# Patient Record
Sex: FEMALE | Race: BLACK OR AFRICAN AMERICAN | Employment: OTHER
[De-identification: names, ages, dates, MRNs, and addresses within clinical notes are randomized per-mention and may not be internally consistent; named-entity substitution may affect disease eponyms.]

---

## 2017-01-16 RX ORDER — NAPROXEN 500 MG/1
TABLET ORAL
Qty: 60 TABLET | Refills: 0 | Status: SHIPPED | OUTPATIENT
Start: 2017-01-16 | End: 2017-02-09 | Stop reason: SDUPTHER

## 2017-01-27 ENCOUNTER — OFFICE VISIT (OUTPATIENT)
Dept: INTERNAL MEDICINE | Facility: CLINIC | Age: 52
End: 2017-01-27

## 2017-01-27 VITALS
WEIGHT: 236 LBS | BODY MASS INDEX: 39.32 KG/M2 | RESPIRATION RATE: 14 BRPM | HEIGHT: 65 IN | DIASTOLIC BLOOD PRESSURE: 88 MMHG | OXYGEN SATURATION: 98 % | HEART RATE: 80 BPM | SYSTOLIC BLOOD PRESSURE: 125 MMHG

## 2017-01-27 DIAGNOSIS — M54.50 CHRONIC BILATERAL LOW BACK PAIN WITHOUT SCIATICA: ICD-10-CM

## 2017-01-27 DIAGNOSIS — G89.29 CHRONIC BILATERAL LOW BACK PAIN WITHOUT SCIATICA: ICD-10-CM

## 2017-01-27 DIAGNOSIS — I10 ESSENTIAL HYPERTENSION: Primary | ICD-10-CM

## 2017-01-27 PROCEDURE — 99213 OFFICE O/P EST LOW 20 MIN: CPT | Performed by: INTERNAL MEDICINE

## 2017-01-27 RX ORDER — BUPROPION HYDROCHLORIDE 300 MG/1
300 TABLET ORAL EVERY MORNING
COMMUNITY
End: 2019-08-12 | Stop reason: SDUPTHER

## 2017-01-27 ASSESSMENT — ENCOUNTER SYMPTOMS
RESPIRATORY NEGATIVE: 1
EYES NEGATIVE: 1
ALLERGIC/IMMUNOLOGIC NEGATIVE: 1
GASTROINTESTINAL NEGATIVE: 1

## 2017-02-09 RX ORDER — NAPROXEN 500 MG/1
TABLET ORAL
Qty: 60 TABLET | Refills: 0 | Status: SHIPPED | OUTPATIENT
Start: 2017-02-09 | End: 2017-03-13 | Stop reason: SDUPTHER

## 2017-02-13 ENCOUNTER — TELEPHONE (OUTPATIENT)
Dept: GASTROENTEROLOGY | Facility: CLINIC | Age: 52
End: 2017-02-13

## 2017-02-14 ENCOUNTER — HOSPITAL ENCOUNTER (OUTPATIENT)
Dept: ULTRASOUND IMAGING | Age: 52
Discharge: HOME | End: 2017-02-14
Payer: MEDICAID

## 2017-02-14 ENCOUNTER — HOSPITAL ENCOUNTER (OUTPATIENT)
Age: 52
Discharge: HOME | End: 2017-02-14
Payer: MEDICAID

## 2017-02-14 DIAGNOSIS — E04.9 GOITER: ICD-10-CM

## 2017-02-14 LAB
ESTRADIOL LEVEL: <5 PG/ML
FOLLICLE STIMULATING HORMONE: 27.6 U/L (ref 1.7–21.5)
LH: 7.5 U/L (ref 1–95.6)
PROLACTIN: 19.51 UG/L (ref 4.79–23.3)

## 2017-02-14 PROCEDURE — 82670 ASSAY OF TOTAL ESTRADIOL: CPT

## 2017-02-14 PROCEDURE — 76536 US EXAM OF HEAD AND NECK: CPT | Performed by: RADIOLOGY

## 2017-02-14 PROCEDURE — 83001 ASSAY OF GONADOTROPIN (FSH): CPT

## 2017-02-14 PROCEDURE — 76536 US EXAM OF HEAD AND NECK: CPT

## 2017-02-14 PROCEDURE — 36415 COLL VENOUS BLD VENIPUNCTURE: CPT

## 2017-02-14 PROCEDURE — 83002 ASSAY OF GONADOTROPIN (LH): CPT

## 2017-02-14 PROCEDURE — 84146 ASSAY OF PROLACTIN: CPT

## 2017-02-21 ENCOUNTER — HOSPITAL ENCOUNTER (OUTPATIENT)
Age: 52
Discharge: HOME | End: 2017-02-21
Payer: MEDICAID

## 2017-02-21 LAB — VITAMIN D 25-HYDROXY: 28.1 NG/ML (ref 30–100)

## 2017-02-21 PROCEDURE — 36415 COLL VENOUS BLD VENIPUNCTURE: CPT

## 2017-02-21 PROCEDURE — 82306 VITAMIN D 25 HYDROXY: CPT

## 2017-03-13 RX ORDER — NAPROXEN 500 MG/1
TABLET ORAL
Qty: 60 TABLET | Refills: 3 | Status: SHIPPED | OUTPATIENT
Start: 2017-03-13 | End: 2017-08-18 | Stop reason: SDUPTHER

## 2017-04-10 DIAGNOSIS — I10 ESSENTIAL HYPERTENSION: ICD-10-CM

## 2017-04-10 RX ORDER — POTASSIUM CHLORIDE 20 MEQ/1
TABLET, EXTENDED RELEASE ORAL
Qty: 60 TABLET | Refills: 0 | Status: SHIPPED | OUTPATIENT
Start: 2017-04-10 | End: 2017-05-09 | Stop reason: SDUPTHER

## 2017-04-14 ENCOUNTER — HOSPITAL ENCOUNTER (EMERGENCY)
Age: 52
Discharge: HOME | End: 2017-04-14
Attending: EMERGENCY MEDICINE
Payer: MEDICAID

## 2017-04-14 ENCOUNTER — APPOINTMENT (OUTPATIENT)
Dept: GENERAL RADIOLOGY | Age: 52
End: 2017-04-14
Payer: MEDICAID

## 2017-04-14 VITALS
SYSTOLIC BLOOD PRESSURE: 147 MMHG | BODY MASS INDEX: 39.99 KG/M2 | OXYGEN SATURATION: 97 % | TEMPERATURE: 97.9 F | DIASTOLIC BLOOD PRESSURE: 98 MMHG | WEIGHT: 240 LBS | RESPIRATION RATE: 16 BRPM | HEART RATE: 88 BPM
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
## 2017-04-14 PROCEDURE — 6360000002 HC RX W HCPCS

## 2017-04-14 PROCEDURE — 96374 THER/PROPH/DIAG INJ IV PUSH: CPT

## 2017-04-14 PROCEDURE — 2500000003 HC RX 250 WO HCPCS: Performed by: EMERGENCY MEDICINE

## 2017-04-14 PROCEDURE — 6360000002 HC RX W HCPCS: Performed by: EMERGENCY MEDICINE

## 2017-04-14 PROCEDURE — 73130 X-RAY EXAM OF HAND: CPT

## 2017-04-14 PROCEDURE — 73090 X-RAY EXAM OF FOREARM: CPT

## 2017-04-14 PROCEDURE — 6370000000 HC RX 637 (ALT 250 FOR IP): Performed by: EMERGENCY MEDICINE

## 2017-04-14 PROCEDURE — 90715 TDAP VACCINE 7 YRS/> IM: CPT | Performed by: EMERGENCY MEDICINE

## 2017-04-14 PROCEDURE — 99284 EMERGENCY DEPT VISIT MOD MDM: CPT

## 2017-04-14 PROCEDURE — 90471 IMMUNIZATION ADMIN: CPT | Performed by: EMERGENCY MEDICINE

## 2017-04-14 PROCEDURE — 96376 TX/PRO/DX INJ SAME DRUG ADON: CPT

## 2017-04-14 RX ORDER — LIDOCAINE HYDROCHLORIDE 10 MG/ML
20 INJECTION, SOLUTION INFILTRATION; PERINEURAL ONCE
Status: COMPLETED | OUTPATIENT
Start: 2017-04-14 | End: 2017-04-14

## 2017-04-14 RX ORDER — OXYCODONE HYDROCHLORIDE AND ACETAMINOPHEN 5; 325 MG/1; MG/1
1-2 TABLET ORAL EVERY 6 HOURS PRN
Qty: 16 TABLET | Refills: 0 | Status: SHIPPED | OUTPATIENT
Start: 2017-04-14 | End: 2017-04-21

## 2017-04-14 RX ORDER — MORPHINE SULFATE 4 MG/ML
4 INJECTION, SOLUTION INTRAMUSCULAR; INTRAVENOUS ONCE
Status: COMPLETED | OUTPATIENT
Start: 2017-04-14 | End: 2017-04-14

## 2017-04-14 RX ORDER — AMOXICILLIN AND CLAVULANATE POTASSIUM 500; 125 MG/1; MG/1
1 TABLET, FILM COATED ORAL 2 TIMES DAILY
Qty: 20 TABLET | Refills: 0 | Status: SHIPPED | OUTPATIENT
Start: 2017-04-14 | End: 2017-04-17 | Stop reason: DRUGHIGH

## 2017-04-14 RX ORDER — MORPHINE SULFATE 4 MG/ML
INJECTION, SOLUTION INTRAMUSCULAR; INTRAVENOUS
Status: COMPLETED
Start: 2017-04-14 | End: 2017-04-14

## 2017-04-14 RX ORDER — AMOXICILLIN AND CLAVULANATE POTASSIUM 500; 125 MG/1; MG/1
1 TABLET, FILM COATED ORAL ONCE
Status: COMPLETED | OUTPATIENT
Start: 2017-04-14 | End: 2017-04-14

## 2017-04-14 RX ORDER — HYDROCODONE BITARTRATE AND ACETAMINOPHEN 5; 325 MG/1; MG/1
1 TABLET ORAL EVERY 8 HOURS PRN
Qty: 10 TABLET | Refills: 0 | Status: SHIPPED | OUTPATIENT
Start: 2017-04-14 | End: 2017-04-21

## 2017-04-14 RX ADMIN — MORPHINE SULFATE 4 MG: 4 INJECTION, SOLUTION INTRAMUSCULAR; INTRAVENOUS at 20:11

## 2017-04-14 RX ADMIN — MORPHINE SULFATE 4 MG: 4 INJECTION, SOLUTION INTRAMUSCULAR; INTRAVENOUS at 21:10

## 2017-04-14 RX ADMIN — AMOXICILLIN AND CLAVULANATE POTASSIUM 1 TABLET: 500; 125 TABLET, FILM COATED ORAL at 22:18

## 2017-04-14 RX ADMIN — LIDOCAINE HYDROCHLORIDE 20 ML: 10 INJECTION, SOLUTION INFILTRATION; PERINEURAL at 21:10

## 2017-04-14 RX ADMIN — TETANUS TOXOID, REDUCED DIPHTHERIA TOXOID AND ACELLULAR PERTUSSIS VACCINE, ADSORBED 0.5 ML: 5; 2.5; 8; 8; 2.5 SUSPENSION INTRAMUSCULAR at 22:16

## 2017-04-14 ASSESSMENT — PAIN DESCRIPTION - DESCRIPTORS: DESCRIPTORS: CONSTANT

## 2017-04-14 ASSESSMENT — PAIN SCALES - GENERAL
PAINLEVEL_OUTOF10: 10
PAINLEVEL_OUTOF10: 8
PAINLEVEL_OUTOF10: 10

## 2017-04-14 ASSESSMENT — ENCOUNTER SYMPTOMS
SHORTNESS OF BREATH: 0
ABDOMINAL PAIN: 0

## 2017-04-14 ASSESSMENT — PAIN DESCRIPTION - LOCATION: LOCATION: ARM

## 2017-04-14 ASSESSMENT — PAIN DESCRIPTION - FREQUENCY: FREQUENCY: CONTINUOUS

## 2017-04-14 ASSESSMENT — PAIN DESCRIPTION - PROGRESSION: CLINICAL_PROGRESSION: NOT CHANGED

## 2017-04-14 ASSESSMENT — PAIN DESCRIPTION - ONSET: ONSET: SUDDEN

## 2017-04-14 ASSESSMENT — PAIN DESCRIPTION - ORIENTATION: ORIENTATION: RIGHT

## 2017-04-14 ASSESSMENT — PAIN DESCRIPTION - PAIN TYPE: TYPE: ACUTE PAIN

## 2017-04-17 ENCOUNTER — OFFICE VISIT (OUTPATIENT)
Dept: INTERNAL MEDICINE | Age: 52
End: 2017-04-17
Payer: MEDICAID

## 2017-04-17 VITALS
BODY MASS INDEX: 39.99 KG/M2 | HEART RATE: 115 BPM | WEIGHT: 240 LBS | HEIGHT: 65 IN | DIASTOLIC BLOOD PRESSURE: 102 MMHG | OXYGEN SATURATION: 97 % | SYSTOLIC BLOOD PRESSURE: 167 MMHG | TEMPERATURE: 100.9 F
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
## 2017-04-17 DIAGNOSIS — I10 ESSENTIAL HYPERTENSION: ICD-10-CM

## 2017-04-17 DIAGNOSIS — R65.10 SIRS (SYSTEMIC INFLAMMATORY RESPONSE SYNDROME) (HCC): Primary | ICD-10-CM

## 2017-04-17 PROCEDURE — 99213 OFFICE O/P EST LOW 20 MIN: CPT | Performed by: HOSPITALIST

## 2017-04-17 RX ORDER — CLINDAMYCIN HYDROCHLORIDE 300 MG/1
300 CAPSULE ORAL 3 TIMES DAILY
Qty: 21 CAPSULE | Refills: 0 | Status: SHIPPED | OUTPATIENT
Start: 2017-04-17 | End: 2017-04-24

## 2017-04-17 RX ORDER — AMOXICILLIN AND CLAVULANATE POTASSIUM 875; 125 MG/1; MG/1
1 TABLET, FILM COATED ORAL 2 TIMES DAILY
Qty: 20 TABLET | Refills: 0 | Status: SHIPPED | OUTPATIENT
Start: 2017-04-17 | End: 2017-04-21 | Stop reason: ALTCHOICE

## 2017-04-21 ENCOUNTER — TELEPHONE (OUTPATIENT)
Dept: INTERNAL MEDICINE | Age: 52
End: 2017-04-21

## 2017-04-21 ENCOUNTER — OFFICE VISIT (OUTPATIENT)
Dept: INTERNAL MEDICINE | Age: 52
End: 2017-04-21
Payer: MEDICAID

## 2017-04-21 VITALS
HEART RATE: 77 BPM | HEIGHT: 65 IN | BODY MASS INDEX: 39.49 KG/M2 | DIASTOLIC BLOOD PRESSURE: 82 MMHG | WEIGHT: 237 LBS | SYSTOLIC BLOOD PRESSURE: 118 MMHG

## 2017-04-21 DIAGNOSIS — J45.22 MILD INTERMITTENT ASTHMA WITH STATUS ASTHMATICUS: ICD-10-CM

## 2017-04-21 DIAGNOSIS — E66.09 NON MORBID OBESITY DUE TO EXCESS CALORIES: ICD-10-CM

## 2017-04-21 DIAGNOSIS — G47.33 OSA (OBSTRUCTIVE SLEEP APNEA): Primary | ICD-10-CM
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
## 2017-04-21 DIAGNOSIS — I10 ESSENTIAL HYPERTENSION: ICD-10-CM

## 2017-04-21 DIAGNOSIS — J35.1 ENLARGED TONSILS: ICD-10-CM

## 2017-04-21 DIAGNOSIS — E87.6 HYPOKALEMIA: ICD-10-CM

## 2017-04-21 PROCEDURE — 99214 OFFICE O/P EST MOD 30 MIN: CPT | Performed by: INTERNAL MEDICINE

## 2017-04-21 RX ORDER — ALBUTEROL SULFATE 90 UG/1
2 AEROSOL, METERED RESPIRATORY (INHALATION) EVERY 6 HOURS PRN
Qty: 1 INHALER | Refills: 3 | Status: SHIPPED | OUTPATIENT
Start: 2017-04-21 | End: 2017-11-14 | Stop reason: SDUPTHER

## 2017-04-24 ENCOUNTER — TELEPHONE (OUTPATIENT)
Dept: INTERNAL MEDICINE | Age: 52
End: 2017-04-24

## 2017-04-25 DIAGNOSIS — R11.2 NAUSEA AND VOMITING, INTRACTABILITY OF VOMITING NOT SPECIFIED, UNSPECIFIED VOMITING TYPE: Primary | ICD-10-CM

## 2017-04-26 RX ORDER — PANTOPRAZOLE SODIUM 40 MG/1
40 TABLET, DELAYED RELEASE ORAL 2 TIMES DAILY
Qty: 60 TABLET | Refills: 3 | Status: SHIPPED | OUTPATIENT
Start: 2017-04-26 | End: 2017-05-09 | Stop reason: CLARIF

## 2017-05-01 ENCOUNTER — HOSPITAL ENCOUNTER (OUTPATIENT)
Dept: SLEEP CENTER | Age: 52
Discharge: HOME | End: 2017-05-01
Payer: MEDICAID

## 2017-05-01 VITALS — RESPIRATION RATE: 20 BRPM | BODY MASS INDEX: 46.53 KG/M2 | HEIGHT: 60 IN | WEIGHT: 237 LBS | HEART RATE: 91 BPM

## 2017-05-01 PROCEDURE — 95872 NDL EMG SINGLE FIBER ELTRD: CPT

## 2017-05-01 PROCEDURE — 95810 POLYSOM 6/> YRS 4/> PARAM: CPT

## 2017-05-01 ASSESSMENT — SLEEP AND FATIGUE QUESTIONNAIRES
HOW LIKELY ARE YOU TO NOD OFF OR FALL ASLEEP WHILE SITTING QUIETLY AFTER LUNCH WITHOUT ALCOHOL: 2
HOW LIKELY ARE YOU TO NOD OFF OR FALL ASLEEP WHILE SITTING AND TALKING TO SOMEONE: 2
HOW LIKELY ARE YOU TO NOD OFF OR FALL ASLEEP WHILE LYING DOWN TO REST IN THE AFTERNOON WHEN CIRCUMSTANCES PERMIT: 3
HOW LIKELY ARE YOU TO NOD OFF OR FALL ASLEEP WHILE SITTING INACTIVE IN A PUBLIC PLACE: 3
ESS TOTAL SCORE: 20
HOW LIKELY ARE YOU TO NOD OFF OR FALL ASLEEP WHEN YOU ARE A PASSENGER IN A CAR FOR AN HOUR WITHOUT A BREAK: 3
HOW LIKELY ARE YOU TO NOD OFF OR FALL ASLEEP IN A CAR, WHILE STOPPED FOR A FEW MINUTES IN TRAFFIC: 2
NECK CIRCUMFERENCE (INCHES): 44
HOW LIKELY ARE YOU TO NOD OFF OR FALL ASLEEP WHILE WATCHING TV: 3
HOW LIKELY ARE YOU TO NOD OFF OR FALL ASLEEP WHILE SITTING AND READING: 2

## 2017-05-05 ENCOUNTER — HOSPITAL ENCOUNTER (OUTPATIENT)
Dept: MAMMOGRAPHY | Age: 52
Discharge: HOME | End: 2017-05-05
Payer: MEDICAID

## 2017-05-05 DIAGNOSIS — Z12.31 ENCOUNTER FOR SCREENING MAMMOGRAM FOR BREAST CANCER: ICD-10-CM

## 2017-05-05 PROCEDURE — 77063 BREAST TOMOSYNTHESIS BI: CPT

## 2017-05-08 ENCOUNTER — HOSPITAL ENCOUNTER (OUTPATIENT)
Age: 52
Discharge: HOME | End: 2017-05-08
Payer: MEDICAID

## 2017-05-08 DIAGNOSIS — E87.6 HYPOKALEMIA: ICD-10-CM

## 2017-05-08 LAB
ANION GAP SERPL CALCULATED.3IONS-SCNC: 16 MMOL/L (ref 9–17)
BUN BLDV-MCNC: 11 MG/DL (ref 6–20)
BUN/CREAT BLD: ABNORMAL (ref 9–20)
CALCIUM SERPL-MCNC: 9 MG/DL (ref 8.6–10.4)
CHLORIDE BLD-SCNC: 98 MMOL/L (ref 98–107)
CO2: 24 MMOL/L (ref 20–31)
CREAT SERPL-MCNC: 1.23 MG/DL (ref 0.5–0.9)
GFR AFRICAN AMERICAN: 56 ML/MIN
GFR NON-AFRICAN AMERICAN: 46 ML/MIN
GFR SERPL CREATININE-BSD FRML MDRD: ABNORMAL ML/MIN/{1.73_M2}
GFR SERPL CREATININE-BSD FRML MDRD: ABNORMAL ML/MIN/{1.73_M2}
GLUCOSE BLD-MCNC: 124 MG/DL (ref 70–99)
MAGNESIUM: 1.9 MG/DL (ref 1.6–2.6)
POTASSIUM SERPL-SCNC: 4.1 MMOL/L (ref 3.7–5.3)
SODIUM BLD-SCNC: 138 MMOL/L (ref 135–144)

## 2017-05-08 PROCEDURE — 36415 COLL VENOUS BLD VENIPUNCTURE: CPT

## 2017-05-08 PROCEDURE — 83735 ASSAY OF MAGNESIUM: CPT

## 2017-05-08 PROCEDURE — 80048 BASIC METABOLIC PNL TOTAL CA: CPT

## 2017-05-09 ENCOUNTER — OFFICE VISIT (OUTPATIENT)
Dept: INTERNAL MEDICINE | Age: 52
End: 2017-05-09
Payer: MEDICAID

## 2017-05-09 VITALS
BODY MASS INDEX: 45.5 KG/M2 | RESPIRATION RATE: 18 BRPM | WEIGHT: 233 LBS | SYSTOLIC BLOOD PRESSURE: 145 MMHG | DIASTOLIC BLOOD PRESSURE: 97 MMHG | HEART RATE: 87 BPM

## 2017-05-09 DIAGNOSIS — Z13.9 SCREENING: ICD-10-CM
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
## 2017-05-09 DIAGNOSIS — Z23 NEED FOR PNEUMOCOCCAL VACCINATION: ICD-10-CM

## 2017-05-09 DIAGNOSIS — I10 ESSENTIAL HYPERTENSION: ICD-10-CM

## 2017-05-09 DIAGNOSIS — I10 ESSENTIAL HYPERTENSION: Primary | ICD-10-CM

## 2017-05-09 PROCEDURE — 90471 IMMUNIZATION ADMIN: CPT | Performed by: INTERNAL MEDICINE

## 2017-05-09 PROCEDURE — 99213 OFFICE O/P EST LOW 20 MIN: CPT | Performed by: INTERNAL MEDICINE

## 2017-05-09 PROCEDURE — 90732 PPSV23 VACC 2 YRS+ SUBQ/IM: CPT | Performed by: INTERNAL MEDICINE

## 2017-05-09 RX ORDER — FAMOTIDINE 20 MG/1
20 TABLET, FILM COATED ORAL 2 TIMES DAILY
Qty: 60 TABLET | Refills: 3 | Status: SHIPPED | OUTPATIENT
Start: 2017-05-09 | End: 2017-11-14 | Stop reason: SDUPTHER

## 2017-05-09 RX ORDER — POTASSIUM CHLORIDE 20 MEQ/1
TABLET, EXTENDED RELEASE ORAL
Qty: 60 TABLET | Refills: 0 | Status: SHIPPED | OUTPATIENT
Start: 2017-05-09 | End: 2017-07-11 | Stop reason: SDUPTHER

## 2017-05-09 ASSESSMENT — ENCOUNTER SYMPTOMS
EYES NEGATIVE: 1
RESPIRATORY NEGATIVE: 1
GASTROINTESTINAL NEGATIVE: 1
ALLERGIC/IMMUNOLOGIC NEGATIVE: 1

## 2017-05-09 ASSESSMENT — PATIENT HEALTH QUESTIONNAIRE - PHQ9
1. LITTLE INTEREST OR PLEASURE IN DOING THINGS: 1
SUM OF ALL RESPONSES TO PHQ9 QUESTIONS 1 & 2: 1
SUM OF ALL RESPONSES TO PHQ QUESTIONS 1-9: 1
2. FEELING DOWN, DEPRESSED OR HOPELESS: 0

## 2017-05-30 DIAGNOSIS — G47.33 OSA (OBSTRUCTIVE SLEEP APNEA): ICD-10-CM

## 2017-07-06 ENCOUNTER — TELEPHONE (OUTPATIENT)
Dept: GASTROENTEROLOGY | Age: 52
End: 2017-07-06

## 2017-07-11 DIAGNOSIS — I10 ESSENTIAL HYPERTENSION: ICD-10-CM

## 2017-07-11 RX ORDER — POTASSIUM CHLORIDE 20 MEQ/1
TABLET, EXTENDED RELEASE ORAL
Qty: 60 TABLET | Refills: 0 | Status: SHIPPED | OUTPATIENT
Start: 2017-07-11 | End: 2017-08-23 | Stop reason: SDUPTHER

## 2017-08-18 ENCOUNTER — OFFICE VISIT (OUTPATIENT)
Dept: INTERNAL MEDICINE | Age: 52
End: 2017-08-18
Payer: MEDICAID

## 2017-08-18 VITALS
BODY MASS INDEX: 39.42 KG/M2 | DIASTOLIC BLOOD PRESSURE: 115 MMHG | HEIGHT: 65 IN | SYSTOLIC BLOOD PRESSURE: 175 MMHG | HEART RATE: 69 BPM | WEIGHT: 236.6 LBS

## 2017-08-18 DIAGNOSIS — I10 ESSENTIAL HYPERTENSION: Primary | ICD-10-CM

## 2017-08-18 DIAGNOSIS — Z13.9 SCREENING PROCEDURE: ICD-10-CM

## 2017-08-18 DIAGNOSIS — R73.03 PREDIABETES: ICD-10-CM

## 2017-08-18 PROCEDURE — 99213 OFFICE O/P EST LOW 20 MIN: CPT | Performed by: INTERNAL MEDICINE

## 2017-08-18 RX ORDER — NAPROXEN 500 MG/1
TABLET ORAL
Qty: 60 TABLET | Refills: 3 | Status: SHIPPED | OUTPATIENT
Start: 2017-08-18 | End: 2017-11-14 | Stop reason: SDUPTHER

## 2017-08-18 RX ORDER — HYDROCHLOROTHIAZIDE 25 MG/1
25 TABLET ORAL DAILY
Qty: 30 TABLET | Refills: 11 | Status: SHIPPED | OUTPATIENT
Start: 2017-08-18 | End: 2017-11-14 | Stop reason: SDUPTHER

## 2017-08-18 ASSESSMENT — ENCOUNTER SYMPTOMS
EYES NEGATIVE: 1
GASTROINTESTINAL NEGATIVE: 1
RESPIRATORY NEGATIVE: 1
ALLERGIC/IMMUNOLOGIC NEGATIVE: 1

## 2017-08-23 ENCOUNTER — HOSPITAL ENCOUNTER (OUTPATIENT)
Age: 52
Discharge: HOME | End: 2017-08-23
Payer: MEDICAID

## 2017-08-23 DIAGNOSIS — R73.03 PREDIABETES: Primary | ICD-10-CM

## 2017-08-23 DIAGNOSIS — I10 ESSENTIAL HYPERTENSION: ICD-10-CM

## 2017-08-23 DIAGNOSIS — R73.03 PREDIABETES: ICD-10-CM

## 2017-08-23 LAB
ANION GAP SERPL CALCULATED.3IONS-SCNC: 21 MMOL/L (ref 9–17)
BUN BLDV-MCNC: 9 MG/DL (ref 6–20)
BUN/CREAT BLD: ABNORMAL (ref 9–20)
CALCIUM SERPL-MCNC: 9.1 MG/DL (ref 8.6–10.4)
CHLORIDE BLD-SCNC: 99 MMOL/L (ref 98–107)
CO2: 23 MMOL/L (ref 20–31)
CREAT SERPL-MCNC: 1.16 MG/DL (ref 0.5–0.9)
ESTIMATED AVERAGE GLUCOSE: 146 MG/DL
GFR AFRICAN AMERICAN: 60 ML/MIN
GFR NON-AFRICAN AMERICAN: 49 ML/MIN
GFR SERPL CREATININE-BSD FRML MDRD: ABNORMAL ML/MIN/{1.73_M2}
GFR SERPL CREATININE-BSD FRML MDRD: ABNORMAL ML/MIN/{1.73_M2}
GLUCOSE BLD-MCNC: 121 MG/DL (ref 70–99)
HBA1C MFR BLD: 6.7 % (ref 4–6)
POTASSIUM SERPL-SCNC: 3.5 MMOL/L (ref 3.7–5.3)
SODIUM BLD-SCNC: 143 MMOL/L (ref 135–144)

## 2017-08-23 PROCEDURE — 83036 HEMOGLOBIN GLYCOSYLATED A1C: CPT

## 2017-08-23 PROCEDURE — 80048 BASIC METABOLIC PNL TOTAL CA: CPT

## 2017-08-23 PROCEDURE — 36415 COLL VENOUS BLD VENIPUNCTURE: CPT

## 2017-08-23 RX ORDER — POTASSIUM CHLORIDE 20 MEQ/1
20 TABLET, EXTENDED RELEASE ORAL 3 TIMES DAILY
Qty: 90 TABLET | Refills: 2 | Status: SHIPPED | OUTPATIENT
Start: 2017-08-23 | End: 2017-11-14 | Stop reason: SDUPTHER

## 2017-08-24 ENCOUNTER — TELEPHONE (OUTPATIENT)
Dept: INTERNAL MEDICINE | Age: 52
End: 2017-08-24

## 2017-08-24 RX ORDER — LISINOPRIL 30 MG/1
30 TABLET ORAL DAILY
Qty: 30 TABLET | Refills: 2 | Status: SHIPPED | OUTPATIENT
Start: 2017-08-24 | End: 2017-11-14 | Stop reason: SDUPTHER

## 2017-08-25 DIAGNOSIS — I10 ESSENTIAL HYPERTENSION: ICD-10-CM

## 2017-08-25 DIAGNOSIS — J03.90 TONSILLITIS: ICD-10-CM

## 2017-08-25 LAB
CONTROL: NORMAL
HEMOCCULT STL QL: NORMAL

## 2017-08-25 RX ORDER — AMLODIPINE BESYLATE 10 MG/1
TABLET ORAL
Qty: 30 TABLET | Refills: 0 | Status: SHIPPED | OUTPATIENT
Start: 2017-08-25 | End: 2017-09-22 | Stop reason: SDUPTHER

## 2017-08-25 RX ORDER — ASPIRIN 81 MG
TABLET, DELAYED RELEASE (ENTERIC COATED) ORAL
Qty: 28 TABLET | Refills: 0 | Status: SHIPPED | OUTPATIENT
Start: 2017-08-25 | End: 2017-09-22 | Stop reason: SDUPTHER

## 2017-08-25 RX ORDER — LORATADINE 10 MG/1
TABLET ORAL
Qty: 30 TABLET | Refills: 0 | Status: SHIPPED | OUTPATIENT
Start: 2017-08-25 | End: 2017-09-22 | Stop reason: SDUPTHER

## 2017-08-28 DIAGNOSIS — Z12.11 COLON CANCER SCREENING: Primary | ICD-10-CM

## 2017-08-28 PROCEDURE — 82274 ASSAY TEST FOR BLOOD FECAL: CPT | Performed by: INTERNAL MEDICINE

## 2017-08-28 RX ORDER — PANTOPRAZOLE SODIUM 40 MG/1
TABLET, DELAYED RELEASE ORAL
Qty: 60 TABLET | Refills: 0 | Status: SHIPPED | OUTPATIENT
Start: 2017-08-28 | End: 2017-11-03 | Stop reason: SDUPTHER

## 2017-09-22 DIAGNOSIS — J03.90 TONSILLITIS: ICD-10-CM

## 2017-09-22 DIAGNOSIS — I10 ESSENTIAL HYPERTENSION: ICD-10-CM

## 2017-09-22 RX ORDER — ASPIRIN 81 MG
TABLET, DELAYED RELEASE (ENTERIC COATED) ORAL
Qty: 28 TABLET | Refills: 1 | Status: SHIPPED | OUTPATIENT
Start: 2017-09-22 | End: 2017-11-14 | Stop reason: SDUPTHER

## 2017-09-22 RX ORDER — AMLODIPINE BESYLATE 10 MG/1
TABLET ORAL
Qty: 30 TABLET | Refills: 1 | Status: SHIPPED | OUTPATIENT
Start: 2017-09-22 | End: 2017-11-14 | Stop reason: SDUPTHER

## 2017-09-22 RX ORDER — LORATADINE 10 MG/1
TABLET ORAL
Qty: 30 TABLET | Refills: 1 | Status: SHIPPED | OUTPATIENT
Start: 2017-09-22 | End: 2017-11-14 | Stop reason: SDUPTHER

## 2017-11-03 RX ORDER — PANTOPRAZOLE SODIUM 40 MG/1
TABLET, DELAYED RELEASE ORAL
Qty: 60 TABLET | Refills: 0 | Status: ON HOLD | OUTPATIENT
Start: 2017-11-03 | End: 2017-11-18 | Stop reason: HOSPADM

## 2017-11-03 NOTE — TELEPHONE ENCOUNTER
Health Maintenance   Topic Date Due    Flu vaccine (1) 09/01/2017    Breast cancer screen  05/05/2019    Diabetes screen  08/23/2020    Lipid screen  06/21/2021    Colon cancer screen colonoscopy  12/10/2023    DTaP/Tdap/Td vaccine (3 - Td) 04/14/2027    Pneumococcal med risk  Completed    HIV screen  Completed             (applicable per patient's age: Cancer Screenings, Depression Screening, Fall Risk Screening, Immunizations)    Hemoglobin A1C (%)   Date Value   08/23/2017 6.7 (H)   01/20/2015 5.5   12/18/2012 5.4     Microalb/Crt.  Ratio (mcg/mg creat)   Date Value   12/18/2012 15     LDL Cholesterol (mg/dL)   Date Value   06/21/2016 139 (H)     AST (U/L)   Date Value   09/25/2016 29     ALT (U/L)   Date Value   09/25/2016 29     BUN (mg/dL)   Date Value   08/23/2017 9      (goal A1C is < 7)   (goal LDL is <100) need 30-50% reduction from baseline     BP Readings from Last 3 Encounters:   08/18/17 (!) 175/115   05/09/17 (!) 145/97   04/21/17 118/82    (goal /80)      All Future Testing planned in [facility]:  Lab Frequency Next Occurrence   Sleep Study with PAP Titration Once 04/21/2017   HIV Screen Once 11/10/2017   Hemoglobin A1C Once 11/30/2017       Next Visit Date:  Future Appointments  Date Time Provider Anthony Everett   11/14/2017 9:45 AM Blayne Jacobs MD [id number]            Patient Active Problem List:     HTN (hypertension)     Major depressive disorder, recurrent episode ([facility] [address]     Lung nodule     Obesity     Adrenal adenoma     Goiter     Alcohol abuse     Essential hypertension     Hypokalemia     Enlarged tonsils

## 2017-11-14 ENCOUNTER — OFFICE VISIT (OUTPATIENT)
Dept: INTERNAL MEDICINE | Age: 52
End: 2017-11-14
Payer: MEDICAID

## 2017-11-14 VITALS
WEIGHT: 224 LBS | DIASTOLIC BLOOD PRESSURE: 113 MMHG | BODY MASS INDEX: 37.32 KG/M2 | SYSTOLIC BLOOD PRESSURE: 177 MMHG | HEART RATE: 82 BPM | HEIGHT: 65 IN

## 2017-11-14 DIAGNOSIS — R73.03 PREDIABETES: ICD-10-CM

## 2017-11-14 DIAGNOSIS — J03.90 TONSILLITIS: ICD-10-CM

## 2017-11-14 DIAGNOSIS — G89.29 CHRONIC BILATERAL LOW BACK PAIN WITHOUT SCIATICA: Primary | ICD-10-CM

## 2017-11-14 DIAGNOSIS — M54.50 CHRONIC BILATERAL LOW BACK PAIN WITHOUT SCIATICA: Primary | ICD-10-CM

## 2017-11-14 DIAGNOSIS — I10 ESSENTIAL HYPERTENSION: ICD-10-CM

## 2017-11-14 DIAGNOSIS — J45.22 MILD INTERMITTENT ASTHMA WITH STATUS ASTHMATICUS: ICD-10-CM

## 2017-11-14 PROCEDURE — 99214 OFFICE O/P EST MOD 30 MIN: CPT | Performed by: INTERNAL MEDICINE

## 2017-11-14 PROCEDURE — G8417 CALC BMI ABV UP PARAM F/U: HCPCS | Performed by: INTERNAL MEDICINE

## 2017-11-14 PROCEDURE — 3014F SCREEN MAMMO DOC REV: CPT | Performed by: INTERNAL MEDICINE

## 2017-11-14 PROCEDURE — 1036F TOBACCO NON-USER: CPT | Performed by: INTERNAL MEDICINE

## 2017-11-14 PROCEDURE — 3017F COLORECTAL CA SCREEN DOC REV: CPT | Performed by: INTERNAL MEDICINE

## 2017-11-14 PROCEDURE — G8484 FLU IMMUNIZE NO ADMIN: HCPCS | Performed by: INTERNAL MEDICINE

## 2017-11-14 PROCEDURE — G8427 DOCREV CUR MEDS BY ELIG CLIN: HCPCS | Performed by: INTERNAL MEDICINE

## 2017-11-14 RX ORDER — POTASSIUM CHLORIDE 20 MEQ/1
20 TABLET, EXTENDED RELEASE ORAL 3 TIMES DAILY
Qty: 90 TABLET | Refills: 2 | Status: SHIPPED | OUTPATIENT
Start: 2017-11-14 | End: 2017-12-12

## 2017-11-14 RX ORDER — FAMOTIDINE 20 MG/1
20 TABLET, FILM COATED ORAL 2 TIMES DAILY
Qty: 60 TABLET | Refills: 3 | Status: SHIPPED | OUTPATIENT
Start: 2017-11-14 | End: 2018-03-06

## 2017-11-14 RX ORDER — ALBUTEROL SULFATE 90 UG/1
2 AEROSOL, METERED RESPIRATORY (INHALATION) EVERY 6 HOURS PRN
Qty: 1 INHALER | Refills: 3 | Status: SHIPPED | OUTPATIENT
Start: 2017-11-14 | End: 2018-12-07 | Stop reason: SDUPTHER

## 2017-11-14 RX ORDER — CITALOPRAM 40 MG/1
40 TABLET ORAL DAILY
Qty: 30 TABLET | Refills: 3 | Status: SHIPPED | OUTPATIENT
Start: 2017-11-14 | End: 2018-03-22 | Stop reason: SDUPTHER

## 2017-11-14 RX ORDER — LORATADINE 10 MG/1
TABLET ORAL
Qty: 30 TABLET | Refills: 1 | Status: ON HOLD | OUTPATIENT
Start: 2017-11-14 | End: 2017-11-18 | Stop reason: HOSPADM

## 2017-11-14 RX ORDER — MIRTAZAPINE 45 MG/1
45 TABLET, FILM COATED ORAL NIGHTLY
Qty: 30 TABLET | Refills: 3 | Status: SHIPPED | OUTPATIENT
Start: 2017-11-14 | End: 2018-02-08 | Stop reason: SDUPTHER

## 2017-11-14 RX ORDER — LISINOPRIL 30 MG/1
30 TABLET ORAL DAILY
Qty: 30 TABLET | Refills: 2 | Status: ON HOLD | OUTPATIENT
Start: 2017-11-14 | End: 2017-11-18 | Stop reason: HOSPADM

## 2017-11-14 RX ORDER — AMLODIPINE BESYLATE 10 MG/1
TABLET ORAL
Qty: 30 TABLET | Refills: 1 | Status: SHIPPED | OUTPATIENT
Start: 2017-11-14 | End: 2018-02-08 | Stop reason: SDUPTHER

## 2017-11-14 RX ORDER — CYCLOBENZAPRINE HCL 5 MG
TABLET ORAL
Qty: 60 TABLET | Refills: 1 | Status: SHIPPED | OUTPATIENT
Start: 2017-11-14 | End: 2020-10-02 | Stop reason: ALTCHOICE

## 2017-11-14 RX ORDER — ATENOLOL 50 MG/1
50 TABLET ORAL DAILY
Qty: 30 TABLET | Refills: 3 | Status: SHIPPED | OUTPATIENT
Start: 2017-11-14 | End: 2018-02-08 | Stop reason: SDUPTHER

## 2017-11-14 RX ORDER — CETIRIZINE HYDROCHLORIDE 10 MG/1
10 TABLET ORAL DAILY
Qty: 30 TABLET | Refills: 5 | Status: SHIPPED | OUTPATIENT
Start: 2017-11-14 | End: 2018-07-03 | Stop reason: SDUPTHER

## 2017-11-14 RX ORDER — ASPIRIN 81 MG/1
TABLET ORAL
Qty: 28 TABLET | Refills: 1 | Status: SHIPPED | OUTPATIENT
Start: 2017-11-14 | End: 2017-12-22 | Stop reason: SDUPTHER

## 2017-11-14 RX ORDER — NAPROXEN 500 MG/1
TABLET ORAL
Qty: 60 TABLET | Refills: 3 | Status: SHIPPED | OUTPATIENT
Start: 2017-11-14 | End: 2017-11-17

## 2017-11-14 RX ORDER — HYDROCHLOROTHIAZIDE 25 MG/1
25 TABLET ORAL DAILY
Qty: 30 TABLET | Refills: 11 | Status: SHIPPED | OUTPATIENT
Start: 2017-11-14 | End: 2018-12-07 | Stop reason: SDUPTHER

## 2017-11-14 ASSESSMENT — ENCOUNTER SYMPTOMS
RESPIRATORY NEGATIVE: 1
BACK PAIN: 1
GASTROINTESTINAL NEGATIVE: 1
ALLERGIC/IMMUNOLOGIC NEGATIVE: 1

## 2017-11-14 NOTE — PROGRESS NOTES
Chronic Disease Visit Information    BP Readings from Last 3 Encounters:   08/18/17 (!) 175/115   05/09/17 (!) 145/97   04/21/17 118/82          Hemoglobin A1C (%)   Date Value   08/23/2017 6.7 (H)   01/20/2015 5.5   12/18/2012 5.4     Microalb/Crt. Ratio (mcg/mg creat)   Date Value   12/18/2012 15     LDL Cholesterol (mg/dL)   Date Value   06/21/2016 139 (H)     HDL (mg/dL)   Date Value   06/21/2016 49     BUN (mg/dL)   Date Value   08/23/2017 9     CREATININE (mg/dL)   Date Value   08/23/2017 1.16 (H)     Glucose (mg/dL)   Date Value   08/23/2017 121 (H)   02/24/2012 88            Have you changed or started any medications since your last visit including any over-the-counter medicines, vitamins, or herbal medicines? no   Are you having any side effects from any of your medications? -  yes - Naproxen not working for patient. Have you stopped taking any of your medications? Is so, why? -  no    Have you seen any other physician or provider since your last visit? No  Have you had any other diagnostic tests since your last visit? No  Have you been seen in the emergency room and/or had an admission to a hospital since we last saw you? No  Have you had your annual diabetic retinal (eye) exam? No  Have you had your routine dental cleaning in the past 6 months? yes - routine cleaning    Have you activated your [employer] account? If not, what are your barriers?  Yes     Patient Care Team:  Ileana Iglesias MD as PCP - General         Medical History Review  Past Medical, Family, and Social History reviewed and does not contribute to the patient presenting condition    Health Maintenance   Topic Date Due    Flu vaccine (1) 09/01/2017    Breast cancer screen  05/05/2019    Diabetes screen  08/23/2020    Lipid screen  06/21/2021    Colon cancer screen colonoscopy  12/10/2023    DTaP/Tdap/Td vaccine (3 - Td) 04/14/2027    Pneumococcal med risk  Completed    HIV screen  Completed

## 2017-11-15 ENCOUNTER — HOSPITAL ENCOUNTER (OUTPATIENT)
Age: 52
Discharge: HOME | DRG: 811 | End: 2017-11-15
Payer: MEDICAID

## 2017-11-15 ENCOUNTER — HOSPITAL ENCOUNTER (OUTPATIENT)
Dept: GENERAL RADIOLOGY | Age: 52
Discharge: HOME | DRG: 811 | End: 2017-11-15
Payer: MEDICAID

## 2017-11-15 DIAGNOSIS — M54.50 CHRONIC BILATERAL LOW BACK PAIN WITHOUT SCIATICA: ICD-10-CM

## 2017-11-15 DIAGNOSIS — G89.29 CHRONIC BILATERAL LOW BACK PAIN WITHOUT SCIATICA: ICD-10-CM

## 2017-11-15 DIAGNOSIS — R73.03 PREDIABETES: ICD-10-CM

## 2017-11-15 LAB
ANION GAP SERPL CALCULATED.3IONS-SCNC: 19 MMOL/L (ref 9–17)
BUN BLDV-MCNC: 15 MG/DL (ref 6–20)
BUN/CREAT BLD: ABNORMAL (ref 9–20)
CALCIUM SERPL-MCNC: 9.5 MG/DL (ref 8.6–10.4)
CHLORIDE BLD-SCNC: 92 MMOL/L (ref 98–107)
CO2: 24 MMOL/L (ref 20–31)
CREAT SERPL-MCNC: 1.6 MG/DL (ref 0.5–0.9)
ESTIMATED AVERAGE GLUCOSE: 131 MG/DL
GFR AFRICAN AMERICAN: 41 ML/MIN
GFR NON-AFRICAN AMERICAN: 34 ML/MIN
GFR SERPL CREATININE-BSD FRML MDRD: ABNORMAL ML/MIN/{1.73_M2}
GFR SERPL CREATININE-BSD FRML MDRD: ABNORMAL ML/MIN/{1.73_M2}
GLUCOSE BLD-MCNC: 93 MG/DL (ref 70–99)
HBA1C MFR BLD: 6.2 % (ref 4–6)
POTASSIUM SERPL-SCNC: 3.8 MMOL/L (ref 3.7–5.3)
SODIUM BLD-SCNC: 135 MMOL/L (ref 135–144)

## 2017-11-15 PROCEDURE — 36415 COLL VENOUS BLD VENIPUNCTURE: CPT

## 2017-11-15 PROCEDURE — 80048 BASIC METABOLIC PNL TOTAL CA: CPT

## 2017-11-15 PROCEDURE — 83036 HEMOGLOBIN GLYCOSYLATED A1C: CPT

## 2017-11-15 PROCEDURE — 72100 X-RAY EXAM L-S SPINE 2/3 VWS: CPT

## 2017-11-17 ENCOUNTER — HOSPITAL ENCOUNTER (OUTPATIENT)
Age: 52
Setting detail: OBSERVATION
Discharge: HOME HEALTH | DRG: 811 | End: 2017-11-18
Attending: EMERGENCY MEDICINE | Admitting: INTERNAL MEDICINE
Payer: MEDICAID

## 2017-11-17 DIAGNOSIS — N17.9 AKI (ACUTE KIDNEY INJURY) (HCC): ICD-10-CM

## 2017-11-17 DIAGNOSIS — T78.3XXA ANGIOEDEMA, INITIAL ENCOUNTER: Primary | ICD-10-CM

## 2017-11-17 DIAGNOSIS — I10 ESSENTIAL HYPERTENSION: ICD-10-CM

## 2017-11-17 PROBLEM — T46.4X5A ACE INHIBITOR-AGGRAVATED ANGIOEDEMA: Status: ACTIVE | Noted: 2017-11-17

## 2017-11-17 LAB
ABSOLUTE EOS #: 0.16 K/UL (ref 0–0.44)
ABSOLUTE IMMATURE GRANULOCYTE: 0 K/UL (ref 0–0.3)
ABSOLUTE LYMPH #: 1.97 K/UL (ref 1.1–3.7)
ABSOLUTE MONO #: 1.64 K/UL (ref 0.1–1.2)
ANION GAP SERPL CALCULATED.3IONS-SCNC: 16 MMOL/L (ref 9–17)
BASOPHILS # BLD: 0 % (ref 0–2)
BASOPHILS ABSOLUTE: 0 K/UL (ref 0–0.2)
BUN BLDV-MCNC: 21 MG/DL (ref 6–20)
BUN/CREAT BLD: ABNORMAL (ref 9–20)
CALCIUM SERPL-MCNC: 9.5 MG/DL (ref 8.6–10.4)
CHLORIDE BLD-SCNC: 91 MMOL/L (ref 98–107)
CO2: 25 MMOL/L (ref 20–31)
CREAT SERPL-MCNC: 1.37 MG/DL (ref 0.5–0.9)
DIFFERENTIAL TYPE: ABNORMAL
EKG ATRIAL RATE: 97 BPM
EKG P AXIS: 37 DEGREES
EKG P-R INTERVAL: 186 MS
EKG Q-T INTERVAL: 382 MS
EKG QRS DURATION: 92 MS
EKG QTC CALCULATION (BAZETT): 485 MS
EKG R AXIS: -17 DEGREES
EKG T AXIS: 68 DEGREES
EKG VENTRICULAR RATE: 97 BPM
EOSINOPHILS RELATIVE PERCENT: 2 % (ref 1–4)
GFR AFRICAN AMERICAN: 49 ML/MIN
GFR NON-AFRICAN AMERICAN: 41 ML/MIN
GFR SERPL CREATININE-BSD FRML MDRD: ABNORMAL ML/MIN/{1.73_M2}
GFR SERPL CREATININE-BSD FRML MDRD: ABNORMAL ML/MIN/{1.73_M2}
GLUCOSE BLD-MCNC: 133 MG/DL (ref 70–99)
GLUCOSE BLD-MCNC: 279 MG/DL (ref 65–105)
HCT VFR BLD CALC: 36.4 % (ref 36.3–47.1)
HEMOGLOBIN: 12.2 G/DL (ref 11.9–15.1)
IMMATURE GRANULOCYTES: 0 %
LYMPHOCYTES # BLD: 24 % (ref 24–43)
MCH RBC QN AUTO: 31.4 PG (ref 25.2–33.5)
MCHC RBC AUTO-ENTMCNC: 33.5 G/DL (ref 28.4–34.8)
MCV RBC AUTO: 93.6 FL (ref 82.6–102.9)
MONOCYTES # BLD: 20 % (ref 3–12)
MORPHOLOGY: ABNORMAL
MRSA, DNA, NASAL: NORMAL
PDW BLD-RTO: 14.8 % (ref 11.8–14.4)
PLATELET # BLD: 506 K/UL (ref 138–453)
PLATELET ESTIMATE: ABNORMAL
PMV BLD AUTO: 9.7 FL (ref 8.1–13.5)
POC TROPONIN I: 0 NG/ML (ref 0–0.1)
POC TROPONIN INTERP: NORMAL
POTASSIUM SERPL-SCNC: 3.9 MMOL/L (ref 3.7–5.3)
RBC # BLD: 3.89 M/UL (ref 3.95–5.11)
RBC # BLD: ABNORMAL 10*6/UL
SEG NEUTROPHILS: 54 % (ref 36–65)
SEGMENTED NEUTROPHILS ABSOLUTE COUNT: 4.43 K/UL (ref 1.5–8.1)
SODIUM BLD-SCNC: 132 MMOL/L (ref 135–144)
SPECIMEN DESCRIPTION: NORMAL
WBC # BLD: 8.2 K/UL (ref 3.5–11.3)
WBC # BLD: ABNORMAL 10*3/UL

## 2017-11-17 PROCEDURE — 6370000000 HC RX 637 (ALT 250 FOR IP): Performed by: EMERGENCY MEDICINE

## 2017-11-17 PROCEDURE — G8979 MOBILITY GOAL STATUS: HCPCS

## 2017-11-17 PROCEDURE — 94640 AIRWAY INHALATION TREATMENT: CPT

## 2017-11-17 PROCEDURE — 6360000002 HC RX W HCPCS: Performed by: INTERNAL MEDICINE

## 2017-11-17 PROCEDURE — 97162 PT EVAL MOD COMPLEX 30 MIN: CPT

## 2017-11-17 PROCEDURE — 93005 ELECTROCARDIOGRAM TRACING: CPT

## 2017-11-17 PROCEDURE — 6360000002 HC RX W HCPCS

## 2017-11-17 PROCEDURE — G0378 HOSPITAL OBSERVATION PER HR: HCPCS

## 2017-11-17 PROCEDURE — 6370000000 HC RX 637 (ALT 250 FOR IP): Performed by: STUDENT IN AN ORGANIZED HEALTH CARE EDUCATION/TRAINING PROGRAM

## 2017-11-17 PROCEDURE — 85025 COMPLETE CBC W/AUTO DIFF WBC: CPT

## 2017-11-17 PROCEDURE — 96374 THER/PROPH/DIAG INJ IV PUSH: CPT

## 2017-11-17 PROCEDURE — 87641 MR-STAPH DNA AMP PROBE: CPT

## 2017-11-17 PROCEDURE — 2500000003 HC RX 250 WO HCPCS: Performed by: EMERGENCY MEDICINE

## 2017-11-17 PROCEDURE — 1200000000 HC SEMI PRIVATE

## 2017-11-17 PROCEDURE — G0008 ADMIN INFLUENZA VIRUS VAC: HCPCS | Performed by: INTERNAL MEDICINE

## 2017-11-17 PROCEDURE — 97530 THERAPEUTIC ACTIVITIES: CPT

## 2017-11-17 PROCEDURE — 94664 DEMO&/EVAL PT USE INHALER: CPT

## 2017-11-17 PROCEDURE — 82947 ASSAY GLUCOSE BLOOD QUANT: CPT

## 2017-11-17 PROCEDURE — 94762 N-INVAS EAR/PLS OXIMTRY CONT: CPT

## 2017-11-17 PROCEDURE — 2580000003 HC RX 258: Performed by: INTERNAL MEDICINE

## 2017-11-17 PROCEDURE — S0028 INJECTION, FAMOTIDINE, 20 MG: HCPCS | Performed by: EMERGENCY MEDICINE

## 2017-11-17 PROCEDURE — 99291 CRITICAL CARE FIRST HOUR: CPT | Performed by: INTERNAL MEDICINE

## 2017-11-17 PROCEDURE — 99285 EMERGENCY DEPT VISIT HI MDM: CPT

## 2017-11-17 PROCEDURE — 96375 TX/PRO/DX INJ NEW DRUG ADDON: CPT

## 2017-11-17 PROCEDURE — 84484 ASSAY OF TROPONIN QUANT: CPT

## 2017-11-17 PROCEDURE — 80048 BASIC METABOLIC PNL TOTAL CA: CPT

## 2017-11-17 PROCEDURE — 90686 IIV4 VACC NO PRSV 0.5 ML IM: CPT | Performed by: INTERNAL MEDICINE

## 2017-11-17 PROCEDURE — 6370000000 HC RX 637 (ALT 250 FOR IP): Performed by: INTERNAL MEDICINE

## 2017-11-17 PROCEDURE — 96372 THER/PROPH/DIAG INJ SC/IM: CPT

## 2017-11-17 PROCEDURE — G8978 MOBILITY CURRENT STATUS: HCPCS

## 2017-11-17 PROCEDURE — G8980 MOBILITY D/C STATUS: HCPCS

## 2017-11-17 RX ORDER — BUPROPION HYDROCHLORIDE 150 MG/1
300 TABLET ORAL EVERY MORNING
Status: DISCONTINUED | OUTPATIENT
Start: 2017-11-18 | End: 2017-11-18 | Stop reason: HOSPADM

## 2017-11-17 RX ORDER — AMLODIPINE BESYLATE 10 MG/1
10 TABLET ORAL NIGHTLY
Status: DISCONTINUED | OUTPATIENT
Start: 2017-11-17 | End: 2017-11-18 | Stop reason: HOSPADM

## 2017-11-17 RX ORDER — ATENOLOL 50 MG/1
50 TABLET ORAL DAILY
Status: DISCONTINUED | OUTPATIENT
Start: 2017-11-17 | End: 2017-11-18 | Stop reason: HOSPADM

## 2017-11-17 RX ORDER — EPINEPHRINE 1 MG/ML
INJECTION, SOLUTION, CONCENTRATE INTRAVENOUS
Status: COMPLETED
Start: 2017-11-17 | End: 2017-11-17

## 2017-11-17 RX ORDER — ICATIBANT 30 MG/3ML
30 INJECTION, SOLUTION SUBCUTANEOUS ONCE
Status: COMPLETED | OUTPATIENT
Start: 2017-11-17 | End: 2017-11-17

## 2017-11-17 RX ORDER — ASPIRIN 81 MG/1
81 TABLET ORAL DAILY
Status: DISCONTINUED | OUTPATIENT
Start: 2017-11-17 | End: 2017-11-18 | Stop reason: HOSPADM

## 2017-11-17 RX ORDER — CETIRIZINE HYDROCHLORIDE 10 MG/1
10 TABLET ORAL DAILY
Status: DISCONTINUED | OUTPATIENT
Start: 2017-11-17 | End: 2017-11-18 | Stop reason: HOSPADM

## 2017-11-17 RX ORDER — DIPHENHYDRAMINE HYDROCHLORIDE 50 MG/ML
INJECTION INTRAMUSCULAR; INTRAVENOUS
Status: COMPLETED
Start: 2017-11-17 | End: 2017-11-17

## 2017-11-17 RX ORDER — METHYLPREDNISOLONE SODIUM SUCCINATE 125 MG/2ML
INJECTION, POWDER, LYOPHILIZED, FOR SOLUTION INTRAMUSCULAR; INTRAVENOUS
Status: COMPLETED
Start: 2017-11-17 | End: 2017-11-17

## 2017-11-17 RX ORDER — ALBUTEROL SULFATE 90 UG/1
2 AEROSOL, METERED RESPIRATORY (INHALATION) EVERY 6 HOURS PRN
Status: DISCONTINUED | OUTPATIENT
Start: 2017-11-17 | End: 2017-11-18 | Stop reason: HOSPADM

## 2017-11-17 RX ORDER — ACETAMINOPHEN 325 MG/1
650 TABLET ORAL EVERY 4 HOURS PRN
Status: DISCONTINUED | OUTPATIENT
Start: 2017-11-17 | End: 2017-11-18 | Stop reason: HOSPADM

## 2017-11-17 RX ORDER — CYCLOBENZAPRINE HCL 5 MG
5 TABLET ORAL 2 TIMES DAILY PRN
Status: DISCONTINUED | OUTPATIENT
Start: 2017-11-17 | End: 2017-11-18 | Stop reason: HOSPADM

## 2017-11-17 RX ORDER — SODIUM CHLORIDE 0.9 % (FLUSH) 0.9 %
10 SYRINGE (ML) INJECTION EVERY 12 HOURS SCHEDULED
Status: DISCONTINUED | OUTPATIENT
Start: 2017-11-17 | End: 2017-11-18 | Stop reason: HOSPADM

## 2017-11-17 RX ORDER — ONDANSETRON 2 MG/ML
INJECTION INTRAMUSCULAR; INTRAVENOUS
Status: COMPLETED
Start: 2017-11-17 | End: 2017-11-17

## 2017-11-17 RX ORDER — CITALOPRAM 20 MG/1
40 TABLET ORAL DAILY
Status: DISCONTINUED | OUTPATIENT
Start: 2017-11-17 | End: 2017-11-18 | Stop reason: HOSPADM

## 2017-11-17 RX ORDER — HYDROCHLOROTHIAZIDE 25 MG/1
25 TABLET ORAL DAILY
Status: DISCONTINUED | OUTPATIENT
Start: 2017-11-17 | End: 2017-11-18

## 2017-11-17 RX ORDER — ONDANSETRON 2 MG/ML
4 INJECTION INTRAMUSCULAR; INTRAVENOUS EVERY 6 HOURS PRN
Status: DISCONTINUED | OUTPATIENT
Start: 2017-11-17 | End: 2017-11-18 | Stop reason: HOSPADM

## 2017-11-17 RX ORDER — EPINEPHRINE 1 MG/ML
0.3 INJECTION, SOLUTION, CONCENTRATE INTRAVENOUS ONCE
Status: COMPLETED | OUTPATIENT
Start: 2017-11-17 | End: 2017-11-17

## 2017-11-17 RX ORDER — NAPROXEN 250 MG/1
250 TABLET ORAL ONCE
Status: COMPLETED | OUTPATIENT
Start: 2017-11-17 | End: 2017-11-17

## 2017-11-17 RX ORDER — DOCUSATE SODIUM 100 MG/1
100 CAPSULE, LIQUID FILLED ORAL 2 TIMES DAILY PRN
Status: DISCONTINUED | OUTPATIENT
Start: 2017-11-17 | End: 2017-11-18 | Stop reason: HOSPADM

## 2017-11-17 RX ORDER — CALCIUM CARBONATE/VITAMIN D3 250-3.125
500 TABLET ORAL DAILY
Status: DISCONTINUED | OUTPATIENT
Start: 2017-11-17 | End: 2017-11-18 | Stop reason: HOSPADM

## 2017-11-17 RX ORDER — SODIUM CHLORIDE 0.9 % (FLUSH) 0.9 %
10 SYRINGE (ML) INJECTION PRN
Status: DISCONTINUED | OUTPATIENT
Start: 2017-11-17 | End: 2017-11-18 | Stop reason: HOSPADM

## 2017-11-17 RX ADMIN — EPINEPHRINE 0.3 MG: 1 INJECTION INTRAMUSCULAR; INTRAVENOUS; SUBCUTANEOUS at 06:19

## 2017-11-17 RX ADMIN — LURASIDONE HYDROCHLORIDE 60 MG: 40 TABLET, FILM COATED ORAL at 22:28

## 2017-11-17 RX ADMIN — Medication 1 PUFF: at 21:58

## 2017-11-17 RX ADMIN — CETIRIZINE HYDROCHLORIDE 10 MG: 10 TABLET ORAL at 22:28

## 2017-11-17 RX ADMIN — ATENOLOL 50 MG: 50 TABLET ORAL at 22:04

## 2017-11-17 RX ADMIN — HYDROCHLOROTHIAZIDE 25 MG: 25 TABLET ORAL at 22:04

## 2017-11-17 RX ADMIN — RACEPINEPHRINE HYDROCHLORIDE 11.25 MG: 11.25 SOLUTION RESPIRATORY (INHALATION) at 06:52

## 2017-11-17 RX ADMIN — INFLUENZA VIRUS VACCINE 0.5 ML: 15; 15; 15; 15 SUSPENSION INTRAMUSCULAR at 12:55

## 2017-11-17 RX ADMIN — Medication 10 ML: at 22:09

## 2017-11-17 RX ADMIN — FAMOTIDINE 20 MG: 10 INJECTION, SOLUTION INTRAVENOUS at 06:14

## 2017-11-17 RX ADMIN — ONDANSETRON 4 MG: 2 INJECTION, SOLUTION INTRAMUSCULAR; INTRAVENOUS at 07:01

## 2017-11-17 RX ADMIN — ONDANSETRON 4 MG: 2 INJECTION, SOLUTION INTRAMUSCULAR; INTRAVENOUS at 09:05

## 2017-11-17 RX ADMIN — AMLODIPINE BESYLATE 10 MG: 10 TABLET ORAL at 22:14

## 2017-11-17 RX ADMIN — CITALOPRAM 40 MG: 20 TABLET, FILM COATED ORAL at 22:04

## 2017-11-17 RX ADMIN — ICATIBANT ACETATE 30 MG: 30 INJECTION, SOLUTION SUBCUTANEOUS at 06:23

## 2017-11-17 RX ADMIN — EPINEPHRINE 0.3 MG: 1 INJECTION, SOLUTION, CONCENTRATE INTRAVENOUS at 06:19

## 2017-11-17 RX ADMIN — ENOXAPARIN SODIUM 40 MG: 40 INJECTION SUBCUTANEOUS at 12:38

## 2017-11-17 RX ADMIN — ASPIRIN 81 MG: 81 TABLET, COATED ORAL at 22:04

## 2017-11-17 RX ADMIN — CALCIUM CARBONATE-CHOLECALCIFEROL TAB 250 MG-125 UNIT 500 MG: 250-125 TAB at 22:04

## 2017-11-17 RX ADMIN — Medication 10 ML: at 08:00

## 2017-11-17 RX ADMIN — ACETAMINOPHEN 650 MG: 325 TABLET ORAL at 17:14

## 2017-11-17 RX ADMIN — METHYLPREDNISOLONE SODIUM SUCCINATE 125 MG: 125 INJECTION, POWDER, FOR SOLUTION INTRAMUSCULAR; INTRAVENOUS at 06:10

## 2017-11-17 RX ADMIN — DIPHENHYDRAMINE HYDROCHLORIDE 50 MG: 50 INJECTION, SOLUTION INTRAMUSCULAR; INTRAVENOUS at 06:10

## 2017-11-17 RX ADMIN — NAPROXEN 250 MG: 250 TABLET ORAL at 22:28

## 2017-11-17 ASSESSMENT — PAIN SCALES - GENERAL
PAINLEVEL_OUTOF10: 0
PAINLEVEL_OUTOF10: 7
PAINLEVEL_OUTOF10: 7

## 2017-11-17 NOTE — CARE COORDINATION
Case Management Initial Discharge Plan  Delphine Flanagan,         Readmission Risk              Readmission Risk:        6.5       Age 73 or Greater:  0    Admitted from SNF or Requires Paid or Family Care:  0    Currently has CHF,COPD,ARF,CRI,or is on dialysis:  0    Takes more than 5 Prescription Medications:  4    Takes Digoxin,Insulin,Anticoagulants,Narcotics or ASA/Plavix:  [address] in Past 12 Months:  0    On Disability:  0    Patient Considers own Health:  2.5            Met with:patient to discuss discharge plans. Information verified: address, contacts, phone number, , insurance Yes  PCP: Sinan Ozuna MD  Date of last visit: 17    Insurance Provider: Dariel Sullivan    Discharge Planning  Current Residence:  Private Residence  Living Arrangements:  Spouse/Significant Other   Home has 1 stories/1 flight down to the basement stairs to climb  Support Systems:  Spouse/Significant Other, Children  Current Services PTA:    Supplier:  [facility]   Patient able to perform ADL's:Independent  DME used to aid ambulation prior to admission: none/during admission:TBD    Potential Assistance Needed:  N/A    Pharmacy: [facility]   Potential Assistance Purchasing Medications:  No  Does patient want to participate in local refill/ meds to beds program?  No    Patient agreeable to home care: Yes  Freedom of choice provided:  yes, requested [facility] has used in the past      Type of [address]  None  Patient expects to be discharged to:  Home    Prior SNF/Rehab Placement and Facility: no  Agreeable to SNF/Rehab: No  [place] of choice provided: n/a   Evaluation: n/a    Expected Discharge date:  17  Follow Up Appointment: Best Day/ Time:      Transportation provider: Utilizes cab services through [facility]  Transportation arrangements needed for discharge: Yes, will need [facility] transportation services called    Discharge Plan: return home, agrees to

## 2017-11-17 NOTE — FLOWSHEET NOTE
Called Beverly RUBI to give report, informed that she is recovering a pt and cannot take the transfer. Instructed to call Kole Rinaldi at [phone]. Kole Rinaldi stated she will call back.

## 2017-11-17 NOTE — PLAN OF CARE
Problem: Falls - Risk of  Goal: Absence of falls  Outcome: Met This Shift      Problem: Airway Clearance - Ineffective:  Goal: Ability to maintain a clear airway will improve  Ability to maintain a clear airway will improve    Outcome: Met This Shift    Goal: Edema will be absent or minimal  Outcome: Met This Shift

## 2017-11-17 NOTE — ED PROVIDER NOTES
history that includes Hysterectomy; Upper gastrointestinal endoscopy; Colonoscopy; Thyroid surgery; Cardiac catheterization; and other surgical history (8/24/2015). Social History     Social History    Marital status: Single     Spouse name: N/A    Number of children: N/A    Years of education: N/A     Occupational History    Not on file. Social History Main Topics    Smoking status: Former Smoker     Quit date: 12/21/1992    Smokeless tobacco: Never Used      Comment: states quiti in the 1980s    Alcohol use 7.2 oz/week     12 Cans of beer per week      Comment: 2-3 times per week    Drug use: No      Comment: last use approximately 1/20/14 cocaine    Sexual activity: Yes     Partners: Male     Other Topics Concern    Not on file     Social History Narrative    No narrative on file         Family History   Problem Relation Age of Onset    Stroke Mother     Hypertension Father     Cancer Brother      bone    Lung Cancer Maternal Aunt     Cancer Maternal Grandmother      eye     Other Sister      aneurysm    Heart Disease Sister          Allergies:  Iodine; Lisinopril; and Shellfish-derived products    Home Medications:  Prior to Admission medications    Medication Sig Start Date End Date Taking?  Authorizing Provider   amLODIPine (NORVASC) 10 MG tablet TAKE 1 TAB BY MOUTH ONCE A DAY 11/14/17  Yes Brenda Up MD   loratadine (CLARITIN) 10 MG tablet TAKE 1 TAB BY MOUTH ONCE A DAY 11/14/17  Yes Brenda Up MD   aspirin (ASPIRIN LOW DOSE) 81 MG EC tablet TAKE 1 TAB BY MOUTH ONCE A DAY 11/14/17  Yes Brenda Up MD   lisinopril (PRINIVIL;ZESTRIL) 30 MG tablet Take 1 tablet by mouth daily DC 20 mg dose 11/14/17  Yes Brenda Up MD   potassium chloride (KLOR-CON M) 20 MEQ extended release tablet Take 1 tablet by mouth 3 times daily 11/14/17  Yes Brenda Up MD   hydrochlorothiazide (HYDRODIURIL) 25 MG tablet Take 1 tablet by mouth daily 11/14/17  Yes Christine Henry Duke Gaxiola MD   mirtazapine (REMERON) 45 MG tablet Take 1 tablet by mouth nightly 11/14/17  Yes Rene Hui MD   citalopram (CELEXA) 40 MG tablet Take 1 tablet by mouth daily 11/14/17  Yes Rene Hui MD   cetirizine (ZYRTEC) 10 MG tablet Take 1 tablet by mouth daily 11/14/17  Yes Rene Hui MD   famotidine (PEPCID) 20 MG tablet Take 1 tablet by mouth 2 times daily 11/14/17  Yes Rene Hui MD   atenolol (TENORMIN) 50 MG tablet Take 1 tablet by mouth daily 11/14/17  Yes Rene Hui MD   cyclobenzaprine (FLEXERIL) 5 MG tablet One to two tid prn back pain or muscle spasm. Will cause drowsiness. Do not drive if taking.  11/14/17  Yes Rene Hui MD   pantoprazole (PROTONIX) 40 MG tablet TAKE 1 TABLET BY MOUTH 2 TIMES DAILY 11/3/17  Yes Rene Hui MD   buPROPion (WELLBUTRIN XL) 300 MG extended release tablet Take 300 mg by mouth every morning   Yes Historical Provider, MD   Cholecalciferol (VITAMIN D3) 85510 UNITS CAPS Take 1 capsule by mouth Twice a Week 8/12/15  Yes Historical Provider, MD   albuterol sulfate HFA (PROAIR HFA) 108 (90 Base) MCG/ACT inhaler Inhale 2 puffs into the lungs every 6 hours as needed for Wheezing 11/14/17   Rene Hui MD   mometasone (ASMANEX 120 METERED DOSES) 220 MCG/INH inhaler Inhale 2 puffs into the lungs daily 11/14/17   Rene Hui MD   lurasidone (LATUDA) 60 MG TABS tablet Take 60 mg by mouth nightly    Historical Provider, MD   docusate (COLACE, DULCOLAX) 100 MG CAPS Take 100 mg by mouth 2 times daily as needed (constipation) 8/9/16   Rene Hui MD   Calcium Carbonate-Vitamin D (OYSTER SHELL CALCIUM/D) 500-200 MG-UNIT TABS TAKE 1 TAB BY MOUTH ONCE A DAY 5/13/16   Rene Hui MD       REVIEW OF SYSTEMS    (2-9 systems for level 4, 10 or more for level 5)      Review of Systems   Unable to perform ROS: Acuity of condition       PHYSICAL EXAM   (up to 7 for level 4, 8 or more for level 5) INITIAL VITALS:   BP (!) 135/96   Pulse 87   Temp 99.5 °F (37.5 °C) (Oral)   Resp 15   Ht 5' 5\" (1.651 m)   Wt 225 lb (102.1 kg)   SpO2 100%   BMI 37.44 kg/m²     Physical Exam   Constitutional: She is oriented to person, place, and time. She appears well-developed and well-nourished. She appears distressed. HENT:   Head: Normocephalic and atraumatic. Significant angioedema with very minimal portion of the posterior pharynx visible over the right side of her tongue. She is able to speak with a very muffled voice and require suctioning of her secretions   Eyes: Conjunctivae are normal. Right eye exhibits no discharge. Left eye exhibits no discharge. Neck: No tracheal deviation present. Cardiovascular: Normal rate and regular rhythm. Pulmonary/Chest: Effort normal and breath sounds normal. No stridor. No respiratory distress. She has no wheezes. Abdominal: She exhibits no distension. Musculoskeletal: She exhibits no deformity. Neurological: She is alert and oriented to person, place, and time. Skin: Skin is warm and dry. Vitals reviewed. DIFFERENTIAL  DIAGNOSIS     PLAN (LABS / IMAGING / EKG):  Orders Placed This Encounter   Procedures    CBC WITH AUTO DIFFERENTIAL    BASIC METABOLIC PANEL    Continuous Pulse Oximetry    Telemetry monitoring    HHN Treatment    POCT troponin    POCT troponin    EKG 12 Lead    Insert peripheral IV    PATIENT STATUS (FROM ED OR OR/PROCEDURAL) Inpatient     If the patient was admitted, some of the above orders may have been placed by the admitting team(s) and were auto populated above when I refreshed my note prior to signing it. If there is any question, please check for the responsible provider for individual orders in the EHR system.     MEDICATIONS ORDERED:  Orders Placed This Encounter   Medications    diphenhydrAMINE (BENADRYL) 50 MG/ML injection     OSVALDO MUNGUIA: cabinet override    methylPREDNISolone sodium (SOLU-MEDROL) 125 this issue. On arrival Pepcid, Benadryl, Solu-Medrol, epinephrine given and icatibant ordered from pharmacy which arrives roughly 10-15 minutes later. At 6:19 AM shortly after the patient's arrival to the emergency department anesthesia was paged for possible difficult airway case. At 6:24 AM Gen. surgery was paged for the same. Several minutes later both anesthesia (attending Kirk Parr) and surgery (resident Dr. Romana Standing and senior resident Dr. Rebekah Marin) present in the emergency department. Dr. Caleb Luna recommending racemic epinephrine which was administered shortly thereafter. Over the next several minutes after receiving the icatibant and all the other medications patient had significant improvement and was able to close her mouth, handle secretions etc.  Dr. Ana Lilia Babin, critical care resident present in the emergency department as well at bedside for patient admission to medical ICU. He accepted the admission under Dr. Katherine Alas and will place all the admission orders other than the admit order itself. 7:18 AM  A few minutes ago patient with episode of emesis. Zofran administered      PROCEDURES:  None     CONSULTS:  IP CONSULT TO SOCIAL WORK  If the patient was admitted, some of the above consults may have been placed by the admitting team(s) and were auto populated above when I refreshed my note prior to signing it. If there is any question, please check for the responsible provider for individual orders in the EHR system. CRITICAL CARE:  None     FINAL IMPRESSION      1. Angioedema, initial encounter             DISPOSITION / PLAN     DISPOSITION Admitted     If discharged, the patient was instructed to return to the emergency department with any worsening symptoms, if new symptoms arise, or if they have any other concerns.   Patient was instructed not to drive home if discharged today and received pain medications or other mind-altering medications while

## 2017-11-17 NOTE — H&P
Critical Care - History and Physical Examination    [facility]: [id number]  Patient's account/billing number: [de-identified]  Patient's YOB: 1965  Age: 47 y.o. Date of Admission: 11/17/2017  6:04 AM  Date of History and Physical Examination: 11/17/2017      Primary Care Physician: Phoenix Walters MD  Attending Physician:    Code Status: Prior    Chief complaint: tongue swelling    HISTORY OF PRESENT ILLNESS:    History was obtained from chart review and the patientNegro Brewer is a 56-year-old female past medical history significant for hypertension, bipolar, depression who presented with tongue swelling. Patient states she developed tongue swelling around 5:30 AM morning of presentation. Was having no other symptoms. Currently on lisinopril at home, has been on lisinopril for the past few years. Dosage just increased 3 days prior by PCP. In ER given Solu-Medrol, icatibant, Pepcid, epinephrine, Benadryl with improvement in swelling while in ER. Admitted to ICU for airway monitoring.         Past Medical History:        Diagnosis Date    Alcohol abuse 5/22/2014    Angioedema 11/17/2017    from lisinopril    Anxiety     Bipolar 1 disorder (HCC)     Bipolar disorder ([address]     CAD (coronary artery disease)     no stents    Claustrophobia     Cocaine abuse     states used years ago    Depression     GERD (gastroesophageal reflux disease)     Hypertension     Hypertrophic cardiomyopathy ([address]     Lung nodules     MDRO (multiple drug resistant organisms) resistance     MRSA (methicillin resistant Staphylococcus aureus)     rt hip    Murmur     see cardiac echo result    OA (osteoarthritis)     Obesity 3/28/2013    PUD (peptic ulcer disease)     Thyroid nodule     Tonsillar hypertrophy        Past Surgical History:        Procedure Laterality Date    CARDIAC CATHETERIZATION      COLONOSCOPY      bx    HYSTERECTOMY      partial hyst  OTHER SURGICAL HISTORY  8/24/2015    MRI under anesthesia    THYROID SURGERY      bilat bx    UPPER GASTROINTESTINAL ENDOSCOPY         Allergies: Allergies   Allergen Reactions    Iodine Anaphylaxis and Rash    Lisinopril Swelling    Shellfish-Derived Products Anaphylaxis and Rash     ANGIOEDEMA         Home Meds:   Prior to Admission medications    Medication Sig Start Date End Date Taking? Authorizing Provider   amLODIPine (NORVASC) 10 MG tablet TAKE 1 TAB BY MOUTH ONCE A DAY 11/14/17  Yes Leela Solis MD   loratadine (CLARITIN) 10 MG tablet TAKE 1 TAB BY MOUTH ONCE A DAY 11/14/17  Yes Leela Solis MD   aspirin (ASPIRIN LOW DOSE) 81 MG EC tablet TAKE 1 TAB BY MOUTH ONCE A DAY 11/14/17  Yes Leela Solis MD   lisinopril (PRINIVIL;ZESTRIL) 30 MG tablet Take 1 tablet by mouth daily DC 20 mg dose 11/14/17  Yes Leela Solis MD   potassium chloride (KLOR-CON M) 20 MEQ extended release tablet Take 1 tablet by mouth 3 times daily 11/14/17  Yes Leela Solis MD   hydrochlorothiazide (HYDRODIURIL) 25 MG tablet Take 1 tablet by mouth daily 11/14/17  Yes Leela Solis MD   mirtazapine (REMERON) 45 MG tablet Take 1 tablet by mouth nightly 11/14/17  Yes Leela Solis MD   citalopram (CELEXA) 40 MG tablet Take 1 tablet by mouth daily 11/14/17  Yes Leela Solis MD   cetirizine (ZYRTEC) 10 MG tablet Take 1 tablet by mouth daily 11/14/17  Yes Leela Solis MD   famotidine (PEPCID) 20 MG tablet Take 1 tablet by mouth 2 times daily 11/14/17  Yes Leela Solis MD   atenolol (TENORMIN) 50 MG tablet Take 1 tablet by mouth daily 11/14/17  Yes Leela Solis MD   cyclobenzaprine (FLEXERIL) 5 MG tablet One to two tid prn back pain or muscle spasm. Will cause drowsiness. Do not drive if taking.  11/14/17  Yes Leela Solis MD   pantoprazole (PROTONIX) 40 MG tablet TAKE 1 TABLET BY MOUTH 2 TIMES DAILY 11/3/17  Yes Leela Solis MD   buPROPion (WELLBUTRIN XL) 300 MG extended release tablet Take 300 mg by mouth every morning   Yes Historical Provider, MD   Cholecalciferol (VITAMIN D3) 26752 UNITS CAPS Take 1 capsule by mouth Twice a Week 8/12/15  Yes Historical Provider, MD   albuterol sulfate HFA (PROAIR HFA) 108 (90 Base) MCG/ACT inhaler Inhale 2 puffs into the lungs every 6 hours as needed for Wheezing 11/14/17   Maria Teresa Swenson MD   [facility] 120 METERED DOSES) 220 MCG/INH inhaler Inhale 2 puffs into the lungs daily 11/14/17   Maria Teresa Swenson MD   lurasidone (LATUDA) 60 MG TABS tablet Take 60 mg by mouth nightly    Historical Provider, MD   docusate (COLACE, DULCOLAX) 100 MG CAPS Take 100 mg by mouth 2 times daily as needed (constipation) 8/9/16   Maria Teresa Swenson MD   Calcium Carbonate-Vitamin D (OYSTER SHELL CALCIUM/D) 500-200 MG-UNIT TABS TAKE 1 TAB BY MOUTH ONCE A DAY 5/13/16   Maria Teresa Swenson MD       Social History:   TOBACCO:   reports that she quit smoking about 24 years ago. She has never used smokeless tobacco.  ETOH:   reports that she drinks about 7.2 oz of alcohol per week . DRUGS:  reports that she does not use drugs.   OCCUPATION:      Family History:       Problem Relation Age of Onset    Stroke Mother     Hypertension Father     Cancer Brother      bone    Lung Cancer Maternal Aunt     Cancer Maternal Grandmother      eye     Other Sister      aneurysm    Heart Disease Sister            REVIEW OF SYSTEMS (ROS):    CONSTITUTIONAL:  negative for fevers, chills, fatigue and malaise    EYES:  negative for double vision, blurred vision and photophobia     HEENT:  Positive for tongue swelling   RESPIRATORY:  negative for cough, shortness of breath, wheezing    CARDIOVASCULAR:  negative for chest pain, palpitations, syncope, edema    GASTROINTESTINAL:  negative for nausea, vomiting, diarrhea, constipation, abdominal pain    GENITOURINARY:  negative for incontinence    MUSCULOSKELETAL:  negative for neck or back pain    NEUROLOGICAL:  negative for headaches   PSYCHIATRIC:  negative for depressed mood, anxiety           Physical Exam:    Vitals: BP (!) 148/96   Pulse 91   Temp 99.5 °F (37.5 °C) (Oral)   Resp 16   Ht 5' 5\" (1.651 m)   Wt 225 lb (102.1 kg)   SpO2 100%   BMI 37.44 kg/m²     Last Body weight:   Wt Readings from Last 3 Encounters:   11/17/17 225 lb (102.1 kg)   11/14/17 224 lb (101.6 kg)   08/18/17 236 lb 9.6 oz (107.3 kg)       Body Mass Index : Body mass index is 37.44 kg/m². PHYSICAL EXAMINATION :    General appearance - alert, no acute distress  Mental status - alert, oriented to person, place, and time  Eyes - pupils equal and reactive, extraocular eye movements intact  Mouth - mucous membranes moist, tongue swelling  Chest - clear to auscultation, no wheezes, symmetric air entry  Heart - normal rate, regular rhythm, normal S1, S2  Abdomen - soft, nontender, nondistended  Neurological - alert, oriented, no focal deficits   Extremities - peripheral pulses normal, no edema  Skin - normal coloration and turgor, no rashes        Any additional physical findings:      Laboratory findings:-    CBC: No results for input(s): WBC, HGB, PLT in the last 72 hours. BMP:    Recent Labs      11/15/17   1304   NA  135   K  3.8   CL  92*   CO2  24   BUN  15   CREATININE  1.60*   GLUCOSE  93     S. Calcium:  Recent Labs      11/15/17   1304   CALCIUM  9.5     S. Ionized Calcium:No results for input(s): IONCA in the last 72 hours. S. Magnesium:No results for input(s): MG in the last 72 hours. S. Phosphorus:No results for input(s): PHOS in the last 72 hours. S. Glucose:No results for input(s): POCGLU in the last 72 hours. Glycosylated hemoglobin A1C:   Recent Labs      11/15/17   1304   LABA1C  6.2*     INR: No results for input(s): INR in the last 72 hours. Hepatic functions: No results for input(s): ALKPHOS, ALT, AST, PROT, BILITOT, BILIDIR, LABALBU in the last 72 hours.   Pancreatic functions:No results

## 2017-11-17 NOTE — PROGRESS NOTES
output data in the 24 hours ending 11/17/17 1800  Last 3 weights: Wt Readings from Last 3 Encounters:   11/17/17 217 lb 2.5 oz (98.5 kg)   11/14/17 224 lb (101.6 kg)   08/18/17 236 lb 9.6 oz (107.3 kg)       General appearance: alert and cooperative with exam. Well-appearing, in no acute distress. HEENT: Head: Normal, normocephalic, atraumatic. Eye: Normal external eye, conjunctiva, lids cornea, AMAN. Nose: Normal external nose, mucus membranes and septum. Tongue has multiple bruises. Neck: no adenopathy, no carotid bruit, no JVD, supple, symmetrical, trachea midline and thyroid not enlarged, symmetric, no tenderness/mass/nodules  Lungs: clear to auscultation bilaterally, no wheezing, normal respiratory effort. Heart: regular rate and rhythm, S1, S2 normal, no murmur, click, rub or gallop  Abdomen: soft, non-tender; bowel sounds normal; no masses,  no organomegaly  Extremities: extremities normal, atraumatic, no cyanosis or edema  Neurologic: Mental status: Alert, oriented, thought content appropriate. Medications:Current Inpatient  Scheduled Meds:   sodium chloride flush  10 mL Intravenous 2 times per day    enoxaparin  40 mg Subcutaneous Daily    naproxen  250 mg Oral Once     Continuous Infusions:   PRN Meds:racepinephrine HCl, sodium chloride flush, acetaminophen, magnesium hydroxide, ondansetron    Objective:    CBC:   Recent Labs      11/17/17   0702   WBC  8.2   HGB  12.2   PLT  506*     BMP:    Recent Labs      11/15/17   1304  11/17/17   0702   NA  135  132*   K  3.8  3.9   CL  92*  91*   CO2  24  25   BUN  15  21*   CREATININE  1.60*  1.37*   GLUCOSE  93  133*     Calcium:  Recent Labs      11/17/17   0702   CALCIUM  9.5     Ionized Calcium:No results for input(s): IONCA in the last 72 hours. Magnesium:No results for input(s): MG in the last 72 hours. Phosphorus:No results for input(s): PHOS in the last 72 hours. BNP:No results for input(s): BNP in the last 72 hours.   Glucose:  Recent Labs PRN  8. Asthma- albuterol and mometasone  9. GERD- no current issues, continue home protonix  10. Seasonal Allergies. Claritin, zyrtec  11. Vitamin D deficiency, continue Calcium and vitamin D    Gabi Pedro MD             11/17/2017, 6:00 PM     Attending Physician Statement  I have discussed the care of [address] [place] [address], including pertinent history and exam findings with the resident. I have reviewed the key elements of all parts of the encounter with the resident. I have seen and examined the patient with the resident and the key elements of all parts of the encounter have been performed by me. 42-year-old lady with history of hypertension initially admitted to the ICU with angioedema most likely secondary to lisinopril  PMH, vitals, labs, medications reviewed  Symptoms are completely resolved currently  Lisinopril has been discontinued  Blood pressure currently stable on Norvasc and atenolol. She is also on HCTZ which is on hold because of AK I  Creatinine is improving  Other chronic medical problems seem to be stable  Okay to discharge today.   BMP in 3 weeks  She has appointment with her primary care physician in 3 days

## 2017-11-17 NOTE — ED NOTES
Pt to ED by EMS for angioedema. Per EMS, pt took her lisinopril last night. Pt states she kept walking up having dreams that her mouth was swollen. Pt reports she has been on lisinopril for 2 years. Pt placed on 4L nasal canula upon arrival. Airway equipment at bedside. Pt placed on cardiac monitor, bp cuff, and pulse ox. Pt anxious upon arrival. Pt RR even and NL. Pt reports she is unable to swallow oral secretions. Pt given suction with younker to assist with decreasing secretions. Pt alert and oriented x4, answering all questions appropriately. Dr. Xuan Melissa and Dr. Chad Zaman at bedside to evaluate.      Chevy Crespo RN  [phone]

## 2017-11-17 NOTE — PLAN OF CARE
Problem: Falls - Risk of  Goal: Absence of falls  Outcome: Ongoing      Problem: Airway Clearance - Ineffective:  Goal: Ability to maintain a clear airway will improve  Ability to maintain a clear airway will improve     Outcome: Met This Shift    Goal: Edema will be absent or minimal  Outcome: Met This Shift

## 2017-11-17 NOTE — ED NOTES
Bed: 13  Expected date:   Expected time:   Means of arrival:   Comments:  Hold-no bed       Bette Hart RN  11/17/17 8283

## 2017-11-17 NOTE — PROGRESS NOTES
Physical Therapy    Facility/Department: [address] MICU  Initial Assessment    NAME: Vitaly Dixon  : 1965  MRN: [id number]    Date of Service: 2017  HISTORY OF PRESENT ILLNESS:     History was obtained from chart review and the patient. Vitaly Dixon is a 52-year-old female past medical history significant for hypertension, bipolar, depression who presented with tongue swelling. Patient states she developed tongue swelling around 5:30 AM morning of presentation. Was having no other symptoms.      Currently on lisinopril at home, has been on lisinopril for the past few years. Dosage just increased 3 days prior by PCP.     In ER given Solu-Medrol, icatibant, Pepcid, epinephrine, Benadryl with improvement in swelling while in ER. Admitted to ICU for airway monitoring.     Patient Diagnosis(es): The encounter diagnosis was Angioedema, initial encounter. has a past medical history of Alcohol abuse; Angioedema; Anxiety; Bipolar 1 disorder ([address] Bipolar disorder ([address] CAD (coronary artery disease); Claustrophobia; Cocaine abuse; Depression; GERD (gastroesophageal reflux disease); Hypertension; Hypertrophic cardiomyopathy ([address] Lung nodules; MDRO (multiple drug resistant organisms) resistance; MRSA (methicillin resistant Staphylococcus aureus); Murmur; OA (osteoarthritis); Obesity; PUD (peptic ulcer disease); Thyroid nodule; and Tonsillar hypertrophy. has a past surgical history that includes Hysterectomy; Upper gastrointestinal endoscopy; Colonoscopy; Thyroid surgery; Cardiac catheterization; and other surgical history (2015).     Restrictions  Restrictions/Precautions  Required Braces or Orthoses?: No  Vision/Hearing  Vision: Within Functional Limits  Hearing: Within functional limits     Subjective  General  Patient assessed for rehabilitation services?: Yes  Family / Caregiver Present: No  Follows Commands: Within Functional Limits  Pain Screening  Patient Currently in Pain: Denies  Vital Signs  Patient Currently in Pain: Denies       Orientation  Orientation  Overall Orientation Status: Within Normal Limits    Social/Functional History  Social/Functional History  Lives With: Significant other  Type of Home: House  Home Layout: One level  Home Access: Stairs to enter with rails  Entrance Stairs - Number of Steps: 3  Home Equipment: Cane but doesn't use it. Receives Help From: Family, Friend(s)  ADL Assistance: Independent  Homemaking Assistance: Independent  Ambulation Assistance: Independent  Transfer Assistance: Independent  Active : No  Occupation: On disability  Objective  AROM RLE (degrees)  RLE AROM: WNL  AROM LLE (degrees)  LLE AROM : WNL  AROM RUE (degrees)  RUE AROM : WNL  AROM LUE (degrees)  LUE AROM : WNL  Strength RLE  Strength RLE: WFL  Strength LLE  Strength LLE: WFL  Strength RUE  Strength RUE: WFL  Strength LUE  Strength LUE: WFL  Motor Control  Gross Motor?: WNL  Sensation  Overall Sensation Status: WNL  Bed mobility  Rolling to Left: Independent  Supine to Sit: Independent  Scooting: Independent  Transfers  Sit to Stand: Supervision  Stand to sit: Independent  Ambulation  Ambulation?: Yes  Ambulation 1  Surface: level tile  Device: No Device  Assistance: Supervision  Quality of Gait: Slow adam.  No LOB  Distance: 300 ft     Balance  Posture: Good  Sitting - Static: Good  Sitting - Dynamic: Good  Standing - Static: Good  Standing - Dynamic: Good        Assessment   Prognosis: Good  Decision Making: Medium Complexity  Patient Education: PT POC  No Skilled PT: Safe to return home  REQUIRES PT FOLLOW UP: No  Activity Tolerance  Activity Tolerance: Patient Tolerated treatment well  PT Equipment Recommendations  Equipment Needed: No     Discharge Recommendations:   Home with Assist PRN      Plan   Plan  Plan Comment: Pt is discharged from PT  Safety Devices  Type of devices: Gait belt, Call light within reach, Left in chair, Nurse notified    G-Code  PT G-Codes  Functional

## 2017-11-18 VITALS
RESPIRATION RATE: 15 BRPM | TEMPERATURE: 98.1 F | HEIGHT: 65 IN | WEIGHT: 220.8 LBS | SYSTOLIC BLOOD PRESSURE: 119 MMHG | HEART RATE: 59 BPM | DIASTOLIC BLOOD PRESSURE: 78 MMHG | BODY MASS INDEX: 36.79 KG/M2 | OXYGEN SATURATION: 100 %

## 2017-11-18 PROBLEM — G47.33 OSA (OBSTRUCTIVE SLEEP APNEA): Status: ACTIVE | Noted: 2017-11-18

## 2017-11-18 PROBLEM — N17.9 AKI (ACUTE KIDNEY INJURY) (HCC): Status: ACTIVE | Noted: 2017-11-18

## 2017-11-18 LAB
ANION GAP SERPL CALCULATED.3IONS-SCNC: 15 MMOL/L (ref 9–17)
BUN BLDV-MCNC: 23 MG/DL (ref 6–20)
BUN/CREAT BLD: ABNORMAL (ref 9–20)
CALCIUM SERPL-MCNC: 9.4 MG/DL (ref 8.6–10.4)
CHLORIDE BLD-SCNC: 88 MMOL/L (ref 98–107)
CO2: 26 MMOL/L (ref 20–31)
CREAT SERPL-MCNC: 1.29 MG/DL (ref 0.5–0.9)
GFR AFRICAN AMERICAN: 53 ML/MIN
GFR NON-AFRICAN AMERICAN: 44 ML/MIN
GFR SERPL CREATININE-BSD FRML MDRD: ABNORMAL ML/MIN/{1.73_M2}
GFR SERPL CREATININE-BSD FRML MDRD: ABNORMAL ML/MIN/{1.73_M2}
GLUCOSE BLD-MCNC: 157 MG/DL (ref 70–99)
HCT VFR BLD CALC: 34.3 % (ref 36.3–47.1)
HEMOGLOBIN: 11.4 G/DL (ref 11.9–15.1)
MCH RBC QN AUTO: 32 PG (ref 25.2–33.5)
MCHC RBC AUTO-ENTMCNC: 33.2 G/DL (ref 28.4–34.8)
MCV RBC AUTO: 96.3 FL (ref 82.6–102.9)
PDW BLD-RTO: 14.8 % (ref 11.8–14.4)
PLATELET # BLD: 475 K/UL (ref 138–453)
PMV BLD AUTO: 9.7 FL (ref 8.1–13.5)
POTASSIUM SERPL-SCNC: 3.9 MMOL/L (ref 3.7–5.3)
RBC # BLD: 3.56 M/UL (ref 3.95–5.11)
SODIUM BLD-SCNC: 129 MMOL/L (ref 135–144)
WBC # BLD: 10.5 K/UL (ref 3.5–11.3)

## 2017-11-18 PROCEDURE — 6370000000 HC RX 637 (ALT 250 FOR IP): Performed by: STUDENT IN AN ORGANIZED HEALTH CARE EDUCATION/TRAINING PROGRAM

## 2017-11-18 PROCEDURE — 85027 COMPLETE CBC AUTOMATED: CPT

## 2017-11-18 PROCEDURE — 80048 BASIC METABOLIC PNL TOTAL CA: CPT

## 2017-11-18 PROCEDURE — 36415 COLL VENOUS BLD VENIPUNCTURE: CPT

## 2017-11-18 PROCEDURE — 6360000002 HC RX W HCPCS: Performed by: INTERNAL MEDICINE

## 2017-11-18 PROCEDURE — 94640 AIRWAY INHALATION TREATMENT: CPT

## 2017-11-18 PROCEDURE — 99233 SBSQ HOSP IP/OBS HIGH 50: CPT | Performed by: INTERNAL MEDICINE

## 2017-11-18 PROCEDURE — G0378 HOSPITAL OBSERVATION PER HR: HCPCS

## 2017-11-18 PROCEDURE — 2580000003 HC RX 258: Performed by: STUDENT IN AN ORGANIZED HEALTH CARE EDUCATION/TRAINING PROGRAM

## 2017-11-18 PROCEDURE — 2580000003 HC RX 258: Performed by: INTERNAL MEDICINE

## 2017-11-18 RX ORDER — SODIUM CHLORIDE 9 MG/ML
INJECTION, SOLUTION INTRAVENOUS CONTINUOUS
Status: DISCONTINUED | OUTPATIENT
Start: 2017-11-18 | End: 2017-11-18 | Stop reason: HOSPADM

## 2017-11-18 RX ADMIN — ASPIRIN 81 MG: 81 TABLET, COATED ORAL at 09:29

## 2017-11-18 RX ADMIN — BUPROPION HYDROCHLORIDE 300 MG: 150 TABLET, FILM COATED, EXTENDED RELEASE ORAL at 09:31

## 2017-11-18 RX ADMIN — SODIUM CHLORIDE: 9 INJECTION, SOLUTION INTRAVENOUS at 05:30

## 2017-11-18 RX ADMIN — Medication 1 PUFF: at 09:33

## 2017-11-18 RX ADMIN — CITALOPRAM 40 MG: 20 TABLET, FILM COATED ORAL at 09:31

## 2017-11-18 RX ADMIN — ATENOLOL 50 MG: 50 TABLET ORAL at 09:31

## 2017-11-18 RX ADMIN — CALCIUM CARBONATE-CHOLECALCIFEROL TAB 250 MG-125 UNIT 500 MG: 250-125 TAB at 09:29

## 2017-11-18 RX ADMIN — ENOXAPARIN SODIUM 40 MG: 40 INJECTION SUBCUTANEOUS at 09:00

## 2017-11-18 RX ADMIN — Medication 10 ML: at 09:33

## 2017-11-18 RX ADMIN — CETIRIZINE HYDROCHLORIDE 10 MG: 10 TABLET ORAL at 09:32

## 2017-11-18 ASSESSMENT — PAIN SCALES - GENERAL
PAINLEVEL_OUTOF10: 7
PAINLEVEL_OUTOF10: 0

## 2017-11-18 ASSESSMENT — PAIN DESCRIPTION - PAIN TYPE: TYPE: CHRONIC PAIN

## 2017-11-18 ASSESSMENT — PAIN DESCRIPTION - ORIENTATION: ORIENTATION: LOWER

## 2017-11-18 ASSESSMENT — PAIN DESCRIPTION - LOCATION: LOCATION: BACK

## 2017-11-18 ASSESSMENT — PAIN DESCRIPTION - FREQUENCY: FREQUENCY: CONTINUOUS

## 2017-11-18 NOTE — PROGRESS NOTES
acetaminophen 650 mg Q4H PRN   magnesium hydroxide 30 mL Daily PRN   ondansetron 4 mg Q6H PRN   albuterol sulfate HFA 2 puff Q6H PRN   docusate sodium 100 mg BID PRN   cyclobenzaprine 5 mg BID PRN       Diagnostic Labs and Imaging:  CBC:  Recent Labs      11/17/17   0702  11/18/17   0621   WBC  8.2  10.5   HGB  12.2  11.4*   PLT  506*  475*     BMP: Recent Labs      11/15/17   1304  11/17/17   0702  11/18/17   0621   NA  135  132*  129*   K  3.8  3.9  3.9   CL  92*  91*  88*   CO2  24  25  26   BUN  15  21*  23*   CREATININE  1.60*  1.37*  1.29*   GLUCOSE  93  133*  157*     Hepatic: No results for input(s): AST, ALT, ALB, BILITOT, ALKPHOS in the last 72 hours. Assessment and Plan:   1. Angioedema. Resolved  2. JONES. CPAP testing to be completed outpatient  3. HTN. Norvasc 10, Atenolol 50. Follow up BMP to resume HCTZ  4. Bipolar depression. Wellbutrin, celexa, lurasidone  5. Back pain. Chronic. Tylenol and flexeril  6. Asthma. Albuterol and mometesone  7. Murlean Dye. Chronic. protonix  8. Seasonal allergies. claritin  9. Vitamin D deficiency. Supplements    Discharge planning today.     Gabi Pedro MD      Department of Internal Medicine  [address] [place] [address]         11/18/2017, 11:22 AM

## 2017-11-18 NOTE — CARE COORDINATION
Discharge [address] [facility]  Clinical Case Management Department  Written by: Yue Yarbrough RN    Patient Name: Kimberly Francis  Attending Provider: No att. providers found  Admit Date: 2017  6:04 AM  MRN: [id number]  Account: [de-identified]                     : 1965  Discharge Date: 2017      Disposition: home with [facility] home care     Yue Yarbrough RN

## 2017-11-18 NOTE — PROGRESS NOTES
Marcela Ibarra SR, RCPPatient Assessment complete. Angioedema, initial encounter [T78. 3XXA] . Vitals:    11/17/17 1906   BP: (!) 118/91   Pulse: 69   Resp: 19   Temp: 97.7 °F (36.5 °C)   SpO2: 100%   . Patients home meds are   Prior to Admission medications    Medication Sig Start Date End Date Taking? Authorizing Provider   amLODIPine (NORVASC) 10 MG tablet TAKE 1 TAB BY MOUTH ONCE A DAY 11/14/17  Yes Sathya Lopez MD   loratadine (CLARITIN) 10 MG tablet TAKE 1 TAB BY MOUTH ONCE A DAY 11/14/17  Yes Sathya Lopez MD   aspirin (ASPIRIN LOW DOSE) 81 MG EC tablet TAKE 1 TAB BY MOUTH ONCE A DAY 11/14/17  Yes Sathya Lopez MD   lisinopril (PRINIVIL;ZESTRIL) 30 MG tablet Take 1 tablet by mouth daily DC 20 mg dose 11/14/17  Yes Sathya Lopez MD   potassium chloride (KLOR-CON M) 20 MEQ extended release tablet Take 1 tablet by mouth 3 times daily 11/14/17  Yes Sathya Lopez MD   hydrochlorothiazide (HYDRODIURIL) 25 MG tablet Take 1 tablet by mouth daily 11/14/17  Yes Sathya Lopez MD   mirtazapine (REMERON) 45 MG tablet Take 1 tablet by mouth nightly 11/14/17  Yes Sathya Lopez MD   citalopram (CELEXA) 40 MG tablet Take 1 tablet by mouth daily 11/14/17  Yes Sathya Lopez MD   cetirizine (ZYRTEC) 10 MG tablet Take 1 tablet by mouth daily 11/14/17  Yes Orroopa Lopez MD   famotidine (PEPCID) 20 MG tablet Take 1 tablet by mouth 2 times daily 11/14/17  Yes Sathya Lopez MD   atenolol (TENORMIN) 50 MG tablet Take 1 tablet by mouth daily 11/14/17  Yes Sathya Lopez MD   cyclobenzaprine (FLEXERIL) 5 MG tablet One to two tid prn back pain or muscle spasm. Will cause drowsiness. Do not drive if taking.  11/14/17  Yes Sathya Lopez MD   pantoprazole (PROTONIX) 40 MG tablet TAKE 1 TABLET BY MOUTH 2 TIMES DAILY 11/3/17  Yes Sathya Lopez MD   buPROPion (WELLBUTRIN XL) 300 MG extended release tablet Take 300 mg by mouth every morning   Yes Historical Provider, MD   Cholecalciferol (VITAMIN D3) 75081 UNITS CAPS Take 1 capsule by mouth Twice a Week 8/12/15  Yes Historical Provider, MD   albuterol sulfate HFA (PROAIR HFA) 108 (90 Base) MCG/ACT inhaler Inhale 2 puffs into the lungs every 6 hours as needed for Wheezing 11/14/17   Maria Teresa Swenson MD   [facility] 120 METERED DOSES) 220 MCG/INH inhaler Inhale 2 puffs into the lungs daily 11/14/17   Maria Teresa Swenson MD   lurasidone (LATUDA) 60 MG TABS tablet Take 60 mg by mouth nightly    Historical Provider, MD   docusate (COLACE, DULCOLAX) 100 MG CAPS Take 100 mg by mouth 2 times daily as needed (constipation) 8/9/16   Maria Teresa Swenson MD   Calcium Carbonate-Vitamin D (OYSTER SHELL CALCIUM/D) 500-200 MG-UNIT TABS TAKE 1 TAB BY MOUTH ONCE A DAY 5/13/16   Maria Teresa Swenson MD   .  Recent Surgical History: None = 0     Assessment   Received pt on RA, awake, no distress. Pt states she quit smoking 20 years ago, uses an inhaler but cannot remember the name, has JONES but does not have a machine for home. Denies home oxygen.      RR 16  Breath Sounds: Clear t/o      · Bronchodilator assessment at level  1  ·   ·   · [x]    Bronchodilator Assessment  BRONCHODILATOR ASSESSMENT SCORE  Score 0 1 2 3 4 5   Breath Sounds   []  Patient Baseline [x]  No Wheeze good aeration []  Faint, scattered wheezing, good aeration []  Expiratory Wheezing and or moderately diminished []  Insp/Exp wheeze and/or very diminished []  Insp/Exp and/ or marked distress   Respiratory Rate   []  Patient Baseline [x]  Less than 20 []  Less than 20 []  20-25 []  Greater than 25 []  Greater than 25   Peak flow % of Pred or PB [x]  NA   []  Greater than 90%  []  81-90% []  71-80% []  Less than or equal to 70%  or unable to perform []  Unable due to Respiratory Distress   Dyspnea re []  Patient Baseline [x]  No SOB []  No SOB []  SOB on exertion []  SOB min activity []  At rest/acute   e FEV% Predicted       [x]  NA []  Above 69%  []  Unable []  Above 60-69%  []  Unable []  Above 50-59%  []  Unable []  Above 35-49%  []  Unable []  Less than 35%  []  Unable

## 2017-11-18 NOTE — FLOWSHEET NOTE
Pt was resting in bed on this initial visit. Pt appears to be aware of their medical condition and coping with hospitalization.  was a ministering presence and offered words of comfort. Pt expressed gratitude for spiritual care visit. 11/17/17 2015   Encounter Summary   Services provided to: Patient   Referral/Consult From: Abhay Davison Visiting (11/17/17)   Complexity of Encounter High   Length of Encounter 15 minutes   Routine   Type Initial   Assessment Calm; Approachable   Intervention Active listening;Sustaining presence/ Ministry of presence   Outcome Acceptance;Comfort

## 2017-11-18 NOTE — PROGRESS NOTES
Pt discharge per md orders. piv removed. Discharge orders reviewed with patient and instructions understood by pt. All questions answered. No prescriptions ordered for pt. Pt ambulated off floor on own accord.

## 2017-11-21 ENCOUNTER — HOSPITAL ENCOUNTER (OUTPATIENT)
Age: 52
Setting detail: SPECIMEN
Discharge: HOME | End: 2017-11-21
Payer: MEDICAID

## 2017-11-21 ENCOUNTER — NURSE ONLY (OUTPATIENT)
Dept: INTERNAL MEDICINE | Age: 52
End: 2017-11-21
Payer: MEDICAID

## 2017-11-21 VITALS — DIASTOLIC BLOOD PRESSURE: 93 MMHG | SYSTOLIC BLOOD PRESSURE: 126 MMHG | HEART RATE: 90 BPM

## 2017-11-21 DIAGNOSIS — N17.9 AKI (ACUTE KIDNEY INJURY) (HCC): ICD-10-CM

## 2017-11-21 DIAGNOSIS — I10 ESSENTIAL HYPERTENSION: ICD-10-CM

## 2017-11-21 DIAGNOSIS — Z13.9 SCREENING FOR CONDITION: ICD-10-CM

## 2017-11-21 DIAGNOSIS — I10 ESSENTIAL HYPERTENSION: Primary | ICD-10-CM

## 2017-11-21 LAB
ANION GAP SERPL CALCULATED.3IONS-SCNC: 16 MMOL/L (ref 9–17)
BUN BLDV-MCNC: 14 MG/DL (ref 6–20)
BUN/CREAT BLD: ABNORMAL (ref 9–20)
CALCIUM SERPL-MCNC: 9.6 MG/DL (ref 8.6–10.4)
CHLORIDE BLD-SCNC: 94 MMOL/L (ref 98–107)
CO2: 26 MMOL/L (ref 20–31)
CREAT SERPL-MCNC: 1 MG/DL (ref 0.5–0.9)
GFR AFRICAN AMERICAN: >60 ML/MIN
GFR NON-AFRICAN AMERICAN: 58 ML/MIN
GFR SERPL CREATININE-BSD FRML MDRD: ABNORMAL ML/MIN/{1.73_M2}
GFR SERPL CREATININE-BSD FRML MDRD: ABNORMAL ML/MIN/{1.73_M2}
GLUCOSE BLD-MCNC: 110 MG/DL (ref 70–99)
HIV AG/AB: NONREACTIVE
POTASSIUM SERPL-SCNC: 3.9 MMOL/L (ref 3.7–5.3)
SODIUM BLD-SCNC: 136 MMOL/L (ref 135–144)

## 2017-11-21 PROCEDURE — 87389 HIV-1 AG W/HIV-1&-2 AB AG IA: CPT

## 2017-11-21 PROCEDURE — 36415 COLL VENOUS BLD VENIPUNCTURE: CPT

## 2017-11-21 PROCEDURE — 80048 BASIC METABOLIC PNL TOTAL CA: CPT

## 2017-11-21 NOTE — DISCHARGE SUMMARY
[facility] [address]  [phone]            Follow up labs:  BMP 1 week    Follow up imaging: N/A    Note that over 30 minutes was spent in preparing discharge papers, discussing discharge with patient, medication review, etc.      Vicente Finch MD         Department of Internal Medicine  [facility]         11/21/2017, 2:38 PM

## 2017-11-22 ENCOUNTER — TELEPHONE (OUTPATIENT)
Dept: INTERNAL MEDICINE | Age: 52
End: 2017-11-22

## 2017-11-22 NOTE — TELEPHONE ENCOUNTER
PC from Dillon Chan with [facility] informing MD that pt was admitted to home care yesterday for angioedema and HTN.      FYI

## 2017-11-29 ENCOUNTER — TELEPHONE (OUTPATIENT)
Dept: INTERNAL MEDICINE | Age: 52
End: 2017-11-29

## 2017-12-01 ENCOUNTER — TELEPHONE (OUTPATIENT)
Dept: INTERNAL MEDICINE | Age: 52
End: 2017-12-01

## 2017-12-01 DIAGNOSIS — I10 ESSENTIAL HYPERTENSION: Primary | ICD-10-CM

## 2017-12-01 NOTE — TELEPHONE ENCOUNTER
PC to pt-- states she just took her BP and it was 153/89-- pt states that she is going to get more consistent with taking her medications at the same every time day-- she is going to check BP twice a day over the weekend and keep track of readings and she will call writer on Monday with readings

## 2017-12-01 NOTE — TELEPHONE ENCOUNTER
PC from [facility] stating that she is with patient and BP is still elevated--160/110 pt took am meds, asymptomatic pt is going to recheck BP this afternoon and call Mekhi Chaudhary. Mekhi Chaudhary will call us if BP is still elevated.

## 2017-12-07 RX ORDER — ISOSORBIDE MONONITRATE 60 MG/1
60 TABLET, EXTENDED RELEASE ORAL EVERY MORNING
Qty: 30 TABLET | Refills: 3 | Status: SHIPPED | OUTPATIENT
Start: 2017-12-07 | End: 2018-03-24 | Stop reason: SDUPTHER

## 2017-12-12 ENCOUNTER — TELEPHONE (OUTPATIENT)
Dept: INTERNAL MEDICINE | Age: 52
End: 2017-12-12

## 2017-12-12 DIAGNOSIS — I10 ESSENTIAL HYPERTENSION: ICD-10-CM

## 2017-12-12 RX ORDER — SPIRONOLACTONE 25 MG/1
25 TABLET ORAL DAILY
Qty: 30 TABLET | Refills: 2 | Status: SHIPPED | OUTPATIENT
Start: 2017-12-12 | End: 2018-02-08 | Stop reason: SDUPTHER

## 2017-12-12 NOTE — TELEPHONE ENCOUNTER
Please ask her to start Aldactone 25 mg daily. Ask her to stop potassium supplements. Have her get BMP done in 7-10 days.

## 2017-12-13 NOTE — TELEPHONE ENCOUNTER
Patient notified that she should start Aldactone and discontinue Potassium supplement  She should also have a BMP 7-10 days after starting medication  She understood directions and will  lab order at

## 2017-12-22 DIAGNOSIS — I10 ESSENTIAL HYPERTENSION: ICD-10-CM

## 2017-12-22 RX ORDER — ASPIRIN 81 MG/1
TABLET ORAL
Qty: 28 TABLET | Refills: 1 | Status: SHIPPED | OUTPATIENT
Start: 2017-12-22 | End: 2018-02-08 | Stop reason: SDUPTHER

## 2017-12-22 RX ORDER — PANTOPRAZOLE SODIUM 40 MG/1
TABLET, DELAYED RELEASE ORAL
Qty: 60 TABLET | Refills: 0 | Status: SHIPPED | OUTPATIENT
Start: 2017-12-22 | End: 2018-12-07 | Stop reason: SDUPTHER

## 2017-12-22 NOTE — TELEPHONE ENCOUNTER
Faxed refill request sent from pharmacy last script was written by dr Awa Blanco. Medication pended. Health Maintenance   Topic Date Due    Breast cancer screen  05/05/2019    Diabetes screen  11/15/2020    Lipid screen  06/21/2021    Colon cancer screen colonoscopy  12/10/2023    DTaP/Tdap/Td vaccine (3 - Td) 04/14/2027    Flu vaccine  Completed    Pneumococcal med risk  Completed    HIV screen  Completed             (applicable per patient's age: Cancer Screenings, Depression Screening, Fall Risk Screening, Immunizations)    Hemoglobin A1C (%)   Date Value   11/15/2017 6.2 (H)   08/23/2017 6.7 (H)   01/20/2015 5.5     Microalb/Crt.  Ratio (mcg/mg creat)   Date Value   12/18/2012 15     LDL Cholesterol (mg/dL)   Date Value   06/21/2016 139 (H)     AST (U/L)   Date Value   09/25/2016 29     ALT (U/L)   Date Value   09/25/2016 29     BUN (mg/dL)   Date Value   11/21/2017 14      (goal A1C is < 7)   (goal LDL is <100) need 30-50% reduction from baseline     BP Readings from Last 3 Encounters:   11/21/17 (!) 126/93   11/18/17 119/78   11/14/17 (!) 177/113    (goal /80)      All Future Testing planned in [facility]:  Lab Frequency Next Occurrence   Sleep Study with PAP Titration Once 22/01/3406   Basic Metabolic Panel Once 71/52/5794       Next Visit Date:  Future Appointments  Date Time Provider Anthony Everett   1/23/2018 1:45 PM Juli Marrero MD [id number]            Patient Active Problem List:     HTN (hypertension)     Major depressive disorder, recurrent episode ([facility]     Lung nodule     Obesity     Adrenal adenoma     Goiter     Alcohol abuse     Essential hypertension     Hypokalemia     Enlarged tonsils     Angio-edema     ACE inhibitor-aggravated angioedema     JONES (obstructive sleep apnea)     ROXANN (acute kidney injury) ([facility] [address]

## 2018-02-08 DIAGNOSIS — I10 ESSENTIAL HYPERTENSION: ICD-10-CM

## 2018-02-08 DIAGNOSIS — J03.90 TONSILLITIS: ICD-10-CM

## 2018-02-08 RX ORDER — ATENOLOL 50 MG/1
TABLET ORAL
Qty: 30 TABLET | Refills: 0 | Status: SHIPPED | OUTPATIENT
Start: 2018-02-08 | End: 2018-02-15 | Stop reason: SDUPTHER

## 2018-02-08 RX ORDER — ASPIRIN 81 MG/1
TABLET ORAL
Qty: 28 TABLET | Refills: 1 | Status: SHIPPED | OUTPATIENT
Start: 2018-02-08 | End: 2018-04-18 | Stop reason: SDUPTHER

## 2018-02-08 RX ORDER — MIRTAZAPINE 45 MG/1
45 TABLET, FILM COATED ORAL NIGHTLY
Qty: 30 TABLET | Refills: 0 | Status: SHIPPED | OUTPATIENT
Start: 2018-02-08 | End: 2018-03-05 | Stop reason: SDUPTHER

## 2018-02-08 RX ORDER — SPIRONOLACTONE 25 MG/1
25 TABLET ORAL DAILY
Qty: 30 TABLET | Refills: 2 | Status: SHIPPED | OUTPATIENT
Start: 2018-02-08 | End: 2018-03-06

## 2018-02-08 RX ORDER — FAMOTIDINE 20 MG/1
TABLET, FILM COATED ORAL
Qty: 60 TABLET | Refills: 0 | OUTPATIENT
Start: 2018-02-08

## 2018-02-08 RX ORDER — AMLODIPINE BESYLATE 10 MG/1
TABLET ORAL
Qty: 30 TABLET | Refills: 0 | Status: SHIPPED | OUTPATIENT
Start: 2018-02-08 | End: 2018-02-15 | Stop reason: SDUPTHER

## 2018-02-15 ENCOUNTER — OFFICE VISIT (OUTPATIENT)
Dept: INTERNAL MEDICINE | Age: 53
End: 2018-02-15
Payer: MEDICAID

## 2018-02-15 ENCOUNTER — HOSPITAL ENCOUNTER (OUTPATIENT)
Age: 53
Setting detail: SPECIMEN
Discharge: HOME | End: 2018-02-15
Payer: MEDICAID

## 2018-02-15 VITALS
HEIGHT: 65 IN | WEIGHT: 201 LBS | DIASTOLIC BLOOD PRESSURE: 85 MMHG | BODY MASS INDEX: 33.49 KG/M2 | SYSTOLIC BLOOD PRESSURE: 119 MMHG | HEART RATE: 71 BPM

## 2018-02-15 DIAGNOSIS — I10 ESSENTIAL HYPERTENSION: ICD-10-CM

## 2018-02-15 LAB
ANION GAP SERPL CALCULATED.3IONS-SCNC: 16 MMOL/L (ref 9–17)
BUN BLDV-MCNC: 11 MG/DL (ref 6–20)
BUN/CREAT BLD: ABNORMAL (ref 9–20)
CALCIUM SERPL-MCNC: 10.1 MG/DL (ref 8.6–10.4)
CHLORIDE BLD-SCNC: 93 MMOL/L (ref 98–107)
CO2: 27 MMOL/L (ref 20–31)
CREAT SERPL-MCNC: 1.01 MG/DL (ref 0.5–0.9)
GFR AFRICAN AMERICAN: >60 ML/MIN
GFR NON-AFRICAN AMERICAN: 58 ML/MIN
GFR SERPL CREATININE-BSD FRML MDRD: ABNORMAL ML/MIN/{1.73_M2}
GFR SERPL CREATININE-BSD FRML MDRD: ABNORMAL ML/MIN/{1.73_M2}
GLUCOSE BLD-MCNC: 114 MG/DL (ref 70–99)
POTASSIUM SERPL-SCNC: 4.5 MMOL/L (ref 3.7–5.3)
SODIUM BLD-SCNC: 136 MMOL/L (ref 135–144)

## 2018-02-15 PROCEDURE — 36415 COLL VENOUS BLD VENIPUNCTURE: CPT

## 2018-02-15 PROCEDURE — 80048 BASIC METABOLIC PNL TOTAL CA: CPT

## 2018-02-15 PROCEDURE — G8427 DOCREV CUR MEDS BY ELIG CLIN: HCPCS | Performed by: INTERNAL MEDICINE

## 2018-02-15 PROCEDURE — 3014F SCREEN MAMMO DOC REV: CPT | Performed by: INTERNAL MEDICINE

## 2018-02-15 PROCEDURE — 1036F TOBACCO NON-USER: CPT | Performed by: INTERNAL MEDICINE

## 2018-02-15 PROCEDURE — 99213 OFFICE O/P EST LOW 20 MIN: CPT | Performed by: INTERNAL MEDICINE

## 2018-02-15 PROCEDURE — G8417 CALC BMI ABV UP PARAM F/U: HCPCS | Performed by: INTERNAL MEDICINE

## 2018-02-15 PROCEDURE — G8484 FLU IMMUNIZE NO ADMIN: HCPCS | Performed by: INTERNAL MEDICINE

## 2018-02-15 PROCEDURE — 3017F COLORECTAL CA SCREEN DOC REV: CPT | Performed by: INTERNAL MEDICINE

## 2018-02-15 RX ORDER — ATENOLOL 50 MG/1
TABLET ORAL
Qty: 30 TABLET | Refills: 2 | Status: SHIPPED | OUTPATIENT
Start: 2018-02-15 | End: 2018-07-03 | Stop reason: SDUPTHER

## 2018-02-15 RX ORDER — AMLODIPINE BESYLATE 10 MG/1
TABLET ORAL
Qty: 30 TABLET | Refills: 2 | Status: SHIPPED | OUTPATIENT
Start: 2018-02-15 | End: 2018-07-03 | Stop reason: SDUPTHER

## 2018-02-15 RX ORDER — MIRTAZAPINE 45 MG/1
45 TABLET, FILM COATED ORAL NIGHTLY
Qty: 30 TABLET | Refills: 0 | Status: CANCELLED | OUTPATIENT
Start: 2018-02-15

## 2018-02-15 NOTE — PROGRESS NOTES
HYPERTENSION visit     BP Readings from Last 3 Encounters:   11/21/17 (!) 126/93   11/18/17 119/78   11/14/17 (!) 177/113       LDL Cholesterol (mg/dL)   Date Value   06/21/2016 139 (H)     HDL (mg/dL)   Date Value   06/21/2016 49     BUN (mg/dL)   Date Value   11/21/2017 14     CREATININE (mg/dL)   Date Value   11/21/2017 1.00 (H)     Glucose (mg/dL)   Date Value   11/21/2017 110 (H)   02/24/2012 88              Have you changed or started any medications since your last visit including any over-the-counter medicines, vitamins, or herbal medicines? no   Have you stopped taking any of your medications? Is so, why? -  no  Are you having any side effects from any of your medications? - no    Have you seen any other physician or provider since your last visit?  no   Have you had any other diagnostic tests since your last visit?  no   Have you been seen in the emergency room and/or had an admission in a hospital since we last saw you?  no   Have you had your routine dental cleaning in the past 6 months?  yes - routine     Do you have an active [employer] account? If no, what is the barrier?   Yes    Patient Care Team:  Juan F Denson MD as PCP - General (Internal Medicine)    Medical History Review  Past Medical, Family, and Social History reviewed and does not contribute to the patient presenting condition    Health Maintenance   Topic Date Due    A1C test (Diabetic or Prediabetic)  11/15/2018    Potassium monitoring  11/21/2018    Creatinine monitoring  11/21/2018    Breast cancer screen  05/05/2019    Lipid screen  06/21/2021    Colon cancer screen colonoscopy  12/10/2023    DTaP/Tdap/Td vaccine (3 - Td) 04/14/2027    Flu vaccine  Completed    Pneumococcal med risk  Completed    HIV screen  Completed

## 2018-02-15 NOTE — PROGRESS NOTES
[facility] PHYSICIANS  [facility] [address] [facility]  [address]  Dept: [phone]      Today's Date: 2/15/2018  Patient Name: Loli Hernandez  Patient's age: 47 y. o., 1965        CHIEF COMPLAINT:    Chief Complaint   Patient presents with    Hypertension     2 month follow up       History Obtained From:  patient    HISTORY OF PRESENT ILLNESS:      The patient is a 47 y.o. old  female and is here to  Follow-up for hypertension. Blood pressure is well controlled with Norvasc 10 mg daily, atenolol 50 mg daily, Aldactone 25 mg daily, hydrochlorothiazide 25 mg daily and Imdur 60 mg daily. She is allergic to ace. Patient Active Problem List   Diagnosis    HTN (hypertension)    Major depressive disorder, recurrent episode ([facility]    Lung nodule    Obesity    Adrenal adenoma    Goiter    Alcohol abuse    Essential hypertension    Hypokalemia    Enlarged tonsils    Angio-edema    ACE inhibitor-aggravated angioedema    JONES (obstructive sleep apnea)    ROXANN (acute kidney injury) ([facility]       Past Medical History:   has a past medical history of ROXANN (acute kidney injury) ([facility] Alcohol abuse; Angioedema; Anxiety; Bipolar 1 disorder ([facility] Bipolar disorder ([facility] CAD (coronary artery disease); Claustrophobia; Cocaine abuse; Depression; GERD (gastroesophageal reflux disease); Hypertension; Hypertrophic cardiomyopathy ([facility] Lung nodules; MDRO (multiple drug resistant organisms) resistance; MRSA (methicillin resistant Staphylococcus aureus); Murmur; OA (osteoarthritis); Obesity; PUD (peptic ulcer disease); Thyroid nodule; and Tonsillar hypertrophy. Past Surgical History:   has a past surgical history that includes Hysterectomy; Upper gastrointestinal endoscopy; Colonoscopy; Thyroid surgery; Cardiac catheterization; and other surgical history (8/24/2015).      Medications:    Current Outpatient Prescriptions   Medication Sig Dispense Refill    Medications Ordered in Other Visits   Medication Dose Route Frequency Provider Last Rate Last Dose    lactated ringers infusion   Intravenous Continuous Sherry Diaz CRNA   Stopped at 08/24/15 1420         Allergies:  Iodine; Lisinopril; and Shellfish-derived products    Social History:   reports that she quit smoking about 25 years ago. She has never used smokeless tobacco. She reports that she drinks about 7.2 oz of alcohol per week . She reports that she does not use drugs. Family History: family history includes Cancer in her brother and maternal grandmother; Heart Disease in her sister; Hypertension in her father; Zeke Herrera in her maternal aunt; Other in her sister; Stroke in her mother. REVIEW OF SYSTEMS:      Constitutional: Negative for fever and fatigue. HENT: Negative for congestion and sore throat. Eyes: Negative for eye pain and visual disturbance. Respiratory: Negative for chest tightness and shortness of breath. Cardiovascular: Negative for chest pain and orthopnea . Gastrointestinal: Negative for vomiting, abdominal pain, constipation and diarrhea. Endocrine: Negative for cold intolerance, heat intolerance, polydipsia and polyuria. Genitourinary: Negative for dysuria and frequency. Musculoskeletal: Negative for  Myalgia, back pain, bone pain and arthralgias. Neurological: Negative for dizziness, weakness and headaches. PHYSICAL EXAM:        /85 (Site: Left Arm, Position: Sitting, Cuff Size: Large Adult)   Pulse 71   Ht 5' 5\" (1.651 m)   Wt 201 lb (91.2 kg)   BMI 33.45 kg/m²      General appearance - well appearing, not in  distress. Mental status - alert and cooperative . Head: Normocephalic, without obvious abnormality, atraumatic. Eye: PERRL, conjunctiva/corneas clear, EOM's intact. Neck - Supple, no significant adenopathy . Chest - Clear to auscultation, no wheezes, rales or rhonchi, symmetric air entry.    Heart -  regular rhythm, normal S1, S2, no murmurs. Abdomen - Soft, nontender, nondistended, no masses or organomegaly. Neurological - Alert, oriented, normal speech, no focal findings or movement disorder noted. Musculoskeletal - No joint tenderness, deformity or swelling. Extremities -  No pedal edema, no clubbing or cyanosis. Labs:       Chemistry        Component Value Date/Time     11/21/2017 1022    K 3.9 11/21/2017 1022    CL 94 (L) 11/21/2017 1022    CO2 26 11/21/2017 1022    BUN 14 11/21/2017 1022    CREATININE 1.00 (H) 11/21/2017 1022        Component Value Date/Time    CALCIUM 9.6 11/21/2017 1022    ALKPHOS 143 (H) 09/25/2016 1001    AST 29 09/25/2016 1001    ALT 29 09/25/2016 1001    BILITOT 0.61 09/25/2016 1001          No results for input(s): GLUCOSE in the last 72 hours. Lab Results   Component Value Date    WBC 10.5 11/18/2017    HGB 11.4 (L) 11/18/2017    HCT 34.3 (L) 11/18/2017    MCV 96.3 11/18/2017     (H) 11/18/2017     Lab Results   Component Value Date    TSH 1.01 03/16/2016     Lab Results   Component Value Date    LABA1C 6.2 (H) 11/15/2017     Lab Results   Component Value Date    LABMICR 15 12/18/2012     No components found for: CHLPL  Lab Results   Component Value Date    TRIG 180 (H) 06/21/2016     Lab Results   Component Value Date    HDL 49 06/21/2016     Lab Results   Component Value Date    LDLCHOLESTEROL 139 (H) 06/21/2016     The 10-year ASCVD risk score (Geeta Hooker et al., 2013) is: 3.9%    Values used to calculate the score:      Age: 47 years      Sex: Female      Is Non- : Yes      Diabetic: No      Tobacco smoker: No      Systolic Blood Pressure: 292 mmHg      Is BP treated: Yes      HDL Cholesterol: 49 mg/dL      Total Cholesterol: 224 mg/dL    There are no preventive care reminders to display for this patient. ASSESSMENT AND PLAN    1. Essential hypertension    - amLODIPine (NORVASC) 10 MG tablet; TAKE 1 TAB BY MOUTH ONCE A DAY  Dispense: 30 tablet;  Refill: 2  - atenolol (TENORMIN) 50 MG tablet; TAKE 1 TAB BY MOUTH ONCE A DAY  Dispense: 30 tablet; Refill: 2      · Continue current medication. · Medications refilled. · Delphine received counseling on the following healthy behaviors: exercise and medication adherence  · Discussed use, benefit, and side effects of prescribed medications. Barriers to medication compliance addressed. All patient questions answered. Pt voiced understanding. · The return visit should be in 3 months      Soto Jeff MD  Attending and Faculty Internal Medicine  [address] [place] [address]  [address]  Dept: [phone]  2/15/18      This note is created with the assistance of a speech-recognition program. While intending to generate a document that actually reflects the content of the visit, the document can still have some mistakes which may not have been identified and corrected by editing.

## 2018-03-06 RX ORDER — SPIRONOLACTONE 25 MG/1
TABLET ORAL
Qty: 30 TABLET | Refills: 2 | Status: SHIPPED | OUTPATIENT
Start: 2018-03-06 | End: 2018-03-24 | Stop reason: SDUPTHER

## 2018-03-06 RX ORDER — MIRTAZAPINE 45 MG/1
45 TABLET, FILM COATED ORAL NIGHTLY
Qty: 30 TABLET | Refills: 2 | Status: SHIPPED | OUTPATIENT
Start: 2018-03-06 | End: 2018-09-19 | Stop reason: SDUPTHER

## 2018-03-06 RX ORDER — FAMOTIDINE 20 MG/1
TABLET, FILM COATED ORAL
Qty: 60 TABLET | Refills: 2 | Status: SHIPPED | OUTPATIENT
Start: 2018-03-06 | End: 2018-07-03 | Stop reason: SDUPTHER

## 2018-03-24 DIAGNOSIS — I10 ESSENTIAL HYPERTENSION: ICD-10-CM

## 2018-03-26 RX ORDER — ISOSORBIDE MONONITRATE 60 MG/1
TABLET, EXTENDED RELEASE ORAL
Qty: 30 TABLET | Refills: 2 | Status: SHIPPED | OUTPATIENT
Start: 2018-03-26 | End: 2018-09-19 | Stop reason: SDUPTHER

## 2018-04-18 DIAGNOSIS — I10 ESSENTIAL HYPERTENSION: ICD-10-CM

## 2018-04-19 RX ORDER — ASPIRIN 81 MG/1
TABLET ORAL
Qty: 90 TABLET | Refills: 1 | Status: SHIPPED | OUTPATIENT
Start: 2018-04-19 | End: 2018-12-07 | Stop reason: SDUPTHER

## 2018-07-03 DIAGNOSIS — I10 ESSENTIAL HYPERTENSION: ICD-10-CM

## 2018-07-04 RX ORDER — ATENOLOL 50 MG/1
TABLET ORAL
Qty: 30 TABLET | Refills: 0 | Status: SHIPPED | OUTPATIENT
Start: 2018-07-04 | End: 2018-09-19 | Stop reason: SDUPTHER

## 2018-07-04 RX ORDER — NAPROXEN 500 MG/1
TABLET ORAL
Qty: 60 TABLET | Refills: 0 | Status: SHIPPED | OUTPATIENT
Start: 2018-07-04 | End: 2018-09-19 | Stop reason: SDUPTHER

## 2018-07-04 RX ORDER — FAMOTIDINE 20 MG/1
TABLET, FILM COATED ORAL
Qty: 60 TABLET | Refills: 0 | Status: SHIPPED | OUTPATIENT
Start: 2018-07-04 | End: 2018-09-19 | Stop reason: SDUPTHER

## 2018-07-04 RX ORDER — AMLODIPINE BESYLATE 10 MG/1
TABLET ORAL
Qty: 30 TABLET | Refills: 0 | Status: SHIPPED | OUTPATIENT
Start: 2018-07-04 | End: 2018-09-19 | Stop reason: SDUPTHER

## 2018-07-04 RX ORDER — CETIRIZINE HYDROCHLORIDE 10 MG/1
TABLET ORAL
Qty: 30 TABLET | Refills: 0 | Status: SHIPPED | OUTPATIENT
Start: 2018-07-04 | End: 2018-09-19 | Stop reason: SDUPTHER

## 2018-12-07 ENCOUNTER — TELEPHONE (OUTPATIENT)
Dept: INTERNAL MEDICINE | Age: 53
End: 2018-12-07

## 2018-12-07 ENCOUNTER — OFFICE VISIT (OUTPATIENT)
Dept: INTERNAL MEDICINE | Age: 53
End: 2018-12-07
Payer: MEDICAID

## 2018-12-07 VITALS
DIASTOLIC BLOOD PRESSURE: 91 MMHG | WEIGHT: 182.6 LBS | HEIGHT: 65 IN | HEART RATE: 85 BPM | SYSTOLIC BLOOD PRESSURE: 144 MMHG | BODY MASS INDEX: 30.42 KG/M2

## 2018-12-07 DIAGNOSIS — J45.20 MILD INTERMITTENT ASTHMA WITHOUT COMPLICATION: ICD-10-CM

## 2018-12-07 DIAGNOSIS — I10 ESSENTIAL HYPERTENSION: Primary | ICD-10-CM

## 2018-12-07 DIAGNOSIS — D64.9 ANEMIA, UNSPECIFIED TYPE: ICD-10-CM

## 2018-12-07 DIAGNOSIS — K21.9 GASTROESOPHAGEAL REFLUX DISEASE WITHOUT ESOPHAGITIS: ICD-10-CM

## 2018-12-07 DIAGNOSIS — E55.9 VITAMIN D DEFICIENCY: ICD-10-CM

## 2018-12-07 DIAGNOSIS — Z12.39 BREAST CANCER SCREENING: ICD-10-CM

## 2018-12-07 DIAGNOSIS — E11.9 TYPE 2 DIABETES MELLITUS WITHOUT COMPLICATION, WITHOUT LONG-TERM CURRENT USE OF INSULIN (HCC): ICD-10-CM

## 2018-12-07 DIAGNOSIS — J45.22 MILD INTERMITTENT ASTHMA WITH STATUS ASTHMATICUS: ICD-10-CM

## 2018-12-07 LAB — HBA1C MFR BLD: 5.6 %

## 2018-12-07 PROCEDURE — G8417 CALC BMI ABV UP PARAM F/U: HCPCS | Performed by: INTERNAL MEDICINE

## 2018-12-07 PROCEDURE — 99214 OFFICE O/P EST MOD 30 MIN: CPT | Performed by: INTERNAL MEDICINE

## 2018-12-07 PROCEDURE — 83036 HEMOGLOBIN GLYCOSYLATED A1C: CPT | Performed by: INTERNAL MEDICINE

## 2018-12-07 PROCEDURE — 1036F TOBACCO NON-USER: CPT | Performed by: INTERNAL MEDICINE

## 2018-12-07 PROCEDURE — 99211 OFF/OP EST MAY X REQ PHY/QHP: CPT | Performed by: INTERNAL MEDICINE

## 2018-12-07 PROCEDURE — 2022F DILAT RTA XM EVC RTNOPTHY: CPT | Performed by: INTERNAL MEDICINE

## 2018-12-07 PROCEDURE — 3044F HG A1C LEVEL LT 7.0%: CPT | Performed by: INTERNAL MEDICINE

## 2018-12-07 PROCEDURE — G8427 DOCREV CUR MEDS BY ELIG CLIN: HCPCS | Performed by: INTERNAL MEDICINE

## 2018-12-07 PROCEDURE — 3017F COLORECTAL CA SCREEN DOC REV: CPT | Performed by: INTERNAL MEDICINE

## 2018-12-07 PROCEDURE — G8484 FLU IMMUNIZE NO ADMIN: HCPCS | Performed by: INTERNAL MEDICINE

## 2018-12-07 RX ORDER — HYDROCHLOROTHIAZIDE 25 MG/1
25 TABLET ORAL DAILY
Qty: 30 TABLET | Refills: 11 | Status: SHIPPED | OUTPATIENT
Start: 2018-12-07 | End: 2019-05-22 | Stop reason: SDUPTHER

## 2018-12-07 RX ORDER — ATENOLOL 50 MG/1
TABLET ORAL
Qty: 30 TABLET | Refills: 3 | Status: SHIPPED | OUTPATIENT
Start: 2018-12-07 | End: 2019-05-22 | Stop reason: SDUPTHER

## 2018-12-07 RX ORDER — FAMOTIDINE 20 MG/1
TABLET, FILM COATED ORAL
Qty: 60 TABLET | Refills: 0 | Status: SHIPPED | OUTPATIENT
Start: 2018-12-07 | End: 2019-01-18 | Stop reason: SDUPTHER

## 2018-12-07 RX ORDER — AMLODIPINE BESYLATE 10 MG/1
TABLET ORAL
Qty: 30 TABLET | Refills: 3 | Status: SHIPPED | OUTPATIENT
Start: 2018-12-07 | End: 2019-05-22 | Stop reason: SDUPTHER

## 2018-12-07 RX ORDER — ALBUTEROL SULFATE 90 UG/1
2 AEROSOL, METERED RESPIRATORY (INHALATION) EVERY 6 HOURS PRN
Qty: 1 INHALER | Refills: 3 | Status: SHIPPED | OUTPATIENT
Start: 2018-12-07 | End: 2019-05-22 | Stop reason: SDUPTHER

## 2018-12-07 RX ORDER — ASPIRIN 81 MG/1
TABLET ORAL
Qty: 90 TABLET | Refills: 1 | Status: SHIPPED | OUTPATIENT
Start: 2018-12-07 | End: 2019-05-22 | Stop reason: SDUPTHER

## 2018-12-07 RX ORDER — MIRTAZAPINE 45 MG/1
45 TABLET, FILM COATED ORAL NIGHTLY
Qty: 30 TABLET | Refills: 0 | Status: SHIPPED | OUTPATIENT
Start: 2018-12-07 | End: 2019-01-18 | Stop reason: SDUPTHER

## 2018-12-07 RX ORDER — ISOSORBIDE MONONITRATE 60 MG/1
TABLET, EXTENDED RELEASE ORAL
Qty: 30 TABLET | Refills: 3 | Status: SHIPPED | OUTPATIENT
Start: 2018-12-07 | End: 2019-05-22 | Stop reason: SDUPTHER

## 2018-12-07 RX ORDER — PANTOPRAZOLE SODIUM 40 MG/1
TABLET, DELAYED RELEASE ORAL
Qty: 60 TABLET | Refills: 0 | Status: SHIPPED | OUTPATIENT
Start: 2018-12-07 | End: 2019-01-21 | Stop reason: SDUPTHER

## 2018-12-07 RX ORDER — CETIRIZINE HYDROCHLORIDE 10 MG/1
TABLET ORAL
Qty: 30 TABLET | Refills: 0 | Status: SHIPPED | OUTPATIENT
Start: 2018-12-07 | End: 2019-01-18 | Stop reason: SDUPTHER

## 2018-12-07 RX ORDER — NAPROXEN 500 MG/1
TABLET ORAL
Qty: 60 TABLET | Refills: 0 | Status: SHIPPED | OUTPATIENT
Start: 2018-12-07 | End: 2019-01-18 | Stop reason: SDUPTHER

## 2018-12-07 RX ORDER — SPIRONOLACTONE 25 MG/1
TABLET ORAL
Qty: 30 TABLET | Refills: 3 | Status: SHIPPED | OUTPATIENT
Start: 2018-12-07 | End: 2019-05-22 | Stop reason: SDUPTHER

## 2018-12-07 RX ORDER — CITALOPRAM 40 MG/1
TABLET ORAL
Qty: 30 TABLET | Refills: 0 | Status: SHIPPED | OUTPATIENT
Start: 2018-12-07 | End: 2019-01-18 | Stop reason: SDUPTHER

## 2018-12-07 RX ORDER — B-COMPLEX WITH VITAMIN C
TABLET ORAL
Qty: 30 TABLET | Refills: 3 | Status: SHIPPED | OUTPATIENT
Start: 2018-12-07 | End: 2020-01-15 | Stop reason: SDUPTHER

## 2018-12-07 NOTE — TELEPHONE ENCOUNTER
Incoming fax from pt pharmacy stating that Asmanex  Is non-formulary     Recommended covered alternatives are Arnuity Ellipta or QVAR redihaler     Please review and advise

## 2018-12-07 NOTE — PROGRESS NOTES
[facility] PHYSICIANS  [facility] [address] [facility]  [address]  Dept: [phone]      Today's Date: 12/7/2018  Patient Name: Alessandra Fleming  Patient's age: 47 y. o., 1965        CHIEF COMPLAINT:    Chief Complaint   Patient presents with    Breast Mass     pt found 2 lumps in left breast 3 days ago, size of a pea, denies pain in the breast     Orders     pt wishes to get order for nutritional supplements        History Obtained From:  patient    HISTORY OF PRESENT ILLNESS:      The patient is a 47 y.o. old  female and is here to Follow-up for hypertension. Her blood pressure was initially elevated however repeat manual blood pressure check was in normal range. She needs refills on all her medication. She states she has found to lumps in her left breast 3 days back. She states that the lumps are very small pinhead in size. Denies any tenderness or nipple discharge. She had mammogram done 1-1/2 years back. We'll repeat his screening mammogram.      Patient Active Problem List   Diagnosis    HTN (hypertension)    Major depressive disorder, recurrent episode ([address]    Lung nodule    Obesity    Adrenal adenoma    Goiter    Alcohol abuse    Essential hypertension    Hypokalemia    Enlarged tonsils    Angio-edema    ACE inhibitor-aggravated angioedema    JONES (obstructive sleep apnea)    ROXANN (acute kidney injury) ([address]       Past Medical History:   has a past medical history of ROXANN (acute kidney injury) ([address] Alcohol abuse; Angioedema; Anxiety; Bipolar 1 disorder ([address] Bipolar disorder ([address] CAD (coronary artery disease); Claustrophobia; Cocaine abuse ([address] Depression; GERD (gastroesophageal reflux disease); Hypertension; Hypertrophic cardiomyopathy ([address] Lung nodules; MDRO (multiple drug resistant organisms) resistance; MRSA (methicillin resistant Staphylococcus aureus); Murmur; OA (osteoarthritis);  Obesity; PUD (peptic ulcer BMI 30.39 kg/m²      General appearance - well appearing, not in  distress. Mental status - alert and cooperative . Head: Normocephalic, without obvious abnormality, atraumatic. Eye: PERRL, conjunctiva/corneas clear, EOM's intact. Neck - Supple, no significant adenopathy . Chest - Clear to auscultation, no wheezes, rales or rhonchi, symmetric air entry. Heart -  regular rhythm, normal S1, S2, no murmurs. Abdomen - Soft, nontender, nondistended, no masses or organomegaly. Neurological - Alert, oriented, normal speech, no focal findings or movement disorder no. Extremities -  No pedal edema, no clubbing or cyanosis. Labs:       Chemistry        Component Value Date/Time     02/15/2018 1056    K 4.5 02/15/2018 1056    CL 93 (L) 02/15/2018 1056    CO2 27 02/15/2018 1056    BUN 11 02/15/2018 1056    CREATININE 1.01 (H) 02/15/2018 1056        Component Value Date/Time    CALCIUM 10.1 02/15/2018 1056    ALKPHOS 143 (H) 09/25/2016 1001    AST 29 09/25/2016 1001    ALT 29 09/25/2016 1001    BILITOT 0.61 09/25/2016 1001          No results for input(s): GLUCOSE in the last 72 hours.   Lab Results   Component Value Date    WBC 10.5 11/18/2017    HGB 11.4 (L) 11/18/2017    HCT 34.3 (L) 11/18/2017    MCV 96.3 11/18/2017     (H) 11/18/2017     Lab Results   Component Value Date    TSH 1.01 03/16/2016     Lab Results   Component Value Date    LABA1C 6.2 (H) 11/15/2017     Lab Results   Component Value Date    LABMICR 15 12/18/2012     No components found for: CHLPL  Lab Results   Component Value Date    TRIG 180 (H) 06/21/2016     Lab Results   Component Value Date    HDL 49 06/21/2016     Lab Results   Component Value Date    LDLCHOLESTEROL 139 (H) 06/21/2016     The 10-year ASCVD risk score (Gisell Alcazar et al., 2013) is: 9.7%    Values used to calculate the score:      Age: [id number] years      Sex: Female      Is Non- : Yes      Diabetic: No      Tobacco smoker: No Mild intermittent asthma without complication    - mometasone (ASMANEX 120 METERED DOSES) 220 MCG/INH inhaler; Inhale 2 puffs into the lungs daily  Dispense: 1 Inhaler; Refill: 3      · Anemia work   · DM 2 controlled with out meds   · Mammogram   · Continue current meds   · meds refilled   · Declined flu vaccine   · Delphine received counseling on the following healthy behaviors: exercise and medication adherence  · Discussed use, benefit, and side effects of prescribed medications. Barriers to medication compliance addressed. All patient questions answered. Pt voiced understanding. · The return visit should be in 3 months      Srinath Oro MD  Attending and Faculty Internal Medicine  [address] [place] [address] [address] [facility]  [address]  Dept: [phone]  12/7/18      This note is created with the assistance of a speech-recognition program. While intending to generate a document that actually reflects the content of the visit, the document can still have some mistakes which may not have been identified and corrected by editing.

## 2019-01-18 RX ORDER — CITALOPRAM 40 MG/1
TABLET ORAL
Qty: 30 TABLET | Refills: 0 | Status: SHIPPED | OUTPATIENT
Start: 2019-01-18 | End: 2019-02-20 | Stop reason: SDUPTHER

## 2019-01-18 RX ORDER — FAMOTIDINE 20 MG/1
TABLET, FILM COATED ORAL
Qty: 60 TABLET | Refills: 0 | Status: SHIPPED | OUTPATIENT
Start: 2019-01-18 | End: 2019-02-20 | Stop reason: SDUPTHER

## 2019-01-18 RX ORDER — MIRTAZAPINE 45 MG/1
45 TABLET, FILM COATED ORAL NIGHTLY
Qty: 30 TABLET | Refills: 0 | Status: SHIPPED | OUTPATIENT
Start: 2019-01-18 | End: 2019-02-20 | Stop reason: SDUPTHER

## 2019-01-18 RX ORDER — CETIRIZINE HYDROCHLORIDE 10 MG/1
TABLET ORAL
Qty: 30 TABLET | Refills: 0 | Status: SHIPPED | OUTPATIENT
Start: 2019-01-18 | End: 2019-02-20 | Stop reason: SDUPTHER

## 2019-01-18 RX ORDER — NAPROXEN 500 MG/1
TABLET ORAL
Qty: 60 TABLET | Refills: 0 | Status: SHIPPED | OUTPATIENT
Start: 2019-01-18 | End: 2019-02-20 | Stop reason: SDUPTHER

## 2019-01-21 DIAGNOSIS — K21.9 GASTROESOPHAGEAL REFLUX DISEASE WITHOUT ESOPHAGITIS: ICD-10-CM

## 2019-01-21 RX ORDER — PANTOPRAZOLE SODIUM 40 MG/1
TABLET, DELAYED RELEASE ORAL
Qty: 60 TABLET | Refills: 0 | Status: SHIPPED | OUTPATIENT
Start: 2019-01-21 | End: 2019-02-20 | Stop reason: SDUPTHER

## 2019-02-20 DIAGNOSIS — K21.9 GASTROESOPHAGEAL REFLUX DISEASE WITHOUT ESOPHAGITIS: ICD-10-CM

## 2019-02-21 RX ORDER — PANTOPRAZOLE SODIUM 40 MG/1
TABLET, DELAYED RELEASE ORAL
Qty: 60 TABLET | Refills: 0 | Status: SHIPPED | OUTPATIENT
Start: 2019-02-21 | End: 2019-05-24 | Stop reason: SDUPTHER

## 2019-02-21 RX ORDER — FAMOTIDINE 20 MG/1
TABLET, FILM COATED ORAL
Qty: 60 TABLET | Refills: 0 | Status: SHIPPED | OUTPATIENT
Start: 2019-02-21 | End: 2019-05-24 | Stop reason: SDUPTHER

## 2019-02-21 RX ORDER — NAPROXEN 500 MG/1
TABLET ORAL
Qty: 60 TABLET | Refills: 0 | Status: SHIPPED | OUTPATIENT
Start: 2019-02-21 | End: 2019-05-24 | Stop reason: SDUPTHER

## 2019-02-21 RX ORDER — CITALOPRAM 40 MG/1
TABLET ORAL
Qty: 30 TABLET | Refills: 0 | Status: SHIPPED | OUTPATIENT
Start: 2019-02-21 | End: 2019-05-24 | Stop reason: SDUPTHER

## 2019-02-21 RX ORDER — MIRTAZAPINE 45 MG/1
TABLET, FILM COATED ORAL
Qty: 30 TABLET | Refills: 0 | Status: SHIPPED | OUTPATIENT
Start: 2019-02-21 | End: 2019-05-24 | Stop reason: SDUPTHER

## 2019-02-21 RX ORDER — CETIRIZINE HYDROCHLORIDE 10 MG/1
TABLET ORAL
Qty: 30 TABLET | Refills: 0 | Status: SHIPPED | OUTPATIENT
Start: 2019-02-21 | End: 2019-05-24 | Stop reason: SDUPTHER

## 2019-05-22 ENCOUNTER — OFFICE VISIT (OUTPATIENT)
Dept: INTERNAL MEDICINE | Age: 54
End: 2019-05-22
Payer: MEDICAID

## 2019-05-22 VITALS
HEART RATE: 79 BPM | BODY MASS INDEX: 29.49 KG/M2 | DIASTOLIC BLOOD PRESSURE: 105 MMHG | HEIGHT: 65 IN | WEIGHT: 177 LBS | SYSTOLIC BLOOD PRESSURE: 165 MMHG

## 2019-05-22 DIAGNOSIS — L84 CORN OF FOOT: Primary | ICD-10-CM

## 2019-05-22 DIAGNOSIS — F33.1 MODERATE EPISODE OF RECURRENT MAJOR DEPRESSIVE DISORDER (HCC): ICD-10-CM

## 2019-05-22 DIAGNOSIS — Z12.39 BREAST CANCER SCREENING: ICD-10-CM

## 2019-05-22 DIAGNOSIS — I10 ESSENTIAL HYPERTENSION: ICD-10-CM

## 2019-05-22 DIAGNOSIS — F10.10 ALCOHOL ABUSE: ICD-10-CM

## 2019-05-22 DIAGNOSIS — E11.9 TYPE 2 DIABETES MELLITUS WITHOUT COMPLICATION, WITHOUT LONG-TERM CURRENT USE OF INSULIN (HCC): ICD-10-CM

## 2019-05-22 DIAGNOSIS — J45.22 MILD INTERMITTENT ASTHMA WITH STATUS ASTHMATICUS: ICD-10-CM

## 2019-05-22 PROCEDURE — 2022F DILAT RTA XM EVC RTNOPTHY: CPT | Performed by: INTERNAL MEDICINE

## 2019-05-22 PROCEDURE — 1036F TOBACCO NON-USER: CPT | Performed by: INTERNAL MEDICINE

## 2019-05-22 PROCEDURE — G8417 CALC BMI ABV UP PARAM F/U: HCPCS | Performed by: INTERNAL MEDICINE

## 2019-05-22 PROCEDURE — G8427 DOCREV CUR MEDS BY ELIG CLIN: HCPCS | Performed by: INTERNAL MEDICINE

## 2019-05-22 PROCEDURE — 99214 OFFICE O/P EST MOD 30 MIN: CPT | Performed by: INTERNAL MEDICINE

## 2019-05-22 PROCEDURE — 99211 OFF/OP EST MAY X REQ PHY/QHP: CPT | Performed by: INTERNAL MEDICINE

## 2019-05-22 PROCEDURE — 3017F COLORECTAL CA SCREEN DOC REV: CPT | Performed by: INTERNAL MEDICINE

## 2019-05-22 PROCEDURE — 3046F HEMOGLOBIN A1C LEVEL >9.0%: CPT | Performed by: INTERNAL MEDICINE

## 2019-05-22 RX ORDER — ASPIRIN 81 MG/1
TABLET ORAL
Qty: 90 TABLET | Refills: 1 | Status: SHIPPED | OUTPATIENT
Start: 2019-05-22 | End: 2019-07-08 | Stop reason: SDUPTHER

## 2019-05-22 RX ORDER — AMLODIPINE BESYLATE 10 MG/1
TABLET ORAL
Qty: 30 TABLET | Refills: 3 | Status: SHIPPED | OUTPATIENT
Start: 2019-05-22 | End: 2019-07-08 | Stop reason: SDUPTHER

## 2019-05-22 RX ORDER — ATENOLOL 50 MG/1
TABLET ORAL
Qty: 30 TABLET | Refills: 3 | Status: SHIPPED | OUTPATIENT
Start: 2019-05-22 | End: 2019-08-12 | Stop reason: SDUPTHER

## 2019-05-22 RX ORDER — ISOSORBIDE MONONITRATE 60 MG/1
TABLET, EXTENDED RELEASE ORAL
Qty: 30 TABLET | Refills: 3 | Status: SHIPPED | OUTPATIENT
Start: 2019-05-22 | End: 2020-01-15 | Stop reason: SDUPTHER

## 2019-05-22 RX ORDER — ALBUTEROL SULFATE 90 UG/1
2 AEROSOL, METERED RESPIRATORY (INHALATION) EVERY 6 HOURS PRN
Qty: 1 INHALER | Refills: 5 | Status: SHIPPED | OUTPATIENT
Start: 2019-05-22 | End: 2019-09-13 | Stop reason: SDUPTHER

## 2019-05-22 RX ORDER — HYDROCHLOROTHIAZIDE 25 MG/1
25 TABLET ORAL DAILY
Qty: 30 TABLET | Refills: 11 | Status: SHIPPED | OUTPATIENT
Start: 2019-05-22 | End: 2019-08-12 | Stop reason: SDUPTHER

## 2019-05-22 RX ORDER — SPIRONOLACTONE 25 MG/1
TABLET ORAL
Qty: 30 TABLET | Refills: 3 | Status: SHIPPED | OUTPATIENT
Start: 2019-05-22 | End: 2019-08-12 | Stop reason: SDUPTHER

## 2019-05-22 NOTE — PROGRESS NOTES
HYPERTENSION visit     BP Readings from Last 3 Encounters:   12/07/18 (!) 144/91   02/15/18 119/85   11/21/17 (!) 126/93       LDL Cholesterol (mg/dL)   Date Value   06/21/2016 139 (H)     HDL (mg/dL)   Date Value   06/21/2016 49     BUN (mg/dL)   Date Value   02/15/2018 11     CREATININE (mg/dL)   Date Value   02/15/2018 1.01 (H)     Glucose (mg/dL)   Date Value   02/15/2018 114 (H)   02/24/2012 88              Have you changed or started any medications since your last visit including any over-the-counter medicines, vitamins, or herbal medicines? no   Have you stopped taking any of your medications? Is so, why? -  no  Are you having any side effects from any of your medications? - no  How often do you miss doses of your medication? occasional      Have you seen any other physician or provider since your last visit?  no   Have you had any other diagnostic tests since your last visit?  no   Have you been seen in the emergency room and/or had an admission in a hospital since we last saw you?  no   Have you had your routine dental cleaning in the past 6 months?  no     Do you have an active MyChart account? If no, what is the barrier?   Yes    Patient Care Team:  Mary Carvalho MD as PCP - General (Internal Medicine)  Mary Carvalho MD as PCP - [facility] Attributed Provider    Medical History Review  Past Medical, Family, and Social History reviewed and does not contribute to the patient presenting condition    Health Maintenance   Topic Date Due    Diabetic foot exam  01/22/1976    Diabetic retinal exam  01/22/1976    Hepatitis B Vaccine (1 of 3 - Risk 3-dose series) 01/22/1985    Diabetic microalbuminuria test  12/18/2013    Shingles Vaccine (1 of 2) 01/22/2016    Lipid screen  06/21/2017    Potassium monitoring  02/15/2019    Creatinine monitoring  02/15/2019    Breast cancer screen  05/05/2019    Flu vaccine (Season Ended) 09/01/2019    A1C test (Diabetic or Prediabetic)  12/07/2019    Colon cancer screen colonoscopy  12/10/2023    DTaP/Tdap/Td vaccine (3 - Td) 04/14/2027    Pneumococcal 0-64 years Vaccine  Completed    HIV screen  Completed

## 2019-05-22 NOTE — PROGRESS NOTES
[facility] PHYSICIANS  [facility] [address] [facility]  [address]  Dept: [phone]      Today's Date: 5/22/2019  Patient Name: Patricia Archuleta  Patient's age: 49 y. o., 1965        CHIEF COMPLAINT:    Chief Complaint   Patient presents with    Hypertension     follow up   [address] [facility]     labs reprinted    Foot Injury     wart on bottom of right foot       History Obtained From:  patient    HISTORY OF PRESENT ILLNESS:      The patient is a 49 y.o. old  female and is here to follow-up for hypertension, alcohol abuse, depression. She is complaining of right foot pain. On exam she has a corn which has been causing her pain. Her blood pressure is elevated. She drinks almost 64 ounces of beer every day. She has depression which has also been worsening. She is working with [facility] for alcohol abuse and depression. She has not been taking her antihypertensive. She was educated on medication compliance and importance of tight blood pressure control.       Patient Active Problem List   Diagnosis    HTN (hypertension)    Major depressive disorder, recurrent episode ([address]    Lung nodule    Obesity    Adrenal adenoma    Goiter    Alcohol abuse    Essential hypertension    Hypokalemia    Enlarged tonsils    Angio-edema    ACE inhibitor-aggravated angioedema    JONES (obstructive sleep apnea)    ROXANN (acute kidney injury) ([address]    Type 2 diabetes mellitus without complication, without long-term current use of insulin ([facility])       Past Medical History:   has a past medical history of ROXANN (acute kidney injury) ([address] Alcohol abuse, Angioedema, Anxiety, Bipolar 1 disorder ([address] Bipolar disorder ([address] CAD (coronary artery disease), Claustrophobia, Cocaine abuse ([address] Depression, GERD (gastroesophageal reflux disease), Hypertension, Hypertrophic cardiomyopathy ([address] Lung nodules, MDRO (multiple drug resistant organisms) resistance, MRSA (methicillin resistant Staphylococcus aureus), Murmur, OA (osteoarthritis), Obesity, PUD (peptic ulcer disease), Thyroid nodule, Tonsillar hypertrophy, and Type 2 diabetes mellitus without complication, without long-term current use of insulin ([facility] Past Surgical History:   has a past surgical history that includes Hysterectomy; Upper gastrointestinal endoscopy; Colonoscopy; Thyroid surgery; Cardiac catheterization; and other surgical history (8/24/2015).      Medications:    Current Outpatient Medications   Medication Sig Dispense Refill    pantoprazole (PROTONIX) 40 MG tablet TAKE 1 TAB BY MOUTH TWICE A DAY 60 tablet 0    famotidine (PEPCID) 20 MG tablet TAKE 1 TAB BY MOUTH TWICE A DAY 60 tablet 0    mirtazapine (REMERON) 45 MG tablet TAKE 1 TAB BY MOUTH AT BEDTIME 30 tablet 0    cetirizine (ZYRTEC) 10 MG tablet TAKE 1 TAB BY MOUTH ONCE A DAY 30 tablet 0    naproxen (NAPROSYN) 500 MG tablet TAKE 1 TAB BY MOUTH TWICE A DAY AFTER MEALS 60 tablet 0    citalopram (CELEXA) 40 MG tablet TAKE 1 TAB BY MOUTH ONCE A DAY 30 tablet 0    amLODIPine (NORVASC) 10 MG tablet TAKE 1 TAB BY MOUTH ONCE A DAY 30 tablet 3    albuterol sulfate HFA (PROAIR HFA) 108 (90 Base) MCG/ACT inhaler Inhale 2 puffs into the lungs every 6 hours as needed for Wheezing 1 Inhaler 3    aspirin 81 MG EC tablet TAKE 1 TAB BY MOUTH ONCE A DAY 90 tablet 1    atenolol (TENORMIN) 50 MG tablet TAKE 1 TAB BY MOUTH ONCE A DAY 30 tablet 3    Calcium Carbonate-Vitamin D (OYSTER SHELL CALCIUM/D) 500-200 MG-UNIT TABS TAKE 1 TAB BY MOUTH ONCE A DAY 30 tablet 3    hydrochlorothiazide (HYDRODIURIL) 25 MG tablet Take 1 tablet by mouth daily 30 tablet 11    isosorbide mononitrate (IMDUR) 60 MG extended release tablet TAKE 1 TAB BY MOUTH IN THE MORNING 30 tablet 3    mometasone (ASMANEX 120 METERED DOSES) 220 MCG/INH inhaler Inhale 2 puffs into the lungs daily 1 Inhaler 3    spironolactone (ALDACTONE) 25 MG tablet TAKE 1 TAB BY MOUTH ONCE A DAY 30 tablet 3    fluticasone (ARNUITY ELLIPTA) 100 MCG/ACT AEPB Inhale 1 puff into the lungs daily 30 each 2    cyclobenzaprine (FLEXERIL) 5 MG tablet One to two tid prn back pain or muscle spasm. Will cause drowsiness. Do not drive if taking. 60 tablet 1    lurasidone (LATUDA) 60 MG TABS tablet Take 60 mg by mouth nightly      buPROPion (WELLBUTRIN XL) 300 MG extended release tablet Take 300 mg by mouth every morning      docusate (COLACE, DULCOLAX) 100 MG CAPS Take 100 mg by mouth 2 times daily as needed (constipation) 60 capsule 5    Cholecalciferol (VITAMIN D3) 99344 UNITS CAPS Take 1 capsule by mouth Twice a Week       No current facility-administered medications for this visit. Facility-Administered Medications Ordered in Other Visits   Medication Dose Route Frequency Provider Last Rate Last Dose    lactated ringers infusion   Intravenous Continuous CECILIA Iyer CRNA   Stopped at 08/24/15 1420         Allergies:  Iodine; Lisinopril; and Shellfish-derived products    Social History:   reports that she quit smoking about 26 years ago. She has never used smokeless tobacco. She reports that she drinks about 7.2 oz of alcohol per week. She reports that she does not use drugs. Family History: family history includes Cancer in her brother and maternal grandmother; Heart Disease in her sister; Hypertension in her father; Synetta Jose Rafael in her maternal aunt; Other in her sister; Stroke in her mother. REVIEW OF SYSTEMS:      Constitutional: Negative for fever and fatigue. HENT: Negative for congestion and sore throat. Eyes: Negative for eye pain and visual disturbance. Respiratory: Negative for chest tightness and shortness of breath. Cardiovascular: Negative for chest pain and orthopnea . Gastrointestinal: Negative for vomiting, abdominal pain, constipation and diarrhea. Endocrine: Negative for cold intolerance, heat intolerance, polydipsia and polyuria.    Genitourinary: Negative for Value Date    LDLCHOLESTEROL 139 (H) 06/21/2016     The 10-year ASCVD risk score (Citlaly Paul et al., 2013) is: 29.3%    Values used to calculate the score:      Age: 49 years      Sex: Female      Is Non- : Yes      Diabetic: Yes      Tobacco smoker: No      Systolic Blood Pressure: 626 mmHg      Is BP treated: Yes      HDL Cholesterol: 49 mg/dL      Total Cholesterol: 224 mg/dL    Health Maintenance Due   Topic Date Due    Diabetic foot exam  01/22/1976    Diabetic retinal exam  01/22/1976    Hepatitis B Vaccine (1 of 3 - Risk 3-dose series) 01/22/1985    Diabetic microalbuminuria test  12/18/2013    Shingles Vaccine (1 of 2) 01/22/2016    Lipid screen  06/21/2017    Potassium monitoring  02/15/2019    Creatinine monitoring  02/15/2019    Breast cancer screen  05/05/2019        ASSESSMENT AND PLAN    1. [place] of foot    - [address]    2. Essential hypertension    - amLODIPine (NORVASC) 10 MG tablet; TAKE 1 TAB BY MOUTH ONCE A DAY  Dispense: 30 tablet; Refill: 3  - aspirin 81 MG EC tablet; TAKE 1 TAB BY MOUTH ONCE A DAY  Dispense: 90 tablet; Refill: 1  - atenolol (TENORMIN) 50 MG tablet; TAKE 1 TAB BY MOUTH ONCE A DAY  Dispense: 30 tablet; Refill: 3  - hydrochlorothiazide (HYDRODIURIL) 25 MG tablet; Take 1 tablet by mouth daily  Dispense: 30 tablet; Refill: 11  - isosorbide mononitrate (IMDUR) 60 MG extended release tablet; TAKE 1 TAB BY MOUTH IN THE MORNING  Dispense: 30 tablet; Refill: 3  - spironolactone (ALDACTONE) 25 MG tablet; TAKE 1 TAB BY MOUTH ONCE A DAY  Dispense: 30 tablet; Refill: 3  - CBC Auto Differential; Future  - Comprehensive Metabolic Panel; Future  - Lipid Panel; Future    3. Moderate episode of recurrent major depressive disorder ([address]      4. Alcohol abuse      5. Type 2 diabetes mellitus without complication, without long-term current use of insulin (HCC)    - Hemoglobin A1C; Future  - Microalbumin, Ur; Future    6.  Breast cancer screening    - [facility] DIGITAL SCREEN W OR WO CAD BILATERAL; Future    7. Mild intermittent asthma with status asthmaticus    - fluticasone (ARNUITY ELLIPTA) 100 MCG/ACT AEPB; Inhale 1 puff into the lungs daily  Dispense: 30 each; Refill: 5  - albuterol sulfate HFA (PROAIR HFA) 108 (90 Base) MCG/ACT inhaler; Inhale 2 puffs into the lungs every 6 hours as needed for Wheezing  Dispense: 1 Inhaler; Refill: 5      ·   · Delphine received counseling on the following healthy behaviors: exercise and medication adherence  · Discussed use, benefit, and side effects of prescribed medications. Barriers to medication compliance addressed. All patient questions answered. Pt voiced understanding. · The return visit should be in 6 weeks      Charmaine Evans MD  Attending and Faculty Internal Medicine  [facility] PHYSICIANS  [address]  [address] [place] [address]  Dept: [phone]  5/22/19      This note is created with the assistance of a speech-recognition program. While intending to generate a document that actually reflects the content of the visit, the document can still have some mistakes which may not have been identified and corrected by editing.

## 2019-05-22 NOTE — PATIENT INSTRUCTIONS
Follow-up appointment scheduled for 6/25/19, AVS given to patient. Your doctor has ordered fasting blood work. It can be done at [facility] or at the hospital. Jesus Donato will need to fast for 12 hours and bring order with you to registration department.    -Order for mammogram faxed to scheduling, they will contact patient with an appt original order given to patient along with scheduling phone number     Referral for Podiatry given to patient.     Henry Watters

## 2019-05-24 DIAGNOSIS — K21.9 GASTROESOPHAGEAL REFLUX DISEASE WITHOUT ESOPHAGITIS: ICD-10-CM

## 2019-05-24 RX ORDER — NAPROXEN 500 MG/1
TABLET ORAL
Qty: 60 TABLET | Refills: 0 | Status: SHIPPED | OUTPATIENT
Start: 2019-05-24 | End: 2019-07-08 | Stop reason: SDUPTHER

## 2019-05-24 RX ORDER — CITALOPRAM 40 MG/1
TABLET ORAL
Qty: 30 TABLET | Refills: 0 | Status: SHIPPED | OUTPATIENT
Start: 2019-05-24 | End: 2019-07-08 | Stop reason: SDUPTHER

## 2019-05-24 RX ORDER — FAMOTIDINE 20 MG/1
TABLET, FILM COATED ORAL
Qty: 60 TABLET | Refills: 0 | Status: SHIPPED | OUTPATIENT
Start: 2019-05-24 | End: 2019-07-08 | Stop reason: SDUPTHER

## 2019-05-24 RX ORDER — MIRTAZAPINE 45 MG/1
TABLET, FILM COATED ORAL
Qty: 30 TABLET | Refills: 0 | Status: SHIPPED | OUTPATIENT
Start: 2019-05-24 | End: 2019-07-08 | Stop reason: SDUPTHER

## 2019-05-24 RX ORDER — CETIRIZINE HYDROCHLORIDE 10 MG/1
TABLET ORAL
Qty: 30 TABLET | Refills: 0 | Status: SHIPPED | OUTPATIENT
Start: 2019-05-24 | End: 2019-07-08 | Stop reason: SDUPTHER

## 2019-05-24 RX ORDER — PANTOPRAZOLE SODIUM 40 MG/1
TABLET, DELAYED RELEASE ORAL
Qty: 60 TABLET | Refills: 0 | Status: SHIPPED | OUTPATIENT
Start: 2019-05-24 | End: 2019-07-08 | Stop reason: SDUPTHER

## 2019-05-24 NOTE — TELEPHONE ENCOUNTER
Refill request for all meds pended        Next Visit Date:  Future Appointments   Date Time Provider Anthony Everett   5/29/2019  1:45 PM STA [facility] MAMMO RM 2 STAZ MAMMO STA Radiolog   6/12/2019 12:45 PM Dann Lee DPM [facility] Frederick Cuello   6/25/2019  1:15 PM Liam Persaud MD [address] Maintenance   Topic Date Due    Diabetic foot exam  01/22/1976    Diabetic retinal exam  01/22/1976    Hepatitis B Vaccine (1 of 3 - Risk 3-dose series) 01/22/1985    Diabetic microalbuminuria test  12/18/2013    Shingles Vaccine (1 of 2) 01/22/2016    Lipid screen  06/21/2017    Potassium monitoring  02/15/2019    Creatinine monitoring  02/15/2019    Breast cancer screen  05/05/2019    Flu vaccine (Season Ended) 09/01/2019    A1C test (Diabetic or Prediabetic)  12/07/2019    Colon cancer screen colonoscopy  12/10/2023    DTaP/Tdap/Td vaccine (3 - Td) 04/14/2027    Pneumococcal 0-64 years Vaccine  Completed    HIV screen  Completed       Hemoglobin A1C (%)   Date Value   12/07/2018 5.6   11/15/2017 6.2 (H)   08/23/2017 6.7 (H)             ( goal A1C is < 7)   Microalb/Crt.  Ratio (mcg/mg creat)   Date Value   12/18/2012 15     LDL Cholesterol (mg/dL)   Date Value   06/21/2016 139 (H)   05/21/2014 93       (goal LDL is <100)   AST (U/L)   Date Value   09/25/2016 29     ALT (U/L)   Date Value   09/25/2016 29     BUN (mg/dL)   Date Value   02/15/2018 11     BP Readings from Last 3 Encounters:   05/22/19 (!) 165/105   12/07/18 (!) 144/91   02/15/18 119/85          (goal 120/80)    All Future Testing planned in [facility]  Lab Frequency Next Occurrence   Ferritin Once 05/26/2019   Iron and TIBC Once 05/20/2019   Vitamin B12 & Folate Once 05/20/2019   CARI DIGITAL SCREEN W OR WO CAD BILATERAL Once 08/07/2019   Vitamin D 25 Hydroxy Once 07/03/2019   CBC Auto Differential Once 10/19/2019   Comprehensive Metabolic Panel Once 93/26/6439   Hemoglobin A1C Once 08/20/2019   Microalbumin, Ur Once 10/19/2019 CARI DIGITAL SCREEN W OR WO CAD BILATERAL Once 05/22/2019   Lipid Panel Once 10/19/2019               Patient Active Problem List:     HTN (hypertension)     Major depressive disorder, recurrent episode ([facility]     Lung nodule     Obesity     Adrenal adenoma     Goiter     Alcohol abuse     Essential hypertension     Hypokalemia     Enlarged tonsils     Angio-edema     ACE inhibitor-aggravated angioedema     JONES (obstructive sleep apnea)     ROXANN (acute kidney injury) ([facility] [address]     Type 2 diabetes mellitus without complication, without long-term current use of insulin ([facility] [address]

## 2019-05-25 ENCOUNTER — TELEPHONE (OUTPATIENT)
Dept: INTERNAL MEDICINE | Age: 54
End: 2019-05-25

## 2019-05-31 ENCOUNTER — HOSPITAL ENCOUNTER (OUTPATIENT)
Dept: MAMMOGRAPHY | Age: 54
Discharge: HOME | End: 2019-06-02
Payer: MEDICAID

## 2019-05-31 DIAGNOSIS — Z12.39 BREAST CANCER SCREENING: ICD-10-CM

## 2019-05-31 PROCEDURE — 77063 BREAST TOMOSYNTHESIS BI: CPT

## 2019-06-12 ENCOUNTER — HOSPITAL ENCOUNTER (OUTPATIENT)
Age: 54
Discharge: HOME | End: 2019-06-12
Payer: MEDICAID

## 2019-06-12 ENCOUNTER — OFFICE VISIT (OUTPATIENT)
Dept: PODIATRY | Age: 54
End: 2019-06-12
Payer: MEDICAID

## 2019-06-12 VITALS
BODY MASS INDEX: 28.85 KG/M2 | HEART RATE: 88 BPM | DIASTOLIC BLOOD PRESSURE: 80 MMHG | WEIGHT: 169 LBS | HEIGHT: 64 IN | SYSTOLIC BLOOD PRESSURE: 140 MMHG

## 2019-06-12 DIAGNOSIS — I10 ESSENTIAL HYPERTENSION: ICD-10-CM

## 2019-06-12 DIAGNOSIS — E11.9 TYPE 2 DIABETES MELLITUS WITHOUT COMPLICATION, WITHOUT LONG-TERM CURRENT USE OF INSULIN (HCC): ICD-10-CM

## 2019-06-12 DIAGNOSIS — Q82.8 POROKERATOSIS: Primary | ICD-10-CM

## 2019-06-12 DIAGNOSIS — R79.89 ELEVATED LFTS: Primary | ICD-10-CM

## 2019-06-12 LAB
ABSOLUTE EOS #: 0.04 K/UL (ref 0–0.44)
ABSOLUTE IMMATURE GRANULOCYTE: <0.03 K/UL (ref 0–0.3)
ABSOLUTE LYMPH #: 1.11 K/UL (ref 1.1–3.7)
ABSOLUTE MONO #: 0.53 K/UL (ref 0.1–1.2)
ALBUMIN SERPL-MCNC: 4.7 G/DL (ref 3.5–5.2)
ALBUMIN/GLOBULIN RATIO: 1 (ref 1–2.5)
ALP BLD-CCNC: 133 U/L (ref 35–104)
ALT SERPL-CCNC: 182 U/L (ref 5–33)
ANION GAP SERPL CALCULATED.3IONS-SCNC: 19 MMOL/L (ref 9–17)
AST SERPL-CCNC: 334 U/L
BASOPHILS # BLD: 1 % (ref 0–2)
BASOPHILS ABSOLUTE: 0.06 K/UL (ref 0–0.2)
BILIRUB SERPL-MCNC: 0.44 MG/DL (ref 0.3–1.2)
BUN BLDV-MCNC: 9 MG/DL (ref 6–20)
BUN/CREAT BLD: ABNORMAL (ref 9–20)
CALCIUM SERPL-MCNC: 9.2 MG/DL (ref 8.6–10.4)
CHLORIDE BLD-SCNC: 96 MMOL/L (ref 98–107)
CHOLESTEROL/HDL RATIO: 1.9
CHOLESTEROL: 300 MG/DL
CO2: 24 MMOL/L (ref 20–31)
CREAT SERPL-MCNC: 0.71 MG/DL (ref 0.5–0.9)
CREATININE URINE: 109.2 MG/DL (ref 28–217)
DIFFERENTIAL TYPE: ABNORMAL
EOSINOPHILS RELATIVE PERCENT: 1 % (ref 1–4)
ESTIMATED AVERAGE GLUCOSE: 120 MG/DL
GFR AFRICAN AMERICAN: >60 ML/MIN
GFR NON-AFRICAN AMERICAN: >60 ML/MIN
GFR SERPL CREATININE-BSD FRML MDRD: ABNORMAL ML/MIN/{1.73_M2}
GFR SERPL CREATININE-BSD FRML MDRD: ABNORMAL ML/MIN/{1.73_M2}
GLUCOSE BLD-MCNC: 127 MG/DL (ref 70–99)
HBA1C MFR BLD: 5.8 % (ref 4–6)
HCT VFR BLD CALC: 40.2 % (ref 36.3–47.1)
HDLC SERPL-MCNC: 154 MG/DL
HEMOGLOBIN: 13.2 G/DL (ref 11.9–15.1)
IMMATURE GRANULOCYTES: 0 %
LDL CHOLESTEROL: 136 MG/DL (ref 0–130)
LYMPHOCYTES # BLD: 25 % (ref 24–43)
MCH RBC QN AUTO: 31.4 PG (ref 25.2–33.5)
MCHC RBC AUTO-ENTMCNC: 32.8 G/DL (ref 28.4–34.8)
MCV RBC AUTO: 95.5 FL (ref 82.6–102.9)
MICROALBUMIN/CREAT 24H UR: 160 MG/L
MICROALBUMIN/CREAT UR-RTO: 147 MCG/MG CREAT
MONOCYTES # BLD: 12 % (ref 3–12)
NRBC AUTOMATED: 0 PER 100 WBC
PDW BLD-RTO: 14.8 % (ref 11.8–14.4)
PLATELET # BLD: 286 K/UL (ref 138–453)
PLATELET ESTIMATE: ABNORMAL
PMV BLD AUTO: 9.6 FL (ref 8.1–13.5)
POTASSIUM SERPL-SCNC: 4.1 MMOL/L (ref 3.7–5.3)
RBC # BLD: 4.21 M/UL (ref 3.95–5.11)
RBC # BLD: ABNORMAL 10*6/UL
SEG NEUTROPHILS: 61 % (ref 36–65)
SEGMENTED NEUTROPHILS ABSOLUTE COUNT: 2.62 K/UL (ref 1.5–8.1)
SODIUM BLD-SCNC: 139 MMOL/L (ref 135–144)
TOTAL PROTEIN: 9.4 G/DL (ref 6.4–8.3)
TRIGL SERPL-MCNC: 48 MG/DL
VLDLC SERPL CALC-MCNC: ABNORMAL MG/DL (ref 1–30)
WBC # BLD: 4.4 K/UL (ref 3.5–11.3)
WBC # BLD: ABNORMAL 10*3/UL

## 2019-06-12 PROCEDURE — 36415 COLL VENOUS BLD VENIPUNCTURE: CPT

## 2019-06-12 PROCEDURE — 82570 ASSAY OF URINE CREATININE: CPT

## 2019-06-12 PROCEDURE — G8427 DOCREV CUR MEDS BY ELIG CLIN: HCPCS | Performed by: STUDENT IN AN ORGANIZED HEALTH CARE EDUCATION/TRAINING PROGRAM

## 2019-06-12 PROCEDURE — 99202 OFFICE O/P NEW SF 15 MIN: CPT | Performed by: STUDENT IN AN ORGANIZED HEALTH CARE EDUCATION/TRAINING PROGRAM

## 2019-06-12 PROCEDURE — 83036 HEMOGLOBIN GLYCOSYLATED A1C: CPT

## 2019-06-12 PROCEDURE — 3017F COLORECTAL CA SCREEN DOC REV: CPT | Performed by: STUDENT IN AN ORGANIZED HEALTH CARE EDUCATION/TRAINING PROGRAM

## 2019-06-12 PROCEDURE — 1036F TOBACCO NON-USER: CPT | Performed by: STUDENT IN AN ORGANIZED HEALTH CARE EDUCATION/TRAINING PROGRAM

## 2019-06-12 PROCEDURE — 80061 LIPID PANEL: CPT

## 2019-06-12 PROCEDURE — G8417 CALC BMI ABV UP PARAM F/U: HCPCS | Performed by: STUDENT IN AN ORGANIZED HEALTH CARE EDUCATION/TRAINING PROGRAM

## 2019-06-12 PROCEDURE — 85025 COMPLETE CBC W/AUTO DIFF WBC: CPT

## 2019-06-12 PROCEDURE — 82043 UR ALBUMIN QUANTITATIVE: CPT

## 2019-06-12 PROCEDURE — 80053 COMPREHEN METABOLIC PANEL: CPT

## 2019-06-12 PROCEDURE — 11055 PARING/CUTG B9 HYPRKER LES 1: CPT | Performed by: STUDENT IN AN ORGANIZED HEALTH CARE EDUCATION/TRAINING PROGRAM

## 2019-06-12 PROCEDURE — 99203 OFFICE O/P NEW LOW 30 MIN: CPT | Performed by: STUDENT IN AN ORGANIZED HEALTH CARE EDUCATION/TRAINING PROGRAM

## 2019-06-12 NOTE — PROGRESS NOTES
HYSTERECTOMY      partial hyst    OTHER SURGICAL HISTORY  2015    MRI under anesthesia    THYROID SURGERY      bilat bx    UPPER GASTROINTESTINAL ENDOSCOPY         Social History:  Social History     Tobacco Use    Smoking status: Former Smoker     Packs/day: 0.50     Types: Cigarettes     Last attempt to quit: 1992     Years since quittin.4    Smokeless tobacco: Never Used    Tobacco comment: states quiti in the 1980s   Substance Use Topics    Alcohol use: Yes     Alcohol/week: 7.2 oz     Types: 12 Cans of beer per week     Comment: 2-3 times per week    Drug use: No     Types: Cocaine     Comment: last use approximately 14 cocaine       Medications:  Prior to Admission medications    Medication Sig Start Date End Date Taking?  Authorizing Provider   naproxen (NAPROSYN) 500 MG tablet TAKE 1 TAB BY MOUTH TWICE A DAY AFTER MEALS 19  Yes Kevin Mckenzie MD   citalopram (CELEXA) 40 MG tablet TAKE 1 TAB BY MOUTH ONCE A DAY 19  Yes Kevin Mckenzie MD   cetirizine (ZYRTEC) 10 MG tablet TAKE 1 TAB BY MOUTH ONCE A DAY 19  Yes Kevin Mckenzie MD   mirtazapine (REMERON) 45 MG tablet TAKE 1 TAB BY MOUTH AT BEDTIME 19  Yes Kevin Mckenzie MD   famotidine (PEPCID) 20 MG tablet TAKE 1 TAB BY MOUTH TWICE DAILY 19  Yes Kevin Mckenzie MD   pantoprazole (PROTONIX) 40 MG tablet TAKE 1 TAB BY MOUTH TWICE DAILY 19  Yes Kevin Mckenzie MD   amLODIPine (NORVASC) 10 MG tablet TAKE 1 TAB BY MOUTH ONCE A DAY 19  Yes Kevin Mckenzie MD   aspirin 81 MG EC tablet TAKE 1 TAB BY MOUTH ONCE A DAY 19  Yes Kevin Mckenzie MD   atenolol (TENORMIN) 50 MG tablet TAKE 1 TAB BY MOUTH ONCE A DAY 19  Yes Kevin Mckenzie MD   hydrochlorothiazide (HYDRODIURIL) 25 MG tablet Take 1 tablet by mouth daily 19  Yes Kevin Mckenzie MD   isosorbide mononitrate (IMDUR) 60 MG extended release tablet TAKE 1 TAB BY MOUTH IN THE MORNING 19  Yes Liam Zee Sanchez MD   spironolactone (ALDACTONE) 25 MG tablet TAKE 1 TAB BY MOUTH ONCE A DAY 5/22/19  Yes Marlena Castellanos MD   fluticasone (ARNUITY ELLIPTA) 100 MCG/ACT AEPB Inhale 1 puff into the lungs daily 5/22/19  Yes Marlena Castellanos MD   albuterol sulfate HFA (PROAIR HFA) 108 (90 Base) MCG/ACT inhaler Inhale 2 puffs into the lungs every 6 hours as needed for Wheezing 5/22/19  Yes Liam Sanchez MD   Calcium Carbonate-Vitamin D (OYSTER SHELL CALCIUM/D) 500-200 MG-UNIT TABS TAKE 1 TAB BY MOUTH ONCE A DAY 12/7/18  Yes Marlena Castellanos MD   mometasone (ASMANEX 120 METERED DOSES) 220 MCG/INH inhaler Inhale 2 puffs into the lungs daily 12/7/18  Yes Marlena Castellanos MD   cyclobenzaprine (FLEXERIL) 5 MG tablet One to two tid prn back pain or muscle spasm. Will cause drowsiness. Do not drive if taking. 11/14/17  Yes Russell Urrutia MD   lurasidone (LATUDA) 60 MG TABS tablet Take 60 mg by mouth nightly   Yes Historical Provider, MD   buPROPion (WELLBUTRIN XL) 300 MG extended release tablet Take 300 mg by mouth every morning   Yes Historical Provider, MD   docusate (COLACE, DULCOLAX) 100 MG CAPS Take 100 mg by mouth 2 times daily as needed (constipation) 8/9/16  Yes Russell Urrutia MD   Cholecalciferol (VITAMIN D3) 06033 UNITS CAPS Take 1 capsule by mouth Twice a Week 8/12/15  Yes Historical Provider, MD       Objective     Vitals:    06/12/19 1253   BP: (!) 140/80   Pulse:        Lab Results   Component Value Date    LABA1C 5.6 12/07/2018       Physical Exam:  General:  Alert and oriented x3. In no acute distress. Lower Extremity Physical Exam:    Vascular: DP and PT pulses are palpable, Bilateral. CFT <3 seconds to all digits, Bilateral.  No edema, Bilateral.  Hair growth is absent to the level of the digits, Bilateral.     Neuro: Saph/sural/SP/DP/plantar sensation intact to light touch.  Protective sensation is intact to 10/10 sites as tested with a 5.07g SWMF, Bilateral.     Musculoskeletal:

## 2019-06-20 ENCOUNTER — HOSPITAL ENCOUNTER (OUTPATIENT)
Dept: ULTRASOUND IMAGING | Age: 54
Discharge: HOME | End: 2019-06-22
Payer: MEDICAID

## 2019-06-20 DIAGNOSIS — R79.89 ELEVATED LFTS: ICD-10-CM

## 2019-06-20 PROCEDURE — 76705 ECHO EXAM OF ABDOMEN: CPT

## 2019-07-08 ENCOUNTER — OFFICE VISIT (OUTPATIENT)
Dept: INTERNAL MEDICINE | Age: 54
End: 2019-07-08
Payer: MEDICAID

## 2019-07-08 VITALS
BODY MASS INDEX: 29.71 KG/M2 | SYSTOLIC BLOOD PRESSURE: 158 MMHG | HEIGHT: 64 IN | WEIGHT: 174 LBS | HEART RATE: 82 BPM | DIASTOLIC BLOOD PRESSURE: 112 MMHG

## 2019-07-08 DIAGNOSIS — F10.920 ACUTE ALCOHOLIC INTOXICATION WITHOUT COMPLICATION (HCC): ICD-10-CM

## 2019-07-08 DIAGNOSIS — K21.9 GASTROESOPHAGEAL REFLUX DISEASE WITHOUT ESOPHAGITIS: ICD-10-CM

## 2019-07-08 DIAGNOSIS — M79.671 RIGHT FOOT PAIN: ICD-10-CM

## 2019-07-08 DIAGNOSIS — I10 ESSENTIAL HYPERTENSION: Primary | ICD-10-CM

## 2019-07-08 PROCEDURE — 99211 OFF/OP EST MAY X REQ PHY/QHP: CPT | Performed by: NURSE PRACTITIONER

## 2019-07-08 PROCEDURE — G8427 DOCREV CUR MEDS BY ELIG CLIN: HCPCS | Performed by: NURSE PRACTITIONER

## 2019-07-08 PROCEDURE — 1036F TOBACCO NON-USER: CPT | Performed by: NURSE PRACTITIONER

## 2019-07-08 PROCEDURE — G8417 CALC BMI ABV UP PARAM F/U: HCPCS | Performed by: NURSE PRACTITIONER

## 2019-07-08 PROCEDURE — 99213 OFFICE O/P EST LOW 20 MIN: CPT | Performed by: NURSE PRACTITIONER

## 2019-07-08 PROCEDURE — 3017F COLORECTAL CA SCREEN DOC REV: CPT | Performed by: NURSE PRACTITIONER

## 2019-07-08 RX ORDER — ASPIRIN 81 MG/1
TABLET ORAL
Qty: 90 TABLET | Refills: 1 | Status: SHIPPED | OUTPATIENT
Start: 2019-07-08 | End: 2019-08-12 | Stop reason: SDUPTHER

## 2019-07-08 RX ORDER — FAMOTIDINE 20 MG/1
TABLET, FILM COATED ORAL
Qty: 60 TABLET | Refills: 0 | Status: SHIPPED | OUTPATIENT
Start: 2019-07-08 | End: 2019-08-12 | Stop reason: SDUPTHER

## 2019-07-08 RX ORDER — AMLODIPINE BESYLATE 10 MG/1
TABLET ORAL
Qty: 30 TABLET | Refills: 3 | Status: SHIPPED | OUTPATIENT
Start: 2019-07-08 | End: 2019-08-12 | Stop reason: SDUPTHER

## 2019-07-08 RX ORDER — CETIRIZINE HYDROCHLORIDE 10 MG/1
TABLET ORAL
Qty: 30 TABLET | Refills: 0 | Status: SHIPPED | OUTPATIENT
Start: 2019-07-08 | End: 2019-09-13 | Stop reason: ALTCHOICE

## 2019-07-08 RX ORDER — NAPROXEN 500 MG/1
TABLET ORAL
Qty: 60 TABLET | Refills: 0 | Status: SHIPPED | OUTPATIENT
Start: 2019-07-08 | End: 2019-12-02 | Stop reason: SDUPTHER

## 2019-07-08 RX ORDER — MIRTAZAPINE 45 MG/1
TABLET, FILM COATED ORAL
Qty: 30 TABLET | Refills: 0 | Status: SHIPPED | OUTPATIENT
Start: 2019-07-08 | End: 2019-08-12 | Stop reason: SDUPTHER

## 2019-07-08 RX ORDER — PANTOPRAZOLE SODIUM 40 MG/1
40 TABLET, DELAYED RELEASE ORAL DAILY
Qty: 60 TABLET | Refills: 0 | Status: SHIPPED | OUTPATIENT
Start: 2019-07-08 | End: 2019-10-09 | Stop reason: ALTCHOICE

## 2019-07-08 RX ORDER — CITALOPRAM 40 MG/1
TABLET ORAL
Qty: 30 TABLET | Refills: 0 | Status: SHIPPED | OUTPATIENT
Start: 2019-07-08 | End: 2019-08-12 | Stop reason: SDUPTHER

## 2019-07-08 ASSESSMENT — ENCOUNTER SYMPTOMS
BLURRED VISION: 0
RHINORRHEA: 1
EYE REDNESS: 1
ORTHOPNEA: 0
SHORTNESS OF BREATH: 0

## 2019-07-24 ENCOUNTER — HOSPITAL ENCOUNTER (OUTPATIENT)
Dept: GENERAL RADIOLOGY | Age: 54
Discharge: HOME | End: 2019-07-26
Payer: MEDICAID

## 2019-07-24 ENCOUNTER — HOSPITAL ENCOUNTER (OUTPATIENT)
Age: 54
Discharge: HOME | End: 2019-07-26
Payer: MEDICAID

## 2019-07-24 DIAGNOSIS — M79.671 RIGHT FOOT PAIN: ICD-10-CM

## 2019-07-24 PROCEDURE — 73630 X-RAY EXAM OF FOOT: CPT

## 2019-07-29 ENCOUNTER — OFFICE VISIT (OUTPATIENT)
Dept: INTERNAL MEDICINE | Age: 54
End: 2019-07-29
Payer: MEDICAID

## 2019-07-29 VITALS
SYSTOLIC BLOOD PRESSURE: 126 MMHG | HEART RATE: 89 BPM | WEIGHT: 162 LBS | BODY MASS INDEX: 27.66 KG/M2 | DIASTOLIC BLOOD PRESSURE: 87 MMHG | HEIGHT: 64 IN

## 2019-07-29 DIAGNOSIS — I10 ESSENTIAL HYPERTENSION: Primary | ICD-10-CM

## 2019-07-29 DIAGNOSIS — Q82.8 POROKERATOSIS: ICD-10-CM

## 2019-07-29 DIAGNOSIS — Z23 NEED FOR SHINGLES VACCINE: ICD-10-CM

## 2019-07-29 DIAGNOSIS — M79.671 RIGHT FOOT PAIN: ICD-10-CM

## 2019-07-29 PROCEDURE — 99211 OFF/OP EST MAY X REQ PHY/QHP: CPT | Performed by: NURSE PRACTITIONER

## 2019-07-29 PROCEDURE — G8427 DOCREV CUR MEDS BY ELIG CLIN: HCPCS | Performed by: NURSE PRACTITIONER

## 2019-07-29 PROCEDURE — G8417 CALC BMI ABV UP PARAM F/U: HCPCS | Performed by: NURSE PRACTITIONER

## 2019-07-29 PROCEDURE — 3017F COLORECTAL CA SCREEN DOC REV: CPT | Performed by: NURSE PRACTITIONER

## 2019-07-29 PROCEDURE — 1036F TOBACCO NON-USER: CPT | Performed by: NURSE PRACTITIONER

## 2019-07-29 PROCEDURE — 99213 OFFICE O/P EST LOW 20 MIN: CPT | Performed by: NURSE PRACTITIONER

## 2019-07-29 RX ORDER — FLUOXETINE HYDROCHLORIDE 40 MG/1
40 CAPSULE ORAL DAILY
COMMUNITY
End: 2019-08-12 | Stop reason: SDUPTHER

## 2019-07-29 ASSESSMENT — ENCOUNTER SYMPTOMS
SHORTNESS OF BREATH: 0
BLURRED VISION: 1
ORTHOPNEA: 0

## 2019-07-29 NOTE — PATIENT INSTRUCTIONS
Follow-up appointment scheduled for 10/31/19, AVS given to patient. LAURA Camargo    Referral for Podiatry sent to [facility]  they will call pt for appt, copy of referral with number and address given to pt. Pt should call referral number if not heard from within a couple of weeks.     Mahin Harris

## 2019-08-12 ENCOUNTER — OFFICE VISIT (OUTPATIENT)
Dept: PODIATRY | Age: 54
End: 2019-08-12
Payer: MEDICAID

## 2019-08-12 VITALS
DIASTOLIC BLOOD PRESSURE: 99 MMHG | HEART RATE: 77 BPM | HEIGHT: 65 IN | BODY MASS INDEX: 27.82 KG/M2 | WEIGHT: 167 LBS | SYSTOLIC BLOOD PRESSURE: 153 MMHG

## 2019-08-12 DIAGNOSIS — M20.41 HAMMER TOES OF BOTH FEET: ICD-10-CM

## 2019-08-12 DIAGNOSIS — M79.671 PAIN OF RIGHT FOOT: ICD-10-CM

## 2019-08-12 DIAGNOSIS — M20.11 HALLUX VALGUS, BILATERAL: ICD-10-CM

## 2019-08-12 DIAGNOSIS — M20.12 HALLUX VALGUS, BILATERAL: ICD-10-CM

## 2019-08-12 DIAGNOSIS — Q82.8 POROKERATOSIS: Primary | ICD-10-CM

## 2019-08-12 DIAGNOSIS — M20.42 HAMMER TOES OF BOTH FEET: ICD-10-CM

## 2019-08-12 DIAGNOSIS — G57.91 NEURITIS OF FOOT, RIGHT: ICD-10-CM

## 2019-08-12 PROCEDURE — G8417 CALC BMI ABV UP PARAM F/U: HCPCS | Performed by: STUDENT IN AN ORGANIZED HEALTH CARE EDUCATION/TRAINING PROGRAM

## 2019-08-12 PROCEDURE — 64455 NJX AA&/STRD PLTR COM DG NRV: CPT | Performed by: STUDENT IN AN ORGANIZED HEALTH CARE EDUCATION/TRAINING PROGRAM

## 2019-08-12 PROCEDURE — 11055 PARING/CUTG B9 HYPRKER LES 1: CPT | Performed by: STUDENT IN AN ORGANIZED HEALTH CARE EDUCATION/TRAINING PROGRAM

## 2019-08-12 PROCEDURE — 99202 OFFICE O/P NEW SF 15 MIN: CPT | Performed by: STUDENT IN AN ORGANIZED HEALTH CARE EDUCATION/TRAINING PROGRAM

## 2019-08-12 PROCEDURE — 3017F COLORECTAL CA SCREEN DOC REV: CPT | Performed by: STUDENT IN AN ORGANIZED HEALTH CARE EDUCATION/TRAINING PROGRAM

## 2019-08-12 PROCEDURE — 6360000002 HC RX W HCPCS

## 2019-08-12 PROCEDURE — G8427 DOCREV CUR MEDS BY ELIG CLIN: HCPCS | Performed by: STUDENT IN AN ORGANIZED HEALTH CARE EDUCATION/TRAINING PROGRAM

## 2019-08-12 PROCEDURE — 20600 DRAIN/INJ JOINT/BURSA W/O US: CPT | Performed by: STUDENT IN AN ORGANIZED HEALTH CARE EDUCATION/TRAINING PROGRAM

## 2019-08-12 PROCEDURE — 1036F TOBACCO NON-USER: CPT | Performed by: STUDENT IN AN ORGANIZED HEALTH CARE EDUCATION/TRAINING PROGRAM

## 2019-08-12 RX ORDER — ASPIRIN 81 MG/1
1 TABLET, CHEWABLE ORAL
COMMUNITY
End: 2019-08-12 | Stop reason: SDUPTHER

## 2019-08-12 RX ORDER — LORATADINE 10 MG/1
1 CAPSULE, LIQUID FILLED ORAL
COMMUNITY
End: 2020-01-15 | Stop reason: SDUPTHER

## 2019-08-12 RX ORDER — FAMOTIDINE 20 MG/1
1 TABLET, FILM COATED ORAL
COMMUNITY
End: 2019-08-12 | Stop reason: SDUPTHER

## 2019-08-12 RX ORDER — AMLODIPINE BESYLATE 10 MG/1
1 TABLET ORAL
COMMUNITY
End: 2019-08-12 | Stop reason: SDUPTHER

## 2019-08-12 RX ORDER — FLUOXETINE HYDROCHLORIDE 40 MG/1
1 CAPSULE ORAL
COMMUNITY
Start: 2019-07-22 | End: 2019-08-12 | Stop reason: SDUPTHER

## 2019-08-12 RX ORDER — TRIAMCINOLONE ACETONIDE 40 MG/ML
20 INJECTION, SUSPENSION INTRA-ARTICULAR; INTRAMUSCULAR ONCE
Status: COMPLETED | OUTPATIENT
Start: 2019-08-12 | End: 2019-08-12

## 2019-08-12 RX ORDER — PANTOPRAZOLE SODIUM 20 MG/1
2 TABLET, DELAYED RELEASE ORAL
COMMUNITY
End: 2019-08-12 | Stop reason: SDUPTHER

## 2019-08-12 RX ORDER — LIDOCAINE HYDROCHLORIDE 10 MG/ML
2.5 INJECTION, SOLUTION INFILTRATION; PERINEURAL ONCE
Status: COMPLETED | OUTPATIENT
Start: 2019-08-12 | End: 2019-08-12

## 2019-08-12 RX ADMIN — LIDOCAINE HYDROCHLORIDE 2.5 ML: 10 INJECTION, SOLUTION INFILTRATION; PERINEURAL at 15:46

## 2019-08-12 RX ADMIN — TRIAMCINOLONE ACETONIDE 20 MG: 40 INJECTION, SUSPENSION INTRA-ARTICULAR; INTRAMUSCULAR at 15:48

## 2019-08-12 NOTE — PROGRESS NOTES
Patient instructed to remove shoes and socks, instructed to sit in exam chair. Current PCP name is Silvia Rdz and date of last visit 7-24-19. Do you have a follow up visit scheduled?   Yes or no    If yes the date is NO
3/28/2013    PUD (peptic ulcer disease)     Thyroid nodule     Tonsillar hypertrophy     Type 2 diabetes mellitus without complication, without long-term current use of insulin ([facility] [address] 2018       Surgical History:   Past Surgical History:   Procedure Laterality Date    CARDIAC CATHETERIZATION      COLONOSCOPY      bx    HYSTERECTOMY      partial hyst    OTHER SURGICAL HISTORY  2015    MRI under anesthesia    THYROID SURGERY      bilat bx    UPPER GASTROINTESTINAL ENDOSCOPY         Social History:  Social History     Tobacco Use    Smoking status: Former Smoker     Packs/day: 0.50     Types: Cigarettes     Last attempt to quit: 1992     Years since quittin.6    Smokeless tobacco: Never Used    Tobacco comment: states quiti in the 1980s   Substance Use Topics    Alcohol use: Yes     Alcohol/week: 12.0 standard drinks     Types: 12 Cans of beer per week     Comment: 2-3 times per week    Drug use: No     Types: Cocaine     Comment: last use approximately 14 cocaine       Medications:  Prior to Admission medications    Medication Sig Start Date End Date Taking?  Authorizing Provider   loratadine (CLARITIN) 10 MG capsule Take 1 capsule by mouth    Historical Provider, MD   albuterol sulfate (PROAIR RESPICLICK) 109 (90 Base) MCG/ACT aerosol powder inhalation Inhale 2 puffs into the lungs    Historical Provider, MD   naproxen (NAPROSYN) 500 MG tablet TAKE 1 TAB BY MOUTH TWICE A DAY AFTER MEALS 19   CECILIA Andino CNP   cetirizine (ZYRTEC) 10 MG tablet TAKE 1 TAB BY MOUTH ONCE A DAY 19   CECILIA Andino CNP   pantoprazole (PROTONIX) 40 MG tablet Take 1 tablet by mouth daily 19   CECILIA Andino CNP   isosorbide mononitrate (IMDUR) 60 MG extended release tablet TAKE 1 TAB BY MOUTH IN THE MORNING 19   Liam Rubin MD   fluticasone (ARNUITY ELLIPTA) 100 MCG/ACT AEPB Inhale 1 puff into the lungs daily 19   Lashanda Cobb MD

## 2019-08-13 ENCOUNTER — TELEPHONE (OUTPATIENT)
Dept: PODIATRY | Age: 54
End: 2019-08-13

## 2019-08-19 ENCOUNTER — OFFICE VISIT (OUTPATIENT)
Dept: PODIATRY | Age: 54
End: 2019-08-19
Payer: MEDICAID

## 2019-08-19 VITALS
HEIGHT: 64 IN | HEART RATE: 79 BPM | DIASTOLIC BLOOD PRESSURE: 99 MMHG | BODY MASS INDEX: 27.66 KG/M2 | SYSTOLIC BLOOD PRESSURE: 153 MMHG | WEIGHT: 162 LBS

## 2019-08-19 DIAGNOSIS — M20.12 HALLUX VALGUS, BILATERAL: ICD-10-CM

## 2019-08-19 DIAGNOSIS — G57.91 NEURITIS OF FOOT, RIGHT: ICD-10-CM

## 2019-08-19 DIAGNOSIS — M20.42 HAMMER TOES OF BOTH FEET: ICD-10-CM

## 2019-08-19 DIAGNOSIS — Q82.8 POROKERATOSIS: Primary | ICD-10-CM

## 2019-08-19 DIAGNOSIS — M79.671 PAIN OF RIGHT FOOT: ICD-10-CM

## 2019-08-19 DIAGNOSIS — M20.11 HALLUX VALGUS, BILATERAL: ICD-10-CM

## 2019-08-19 DIAGNOSIS — M20.41 HAMMER TOES OF BOTH FEET: ICD-10-CM

## 2019-08-19 PROCEDURE — 11055 PARING/CUTG B9 HYPRKER LES 1: CPT | Performed by: STUDENT IN AN ORGANIZED HEALTH CARE EDUCATION/TRAINING PROGRAM

## 2019-08-19 PROCEDURE — 1036F TOBACCO NON-USER: CPT | Performed by: STUDENT IN AN ORGANIZED HEALTH CARE EDUCATION/TRAINING PROGRAM

## 2019-08-19 PROCEDURE — 99213 OFFICE O/P EST LOW 20 MIN: CPT | Performed by: STUDENT IN AN ORGANIZED HEALTH CARE EDUCATION/TRAINING PROGRAM

## 2019-08-19 PROCEDURE — G8427 DOCREV CUR MEDS BY ELIG CLIN: HCPCS | Performed by: STUDENT IN AN ORGANIZED HEALTH CARE EDUCATION/TRAINING PROGRAM

## 2019-08-19 PROCEDURE — 3017F COLORECTAL CA SCREEN DOC REV: CPT | Performed by: STUDENT IN AN ORGANIZED HEALTH CARE EDUCATION/TRAINING PROGRAM

## 2019-08-19 PROCEDURE — G8417 CALC BMI ABV UP PARAM F/U: HCPCS | Performed by: STUDENT IN AN ORGANIZED HEALTH CARE EDUCATION/TRAINING PROGRAM

## 2019-08-19 PROCEDURE — 99212 OFFICE O/P EST SF 10 MIN: CPT | Performed by: STUDENT IN AN ORGANIZED HEALTH CARE EDUCATION/TRAINING PROGRAM

## 2019-08-26 ENCOUNTER — TELEPHONE (OUTPATIENT)
Dept: INTERNAL MEDICINE | Age: 54
End: 2019-08-26

## 2019-08-26 NOTE — TELEPHONE ENCOUNTER
PC from patient--- Patient stated she would like to know if she is a diabetic. She stated she has never been diagnosed with diabetes but she is loosing a lot of weight. Health Maintenance   Topic Date Due    Diabetic foot exam  01/22/1976    Diabetic retinal exam  01/22/1976    Hepatitis B Vaccine (1 of 3 - Risk 3-dose series) 01/22/1985    Shingles Vaccine (1 of 2) 01/22/2016    Flu vaccine (1) 09/01/2019    A1C test (Diabetic or Prediabetic)  06/12/2020    Diabetic microalbuminuria test  06/12/2020    Lipid screen  06/12/2020    Potassium monitoring  06/12/2020    Creatinine monitoring  06/12/2020    Breast cancer screen  05/31/2021    Colon cancer screen colonoscopy  12/10/2023    DTaP/Tdap/Td vaccine (3 - Td) 04/14/2027    Pneumococcal 0-64 years Vaccine  Completed    HIV screen  Completed             (applicable per patient's age: Cancer Screenings, Depression Screening, Fall Risk Screening, Immunizations)    Hemoglobin A1C (%)   Date Value   06/12/2019 5.8   12/07/2018 5.6   11/15/2017 6.2 (H)     Microalb/Crt.  Ratio (mcg/mg creat)   Date Value   06/12/2019 147 (H)     LDL Cholesterol (mg/dL)   Date Value   06/12/2019 136 (H)     AST (U/L)   Date Value   06/12/2019 334 (H)     ALT (U/L)   Date Value   06/12/2019 182 (H)     BUN (mg/dL)   Date Value   06/12/2019 9      (goal A1C is < 7)   (goal LDL is <100) need 30-50% reduction from baseline     BP Readings from Last 3 Encounters:   08/19/19 (!) 153/99   08/12/19 (!) 153/99   07/29/19 126/87    (goal /80)      All Future Testing planned in [facility]:  Lab Frequency Next Occurrence   Ferritin Once 08/28/2019   Iron and TIBC Once 08/28/2019   Vitamin B12 & Folate Once 08/28/2019   CARI DIGITAL SCREEN W OR WO CAD BILATERAL Once 11/22/2019   Vitamin D 25 Hydroxy Once 10/11/2019   Hepatic Function Panel Once 09/20/2019   Hepatitis Panel, Acute Once 09/20/2019       Next Visit Date:  Future Appointments   Date Time Provider Anthony Everett 9/9/2019  2:15 PM Joanne Carpenter DPM [facility]ry Matthew Baker   10/31/2019  2:00 PM Ajay Maynard MD [facility]            Patient Active Problem List:     HTN (hypertension)     Major depressive disorder, recurrent episode ([address]     Lung nodule     Obesity     Adrenal adenoma     Goiter     Alcohol abuse     Essential hypertension     Hypokalemia     Enlarged tonsils     Angio-edema     ACE inhibitor-aggravated angioedema     JONES (obstructive sleep apnea)     ROXANN (acute kidney injury) ([address]     Type 2 diabetes mellitus without complication, without long-term current use of insulin ([address]

## 2019-09-09 ENCOUNTER — OFFICE VISIT (OUTPATIENT)
Dept: PODIATRY | Age: 54
End: 2019-09-09
Payer: MEDICAID

## 2019-09-09 VITALS
SYSTOLIC BLOOD PRESSURE: 109 MMHG | WEIGHT: 161 LBS | DIASTOLIC BLOOD PRESSURE: 71 MMHG | HEIGHT: 64 IN | BODY MASS INDEX: 27.49 KG/M2 | HEART RATE: 76 BPM

## 2019-09-09 DIAGNOSIS — M79.671 PAIN OF RIGHT FOOT: ICD-10-CM

## 2019-09-09 DIAGNOSIS — M20.41 HAMMER TOES OF BOTH FEET: ICD-10-CM

## 2019-09-09 DIAGNOSIS — Q82.8 POROKERATOSIS: Primary | ICD-10-CM

## 2019-09-09 DIAGNOSIS — M20.42 HAMMER TOES OF BOTH FEET: ICD-10-CM

## 2019-09-09 DIAGNOSIS — M20.11 HALLUX VALGUS, BILATERAL: ICD-10-CM

## 2019-09-09 DIAGNOSIS — M20.12 HALLUX VALGUS, BILATERAL: ICD-10-CM

## 2019-09-09 PROCEDURE — G8427 DOCREV CUR MEDS BY ELIG CLIN: HCPCS | Performed by: STUDENT IN AN ORGANIZED HEALTH CARE EDUCATION/TRAINING PROGRAM

## 2019-09-09 PROCEDURE — 3017F COLORECTAL CA SCREEN DOC REV: CPT | Performed by: STUDENT IN AN ORGANIZED HEALTH CARE EDUCATION/TRAINING PROGRAM

## 2019-09-09 PROCEDURE — 1036F TOBACCO NON-USER: CPT | Performed by: STUDENT IN AN ORGANIZED HEALTH CARE EDUCATION/TRAINING PROGRAM

## 2019-09-09 PROCEDURE — 99212 OFFICE O/P EST SF 10 MIN: CPT | Performed by: STUDENT IN AN ORGANIZED HEALTH CARE EDUCATION/TRAINING PROGRAM

## 2019-09-09 PROCEDURE — G8417 CALC BMI ABV UP PARAM F/U: HCPCS | Performed by: STUDENT IN AN ORGANIZED HEALTH CARE EDUCATION/TRAINING PROGRAM

## 2019-09-09 PROCEDURE — 11055 PARING/CUTG B9 HYPRKER LES 1: CPT | Performed by: STUDENT IN AN ORGANIZED HEALTH CARE EDUCATION/TRAINING PROGRAM

## 2019-09-09 PROCEDURE — 99213 OFFICE O/P EST LOW 20 MIN: CPT | Performed by: STUDENT IN AN ORGANIZED HEALTH CARE EDUCATION/TRAINING PROGRAM

## 2019-09-09 NOTE — PROGRESS NOTES
[address] [facility], [address] [facility]  Tel: [phone]   Fax: [phone]    Subjective     CC: Painful corns    Interval History:  Patient is a 49year old female who presents for f/u right foot pain. Patient relates feet feel \"numb\" after using [employer] compound cream. She relates recurrence of painful callus under ball of foot. Has been wearing sandals instead of supportive shoegear. No further issues. HPI:  Kelly Arcos is a 49y.o. year old female who presents to clinic today for a painful callus on the bottom of her right foot. Patient states she has had this callus for a number of years she had not tried any treatment prior to her office visit 1 month ago. She received relief for about 2 weeks after last professional debridement. Patient states she has a sharp radiating pain when she walks. Pain starts from the second interspace and radiates proximally over the dorsum of the foot and circumferentially around the metatarsal heads. She complains of toes 2, 3, and 4 being numb which started a few weeks ago. Patient denies being diabetic, last hemoglobin A1c was 5.8% on 06/12/2019. Patient denies any nausea, vomiting, fever, chills, shortness of breath. Primary care physician is Saniya Patel MD.    ROS:    Constitutional: Denies nausea, vomiting, fever, chills. Neurologic: Denies numbness, tingling, and burning in the feet. Vascular: Denies symptoms of lower extremity claudication. Skin: Denies open wounds. Otherwise negative except as noted in the HPI.      PMH:  Past Medical History:   Diagnosis Date    ROXANN (acute kidney injury) ([facility] [address] 11/18/2017    Alcohol abuse 5/22/2014    Angioedema 11/17/2017    from lisinopril    Anxiety     Bipolar 1 disorder (HCC)     Bipolar disorder ([facility] [address]     CAD (coronary artery disease)     no stents    Claustrophobia     Cocaine abuse ([facility] [address]     states used years ago    Depression     GERD (gastroesophageal

## 2019-09-13 ENCOUNTER — OFFICE VISIT (OUTPATIENT)
Dept: INTERNAL MEDICINE | Age: 54
End: 2019-09-13
Payer: MEDICAID

## 2019-09-13 VITALS
DIASTOLIC BLOOD PRESSURE: 89 MMHG | HEIGHT: 64 IN | HEART RATE: 69 BPM | BODY MASS INDEX: 27.66 KG/M2 | SYSTOLIC BLOOD PRESSURE: 122 MMHG | WEIGHT: 162 LBS

## 2019-09-13 DIAGNOSIS — R73.02 IGT (IMPAIRED GLUCOSE TOLERANCE): ICD-10-CM

## 2019-09-13 DIAGNOSIS — G47.33 OSA (OBSTRUCTIVE SLEEP APNEA): ICD-10-CM

## 2019-09-13 DIAGNOSIS — F10.10 ALCOHOL ABUSE: ICD-10-CM

## 2019-09-13 DIAGNOSIS — F33.1 MODERATE EPISODE OF RECURRENT MAJOR DEPRESSIVE DISORDER (HCC): ICD-10-CM

## 2019-09-13 DIAGNOSIS — R63.4 WEIGHT LOSS, NON-INTENTIONAL: ICD-10-CM

## 2019-09-13 DIAGNOSIS — E78.5 DYSLIPIDEMIA: ICD-10-CM

## 2019-09-13 DIAGNOSIS — I10 ESSENTIAL HYPERTENSION: Primary | ICD-10-CM

## 2019-09-13 DIAGNOSIS — K21.00 GASTROESOPHAGEAL REFLUX DISEASE WITH ESOPHAGITIS: ICD-10-CM

## 2019-09-13 DIAGNOSIS — E55.9 VITAMIN D DEFICIENCY: ICD-10-CM

## 2019-09-13 DIAGNOSIS — J45.20 MILD INTERMITTENT ASTHMA WITHOUT COMPLICATION: ICD-10-CM

## 2019-09-13 PROCEDURE — 1036F TOBACCO NON-USER: CPT | Performed by: STUDENT IN AN ORGANIZED HEALTH CARE EDUCATION/TRAINING PROGRAM

## 2019-09-13 PROCEDURE — 3017F COLORECTAL CA SCREEN DOC REV: CPT | Performed by: STUDENT IN AN ORGANIZED HEALTH CARE EDUCATION/TRAINING PROGRAM

## 2019-09-13 PROCEDURE — G8417 CALC BMI ABV UP PARAM F/U: HCPCS | Performed by: STUDENT IN AN ORGANIZED HEALTH CARE EDUCATION/TRAINING PROGRAM

## 2019-09-13 PROCEDURE — 99211 OFF/OP EST MAY X REQ PHY/QHP: CPT | Performed by: INTERNAL MEDICINE

## 2019-09-13 PROCEDURE — G8427 DOCREV CUR MEDS BY ELIG CLIN: HCPCS | Performed by: STUDENT IN AN ORGANIZED HEALTH CARE EDUCATION/TRAINING PROGRAM

## 2019-09-13 PROCEDURE — 99213 OFFICE O/P EST LOW 20 MIN: CPT | Performed by: STUDENT IN AN ORGANIZED HEALTH CARE EDUCATION/TRAINING PROGRAM

## 2019-09-13 RX ORDER — HYDROCHLOROTHIAZIDE 25 MG/1
25 TABLET ORAL DAILY
COMMUNITY
End: 2020-01-15 | Stop reason: SDUPTHER

## 2019-09-13 RX ORDER — ATORVASTATIN CALCIUM 40 MG/1
40 TABLET, FILM COATED ORAL DAILY
Qty: 30 TABLET | Refills: 3 | Status: CANCELLED | OUTPATIENT
Start: 2019-09-13

## 2019-09-13 RX ORDER — CITALOPRAM 40 MG/1
40 TABLET ORAL DAILY
Status: ON HOLD | COMMUNITY
End: 2021-03-23 | Stop reason: HOSPADM

## 2019-09-13 RX ORDER — FAMOTIDINE 20 MG/1
20 TABLET, FILM COATED ORAL 2 TIMES DAILY
COMMUNITY
End: 2020-10-02 | Stop reason: ALTCHOICE

## 2019-09-13 RX ORDER — AMLODIPINE BESYLATE 10 MG/1
10 TABLET ORAL DAILY
COMMUNITY
End: 2020-01-15 | Stop reason: SDUPTHER

## 2019-09-13 RX ORDER — SPIRONOLACTONE 25 MG/1
25 TABLET ORAL DAILY
COMMUNITY
End: 2020-01-15 | Stop reason: SDUPTHER

## 2019-09-13 RX ORDER — MIRTAZAPINE 45 MG/1
45 TABLET, FILM COATED ORAL NIGHTLY
COMMUNITY
End: 2021-01-29

## 2019-09-13 RX ORDER — ATENOLOL 50 MG/1
50 TABLET ORAL DAILY
COMMUNITY
End: 2020-01-15 | Stop reason: SDUPTHER

## 2019-09-13 NOTE — PROGRESS NOTES
going to check herself in an sub-center in 1 week. The patient does follow for depression and anxiety as of center. She is currently on Ativan and fluoxetine. Patient denies smoking or use of any recreational drugs. Her blood pressure is in good control and asthma is also stable. Patient also has a history of obstructive sleep apnea but denies using any CPAP at home. She did underwent sleep study. The patient's hemoglobin A1c during this visit is 5.5.     Patient Active Problem List   Diagnosis    Major depressive disorder, recurrent episode ([facility] [address]    Lung nodule    Obesity    Adrenal adenoma    Goiter    Alcohol abuse    Essential hypertension    Hypokalemia    Enlarged tonsils    Angio-edema    ACE inhibitor-aggravated angioedema    JONES (obstructive sleep apnea)    Dyslipidemia       ALLERGIES      Allergies   Allergen Reactions    Iodine Anaphylaxis and Rash    Lisinopril Swelling and Anaphylaxis     Angioedema    Shellfish-Derived Products Anaphylaxis and Rash     ANGIOEDEMA         MEDICATIONS:      Current Outpatient Medications on File Prior to Visit   Medication Sig Dispense Refill    amLODIPine (NORVASC) 10 MG tablet Take 10 mg by mouth daily      aspirin 81 MG tablet Take 81 mg by mouth daily      famotidine (PEPCID) 20 MG tablet Take 20 mg by mouth 2 times daily      citalopram (CELEXA) 40 MG tablet Take 40 mg by mouth daily      mirtazapine (REMERON) 45 MG tablet Take 45 mg by mouth nightly      atenolol (TENORMIN) 50 MG tablet Take 50 mg by mouth daily      spironolactone (ALDACTONE) 25 MG tablet Take 25 mg by mouth daily      hydrochlorothiazide (HYDRODIURIL) 25 MG tablet Take 25 mg by mouth daily      loratadine (CLARITIN) 10 MG capsule Take 1 capsule by mouth      albuterol sulfate (PROAIR RESPICLICK) 147 (90 Base) MCG/ACT aerosol powder inhalation Inhale 2 puffs into the lungs      naproxen (NAPROSYN) 500 MG tablet TAKE 1 TAB BY MOUTH TWICE A DAY AFTER MEALS 60 Gastroesophageal reflux disease with esophagitis      4. Mild intermittent asthma without complication  Continue albuterol. 5. Moderate episode of recurrent major depressive disorder [facility])  He needs to follow with [facility]    6. JONES (obstructive sleep apnea)      7. Hyperthyroidism    - TSH With Reflex Ft4; Future    8. Vitamin D deficiency  We will check vitamin D levels. 9. Alcohol abuse        FOLLOW UP:       1. Delphine received counseling on the following healthy behaviors: nutrition, exercise, medication adherence and decrease in alcohol consumption  2. Patient given educational materials - see patient instructions  3. Discussed use, benefit, and side effects of prescribed medications. Barriers to medication compliance addressed. All patient questions answered. Pt voiced understanding. 4.  Reviewed prior labs and health maintenance  5. Continue current medications, diet and exercise.     Mike Vazquez MD  PGY-2, Department of Internal Medicine  [address] [place], [place]  9/13/2019 1:53 PM

## 2019-09-13 NOTE — PROGRESS NOTES
Patient here today for follow-up of hypertension. She is concerned about weight loss. She was about 200 pounds last year. She is having other symptoms of feeling warm and sweaty and tremors. Last TSH 2 years ago was normal.  She had a hysterectomy several years ago. She does have alcohol abuse and bipolar disorder and psychosis. She sees psychiatry. Labs reviewed. She has very elevated liver function tests from June. She has been compliant with mammogram and colonoscopy. She states she had a stent in the 90s but is not on statin. Previous cardiac notes reviewed and no coronary artery disease noted on the cath in 2009. Repeat angiogram in 2014 noted in chart but no results available. Will call. Echo at that time showed normal LV function. Plan  CMP and TSH. Follow-up with psychiatry. Follow-up for weight loss to assess over the next few months. Last CBC was normal.  Advised small frequent meals. Cardiac notes and cath reports. Attending Physician Statement  I have discussed the care of [address] [place] [address], including pertinent history and exam findings,  with the resident. I have reviewed the key elements of all parts of the encounter with the resident. I agree with the assessment, plan and orders as documented by the resident.   (GE Modifier)

## 2019-09-20 ENCOUNTER — HOSPITAL ENCOUNTER (OUTPATIENT)
Age: 54
Setting detail: SPECIMEN
Discharge: HOME | End: 2019-09-20
Payer: MEDICAID

## 2019-09-20 DIAGNOSIS — E55.9 VITAMIN D DEFICIENCY: ICD-10-CM

## 2019-09-20 DIAGNOSIS — E78.5 DYSLIPIDEMIA: ICD-10-CM

## 2019-09-20 DIAGNOSIS — R63.4 WEIGHT LOSS, NON-INTENTIONAL: ICD-10-CM

## 2019-09-20 DIAGNOSIS — F10.10 ALCOHOL ABUSE: ICD-10-CM

## 2019-09-20 DIAGNOSIS — I10 ESSENTIAL HYPERTENSION: ICD-10-CM

## 2019-09-20 LAB
ALBUMIN SERPL-MCNC: 4.6 G/DL (ref 3.5–5.2)
ALBUMIN/GLOBULIN RATIO: 1.1 (ref 1–2.5)
ALP BLD-CCNC: 119 U/L (ref 35–104)
ALT SERPL-CCNC: 76 U/L (ref 5–33)
ANION GAP SERPL CALCULATED.3IONS-SCNC: 20 MMOL/L (ref 9–17)
AST SERPL-CCNC: 122 U/L
BILIRUB SERPL-MCNC: 0.27 MG/DL (ref 0.3–1.2)
BUN BLDV-MCNC: 6 MG/DL (ref 6–20)
BUN/CREAT BLD: ABNORMAL (ref 9–20)
CALCIUM SERPL-MCNC: 9.1 MG/DL (ref 8.6–10.4)
CHLORIDE BLD-SCNC: 97 MMOL/L (ref 98–107)
CO2: 22 MMOL/L (ref 20–31)
CREAT SERPL-MCNC: 0.65 MG/DL (ref 0.5–0.9)
GFR AFRICAN AMERICAN: >60 ML/MIN
GFR NON-AFRICAN AMERICAN: >60 ML/MIN
GFR SERPL CREATININE-BSD FRML MDRD: ABNORMAL ML/MIN/{1.73_M2}
GFR SERPL CREATININE-BSD FRML MDRD: ABNORMAL ML/MIN/{1.73_M2}
GLUCOSE BLD-MCNC: 74 MG/DL (ref 70–99)
POTASSIUM SERPL-SCNC: 4.1 MMOL/L (ref 3.7–5.3)
SODIUM BLD-SCNC: 139 MMOL/L (ref 135–144)
TOTAL PROTEIN: 8.7 G/DL (ref 6.4–8.3)
TSH SERPL DL<=0.05 MIU/L-ACNC: 0.78 MIU/L (ref 0.3–5)
VITAMIN D 25-HYDROXY: 32 NG/ML (ref 30–100)

## 2019-09-20 PROCEDURE — 36415 COLL VENOUS BLD VENIPUNCTURE: CPT

## 2019-09-20 PROCEDURE — 82306 VITAMIN D 25 HYDROXY: CPT

## 2019-09-20 PROCEDURE — 84443 ASSAY THYROID STIM HORMONE: CPT

## 2019-09-20 PROCEDURE — 80053 COMPREHEN METABOLIC PANEL: CPT

## 2019-09-27 ENCOUNTER — TELEPHONE (OUTPATIENT)
Dept: INTERNAL MEDICINE | Age: 54
End: 2019-09-27

## 2019-09-27 RX ORDER — MULTIVIT-MIN/IRON FUM/FOLIC AC 7.5 MG-4
1 TABLET ORAL DAILY
Qty: 30 TABLET | Refills: 3 | Status: ON HOLD | OUTPATIENT
Start: 2019-09-27 | End: 2020-12-07

## 2019-09-27 NOTE — TELEPHONE ENCOUNTER
Pt was calling asking for a vitamin supplement that she can take for her weight loss. Health Maintenance   Topic Date Due    Diabetic foot exam  01/22/1976    Diabetic retinal exam  01/22/1976    Shingles Vaccine (1 of 2) 01/22/2016    Flu vaccine (1) 09/01/2019    Hepatitis B Vaccine (1 of 3 - Risk 3-dose series) 09/13/2020 (Originally 1/22/1985)    A1C test (Diabetic or Prediabetic)  06/12/2020    Diabetic microalbuminuria test  06/12/2020    Lipid screen  06/12/2020    Potassium monitoring  09/20/2020    Creatinine monitoring  09/20/2020    Breast cancer screen  05/31/2021    Colon cancer screen colonoscopy  12/10/2023    DTaP/Tdap/Td vaccine (3 - Td) 04/14/2027    Pneumococcal 0-64 years Vaccine  Completed    HIV screen  Completed             (applicable per patient's age: Cancer Screenings, Depression Screening, Fall Risk Screening, Immunizations)    Hemoglobin A1C (%)   Date Value   06/12/2019 5.8   12/07/2018 5.6   11/15/2017 6.2 (H)     Microalb/Crt.  Ratio (mcg/mg creat)   Date Value   06/12/2019 147 (H)     LDL Cholesterol (mg/dL)   Date Value   06/12/2019 136 (H)     AST (U/L)   Date Value   09/20/2019 122 (H)     ALT (U/L)   Date Value   09/20/2019 76 (H)     BUN (mg/dL)   Date Value   09/20/2019 6      (goal A1C is < 7)   (goal LDL is <100) need 30-50% reduction from baseline     BP Readings from Last 3 Encounters:   09/13/19 122/89   09/09/19 109/71   08/19/19 (!) 153/99    (goal /80)      All Future Testing planned in [facility]:  Lab Frequency Next Occurrence   Ferritin Once 12/05/2019   Iron and TIBC Once 12/05/2019   Vitamin B12 & Folate Once 12/05/2019   CARI DIGITAL SCREEN W OR WO CAD BILATERAL Once 11/22/2019   Vitamin D 25 Hydroxy Once 10/11/2019   Hepatic Function Panel Once 09/20/2019   Hepatitis Panel, Acute Once 09/20/2019       Next Visit Date:  Future Appointments   Date Time Provider Anthony Everett   11/11/2019  1:15 PM Erica Crigler, DPM [facility] Podiatry Vito Cotton Patient Active Problem List:     Major depressive disorder, recurrent episode ([facility] [address]     Lung nodule     Obesity     Adrenal adenoma     Goiter     Alcohol abuse     Essential hypertension     Hypokalemia     Enlarged tonsils     Angio-edema     ACE inhibitor-aggravated angioedema     JONES (obstructive sleep apnea)     Dyslipidemia     IGT (impaired glucose tolerance)

## 2019-10-01 ENCOUNTER — HOSPITAL ENCOUNTER (INPATIENT)
Age: 54
LOS: 1 days | Discharge: HOME | DRG: 816 | End: 2019-10-03
Attending: EMERGENCY MEDICINE | Admitting: INTERNAL MEDICINE
Payer: MEDICAID

## 2019-10-01 DIAGNOSIS — T63.441A ANAPHYLACTIC REACTION TO BEE STING, ACCIDENTAL OR UNINTENTIONAL, INITIAL ENCOUNTER: Primary | ICD-10-CM

## 2019-10-01 PROBLEM — T78.2XXA BEE STING-INDUCED ANAPHYLAXIS, ACCIDENTAL OR UNINTENTIONAL, INITIAL ENCOUNTER: Status: ACTIVE | Noted: 2019-10-01

## 2019-10-01 PROCEDURE — 96375 TX/PRO/DX INJ NEW DRUG ADDON: CPT

## 2019-10-01 PROCEDURE — G0378 HOSPITAL OBSERVATION PER HR: HCPCS

## 2019-10-01 PROCEDURE — 99285 EMERGENCY DEPT VISIT HI MDM: CPT

## 2019-10-01 PROCEDURE — 2500000003 HC RX 250 WO HCPCS: Performed by: EMERGENCY MEDICINE

## 2019-10-01 PROCEDURE — 99219 PR INITIAL OBSERVATION CARE/DAY 50 MINUTES: CPT | Performed by: FAMILY MEDICINE

## 2019-10-01 PROCEDURE — 6360000002 HC RX W HCPCS: Performed by: NURSE PRACTITIONER

## 2019-10-01 PROCEDURE — 6370000000 HC RX 637 (ALT 250 FOR IP): Performed by: EMERGENCY MEDICINE

## 2019-10-01 PROCEDURE — 6360000002 HC RX W HCPCS: Performed by: FAMILY MEDICINE

## 2019-10-01 PROCEDURE — 93005 ELECTROCARDIOGRAM TRACING: CPT | Performed by: EMERGENCY MEDICINE

## 2019-10-01 PROCEDURE — 6370000000 HC RX 637 (ALT 250 FOR IP): Performed by: FAMILY MEDICINE

## 2019-10-01 PROCEDURE — 2580000003 HC RX 258: Performed by: FAMILY MEDICINE

## 2019-10-01 PROCEDURE — 96374 THER/PROPH/DIAG INJ IV PUSH: CPT

## 2019-10-01 PROCEDURE — 6360000002 HC RX W HCPCS: Performed by: EMERGENCY MEDICINE

## 2019-10-01 PROCEDURE — 94640 AIRWAY INHALATION TREATMENT: CPT

## 2019-10-01 RX ORDER — ALBUTEROL SULFATE 90 UG/1
2 AEROSOL, METERED RESPIRATORY (INHALATION)
Status: DISCONTINUED | OUTPATIENT
Start: 2019-10-01 | End: 2019-10-01

## 2019-10-01 RX ORDER — NICOTINE 21 MG/24HR
1 PATCH, TRANSDERMAL 24 HOURS TRANSDERMAL DAILY PRN
Status: DISCONTINUED | OUTPATIENT
Start: 2019-10-01 | End: 2019-10-03 | Stop reason: HOSPADM

## 2019-10-01 RX ORDER — SPIRONOLACTONE 25 MG/1
25 TABLET ORAL DAILY
Status: DISCONTINUED | OUTPATIENT
Start: 2019-10-01 | End: 2019-10-03 | Stop reason: HOSPADM

## 2019-10-01 RX ORDER — POTASSIUM CHLORIDE 20 MEQ/1
40 TABLET, EXTENDED RELEASE ORAL PRN
Status: DISCONTINUED | OUTPATIENT
Start: 2019-10-01 | End: 2019-10-03 | Stop reason: HOSPADM

## 2019-10-01 RX ORDER — SODIUM CHLORIDE 0.9 % (FLUSH) 0.9 %
10 SYRINGE (ML) INJECTION EVERY 12 HOURS SCHEDULED
Status: DISCONTINUED | OUTPATIENT
Start: 2019-10-01 | End: 2019-10-03 | Stop reason: HOSPADM

## 2019-10-01 RX ORDER — DEXAMETHASONE SODIUM PHOSPHATE 10 MG/ML
10 INJECTION INTRAMUSCULAR; INTRAVENOUS ONCE
Status: COMPLETED | OUTPATIENT
Start: 2019-10-01 | End: 2019-10-01

## 2019-10-01 RX ORDER — DIPHENHYDRAMINE HYDROCHLORIDE 50 MG/ML
25 INJECTION INTRAMUSCULAR; INTRAVENOUS EVERY 6 HOURS
Status: DISCONTINUED | OUTPATIENT
Start: 2019-10-01 | End: 2019-10-03 | Stop reason: HOSPADM

## 2019-10-01 RX ORDER — ATENOLOL 50 MG/1
50 TABLET ORAL DAILY
Status: DISCONTINUED | OUTPATIENT
Start: 2019-10-01 | End: 2019-10-03 | Stop reason: HOSPADM

## 2019-10-01 RX ORDER — ACETAMINOPHEN 325 MG/1
650 TABLET ORAL EVERY 4 HOURS PRN
Status: DISCONTINUED | OUTPATIENT
Start: 2019-10-01 | End: 2019-10-03 | Stop reason: HOSPADM

## 2019-10-01 RX ORDER — ISOSORBIDE MONONITRATE 30 MG/1
30 TABLET, EXTENDED RELEASE ORAL DAILY
Status: DISCONTINUED | OUTPATIENT
Start: 2019-10-01 | End: 2019-10-03 | Stop reason: HOSPADM

## 2019-10-01 RX ORDER — LORAZEPAM 2 MG/ML
0.5 INJECTION INTRAMUSCULAR EVERY 6 HOURS PRN
Status: DISCONTINUED | OUTPATIENT
Start: 2019-10-02 | End: 2019-10-03 | Stop reason: HOSPADM

## 2019-10-01 RX ORDER — POTASSIUM CHLORIDE 7.45 MG/ML
10 INJECTION INTRAVENOUS PRN
Status: DISCONTINUED | OUTPATIENT
Start: 2019-10-01 | End: 2019-10-03 | Stop reason: HOSPADM

## 2019-10-01 RX ORDER — HYDROCHLOROTHIAZIDE 25 MG/1
25 TABLET ORAL DAILY
Status: DISCONTINUED | OUTPATIENT
Start: 2019-10-01 | End: 2019-10-03 | Stop reason: HOSPADM

## 2019-10-01 RX ORDER — ALBUTEROL SULFATE 2.5 MG/3ML
5 SOLUTION RESPIRATORY (INHALATION)
Status: DISCONTINUED | OUTPATIENT
Start: 2019-10-01 | End: 2019-10-01

## 2019-10-01 RX ORDER — METHYLPREDNISOLONE SODIUM SUCCINATE 125 MG/2ML
125 INJECTION, POWDER, LYOPHILIZED, FOR SOLUTION INTRAMUSCULAR; INTRAVENOUS EVERY 6 HOURS
Status: DISCONTINUED | OUTPATIENT
Start: 2019-10-01 | End: 2019-10-02

## 2019-10-01 RX ORDER — CITALOPRAM 20 MG/1
40 TABLET ORAL DAILY
Status: DISCONTINUED | OUTPATIENT
Start: 2019-10-01 | End: 2019-10-03 | Stop reason: HOSPADM

## 2019-10-01 RX ORDER — FLUTICASONE PROPIONATE 110 UG/1
2 AEROSOL, METERED RESPIRATORY (INHALATION) 2 TIMES DAILY
Status: DISCONTINUED | OUTPATIENT
Start: 2019-10-01 | End: 2019-10-03 | Stop reason: HOSPADM

## 2019-10-01 RX ORDER — AMLODIPINE BESYLATE 10 MG/1
10 TABLET ORAL DAILY
Status: DISCONTINUED | OUTPATIENT
Start: 2019-10-01 | End: 2019-10-03 | Stop reason: HOSPADM

## 2019-10-01 RX ORDER — SODIUM CHLORIDE 0.9 % (FLUSH) 0.9 %
10 SYRINGE (ML) INJECTION PRN
Status: DISCONTINUED | OUTPATIENT
Start: 2019-10-01 | End: 2019-10-03 | Stop reason: HOSPADM

## 2019-10-01 RX ORDER — IPRATROPIUM BROMIDE AND ALBUTEROL SULFATE 2.5; .5 MG/3ML; MG/3ML
1 SOLUTION RESPIRATORY (INHALATION)
Status: DISCONTINUED | OUTPATIENT
Start: 2019-10-01 | End: 2019-10-03 | Stop reason: HOSPADM

## 2019-10-01 RX ORDER — MAGNESIUM SULFATE 1 G/100ML
1 INJECTION INTRAVENOUS PRN
Status: DISCONTINUED | OUTPATIENT
Start: 2019-10-01 | End: 2019-10-03 | Stop reason: HOSPADM

## 2019-10-01 RX ORDER — ONDANSETRON 2 MG/ML
4 INJECTION INTRAMUSCULAR; INTRAVENOUS EVERY 6 HOURS PRN
Status: DISCONTINUED | OUTPATIENT
Start: 2019-10-01 | End: 2019-10-03 | Stop reason: HOSPADM

## 2019-10-01 RX ORDER — ASPIRIN 81 MG/1
81 TABLET ORAL DAILY
Status: DISCONTINUED | OUTPATIENT
Start: 2019-10-01 | End: 2019-10-03 | Stop reason: HOSPADM

## 2019-10-01 RX ORDER — LORAZEPAM 2 MG/ML
0.5 INJECTION INTRAMUSCULAR ONCE
Status: COMPLETED | OUTPATIENT
Start: 2019-10-01 | End: 2019-10-01

## 2019-10-01 RX ADMIN — DEXAMETHASONE SODIUM PHOSPHATE 10 MG: 10 INJECTION INTRAMUSCULAR; INTRAVENOUS at 14:51

## 2019-10-01 RX ADMIN — LURASIDONE HYDROCHLORIDE 60 MG: 60 TABLET, FILM COATED ORAL at 20:34

## 2019-10-01 RX ADMIN — METHYLPREDNISOLONE SODIUM SUCCINATE 125 MG: 125 INJECTION, POWDER, FOR SOLUTION INTRAMUSCULAR; INTRAVENOUS at 20:35

## 2019-10-01 RX ADMIN — IPRATROPIUM BROMIDE AND ALBUTEROL SULFATE 1 AMPULE: .5; 3 SOLUTION RESPIRATORY (INHALATION) at 19:52

## 2019-10-01 RX ADMIN — ACETAMINOPHEN 650 MG: 325 TABLET ORAL at 18:24

## 2019-10-01 RX ADMIN — MIRTAZAPINE 45 MG: 30 TABLET, FILM COATED ORAL at 20:33

## 2019-10-01 RX ADMIN — FAMOTIDINE 20 MG: 10 INJECTION, SOLUTION INTRAVENOUS at 13:31

## 2019-10-01 RX ADMIN — RACEPINEPHRINE HYDROCHLORIDE 11.25 MG: 11.25 SOLUTION RESPIRATORY (INHALATION) at 13:19

## 2019-10-01 RX ADMIN — LORAZEPAM 0.5 MG: 2 INJECTION INTRAMUSCULAR; INTRAVENOUS at 20:30

## 2019-10-01 RX ADMIN — Medication 10 ML: at 20:39

## 2019-10-01 ASSESSMENT — ENCOUNTER SYMPTOMS
NAUSEA: 0
COLOR CHANGE: 1
VOMITING: 0
CHOKING: 1
COUGH: 1
SHORTNESS OF BREATH: 1
FACIAL SWELLING: 1
ABDOMINAL PAIN: 0

## 2019-10-01 ASSESSMENT — PAIN DESCRIPTION - ONSET
ONSET: ON-GOING

## 2019-10-01 ASSESSMENT — PAIN DESCRIPTION - LOCATION
LOCATION: MOUTH;THROAT
LOCATION: MOUTH
LOCATION: FACE;MOUTH;THROAT
LOCATION: MOUTH

## 2019-10-01 ASSESSMENT — PAIN SCALES - GENERAL
PAINLEVEL_OUTOF10: 8
PAINLEVEL_OUTOF10: 10
PAINLEVEL_OUTOF10: 8
PAINLEVEL_OUTOF10: 8

## 2019-10-01 ASSESSMENT — PAIN DESCRIPTION - DESCRIPTORS
DESCRIPTORS: BURNING;DISCOMFORT
DESCRIPTORS: TIGHTNESS;SQUEEZING;DISCOMFORT

## 2019-10-01 ASSESSMENT — PAIN DESCRIPTION - FREQUENCY
FREQUENCY: CONTINUOUS

## 2019-10-01 ASSESSMENT — PAIN DESCRIPTION - PAIN TYPE
TYPE: ACUTE PAIN

## 2019-10-02 PROBLEM — T78.2XXA BEE STING-INDUCED ANAPHYLAXIS, ACCIDENTAL OR UNINTENTIONAL, INITIAL ENCOUNTER: Status: RESOLVED | Noted: 2019-10-01 | Resolved: 2019-10-02

## 2019-10-02 PROBLEM — T63.441A BEE STING-INDUCED ANAPHYLAXIS, ACCIDENTAL OR UNINTENTIONAL, INITIAL ENCOUNTER: Status: RESOLVED | Noted: 2019-10-01 | Resolved: 2019-10-02

## 2019-10-02 PROBLEM — T78.2XXA ANAPHYLACTIC SYNDROME: Status: ACTIVE | Noted: 2019-10-02

## 2019-10-02 LAB
ANION GAP SERPL CALCULATED.3IONS-SCNC: 18 MMOL/L (ref 9–17)
BUN BLDV-MCNC: 10 MG/DL (ref 6–20)
BUN/CREAT BLD: ABNORMAL (ref 9–20)
CALCIUM SERPL-MCNC: 9.4 MG/DL (ref 8.6–10.4)
CHLORIDE BLD-SCNC: 100 MMOL/L (ref 98–107)
CO2: 20 MMOL/L (ref 20–31)
CREAT SERPL-MCNC: 0.74 MG/DL (ref 0.5–0.9)
EKG ATRIAL RATE: 89 BPM
EKG P AXIS: 37 DEGREES
EKG P-R INTERVAL: 196 MS
EKG Q-T INTERVAL: 378 MS
EKG QRS DURATION: 88 MS
EKG QTC CALCULATION (BAZETT): 459 MS
EKG R AXIS: 23 DEGREES
EKG T AXIS: 64 DEGREES
EKG VENTRICULAR RATE: 89 BPM
GFR AFRICAN AMERICAN: >60 ML/MIN
GFR NON-AFRICAN AMERICAN: >60 ML/MIN
GFR SERPL CREATININE-BSD FRML MDRD: ABNORMAL ML/MIN/{1.73_M2}
GFR SERPL CREATININE-BSD FRML MDRD: ABNORMAL ML/MIN/{1.73_M2}
GLUCOSE BLD-MCNC: 298 MG/DL (ref 70–99)
HCT VFR BLD CALC: 32.8 % (ref 36.3–47.1)
HEMOGLOBIN: 10.9 G/DL (ref 11.9–15.1)
INR BLD: 1
MCH RBC QN AUTO: 31.5 PG (ref 25.2–33.5)
MCHC RBC AUTO-ENTMCNC: 33.2 G/DL (ref 28.4–34.8)
MCV RBC AUTO: 94.8 FL (ref 82.6–102.9)
NRBC AUTOMATED: 0 PER 100 WBC
PDW BLD-RTO: 14.6 % (ref 11.8–14.4)
PLATELET # BLD: 211 K/UL (ref 138–453)
PMV BLD AUTO: 9.7 FL (ref 8.1–13.5)
POTASSIUM SERPL-SCNC: 4 MMOL/L (ref 3.7–5.3)
PROTHROMBIN TIME: 10.6 SEC (ref 9–12)
RBC # BLD: 3.46 M/UL (ref 3.95–5.11)
SODIUM BLD-SCNC: 138 MMOL/L (ref 135–144)
WBC # BLD: 3.5 K/UL (ref 3.5–11.3)

## 2019-10-02 PROCEDURE — 97162 PT EVAL MOD COMPLEX 30 MIN: CPT

## 2019-10-02 PROCEDURE — 1200000000 HC SEMI PRIVATE

## 2019-10-02 PROCEDURE — 94640 AIRWAY INHALATION TREATMENT: CPT

## 2019-10-02 PROCEDURE — 36415 COLL VENOUS BLD VENIPUNCTURE: CPT

## 2019-10-02 PROCEDURE — 6370000000 HC RX 637 (ALT 250 FOR IP): Performed by: FAMILY MEDICINE

## 2019-10-02 PROCEDURE — 94760 N-INVAS EAR/PLS OXIMETRY 1: CPT

## 2019-10-02 PROCEDURE — 96375 TX/PRO/DX INJ NEW DRUG ADDON: CPT

## 2019-10-02 PROCEDURE — 6370000000 HC RX 637 (ALT 250 FOR IP): Performed by: INTERNAL MEDICINE

## 2019-10-02 PROCEDURE — 80048 BASIC METABOLIC PNL TOTAL CA: CPT

## 2019-10-02 PROCEDURE — 97530 THERAPEUTIC ACTIVITIES: CPT

## 2019-10-02 PROCEDURE — 93010 ELECTROCARDIOGRAM REPORT: CPT | Performed by: INTERNAL MEDICINE

## 2019-10-02 PROCEDURE — 6370000000 HC RX 637 (ALT 250 FOR IP): Performed by: EMERGENCY MEDICINE

## 2019-10-02 PROCEDURE — 2580000003 HC RX 258: Performed by: FAMILY MEDICINE

## 2019-10-02 PROCEDURE — 85027 COMPLETE CBC AUTOMATED: CPT

## 2019-10-02 PROCEDURE — 2500000003 HC RX 250 WO HCPCS: Performed by: FAMILY MEDICINE

## 2019-10-02 PROCEDURE — 97161 PT EVAL LOW COMPLEX 20 MIN: CPT

## 2019-10-02 PROCEDURE — 99232 SBSQ HOSP IP/OBS MODERATE 35: CPT | Performed by: INTERNAL MEDICINE

## 2019-10-02 PROCEDURE — 6360000002 HC RX W HCPCS: Performed by: NURSE PRACTITIONER

## 2019-10-02 PROCEDURE — 85610 PROTHROMBIN TIME: CPT

## 2019-10-02 PROCEDURE — 96376 TX/PRO/DX INJ SAME DRUG ADON: CPT

## 2019-10-02 PROCEDURE — 97535 SELF CARE MNGMENT TRAINING: CPT

## 2019-10-02 PROCEDURE — 97165 OT EVAL LOW COMPLEX 30 MIN: CPT

## 2019-10-02 PROCEDURE — 6360000002 HC RX W HCPCS: Performed by: FAMILY MEDICINE

## 2019-10-02 RX ORDER — HYDRALAZINE HYDROCHLORIDE 50 MG/1
50 TABLET, FILM COATED ORAL ONCE
Status: COMPLETED | OUTPATIENT
Start: 2019-10-02 | End: 2019-10-02

## 2019-10-02 RX ORDER — HYDRALAZINE HYDROCHLORIDE 50 MG/1
50 TABLET, FILM COATED ORAL EVERY 6 HOURS PRN
Status: DISCONTINUED | OUTPATIENT
Start: 2019-10-02 | End: 2019-10-03 | Stop reason: HOSPADM

## 2019-10-02 RX ORDER — PREDNISONE 20 MG/1
40 TABLET ORAL DAILY
Status: DISCONTINUED | OUTPATIENT
Start: 2019-10-02 | End: 2019-10-03

## 2019-10-02 RX ORDER — LIDOCAINE 50 MG/G
1 OINTMENT TOPICAL 3 TIMES DAILY PRN
COMMUNITY
End: 2021-04-08

## 2019-10-02 RX ORDER — PREDNISONE 20 MG/1
TABLET ORAL
Qty: 6 TABLET | Refills: 0 | Status: SHIPPED | OUTPATIENT
Start: 2019-10-02 | End: 2019-10-03 | Stop reason: HOSPADM

## 2019-10-02 RX ORDER — EPINEPHRINE 0.3 MG/.3ML
INJECTION SUBCUTANEOUS
Qty: 1 EACH | Refills: 1 | Status: SHIPPED | OUTPATIENT
Start: 2019-10-02 | End: 2020-01-15 | Stop reason: SDUPTHER

## 2019-10-02 RX ADMIN — FLUTICASONE PROPIONATE 2 PUFF: 110 AEROSOL, METERED RESPIRATORY (INHALATION) at 08:40

## 2019-10-02 RX ADMIN — IPRATROPIUM BROMIDE AND ALBUTEROL SULFATE 1 AMPULE: .5; 3 SOLUTION RESPIRATORY (INHALATION) at 08:27

## 2019-10-02 RX ADMIN — FAMOTIDINE 20 MG: 10 INJECTION, SOLUTION INTRAVENOUS at 09:50

## 2019-10-02 RX ADMIN — HYDROCHLOROTHIAZIDE 25 MG: 25 TABLET ORAL at 09:55

## 2019-10-02 RX ADMIN — LORAZEPAM 0.5 MG: 2 INJECTION INTRAMUSCULAR; INTRAVENOUS at 03:15

## 2019-10-02 RX ADMIN — Medication 10 ML: at 03:17

## 2019-10-02 RX ADMIN — HYDRALAZINE HYDROCHLORIDE 50 MG: 50 TABLET, FILM COATED ORAL at 14:04

## 2019-10-02 RX ADMIN — IPRATROPIUM BROMIDE AND ALBUTEROL SULFATE 1 AMPULE: .5; 3 SOLUTION RESPIRATORY (INHALATION) at 11:56

## 2019-10-02 RX ADMIN — SPIRONOLACTONE 25 MG: 25 TABLET ORAL at 09:51

## 2019-10-02 RX ADMIN — AMLODIPINE BESYLATE 10 MG: 10 TABLET ORAL at 09:51

## 2019-10-02 RX ADMIN — IPRATROPIUM BROMIDE AND ALBUTEROL SULFATE 1 AMPULE: .5; 3 SOLUTION RESPIRATORY (INHALATION) at 16:22

## 2019-10-02 RX ADMIN — CITALOPRAM 40 MG: 20 TABLET, FILM COATED ORAL at 09:55

## 2019-10-02 RX ADMIN — PREDNISONE 40 MG: 20 TABLET ORAL at 09:52

## 2019-10-02 RX ADMIN — ATENOLOL 50 MG: 50 TABLET ORAL at 09:51

## 2019-10-02 RX ADMIN — Medication 10 ML: at 09:51

## 2019-10-02 RX ADMIN — METHYLPREDNISOLONE SODIUM SUCCINATE 125 MG: 125 INJECTION, POWDER, FOR SOLUTION INTRAMUSCULAR; INTRAVENOUS at 03:19

## 2019-10-02 RX ADMIN — Medication 81 MG: at 09:51

## 2019-10-02 RX ADMIN — DIPHENHYDRAMINE HYDROCHLORIDE 25 MG: 50 INJECTION, SOLUTION INTRAMUSCULAR; INTRAVENOUS at 09:50

## 2019-10-02 RX ADMIN — ISOSORBIDE MONONITRATE 30 MG: 30 TABLET ORAL at 09:55

## 2019-10-02 RX ADMIN — DIPHENHYDRAMINE HYDROCHLORIDE 25 MG: 50 INJECTION, SOLUTION INTRAMUSCULAR; INTRAVENOUS at 03:19

## 2019-10-02 RX ADMIN — RACEPINEPHRINE HYDROCHLORIDE 11.25 MG: 11.25 SOLUTION RESPIRATORY (INHALATION) at 02:54

## 2019-10-02 ASSESSMENT — PAIN SCALES - GENERAL
PAINLEVEL_OUTOF10: 2
PAINLEVEL_OUTOF10: 0

## 2019-10-02 ASSESSMENT — PAIN DESCRIPTION - PAIN TYPE: TYPE: ACUTE PAIN

## 2019-10-03 VITALS
BODY MASS INDEX: 27.16 KG/M2 | DIASTOLIC BLOOD PRESSURE: 86 MMHG | HEIGHT: 65 IN | RESPIRATION RATE: 16 BRPM | HEART RATE: 73 BPM | OXYGEN SATURATION: 98 % | WEIGHT: 163 LBS | TEMPERATURE: 98.6 F | SYSTOLIC BLOOD PRESSURE: 127 MMHG

## 2019-10-03 PROBLEM — T78.2XXA ANAPHYLACTIC SYNDROME: Status: RESOLVED | Noted: 2019-10-02 | Resolved: 2019-10-03

## 2019-10-03 PROCEDURE — 6370000000 HC RX 637 (ALT 250 FOR IP): Performed by: INTERNAL MEDICINE

## 2019-10-03 PROCEDURE — 6370000000 HC RX 637 (ALT 250 FOR IP): Performed by: EMERGENCY MEDICINE

## 2019-10-03 PROCEDURE — 94760 N-INVAS EAR/PLS OXIMETRY 1: CPT

## 2019-10-03 PROCEDURE — 6360000002 HC RX W HCPCS: Performed by: INTERNAL MEDICINE

## 2019-10-03 PROCEDURE — 97110 THERAPEUTIC EXERCISES: CPT

## 2019-10-03 PROCEDURE — 97116 GAIT TRAINING THERAPY: CPT

## 2019-10-03 PROCEDURE — 94640 AIRWAY INHALATION TREATMENT: CPT

## 2019-10-03 PROCEDURE — 6370000000 HC RX 637 (ALT 250 FOR IP): Performed by: FAMILY MEDICINE

## 2019-10-03 PROCEDURE — 6360000002 HC RX W HCPCS: Performed by: FAMILY MEDICINE

## 2019-10-03 PROCEDURE — 2580000003 HC RX 258: Performed by: FAMILY MEDICINE

## 2019-10-03 PROCEDURE — 2500000003 HC RX 250 WO HCPCS: Performed by: FAMILY MEDICINE

## 2019-10-03 PROCEDURE — 99239 HOSP IP/OBS DSCHRG MGMT >30: CPT | Performed by: INTERNAL MEDICINE

## 2019-10-03 RX ORDER — FUROSEMIDE 10 MG/ML
40 INJECTION INTRAMUSCULAR; INTRAVENOUS ONCE
Status: COMPLETED | OUTPATIENT
Start: 2019-10-03 | End: 2019-10-03

## 2019-10-03 RX ADMIN — IPRATROPIUM BROMIDE AND ALBUTEROL SULFATE 1 AMPULE: .5; 3 SOLUTION RESPIRATORY (INHALATION) at 08:32

## 2019-10-03 RX ADMIN — FUROSEMIDE 40 MG: 10 INJECTION, SOLUTION INTRAMUSCULAR; INTRAVENOUS at 12:43

## 2019-10-03 RX ADMIN — FAMOTIDINE 20 MG: 10 INJECTION, SOLUTION INTRAVENOUS at 09:02

## 2019-10-03 RX ADMIN — HYDROCHLOROTHIAZIDE 25 MG: 25 TABLET ORAL at 09:01

## 2019-10-03 RX ADMIN — FAMOTIDINE 20 MG: 10 INJECTION, SOLUTION INTRAVENOUS at 00:24

## 2019-10-03 RX ADMIN — PREDNISONE 40 MG: 20 TABLET ORAL at 09:01

## 2019-10-03 RX ADMIN — Medication 10 ML: at 09:01

## 2019-10-03 RX ADMIN — MIRTAZAPINE 45 MG: 30 TABLET, FILM COATED ORAL at 00:08

## 2019-10-03 RX ADMIN — SPIRONOLACTONE 25 MG: 25 TABLET ORAL at 09:01

## 2019-10-03 RX ADMIN — CITALOPRAM 40 MG: 20 TABLET, FILM COATED ORAL at 09:01

## 2019-10-03 RX ADMIN — ATENOLOL 50 MG: 50 TABLET ORAL at 09:02

## 2019-10-03 RX ADMIN — HYDRALAZINE HYDROCHLORIDE 50 MG: 50 TABLET, FILM COATED ORAL at 00:56

## 2019-10-03 RX ADMIN — ISOSORBIDE MONONITRATE 30 MG: 30 TABLET ORAL at 09:01

## 2019-10-03 RX ADMIN — AMLODIPINE BESYLATE 10 MG: 10 TABLET ORAL at 09:02

## 2019-10-03 RX ADMIN — DIPHENHYDRAMINE HYDROCHLORIDE 25 MG: 50 INJECTION, SOLUTION INTRAMUSCULAR; INTRAVENOUS at 06:30

## 2019-10-03 RX ADMIN — Medication 81 MG: at 09:02

## 2019-10-03 RX ADMIN — DIPHENHYDRAMINE HYDROCHLORIDE 25 MG: 50 INJECTION, SOLUTION INTRAMUSCULAR; INTRAVENOUS at 00:23

## 2019-10-03 RX ADMIN — ENOXAPARIN SODIUM 40 MG: 40 INJECTION SUBCUTANEOUS at 09:02

## 2019-10-03 RX ADMIN — FLUTICASONE PROPIONATE 2 PUFF: 110 AEROSOL, METERED RESPIRATORY (INHALATION) at 08:32

## 2019-10-03 RX ADMIN — LURASIDONE HYDROCHLORIDE 60 MG: 60 TABLET, FILM COATED ORAL at 00:05

## 2019-10-09 ENCOUNTER — OFFICE VISIT (OUTPATIENT)
Dept: INTERNAL MEDICINE | Age: 54
End: 2019-10-09
Payer: MEDICAID

## 2019-10-09 VITALS
HEIGHT: 65 IN | BODY MASS INDEX: 28.09 KG/M2 | WEIGHT: 168.6 LBS | SYSTOLIC BLOOD PRESSURE: 115 MMHG | DIASTOLIC BLOOD PRESSURE: 73 MMHG | HEART RATE: 71 BPM

## 2019-10-09 DIAGNOSIS — F10.10 ALCOHOL ABUSE: ICD-10-CM

## 2019-10-09 DIAGNOSIS — E78.5 DYSLIPIDEMIA: ICD-10-CM

## 2019-10-09 DIAGNOSIS — I10 ESSENTIAL HYPERTENSION: Primary | ICD-10-CM

## 2019-10-09 DIAGNOSIS — R73.02 IGT (IMPAIRED GLUCOSE TOLERANCE): ICD-10-CM

## 2019-10-09 DIAGNOSIS — Z09 HOSPITAL DISCHARGE FOLLOW-UP: ICD-10-CM

## 2019-10-09 DIAGNOSIS — T78.3XXD ANGIOEDEMA, SUBSEQUENT ENCOUNTER: ICD-10-CM

## 2019-10-09 DIAGNOSIS — Z00.00 HEALTH CARE MAINTENANCE: ICD-10-CM

## 2019-10-09 PROCEDURE — 99211 OFF/OP EST MAY X REQ PHY/QHP: CPT | Performed by: INTERNAL MEDICINE

## 2019-10-09 PROCEDURE — 99214 OFFICE O/P EST MOD 30 MIN: CPT | Performed by: STUDENT IN AN ORGANIZED HEALTH CARE EDUCATION/TRAINING PROGRAM

## 2019-10-09 PROCEDURE — 90688 IIV4 VACCINE SPLT 0.5 ML IM: CPT | Performed by: STUDENT IN AN ORGANIZED HEALTH CARE EDUCATION/TRAINING PROGRAM

## 2019-10-09 PROCEDURE — 1111F DSCHRG MED/CURRENT MED MERGE: CPT | Performed by: STUDENT IN AN ORGANIZED HEALTH CARE EDUCATION/TRAINING PROGRAM

## 2019-10-09 RX ORDER — MULTIVIT-MIN/IRON FUM/FOLIC AC 7.5 MG-4
1 TABLET ORAL DAILY
Qty: 30 TABLET | Refills: 3 | Status: CANCELLED | OUTPATIENT
Start: 2019-10-09

## 2019-11-11 ENCOUNTER — NURSE TRIAGE (OUTPATIENT)
Dept: OTHER | Facility: CLINIC | Age: 54
End: 2019-11-11

## 2019-12-05 RX ORDER — FAMOTIDINE 20 MG/1
TABLET, FILM COATED ORAL
Qty: 60 TABLET | Refills: 0 | Status: SHIPPED | OUTPATIENT
Start: 2019-12-05 | End: 2020-01-03

## 2019-12-05 RX ORDER — NAPROXEN 500 MG/1
TABLET ORAL
Qty: 60 TABLET | Refills: 0 | Status: SHIPPED | OUTPATIENT
Start: 2019-12-05 | End: 2020-01-03

## 2019-12-05 RX ORDER — CITALOPRAM 40 MG/1
TABLET ORAL
Qty: 30 TABLET | Refills: 0 | Status: SHIPPED | OUTPATIENT
Start: 2019-12-05 | End: 2020-01-03

## 2019-12-05 RX ORDER — CETIRIZINE HYDROCHLORIDE 10 MG/1
TABLET ORAL
Qty: 30 TABLET | Refills: 0 | Status: SHIPPED | OUTPATIENT
Start: 2019-12-05 | End: 2020-01-03

## 2019-12-05 RX ORDER — MIRTAZAPINE 45 MG/1
TABLET, FILM COATED ORAL
Qty: 30 TABLET | Refills: 0 | Status: SHIPPED | OUTPATIENT
Start: 2019-12-05 | End: 2020-01-03

## 2019-12-31 NOTE — TELEPHONE ENCOUNTER
Request for protonix - medication pended. Please fill if appropriate. Next Visit Date:  Future Appointments   Date Time Provider Anthony Cutleri   1/15/2020  9:30 AM Keo Champagne MD [address] [facility]   Topic Date Due    Diabetic foot exam  01/22/1976    Diabetic retinal exam  01/22/1976    Shingles Vaccine (1 of 2) 01/22/2016    Hepatitis B vaccine (1 of 3 - Risk 3-dose series) 09/13/2020 (Originally 1/22/1985)    A1C test (Diabetic or Prediabetic)  06/12/2020    Diabetic microalbuminuria test  06/12/2020    Lipid screen  06/12/2020    Potassium monitoring  10/02/2020    Creatinine monitoring  10/02/2020    Breast cancer screen  05/31/2021    Colon cancer screen colonoscopy  12/10/2023    DTaP/Tdap/Td vaccine (3 - Td) 04/14/2027    Flu vaccine  Completed    Pneumococcal 0-64 years Vaccine  Completed    HIV screen  Completed       Hemoglobin A1C (%)   Date Value   06/12/2019 5.8   12/07/2018 5.6   11/15/2017 6.2 (H)             ( goal A1C is < 7)   Microalb/Crt.  Ratio (mcg/mg creat)   Date Value   06/12/2019 147 (H)     LDL Cholesterol (mg/dL)   Date Value   06/12/2019 136 (H)       (goal LDL is <100)   AST (U/L)   Date Value   09/20/2019 122 (H)     ALT (U/L)   Date Value   09/20/2019 76 (H)     BUN (mg/dL)   Date Value   10/02/2019 10     BP Readings from Last 3 Encounters:   10/09/19 115/73   10/03/19 127/86   09/13/19 122/89          (goal 120/80)    All Future Testing planned in [facility]  Lab Frequency Next Occurrence   Hepatic Function Panel Once 12/30/2019   Hepatitis Panel, Acute Once 12/30/2019         Patient Active Problem List:     Major depressive disorder, recurrent episode ([address]     Lung nodule     Obesity     Adrenal adenoma     Goiter     Alcohol abuse     Essential hypertension     Hypokalemia     Enlarged tonsils     Angio-edema     ACE inhibitor-aggravated angioedema     JONES (obstructive sleep apnea)     Dyslipidemia     IGT (impaired glucose tolerance)

## 2019-12-31 NOTE — TELEPHONE ENCOUNTER
Request for celexa, naproxen, zyrtec, & mirtazapine - medications pended. Please fill if appropriate. Next Visit Date:  Future Appointments   Date Time Provider Anthony Cutleri   1/15/2020  9:30 AM Romel Vasquez MD [address] [facility]   Topic Date Due    Diabetic foot exam  01/22/1976    Diabetic retinal exam  01/22/1976    Shingles Vaccine (1 of 2) 01/22/2016    Hepatitis B vaccine (1 of 3 - Risk 3-dose series) 09/13/2020 (Originally 1/22/1985)    A1C test (Diabetic or Prediabetic)  06/12/2020    Diabetic microalbuminuria test  06/12/2020    Lipid screen  06/12/2020    Potassium monitoring  10/02/2020    Creatinine monitoring  10/02/2020    Breast cancer screen  05/31/2021    Colon cancer screen colonoscopy  12/10/2023    DTaP/Tdap/Td vaccine (3 - Td) 04/14/2027    Flu vaccine  Completed    Pneumococcal 0-64 years Vaccine  Completed    HIV screen  Completed       Hemoglobin A1C (%)   Date Value   06/12/2019 5.8   12/07/2018 5.6   11/15/2017 6.2 (H)             ( goal A1C is < 7)   Microalb/Crt.  Ratio (mcg/mg creat)   Date Value   06/12/2019 147 (H)     LDL Cholesterol (mg/dL)   Date Value   06/12/2019 136 (H)       (goal LDL is <100)   AST (U/L)   Date Value   09/20/2019 122 (H)     ALT (U/L)   Date Value   09/20/2019 76 (H)     BUN (mg/dL)   Date Value   10/02/2019 10     BP Readings from Last 3 Encounters:   10/09/19 115/73   10/03/19 127/86   09/13/19 122/89          (goal 120/80)    All Future Testing planned in [facility]  Lab Frequency Next Occurrence   Hepatic Function Panel Once 12/30/2019   Hepatitis Panel, Acute Once 12/30/2019         Patient Active Problem List:     Major depressive disorder, recurrent episode ([facility] [address]     Lung nodule     Obesity     Adrenal adenoma     Goiter     Alcohol abuse     Essential hypertension     Hypokalemia     Enlarged tonsils     Angio-edema     ACE inhibitor-aggravated angioedema     JONES (obstructive sleep apnea) Dyslipidemia     IGT (impaired glucose tolerance)

## 2020-01-03 RX ORDER — NAPROXEN 500 MG/1
TABLET ORAL
Qty: 60 TABLET | Refills: 0 | Status: SHIPPED | OUTPATIENT
Start: 2020-01-03 | End: 2020-06-25 | Stop reason: SDUPTHER

## 2020-01-03 RX ORDER — PANTOPRAZOLE SODIUM 40 MG/1
TABLET, DELAYED RELEASE ORAL
Qty: 60 TABLET | Refills: 3 | Status: SHIPPED | OUTPATIENT
Start: 2020-01-03 | End: 2020-01-15 | Stop reason: SDUPTHER

## 2020-01-03 RX ORDER — FAMOTIDINE 20 MG/1
TABLET, FILM COATED ORAL
Qty: 60 TABLET | Refills: 0 | Status: SHIPPED | OUTPATIENT
Start: 2020-01-03 | End: 2020-01-15 | Stop reason: SDUPTHER

## 2020-01-03 RX ORDER — CETIRIZINE HYDROCHLORIDE 10 MG/1
TABLET ORAL
Qty: 30 TABLET | Refills: 0 | Status: SHIPPED | OUTPATIENT
Start: 2020-01-03 | End: 2020-07-23

## 2020-01-03 RX ORDER — CITALOPRAM 40 MG/1
TABLET ORAL
Qty: 30 TABLET | Refills: 0 | Status: SHIPPED | OUTPATIENT
Start: 2020-01-03 | End: 2020-01-15 | Stop reason: SDUPTHER

## 2020-01-03 RX ORDER — MIRTAZAPINE 45 MG/1
TABLET, FILM COATED ORAL
Qty: 30 TABLET | Refills: 0 | Status: SHIPPED | OUTPATIENT
Start: 2020-01-03 | End: 2020-01-15 | Stop reason: SDUPTHER

## 2020-01-13 ENCOUNTER — HOSPITAL ENCOUNTER (OUTPATIENT)
Age: 55
Discharge: HOME | End: 2020-01-15
Payer: MEDICAID

## 2020-01-13 ENCOUNTER — HOSPITAL ENCOUNTER (OUTPATIENT)
Age: 55
Discharge: HOME | End: 2020-01-13
Payer: MEDICAID

## 2020-01-13 ENCOUNTER — OFFICE VISIT (OUTPATIENT)
Dept: PODIATRY | Age: 55
End: 2020-01-13
Payer: MEDICAID

## 2020-01-13 ENCOUNTER — HOSPITAL ENCOUNTER (OUTPATIENT)
Dept: GENERAL RADIOLOGY | Age: 55
Discharge: HOME | End: 2020-01-15
Payer: MEDICAID

## 2020-01-13 VITALS
WEIGHT: 159 LBS | BODY MASS INDEX: 27.14 KG/M2 | DIASTOLIC BLOOD PRESSURE: 72 MMHG | HEART RATE: 84 BPM | SYSTOLIC BLOOD PRESSURE: 113 MMHG | HEIGHT: 64 IN

## 2020-01-13 LAB — URIC ACID: 6.7 MG/DL (ref 2.4–5.7)

## 2020-01-13 PROCEDURE — 99212 OFFICE O/P EST SF 10 MIN: CPT | Performed by: STUDENT IN AN ORGANIZED HEALTH CARE EDUCATION/TRAINING PROGRAM

## 2020-01-13 PROCEDURE — 84550 ASSAY OF BLOOD/URIC ACID: CPT

## 2020-01-13 PROCEDURE — 1036F TOBACCO NON-USER: CPT | Performed by: STUDENT IN AN ORGANIZED HEALTH CARE EDUCATION/TRAINING PROGRAM

## 2020-01-13 PROCEDURE — 3017F COLORECTAL CA SCREEN DOC REV: CPT | Performed by: STUDENT IN AN ORGANIZED HEALTH CARE EDUCATION/TRAINING PROGRAM

## 2020-01-13 PROCEDURE — 99213 OFFICE O/P EST LOW 20 MIN: CPT | Performed by: STUDENT IN AN ORGANIZED HEALTH CARE EDUCATION/TRAINING PROGRAM

## 2020-01-13 PROCEDURE — G8417 CALC BMI ABV UP PARAM F/U: HCPCS | Performed by: STUDENT IN AN ORGANIZED HEALTH CARE EDUCATION/TRAINING PROGRAM

## 2020-01-13 PROCEDURE — 73630 X-RAY EXAM OF FOOT: CPT

## 2020-01-13 PROCEDURE — G8427 DOCREV CUR MEDS BY ELIG CLIN: HCPCS | Performed by: STUDENT IN AN ORGANIZED HEALTH CARE EDUCATION/TRAINING PROGRAM

## 2020-01-13 PROCEDURE — G8482 FLU IMMUNIZE ORDER/ADMIN: HCPCS | Performed by: STUDENT IN AN ORGANIZED HEALTH CARE EDUCATION/TRAINING PROGRAM

## 2020-01-13 PROCEDURE — 36415 COLL VENOUS BLD VENIPUNCTURE: CPT

## 2020-01-13 RX ORDER — PREDNISONE 10 MG/1
20 TABLET ORAL DAILY
Qty: 4 TABLET | Refills: 0 | Status: SHIPPED | OUTPATIENT
Start: 2020-01-13 | End: 2020-01-15 | Stop reason: SDUPTHER

## 2020-01-13 RX ORDER — PREDNISONE 1 MG/1
5 TABLET ORAL DAILY
Qty: 1 TABLET | Refills: 0 | Status: SHIPPED | OUTPATIENT
Start: 2020-01-13 | End: 2020-01-14

## 2020-01-13 RX ORDER — PREDNISONE 10 MG/1
30 TABLET ORAL DAILY
Qty: 6 TABLET | Refills: 0 | Status: SHIPPED | OUTPATIENT
Start: 2020-01-13 | End: 2020-01-15

## 2020-01-13 RX ORDER — AMOXICILLIN 500 MG/1
CAPSULE ORAL
COMMUNITY
Start: 2019-12-31 | End: 2020-07-24

## 2020-01-13 RX ORDER — IBUPROFEN 800 MG/1
TABLET ORAL
COMMUNITY
Start: 2019-12-31 | End: 2020-09-10

## 2020-01-13 RX ORDER — PREDNISONE 10 MG/1
10 TABLET ORAL DAILY
Qty: 2 TABLET | Refills: 0 | Status: SHIPPED | OUTPATIENT
Start: 2020-01-13 | End: 2020-01-15 | Stop reason: SDUPTHER

## 2020-01-13 RX ORDER — FLUOXETINE HYDROCHLORIDE 40 MG/1
CAPSULE ORAL
COMMUNITY
Start: 2019-10-14 | End: 2020-01-15

## 2020-01-13 RX ORDER — ASPIRIN 81 MG/1
TABLET, COATED ORAL
COMMUNITY
Start: 2019-12-31 | End: 2020-01-15 | Stop reason: SDUPTHER

## 2020-01-13 NOTE — PROGRESS NOTES
[employer]  [address] [place] [address] [address]  Tel: [phone]   Fax: [phone]    Subjective     CC: Left hallux pain    HPI:  Chioma Shipman is a 49y.o. year old female who presents to clinic today for evaluation of left hallux pain. The patient states her left great toe became swollen and painful approximately 5 days ago. The patient denies any history of gout. The patient denies any history of acute trauma. The patient does states she eats a diet high in cheese, approximately 1/4 pound of cheese every day. The patient states she also drinks beer daily. The patient denies any open wounds or lacerations. The patient does state that it is difficult to ambulate with a sharp pain. Patient denies any nausea, vomiting, fever, chills, shortness breath. Primary care physician is Rambo Miguel MD.    ROS:    Constitutional: Denies nausea, vomiting, fever, chills. Neurologic: Denies numbness, tingling, and burning in the feet. Vascular: Denies symptoms of lower extremity claudication. Skin: Denies open wounds. Otherwise negative except as noted in the HPI.      PMH:  Past Medical History:   Diagnosis Date    ROXANN (acute kidney injury) ([facility] [address] 11/18/2017    Alcohol abuse 5/22/2014    Angioedema 11/17/2017    from lisinopril    Anxiety     Bipolar 1 disorder (HCC)     Bipolar disorder ([facility] [address]     CAD (coronary artery disease)     no stents    Claustrophobia     Cocaine abuse ([facility] [address]     states used years ago    Depression     GERD (gastroesophageal reflux disease)     Hypertension     Hypertrophic cardiomyopathy ([facility] [address]     Lung nodules     MDRO (multiple drug resistant organisms) resistance     MRSA (methicillin resistant Staphylococcus aureus)     rt hip    Murmur     see cardiac echo result    OA (osteoarthritis)     Obesity 3/28/2013    PUD (peptic ulcer disease)     Thyroid nodule     Tonsillar hypertrophy     Type 2 diabetes mellitus without Yes Kita Clemente, DPWILLARD   predniSONE (DELTASONE) 5 MG tablet Take 1 tablet by mouth daily for 1 day Prednisone taper: 30mg for 2 days, 20mg for 2 days, 10mg for 2 days, 5mg for 1 day. 1/13/20 1/14/20 Yes Kita Clemente DPM   econazole nitrate 1 % cream Apply topically daily. 1/13/20  Yes Kita Clemente DPM   mirtazapine (REMERON) 45 MG tablet TAKE 1 TAB BY MOUTH AT BEDTIME 1/3/20  Yes Gloria Ortega MD   cetirizine (ZYRTEC) 10 MG tablet TAKE 1 TAB BY MOUTH ONCE A DAY 1/3/20  Yes Gloria Ortega MD   naproxen (NAPROSYN) 500 MG tablet TAKE 1 TAB BY MOUTH TWICE A DAY WITH MEALS 1/3/20  Yes Gloria Ortega MD   pantoprazole (PROTONIX) 40 MG tablet TAKE 1 TAB BY MOUTH ONCE A DAY 1/3/20  Yes Gloria Ortega MD   citalopram (CELEXA) 40 MG tablet TAKE 1 TAB BY MOUTH ONCE A DAY 1/3/20  Yes Gloria Ortega MD   famotidine (PEPCID) 20 MG tablet TAKE 1 TAB BY MOUTH TWICE A DAY 1/3/20  Yes Gloria Ortega MD   metFORMIN (GLUCOPHAGE) 500 MG tablet Take 1 tablet by mouth 2 times daily (with meals) 10/9/19  Yes Ellie Silva MD   lidocaine (XYLOCAINE) 5 % ointment Apply 1 applicator topically 3 times daily as needed for Pain Apply topically as needed.    Yes Historical Provider, MD   EPINEPHrine (EPIPEN 2-GRICELDA) 0.3 MG/0.3ML SOAJ injection Use as directed for allergic reaction 10/2/19  Yes Tiffanie Ashby MD   Multiple Vitamins-Minerals (MULTIVITAMIN WITH MINERALS) tablet Take 1 tablet by mouth daily 9/27/19  Yes Gloria Ortega MD   amLODIPine (NORVASC) 10 MG tablet Take 10 mg by mouth daily   Yes Historical Provider, MD   aspirin 81 MG tablet Take 81 mg by mouth daily   Yes Historical Provider, MD   famotidine (PEPCID) 20 MG tablet Take 20 mg by mouth 2 times daily   Yes Historical Provider, MD   citalopram (CELEXA) 40 MG tablet Take 40 mg by mouth daily   Yes Historical Provider, MD   mirtazapine (REMERON) 45 MG tablet Take 45 mg by mouth nightly   Yes Historical Provider, MD   atenolol (TENORMIN) 50 MG tablet Take 50 mg by mouth daily   Yes Historical Provider, MD   spironolactone (ALDACTONE) 25 MG tablet Take 25 mg by mouth daily   Yes Historical Provider, MD   hydrochlorothiazide (HYDRODIURIL) 25 MG tablet Take 25 mg by mouth daily   Yes Historical Provider, MD   loratadine (CLARITIN) 10 MG capsule Take 1 capsule by mouth   Yes Historical Provider, MD   albuterol sulfate (PROAIR RESPICLICK) 073 (90 Base) MCG/ACT aerosol powder inhalation Inhale 2 puffs into the lungs   Yes Historical Provider, MD   isosorbide mononitrate (IMDUR) 60 MG extended release tablet TAKE 1 TAB BY MOUTH IN THE MORNING 5/22/19  Yes Yvrose Ocampo MD   fluticasone (ARNUITY ELLIPTA) 100 MCG/ACT AEPB Inhale 1 puff into the lungs daily 5/22/19  Yes Liam Jay MD   Calcium Carbonate-Vitamin D (OYSTER SHELL CALCIUM/D) 500-200 MG-UNIT TABS TAKE 1 TAB BY MOUTH ONCE A DAY 12/7/18  Yes Yvrose Ocampo MD   mometasone (ASMANEX 120 METERED DOSES) 220 MCG/INH inhaler Inhale 2 puffs into the lungs daily 12/7/18  Yes Yvrose Ocampo MD   cyclobenzaprine (FLEXERIL) 5 MG tablet One to two tid prn back pain or muscle spasm. Will cause drowsiness. Do not drive if taking. 11/14/17  Yes Stefany Mcmillan MD   lurasidone (LATUDA) 60 MG TABS tablet Take 60 mg by mouth nightly   Yes Historical Provider, MD   Cholecalciferol (VITAMIN D3) 76929 UNITS CAPS Take 1 capsule by mouth Twice a Week 8/12/15  Yes Historical Provider, MD       Objective     Vitals:    01/13/20 1426   BP: 113/72   Pulse: 84       Lab Results   Component Value Date    LABA1C 5.8 06/12/2019       Physical Exam:  General:  Alert and oriented x3. In no acute distress. Lower Extremity Physical Exam:    Vascular: DP and PT pulses are palpable, Bilateral. CFT <3 seconds to all digits, Bilateral.  Moderate nonpitting edema noted to the left foot . Hair growth is absent to the level of the digits, Bilateral.     Neuro: Saph/sural/SP/DP/plantar sensation intact to light touch.  Protective sensation days, 10mg for 2 days, 5mg for 1 day. Dispense:  2 tablet     Refill:  0    predniSONE (DELTASONE) 5 MG tablet     Sig: Take 1 tablet by mouth daily for 1 day Prednisone taper: 30mg for 2 days, 20mg for 2 days, 10mg for 2 days, 5mg for 1 day. Dispense:  1 tablet     Refill:  0    econazole nitrate 1 % cream     Sig: Apply topically daily.      Dispense:  85 g     Refill:  2     Orders Placed This Encounter   Procedures    XR FOOT LEFT (MIN 3 VIEWS)     Standing Status:   Future     Standing Expiration Date:   1/13/2021     Order Specific Question:   Reason for exam:     Answer:   R 1st MTPJ pain    Uric Acid     Standing Status:   Future     Standing Expiration Date:   1/13/2021         Electronically signed by Carol Ann German DPM on 1/13/2020 at 2:52 PM

## 2020-01-15 ENCOUNTER — OFFICE VISIT (OUTPATIENT)
Dept: INTERNAL MEDICINE | Age: 55
End: 2020-01-15
Payer: MEDICAID

## 2020-01-15 VITALS
DIASTOLIC BLOOD PRESSURE: 73 MMHG | SYSTOLIC BLOOD PRESSURE: 116 MMHG | HEART RATE: 73 BPM | HEIGHT: 64 IN | BODY MASS INDEX: 28.1 KG/M2 | WEIGHT: 164.6 LBS

## 2020-01-15 PROBLEM — T78.3XXA ANGIO-EDEMA: Status: RESOLVED | Noted: 2017-11-17 | Resolved: 2020-01-15

## 2020-01-15 PROCEDURE — G8417 CALC BMI ABV UP PARAM F/U: HCPCS | Performed by: STUDENT IN AN ORGANIZED HEALTH CARE EDUCATION/TRAINING PROGRAM

## 2020-01-15 PROCEDURE — 3017F COLORECTAL CA SCREEN DOC REV: CPT | Performed by: STUDENT IN AN ORGANIZED HEALTH CARE EDUCATION/TRAINING PROGRAM

## 2020-01-15 PROCEDURE — G8482 FLU IMMUNIZE ORDER/ADMIN: HCPCS | Performed by: STUDENT IN AN ORGANIZED HEALTH CARE EDUCATION/TRAINING PROGRAM

## 2020-01-15 PROCEDURE — 99213 OFFICE O/P EST LOW 20 MIN: CPT | Performed by: STUDENT IN AN ORGANIZED HEALTH CARE EDUCATION/TRAINING PROGRAM

## 2020-01-15 PROCEDURE — 1036F TOBACCO NON-USER: CPT | Performed by: STUDENT IN AN ORGANIZED HEALTH CARE EDUCATION/TRAINING PROGRAM

## 2020-01-15 PROCEDURE — 99211 OFF/OP EST MAY X REQ PHY/QHP: CPT | Performed by: INTERNAL MEDICINE

## 2020-01-15 PROCEDURE — G8427 DOCREV CUR MEDS BY ELIG CLIN: HCPCS | Performed by: STUDENT IN AN ORGANIZED HEALTH CARE EDUCATION/TRAINING PROGRAM

## 2020-01-15 RX ORDER — LORATADINE 10 MG/1
10 CAPSULE, LIQUID FILLED ORAL DAILY
Qty: 30 CAPSULE | Refills: 3 | Status: SHIPPED | OUTPATIENT
Start: 2020-01-15 | End: 2020-07-23 | Stop reason: SDUPTHER

## 2020-01-15 RX ORDER — ISOSORBIDE MONONITRATE 60 MG/1
TABLET, EXTENDED RELEASE ORAL
Qty: 30 TABLET | Refills: 3 | Status: SHIPPED | OUTPATIENT
Start: 2020-01-15 | End: 2020-07-22 | Stop reason: SDUPTHER

## 2020-01-15 RX ORDER — FAMOTIDINE 20 MG/1
20 TABLET, FILM COATED ORAL 2 TIMES DAILY
Status: CANCELLED | OUTPATIENT
Start: 2020-01-15

## 2020-01-15 RX ORDER — CETIRIZINE HYDROCHLORIDE 10 MG/1
TABLET ORAL
Qty: 30 TABLET | Refills: 0 | Status: CANCELLED | OUTPATIENT
Start: 2020-01-15

## 2020-01-15 RX ORDER — SPIRONOLACTONE 25 MG/1
25 TABLET ORAL DAILY
Qty: 60 TABLET | Refills: 3 | Status: SHIPPED | OUTPATIENT
Start: 2020-01-15 | End: 2020-10-02 | Stop reason: SDUPTHER

## 2020-01-15 RX ORDER — B-COMPLEX WITH VITAMIN C
TABLET ORAL
Qty: 30 TABLET | Refills: 3 | Status: SHIPPED | OUTPATIENT
Start: 2020-01-15 | End: 2020-07-17 | Stop reason: SDUPTHER

## 2020-01-15 RX ORDER — IBUPROFEN 800 MG/1
TABLET ORAL
Qty: 120 TABLET | Status: CANCELLED | OUTPATIENT
Start: 2020-01-15

## 2020-01-15 RX ORDER — AMLODIPINE BESYLATE 10 MG/1
10 TABLET ORAL DAILY
Qty: 60 TABLET | Refills: 3 | Status: SHIPPED | OUTPATIENT
Start: 2020-01-15 | End: 2020-10-02 | Stop reason: ALTCHOICE

## 2020-01-15 RX ORDER — CYCLOBENZAPRINE HCL 5 MG
TABLET ORAL
Qty: 60 TABLET | Refills: 1 | Status: CANCELLED | OUTPATIENT
Start: 2020-01-15

## 2020-01-15 RX ORDER — EPINEPHRINE 0.3 MG/.3ML
INJECTION SUBCUTANEOUS
Qty: 1 EACH | Refills: 2 | Status: SHIPPED | OUTPATIENT
Start: 2020-01-15 | End: 2020-07-17 | Stop reason: SDUPTHER

## 2020-01-15 RX ORDER — CITALOPRAM 40 MG/1
40 TABLET ORAL DAILY
Status: CANCELLED | OUTPATIENT
Start: 2020-01-15

## 2020-01-15 RX ORDER — HYDROCHLOROTHIAZIDE 25 MG/1
25 TABLET ORAL DAILY
Qty: 60 TABLET | Refills: 3 | Status: SHIPPED | OUTPATIENT
Start: 2020-01-15 | End: 2020-10-02 | Stop reason: ALTCHOICE

## 2020-01-15 RX ORDER — ATENOLOL 50 MG/1
50 TABLET ORAL DAILY
Qty: 60 TABLET | Refills: 3 | Status: SHIPPED | OUTPATIENT
Start: 2020-01-15 | End: 2021-01-22 | Stop reason: SDUPTHER

## 2020-01-15 RX ORDER — PANTOPRAZOLE SODIUM 40 MG/1
TABLET, DELAYED RELEASE ORAL
Qty: 60 TABLET | Refills: 3 | Status: SHIPPED | OUTPATIENT
Start: 2020-01-15 | End: 2020-10-02 | Stop reason: ALTCHOICE

## 2020-01-15 RX ORDER — PREDNISONE 10 MG/1
30 TABLET ORAL DAILY
Qty: 6 TABLET | Refills: 0 | Status: CANCELLED | OUTPATIENT
Start: 2020-01-15 | End: 2020-01-17

## 2020-01-15 RX ORDER — NAPROXEN 500 MG/1
TABLET ORAL
Qty: 60 TABLET | Refills: 0 | Status: CANCELLED | OUTPATIENT
Start: 2020-01-15

## 2020-01-15 RX ORDER — FLUOXETINE HYDROCHLORIDE 40 MG/1
CAPSULE ORAL
Qty: 30 CAPSULE | Status: CANCELLED | OUTPATIENT
Start: 2020-01-15

## 2020-01-15 RX ORDER — MIRTAZAPINE 45 MG/1
45 TABLET, FILM COATED ORAL NIGHTLY
Status: CANCELLED | OUTPATIENT
Start: 2020-01-15

## 2020-01-15 RX ORDER — CHOLECALCIFEROL (VITAMIN D3) 1250 MCG
1 CAPSULE ORAL
Qty: 1 CAPSULE | Status: CANCELLED | OUTPATIENT
Start: 2020-01-16

## 2020-01-15 RX ORDER — MULTIVIT-MIN/IRON FUM/FOLIC AC 7.5 MG-4
1 TABLET ORAL DAILY
Qty: 30 TABLET | Refills: 3 | Status: CANCELLED | OUTPATIENT
Start: 2020-01-15

## 2020-01-15 NOTE — PATIENT INSTRUCTIONS
Follow-up appointment scheduled for 5/20/20 at 1:25p, AVS given to patient. Medications e-scribe to pharmacy of pt's choice.      Referral to Sleep Study w/Pap was placed, summary of care printed and faxed to office, phone numbers given to pt, they will contact office for an appt      jw

## 2020-01-15 NOTE — PROGRESS NOTES
Visit Information    Have you changed or started any medications since your last visit including any over-the-counter medicines, vitamins, or herbal medicines? no   Have you stopped taking any of your medications? Is so, why? -  no  Are you having any side effects from any of your medications? - no    Have you seen any other physician or provider since your last visit?  no   Have you had any other diagnostic tests since your last visit?  no   Have you been seen in the emergency room and/or had an admission in a hospital since we last saw you?  no   Have you had your routine dental cleaning in the past 6 months?  no     Do you have an active MyChart account? If no, what is the barrier?   Yes    Patient Care Team:  Rico Veras MD as PCP - General (Internal Medicine)  CECILIA Prado CNP as PCP - [facility]    Medical History Review  Past Medical, Family, and Social History reviewed and does contribute to the patient presenting condition    Health Maintenance   Topic Date Due    Diabetic foot exam  01/22/1976    Diabetic retinal exam  01/22/1976    Shingles Vaccine (1 of 2) 01/22/2016    Hepatitis B vaccine (1 of 3 - Risk 3-dose series) 09/13/2020 (Originally 1/22/1985)    A1C test (Diabetic or Prediabetic)  06/12/2020    Diabetic microalbuminuria test  06/12/2020    Lipid screen  06/12/2020    Potassium monitoring  10/02/2020    Creatinine monitoring  10/02/2020    Breast cancer screen  05/31/2021    Colon cancer screen colonoscopy  12/10/2023    DTaP/Tdap/Td vaccine (3 - Td) 04/14/2027    Flu vaccine  Completed    Pneumococcal 0-64 years Vaccine  Completed    HIV screen  Completed
Types: Cocaine     Comment: last use approximately 1/20/14 cocaine       Family History   Problem Relation Age of Onset    Stroke Mother     Hypertension Father     Cancer Brother         bone    Lung Cancer Maternal Aunt     Cancer Maternal Grandmother         eye     Other Sister         aneurysm    Heart Disease Sister       ________________________________________________________________________  Review of Systems:  CONSTITUTIONAL: Denies: fever, chills  PSYCH: Denies: anxiety, depression  ALLERGIES: Denies: urticaria  EYES: Denies: blurry vision, decreased vision, photophobia  ENT: Denies: sore throat, nasal congestion  CARDIOVASCULAR: Denies: chest pain, dyspnea on exertion  RESPIRATORY: Denies: cough, hemoptysis, shortness of breath  GI: Denies: Denies: abdominal pain, flank pain  : Denies: Denies: dysuria, frequency/urgency  NEURO: Denies: dizzy/vertigo, headache  MUSCULOSKELETAL: Denies: back pain, joint pain  SKIN: Denies: rash, itching  ________________________________________________________________________  Physical Exam:  Vitals:    01/15/20 0959   BP: 116/73   Site: Left Upper Arm   Position: Sitting   Cuff Size: Medium Adult   Pulse: 73   Weight: 164 lb 9.6 oz (74.7 kg)   Height: 5' 4\" (1.626 m)     BP Readings from Last 3 Encounters:   01/15/20 116/73   01/13/20 113/72   10/09/19 115/73      General appearance - alert, well appearing, and in no distress  Mental status - alert, oriented to person, place, and time  Mouth - mucous membranes moist, pharynx normal without lesions  Chest - clear to auscultation, no wheezes, rales or rhonchi, symmetric air entry  Heart - normal rate, regular rhythm, normal S1, S2, no murmurs, rubs, clicks or gallops  Abdomen - soft, nontender, nondistended, no masses or organomegaly  Neurological - alert, oriented, normal speech, no focal findings or movement disorder noted  Extremities - peripheral pulses normal, no pedal edema, no clubbing or

## 2020-01-20 ENCOUNTER — HOSPITAL ENCOUNTER (OUTPATIENT)
Age: 55
Discharge: HOME | End: 2020-01-22
Payer: MEDICAID

## 2020-01-20 ENCOUNTER — OFFICE VISIT (OUTPATIENT)
Dept: PODIATRY | Age: 55
End: 2020-01-20
Payer: MEDICAID

## 2020-01-20 ENCOUNTER — HOSPITAL ENCOUNTER (OUTPATIENT)
Dept: GENERAL RADIOLOGY | Age: 55
Discharge: HOME | End: 2020-01-22
Payer: MEDICAID

## 2020-01-20 VITALS
DIASTOLIC BLOOD PRESSURE: 83 MMHG | WEIGHT: 166 LBS | HEART RATE: 72 BPM | BODY MASS INDEX: 28.34 KG/M2 | HEIGHT: 64 IN | SYSTOLIC BLOOD PRESSURE: 123 MMHG

## 2020-01-20 PROCEDURE — 99212 OFFICE O/P EST SF 10 MIN: CPT | Performed by: STUDENT IN AN ORGANIZED HEALTH CARE EDUCATION/TRAINING PROGRAM

## 2020-01-20 PROCEDURE — 3017F COLORECTAL CA SCREEN DOC REV: CPT | Performed by: STUDENT IN AN ORGANIZED HEALTH CARE EDUCATION/TRAINING PROGRAM

## 2020-01-20 PROCEDURE — G8417 CALC BMI ABV UP PARAM F/U: HCPCS | Performed by: STUDENT IN AN ORGANIZED HEALTH CARE EDUCATION/TRAINING PROGRAM

## 2020-01-20 PROCEDURE — G8482 FLU IMMUNIZE ORDER/ADMIN: HCPCS | Performed by: STUDENT IN AN ORGANIZED HEALTH CARE EDUCATION/TRAINING PROGRAM

## 2020-01-20 PROCEDURE — 73630 X-RAY EXAM OF FOOT: CPT

## 2020-01-20 PROCEDURE — 1036F TOBACCO NON-USER: CPT | Performed by: STUDENT IN AN ORGANIZED HEALTH CARE EDUCATION/TRAINING PROGRAM

## 2020-01-20 PROCEDURE — G8427 DOCREV CUR MEDS BY ELIG CLIN: HCPCS | Performed by: STUDENT IN AN ORGANIZED HEALTH CARE EDUCATION/TRAINING PROGRAM

## 2020-01-20 PROCEDURE — 99213 OFFICE O/P EST LOW 20 MIN: CPT | Performed by: STUDENT IN AN ORGANIZED HEALTH CARE EDUCATION/TRAINING PROGRAM

## 2020-01-20 NOTE — PROGRESS NOTES
[address]  [address] [place] [address], Maurice Coy  Tel: [phone]   Fax: [phone]    Subjective     CC: Left hallux pain    Interval hx: Patient states that she is took her prednisone taper as directed and no longer has any pain in her first MTPJ on the right. She states that she has been applying the topical antifungal cream as directed which has significantly improved her flaking skin bilaterally. Patient states she is very happy. Patient states that she would like to be evaluated for possible surgery on her left hallux abductovalgus deformity which is painful. Patient states that she gets a callus under her subsecond metatarsal head which is very painful when ambulating. The patient states that she has tried wider shoe gear in the past however she still has pain. Patient states she is pre-diabetic. HPI:  Jaime Munoz is a 49y.o. year old female who presents to clinic today for evaluation of left hallux pain. The patient states her left great toe became swollen and painful approximately 5 days ago. The patient denies any history of gout. The patient denies any history of acute trauma. The patient does states she eats a diet high in cheese, approximately 1/4 pound of cheese every day. The patient states she also drinks beer daily. The patient denies any open wounds or lacerations. The patient does state that it is difficult to ambulate with a sharp pain. Patient denies any nausea, vomiting, fever, chills, shortness breath. Primary care physician is William Evans MD.    ROS:    Constitutional: Denies nausea, vomiting, fever, chills. Neurologic: Denies numbness, tingling, and burning in the feet. Vascular: Denies symptoms of lower extremity claudication. Skin: Denies open wounds. Otherwise negative except as noted in the HPI.      PMH:  Past Medical History:   Diagnosis Date    ROXANN (acute kidney injury) ([facility] [address] 11/18/2017    Alcohol abuse 5/22/2014    digit.    Dermatologic: No open lesions, Bilateral.  Interdigital maceration absent, Bilateral.  Nails 1-10 are within normal limits. Focal hyperkeratotic lesion noted to the subsecond metatarsal head on the right. Clinical:  None. Imagin20    Impression   Findings 1st metatarsal-phalangeal joint more typical of osteoarthritis.  No   plain film finding clearly indicative of gout. Assessment   Marlys Dixon is a 49 y.o. female with     Diagnosis Orders   1. Gout involving toe of left foot, unspecified cause, unspecified chronicity     2. Hallux abducto valgus, right  XR FOOT RIGHT (MIN 3 VIEWS)   3. Hammer toe of left foot          Plan   · Patient examined and evaluated. · Diagnosis and treatment options discussed in detail. · Plain film radiographs reviewed in detail and discussed with patient. · Will order new plain film radiographs on the right foot for evaluation of her hallux abductovalgus and hammertoe deformity. · Instructed the patient to wear wide toe box shoe gear. · Uric acid level 6.7mg/dL. ·  Long-term gout medication will be managed by the patient's PCP, recommend follow-up with PCP. · Patient to RTC PRN. · Please, call the office with any questions or concerns. No orders of the defined types were placed in this encounter.     Orders Placed This Encounter   Procedures    XR FOOT RIGHT (MIN 3 VIEWS)     Please add 1 more view of sesamoid axial (forefoot axial)     Standing Status:   Future     Standing Expiration Date:   2021     Order Specific Question:   Reason for exam:     Answer:   right HAV         Electronically signed by David George DPM on 2020 at 2:20 PM

## 2020-01-20 NOTE — PROGRESS NOTES
Patient instructed to remove shoes and socks, instructed to sit in exam chair. Current PCP name is mai and date of last visit 1/15/20. Do you have a follow up visit scheduled?   Yes or no    If yes the date is 5/20/20

## 2020-01-27 NOTE — PROGRESS NOTES
I performed a history and physical examination of the patient and discussed management with the resident. I reviewed the residents note and agree with the documented findings and plan of care. Any areas of disagreement are noted on the chart. I was personally present for the key portions of any procedures. I have documented in the chart those procedures where I was not present during the key portions. I have reviewed the Podiatry Resident progress note. I agree with the chief complaint, past medical history, past surgical history, allergies, medications, social and family history as documented unless otherwise noted below. Documentation of the HPI, Physical Exam and Medical Decision Making performed by medical students or scribes is based on my personal performance of the HPI, PE and MDM. I have personally evaluated this patient and have completed at least one if not all key elements of the E/M (history, physical exam, and MDM). Additional findings are as noted. Louise Tubbs D.P.M.

## 2020-01-31 ENCOUNTER — OFFICE VISIT (OUTPATIENT)
Dept: PODIATRY | Age: 55
End: 2020-01-31
Payer: MEDICAID

## 2020-01-31 VITALS
SYSTOLIC BLOOD PRESSURE: 121 MMHG | HEIGHT: 64 IN | HEART RATE: 65 BPM | BODY MASS INDEX: 28.82 KG/M2 | DIASTOLIC BLOOD PRESSURE: 74 MMHG | WEIGHT: 168.8 LBS

## 2020-01-31 PROCEDURE — G8482 FLU IMMUNIZE ORDER/ADMIN: HCPCS | Performed by: STUDENT IN AN ORGANIZED HEALTH CARE EDUCATION/TRAINING PROGRAM

## 2020-01-31 PROCEDURE — 1036F TOBACCO NON-USER: CPT | Performed by: STUDENT IN AN ORGANIZED HEALTH CARE EDUCATION/TRAINING PROGRAM

## 2020-01-31 PROCEDURE — G8427 DOCREV CUR MEDS BY ELIG CLIN: HCPCS | Performed by: STUDENT IN AN ORGANIZED HEALTH CARE EDUCATION/TRAINING PROGRAM

## 2020-01-31 PROCEDURE — 3017F COLORECTAL CA SCREEN DOC REV: CPT | Performed by: STUDENT IN AN ORGANIZED HEALTH CARE EDUCATION/TRAINING PROGRAM

## 2020-01-31 PROCEDURE — 99212 OFFICE O/P EST SF 10 MIN: CPT | Performed by: STUDENT IN AN ORGANIZED HEALTH CARE EDUCATION/TRAINING PROGRAM

## 2020-01-31 PROCEDURE — 11055 PARING/CUTG B9 HYPRKER LES 1: CPT | Performed by: STUDENT IN AN ORGANIZED HEALTH CARE EDUCATION/TRAINING PROGRAM

## 2020-01-31 PROCEDURE — 99213 OFFICE O/P EST LOW 20 MIN: CPT | Performed by: STUDENT IN AN ORGANIZED HEALTH CARE EDUCATION/TRAINING PROGRAM

## 2020-01-31 PROCEDURE — G8417 CALC BMI ABV UP PARAM F/U: HCPCS | Performed by: STUDENT IN AN ORGANIZED HEALTH CARE EDUCATION/TRAINING PROGRAM

## 2020-01-31 RX ORDER — FLUOXETINE HYDROCHLORIDE 40 MG/1
40 CAPSULE ORAL DAILY
COMMUNITY
Start: 2020-01-24 | End: 2021-04-08

## 2020-01-31 NOTE — PROGRESS NOTES
disease)     Hypertension     Hypertrophic cardiomyopathy ([facility] [address]     Lung nodules     MDRO (multiple drug resistant organisms) resistance     MRSA (methicillin resistant Staphylococcus aureus)     rt hip    Murmur     see cardiac echo result    OA (osteoarthritis)     Obesity 3/28/2013    PUD (peptic ulcer disease)     Thyroid nodule     Tonsillar hypertrophy     Type 2 diabetes mellitus without complication, without long-term current use of insulin ([facility] [address] 2018       Surgical History:   Past Surgical History:   Procedure Laterality Date    CARDIAC CATHETERIZATION      COLONOSCOPY      bx    HYSTERECTOMY      partial hyst    OTHER SURGICAL HISTORY  2015    MRI under anesthesia    THYROID SURGERY      bilat bx    UPPER GASTROINTESTINAL ENDOSCOPY         Social History:  Social History     Tobacco Use    Smoking status: Former Smoker     Packs/day: 0.50     Types: Cigarettes     Last attempt to quit: 1992     Years since quittin.1    Smokeless tobacco: Never Used    Tobacco comment: states quiti in the 1980s   Substance Use Topics    Alcohol use: Yes     Alcohol/week: 12.0 standard drinks     Types: 12 Cans of beer per week     Comment: 2-3 times per week    Drug use: No     Types: Cocaine     Comment: last use approximately 14 cocaine       Medications:  Prior to Admission medications    Medication Sig Start Date End Date Taking?  Authorizing Provider   FLUoxetine (PROZAC) 40 MG capsule Take 40 mg by mouth daily 20  Yes Historical Provider, MD   pantoprazole (PROTONIX) 40 MG tablet TAKE 1 TAB BY MOUTH ONCE A DAY 1/15/20  Yes Iman Jones MD   [facility], 120 METERED DOSES,) 220 MCG/INH inhaler Inhale 2 puffs into the lungs daily 1/15/20  Yes Iman Jones MD   metFORMIN (GLUCOPHAGE) 500 MG tablet Take 1 tablet by mouth 2 times daily (with meals) 1/15/20  Yes Iman Jones MD   isosorbide mononitrate (IMDUR) 60 MG extended release tablet TAKE 1 TAB BY MOUTH IN THE MORNING 1/15/20  Yes Shannan Barros MD   fluticasone (ARNUITY ELLIPTA) 100 MCG/ACT AEPB Inhale 1 puff into the lungs daily 1/15/20  Yes Shannan Barros MD   EPINEPHrine (EPIPEN 2-GRICELDA) 0.3 MG/0.3ML SOAJ injection Use as directed for allergic reaction 1/15/20  Yes Shannan Barros MD   Calcium Carbonate-Vitamin D (OYSTER SHELL CALCIUM/D) 500-200 MG-UNIT TABS TAKE 1 TAB BY MOUTH ONCE A DAY 1/15/20  Yes Shannan Barros MD   spironolactone (ALDACTONE) 25 MG tablet Take 1 tablet by mouth daily 1/15/20  Yes Shannan Barros MD   loratadine (CLARITIN) 10 MG capsule Take 1 capsule by mouth daily 1/15/20  Yes Shannan Barros MD   hydrochlorothiazide (HYDRODIURIL) 25 MG tablet Take 1 tablet by mouth daily 1/15/20  Yes Shannan Barros MD   atenolol (TENORMIN) 50 MG tablet Take 1 tablet by mouth daily 1/15/20  Yes Shanann Barros MD   aspirin 81 MG tablet Take 1 tablet by mouth daily 1/15/20  Yes Shannan Barros MD   amLODIPine (NORVASC) 10 MG tablet Take 1 tablet by mouth daily 1/15/20  Yes Shannan Barros MD   albuterol sulfate (PROAIR RESPICLICK) 118 (90 Base) MCG/ACT aerosol powder inhalation Inhale 2 puffs into the lungs every 6 hours as needed for Wheezing or Shortness of Breath 1/15/20  Yes Shannan Barros MD   amoxicillin (AMOXIL) 500 MG capsule  12/31/19  Yes Historical Provider, MD   baclofen POWD  10/7/19  Yes Historical Provider, MD   ibuprofen (ADVIL;MOTRIN) 800 MG tablet  12/31/19  Yes Historical Provider, MD   econazole nitrate 1 % cream Apply topically daily. 1/13/20  Yes Pollo Clancy DPM   cetirizine (ZYRTEC) 10 MG tablet TAKE 1 TAB BY MOUTH ONCE A DAY 1/3/20  Yes Kassandra Campos MD   naproxen (NAPROSYN) 500 MG tablet TAKE 1 TAB BY MOUTH TWICE A DAY WITH MEALS 1/3/20  Yes Kassandra Campos MD   lidocaine (XYLOCAINE) 5 % ointment Apply 1 applicator topically 3 times daily as needed for Pain Apply topically as needed.    Yes Historical Provider,

## 2020-02-03 ENCOUNTER — TELEPHONE (OUTPATIENT)
Dept: PODIATRY | Age: 55
End: 2020-02-03

## 2020-02-03 NOTE — TELEPHONE ENCOUNTER
Oriana at [facility] called and stated patient needs to re scanned for orthotics. Please call patient and schedule an appointment.

## 2020-04-02 RX ORDER — LIDOCAINE 50 MG/G
1 OINTMENT TOPICAL 3 TIMES DAILY PRN
OUTPATIENT
Start: 2020-04-02

## 2020-04-08 ENCOUNTER — TELEPHONE (OUTPATIENT)
Dept: ADMINISTRATIVE | Age: 55
End: 2020-04-08

## 2020-04-08 RX ORDER — LIDOCAINE 50 MG/G
1 OINTMENT TOPICAL 3 TIMES DAILY PRN
Qty: 2 TUBE | Refills: 3 | OUTPATIENT
Start: 2020-04-08

## 2020-05-13 ENCOUNTER — TELEPHONE (OUTPATIENT)
Dept: INTERNAL MEDICINE | Age: 55
End: 2020-05-13

## 2020-05-19 ENCOUNTER — TELEPHONE (OUTPATIENT)
Dept: INTERNAL MEDICINE | Age: 55
End: 2020-05-19

## 2020-05-19 NOTE — TELEPHONE ENCOUNTER
PC to pt-- LM letting the pt know that their appt on 5/20/2020 will be cancelled and to contact the office to reschedule.

## 2020-06-23 NOTE — TELEPHONE ENCOUNTER
Request for naproxen - medication pended. Please fill if appropriate. Next Visit Date:  Future Appointments   Date Time Provider Anthony Everett   6/24/2020  2:45 PM THERESE Rodrigues [facility]   Topic Date Due    Diabetic foot exam  01/22/1976    Diabetic retinal exam  01/22/1976    Shingles Vaccine (1 of 2) 01/22/2016    A1C test (Diabetic or Prediabetic)  06/12/2020    Diabetic microalbuminuria test  06/12/2020    Lipid screen  06/12/2020    Hepatitis B vaccine (1 of 3 - Risk 3-dose series) 09/13/2020 (Originally 1/22/1985)    Potassium monitoring  10/02/2020    Creatinine monitoring  10/02/2020    Breast cancer screen  05/31/2021    Colon cancer screen colonoscopy  12/10/2023    DTaP/Tdap/Td vaccine (3 - Td) 04/14/2027    Flu vaccine  Completed    Pneumococcal 0-64 years Vaccine  Completed    HIV screen  Completed    Hepatitis A vaccine  Aged Out    Hib vaccine  Aged Out    Meningococcal (ACWY) vaccine  Aged Out       Hemoglobin A1C (%)   Date Value   06/12/2019 5.8   12/07/2018 5.6   11/15/2017 6.2 (H)             ( goal A1C is < 7)   Microalb/Crt.  Ratio (mcg/mg creat)   Date Value   06/12/2019 147 (H)     LDL Cholesterol (mg/dL)   Date Value   06/12/2019 136 (H)       (goal LDL is <100)   AST (U/L)   Date Value   09/20/2019 122 (H)     ALT (U/L)   Date Value   09/20/2019 76 (H)     BUN (mg/dL)   Date Value   10/02/2019 10     BP Readings from Last 3 Encounters:   01/31/20 121/74   01/20/20 123/83   01/15/20 116/73          (goal 120/80)    All Future Testing planned in [facility]  Lab Frequency Next Occurrence   Sleep Study with PAP Titration Once 01/15/2020         Patient Active Problem List:     Major depressive disorder, recurrent episode ([address]     Lung nodule     Obesity     Adrenal adenoma     Goiter     Alcohol abuse     Essential hypertension     Enlarged tonsils     ACE inhibitor-aggravated angioedema     JONES (obstructive sleep apnea)

## 2020-06-24 ENCOUNTER — OFFICE VISIT (OUTPATIENT)
Dept: PODIATRY | Age: 55
End: 2020-06-24
Payer: MEDICAID

## 2020-06-24 VITALS
DIASTOLIC BLOOD PRESSURE: 71 MMHG | HEART RATE: 76 BPM | WEIGHT: 169.8 LBS | HEIGHT: 64 IN | SYSTOLIC BLOOD PRESSURE: 102 MMHG | BODY MASS INDEX: 28.99 KG/M2

## 2020-06-24 PROCEDURE — 1036F TOBACCO NON-USER: CPT | Performed by: STUDENT IN AN ORGANIZED HEALTH CARE EDUCATION/TRAINING PROGRAM

## 2020-06-24 PROCEDURE — 3017F COLORECTAL CA SCREEN DOC REV: CPT | Performed by: STUDENT IN AN ORGANIZED HEALTH CARE EDUCATION/TRAINING PROGRAM

## 2020-06-24 PROCEDURE — G8427 DOCREV CUR MEDS BY ELIG CLIN: HCPCS | Performed by: STUDENT IN AN ORGANIZED HEALTH CARE EDUCATION/TRAINING PROGRAM

## 2020-06-24 PROCEDURE — 99212 OFFICE O/P EST SF 10 MIN: CPT | Performed by: STUDENT IN AN ORGANIZED HEALTH CARE EDUCATION/TRAINING PROGRAM

## 2020-06-24 PROCEDURE — 11055 PARING/CUTG B9 HYPRKER LES 1: CPT | Performed by: STUDENT IN AN ORGANIZED HEALTH CARE EDUCATION/TRAINING PROGRAM

## 2020-06-24 PROCEDURE — G8417 CALC BMI ABV UP PARAM F/U: HCPCS | Performed by: STUDENT IN AN ORGANIZED HEALTH CARE EDUCATION/TRAINING PROGRAM

## 2020-06-24 PROCEDURE — 99213 OFFICE O/P EST LOW 20 MIN: CPT | Performed by: STUDENT IN AN ORGANIZED HEALTH CARE EDUCATION/TRAINING PROGRAM

## 2020-06-24 NOTE — PROGRESS NOTES
(gastroesophageal reflux disease)     Hypertension     Hypertrophic cardiomyopathy (HCC)     Lung nodules     MDRO (multiple drug resistant organisms) resistance     MRSA (methicillin resistant Staphylococcus aureus)     rt hip    Murmur     see cardiac echo result    OA (osteoarthritis)     Obesity 3/28/2013    PUD (peptic ulcer disease)     Thyroid nodule     Tonsillar hypertrophy     Type 2 diabetes mellitus without complication, without long-term current use of insulin ([facility] [address] 2018       Surgical History:   Past Surgical History:   Procedure Laterality Date    CARDIAC CATHETERIZATION      COLONOSCOPY      bx    HYSTERECTOMY      partial hyst    OTHER SURGICAL HISTORY  2015    MRI under anesthesia    THYROID SURGERY      bilat bx    UPPER GASTROINTESTINAL ENDOSCOPY         Social History:  Social History     Tobacco Use    Smoking status: Former Smoker     Packs/day: 0.50     Types: Cigarettes     Last attempt to quit: 1992     Years since quittin.5    Smokeless tobacco: Never Used    Tobacco comment: states quiti in the    Substance Use Topics    Alcohol use: Yes     Alcohol/week: 12.0 standard drinks     Types: 12 Cans of beer per week     Comment: 2-3 times per week    Drug use: No     Types: Cocaine     Comment: last use approximately 14 cocaine       Medications:  Prior to Admission medications    Medication Sig Start Date End Date Taking?  Authorizing Provider   FLUoxetine (PROZAC) 40 MG capsule Take 40 mg by mouth daily 20  Yes Historical Provider, MD   pantoprazole (PROTONIX) 40 MG tablet TAKE 1 TAB BY MOUTH ONCE A DAY 1/15/20  Yes Malachi Leal MD   [facility], 120 METERED DOSES,) 220 MCG/INH inhaler Inhale 2 puffs into the lungs daily 1/15/20  Yes Malachi Leal MD   metFORMIN (GLUCOPHAGE) 500 MG tablet Take 1 tablet by mouth 2 times daily (with meals) 1/15/20  Yes Malachi Leal MD   isosorbide mononitrate (IMDUR) 60 MG Interdigital maceration absent, Bilateral.  Nails 1-10 are within normal limits. Focal hyperkeratotic lesion noted to the subsecond metatarsal head on the right. Imagin20    XR Right foot  Impression   No acute osseous or soft tissue abnormality.       Degenerative change most pronounced at the 1st metatarsophalangeal joint   space with mild hallux valgus deformity         Assessment   Delphine Campuzano is a 48 y.o. female with     Diagnosis Orders   1. Hallux abducto valgus, right     2. Hallux abducto valgus, left     3. Acquired palmar and plantar hyperkeratosis     4. Hammer toes of both feet     5. Bilateral foot pain     6. Osteopenia of right foot  Vitamin D 25 Hydroxy   7. Prediabetes  Hemoglobin A1C        Plan   · Patient examined and evaluated. · Diagnosis and treatment options discussed in detail. · X-ray right foot reviewed with patient-diffuse osteopenia noted to the right foot; hallux abductovalgus deformity noted. · Discussed with patient surgical options to correct bunion deformity and hammertoe deformity of right foot. Discussed risks and benefits of the surgery. Patient verbalized understanding. · Labs ordered for vitamin D and hemoglobin A1c for preop clearance. · Patient to follow-up with PCP for preop clearance for right foot surgery. · Hyperkeratotic lesion x1 debrided into level of dermis using dermal curette  · Patient to RTC once seen by PCP for preop clearance  · Please, call the office with any questions or concerns. No orders of the defined types were placed in this encounter.     Orders Placed This Encounter   Procedures    Vitamin D 25 Hydroxy     Standing Status:   Future     Standing Expiration Date:   2021    Hemoglobin A1C     Standing Status:   Future     Standing Expiration Date:   2021         Electronically signed by Linnea Hairston DPM on 2020 at 4:25 PM

## 2020-06-25 RX ORDER — NAPROXEN 500 MG/1
TABLET ORAL
Qty: 60 TABLET | Refills: 0 | Status: SHIPPED | OUTPATIENT
Start: 2020-06-25 | End: 2020-09-10

## 2020-06-30 ENCOUNTER — HOSPITAL ENCOUNTER (OUTPATIENT)
Age: 55
Discharge: HOME | End: 2020-06-30
Payer: MEDICAID

## 2020-06-30 LAB
ESTIMATED AVERAGE GLUCOSE: 134 MG/DL
HBA1C MFR BLD: 6.3 % (ref 4–6)
VITAMIN D 25-HYDROXY: 22.6 NG/ML (ref 30–100)

## 2020-06-30 PROCEDURE — 36415 COLL VENOUS BLD VENIPUNCTURE: CPT

## 2020-06-30 PROCEDURE — 82306 VITAMIN D 25 HYDROXY: CPT

## 2020-06-30 PROCEDURE — 83036 HEMOGLOBIN GLYCOSYLATED A1C: CPT

## 2020-07-02 ENCOUNTER — TELEPHONE (OUTPATIENT)
Dept: PODIATRY | Age: 55
End: 2020-07-02

## 2020-07-02 NOTE — TELEPHONE ENCOUNTER
Pt called complaining of right big toe pain 10/10. Pt states her right foot is swelling and painful to touch. Pt wanted to know her lab results. Informed her of her results. Spoke with Dr. Ivan Frederick about vitamin D level being low. He advised that she follows up with her pcp at this time. She needs to schedule a surgery clearance with her pcp also for her bunion removal surgery. He wants to hold vitamin d medication at this time till evaluated by her pcp. Gave verbal by Dr. Ivan Frederick to refill her biomed medication. New rx filled out and faxed to [id number] pt is aware it was faxed. If pt is in a lot of pain that cannot be relieved with tylenol/ibuprofen she is advised to go to ED. Called pt to notify her and had to leave vm to call office back. Spoke to pt gave her information pt verbally understood. She has a surgical clearance appt scheduled for 7/17/20. If pain gets worse she will go to ED.

## 2020-07-17 ENCOUNTER — OFFICE VISIT (OUTPATIENT)
Dept: INTERNAL MEDICINE | Age: 55
End: 2020-07-17
Payer: MEDICAID

## 2020-07-17 ENCOUNTER — HOSPITAL ENCOUNTER (OUTPATIENT)
Age: 55
Setting detail: SPECIMEN
Discharge: HOME | End: 2020-07-17
Payer: MEDICAID

## 2020-07-17 VITALS
HEART RATE: 73 BPM | BODY MASS INDEX: 28.68 KG/M2 | SYSTOLIC BLOOD PRESSURE: 153 MMHG | TEMPERATURE: 96.6 F | WEIGHT: 168 LBS | DIASTOLIC BLOOD PRESSURE: 102 MMHG | HEIGHT: 64 IN

## 2020-07-17 LAB
ABSOLUTE EOS #: 0.04 K/UL (ref 0–0.44)
ABSOLUTE IMMATURE GRANULOCYTE: <0.03 K/UL (ref 0–0.3)
ABSOLUTE LYMPH #: 0.96 K/UL (ref 1.1–3.7)
ABSOLUTE MONO #: 0.5 K/UL (ref 0.1–1.2)
ALBUMIN SERPL-MCNC: 4.5 G/DL (ref 3.5–5.2)
ALBUMIN/GLOBULIN RATIO: 1.1 (ref 1–2.5)
ALP BLD-CCNC: 118 U/L (ref 35–104)
ALT SERPL-CCNC: 40 U/L (ref 5–33)
ANION GAP SERPL CALCULATED.3IONS-SCNC: 22 MMOL/L (ref 9–17)
AST SERPL-CCNC: 78 U/L
BASOPHILS # BLD: 2 % (ref 0–2)
BASOPHILS ABSOLUTE: 0.05 K/UL (ref 0–0.2)
BILIRUB SERPL-MCNC: 0.8 MG/DL (ref 0.3–1.2)
BUN BLDV-MCNC: 6 MG/DL (ref 6–20)
BUN/CREAT BLD: ABNORMAL (ref 9–20)
CALCIUM SERPL-MCNC: 9.5 MG/DL (ref 8.6–10.4)
CHLORIDE BLD-SCNC: 103 MMOL/L (ref 98–107)
CHOLESTEROL, FASTING: 340 MG/DL
CHOLESTEROL/HDL RATIO: 2.6
CO2: 22 MMOL/L (ref 20–31)
CREAT SERPL-MCNC: 0.72 MG/DL (ref 0.5–0.9)
DIFFERENTIAL TYPE: ABNORMAL
EOSINOPHILS RELATIVE PERCENT: 1 % (ref 1–4)
GFR AFRICAN AMERICAN: >60 ML/MIN
GFR NON-AFRICAN AMERICAN: >60 ML/MIN
GFR SERPL CREATININE-BSD FRML MDRD: ABNORMAL ML/MIN/{1.73_M2}
GFR SERPL CREATININE-BSD FRML MDRD: ABNORMAL ML/MIN/{1.73_M2}
GLUCOSE BLD-MCNC: 86 MG/DL (ref 70–99)
HCT VFR BLD CALC: 37.6 % (ref 36.3–47.1)
HDLC SERPL-MCNC: 129 MG/DL
HEMOGLOBIN: 12.2 G/DL (ref 11.9–15.1)
IMMATURE GRANULOCYTES: 0 %
LDL CHOLESTEROL: 187 MG/DL (ref 0–130)
LYMPHOCYTES # BLD: 28 % (ref 24–43)
MCH RBC QN AUTO: 30.5 PG (ref 25.2–33.5)
MCHC RBC AUTO-ENTMCNC: 32.4 G/DL (ref 28.4–34.8)
MCV RBC AUTO: 94 FL (ref 82.6–102.9)
MONOCYTES # BLD: 15 % (ref 3–12)
NRBC AUTOMATED: 0 PER 100 WBC
PDW BLD-RTO: 14.2 % (ref 11.8–14.4)
PLATELET # BLD: 319 K/UL (ref 138–453)
PLATELET ESTIMATE: ABNORMAL
PMV BLD AUTO: 9.4 FL (ref 8.1–13.5)
POTASSIUM SERPL-SCNC: 4.5 MMOL/L (ref 3.7–5.3)
RBC # BLD: 4 M/UL (ref 3.95–5.11)
RBC # BLD: ABNORMAL 10*6/UL
SEG NEUTROPHILS: 54 % (ref 36–65)
SEGMENTED NEUTROPHILS ABSOLUTE COUNT: 1.84 K/UL (ref 1.5–8.1)
SODIUM BLD-SCNC: 147 MMOL/L (ref 135–144)
TOTAL PROTEIN: 8.7 G/DL (ref 6.4–8.3)
TRIGLYCERIDE, FASTING: 121 MG/DL
URIC ACID: 8.1 MG/DL (ref 2.4–5.7)
VLDLC SERPL CALC-MCNC: ABNORMAL MG/DL (ref 1–30)
WBC # BLD: 3.4 K/UL (ref 3.5–11.3)
WBC # BLD: ABNORMAL 10*3/UL

## 2020-07-17 PROCEDURE — 85610 PROTHROMBIN TIME: CPT | Performed by: INTERNAL MEDICINE

## 2020-07-17 PROCEDURE — 99213 OFFICE O/P EST LOW 20 MIN: CPT | Performed by: STUDENT IN AN ORGANIZED HEALTH CARE EDUCATION/TRAINING PROGRAM

## 2020-07-17 PROCEDURE — 1036F TOBACCO NON-USER: CPT | Performed by: STUDENT IN AN ORGANIZED HEALTH CARE EDUCATION/TRAINING PROGRAM

## 2020-07-17 PROCEDURE — G8417 CALC BMI ABV UP PARAM F/U: HCPCS | Performed by: STUDENT IN AN ORGANIZED HEALTH CARE EDUCATION/TRAINING PROGRAM

## 2020-07-17 PROCEDURE — 3017F COLORECTAL CA SCREEN DOC REV: CPT | Performed by: STUDENT IN AN ORGANIZED HEALTH CARE EDUCATION/TRAINING PROGRAM

## 2020-07-17 PROCEDURE — G8427 DOCREV CUR MEDS BY ELIG CLIN: HCPCS | Performed by: STUDENT IN AN ORGANIZED HEALTH CARE EDUCATION/TRAINING PROGRAM

## 2020-07-17 PROCEDURE — 99211 OFF/OP EST MAY X REQ PHY/QHP: CPT | Performed by: INTERNAL MEDICINE

## 2020-07-17 RX ORDER — B-COMPLEX WITH VITAMIN C
TABLET ORAL
Qty: 30 TABLET | Refills: 3 | Status: SHIPPED | OUTPATIENT
Start: 2020-07-17 | End: 2021-01-22

## 2020-07-17 RX ORDER — CHOLECALCIFEROL (VITAMIN D3) 1250 MCG
1 CAPSULE ORAL
Qty: 1 CAPSULE | Status: CANCELLED | OUTPATIENT
Start: 2020-07-20

## 2020-07-17 RX ORDER — MELATONIN
1000 DAILY
Qty: 30 TABLET | Refills: 5 | Status: SHIPPED | OUTPATIENT
Start: 2020-07-17 | End: 2021-01-20 | Stop reason: ALTCHOICE

## 2020-07-17 RX ORDER — EPINEPHRINE 0.3 MG/.3ML
INJECTION SUBCUTANEOUS
Qty: 1 EACH | Refills: 2 | Status: SHIPPED | OUTPATIENT
Start: 2020-07-17 | End: 2021-04-08

## 2020-07-17 ASSESSMENT — ENCOUNTER SYMPTOMS
EYE DISCHARGE: 0
ABDOMINAL PAIN: 0
ABDOMINAL DISTENTION: 0
EYE ITCHING: 0
WHEEZING: 0
RHINORRHEA: 0
SHORTNESS OF BREATH: 0
COUGH: 0
BACK PAIN: 0

## 2020-07-17 NOTE — PROGRESS NOTES
[facility] [address] [address]  Dept: [phone]  Dept Fax: [phone]    Office Progress/Follow Up Note  Date ofpatient's visit: 7/17/2020  Patient's Name:  Ty Rich YOB: 1965            Patient Care Team:  Devon Hood MD as PCP - General (Internal Medicine)  CECILIA Trinidad CNP as PCP - [facility] Empaneled Provider  ================================================================    REASON FOR VISIT/CHIEF COMPLAINT:  Established New Doctor; Surgical Consult (for foot surgery); Breast Pain (left breast); and Health Maintenance (decline shingles, needs eye appt, labs pended, sees podiatry)    HISTORY OF PRESENTING ILLNESS:  History was obtained from: patient. Alejandra cerna 48 y.o. is here for a routine follow-up visit. Patient has callus on bilateral hallux. She complained of foot pain more on right side. She follows up with podiatry regularly and mentioned that she is scheduled for surgery for callus removal soon in and wants surgical clearance. Patient has hypertension. Current B.p 150/100. HR-73. B.p in last three encounters in systolic 334. Patient did not bring her medication list and was not aware what medications she is taking. She did mention that she took 1 of the pills in the morning   Initially her blood pressure runs high in the morning. Is asymptomatic. Does not monitor blood pressure at home. On medication review she takes Norvasc 10 ,atenolol 50 mg, HCTZ 25 mg Aldactone 25 mg daily. Advised patient to bring medication list at next visit for further medication adjustments. She is prediabetic HbA1c increased from 5.8<6.3. On metformin 500 twice daily per medication list.  Advised weight loss and DASH diet. Patient has elevated LFTs and fatty liver on ultrasound. Repeating ultrasound LFTs. She has history of alcohol abuse and drinks 3 tall beers every day.   Advised to cut down on drinking. Former smoker. She also has dyslipidemia. Cholesterol levels more than 300 . No history of thyroid disorder. Will recheck fasting lipids. Low ASCVD score. Takes aspirin per medication list.  Will hold aspirin before surgery. Has history of major depression and is on Froylan Digna. Follows up with [facility]. Denies any suicidal ideations for now. History of obstructive sleep apnea diagnosed with sleep study done in 2017. Did not use CPAP at home. Order new sleep study.      Mild intermittent asthma -controlled, denied any wheezing, shortness of breath, productive cough, fever, chills    Health maintenance due  Mammogram  Last Pap smear-?  F/u  FIT test.          Patient Active Problem List   Diagnosis    Major depressive disorder, recurrent episode ([facility] [address]    Lung nodule    Obesity    Adrenal adenoma    Goiter    Alcohol abuse    Essential hypertension    Enlarged tonsils    ACE inhibitor-aggravated angioedema    JONES (obstructive sleep apnea)    Dyslipidemia    IGT (impaired glucose tolerance)       Health Maintenance Due   Topic Date Due    Diabetic foot exam  01/22/1976    Diabetic retinal exam  01/22/1976    Diabetic microalbuminuria test  06/12/2020    Lipid screen  06/12/2020       Allergies   Allergen Reactions    Bee Venom Anaphylaxis    Iodine Anaphylaxis and Rash    Lisinopril Swelling and Anaphylaxis     Angioedema    Shellfish-Derived Products Anaphylaxis and Rash     ANGIOEDEMA         Current Outpatient Medications   Medication Sig Dispense Refill    EPINEPHrine (EPIPEN 2-GRICELDA) 0.3 MG/0.3ML SOAJ injection Use as directed for allergic reaction 1 each 2    Calcium Carbonate-Vitamin D (OYSTER SHELL CALCIUM/D) 500-200 MG-UNIT TABS TAKE 1 TAB BY MOUTH ONCE A DAY 30 tablet 3    vitamin D3 (CHOLECALCIFEROL) 25 MCG (1000 UT) TABS tablet Take 1 tablet by mouth daily 30 tablet 5    naproxen (NAPROSYN) 500 MG tablet TAKE 1 TAB BY MOUTH TWICE A DAY WITH Reported on 2020) 85 g 2    lidocaine (XYLOCAINE) 5 % ointment Apply 1 applicator topically 3 times daily as needed for Pain Apply topically as needed.  Multiple Vitamins-Minerals (MULTIVITAMIN WITH MINERALS) tablet Take 1 tablet by mouth daily (Patient not taking: Reported on 2020) 30 tablet 3    cyclobenzaprine (FLEXERIL) 5 MG tablet One to two tid prn back pain or muscle spasm. Will cause drowsiness. Do not drive if taking. (Patient not taking: Reported on 2020) 60 tablet 1     No current facility-administered medications for this visit. Facility-Administered Medications Ordered in Other Visits   Medication Dose Route Frequency Provider Last Rate Last Dose    lactated ringers infusion   Intravenous Continuous CECILIA Fraga CRNA   Stopped at 08/24/15 1420       Social History     Tobacco Use    Smoking status: Former Smoker     Packs/day: 0.50     Types: Cigarettes     Last attempt to quit: 1992     Years since quittin.5    Smokeless tobacco: Never Used    Tobacco comment: [facility] in the    Substance Use Topics    Alcohol use: Yes     Alcohol/week: 12.0 standard drinks     Types: 12 Cans of beer per week     Comment: 2-3 times per week    Drug use: No     Types: Cocaine     Comment: last use approximately 14 cocaine       Family History   Problem Relation Age of Onset    Stroke Mother     Hypertension Father     Cancer Brother         bone    Lung Cancer Maternal Aunt     Cancer Maternal Grandmother         eye     Other Sister         aneurysm    Heart Disease Sister         REVIEW OF SYSTEMS:  Review of Systems   Constitutional: Negative for chills, fatigue and fever. HENT: Negative for congestion, hearing loss and rhinorrhea. Eyes: Negative for discharge and itching. Respiratory: Negative for cough, shortness of breath and wheezing. Cardiovascular: Negative for chest pain, palpitations and leg swelling.    Gastrointestinal: FINDINGS:  CBC:  Lab Results   Component Value Date    WBC 3.5 10/02/2019    HGB 10.9 10/02/2019     10/02/2019       BMP:    Lab Results   Component Value Date     10/02/2019    K 4.0 10/02/2019     10/02/2019    CO2 20 10/02/2019    BUN 10 10/02/2019    CREATININE 0.74 10/02/2019    GLUCOSE 298 10/02/2019    GLUCOSE 88 02/24/2012       HEMOGLOBIN A1C:   Lab Results   Component Value Date    LABA1C 6.3 06/30/2020       FASTING LIPID PANEL:  Lab Results   Component Value Date    CHOL 300 (H) 06/12/2019     06/12/2019    TRIG 48 06/12/2019       ASSESSMENT AND PLAN:  Annalisa Berrios was seen today for established new doctor, surgical consult, breast pain and health maintenance. Diagnoses and all orders for this visit:    Right foot pain-follows up with podiatry. Plan for surgery. Follow-up in 1 week for surgical clearance. Follow-up with repeat labs and medication list.    [id number] fasting lipid panel and ultrasound liver. Alcohol abuse-no signs and symptoms of cirrhosis. Follow-up cultures 11. Advised to cut down. Vitamin D deficiency-continue vitamin D and calcium replacement. Elevated LFTs-likely secondary to alcohol abuse. Ultrasound liver showed fatty liver last year. Hepatitis negative. Follow-up repeat LFTs, ultrasound liver. Essential hypertension-blood pressure high on this visit  Well-controlled -continue Norvasc 10 mg, atenolol 50 mg, HCTZ 25 mg ,Aldactone 25 mg daily per medication list.  Advised to bring medication list to next visit. Ordered BP monitor for home.        Mild intermittent asthma without complication  Well-controlled, continue albuterol as needed and Dulera     Prediabetes. On metformin for 500 mg twice daily, hemoglobin A1c 6.3.     JONES (obstructive sleep apnea)  Needs CPAP titration study.   Order already in.     Moderate episode of recurrent major depressive disorder ([facility] [address]  Follows with psychiatry on Celexa, Latuda, Remeron  Mood is

## 2020-07-17 NOTE — PATIENT INSTRUCTIONS
Medications e-scribed to pharmacy of patient's choice. Your doctor has ordered fasting blood work. It can be done at [facility] or at the hospital. Annel Exon will need to fast for 12 hours and bring order with you to registration department. Follow-up appointment scheduled for   07/24/2020    , AVS given to patient.     Order for mammogram faxed to scheduling, they will contact patient with an appt original order given to patient along with scheduling phone number       tv

## 2020-07-17 NOTE — PROGRESS NOTES
Attending Physician Statement  I have discussed the care of [address] including pertinent history and exam findings,  with the resident. I have reviewed the key elements of all parts of the encounter with the resident. I agree with the assessment, plan and orders as documented by the resident.   (GE Modifier)    MD HAWA Thacker  Attending Physician, [address] [place] [address], Internal Medicine Residency Program  [address] [place] [address]  7/17/2020, 10:28 AM

## 2020-07-17 NOTE — PROGRESS NOTES
Visit Information    Have you changed or started any medications since your last visit including any over-the-counter medicines, vitamins, or herbal medicines? no   Have you stopped taking any of your medications? Is so, why? -  no  Are you having any side effects from any of your medications? - no    Have you seen any other physician or provider since your last visit? yes - podiatry   Have you had any other diagnostic tests since your last visit? yes - blood work   Have you been seen in the emergency room and/or had an admission in a hospital since we last saw you?  no   Have you had your routine dental cleaning in the past 6 months?  no     Do you have an active MyChart account? If no, what is the barrier?   Yes    Patient Care Team:  José Manuel Palma MD as PCP - General (Internal Medicine)  CECILIA Devlin - CNP as PCP - [facility] Sarah Caraballo Provider    Medical History Review  Past Medical, Family, and Social History reviewed and does contribute to the patient presenting condition    Health Maintenance   Topic Date Due    Diabetic foot exam  01/22/1976    Diabetic retinal exam  01/22/1976    Shingles Vaccine (1 of 2) 01/22/2016    Diabetic microalbuminuria test  06/12/2020    Lipid screen  06/12/2020    Hepatitis B vaccine (1 of 3 - Risk 3-dose series) 09/13/2020 (Originally 1/22/1985)    Flu vaccine (1) 09/01/2020    Potassium monitoring  10/02/2020    Creatinine monitoring  10/02/2020    Breast cancer screen  05/31/2021    A1C test (Diabetic or Prediabetic)  06/30/2021    Colon cancer screen colonoscopy  12/10/2023    DTaP/Tdap/Td vaccine (3 - Td) 04/14/2027    Pneumococcal 0-64 years Vaccine  Completed    HIV screen  Completed    Hepatitis A vaccine  Aged Out    Hib vaccine  Aged Out    Meningococcal (ACWY) vaccine  Aged Out

## 2020-07-20 ENCOUNTER — TELEPHONE (OUTPATIENT)
Dept: PODIATRY | Age: 55
End: 2020-07-20

## 2020-07-22 RX ORDER — LORATADINE 10 MG/1
TABLET ORAL
Qty: 30 TABLET | Refills: 0 | OUTPATIENT
Start: 2020-07-22 | End: 2020-08-01

## 2020-07-22 RX ORDER — ISOSORBIDE MONONITRATE 60 MG/1
TABLET, EXTENDED RELEASE ORAL
Qty: 30 TABLET | Refills: 3 | Status: SHIPPED | OUTPATIENT
Start: 2020-07-22 | End: 2020-10-02 | Stop reason: ALTCHOICE

## 2020-07-22 NOTE — TELEPHONE ENCOUNTER
Request for imdur, loratadine - meds pended. Please fill if appropriate. Next Visit Date:  Future Appointments   Date Time Provider Anthony Cutleri   7/24/2020  9:15 AM Trent Cuevas MD [email or web address]   7/27/2020  1:15 PM Angelina Grey DPM [facility] Maintenance   Topic Date Due    Diabetic foot exam  01/22/1976    Diabetic retinal exam  01/22/1976    Diabetic microalbuminuria test  06/12/2020    Hepatitis B vaccine (1 of 3 - Risk 3-dose series) 09/13/2020 (Originally 1/22/1985)    Shingles Vaccine (1 of 2) 07/17/2021 (Originally 1/22/2016)    Flu vaccine (1) 09/01/2020    Breast cancer screen  05/31/2021    A1C test (Diabetic or Prediabetic)  06/30/2021    Lipid screen  07/17/2021    Potassium monitoring  07/17/2021    Creatinine monitoring  07/17/2021    Colon cancer screen colonoscopy  12/10/2023    DTaP/Tdap/Td vaccine (3 - Td) 04/14/2027    Pneumococcal 0-64 years Vaccine  Completed    HIV screen  Completed    Hepatitis A vaccine  Aged Out    Hib vaccine  Aged Out    Meningococcal (ACWY) vaccine  Aged Out       Hemoglobin A1C (%)   Date Value   06/30/2020 6.3 (H)   06/12/2019 5.8   12/07/2018 5.6             ( goal A1C is < 7)   Microalb/Crt.  Ratio (mcg/mg creat)   Date Value   06/12/2019 147 (H)     LDL Cholesterol (mg/dL)   Date Value   07/17/2020 187 (H)       (goal LDL is <100)   AST (U/L)   Date Value   07/17/2020 78 (H)     ALT (U/L)   Date Value   07/17/2020 40 (H)     BUN (mg/dL)   Date Value   07/17/2020 6     BP Readings from Last 3 Encounters:   07/17/20 (!) 153/102   06/24/20 102/71   01/31/20 121/74          (goal 120/80)    All Future Testing planned in [facility]  Lab Frequency Next Occurrence   Sleep Study with PAP Titration Once 01/15/2020   CARI DIGITAL SCREEN W OR WO CAD BILATERAL Once 07/17/2020   US LIVER Once 07/17/2020         Patient Active Problem List:     Major depressive disorder, recurrent episode ([address]     Lung nodule     Obesity

## 2020-07-23 RX ORDER — LORATADINE 10 MG/1
10 CAPSULE, LIQUID FILLED ORAL DAILY
Qty: 30 CAPSULE | Refills: 3 | Status: SHIPPED | OUTPATIENT
Start: 2020-07-23 | End: 2021-01-12

## 2020-07-23 NOTE — TELEPHONE ENCOUNTER
I declined initially because looks like pt has cetrizine(similar med ) in her med list. So it was duplicate order. Cetrizine d/c.  Ordered loratadine as requested

## 2020-07-23 NOTE — TELEPHONE ENCOUNTER
Loratadine refused yesterday for being a duplicate request? Where is the first request? I've re-pended it in case it was a mistake.

## 2020-07-24 ENCOUNTER — OFFICE VISIT (OUTPATIENT)
Dept: INTERNAL MEDICINE | Age: 55
End: 2020-07-24
Payer: MEDICAID

## 2020-07-24 VITALS
SYSTOLIC BLOOD PRESSURE: 108 MMHG | TEMPERATURE: 97 F | OXYGEN SATURATION: 98 % | DIASTOLIC BLOOD PRESSURE: 80 MMHG | WEIGHT: 167.4 LBS | BODY MASS INDEX: 28.73 KG/M2 | HEART RATE: 68 BPM

## 2020-07-24 PROBLEM — F10.920 ACUTE ALCOHOLIC INTOXICATION WITHOUT COMPLICATION (HCC): Status: ACTIVE | Noted: 2020-07-24

## 2020-07-24 PROCEDURE — 3017F COLORECTAL CA SCREEN DOC REV: CPT | Performed by: STUDENT IN AN ORGANIZED HEALTH CARE EDUCATION/TRAINING PROGRAM

## 2020-07-24 PROCEDURE — G8427 DOCREV CUR MEDS BY ELIG CLIN: HCPCS | Performed by: STUDENT IN AN ORGANIZED HEALTH CARE EDUCATION/TRAINING PROGRAM

## 2020-07-24 PROCEDURE — 1036F TOBACCO NON-USER: CPT | Performed by: STUDENT IN AN ORGANIZED HEALTH CARE EDUCATION/TRAINING PROGRAM

## 2020-07-24 PROCEDURE — 99211 OFF/OP EST MAY X REQ PHY/QHP: CPT | Performed by: INTERNAL MEDICINE

## 2020-07-24 PROCEDURE — G8417 CALC BMI ABV UP PARAM F/U: HCPCS | Performed by: STUDENT IN AN ORGANIZED HEALTH CARE EDUCATION/TRAINING PROGRAM

## 2020-07-24 PROCEDURE — 99213 OFFICE O/P EST LOW 20 MIN: CPT | Performed by: STUDENT IN AN ORGANIZED HEALTH CARE EDUCATION/TRAINING PROGRAM

## 2020-07-24 RX ORDER — ATORVASTATIN CALCIUM 20 MG/1
20 TABLET, FILM COATED ORAL DAILY
Qty: 30 TABLET | Refills: 3 | Status: ON HOLD | OUTPATIENT
Start: 2020-07-24 | End: 2020-09-24 | Stop reason: SDUPTHER

## 2020-07-24 NOTE — PROGRESS NOTES
Attending Physician Statement  I have discussed the care of [address] including pertinent history and exam findings,  with the resident. I have reviewed the key elements of all parts of the encounter with the resident. I agree with the assessment, plan and orders as documented by the resident.   (GE Modifier)    MD HAWA Jaramillo  Attending Physician, [address] [place] [address], Internal Medicine Residency Program  [address] [place] [address]  7/24/2020, 9:36 AM

## 2020-07-24 NOTE — PATIENT INSTRUCTIONS
Medications e-scribe to pharmacy of pt's choice. Surgery Clearance form faxed to Podiatry, and copy given to pt. An After Visit Summary was printed and given to the patient.   JENNIFER

## 2020-07-24 NOTE — PROGRESS NOTES
[facility] [address] [facility]  [address] [facility] [address]  Dept: [phone]  Dept Fax: [phone]    Office Progress/Follow Up Note  Date of patient's visit: 7/24/2020  Patient's Name:  Nancy Estes YOB: 1965            Patient Care Team:  Aditya Kang MD as PCP - General (Internal Medicine)  CECILIA Anguiano CNP as PCP - [facility]    REASON FOR VISIT: 1 week follow-up    HISTORY OF PRESENT ILLNESS:      Chief Complaint   Patient presents with   [address]     lab results        History was obtained from the patient. Nancy Estes is a 48 y.o. is here for a 1 week follow-up to discuss repeat labs. Patient was seen last week 7/17/2020 with  to establish new care. Patient was noted to have elevated LFTs and cholesterol levels. In 6/19, HDL was 150, cholesterol 300, . Repeat lipid panel on 717 showed cholesterol 340, , . Patient unlikely to have [place]. Additionally, patient had elevated LFTs on 9/19. Repeat LFTs on 7/17/2020 overall improved with alk phos 118, ALT 40, . Patient states she still a heavy drinker. Counseled to decrease amount of alcohol intake. Patient was also asked to get repeat liver ultrasound as previous one showed fatty liver disease. Patient has still yet to get liver ultrasound completed. Patient having surgery to have callus removed by podiatry in the coming weeks. Patient needing surgical clearance. Patient RCRI risk is 0. Remaining chronic problems unchanged from 1 week ago. No acute complaints at this time.       Patient Active Problem List   Diagnosis    Lung nodule    Obesity    Adrenal adenoma    Goiter    Alcohol abuse    Essential hypertension    Enlarged tonsils    ACE inhibitor-aggravated angioedema    JONES (obstructive sleep apnea)    Dyslipidemia    IGT (impaired glucose tolerance)    Acute alcoholic intoxication without complication ([address] tablet 3    albuterol sulfate (PROAIR RESPICLICK) 689 (90 Base) MCG/ACT aerosol powder inhalation Inhale 2 puffs into the lungs every 6 hours as needed for Wheezing or Shortness of Breath 5 Inhaler 3    baclofen POWD       ibuprofen (ADVIL;MOTRIN) 800 MG tablet       econazole nitrate 1 % cream Apply topically daily. 85 g 2    lidocaine (XYLOCAINE) 5 % ointment Apply 1 applicator topically 3 times daily as needed for Pain Apply topically as needed.  famotidine (PEPCID) 20 MG tablet Take 20 mg by mouth 2 times daily      citalopram (CELEXA) 40 MG tablet Take 40 mg by mouth daily      mirtazapine (REMERON) 45 MG tablet Take 45 mg by mouth nightly      cyclobenzaprine (FLEXERIL) 5 MG tablet One to two tid prn back pain or muscle spasm. Will cause drowsiness. Do not drive if taking. 60 tablet 1    lurasidone (LATUDA) 60 MG TABS tablet Take 60 mg by mouth nightly      Cholecalciferol (VITAMIN D3) 13451 UNITS CAPS Take 1 capsule by mouth Twice a Week      Multiple Vitamins-Minerals (MULTIVITAMIN WITH MINERALS) tablet Take 1 tablet by mouth daily (Patient not taking: Reported on 7/24/2020) 30 tablet 3     No current facility-administered medications for this visit. Facility-Administered Medications Ordered in Other Visits   Medication Dose Route Frequency Provider Last Rate Last Dose    lactated ringers infusion   Intravenous Continuous CECILIA Vidal CRNA   Stopped at 08/24/15 1420       SOCIAL HISTORY    Reviewed and no change from previous record. Steven Morales  reports that she quit smoking about 27 years ago. Her smoking use included cigarettes. She smoked 0.50 packs per day.  She has never used smokeless tobacco.    FAMILY HISTORY:    Reviewed and No change from previous visit    REVIEW OF SYSTEMS:    CONSTITUTIONAL: Denies: fever, chills  PSYCH: Denies: anxiety, depression  ALLERGIES: Denies: urticaria  EYES: Denies: blurry vision, decreased vision, photophobia  ENT: Denies: sore throat, nasal congestion  CARDIOVASCULAR: Denies: chest pain, dyspnea on exertion  RESPIRATORY: Denies: cough, hemoptysis, shortness of breath  GI: Denies: Denies: abdominal pain, flank pain  : Denies: Denies: dysuria, frequency/urgency  NEURO: Denies: dizzy/vertigo, headache  MUSCULOSKELETAL: Denies: back pain, joint pain  SKIN: Denies: rash, itching    PHYSICAL EXAM:      Vitals:    07/24/20 0831   BP: 108/80   Site: Right Upper Arm   Position: Sitting   Cuff Size: Small Adult   Pulse: 68   Temp: 97 °F (36.1 °C)   TempSrc: Temporal   SpO2: 98%   Weight: 167 lb 6.4 oz (75.9 kg)     BP Readings from Last 3 Encounters:   07/24/20 108/80   07/17/20 (!) 153/102   06/24/20 102/71      General appearance - alert, well appearing, and in no distress  Mental status - alert, oriented to person, place, and time  Heart - normal rate, regular rhythm, normal S1, S2, no murmurs, rubs, clicks or gallops  Abdomen - soft, nontender, nondistended, no masses or organomegaly  Musculoskeletal - no joint tenderness, deformity or swelling  Extremities - peripheral pulses normal, no pedal edema, no clubbing or cyanosis  Skin - normal coloration and turgor, no rashes, no suspicious skin lesions noted    LABORATORY FINDINGS:    CBC:  Lab Results   Component Value Date    WBC 3.4 07/17/2020    HGB 12.2 07/17/2020     07/17/2020       BMP:    Lab Results   Component Value Date     07/17/2020    K 4.5 07/17/2020     07/17/2020    CO2 22 07/17/2020    BUN 6 07/17/2020    CREATININE 0.72 07/17/2020    GLUCOSE 86 07/17/2020    GLUCOSE 88 02/24/2012       HEMOGLOBIN A1C:   Lab Results   Component Value Date    LABA1C 6.3 06/30/2020       FASTING LIPID PANEL:  Lab Results   Component Value Date    CHOL 300 (H) 06/12/2019     07/17/2020    TRIG 48 06/12/2019       ASSESSMENT AND PLAN:    Luis Manuel Alcocer was seen today for discuss labs.     Diagnoses and all orders for this visit:    Right foot pain-follows up with podiatry   Plan for surgery of

## 2020-07-27 ENCOUNTER — OFFICE VISIT (OUTPATIENT)
Dept: PODIATRY | Age: 55
End: 2020-07-27
Payer: MEDICAID

## 2020-07-27 VITALS
BODY MASS INDEX: 28.85 KG/M2 | HEIGHT: 64 IN | WEIGHT: 169 LBS | SYSTOLIC BLOOD PRESSURE: 109 MMHG | DIASTOLIC BLOOD PRESSURE: 71 MMHG | HEART RATE: 75 BPM

## 2020-07-27 PROCEDURE — 99212 OFFICE O/P EST SF 10 MIN: CPT | Performed by: STUDENT IN AN ORGANIZED HEALTH CARE EDUCATION/TRAINING PROGRAM

## 2020-07-27 PROCEDURE — G8427 DOCREV CUR MEDS BY ELIG CLIN: HCPCS | Performed by: STUDENT IN AN ORGANIZED HEALTH CARE EDUCATION/TRAINING PROGRAM

## 2020-07-27 PROCEDURE — G8417 CALC BMI ABV UP PARAM F/U: HCPCS | Performed by: STUDENT IN AN ORGANIZED HEALTH CARE EDUCATION/TRAINING PROGRAM

## 2020-07-27 PROCEDURE — 99213 OFFICE O/P EST LOW 20 MIN: CPT | Performed by: STUDENT IN AN ORGANIZED HEALTH CARE EDUCATION/TRAINING PROGRAM

## 2020-07-27 PROCEDURE — 1036F TOBACCO NON-USER: CPT | Performed by: STUDENT IN AN ORGANIZED HEALTH CARE EDUCATION/TRAINING PROGRAM

## 2020-07-27 PROCEDURE — 3017F COLORECTAL CA SCREEN DOC REV: CPT | Performed by: STUDENT IN AN ORGANIZED HEALTH CARE EDUCATION/TRAINING PROGRAM

## 2020-07-27 NOTE — PROGRESS NOTES
[address]  [address] [place] [address],  LUCILA Coy  Tel: [phone]   Fax: [phone]    Subjective     CC: Right foot pain    Interval hx:7/27/2020  Patient presents clinic today for follow-up of bilateral bunion pain and hammertoe pain of the right foot. Surgery was discussed at her last visit. Since then, patient has received clearance by her primary care doctor. She has also been placed on vitamin D supplements. Patient states that she would like to go ahead and schedule for surgery due to the increased amount of pain. Patient continues to apply biomed cream to help with pain. Patient continues to wear narrow toed shoes which increase her pain. Patient does have a history of type 2 diabetes, is a non-smoker. HPI:  Krystle Henry is a 48y.o. year old female who presents to clinic today for evaluation of left hallux pain. The patient states her left great toe became swollen and painful approximately 5 days ago. The patient denies any history of gout. The patient denies any history of acute trauma. The patient does states she eats a diet high in cheese, approximately 1/4 pound of cheese every day. The patient states she also drinks beer daily. The patient denies any open wounds or lacerations. The patient does state that it is difficult to ambulate with a sharp pain. Patient denies any nausea, vomiting, fever, chills, shortness breath. Primary care physician is Sal Aviles MD.    ROS:    Constitutional: Denies nausea, vomiting, fever, chills. Neurologic: Denies numbness, tingling, and burning in the feet. Vascular: Denies symptoms of lower extremity claudication. Skin: Denies open wounds. Otherwise negative except as noted in the HPI.      PMH:  Past Medical History:   Diagnosis Date    [facility]) ([facility] 11/18/2017    Alcohol abuse 5/22/2014    Angioedema 11/17/2017    from lisinopril    Anxiety     Bipolar 1 disorder ([facility]     0.3 MG/0.3ML SOAJ injection Use as directed for allergic reaction 7/17/20  Yes Jose Zuniga MD   Calcium Carbonate-Vitamin D (OYSTER SHELL CALCIUM/D) 500-200 MG-UNIT TABS TAKE 1 TAB BY MOUTH ONCE A DAY 7/17/20  Yes Jyothi Tran MD   vitamin D3 (CHOLECALCIFEROL) 25 MCG (1000 UT) TABS tablet Take 1 tablet by mouth daily 7/17/20  Yes Jyohti Tran MD   naproxen (NAPROSYN) 500 MG tablet TAKE 1 TAB BY MOUTH TWICE A DAY WITH MEALS 6/25/20  Yes Ezequiel Montoya DO   FLUoxetine (PROZAC) 40 MG capsule Take 40 mg by mouth daily 1/24/20  Yes Historical Provider, MD   pantoprazole (PROTONIX) 40 MG tablet TAKE 1 TAB BY MOUTH ONCE A DAY 1/15/20  Yes Serenity Santoro, MD   [facility], 120 METERED DOSES,) 220 MCG/INH inhaler Inhale 2 puffs into the lungs daily 1/15/20  Yes Serenity Santoro, MD   metFORMIN (GLUCOPHAGE) 500 MG tablet Take 1 tablet by mouth 2 times daily (with meals) 1/15/20  Yes Serenity Santoro, MD   fluticasone (ARNUITY ELLIPTA) 100 MCG/ACT AEPB Inhale 1 puff into the lungs daily 1/15/20  Yes Serenity Santoro, MD   spironolactone (ALDACTONE) 25 MG tablet Take 1 tablet by mouth daily 1/15/20  Yes Serenity Santoro, MD   hydrochlorothiazide (HYDRODIURIL) 25 MG tablet Take 1 tablet by mouth daily 1/15/20  Yes Serenity Santoro, MD   atenolol (TENORMIN) 50 MG tablet Take 1 tablet by mouth daily 1/15/20  Yes Serenity Santoro, MD   aspirin 81 MG tablet Take 1 tablet by mouth daily 1/15/20  Yes Serenity Santoro, MD   amLODIPine (NORVASC) 10 MG tablet Take 1 tablet by mouth daily 1/15/20  Yes Serenity Santoro, MD   albuterol sulfate (PROAIR RESPICLICK) 437 (90 Base) MCG/ACT aerosol powder inhalation Inhale 2 puffs into the lungs every 6 hours as needed for Wheezing or Shortness of Breath 1/15/20  Yes Serenity Santoro, MD   baclofen POWD  10/7/19  Yes Historical Provider, MD   ibuprofen (ADVIL;MOTRIN) 800 MG tablet  12/31/19  Yes Historical Provider, MD   econazole nitrate 1 % cream Apply deformity right second digit. Reduced range of motion to bilateral first MTPJ's, worse on the left. Dermatologic: No open lesions, Bilateral.  Interdigital maceration absent, Bilateral.  Nails 1-10 are within normal limits. Focal hyperkeratotic lesion noted to the subsecond metatarsal head on the right. Imagin20    XR Right foot  Impression   No acute osseous or soft tissue abnormality.       Degenerative change most pronounced at the 1st metatarsophalangeal joint   space with mild hallux valgus deformity         Assessment   Delphine Garcia is a 48 y.o. female with     Diagnosis Orders   1. Hallux valgus, bilateral     2. Hammer toes of both feet     3. Porokeratosis     4. Prediabetes          Plan   · Patient examined and evaluated. · Diagnosis and treatment options discussed in detail. · X-ray right foot reviewed with patient-diffuse osteopenia noted to the right foot; hallux abductovalgus deformity noted. · Discussed with patient surgical options to correct bunion deformity and hammertoe deformity of right foot. Discussed risks and benefits of the surgery. Patient verbalized understanding and is amenable for the procedure. Discussed in detail about postoperative course and treatment with patient. · Patient will undergo a large bunionectomy with first metatarsal osteotomy and second digit hammertoe correction of the right foot. · Patient has received clearance from her PCP and placed on vitamin D supplements. · Patient will require COVID testing prior to surgery which will be scheduled once date of surgery is finalized. · Hyperkeratotic lesion x1 debrided into level of dermis using #15 blade, without incident  · Patient to RTC 1 week after surgery  · Please, call the office with any questions or concerns  · Discussed with Dr. Bernard Laughlin and Dr. Bertha Iyer    No orders of the defined types were placed in this encounter. No orders of the defined types were placed in this encounter.     Hernandez Garcia Dalia Greer   Podiatric Medicine & Surgery   7/27/2020 at 2:21 PM

## 2020-07-27 NOTE — PROGRESS NOTES
Patient instructed to remove shoes and socks, instructed to sit in exam chair. Current PCP name is Michi Espino  and date of last visit 7-24-20. Do you have a follow up visit scheduled?   Yes or no    If yes the date is NO

## 2020-08-26 ENCOUNTER — TELEPHONE (OUTPATIENT)
Dept: PODIATRY | Age: 55
End: 2020-08-26

## 2020-08-26 NOTE — TELEPHONE ENCOUNTER
Patient is scheduled for surgery on 9/23 at 11:30AM and for covid testing on 9/19 at 10:30AM at the [facility]. Talked to patient and gave her the dates, times and instructions. Patient confirmed and verbalized understanding. Letter mailed out today.

## 2020-09-10 RX ORDER — NAPROXEN 500 MG/1
TABLET ORAL
Qty: 60 TABLET | Refills: 0 | Status: SHIPPED | OUTPATIENT
Start: 2020-09-10 | End: 2020-11-13 | Stop reason: ALTCHOICE

## 2020-09-10 NOTE — TELEPHONE ENCOUNTER
Request for naproxen - med pended. Please fill if appropriate. Next Visit Date:  Future Appointments   Date Time Provider Anthony Everett   9/19/2020 10:30 AM SCHEDULE, [address] [facility] [address] [facility] [place]   10/10/2020  8:00 AM STA MAMMO RM 1 STAZ MAMMO STA Radiolog   10/10/2020  9:00 AM STA ULTRASOUND RM 1 STAZ US STA Radiolog       Health Maintenance   Topic Date Due    Diabetic foot exam  01/22/1976    Diabetic retinal exam  01/22/1976    Diabetic microalbuminuria test  06/12/2020    Flu vaccine (1) 09/01/2020    Hepatitis B vaccine (1 of 3 - Risk 3-dose series) 09/13/2020 (Originally 1/22/1985)    Shingles Vaccine (1 of 2) 07/17/2021 (Originally 1/22/2016)    Breast cancer screen  05/31/2021    A1C test (Diabetic or Prediabetic)  06/30/2021    Lipid screen  07/17/2021    Potassium monitoring  07/17/2021    Creatinine monitoring  07/17/2021    Colon cancer screen colonoscopy  12/10/2023    DTaP/Tdap/Td vaccine (3 - Td) 04/14/2027    Pneumococcal 0-64 years Vaccine  Completed    HIV screen  Completed    Hepatitis A vaccine  Aged Out    Hib vaccine  Aged Out    Meningococcal (ACWY) vaccine  Aged Out       Hemoglobin A1C (%)   Date Value   06/30/2020 6.3 (H)   06/12/2019 5.8   12/07/2018 5.6             ( goal A1C is < 7)   Microalb/Crt.  Ratio (mcg/mg creat)   Date Value   06/12/2019 147 (H)     LDL Cholesterol (mg/dL)   Date Value   07/17/2020 187 (H)       (goal LDL is <100)   AST (U/L)   Date Value   07/17/2020 78 (H)     ALT (U/L)   Date Value   07/17/2020 40 (H)     BUN (mg/dL)   Date Value   07/17/2020 6     BP Readings from Last 3 Encounters:   07/27/20 109/71   07/24/20 108/80   07/17/20 (!) 153/102          (goal 120/80)    All Future Testing planned in [facility]  Lab Frequency Next Occurrence   Sleep Study with PAP Titration Once 01/15/2020   CARI DIGITAL SCREEN W OR WO CAD BILATERAL Once 11/03/2020   US LIVER Once 11/03/2020         Patient Active Problem List:     Lung

## 2020-09-15 ENCOUNTER — TELEPHONE (OUTPATIENT)
Dept: INTERNAL MEDICINE | Age: 55
End: 2020-09-15

## 2020-09-20 ENCOUNTER — APPOINTMENT (OUTPATIENT)
Dept: ULTRASOUND IMAGING | Age: 55
DRG: 469 | End: 2020-09-20
Payer: MEDICAID

## 2020-09-20 ENCOUNTER — APPOINTMENT (OUTPATIENT)
Dept: CT IMAGING | Age: 55
DRG: 469 | End: 2020-09-20
Payer: MEDICAID

## 2020-09-20 ENCOUNTER — HOSPITAL ENCOUNTER (INPATIENT)
Age: 55
LOS: 4 days | Discharge: HOME | DRG: 469 | End: 2020-09-24
Attending: EMERGENCY MEDICINE | Admitting: INTERNAL MEDICINE
Payer: MEDICAID

## 2020-09-20 PROBLEM — N28.9 ACUTE RENAL INSUFFICIENCY: Status: ACTIVE | Noted: 2020-09-20

## 2020-09-20 LAB
-: ABNORMAL
ABSOLUTE EOS #: 0 K/UL (ref 0–0.44)
ABSOLUTE IMMATURE GRANULOCYTE: 0 K/UL (ref 0–0.3)
ABSOLUTE LYMPH #: 0.59 K/UL (ref 1.1–3.7)
ABSOLUTE MONO #: 0.92 K/UL (ref 0.1–1.2)
ACETAMINOPHEN LEVEL: <5 UG/ML (ref 10–30)
ALBUMIN SERPL-MCNC: 4.5 G/DL (ref 3.5–5.2)
ALBUMIN/GLOBULIN RATIO: 1.1 (ref 1–2.5)
ALLEN TEST: ABNORMAL
ALP BLD-CCNC: 93 U/L (ref 35–104)
ALT SERPL-CCNC: 51 U/L (ref 5–33)
AMMONIA: 14 UMOL/L (ref 11–51)
AMORPHOUS: ABNORMAL
AMPHETAMINE SCREEN URINE: NEGATIVE
ANION GAP SERPL CALCULATED.3IONS-SCNC: 17 MMOL/L (ref 9–17)
AST SERPL-CCNC: 74 U/L
BACTERIA: ABNORMAL
BARBITURATE SCREEN URINE: NEGATIVE
BASOPHILS # BLD: 1 % (ref 0–2)
BASOPHILS ABSOLUTE: 0.05 K/UL (ref 0–0.2)
BENZODIAZEPINE SCREEN, URINE: NEGATIVE
BILIRUB SERPL-MCNC: 0.75 MG/DL (ref 0.3–1.2)
BILIRUBIN URINE: NEGATIVE
BUN BLDV-MCNC: 38 MG/DL (ref 6–20)
BUN/CREAT BLD: ABNORMAL (ref 9–20)
BUPRENORPHINE URINE: NORMAL
CALCIUM SERPL-MCNC: 10.2 MG/DL (ref 8.6–10.4)
CANNABINOID SCREEN URINE: NEGATIVE
CARBOXYHEMOGLOBIN: 0.7 % (ref 0–5)
CASTS UA: ABNORMAL /LPF (ref 0–2)
CASTS UA: ABNORMAL /LPF (ref 0–2)
CHLORIDE BLD-SCNC: 100 MMOL/L (ref 98–107)
CO2: 21 MMOL/L (ref 20–31)
COCAINE METABOLITE, URINE: NEGATIVE
COLOR: YELLOW
CREAT SERPL-MCNC: 2.38 MG/DL (ref 0.5–0.9)
CREATININE URINE: 174.1 MG/DL (ref 28–217)
CRYSTALS, UA: ABNORMAL /HPF
DIFFERENTIAL TYPE: ABNORMAL
EOSINOPHILS RELATIVE PERCENT: 0 % (ref 1–4)
EPITHELIAL CELLS UA: ABNORMAL /HPF (ref 0–5)
ETHANOL PERCENT: <0.01 %
ETHANOL: <10 MG/DL
FIO2: ABNORMAL
GFR AFRICAN AMERICAN: 26 ML/MIN
GFR NON-AFRICAN AMERICAN: 21 ML/MIN
GFR SERPL CREATININE-BSD FRML MDRD: ABNORMAL ML/MIN/{1.73_M2}
GFR SERPL CREATININE-BSD FRML MDRD: ABNORMAL ML/MIN/{1.73_M2}
GLUCOSE BLD-MCNC: 94 MG/DL (ref 70–99)
GLUCOSE URINE: NEGATIVE
HCO3 VENOUS: 22 MMOL/L (ref 24–30)
HCT VFR BLD CALC: 32 % (ref 36.3–47.1)
HEMOGLOBIN: 10.6 G/DL (ref 11.9–15.1)
IMMATURE GRANULOCYTES: 0 %
KETONES, URINE: ABNORMAL
LEUKOCYTE ESTERASE, URINE: ABNORMAL
LYMPHOCYTES # BLD: 11 % (ref 24–43)
MCH RBC QN AUTO: 31.6 PG (ref 25.2–33.5)
MCHC RBC AUTO-ENTMCNC: 33.1 G/DL (ref 28.4–34.8)
MCV RBC AUTO: 95.5 FL (ref 82.6–102.9)
MDMA URINE: NORMAL
METHADONE SCREEN, URINE: NEGATIVE
METHAMPHETAMINE, URINE: NORMAL
METHEMOGLOBIN: ABNORMAL % (ref 0–1.5)
MODE: ABNORMAL
MONOCYTES # BLD: 17 % (ref 3–12)
MORPHOLOGY: ABNORMAL
MUCUS: ABNORMAL
NEGATIVE BASE EXCESS, VEN: 2.9 MMOL/L (ref 0–2)
NITRITE, URINE: POSITIVE
NOTIFICATION TIME: ABNORMAL
NOTIFICATION: ABNORMAL
NRBC AUTOMATED: 0 PER 100 WBC
O2 DEVICE/FLOW/%: ABNORMAL
O2 SAT, VEN: 44.1 % (ref 60–85)
OPIATES, URINE: NEGATIVE
OTHER OBSERVATIONS UA: ABNORMAL
OXYCODONE SCREEN URINE: NEGATIVE
OXYHEMOGLOBIN: ABNORMAL % (ref 95–98)
PATIENT TEMP: 37
PCO2, VEN, TEMP ADJ: ABNORMAL MMHG (ref 39–55)
PCO2, VEN: 41.3 (ref 39–55)
PDW BLD-RTO: 15.1 % (ref 11.8–14.4)
PEEP/CPAP: ABNORMAL
PH UA: 5 (ref 5–8)
PH VENOUS: 7.34 (ref 7.32–7.42)
PH, VEN, TEMP ADJ: ABNORMAL (ref 7.32–7.42)
PHENCYCLIDINE, URINE: NEGATIVE
PLATELET # BLD: 199 K/UL (ref 138–453)
PLATELET ESTIMATE: ABNORMAL
PMV BLD AUTO: 9.9 FL (ref 8.1–13.5)
PO2, VEN, TEMP ADJ: ABNORMAL MMHG (ref 30–50)
PO2, VEN: 28.2 (ref 30–50)
POSITIVE BASE EXCESS, VEN: ABNORMAL MMOL/L (ref 0–2)
POTASSIUM SERPL-SCNC: 3.9 MMOL/L (ref 3.7–5.3)
PROPOXYPHENE, URINE: NORMAL
PROTEIN UA: ABNORMAL
PSV: ABNORMAL
PT. POSITION: ABNORMAL
RBC # BLD: 3.35 M/UL (ref 3.95–5.11)
RBC # BLD: ABNORMAL 10*6/UL
RBC UA: ABNORMAL /HPF (ref 0–2)
RENAL EPITHELIAL, UA: ABNORMAL /HPF
RESPIRATORY RATE: ABNORMAL
SALICYLATE LEVEL: <1 MG/DL (ref 3–10)
SAMPLE SITE: ABNORMAL
SEG NEUTROPHILS: 71 % (ref 36–65)
SEGMENTED NEUTROPHILS ABSOLUTE COUNT: 3.84 K/UL (ref 1.5–8.1)
SET RATE: ABNORMAL
SODIUM BLD-SCNC: 138 MMOL/L (ref 135–144)
SODIUM,UR: 116 MMOL/L
SPECIFIC GRAVITY UA: 1.02 (ref 1–1.03)
TEST INFORMATION: NORMAL
TEXT FOR RESPIRATORY: ABNORMAL
TOTAL HB: ABNORMAL G/DL (ref 12–16)
TOTAL PROTEIN: 8.7 G/DL (ref 6.4–8.3)
TOTAL RATE: ABNORMAL
TOXIC TRICYCLIC SC,BLOOD: NEGATIVE
TRICHOMONAS: ABNORMAL
TRICYCLIC ANTIDEPRESSANTS, UR: NORMAL
TSH SERPL DL<=0.05 MIU/L-ACNC: 1.67 MIU/L (ref 0.3–5)
TURBIDITY: ABNORMAL
URINE HGB: ABNORMAL
UROBILINOGEN, URINE: NORMAL
VT: ABNORMAL
WBC # BLD: 5.4 K/UL (ref 3.5–11.3)
WBC # BLD: ABNORMAL 10*3/UL
WBC UA: ABNORMAL /HPF (ref 0–5)
YEAST: ABNORMAL

## 2020-09-20 PROCEDURE — 6360000002 HC RX W HCPCS: Performed by: STUDENT IN AN ORGANIZED HEALTH CARE EDUCATION/TRAINING PROGRAM

## 2020-09-20 PROCEDURE — 96374 THER/PROPH/DIAG INJ IV PUSH: CPT

## 2020-09-20 PROCEDURE — 82746 ASSAY OF FOLIC ACID SERUM: CPT

## 2020-09-20 PROCEDURE — 99285 EMERGENCY DEPT VISIT HI MDM: CPT

## 2020-09-20 PROCEDURE — 80307 DRUG TEST PRSMV CHEM ANLYZR: CPT

## 2020-09-20 PROCEDURE — G0480 DRUG TEST DEF 1-7 CLASSES: HCPCS

## 2020-09-20 PROCEDURE — 6370000000 HC RX 637 (ALT 250 FOR IP): Performed by: STUDENT IN AN ORGANIZED HEALTH CARE EDUCATION/TRAINING PROGRAM

## 2020-09-20 PROCEDURE — 76770 US EXAM ABDO BACK WALL COMP: CPT

## 2020-09-20 PROCEDURE — 81003 URINALYSIS AUTO W/O SCOPE: CPT

## 2020-09-20 PROCEDURE — 2580000003 HC RX 258: Performed by: STUDENT IN AN ORGANIZED HEALTH CARE EDUCATION/TRAINING PROGRAM

## 2020-09-20 PROCEDURE — 2060000000 HC ICU INTERMEDIATE R&B

## 2020-09-20 PROCEDURE — 70450 CT HEAD/BRAIN W/O DYE: CPT

## 2020-09-20 PROCEDURE — 85025 COMPLETE CBC W/AUTO DIFF WBC: CPT

## 2020-09-20 PROCEDURE — 93005 ELECTROCARDIOGRAM TRACING: CPT | Performed by: EMERGENCY MEDICINE

## 2020-09-20 PROCEDURE — 2580000003 HC RX 258: Performed by: EMERGENCY MEDICINE

## 2020-09-20 PROCEDURE — 36415 COLL VENOUS BLD VENIPUNCTURE: CPT

## 2020-09-20 PROCEDURE — 84443 ASSAY THYROID STIM HORMONE: CPT

## 2020-09-20 PROCEDURE — 84300 ASSAY OF URINE SODIUM: CPT

## 2020-09-20 PROCEDURE — 80053 COMPREHEN METABOLIC PANEL: CPT

## 2020-09-20 PROCEDURE — 82607 VITAMIN B-12: CPT

## 2020-09-20 PROCEDURE — 81015 MICROSCOPIC EXAM OF URINE: CPT

## 2020-09-20 PROCEDURE — 94640 AIRWAY INHALATION TREATMENT: CPT

## 2020-09-20 PROCEDURE — 82805 BLOOD GASES W/O2 SATURATION: CPT

## 2020-09-20 PROCEDURE — 6360000002 HC RX W HCPCS: Performed by: EMERGENCY MEDICINE

## 2020-09-20 PROCEDURE — 82570 ASSAY OF URINE CREATININE: CPT

## 2020-09-20 PROCEDURE — 82140 ASSAY OF AMMONIA: CPT

## 2020-09-20 RX ORDER — ACETAMINOPHEN 650 MG/1
650 SUPPOSITORY RECTAL EVERY 6 HOURS PRN
Status: DISCONTINUED | OUTPATIENT
Start: 2020-09-20 | End: 2020-09-24 | Stop reason: HOSPADM

## 2020-09-20 RX ORDER — ATORVASTATIN CALCIUM 20 MG/1
20 TABLET, FILM COATED ORAL NIGHTLY
Status: DISCONTINUED | OUTPATIENT
Start: 2020-09-20 | End: 2020-09-24 | Stop reason: HOSPADM

## 2020-09-20 RX ORDER — PANTOPRAZOLE SODIUM 40 MG/1
40 TABLET, DELAYED RELEASE ORAL
Status: DISCONTINUED | OUTPATIENT
Start: 2020-09-21 | End: 2020-09-24 | Stop reason: HOSPADM

## 2020-09-20 RX ORDER — SODIUM CHLORIDE 0.9 % (FLUSH) 0.9 %
10 SYRINGE (ML) INJECTION PRN
Status: DISCONTINUED | OUTPATIENT
Start: 2020-09-20 | End: 2020-09-20

## 2020-09-20 RX ORDER — AMLODIPINE BESYLATE 10 MG/1
10 TABLET ORAL DAILY
Status: DISCONTINUED | OUTPATIENT
Start: 2020-09-20 | End: 2020-09-24 | Stop reason: HOSPADM

## 2020-09-20 RX ORDER — LORAZEPAM 2 MG/ML
3 INJECTION INTRAMUSCULAR
Status: DISCONTINUED | OUTPATIENT
Start: 2020-09-20 | End: 2020-09-24 | Stop reason: HOSPADM

## 2020-09-20 RX ORDER — ALBUTEROL SULFATE 2.5 MG/3ML
2.5 SOLUTION RESPIRATORY (INHALATION) EVERY 4 HOURS PRN
Status: DISCONTINUED | OUTPATIENT
Start: 2020-09-20 | End: 2020-09-24 | Stop reason: HOSPADM

## 2020-09-20 RX ORDER — LORAZEPAM 2 MG/ML
2 INJECTION INTRAMUSCULAR ONCE
Status: COMPLETED | OUTPATIENT
Start: 2020-09-20 | End: 2020-09-20

## 2020-09-20 RX ORDER — MULTIVITAMIN WITH IRON
1 TABLET ORAL DAILY
Status: DISCONTINUED | OUTPATIENT
Start: 2020-09-20 | End: 2020-09-24 | Stop reason: HOSPADM

## 2020-09-20 RX ORDER — LORAZEPAM 2 MG/ML
2 INJECTION INTRAMUSCULAR
Status: DISCONTINUED | OUTPATIENT
Start: 2020-09-20 | End: 2020-09-24 | Stop reason: HOSPADM

## 2020-09-20 RX ORDER — LORAZEPAM 2 MG/ML
4 INJECTION INTRAMUSCULAR
Status: DISCONTINUED | OUTPATIENT
Start: 2020-09-20 | End: 2020-09-24 | Stop reason: HOSPADM

## 2020-09-20 RX ORDER — THIAMINE MONONITRATE (VIT B1) 100 MG
100 TABLET ORAL DAILY
Status: DISCONTINUED | OUTPATIENT
Start: 2020-09-20 | End: 2020-09-24 | Stop reason: HOSPADM

## 2020-09-20 RX ORDER — POLYETHYLENE GLYCOL 3350 17 G/17G
17 POWDER, FOR SOLUTION ORAL DAILY PRN
Status: DISCONTINUED | OUTPATIENT
Start: 2020-09-20 | End: 2020-09-24 | Stop reason: HOSPADM

## 2020-09-20 RX ORDER — SODIUM CHLORIDE 0.9 % (FLUSH) 0.9 %
10 SYRINGE (ML) INJECTION PRN
Status: DISCONTINUED | OUTPATIENT
Start: 2020-09-20 | End: 2020-09-24 | Stop reason: HOSPADM

## 2020-09-20 RX ORDER — ACETAMINOPHEN 325 MG/1
650 TABLET ORAL EVERY 4 HOURS PRN
Status: DISCONTINUED | OUTPATIENT
Start: 2020-09-20 | End: 2020-09-20

## 2020-09-20 RX ORDER — THIAMINE MONONITRATE (VIT B1) 100 MG
100 TABLET ORAL DAILY
Status: DISCONTINUED | OUTPATIENT
Start: 2020-09-20 | End: 2020-09-20

## 2020-09-20 RX ORDER — 0.9 % SODIUM CHLORIDE 0.9 %
1000 INTRAVENOUS SOLUTION INTRAVENOUS ONCE
Status: COMPLETED | OUTPATIENT
Start: 2020-09-20 | End: 2020-09-20

## 2020-09-20 RX ORDER — SODIUM CHLORIDE 0.9 % (FLUSH) 0.9 %
10 SYRINGE (ML) INJECTION EVERY 12 HOURS SCHEDULED
Status: DISCONTINUED | OUTPATIENT
Start: 2020-09-20 | End: 2020-09-24 | Stop reason: HOSPADM

## 2020-09-20 RX ORDER — SODIUM CHLORIDE 0.9 % (FLUSH) 0.9 %
10 SYRINGE (ML) INJECTION EVERY 12 HOURS SCHEDULED
Status: DISCONTINUED | OUTPATIENT
Start: 2020-09-20 | End: 2020-09-20

## 2020-09-20 RX ORDER — LORAZEPAM 2 MG/ML
1 INJECTION INTRAMUSCULAR
Status: DISCONTINUED | OUTPATIENT
Start: 2020-09-20 | End: 2020-09-24 | Stop reason: HOSPADM

## 2020-09-20 RX ORDER — ISOSORBIDE MONONITRATE 60 MG/1
60 TABLET, EXTENDED RELEASE ORAL DAILY
Status: DISCONTINUED | OUTPATIENT
Start: 2020-09-21 | End: 2020-09-24 | Stop reason: HOSPADM

## 2020-09-20 RX ORDER — ONDANSETRON 4 MG/1
4 TABLET, FILM COATED ORAL ONCE
Status: DISCONTINUED | OUTPATIENT
Start: 2020-09-20 | End: 2020-09-20

## 2020-09-20 RX ORDER — LORAZEPAM 1 MG/1
1 TABLET ORAL
Status: DISCONTINUED | OUTPATIENT
Start: 2020-09-20 | End: 2020-09-24 | Stop reason: HOSPADM

## 2020-09-20 RX ORDER — ONDANSETRON 2 MG/ML
4 INJECTION INTRAMUSCULAR; INTRAVENOUS EVERY 6 HOURS PRN
Status: DISCONTINUED | OUTPATIENT
Start: 2020-09-20 | End: 2020-09-24 | Stop reason: HOSPADM

## 2020-09-20 RX ORDER — ACETAMINOPHEN 325 MG/1
650 TABLET ORAL EVERY 6 HOURS PRN
Status: DISCONTINUED | OUTPATIENT
Start: 2020-09-20 | End: 2020-09-24 | Stop reason: HOSPADM

## 2020-09-20 RX ORDER — SODIUM CHLORIDE 9 MG/ML
INJECTION, SOLUTION INTRAVENOUS CONTINUOUS
Status: DISCONTINUED | OUTPATIENT
Start: 2020-09-20 | End: 2020-09-23

## 2020-09-20 RX ORDER — ASPIRIN 81 MG/1
81 TABLET, CHEWABLE ORAL DAILY
Status: DISCONTINUED | OUTPATIENT
Start: 2020-09-21 | End: 2020-09-24 | Stop reason: HOSPADM

## 2020-09-20 RX ORDER — LORAZEPAM 2 MG/1
4 TABLET ORAL
Status: DISCONTINUED | OUTPATIENT
Start: 2020-09-20 | End: 2020-09-24 | Stop reason: HOSPADM

## 2020-09-20 RX ORDER — HEPARIN SODIUM 5000 [USP'U]/ML
5000 INJECTION, SOLUTION INTRAVENOUS; SUBCUTANEOUS EVERY 8 HOURS SCHEDULED
Status: DISCONTINUED | OUTPATIENT
Start: 2020-09-20 | End: 2020-09-24 | Stop reason: HOSPADM

## 2020-09-20 RX ORDER — FOLIC ACID 1 MG/1
1 TABLET ORAL DAILY
Status: DISCONTINUED | OUTPATIENT
Start: 2020-09-20 | End: 2020-09-24 | Stop reason: HOSPADM

## 2020-09-20 RX ORDER — LORAZEPAM 2 MG/1
2 TABLET ORAL
Status: DISCONTINUED | OUTPATIENT
Start: 2020-09-20 | End: 2020-09-24 | Stop reason: HOSPADM

## 2020-09-20 RX ORDER — FLUTICASONE PROPIONATE 110 UG/1
1 AEROSOL, METERED RESPIRATORY (INHALATION) 2 TIMES DAILY
Status: DISCONTINUED | OUTPATIENT
Start: 2020-09-20 | End: 2020-09-24 | Stop reason: HOSPADM

## 2020-09-20 RX ORDER — PROMETHAZINE HYDROCHLORIDE 12.5 MG/1
12.5 TABLET ORAL EVERY 6 HOURS PRN
Status: DISCONTINUED | OUTPATIENT
Start: 2020-09-20 | End: 2020-09-24 | Stop reason: HOSPADM

## 2020-09-20 RX ORDER — SPIRONOLACTONE 25 MG/1
25 TABLET ORAL DAILY
Status: DISCONTINUED | OUTPATIENT
Start: 2020-09-21 | End: 2020-09-24 | Stop reason: HOSPADM

## 2020-09-20 RX ADMIN — SODIUM CHLORIDE: 9 INJECTION, SOLUTION INTRAVENOUS at 23:54

## 2020-09-20 RX ADMIN — FOLIC ACID 1 MG: 1 TABLET ORAL at 14:01

## 2020-09-20 RX ADMIN — THERA TABS 1 TABLET: TAB at 14:01

## 2020-09-20 RX ADMIN — HEPARIN SODIUM 5000 UNITS: 5000 INJECTION INTRAVENOUS; SUBCUTANEOUS at 21:09

## 2020-09-20 RX ADMIN — SODIUM CHLORIDE 1000 ML: 9 INJECTION, SOLUTION INTRAVENOUS at 10:00

## 2020-09-20 RX ADMIN — Medication 100 MG: at 14:01

## 2020-09-20 RX ADMIN — AMLODIPINE BESYLATE 10 MG: 10 TABLET ORAL at 14:01

## 2020-09-20 RX ADMIN — SODIUM CHLORIDE 1000 ML: 9 INJECTION, SOLUTION INTRAVENOUS at 08:36

## 2020-09-20 RX ADMIN — HEPARIN SODIUM 5000 UNITS: 5000 INJECTION INTRAVENOUS; SUBCUTANEOUS at 14:01

## 2020-09-20 RX ADMIN — SODIUM CHLORIDE: 9 INJECTION, SOLUTION INTRAVENOUS at 14:01

## 2020-09-20 RX ADMIN — FLUTICASONE PROPIONATE 1 PUFF: 110 AEROSOL, METERED RESPIRATORY (INHALATION) at 20:01

## 2020-09-20 RX ADMIN — LORAZEPAM 2 MG: 2 INJECTION INTRAMUSCULAR; INTRAVENOUS at 08:36

## 2020-09-20 RX ADMIN — ATORVASTATIN CALCIUM 20 MG: 20 TABLET, FILM COATED ORAL at 21:09

## 2020-09-20 NOTE — ED PROVIDER NOTES
[facility]//MED SURG  Emergency Department Encounter  EmergencyMedicine Resident     Pt Name:Delphine Mcneil  MRN: [id number]  [place] 1965  Date of evaluation: 9/20/20  PCP:  Kaitlynn Madrid MD    CHIEF COMPLAINT       Chief Complaint   Patient presents with    Hallucinations       HISTORY OF PRESENT ILLNESS  (Location/Symptom, Timing/Onset, Context/Setting, Quality, Duration, Modifying Factors, Severity.)      Harvey Sahni is a 48 y.o. female who presents with hallucinations. Patient was brought in from detention after receiving a few doses of Ativan for alcohol withdrawal, chart review from detention demonstrates 4 mg Ativan at 6 PM, 2 mg Ativan at midnight and 2 mg of Ativan at 6 AM this morning. Patient alert and oriented x3, not answering questions appropriately. Patient states that she does drink 75 ounces of alcohol daily. Unable to identify whether it is liquor or beer. Does state that she has frequent shakes. PAST MEDICAL / SURGICAL / SOCIAL / FAMILY HISTORY      has a past medical history of ROXANN (acute kidney injury) ([address] Alcohol abuse, Angioedema, Anxiety, Bipolar 1 disorder ([address] Bipolar disorder ([address] CAD (coronary artery disease), Claustrophobia, Cocaine abuse ([address] Depression, GERD (gastroesophageal reflux disease), Hypertension, Hypertrophic cardiomyopathy ([address] Lung nodules, MDRO (multiple drug resistant organisms) resistance, MRSA (methicillin resistant Staphylococcus aureus), Murmur, OA (osteoarthritis), Obesity, PUD (peptic ulcer disease), Thyroid nodule, Tonsillar hypertrophy, and Type 2 diabetes mellitus without complication, without long-term current use of insulin ([address] No other pertinent past medical history     has a past surgical history that includes Hysterectomy; Upper gastrointestinal endoscopy; Colonoscopy; Thyroid surgery; Cardiac catheterization; and other surgical history (8/24/2015).   No other pertinent past surgical history    Social History     Socioeconomic History    Marital status: Single     Spouse name: Not on file    Number of children: Not on file    Years of education: Not on file    Highest education level: Not on file   Occupational History    Not on file   Social Needs    Financial resource strain: Not on file    Food insecurity     Worry: Not on file     Inability: Not on file    Transportation needs     Medical: Not on file     Non-medical: Not on file   Tobacco Use    Smoking status: Former Smoker     Packs/day: 0.50     Last attempt to quit: 1992     Years since quittin.7    Smokeless tobacco: Never Used    Tobacco comment: [facility] [place] [facility] in the 1980s   Substance and Sexual Activity    Alcohol use: Yes     Alcohol/week: 12.0 standard drinks     Types: 12 Cans of beer per week     Comment: 2-3 times per week    Drug use: No     Types: Cocaine     Comment: last use approximately 14 cocaine    Sexual activity: Yes     Partners: Male   Lifestyle    Physical activity     Days per week: Not on file     Minutes per session: Not on file    Stress: Not on file   Relationships    Social connections     Talks on phone: Not on file     Gets together: Not on file     Attends Jain service: Not on file     Active member of club or organization: Not on file     Attends meetings of clubs or organizations: Not on file     Relationship status: Not on file    Intimate partner violence     Fear of current or ex partner: Not on file     Emotionally abused: Not on file     Physically abused: Not on file     Forced sexual activity: Not on file   Other Topics Concern    Not on file   Social History Narrative    Not on file       Family History   Problem Relation Age of Onset    Stroke Mother     Hypertension Father     Cancer Brother         bone    Lung Cancer Maternal Aunt     Cancer Maternal Grandmother         eye     Other Sister         aneurysm    Heart Disease Sister        Allergies:  Bee venom; Iodine;  Lisinopril; and 10 MG tablet Take 1 tablet by mouth daily 1/15/20   Susy Dawson MD   albuterol sulfate (PROAIR RESPICLICK) 121 (90 Base) MCG/ACT aerosol powder inhalation Inhale 2 puffs into the lungs every 6 hours as needed for Wheezing or Shortness of Breath 1/15/20   Susy Dawson MD   baclofen POWD  10/7/19   Historical Provider, MD   econazole nitrate 1 % cream Apply topically daily. 1/13/20   America Barreto DPM   lidocaine (XYLOCAINE) 5 % ointment Apply 1 applicator topically 3 times daily as needed for Pain Apply topically as needed. Historical Provider, MD   Multiple Vitamins-Minerals (MULTIVITAMIN WITH MINERALS) tablet Take 1 tablet by mouth daily 9/27/19   Lorin Trujillo MD   famotidine (PEPCID) 20 MG tablet Take 20 mg by mouth 2 times daily    Historical Provider, MD   citalopram (CELEXA) 40 MG tablet Take 40 mg by mouth daily    Historical Provider, MD   mirtazapine (REMERON) 45 MG tablet Take 45 mg by mouth nightly    Historical Provider, MD   cyclobenzaprine (FLEXERIL) 5 MG tablet One to two tid prn back pain or muscle spasm. Will cause drowsiness. Do not drive if taking. 11/14/17   Susy Dawson MD   lurasidone (LATUDA) 60 MG TABS tablet Take 60 mg by mouth nightly    Historical Provider, MD   Cholecalciferol (VITAMIN D3) 19044 UNITS CAPS Take 1 capsule by mouth Twice a Week 8/12/15   Historical Provider, MD       REVIEW OF SYSTEMS    (2-9 systems for level 4, 10 or more for level 5)      Review of Systems   Unable to perform ROS: Mental status change   Limited due to active hallucinations and altered mental status. PHYSICAL EXAM   (up to 7 for level 4, 8 or more for level 5)      INITIAL VITALS:   BP (!) 165/106   Pulse 72   Temp 97.7 °F (36.5 °C) (Oral)   Resp 16   Ht 5' 4\" (1.626 m)   Wt 169 lb (76.7 kg)   SpO2 99%   BMI 29.01 kg/m²     Physical Exam  Vitals signs and nursing note reviewed. Constitutional:       Appearance: Normal appearance.    HENT:      Head: Normocephalic and Telemetry monitoring - continuous duration    Notify physician    Up with assistance    Daily weights    Intake and output    Nursing swallow assessment    Full Code    Inpatient consult to Internal Medicine    Inpatient consult to Social Work    Inpatient consult to Case Management    OT eval and treat    PT evaluation and treat    Initiate Oxygen Therapy Protocol    EKG 12 Lead    PATIENT STATUS (FROM ED OR OR/PROCEDURAL) Inpatient    Transfer patient    Transfer patient    Alcohol and or drug assessment    Seizure precautions    Fall precautions       MEDICATIONS ORDERED:  Orders Placed This Encounter   Medications    0.9 % sodium chloride bolus    LORazepam (ATIVAN) injection 2 mg    0.9 % sodium chloride bolus    DISCONTD: sodium chloride flush 0.9 % injection 10 mL    DISCONTD: sodium chloride flush 0.9 % injection 10 mL    DISCONTD: acetaminophen (TYLENOL) tablet 650 mg    DISCONTD: enoxaparin (LOVENOX) injection 40 mg    DISCONTD: vitamin B-1 (THIAMINE) tablet 100 mg    DISCONTD: ondansetron (ZOFRAN) tablet 4 mg    albuterol (PROVENTIL) nebulizer solution 2.5 mg    amLODIPine (NORVASC) tablet 10 mg    aspirin chewable tablet 81 mg    atorvastatin (LIPITOR) tablet 20 mg    fluticasone (FLOVENT HFA) 110 MCG/ACT inhaler 1 puff    isosorbide mononitrate (IMDUR) extended release tablet 60 mg    pantoprazole (PROTONIX) tablet 40 mg    spironolactone (ALDACTONE) tablet 25 mg    sodium chloride flush 0.9 % injection 10 mL    sodium chloride flush 0.9 % injection 10 mL    OR Linked Order Group     acetaminophen (TYLENOL) tablet 650 mg     acetaminophen (TYLENOL) suppository 650 mg    polyethylene glycol (GLYCOLAX) packet 17 g    OR Linked Order Group     promethazine (PHENERGAN) tablet 12.5 mg     ondansetron (ZOFRAN) injection 4 mg    0.9 % sodium chloride infusion    heparin (porcine) injection 5,000 Units    multivitamin 1 tablet    vitamin B-1 (THIAMINE) tablet 100 mg    folic acid (FOLVITE) tablet 1 mg    OR Linked Order Group     LORazepam (ATIVAN) tablet 1 mg     LORazepam (ATIVAN) injection 1 mg     LORazepam (ATIVAN) tablet 2 mg     LORazepam (ATIVAN) injection 2 mg     LORazepam (ATIVAN) tablet 3 mg     LORazepam (ATIVAN) injection 3 mg     LORazepam (ATIVAN) tablet 4 mg     LORazepam (ATIVAN) injection 4 mg       DIAGNOSTIC RESULTS / EMERGENCY DEPARTMENT COURSE / MDM   LAB RESULTS:  Results for orders placed or performed during the hospital encounter of 09/20/20   TSH with Reflex   Result Value Ref Range    TSH 1.67 0.30 - 5.00 mIU/L   COMPREHENSIVE METABOLIC PANEL   Result Value Ref Range    Glucose 94 70 - 99 mg/dL    BUN 38 (H) 6 - 20 mg/dL    CREATININE 2.38 (H) 0.50 - 0.90 mg/dL    Bun/Cre Ratio NOT REPORTED 9 - 20    Calcium 10.2 8.6 - 10.4 mg/dL    Sodium 138 135 - 144 mmol/L    Potassium 3.9 3.7 - 5.3 mmol/L    Chloride 100 98 - 107 mmol/L    CO2 21 20 - 31 mmol/L    Anion Gap 17 9 - 17 mmol/L    Alkaline Phosphatase 93 35 - 104 U/L    ALT 51 (H) 5 - 33 U/L    AST 74 (H) <32 U/L    Total Bilirubin 0.75 0.3 - 1.2 mg/dL    Total Protein 8.7 (H) 6.4 - 8.3 g/dL    Alb 4.5 3.5 - 5.2 g/dL    Albumin/Globulin Ratio 1.1 1.0 - 2.5    GFR Non- 21 (L) >60 mL/min    GFR  26 (L) >60 mL/min    GFR Comment          GFR Staging NOT REPORTED    CBC Auto Differential   Result Value Ref Range    WBC 5.4 3.5 - 11.3 k/uL    RBC 3.35 (L) 3.95 - 5.11 m/uL    Hemoglobin 10.6 (L) 11.9 - 15.1 g/dL    Hematocrit 32.0 (L) 36.3 - 47.1 %    MCV 95.5 82.6 - 102.9 fL    MCH 31.6 25.2 - 33.5 pg    MCHC 33.1 28.4 - 34.8 g/dL    RDW 15.1 (H) 11.8 - 14.4 %    Platelets 456 434 - 387 k/uL    MPV 9.9 8.1 - 13.5 fL    NRBC Automated 0.0 0.0 per 100 WBC    Differential Type NOT REPORTED     WBC Morphology NOT REPORTED     RBC Morphology NOT REPORTED     Platelet Estimate NOT REPORTED     Immature Granulocytes 0 0 %    Seg Neutrophils 71 (H) 36 - 65 % Lymphocytes 11 (L) 24 - 43 %    Monocytes 17 (H) 3 - 12 %    Eosinophils % 0 (L) 1 - 4 %    Basophils 1 0 - 2 %    Absolute Immature Granulocyte 0.00 0.00 - 0.30 k/uL    Segs Absolute 3.84 1.50 - 8.10 k/uL    Absolute Lymph # 0.59 (L) 1.10 - 3.70 k/uL    Absolute Mono # 0.92 0.10 - 1.20 k/uL    Absolute Eos # 0.00 0.00 - 0.44 k/uL    Basophils Absolute 0.05 0.00 - 0.20 k/uL    Morphology ANISOCYTOSIS PRESENT    TOX SCR, BLD, ED   Result Value Ref Range    Acetaminophen Level <5 (L) 10 - 30 ug/mL    Ethanol <10 <10 mg/dL    Ethanol percent <0.459 <8.399 %    Salicylate Lvl <1 (L) 3 - 10 mg/dL    Toxic Tricyclic Sc,Blood NEGATIVE NEGATIVE   AMMONIA   Result Value Ref Range    Ammonia 14 11 - 51 umol/L   BLOOD GAS, VENOUS   Result Value Ref Range    pH, Edy 7.345 7.320 - 7.420    pCO2, Edy 41.3 39 - 55    pO2, Edy 28.2 (L) 30 - 50    HCO3, Venous 22.0 (L) 24 - 30 mmol/L    Positive Base Excess, Edy NOT REPORTED 0.0 - 2.0 mmol/L    Negative Base Excess, Edy 2.9 (H) 0.0 - 2.0 mmol/L    O2 Sat, Edy 44.1 (L) 60.0 - 85.0 %    Total Hb NOT REPORTED 12.0 - 16.0 g/dl    Oxyhemoglobin NOT REPORTED 95.0 - 98.0 %    Carboxyhemoglobin 0.7 0 - 5 %    Methemoglobin NOT REPORTED 0.0 - 1.5 %    Pt Temp 37.0     pH, Edy, Temp Adj NOT REPORTED 7.320 - 7.420    pCO2, Edy, Temp Adj NOT REPORTED 39 - 55 mmHg    pO2, Edy, Temp Adj NOT REPORTED 30 - 50 mmHg    O2 Device/Flow/% NOT REPORTED     Respiratory Rate NOT REPORTED     Ravi Test NOT REPORTED     Sample Site NOT REPORTED     Pt.  Position NOT REPORTED     Mode NOT REPORTED     Set Rate NOT REPORTED     Total Rate NOT REPORTED     VT NOT REPORTED     FIO2 INFORMATION NOT PROVIDED     Peep/Cpap NOT REPORTED     PSV NOT REPORTED     Text for Respiratory NOT REPORTED     NOTIFICATION NOT REPORTED     NOTIFICATION TIME NOT REPORTED    URINALYSIS, MICRO   Result Value Ref Range    -          WBC, UA 50  0 - 5 /HPF    RBC, UA 20 TO 50 0 - 2 /HPF    Casts UA 5 TO 10 0 - 2 /LPF    Casts EKG    Piotr Strange     All EKG's are interpreted by the Emergency Department Physician who either signs or Co-signs this chart in the absence of a cardiologist.    EMERGENCY DEPARTMENT COURSE:  ED Course as of Sep 20 1509   Sun Sep 20, 2020   0930 Noted to have ROXANN. Patient resting, still altered. [CR]      ED Course User Index  [CR] [address] [place], DO      Patient admitted to internal medicine due to ROXANN. Internal medicine will get psych on board for follow-up of evaluation. PROCEDURES:  No procedures indicated    CONSULTS:  IP CONSULT TO INTERNAL MEDICINE  IP CONSULT TO SOCIAL WORK  IP CONSULT TO CASE MANAGEMENT    MEDICAL DECISION MAKING:  Presents emergency department with lacerations. Zachariah started was secondary to Ativan administration. On assessment patient did have tongue fasciculations and concern for alcohol withdrawal but was not being properly managed by the Ativan. Patient was given 2 more of Ativan. Labs and EKG will be obtained to further evaluate the hallucinations. Patient was noted to have acute kidney injury on the labs. Patient was given 2 L of fluid. Patient was admitted to internal medicine for further evaluation. Ammonia was normal, ED tox was normal, patient was unable to urinate while here in the emergency department. Concern that this could be infectious in nature for the ROXANN and the hallucinations. Patient will be admitted to internal medicine for further evaluation, they did state that they would talk to psych for evaluation of hallucinations. CRITICAL CARE:  Please see attending note    FINAL IMPRESSION      1.  Acute renal failure, unspecified acute renal failure type [facility])          DISPOSITION / [address] Admitted 09/20/2020 10:52:34 AM      PATIENT REFERRED TO:  Vito Almendarez MD  [address] [facility] [id number], APRN - CNP  [phone]  [phone]            DISCHARGE MEDICATIONS:  Current Discharge Medication List          Piotr Welch DO  Emergency Medicine Resident    (Please note that portions of thisnote were completed with a voice recognition program.  Efforts were made to edit the dictations but occasionally words are mis-transcribed.)       Elina Jain DO  Resident  09/20/20 6176

## 2020-09-20 NOTE — ED NOTES
Pt ambulatory to room 33 with c/o hallucinations. Per report, pt was prescribed ativan for alcohol withdrawal. Staff at the detention states that pt seems more groggy than usual. Pt arouses with verbal stimuli but intermittently falls back asleep. No respiratory distress noted. Will continue to monitor.       Shawn Hampton RN  09/20/20 5719

## 2020-09-20 NOTE — PLAN OF CARE
Problem: Confusion - Acute:  Goal: Absence of continued neurological deterioration signs and symptoms  Outcome: Ongoing     Problem: Falls - Risk of:  Goal: Will remain free from falls  Description: Pt remains free of falls at this time. Bed locked in lowest position, siderails x2, call light in reach. Bed alarm set. Non-skid footwear applied. Will continue to monitor. Outcome: Ongoing  Note: Pt remains free of falls at this time. Bed locked in lowest position, siderails x2, call light in reach. Bed alarm set. Non-skid footwear applied. Will continue to monitor.

## 2020-09-20 NOTE — ED PROVIDER NOTES
[address] [facility]     Emergency Department     Faculty Attestation    I performed a history and physical examination of the patient and discussed management with the resident. I reviewed the residents note and agree with the documented findings including all diagnostic interpretations and plan of care. Any areas of disagreement are noted on the chart. I was personally present for the key portions of any procedures. I have documented in the chart those procedures where I was not present during the key portions. I have reviewed the emergency nurses triage note. I agree with the chief complaint, past medical history, past surgical history, allergies, medications, social and family history as documented unless otherwise noted below. Documentation of the HPI, Physical Exam and Medical Decision Making performed by scribes is based on my personal performance of the HPI, PE and MDM. For Physician Assistant/ Nurse Practitioner cases/documentation I have personally evaluated this patient and have completed at least one if not all key elements of the E/M (history, physical exam, and MDM). Additional findings are as noted. This patient was evaluated in the Emergency Department for symptoms described in the history of present illness. He/she was evaluated in the context of the global COVID-19 pandemic, which necessitated consideration that the patient might be at risk for infection with the SARS-CoV-2 virus that causes COVID-19. Institutional protocols and algorithms that pertain to the evaluation of patients at risk for COVID-19 are in a state of rapid change based on information released by regulatory bodies including the CDC and federal and state organizations. These policies and algorithms were followed during the patient's care in the ED. Primary Care Physician: Harvey Carvajal MD    History:  This is a 48 y.o. female who presents to the Emergency Department with complaint of hallucinations. Sent from care home. Currently on Ativan due to concerns for alcohol withdrawal.  Reports confusion and hallucinations. Sent from care home due to concerns for side effect from Ativan Limited history from patient due to confusion. Physical:     temperature is 98.4 °F (36.9 °C). Her blood pressure is 130/93 (abnormal) and her pulse is 92. Her respiration is 18 and oxygen saturation is 99%.    48 y.o. female sleeping comfortably not acutely distressed, arousable, can tell me name and her current location but otherwise cannot answer orientation questions accurately. Pupils are 2 mm and reactive bilaterally breath sounds are equal bilaterally. Strength is equal in all 4 extremities. Abdomen soft nontender nondistended. Cardiac exam regular rate and rhythm no murmurs rubs gallops    Impression: Hallucinations/altered mental status. Alcohol withdrawal versus other organic causes. Toxic possible although seems less likely given relatively controlled environment. Presentation does not seem consistent with benzodiazepine reaction or toxicity    Plan: Labs including TSH. CT head. EKG.   Likely admission    EKG Interpretation  EKG Interpretation    Interpreted by emergency department physician    Rhythm: normal sinus   Rate: normal  Axis: normal  Ectopy: none  Conduction: Left ventricular hypertrophy by aVL criteria  ST Segments: no acute change  T Waves: no acute change  Q Waves: none    EKG  Impression: no acute changes and non-specific EKG    Cailin Jones MD      Interpreted by me      Malina Suárez MD, [facility]  Attending Emergency Physician      Cailin Jones MD  09/20/20 9001

## 2020-09-20 NOTE — FLOWSHEET NOTE
A. Assessment;  Patient seemed to be in a stupor. She was only able to respond to conversation occasionally. Her huspand was in the room and helped the  communicate. Patient asked if I had the paperwork to discharge her.  said no and  reminded her that she was going to move to a room on a different floor. I, Intervention; Intervention was difficult since patient was not coherent.  prayed for family and excused himself. O. Outcome;  Visit was inconclusive due to communication problems. Patient was to be moved to a floor that  would be visiting later so  offered to look in on her there. 09/20/20 1418   Encounter Summary   Services provided to: Patient and family together   Referral/Consult From: [facility]   ([facility])   Contact Scientology No   Continue Visiting   (09/20/2020)   Complexity of Encounter High   Length of Encounter 15 minutes   Spiritual Assessment Completed   (no)   Advance Care Planning   (no)   Routine   Type Initial   Assessment Approachable;Sleeping; Unable to respond   Intervention Prayer;Sustaining presence/ Ministry of presence   Outcome Comfort;Did not respond

## 2020-09-20 NOTE — PROGRESS NOTES
SENIOR NOTE. Shyam Watters is a 48year old female presented to the emergency department via Police from [place] with hallucinations. She was receiving Ativan for suspected Alcohol Withdrawal with incarcerated. Received 4 mg of Ativan at 1800, 2 mg Ativan at 0000 and 2 mg Ativan at 0600. No seizures per ED resident. She appears confused on examination stating she had fallen multiple times at home today however has been incarcerated. Appears drowsy dozing off between questioning. Denies any pain currently. In the emergency department she was afebrile and hemodynamically stable. Labs demonstrated elevated BUN and creatinine (38 and 2.38 respectively), decreased GFR (26), ALT (51), AST (74), Anemia (10.6). She was given 2 mg of ativan and 2 L NS in ED. Assessment and Plan:  1. Acute Toxic vs Metabolic Encephalopathy?  - History of ETOH Abuse. Possible Alcohol Withdrawal.   - ETOH < 10.  - Ammonia 14.   - TSH 1.67.   - CT head negative. - Blood and Urine toxicology. - CIWA  - NPO until bedside swallow. - Seizure precaution.   - Aspiration precaution. 2. Acute Kidney Injury. - Ceatinine 2.38, baseline normal.   - NS @ 100 ml/hr.   - Urine Sodium  - Urine Creatinine  - FeNA  - Kidney Ultrasound. 3. History of ETOH Abuse. - Multivitamin  - Thiamine  - Folic Acid  - CIWA    Full note to follow.      Yessica Wallace,   PGY-2, Internal medicine resident  [address] [place] [address]  9/20/2020 1:01 PM

## 2020-09-21 ENCOUNTER — APPOINTMENT (OUTPATIENT)
Dept: GENERAL RADIOLOGY | Age: 55
DRG: 469 | End: 2020-09-21
Payer: MEDICAID

## 2020-09-21 LAB
ABSOLUTE EOS #: 0.09 K/UL (ref 0–0.44)
ABSOLUTE IMMATURE GRANULOCYTE: <0.03 K/UL (ref 0–0.3)
ABSOLUTE LYMPH #: 0.87 K/UL (ref 1.1–3.7)
ABSOLUTE MONO #: 0.78 K/UL (ref 0.1–1.2)
ALBUMIN SERPL-MCNC: 3.8 G/DL (ref 3.5–5.2)
ALBUMIN/GLOBULIN RATIO: 1.1 (ref 1–2.5)
ALP BLD-CCNC: 79 U/L (ref 35–104)
ALT SERPL-CCNC: 41 U/L (ref 5–33)
ANION GAP SERPL CALCULATED.3IONS-SCNC: 15 MMOL/L (ref 9–17)
AST SERPL-CCNC: 64 U/L
BASOPHILS # BLD: 0 % (ref 0–2)
BASOPHILS ABSOLUTE: <0.03 K/UL (ref 0–0.2)
BILIRUB SERPL-MCNC: 0.67 MG/DL (ref 0.3–1.2)
BUN BLDV-MCNC: 18 MG/DL (ref 6–20)
BUN/CREAT BLD: ABNORMAL (ref 9–20)
CALCIUM SERPL-MCNC: 9.1 MG/DL (ref 8.6–10.4)
CHLORIDE BLD-SCNC: 104 MMOL/L (ref 98–107)
CO2: 20 MMOL/L (ref 20–31)
CREAT SERPL-MCNC: 0.8 MG/DL (ref 0.5–0.9)
DIFFERENTIAL TYPE: ABNORMAL
EKG ATRIAL RATE: 87 BPM
EKG P AXIS: 50 DEGREES
EKG P-R INTERVAL: 192 MS
EKG Q-T INTERVAL: 398 MS
EKG QRS DURATION: 94 MS
EKG QTC CALCULATION (BAZETT): 478 MS
EKG R AXIS: -9 DEGREES
EKG T AXIS: 83 DEGREES
EKG VENTRICULAR RATE: 87 BPM
EOSINOPHILS RELATIVE PERCENT: 2 % (ref 1–4)
FERRITIN: 271 UG/L (ref 13–150)
FOLATE: >20 NG/ML
GFR AFRICAN AMERICAN: >60 ML/MIN
GFR NON-AFRICAN AMERICAN: >60 ML/MIN
GFR SERPL CREATININE-BSD FRML MDRD: ABNORMAL ML/MIN/{1.73_M2}
GFR SERPL CREATININE-BSD FRML MDRD: ABNORMAL ML/MIN/{1.73_M2}
GLUCOSE BLD-MCNC: 77 MG/DL (ref 70–99)
HCT VFR BLD CALC: 30.8 % (ref 36.3–47.1)
HEMOGLOBIN: 10.2 G/DL (ref 11.9–15.1)
IMMATURE GRANULOCYTES: 0 %
IRON SATURATION: 17 % (ref 20–55)
IRON: 40 UG/DL (ref 37–145)
LYMPHOCYTES # BLD: 20 % (ref 24–43)
MAGNESIUM: 1.3 MG/DL (ref 1.6–2.6)
MCH RBC QN AUTO: 31.1 PG (ref 25.2–33.5)
MCHC RBC AUTO-ENTMCNC: 33.1 G/DL (ref 28.4–34.8)
MCV RBC AUTO: 93.9 FL (ref 82.6–102.9)
MONOCYTES # BLD: 18 % (ref 3–12)
NRBC AUTOMATED: 0 PER 100 WBC
PDW BLD-RTO: 14.6 % (ref 11.8–14.4)
PLATELET # BLD: 203 K/UL (ref 138–453)
PLATELET ESTIMATE: ABNORMAL
PMV BLD AUTO: 10 FL (ref 8.1–13.5)
POTASSIUM SERPL-SCNC: 3 MMOL/L (ref 3.7–5.3)
RBC # BLD: 3.28 M/UL (ref 3.95–5.11)
RBC # BLD: ABNORMAL 10*6/UL
SEG NEUTROPHILS: 60 % (ref 36–65)
SEGMENTED NEUTROPHILS ABSOLUTE COUNT: 2.68 K/UL (ref 1.5–8.1)
SODIUM BLD-SCNC: 139 MMOL/L (ref 135–144)
TOTAL IRON BINDING CAPACITY: 237 UG/DL (ref 250–450)
TOTAL PROTEIN: 7.3 G/DL (ref 6.4–8.3)
UNSATURATED IRON BINDING CAPACITY: 197 UG/DL (ref 112–347)
VITAMIN B-12: 648 PG/ML (ref 232–1245)
WBC # BLD: 4.5 K/UL (ref 3.5–11.3)
WBC # BLD: ABNORMAL 10*3/UL

## 2020-09-21 PROCEDURE — 36415 COLL VENOUS BLD VENIPUNCTURE: CPT

## 2020-09-21 PROCEDURE — 89051 BODY FLUID CELL COUNT: CPT

## 2020-09-21 PROCEDURE — 6370000000 HC RX 637 (ALT 250 FOR IP): Performed by: STUDENT IN AN ORGANIZED HEALTH CARE EDUCATION/TRAINING PROGRAM

## 2020-09-21 PROCEDURE — 85025 COMPLETE CBC W/AUTO DIFF WBC: CPT

## 2020-09-21 PROCEDURE — 82728 ASSAY OF FERRITIN: CPT

## 2020-09-21 PROCEDURE — 6360000002 HC RX W HCPCS: Performed by: STUDENT IN AN ORGANIZED HEALTH CARE EDUCATION/TRAINING PROGRAM

## 2020-09-21 PROCEDURE — 83540 ASSAY OF IRON: CPT

## 2020-09-21 PROCEDURE — 87205 SMEAR GRAM STAIN: CPT

## 2020-09-21 PROCEDURE — 89060 EXAM SYNOVIAL FLUID CRYSTALS: CPT

## 2020-09-21 PROCEDURE — 97166 OT EVAL MOD COMPLEX 45 MIN: CPT

## 2020-09-21 PROCEDURE — 2060000000 HC ICU INTERMEDIATE R&B

## 2020-09-21 PROCEDURE — 93010 ELECTROCARDIOGRAM REPORT: CPT | Performed by: INTERNAL MEDICINE

## 2020-09-21 PROCEDURE — 94640 AIRWAY INHALATION TREATMENT: CPT

## 2020-09-21 PROCEDURE — 6370000000 HC RX 637 (ALT 250 FOR IP): Performed by: INTERNAL MEDICINE

## 2020-09-21 PROCEDURE — 97535 SELF CARE MNGMENT TRAINING: CPT

## 2020-09-21 PROCEDURE — 97162 PT EVAL MOD COMPLEX 30 MIN: CPT

## 2020-09-21 PROCEDURE — 94761 N-INVAS EAR/PLS OXIMETRY MLT: CPT

## 2020-09-21 PROCEDURE — 80053 COMPREHEN METABOLIC PANEL: CPT

## 2020-09-21 PROCEDURE — 97530 THERAPEUTIC ACTIVITIES: CPT

## 2020-09-21 PROCEDURE — 2580000003 HC RX 258: Performed by: STUDENT IN AN ORGANIZED HEALTH CARE EDUCATION/TRAINING PROGRAM

## 2020-09-21 PROCEDURE — 99223 1ST HOSP IP/OBS HIGH 75: CPT | Performed by: INTERNAL MEDICINE

## 2020-09-21 PROCEDURE — 73562 X-RAY EXAM OF KNEE 3: CPT

## 2020-09-21 PROCEDURE — 87070 CULTURE OTHR SPECIMN AEROBIC: CPT

## 2020-09-21 PROCEDURE — 83550 IRON BINDING TEST: CPT

## 2020-09-21 PROCEDURE — 87075 CULTR BACTERIA EXCEPT BLOOD: CPT

## 2020-09-21 PROCEDURE — 83735 ASSAY OF MAGNESIUM: CPT

## 2020-09-21 PROCEDURE — 71045 X-RAY EXAM CHEST 1 VIEW: CPT

## 2020-09-21 RX ORDER — PREDNISONE 20 MG/1
20 TABLET ORAL DAILY
Status: DISCONTINUED | OUTPATIENT
Start: 2020-09-21 | End: 2020-09-22

## 2020-09-21 RX ORDER — POTASSIUM CHLORIDE 20 MEQ/1
40 TABLET, EXTENDED RELEASE ORAL ONCE
Status: COMPLETED | OUTPATIENT
Start: 2020-09-21 | End: 2020-09-21

## 2020-09-21 RX ORDER — CIPROFLOXACIN 500 MG/1
500 TABLET, FILM COATED ORAL EVERY 12 HOURS SCHEDULED
Status: DISCONTINUED | OUTPATIENT
Start: 2020-09-21 | End: 2020-09-22

## 2020-09-21 RX ORDER — CIPROFLOXACIN 500 MG/1
500 TABLET, FILM COATED ORAL
Status: DISCONTINUED | OUTPATIENT
Start: 2020-09-21 | End: 2020-09-21

## 2020-09-21 RX ORDER — MAGNESIUM SULFATE IN WATER 40 MG/ML
2 INJECTION, SOLUTION INTRAVENOUS ONCE
Status: COMPLETED | OUTPATIENT
Start: 2020-09-21 | End: 2020-09-21

## 2020-09-21 RX ADMIN — CIPROFLOXACIN 500 MG: 500 TABLET ORAL at 21:18

## 2020-09-21 RX ADMIN — SODIUM CHLORIDE: 9 INJECTION, SOLUTION INTRAVENOUS at 11:41

## 2020-09-21 RX ADMIN — ASPIRIN 81 MG: 81 TABLET, CHEWABLE ORAL at 08:01

## 2020-09-21 RX ADMIN — ACETAMINOPHEN 650 MG: 325 TABLET ORAL at 15:53

## 2020-09-21 RX ADMIN — AMLODIPINE BESYLATE 10 MG: 10 TABLET ORAL at 07:57

## 2020-09-21 RX ADMIN — THERA TABS 1 TABLET: TAB at 08:45

## 2020-09-21 RX ADMIN — FOLIC ACID 1 MG: 1 TABLET ORAL at 07:57

## 2020-09-21 RX ADMIN — CIPROFLOXACIN 500 MG: 500 TABLET ORAL at 08:01

## 2020-09-21 RX ADMIN — HEPARIN SODIUM 5000 UNITS: 5000 INJECTION INTRAVENOUS; SUBCUTANEOUS at 13:50

## 2020-09-21 RX ADMIN — SPIRONOLACTONE 25 MG: 25 TABLET ORAL at 08:02

## 2020-09-21 RX ADMIN — FLUTICASONE PROPIONATE 1 PUFF: 110 AEROSOL, METERED RESPIRATORY (INHALATION) at 09:07

## 2020-09-21 RX ADMIN — POTASSIUM CHLORIDE 40 MEQ: 1500 TABLET, EXTENDED RELEASE ORAL at 07:56

## 2020-09-21 RX ADMIN — HEPARIN SODIUM 5000 UNITS: 5000 INJECTION INTRAVENOUS; SUBCUTANEOUS at 06:53

## 2020-09-21 RX ADMIN — POTASSIUM CHLORIDE 40 MEQ: 1500 TABLET, EXTENDED RELEASE ORAL at 17:52

## 2020-09-21 RX ADMIN — MAGNESIUM SULFATE HEPTAHYDRATE 2 G: 40 INJECTION, SOLUTION INTRAVENOUS at 08:45

## 2020-09-21 RX ADMIN — FLUTICASONE PROPIONATE 1 PUFF: 110 AEROSOL, METERED RESPIRATORY (INHALATION) at 20:32

## 2020-09-21 RX ADMIN — PREDNISONE 20 MG: 20 TABLET ORAL at 18:27

## 2020-09-21 RX ADMIN — ISOSORBIDE MONONITRATE 60 MG: 60 TABLET ORAL at 08:02

## 2020-09-21 RX ADMIN — ATORVASTATIN CALCIUM 20 MG: 20 TABLET, FILM COATED ORAL at 21:18

## 2020-09-21 RX ADMIN — HEPARIN SODIUM 5000 UNITS: 5000 INJECTION INTRAVENOUS; SUBCUTANEOUS at 21:18

## 2020-09-21 RX ADMIN — Medication 100 MG: at 07:56

## 2020-09-21 RX ADMIN — PANTOPRAZOLE SODIUM 40 MG: 40 TABLET, DELAYED RELEASE ORAL at 06:53

## 2020-09-21 RX ADMIN — ACETAMINOPHEN 650 MG: 325 TABLET ORAL at 23:16

## 2020-09-21 ASSESSMENT — PAIN DESCRIPTION - DESCRIPTORS
DESCRIPTORS: ACHING;CRAMPING;TIGHTNESS
DESCRIPTORS: ACHING

## 2020-09-21 ASSESSMENT — PAIN DESCRIPTION - ORIENTATION
ORIENTATION: RIGHT;LEFT
ORIENTATION: RIGHT;LEFT
ORIENTATION: RIGHT

## 2020-09-21 ASSESSMENT — PAIN DESCRIPTION - LOCATION
LOCATION: LEG

## 2020-09-21 ASSESSMENT — PAIN DESCRIPTION - PROGRESSION
CLINICAL_PROGRESSION: NOT CHANGED
CLINICAL_PROGRESSION: NOT CHANGED

## 2020-09-21 ASSESSMENT — PAIN SCALES - GENERAL
PAINLEVEL_OUTOF10: 10
PAINLEVEL_OUTOF10: 7
PAINLEVEL_OUTOF10: 10
PAINLEVEL_OUTOF10: 0
PAINLEVEL_OUTOF10: 3
PAINLEVEL_OUTOF10: 5

## 2020-09-21 ASSESSMENT — PAIN DESCRIPTION - ONSET
ONSET: UNABLE TO TELL
ONSET: ON-GOING

## 2020-09-21 ASSESSMENT — PAIN DESCRIPTION - FREQUENCY
FREQUENCY: INTERMITTENT
FREQUENCY: INTERMITTENT

## 2020-09-21 ASSESSMENT — PAIN - FUNCTIONAL ASSESSMENT
PAIN_FUNCTIONAL_ASSESSMENT: PREVENTS OR INTERFERES SOME ACTIVE ACTIVITIES AND ADLS
PAIN_FUNCTIONAL_ASSESSMENT: PREVENTS OR INTERFERES SOME ACTIVE ACTIVITIES AND ADLS

## 2020-09-21 ASSESSMENT — PAIN DESCRIPTION - PAIN TYPE
TYPE: ACUTE PAIN
TYPE: ACUTE PAIN
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
## 2020-09-21 NOTE — PROGRESS NOTES
Evaluated the patient prior to planned discharge this evening pending bowel movements today. Patient complaining of right knee pain. Evaluated her knee which demonstrated joint effusion. On questioning admitted to history of gout with extensive ETOH history. Consulted orthopedics for joint aspiration.      Lila Salmon DO  PGY-2, Internal medicine resident  [facility] [place], [place]  9/21/2020 5:34 PM

## 2020-09-21 NOTE — PLAN OF CARE
Problem: Falls - Risk of:  Goal: Will remain free from falls  Description: Pt remains free of falls at this time. Bed locked in lowest position, siderails x2, call light in reach. Bed alarm set. Non-skid footwear applied. Will continue to monitor. 9/21/2020 0709 by Сергей Isaacs RN  Outcome: Met This Shift  Note: Patient's bed remained locked and in lowest position throughout shift. Patient belonings and call light remain within reach. 2/4 side rails are up, and non-skid footwear is on.      Problem: Confusion - Acute:  Goal: Absence of continued neurological deterioration signs and symptoms  9/21/2020 0709 by Сергей Isaacs RN  Outcome: Ongoing  9/20/2020 1815 by Cristal Roberto RN  Outcome: Ongoing   Electronically signed by Сергей Isaacs RN on 9/21/2020 at 7:09 AM
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
## 2020-09-22 LAB
ABSOLUTE EOS #: 0 K/UL (ref 0–0.4)
ABSOLUTE IMMATURE GRANULOCYTE: 0 K/UL (ref 0–0.3)
ABSOLUTE LYMPH #: 0.36 K/UL (ref 1–4.8)
ABSOLUTE MONO #: 0.31 K/UL (ref 0.1–0.8)
ANION GAP SERPL CALCULATED.3IONS-SCNC: 11 MMOL/L (ref 9–17)
BASOPHILS # BLD: 0 % (ref 0–2)
BASOPHILS ABSOLUTE: 0 K/UL (ref 0–0.2)
BUN BLDV-MCNC: 9 MG/DL (ref 6–20)
BUN/CREAT BLD: ABNORMAL (ref 9–20)
C-REACTIVE PROTEIN: 25.6 MG/L (ref 0–5)
CALCIUM SERPL-MCNC: 8.9 MG/DL (ref 8.6–10.4)
CHLORIDE BLD-SCNC: 104 MMOL/L (ref 98–107)
CO2: 22 MMOL/L (ref 20–31)
CREAT SERPL-MCNC: 0.79 MG/DL (ref 0.5–0.9)
CULTURE: NORMAL
DIFFERENTIAL TYPE: ABNORMAL
DIRECT EXAM: NORMAL
DIRECT EXAM: NORMAL
EOSINOPHILS RELATIVE PERCENT: 0 % (ref 1–4)
GFR AFRICAN AMERICAN: >60 ML/MIN
GFR NON-AFRICAN AMERICAN: >60 ML/MIN
GFR SERPL CREATININE-BSD FRML MDRD: ABNORMAL ML/MIN/{1.73_M2}
GFR SERPL CREATININE-BSD FRML MDRD: ABNORMAL ML/MIN/{1.73_M2}
GLUCOSE BLD-MCNC: 139 MG/DL (ref 70–99)
HCT VFR BLD CALC: 29.9 % (ref 36.3–47.1)
HEMOGLOBIN: 10 G/DL (ref 11.9–15.1)
IMMATURE GRANULOCYTES: 0 %
LYMPHOCYTES # BLD: 7 % (ref 24–44)
Lab: NORMAL
Lab: NORMAL
MCH RBC QN AUTO: 31.5 PG (ref 25.2–33.5)
MCHC RBC AUTO-ENTMCNC: 33.4 G/DL (ref 28.4–34.8)
MCV RBC AUTO: 94.3 FL (ref 82.6–102.9)
MONOCYTES # BLD: 6 % (ref 1–7)
MORPHOLOGY: NORMAL
NRBC AUTOMATED: 0 PER 100 WBC
PDW BLD-RTO: 14.3 % (ref 11.8–14.4)
PLATELET # BLD: 211 K/UL (ref 138–453)
PLATELET ESTIMATE: ABNORMAL
PMV BLD AUTO: 9.9 FL (ref 8.1–13.5)
POTASSIUM SERPL-SCNC: 3.6 MMOL/L (ref 3.7–5.3)
RBC # BLD: 3.17 M/UL (ref 3.95–5.11)
RBC # BLD: ABNORMAL 10*6/UL
SEDIMENTATION RATE, ERYTHROCYTE: 48 MM (ref 0–30)
SEG NEUTROPHILS: 87 % (ref 36–66)
SEGMENTED NEUTROPHILS ABSOLUTE COUNT: 4.53 K/UL (ref 1.8–7.7)
SODIUM BLD-SCNC: 137 MMOL/L (ref 135–144)
SPECIMEN DESCRIPTION: NORMAL
SPECIMEN DESCRIPTION: NORMAL
URIC ACID: 7.3 MG/DL (ref 2.4–5.7)
WBC # BLD: 5.2 K/UL (ref 3.5–11.3)
WBC # BLD: ABNORMAL 10*3/UL

## 2020-09-22 PROCEDURE — 94640 AIRWAY INHALATION TREATMENT: CPT

## 2020-09-22 PROCEDURE — 99232 SBSQ HOSP IP/OBS MODERATE 35: CPT | Performed by: INTERNAL MEDICINE

## 2020-09-22 PROCEDURE — 36415 COLL VENOUS BLD VENIPUNCTURE: CPT

## 2020-09-22 PROCEDURE — 2580000003 HC RX 258: Performed by: STUDENT IN AN ORGANIZED HEALTH CARE EDUCATION/TRAINING PROGRAM

## 2020-09-22 PROCEDURE — G0008 ADMIN INFLUENZA VIRUS VAC: HCPCS | Performed by: INTERNAL MEDICINE

## 2020-09-22 PROCEDURE — 87102 FUNGUS ISOLATION CULTURE: CPT

## 2020-09-22 PROCEDURE — 6360000002 HC RX W HCPCS: Performed by: INTERNAL MEDICINE

## 2020-09-22 PROCEDURE — 89051 BODY FLUID CELL COUNT: CPT

## 2020-09-22 PROCEDURE — 2060000000 HC ICU INTERMEDIATE R&B

## 2020-09-22 PROCEDURE — 6370000000 HC RX 637 (ALT 250 FOR IP): Performed by: INTERNAL MEDICINE

## 2020-09-22 PROCEDURE — 84550 ASSAY OF BLOOD/URIC ACID: CPT

## 2020-09-22 PROCEDURE — 6360000002 HC RX W HCPCS: Performed by: STUDENT IN AN ORGANIZED HEALTH CARE EDUCATION/TRAINING PROGRAM

## 2020-09-22 PROCEDURE — 0S9C3ZX DRAINAGE OF RIGHT KNEE JOINT, PERCUTANEOUS APPROACH, DIAGNOSTIC: ICD-10-PCS | Performed by: ORTHOPAEDIC SURGERY

## 2020-09-22 PROCEDURE — 97116 GAIT TRAINING THERAPY: CPT

## 2020-09-22 PROCEDURE — 85652 RBC SED RATE AUTOMATED: CPT

## 2020-09-22 PROCEDURE — 87040 BLOOD CULTURE FOR BACTERIA: CPT

## 2020-09-22 PROCEDURE — 90686 IIV4 VACC NO PRSV 0.5 ML IM: CPT | Performed by: INTERNAL MEDICINE

## 2020-09-22 PROCEDURE — 87116 MYCOBACTERIA CULTURE: CPT

## 2020-09-22 PROCEDURE — 89060 EXAM SYNOVIAL FLUID CRYSTALS: CPT

## 2020-09-22 PROCEDURE — 99231 SBSQ HOSP IP/OBS SF/LOW 25: CPT | Performed by: ORTHOPAEDIC SURGERY

## 2020-09-22 PROCEDURE — 94761 N-INVAS EAR/PLS OXIMETRY MLT: CPT

## 2020-09-22 PROCEDURE — 80048 BASIC METABOLIC PNL TOTAL CA: CPT

## 2020-09-22 PROCEDURE — 86140 C-REACTIVE PROTEIN: CPT

## 2020-09-22 PROCEDURE — 87206 SMEAR FLUORESCENT/ACID STAI: CPT

## 2020-09-22 PROCEDURE — 85025 COMPLETE CBC W/AUTO DIFF WBC: CPT

## 2020-09-22 PROCEDURE — 6370000000 HC RX 637 (ALT 250 FOR IP): Performed by: STUDENT IN AN ORGANIZED HEALTH CARE EDUCATION/TRAINING PROGRAM

## 2020-09-22 PROCEDURE — 87070 CULTURE OTHR SPECIMN AEROBIC: CPT

## 2020-09-22 PROCEDURE — 97535 SELF CARE MNGMENT TRAINING: CPT

## 2020-09-22 PROCEDURE — 87075 CULTR BACTERIA EXCEPT BLOOD: CPT

## 2020-09-22 PROCEDURE — 97110 THERAPEUTIC EXERCISES: CPT

## 2020-09-22 RX ORDER — OXYCODONE HYDROCHLORIDE AND ACETAMINOPHEN 5; 325 MG/1; MG/1
1 TABLET ORAL EVERY 6 HOURS PRN
Status: DISCONTINUED | OUTPATIENT
Start: 2020-09-22 | End: 2020-09-24 | Stop reason: HOSPADM

## 2020-09-22 RX ORDER — PREDNISONE 20 MG/1
40 TABLET ORAL DAILY
Status: DISCONTINUED | OUTPATIENT
Start: 2020-09-23 | End: 2020-09-24 | Stop reason: HOSPADM

## 2020-09-22 RX ADMIN — Medication 100 MG: at 09:22

## 2020-09-22 RX ADMIN — OXYCODONE HYDROCHLORIDE AND ACETAMINOPHEN 1 TABLET: 5; 325 TABLET ORAL at 14:11

## 2020-09-22 RX ADMIN — ATORVASTATIN CALCIUM 20 MG: 20 TABLET, FILM COATED ORAL at 21:11

## 2020-09-22 RX ADMIN — LORAZEPAM 1 MG: 1 TABLET ORAL at 11:45

## 2020-09-22 RX ADMIN — HEPARIN SODIUM 5000 UNITS: 5000 INJECTION INTRAVENOUS; SUBCUTANEOUS at 14:11

## 2020-09-22 RX ADMIN — Medication 10 ML: at 09:29

## 2020-09-22 RX ADMIN — ASPIRIN 81 MG: 81 TABLET, CHEWABLE ORAL at 09:21

## 2020-09-22 RX ADMIN — OXYCODONE HYDROCHLORIDE AND ACETAMINOPHEN 1 TABLET: 5; 325 TABLET ORAL at 21:11

## 2020-09-22 RX ADMIN — CEFTRIAXONE SODIUM 1 G: 1 INJECTION, POWDER, FOR SOLUTION INTRAMUSCULAR; INTRAVENOUS at 14:05

## 2020-09-22 RX ADMIN — ACETAMINOPHEN 650 MG: 325 TABLET ORAL at 09:14

## 2020-09-22 RX ADMIN — AMLODIPINE BESYLATE 10 MG: 10 TABLET ORAL at 09:21

## 2020-09-22 RX ADMIN — PANTOPRAZOLE SODIUM 40 MG: 40 TABLET, DELAYED RELEASE ORAL at 05:40

## 2020-09-22 RX ADMIN — CIPROFLOXACIN 500 MG: 500 TABLET ORAL at 09:21

## 2020-09-22 RX ADMIN — FLUTICASONE PROPIONATE 1 PUFF: 110 AEROSOL, METERED RESPIRATORY (INHALATION) at 09:23

## 2020-09-22 RX ADMIN — SPIRONOLACTONE 25 MG: 25 TABLET ORAL at 09:22

## 2020-09-22 RX ADMIN — ISOSORBIDE MONONITRATE 60 MG: 60 TABLET ORAL at 09:21

## 2020-09-22 RX ADMIN — FOLIC ACID 1 MG: 1 TABLET ORAL at 09:22

## 2020-09-22 RX ADMIN — FLUTICASONE PROPIONATE 1 PUFF: 110 AEROSOL, METERED RESPIRATORY (INHALATION) at 20:42

## 2020-09-22 RX ADMIN — INFLUENZA A VIRUS A/VICTORIA/2454/2019 IVR-207 (H1N1) ANTIGEN (PROPIOLACTONE INACTIVATED), INFLUENZA A VIRUS A/HONG KONG/2671/2019 IVR-208 (H3N2) ANTIGEN (PROPIOLACTONE INACTIVATED), INFLUENZA B VIRUS B/VICTORIA/705/2018 BVR-11 ANTIGEN (PROPIOLACTONE INACTIVATED), INFLUENZA B VIRUS B/PHUKET/3073/2013 BVR-1B ANTIGEN (PROPIOLACTONE INACTIVATED) 0.5 ML: 15; 15; 15; 15 INJECTION, SUSPENSION INTRAMUSCULAR at 11:13

## 2020-09-22 RX ADMIN — PREDNISONE 20 MG: 20 TABLET ORAL at 09:22

## 2020-09-22 RX ADMIN — HEPARIN SODIUM 5000 UNITS: 5000 INJECTION INTRAVENOUS; SUBCUTANEOUS at 05:40

## 2020-09-22 RX ADMIN — HEPARIN SODIUM 5000 UNITS: 5000 INJECTION INTRAVENOUS; SUBCUTANEOUS at 21:11

## 2020-09-22 RX ADMIN — THERA TABS 1 TABLET: TAB at 09:22

## 2020-09-22 ASSESSMENT — PAIN DESCRIPTION - LOCATION
LOCATION: KNEE
LOCATION_2: KNEE

## 2020-09-22 ASSESSMENT — PAIN SCALES - GENERAL
PAINLEVEL_OUTOF10: 10
PAINLEVEL_OUTOF10: 4
PAINLEVEL_OUTOF10: 10
PAINLEVEL_OUTOF10: 4
PAINLEVEL_OUTOF10: 7
PAINLEVEL_OUTOF10: 6
PAINLEVEL_OUTOF10: 10
PAINLEVEL_OUTOF10: 10

## 2020-09-22 ASSESSMENT — PAIN DESCRIPTION - ONSET
ONSET: ON-GOING
ONSET: ON-GOING

## 2020-09-22 ASSESSMENT — PAIN DESCRIPTION - FREQUENCY
FREQUENCY: CONTINUOUS
FREQUENCY: INTERMITTENT
FREQUENCY: INTERMITTENT

## 2020-09-22 ASSESSMENT — PAIN DESCRIPTION - INTENSITY: RATING_2: 8

## 2020-09-22 ASSESSMENT — PAIN DESCRIPTION - DURATION: DURATION_2: CONTINUOUS

## 2020-09-22 ASSESSMENT — PAIN DESCRIPTION - ORIENTATION
ORIENTATION: RIGHT
ORIENTATION_2: RIGHT
ORIENTATION: RIGHT
ORIENTATION: LEFT

## 2020-09-22 ASSESSMENT — PAIN DESCRIPTION - DESCRIPTORS
DESCRIPTORS: SHARP;SPASM
DESCRIPTORS_2: ACHING;DISCOMFORT;SORE
DESCRIPTORS: ACHING
DESCRIPTORS: ACHING;DISCOMFORT;SORE

## 2020-09-22 ASSESSMENT — PAIN DESCRIPTION - PAIN TYPE
TYPE_2: ACUTE PAIN
TYPE: ACUTE PAIN
TYPE: ACUTE PAIN

## 2020-09-22 ASSESSMENT — PAIN DESCRIPTION - PROGRESSION: CLINICAL_PROGRESSION: NOT CHANGED
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
## 2020-09-22 NOTE — PROGRESS NOTES
catheterization; and other surgical history (8/24/2015). Restrictions  Restrictions/Precautions  Restrictions/Precautions: General Precautions, Fall Risk, Seizure  Required Braces or Orthoses?: No  Position Activity Restriction  Other position/activity restrictions: up with A  Subjective   General  Response To Previous Treatment: Patient with no complaints from previous session. Family / Caregiver Present: No  Subjective  Subjective: Pt and RN agreeable to PT. Pt alert in bed upon arrival, pleasant and cooperative t/o treatment. C/o pain in right knee 10/10. General Comment  Comments: Pt left in bed with call light within reach, alarm activated  Pain Screening  Patient Currently in Pain: Yes  Pain Assessment  Pain Assessment: 0-10  Pain Level: 10  Pain Location: Knee  Pain Orientation: Right  Pain Descriptors: Sharp;Spasm  Pain Frequency: Intermittent  Pain Onset: On-going  Functional Pain Assessment: Prevents or interferes some active activities and ADLs  Non-Pharmaceutical Pain Intervention(s): Ambulation/Increased Activity;Repositioned  Vital Signs  Patient Currently in Pain: Yes       Orientation  Orientation  Overall Orientation Status: Within Functional Limits  Cognition      Objective   Bed mobility  Rolling to Right: Modified independent  Supine to Sit: Modified independent  Sit to Supine: Contact guard assistance  Scooting: Modified independent  Comment: vc's for sequencing with good carryover  Transfers  Sit to Stand: Contact guard assistance  Stand to sit: Contact guard assistance  Comment: vc's for UE placement with fair return demo, STS completed to RW  Ambulation  Ambulation?: Yes  Ambulation 1  Surface: level tile  Device: Rolling Walker  Assistance: Contact guard assistance  Quality of Gait: antalgic, step to gait  Gait Deviations: Increased ALTAGRACIA; Slow Kelli;Decreased step length  Distance: 30ft  Comments: very high reliance on RW  Stairs/Curb  Stairs?: No     Balance  Posture: Fair  Sitting - Static: Good  Sitting - Dynamic: Good  Standing - Static: Fair  Standing - Dynamic: Fair;-  Comments: standing balance assessed with RW  Exercises  Straight Leg Raise: AROM 15x bilat  Quad Sets: AROM 15x bilat  Heelslides: AROM 15x bilat  Gluteal Sets: AROM 15x bilat  Hip Abduction: AROM 15x bilat  Ankle Pumps: AROM 15x bilat     Goals  Short term goals  Time Frame for Short term goals: 12 visits  Short term goal 1: independent bed mobility  Short term goal 2: independent transfers  Short term goal 3: independent gait with appropriate device x 100'  Short term goal 4: stair ambulation x 3 steps with B HRs and SBA+1  Patient Goals   Patient goals : feel better, stop falling    Plan    Plan  Times per week: 5-6 visits weekly  Times per day: Daily  Current Treatment Recommendations: Strengthening, ROM, Balance Training, Functional Mobility Training, Transfer Training, Endurance Training, Gait Training, Stair training, Pain Management, Home Exercise Program, Safety Education & Training, Patient/Caregiver Education & Training, Positioning  Safety Devices  Type of devices: Call light within reach, Gait belt, Patient at risk for falls, Nurse notified, All fall risk precautions in place, Bed alarm in place, Left in bed  Restraints  Initially in place: No     Therapy Time   Individual Concurrent Group Co-treatment   Time In 1411         Time Out 1434         Minutes 23         Timed Code Treatment Minutes: [address], [place]

## 2020-09-22 NOTE — PROGRESS NOTES
Occupational Therapy  Facility/Department: [address] STEPDOWN  Daily Treatment Note  NAME: Minh Barrios  : 1965  MRN: [id number]    Date of Service: 2020    Discharge Recommendations:  Patient would benefit from continued therapy after discharge       Assessment   Performance deficits / Impairments: Decreased functional mobility ; Decreased endurance;Decreased ADL status; Decreased balance;Decreased safe awareness;Decreased high-level IADLs  Prognosis: Good  Patient Education: OT POC, transfer/walker safety, importance of participation in therapy, pain mgnt - pt verbalized understanding. REQUIRES OT FOLLOW UP: Yes  Activity Tolerance  Activity Tolerance: Patient Tolerated treatment well  Safety Devices  Safety Devices in place: Yes  Type of devices: Call light within reach; Chair alarm in place;Gait belt;Patient at risk for falls; Left in chair;Nurse notified         Patient Diagnosis(es): The encounter diagnosis was Acute renal failure, unspecified acute renal failure type ([address] has a past medical history of ROXANN (acute kidney injury) ([address] Alcohol abuse, Angioedema, Anxiety, Asthma, Bipolar 1 disorder ([address] Bipolar disorder ([address] CAD (coronary artery disease), Claustrophobia, Cocaine abuse ([address] Depression, GERD (gastroesophageal reflux disease), Hypertension, Hypertrophic cardiomyopathy ([address] Lung nodules, MDRO (multiple drug resistant organisms) resistance, MRSA (methicillin resistant Staphylococcus aureus), Murmur, OA (osteoarthritis), Obesity, PUD (peptic ulcer disease), Thyroid nodule, Tonsillar hypertrophy, and Type 2 diabetes mellitus without complication, without long-term current use of insulin ([address] has a past surgical history that includes Hysterectomy; Upper gastrointestinal endoscopy; Colonoscopy; Thyroid surgery; Cardiac catheterization; and other surgical history (2015).     Restrictions  Restrictions/Precautions  Restrictions/Precautions: General Precautions, Fall Risk, Seizure  Required Braces or Orthoses?: No  Position Activity Restriction  Other position/activity restrictions: up with A  Subjective   General  Patient assessed for rehabilitation services?: Yes  Family / Caregiver Present: No  Pain Assessment  Pain Assessment: 0-10  Pain Level: 6  Pain Type: Acute pain  Pain Location: Knee  Pain Orientation: Left  Pain Descriptors: Aching;Discomfort; Sore  Pain Frequency: Continuous  Non-Pharmaceutical Pain Intervention(s): Repositioned;Rest;Therapeutic presence  Multiple Pain Sites: Yes  Vital Signs  Patient Currently in Pain: Yes   Orientation  Orientation  Overall Orientation Status: Within Functional Limits  Objective    ADL  Grooming: Setup; Independent(Oral care seated in chair.)  UE Bathing: Setup;Supervision(Mod A for back seated in chair.)  LE Bathing: Setup;Supervision  UE Dressing: Contact guard assistance(To manage gown.)  LE Dressing: Supervision(Doff/ely socks seated in chair.)  Toileting: Setup;Stand by assistance(Pericre/bottom care standing at chair.)  Additional Comments: Pt transferred to seated in chair to complete ADL care. Balance  Sitting Balance: Supervision(Seated EOB/chair.)  Standing Balance  Time: 4 minutes  Activity: Functional mobility through room using RW, pericare in standing at chair. Functional Mobility  Functional - Mobility Device: Rolling Walker  Activity: Other  Assist Level: Contact guard assistance  Bed mobility  Supine to Sit: Supervision  Scooting: Modified independent  Transfers  Sit to stand: Contact guard assistance  Stand to sit: Contact guard assistance  Transfer Comments: Transfers completed x2. Cognition  Overall Cognitive Status: [facility]    Plan   Plan  Times per week: 3-4x/week  Current Treatment Recommendations: Balance Training, Functional Mobility Training, Endurance Training, Safety Education & Training, Self-Care / ADL, Equipment Evaluation, Education, & procurement  Goals  Short term goals  Time Frame for Short term goals:  By discharge, pt will  Short term goal 1: demo I in UE ADLs  Short term goal 2: demo sup during LE ADLs with increased time as needed  Short term goal 3: demo sup during functional mobility/transfers using LRD with good safety awareness  Short term goal 4: demo 30+ min activity tolerance during ADLs/functional activities with 1 rest break as needed  Short term goal 5: demo 8+ min dynamic standing tolerance during ADLs/functional activities using LRD with good balance  Short term goal 6: demo understanding of safety awareness, fall prevention and ECWS during ADLs/functional activities with 1 VC as needed       Therapy Time   Individual Concurrent Group Co-treatment   Time In 1455         Time Out 1542         Minutes 47         Timed Code Treatment Minutes: 47 Minutes     Pt supine in bed upon therapist arrival. Pleasant and agreeable to therapy. See above for LOF for all tasks.  Pt retired to seated in chair at end of session with chair alarm activated and call light within reach    [employer], SHORE/L
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
## 2020-09-23 LAB
ABSOLUTE EOS #: 0.07 K/UL (ref 0–0.44)
ABSOLUTE IMMATURE GRANULOCYTE: <0.03 K/UL (ref 0–0.3)
ABSOLUTE LYMPH #: 0.94 K/UL (ref 1.1–3.7)
ABSOLUTE MONO #: 1.22 K/UL (ref 0.1–1.2)
ANION GAP SERPL CALCULATED.3IONS-SCNC: 10 MMOL/L (ref 9–17)
APPEARANCE FLUID: NORMAL
BASO FLUID: NORMAL %
BASOPHILS # BLD: 1 % (ref 0–2)
BASOPHILS ABSOLUTE: 0.03 K/UL (ref 0–0.2)
BUN BLDV-MCNC: 8 MG/DL (ref 6–20)
BUN/CREAT BLD: ABNORMAL (ref 9–20)
C-REACTIVE PROTEIN: 36.9 MG/L (ref 0–5)
CALCIUM SERPL-MCNC: 8.4 MG/DL (ref 8.6–10.4)
CCP IGG ANTIBODIES: <1.5 U/ML
CHLORIDE BLD-SCNC: 104 MMOL/L (ref 98–107)
CO2: 21 MMOL/L (ref 20–31)
COLOR FLUID: NORMAL
CREAT SERPL-MCNC: 0.69 MG/DL (ref 0.5–0.9)
CRYSTALS, FLUID: NEGATIVE
DIFFERENTIAL TYPE: ABNORMAL
EOSINOPHIL FLUID: NORMAL %
EOSINOPHILS RELATIVE PERCENT: 1 % (ref 1–4)
FLUID DIFF COMMENT: NORMAL
GFR AFRICAN AMERICAN: >60 ML/MIN
GFR NON-AFRICAN AMERICAN: >60 ML/MIN
GFR SERPL CREATININE-BSD FRML MDRD: ABNORMAL ML/MIN/{1.73_M2}
GFR SERPL CREATININE-BSD FRML MDRD: ABNORMAL ML/MIN/{1.73_M2}
GLUCOSE BLD-MCNC: 95 MG/DL (ref 70–99)
HCT VFR BLD CALC: 28.8 % (ref 36.3–47.1)
HEMOGLOBIN: 9.3 G/DL (ref 11.9–15.1)
IMMATURE GRANULOCYTES: 0 %
LYMPHOCYTES # BLD: 16 % (ref 24–43)
LYMPHOCYTES, BODY FLUID: 0 %
MAGNESIUM: 1.3 MG/DL (ref 1.6–2.6)
MCH RBC QN AUTO: 31.4 PG (ref 25.2–33.5)
MCHC RBC AUTO-ENTMCNC: 32.3 G/DL (ref 28.4–34.8)
MCV RBC AUTO: 97.3 FL (ref 82.6–102.9)
MONOCYTE, FLUID: NORMAL %
MONOCYTES # BLD: 21 % (ref 3–12)
NEUTROPHIL, FLUID: 90 %
NRBC AUTOMATED: 0 PER 100 WBC
OTHER CELLS FLUID: NORMAL %
PDW BLD-RTO: 14.4 % (ref 11.8–14.4)
PLATELET # BLD: 223 K/UL (ref 138–453)
PLATELET ESTIMATE: ABNORMAL
PMV BLD AUTO: 10.1 FL (ref 8.1–13.5)
POTASSIUM SERPL-SCNC: 3 MMOL/L (ref 3.7–5.3)
RBC # BLD: 2.96 M/UL (ref 3.95–5.11)
RBC # BLD: ABNORMAL 10*6/UL
RBC FLUID: NORMAL /MM3
RHEUMATOID FACTOR: 12.4 IU/ML
RHEUMATOID FACTOR: 13.2 IU/ML
SEG NEUTROPHILS: 62 % (ref 36–65)
SEGMENTED NEUTROPHILS ABSOLUTE COUNT: 3.69 K/UL (ref 1.5–8.1)
SODIUM BLD-SCNC: 135 MMOL/L (ref 135–144)
SPECIMEN TYPE: NORMAL
SPECIMEN TYPE: NORMAL
WBC # BLD: 6 K/UL (ref 3.5–11.3)
WBC # BLD: ABNORMAL 10*3/UL
WBC FLUID: NORMAL /MM3

## 2020-09-23 PROCEDURE — 6370000000 HC RX 637 (ALT 250 FOR IP): Performed by: STUDENT IN AN ORGANIZED HEALTH CARE EDUCATION/TRAINING PROGRAM

## 2020-09-23 PROCEDURE — 94640 AIRWAY INHALATION TREATMENT: CPT

## 2020-09-23 PROCEDURE — 6360000002 HC RX W HCPCS: Performed by: STUDENT IN AN ORGANIZED HEALTH CARE EDUCATION/TRAINING PROGRAM

## 2020-09-23 PROCEDURE — 36415 COLL VENOUS BLD VENIPUNCTURE: CPT

## 2020-09-23 PROCEDURE — 97110 THERAPEUTIC EXERCISES: CPT

## 2020-09-23 PROCEDURE — 76937 US GUIDE VASCULAR ACCESS: CPT

## 2020-09-23 PROCEDURE — 83735 ASSAY OF MAGNESIUM: CPT

## 2020-09-23 PROCEDURE — 2580000003 HC RX 258: Performed by: STUDENT IN AN ORGANIZED HEALTH CARE EDUCATION/TRAINING PROGRAM

## 2020-09-23 PROCEDURE — 86140 C-REACTIVE PROTEIN: CPT

## 2020-09-23 PROCEDURE — 2060000000 HC ICU INTERMEDIATE R&B

## 2020-09-23 PROCEDURE — 86431 RHEUMATOID FACTOR QUANT: CPT

## 2020-09-23 PROCEDURE — 86200 CCP ANTIBODY: CPT

## 2020-09-23 PROCEDURE — 97116 GAIT TRAINING THERAPY: CPT

## 2020-09-23 PROCEDURE — 99222 1ST HOSP IP/OBS MODERATE 55: CPT | Performed by: INTERNAL MEDICINE

## 2020-09-23 PROCEDURE — 85025 COMPLETE CBC W/AUTO DIFF WBC: CPT

## 2020-09-23 PROCEDURE — 80048 BASIC METABOLIC PNL TOTAL CA: CPT

## 2020-09-23 PROCEDURE — 87086 URINE CULTURE/COLONY COUNT: CPT

## 2020-09-23 PROCEDURE — 99233 SBSQ HOSP IP/OBS HIGH 50: CPT | Performed by: INTERNAL MEDICINE

## 2020-09-23 PROCEDURE — 86038 ANTINUCLEAR ANTIBODIES: CPT

## 2020-09-23 RX ORDER — MAGNESIUM SULFATE IN WATER 40 MG/ML
2 INJECTION, SOLUTION INTRAVENOUS ONCE
Status: COMPLETED | OUTPATIENT
Start: 2020-09-23 | End: 2020-09-23

## 2020-09-23 RX ORDER — CIPROFLOXACIN 500 MG/1
500 TABLET, FILM COATED ORAL EVERY 12 HOURS SCHEDULED
Status: DISCONTINUED | OUTPATIENT
Start: 2020-09-23 | End: 2020-09-24

## 2020-09-23 RX ORDER — POTASSIUM CHLORIDE 20 MEQ/1
40 TABLET, EXTENDED RELEASE ORAL ONCE
Status: DISCONTINUED | OUTPATIENT
Start: 2020-09-23 | End: 2020-09-23

## 2020-09-23 RX ORDER — POTASSIUM CHLORIDE 20 MEQ/1
40 TABLET, EXTENDED RELEASE ORAL 2 TIMES DAILY WITH MEALS
Status: COMPLETED | OUTPATIENT
Start: 2020-09-23 | End: 2020-09-23

## 2020-09-23 RX ADMIN — ASPIRIN 81 MG: 81 TABLET, CHEWABLE ORAL at 08:23

## 2020-09-23 RX ADMIN — AMLODIPINE BESYLATE 10 MG: 10 TABLET ORAL at 08:23

## 2020-09-23 RX ADMIN — PREDNISONE 40 MG: 20 TABLET ORAL at 08:23

## 2020-09-23 RX ADMIN — Medication 10 ML: at 20:08

## 2020-09-23 RX ADMIN — SPIRONOLACTONE 25 MG: 25 TABLET ORAL at 08:23

## 2020-09-23 RX ADMIN — PANTOPRAZOLE SODIUM 40 MG: 40 TABLET, DELAYED RELEASE ORAL at 06:25

## 2020-09-23 RX ADMIN — HEPARIN SODIUM 5000 UNITS: 5000 INJECTION INTRAVENOUS; SUBCUTANEOUS at 20:09

## 2020-09-23 RX ADMIN — Medication 100 MG: at 08:23

## 2020-09-23 RX ADMIN — OXYCODONE HYDROCHLORIDE AND ACETAMINOPHEN 1 TABLET: 5; 325 TABLET ORAL at 03:55

## 2020-09-23 RX ADMIN — SODIUM CHLORIDE: 9 INJECTION, SOLUTION INTRAVENOUS at 03:57

## 2020-09-23 RX ADMIN — HEPARIN SODIUM 5000 UNITS: 5000 INJECTION INTRAVENOUS; SUBCUTANEOUS at 13:34

## 2020-09-23 RX ADMIN — POTASSIUM CHLORIDE 40 MEQ: 1500 TABLET, EXTENDED RELEASE ORAL at 10:52

## 2020-09-23 RX ADMIN — ATORVASTATIN CALCIUM 20 MG: 20 TABLET, FILM COATED ORAL at 20:08

## 2020-09-23 RX ADMIN — OXYCODONE HYDROCHLORIDE AND ACETAMINOPHEN 1 TABLET: 5; 325 TABLET ORAL at 09:39

## 2020-09-23 RX ADMIN — CEFTRIAXONE SODIUM 1 G: 1 INJECTION, POWDER, FOR SOLUTION INTRAMUSCULAR; INTRAVENOUS at 10:52

## 2020-09-23 RX ADMIN — OXYCODONE HYDROCHLORIDE AND ACETAMINOPHEN 1 TABLET: 5; 325 TABLET ORAL at 21:53

## 2020-09-23 RX ADMIN — THERA TABS 1 TABLET: TAB at 08:23

## 2020-09-23 RX ADMIN — ISOSORBIDE MONONITRATE 60 MG: 60 TABLET ORAL at 08:22

## 2020-09-23 RX ADMIN — CIPROFLOXACIN 500 MG: 500 TABLET ORAL at 20:08

## 2020-09-23 RX ADMIN — OXYCODONE HYDROCHLORIDE AND ACETAMINOPHEN 1 TABLET: 5; 325 TABLET ORAL at 16:00

## 2020-09-23 RX ADMIN — FOLIC ACID 1 MG: 1 TABLET ORAL at 08:23

## 2020-09-23 RX ADMIN — POTASSIUM CHLORIDE 40 MEQ: 1500 TABLET, EXTENDED RELEASE ORAL at 16:00

## 2020-09-23 RX ADMIN — FLUTICASONE PROPIONATE 1 PUFF: 110 AEROSOL, METERED RESPIRATORY (INHALATION) at 20:02

## 2020-09-23 RX ADMIN — HEPARIN SODIUM 5000 UNITS: 5000 INJECTION INTRAVENOUS; SUBCUTANEOUS at 06:25

## 2020-09-23 RX ADMIN — MAGNESIUM SULFATE 2 G: 2 INJECTION INTRAVENOUS at 12:24

## 2020-09-23 ASSESSMENT — PAIN DESCRIPTION - DURATION: DURATION_2: CONTINUOUS

## 2020-09-23 ASSESSMENT — PAIN SCALES - GENERAL
PAINLEVEL_OUTOF10: 0
PAINLEVEL_OUTOF10: 10
PAINLEVEL_OUTOF10: 8
PAINLEVEL_OUTOF10: 10
PAINLEVEL_OUTOF10: 7
PAINLEVEL_OUTOF10: 7

## 2020-09-23 ASSESSMENT — PAIN DESCRIPTION - DESCRIPTORS
DESCRIPTORS: ACHING;DISCOMFORT;SORE
DESCRIPTORS_2: ACHING

## 2020-09-23 ASSESSMENT — ENCOUNTER SYMPTOMS: TACHYPNEA: 1

## 2020-09-23 ASSESSMENT — PAIN DESCRIPTION - PAIN TYPE
TYPE_2: ACUTE PAIN
TYPE: ACUTE PAIN

## 2020-09-23 ASSESSMENT — PAIN DESCRIPTION - PROGRESSION: CLINICAL_PROGRESSION: NOT CHANGED

## 2020-09-23 ASSESSMENT — PAIN - FUNCTIONAL ASSESSMENT: PAIN_FUNCTIONAL_ASSESSMENT: PREVENTS OR INTERFERES SOME ACTIVE ACTIVITIES AND ADLS

## 2020-09-23 ASSESSMENT — PAIN DESCRIPTION - LOCATION
LOCATION: KNEE
LOCATION_2: KNEE

## 2020-09-23 ASSESSMENT — PAIN DESCRIPTION - ORIENTATION
ORIENTATION: LEFT
ORIENTATION_2: RIGHT

## 2020-09-23 ASSESSMENT — PAIN DESCRIPTION - FREQUENCY: FREQUENCY: CONTINUOUS

## 2020-09-23 ASSESSMENT — PAIN DESCRIPTION - INTENSITY: RATING_2: 6

## 2020-09-23 ASSESSMENT — PAIN DESCRIPTION - ONSET: ONSET: ON-GOING

## 2020-09-23 NOTE — DISCHARGE INSTR - COC
Continuity of Care Form    Patient Name: Nathanael Pyle   :  1965  MRN:  [id number]    Admit date:  2020  Discharge date:  ***    Code Status Order: Full Code   Advance Directives:      Admitting Physician:  Josie Anderson MD  PCP: Vito Almendarez MD    Discharging Nurse: [facility] Unit/Room#: 2056/8863-53  Discharging Unit Phone Number: ***    Emergency Contact:   Extended Emergency Contact Information  Primary Emergency Contact: Sheryl Pelayo  Address: N/A  Home Phone: [phone]  Relation: Aunt/Uncle   needed? No    Past Surgical History:  Past Surgical History:   Procedure Laterality Date    CARDIAC CATHETERIZATION      COLONOSCOPY      bx    HYSTERECTOMY      partial hyst    OTHER SURGICAL HISTORY  2015    MRI under anesthesia    THYROID SURGERY      bilat bx    UPPER GASTROINTESTINAL ENDOSCOPY         Immunization History:   Immunization History   Administered Date(s) Administered    Influenza Vaccine, unspecified formulation 2017, 10/09/2019    Influenza, Quadv, IM, (6 mo and older Fluzone, Flulaval, Fluarix and 3 yrs and older Afluria) 10/09/2019    Influenza, Quadv, IM, PF (6 mo and older Fluzone, Flulaval, Fluarix, and 3 yrs and older Afluria) 2017, 2020    Pneumococcal Polysaccharide ([id number]) 2017    Tdap (Boostrix, Adacel) 2014, 2017       Active Problems:  Patient Active Problem List   Diagnosis Code    Lung nodule R91.1    Obesity E66.9    Adrenal adenoma D35.00    Goiter E04.9    Alcohol abuse F10.10    Essential hypertension I10    Enlarged tonsils J35.1    ACE inhibitor-aggravated angioedema T78. 3XXA, T46.4X5A    JONES (obstructive sleep apnea) G47.33    Dyslipidemia E78.5    IGT (impaired glucose tolerance) P63.61    Acute alcoholic intoxication without complication (HCC) N37.294    Acute renal insufficiency N28.9    Effusion of bursa of right knee M25.461       Isolation/Infection:   Isolation            No Isolation Patient Infection Status       Infection Onset Added Last Indicated Last Indicated By Review Planned Expiration Resolved Resolved By    None active    Resolved    MRSA  07/10/13 07/10/13 Vicky Baker RN   10/02/19 Vicky Baker RN            Nurse Assessment:  Last Vital Signs: BP (!) 152/96   Pulse 91   Temp 99.1 °F (37.3 °C) (Oral)   Resp 19   Ht 5' 4\" (1.626 m)   Wt 169 lb (76.7 kg)   SpO2 95%   BMI 29.01 kg/m²     Last documented pain score (0-10 scale): Pain Level: 8  Last Weight:   Wt Readings from Last 1 Encounters:   09/20/20 169 lb (76.7 kg)     Mental Status:  {IP PT MENTAL STATUS:20030:::0}    IV Access:  { MARYSE IV ACCESS:935224902:::0}    Nursing Mobility/ADLs:  Walking   {CHP DME ADLs:626236012:::0}  Transfer  {CHP DME ADLs:795015084:::0}  Bathing  {CHP DME ADLs:810147705:::0}  Dressing  {CHP DME ADLs:291981143:::0}  Toileting  {CHP DME ADLs:523777693:::0}  Feeding  {CHP DME ADLs:593095131:::0}  Med Admin  {CHP DME ADLs:155841154:::0}  Med Delivery   { MARYSE MED Delivery:084492849:::0}    Wound Care Documentation and Therapy:        Elimination:  Continence: Bowel: {YES / VO:76256}  Bladder: {YES / AE:51430}  Urinary Catheter: {Urinary Catheter:268317909:::0}   Colostomy/Ileostomy/Ileal Conduit: {YES / WZ:82844}       Date of Last BM: ***    Intake/Output Summary (Last 24 hours) at 9/23/2020 1152  Last data filed at 9/23/2020 0631  Gross per 24 hour   Intake 3864 ml   Output 2350 ml   Net 1514 ml     I/O last 3 completed shifts:   In: 8313 [P.O.:1580; I.V.:2284]  Out: 2350 [Urine:2350]    Safety Concerns:     [address] MARYSE Safety Concerns:677237561:::0}    Impairments/Disabilities:      [address] MARYSE Impairments/Disabilities:595406943:::0}    Nutrition Therapy:  Current Nutrition Therapy:   [address]RA Diet List:942349067:::0}    Routes of Feeding: {CHP DME Other Feedings:873144798:::0}  Liquids: {Slp liquid thickness:18672}  Daily Fluid Restriction: {CHP DME Yes amt example:689243913:::0}  Last Modified Barium Swallow with Video (Video Swallowing Test): {Done Not Done KSBT:631069839:::7}    Treatments at the Time of Hospital Discharge:   Respiratory Treatments: ***  Oxygen Therapy:  {Therapy; copd oxygen:10802:::0}  Ventilator:    {[facility] Vent List:173580131:::0}    Rehab Therapies: {THERAPEUTIC INTERVENTION:0193668301}  Weight Bearing Status/Restrictions: {[facility] Weight Bearin:::0}  Other Medical Equipment (for information only, NOT a DME order):  {EQUIPMENT:587858261}  Other Treatments: ***    Patient's personal belongings (please select all that are sent with patient):  {CHP DME Belongings:406407221:::0}    RN SIGNATURE:  {Esignature:651400469:::0}    CASE MANAGEMENT/SOCIAL WORK SECTION    Inpatient Status Date: ***    Readmission Risk Assessment Score:  Readmission Risk              Risk of Unplanned Readmission:        17           Discharging to Facility/ Agency   Name:   Address:  Phone:  Fax:    Dialysis Facility (if applicable)   Name:  Address:  Dialysis Schedule:  Phone:  Fax:    / signature: {Esignature:632388315:::0}    PHYSICIAN SECTION    Prognosis: {Prognosis:4958745168:::0}    Condition at Discharge: Jim Villagran Patient Condition:206517238:::0}    Rehab Potential (if transferring to Rehab): {Prognosis:8677497920:::0}    Recommended Labs or Other Treatments After Discharge: ***    Physician Certification: I certify the above information and transfer of Minh Barrios  is necessary for the continuing treatment of the diagnosis listed and that she requires {Admit to Appropriate Level of Care:49841:::0} for {GREATER/LESS:118899727} 30 days.      Update Admission H&P: {CHP DME Changes in HandP:724970302:::0}    PHYSICIAN SIGNATURE:  Electronically signed by Xander Aranda MD on 20 at 11:52 AM EDT

## 2020-09-23 NOTE — PLAN OF CARE
Problem: Confusion - Acute:  Goal: Absence of continued neurological deterioration signs and symptoms  Outcome: Ongoing  Goal: Mental status will be restored to baseline  Description: Mental status will be restored to baseline  Outcome: Ongoing     Problem: Falls - Risk of:  Goal: Will remain free from falls  Description: Pt remains free of falls at this time. Bed locked in lowest position, siderails x2, call light in reach. Bed alarm set. Non-skid footwear applied. Will continue to monitor.    Outcome: Ongoing  Goal: Absence of physical injury  Outcome: Ongoing     Problem: Musculor/Skeletal Functional Status  Goal: Highest potential functional level  Outcome: Ongoing  Goal: Absence of falls  Outcome: Ongoing     Problem: Pain:  Goal: Pain level will decrease  Description: Pain level will decrease  Outcome: Ongoing  Goal: Control of acute pain  Description: Control of acute pain  Outcome: Ongoing  Goal: Control of chronic pain  Description: Control of chronic pain  Outcome: Ongoing     Problem: Skin Integrity:  Goal: Will show no infection signs and symptoms  Description: Will show no infection signs and symptoms  Outcome: Ongoing  Goal: Absence of new skin breakdown  Description: Absence of new skin breakdown  Outcome: Ongoing

## 2020-09-23 NOTE — PROGRESS NOTES
Orthopedic Progress Note    Patient:  Diana Ferro  YOB: 1965     48 y.o. female    Subjective:  Patient seen and examined this morning. Had initial relief after aspiration yesterday. Doing about the same today. Still admits to right knee pain. No new complaints or concerns at this time. No issue overnight per nursing and patient. Pain controlled on current regimen. Denies fever, HA, CP, SOB, N/V, dysuria, numbness or tingling. Vitals reviewed, afebrile    Objective:   Vitals:    09/23/20 0316   BP: (!) 147/108   Pulse: 70   Resp: 12   Temp: 98.7 °F (37.1 °C)   SpO2: 97%     Gen: NAD, cooperative  Cardiovascular: Regular rate, no dependent edema, distal pulses 2+  Respiratory: Chest symmetric, no accessory muscle use, normal respirations, no audible wheezes  MSK: Right knee. Mild effusion. Warm to touch. No erythema. ROM 0-30 no pain. Spasms between 30-60, then able to flex to 90. Ace compression present. Compartments soft and compressible. TA/EHL/FHL/GS motor intact. Deep and Superficial Peroneal/Saphenous/Sural SILT. 2+ DP pulse. Recent Labs     09/22/20  0542 09/23/20  0633   WBC 5.2 6.0   HGB 10.0* 9.3*   HCT 29.9* 28.8*    223     --    K 3.6*  --    BUN 9  --    CREATININE 0.79  --    GLUCOSE 139*  --       Meds: ASA, Ceftriaxone, Heparin   See rec for complete list    Impression/plan: 48 y.o. female being seen for right knee effusion  -History of gout     -No acute/urgent orthopedic surgical intervention at this time.  -Pending Crystals  -Aspirate: WBC 26k, Neut 90%.  Uric acid 7.4.   -CRP 25, Sed 48, WBC 6.0, Afebrile.   -Trend CRP  -Weight bearing: Weightbearing as tolerated right lower extremity  -Continue IV antibiotics vs gout treatment  -Consider ID consult  -Medicine team primary  -Pain control per primary  -DVT: Managed per primary  -Ice and elevation for pain/swelling  -Recommend PT/OT evaluation and treatment  -Please page Ortho with any questions or concerns    ----------------------------------------  Wayne Negrete DO  PGY-3, Department of [address], [place], [place]

## 2020-09-23 NOTE — PROGRESS NOTES
it was a beer.  As per the patient she had no further hallucinations.  The hallucination was visual and she was seeing someone with a gun.  Patient had no other hallucinations such as tactile or auditory. The patient complained of knee pain on the third day of admission and we found swelling of the knee and ortho centesis was done. OBJECTIVE     Vital Signs:  BP (!) 128/93   Pulse 70   Temp 98.9 °F (37.2 °C) (Oral)   Resp 13   Ht 5' 4\" (1.626 m)   Wt 169 lb (76.7 kg)   SpO2 95%   BMI 29.01 kg/m²     Temp (24hrs), Av.4 °F (37.4 °C), Min:98.7 °F (37.1 °C), Max:101.1 °F (38.4 °C)    In: 3864   Out: 2350 [Urine:2350]    Physical Exam:      Constitutional: This is a well developed, well nourished, 25-29.9 - Overweight 55 y. o. year old female who is oriented to time, place and person. Patient is in mild distress due to pain  Head:normocephalic and atraumatic.    EENT:  PERRLA.  No conjunctival injections.   Septum was midline, mucosa was without erythema, exudates or cobblestoning.  No thrush was noted. Neck: Supple without thyromegaly. No elevated JVP. Trachea was midline. Respiratory: Chest was symmetrical without dullness to percussion.  Breath sounds bilaterally were clear to auscultation. There were no wheezes, rhonchi or rales. There is no intercostal retraction or use of accessory muscles. No egophony noted. Cardiovascular: Normal S1 plus S2 with pansystolic murmur at the mitral area. Abdomen: Abdomen is soft nontender with no visceromegaly bowel sounds positive  Lymphatic: No lymphadenopathy. Musculoskeletal: Normal curvature of the spine.  No gross muscle weakness.    Extremities:  No lower extremity edema, ulcerations, tenderness, varicosities or erythema.  Muscle size, tone and strength are normal.  No involuntary movements are noted.    Skin:  Warm and dry.  Good color, turgor and pigmentation.  No lesions or scars.  No cyanosis or clubbing  Neurological/Psychiatric: The patient's general behavior, level of consciousness, thought content and emotional status is normal.        Medications:  Scheduled Medications:    magnesium sulfate  2 g Intravenous Once    potassium chloride  40 mEq Oral BID WC    predniSONE  40 mg Oral Daily    cefTRIAXone (ROCEPHIN) IV  1 g Intravenous Q24H    amLODIPine  10 mg Oral Daily    aspirin  81 mg Oral Daily    atorvastatin  20 mg Oral Nightly    fluticasone  1 puff Inhalation BID    isosorbide mononitrate  60 mg Oral Daily    pantoprazole  40 mg Oral QAM AC    spironolactone  25 mg Oral Daily    sodium chloride flush  10 mL Intravenous 2 times per day    heparin (porcine)  5,000 Units Subcutaneous 3 times per day    multivitamin  1 tablet Oral Daily    thiamine  100 mg Oral Daily    folic acid  1 mg Oral Daily     Continuous Infusions:   PRN MedicationsoxyCODONE-acetaminophen, 1 tablet, Q6H PRN  albuterol, 2.5 mg, Q4H PRN  sodium chloride flush, 10 mL, PRN  acetaminophen, 650 mg, Q6H PRN    Or  acetaminophen, 650 mg, Q6H PRN  polyethylene glycol, 17 g, Daily PRN  promethazine, 12.5 mg, Q6H PRN    Or  ondansetron, 4 mg, Q6H PRN  LORazepam, 1 mg, Q1H PRN    Or  LORazepam, 1 mg, Q1H PRN    Or  LORazepam, 2 mg, Q1H PRN    Or  LORazepam, 2 mg, Q1H PRN    Or  LORazepam, 3 mg, Q1H PRN    Or  LORazepam, 3 mg, Q1H PRN    Or  LORazepam, 4 mg, Q1H PRN    Or  LORazepam, 4 mg, Q1H PRN        Diagnostic Labs:  CBC:   Recent Labs     09/21/20 0447 09/22/20  0542 09/23/20  0633   WBC 4.5 5.2 6.0   RBC 3.28* 3.17* 2.96*   HGB 10.2* 10.0* 9.3*   HCT 30.8* 29.9* 28.8*   MCV 93.9 94.3 97.3   RDW 14.6* 14.3 14.4    211 223     BMP:   Recent Labs     09/21/20 0447 09/22/20  0542 09/23/20  0633    137 135   K 3.0* 3.6* 3.0*    104 104   CO2 20 22 21   BUN 18 9 8   CREATININE 0.80 0.79 0.69     BNP: No results for input(s): BNP in the last 72 hours. PT/INR: No results for input(s): PROTIME, INR in the last 72 hours. APTT: No results for input(s):  APTT in the last 72 hours. CARDIAC ENZYMES: No results for input(s): CKMB, CKMBINDEX, TROPONINI in the last 72 hours. Invalid input(s): CKTOTAL;3  FASTING LIPID PANEL:  Lab Results   Component Value Date    CHOL 300 (H) 06/12/2019     07/17/2020    TRIG 48 06/12/2019     LIVER PROFILE:   Recent Labs     09/21/20  0447   AST 64*   ALT 41*   BILITOT 0.67   ALKPHOS 79      MICROBIOLOGY:   Lab Results   Component Value Date/Time    CULTURE PENDING 09/22/2020 02:09 PM       Imaging:    Xr Knee Right (3 Views)    Result Date: 9/22/2020  Small joint effusion. Ct Head Wo Contrast    Result Date: 9/21/2020  No acute intracranial abnormality. Chronic small vessel ischemic changes. Us Renal Complete    Result Date: 9/20/2020  Unremarkable ultrasound of the kidneys and urinary bladder. Xr Chest Portable    Result Date: 9/21/2020  No acute cardiopulmonary findings       ASSESSMENT & PLAN     ASSESSMENT / PLAN:     Active Problems:  1. Acute Toxic vs Metabolic Encephalopathy?  - History of ETOH Abuse. Possible Alcohol Withdrawal.   - ETOH < 10.  - Ammonia 14.   - TSH 1.67.   - CT head negative. - CIWA     2. Acute Kidney Injury(Resolved). - Ceatinine was 2.38 and waiting on the result from today. - fluids stopped   - FeNa : 0.38?%  - Kidney Ultrasound normal     3.  Urinary tract infection:  Urinalysis shows positive nitrite and moderate leukocyte esterase. Microscopy shows many bacteria. Patient antibiotics changed to ciprofloxacin today.     4.Right Knee Swelling:  Patient was complaining of pain in the right knee yesterday. Was examined by the resident and found that she was having swelling in the right knee. And it was tender to palpation. Antibiotics changed to ciprofloxacin daily. Started on Percocet 6 hourly as needed.   Negative for crystals and low probability of septic shock. RA factor and JARRELL sent.     5. History of ETOH Abuse.    - Multivitamin  - Thiamine  - Folic Acid  - CIWA     6.  Anemia:  Hb 9.3. Chronically low. Folic acid greater than 20. Vitamin B12 648. Ferritin 271, iron 40, saturation 70, TIBC 237.     6. Type 2 DM: Was on Glucophage 500 mg 2 times a day  Blood glucose 94. Hemoglobin A1c 6.3  Monitor blood glucose.     7.  Hypertension:  Continue Norvasc 10 mg, Spironolactone 25 mg and Imdur 60 mg daily. /93.     8.  Hyperlipidemia:  Continue atorvastatin 20 mg nightly     9.  Bipolar 1 disorder:  Was on fluoxetine 40 mg daily, citalopram 40 mg daily and mirtazapine 45 mg daily.        DVT ppx: Heparin  GI ppx: Protonix 40 mg     PT/OT/SW: Ongoing  Discharge Planning: Case management to help with discharge of the patient    Rochelle Appiah MD  Internal Medicine Resident, PGY-1  [facility]; [place], [place]  9/23/2020, 12:46 PM    Attending Physician Statement  I have discussed the care of [address] [place] [address] with the resident team. I have examined the patient myself and taken ros and hpi , including pertinent history and exam findings,  with the resident. I have reviewed the key elements of all parts of the encounter with the resident. I agree with the assessment, plan and orders as documented by the resident. Active Problems:    Acute renal insufficiency    Effusion of bursa of right knee  Resolved Problems:    * No resolved hospital problems. *      Acute arthritis of right knee, severe pain, improving on prednisone. Knee joint aspiration performed yesterday, white cell count not in range for septic arthritis. Negative for crystals. Await final analysis of aspirate.       Electronically signed by Alyssa Clark MD
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
## 2020-09-23 NOTE — PROGRESS NOTES
Good  Standing - Static: Fair;+  Standing - Dynamic: Fair  Comments: standing balance assessed with RW  Exercises  Seated LE exercise program: Long Arc Quads, hip abduction/adduction, heel/toe raises, and marches.  Reps: 20x   Comments: Good effort with ther exs    Goals  Short term goals  Time Frame for Short term goals: 12 visits  Short term goal 1: independent bed mobility  Short term goal 2: independent transfers  Short term goal 3: independent gait with appropriate device x 100'  Short term goal 4: stair ambulation x 3 steps with B HRs and SBA+1  Patient Goals   Patient goals : feel better, stop falling    Plan    Plan  Times per week: 5-6 visits weekly  Times per day: Daily  Current Treatment Recommendations: Strengthening, ROM, Balance Training, Functional Mobility Training, Transfer Training, Endurance Training, Gait Training, Stair training, Pain Management, Home Exercise Program, Safety Education & Training, Patient/Caregiver Education & Training, Positioning  Safety Devices  Type of devices: Call light within reach, Gait belt, Patient at risk for falls, Nurse notified, All fall risk precautions in place, Left in bed  Restraints  Initially in place: No     Therapy Time   Individual Concurrent Group Co-treatment   Time In 1133         Time Out 1157         Minutes 24         Timed Code Treatment Minutes: [address] [place], [place]

## 2020-09-23 NOTE — PLAN OF CARE
Problem: Falls - Risk of:  Goal: Will remain free from falls  Description: Pt remains free of falls at this time. Bed locked in lowest position, siderails x2, call light in reach. Bed alarm set. Non-skid footwear applied. Will continue to monitor. 9/23/2020 0311 by Сергей Isaacs RN  Outcome: Met This Shift  Note: Patient's bed remained locked and in lowest position throughout shift. Patient belonings and call light remain within reach. 2/4 side rails are up, and non-skid footwear is on. Problem: Pain:  Description: Pain management should include both nonpharmacologic and pharmacologic interventions. Goal: Pain level will decrease  Description: Pain level will decrease  9/23/2020 0311 by Сергей Isaacs RN  Outcome: Ongoing  Note: Patient rated pain a 10  on a scale from 0-10. Writer administered PRN pain medications to patient.          Problem: Skin Integrity:  Goal: Will show no infection signs and symptoms  Description: Will show no infection signs and symptoms  9/23/2020 0311 by Сергей Isaacs RN  Outcome: Met This Shift  Electronically signed by Сергей Isaacs RN on 9/23/2020 at 3:13 AM

## 2020-09-24 VITALS
BODY MASS INDEX: 28.85 KG/M2 | DIASTOLIC BLOOD PRESSURE: 96 MMHG | TEMPERATURE: 99 F | SYSTOLIC BLOOD PRESSURE: 132 MMHG | OXYGEN SATURATION: 98 % | WEIGHT: 169 LBS | HEART RATE: 81 BPM | RESPIRATION RATE: 17 BRPM | HEIGHT: 64 IN

## 2020-09-24 LAB
ABSOLUTE EOS #: <0.03 K/UL (ref 0–0.44)
ABSOLUTE IMMATURE GRANULOCYTE: 0.03 K/UL (ref 0–0.3)
ABSOLUTE LYMPH #: 0.97 K/UL (ref 1.1–3.7)
ABSOLUTE MONO #: 1.45 K/UL (ref 0.1–1.2)
ANTI-NUCLEAR ANTIBODY (ANA): NEGATIVE
BASOPHILS # BLD: 0 % (ref 0–2)
BASOPHILS ABSOLUTE: <0.03 K/UL (ref 0–0.2)
CULTURE: NORMAL
DIFFERENTIAL TYPE: ABNORMAL
EOSINOPHILS RELATIVE PERCENT: 0 % (ref 1–4)
HCT VFR BLD CALC: 28.6 % (ref 36.3–47.1)
HEMOGLOBIN: 9.5 G/DL (ref 11.9–15.1)
IMMATURE GRANULOCYTES: 1 %
LYMPHOCYTES # BLD: 15 % (ref 24–43)
Lab: NORMAL
MCH RBC QN AUTO: 31.5 PG (ref 25.2–33.5)
MCHC RBC AUTO-ENTMCNC: 33.2 G/DL (ref 28.4–34.8)
MCV RBC AUTO: 94.7 FL (ref 82.6–102.9)
MONOCYTES # BLD: 22 % (ref 3–12)
NRBC AUTOMATED: 0 PER 100 WBC
PDW BLD-RTO: 14.3 % (ref 11.8–14.4)
PLATELET # BLD: 262 K/UL (ref 138–453)
PLATELET ESTIMATE: ABNORMAL
PMV BLD AUTO: 10 FL (ref 8.1–13.5)
RBC # BLD: 3.02 M/UL (ref 3.95–5.11)
RBC # BLD: ABNORMAL 10*6/UL
SEG NEUTROPHILS: 62 % (ref 36–65)
SEGMENTED NEUTROPHILS ABSOLUTE COUNT: 4.02 K/UL (ref 1.5–8.1)
SPECIMEN DESCRIPTION: NORMAL
WBC # BLD: 6.5 K/UL (ref 3.5–11.3)
WBC # BLD: ABNORMAL 10*3/UL

## 2020-09-24 PROCEDURE — 99239 HOSP IP/OBS DSCHRG MGMT >30: CPT | Performed by: INTERNAL MEDICINE

## 2020-09-24 PROCEDURE — 6360000002 HC RX W HCPCS: Performed by: STUDENT IN AN ORGANIZED HEALTH CARE EDUCATION/TRAINING PROGRAM

## 2020-09-24 PROCEDURE — 85025 COMPLETE CBC W/AUTO DIFF WBC: CPT

## 2020-09-24 PROCEDURE — 2580000003 HC RX 258: Performed by: INTERNAL MEDICINE

## 2020-09-24 PROCEDURE — 99232 SBSQ HOSP IP/OBS MODERATE 35: CPT | Performed by: INTERNAL MEDICINE

## 2020-09-24 PROCEDURE — 6370000000 HC RX 637 (ALT 250 FOR IP): Performed by: STUDENT IN AN ORGANIZED HEALTH CARE EDUCATION/TRAINING PROGRAM

## 2020-09-24 PROCEDURE — 6360000002 HC RX W HCPCS: Performed by: INTERNAL MEDICINE

## 2020-09-24 PROCEDURE — 2580000003 HC RX 258: Performed by: STUDENT IN AN ORGANIZED HEALTH CARE EDUCATION/TRAINING PROGRAM

## 2020-09-24 PROCEDURE — 36415 COLL VENOUS BLD VENIPUNCTURE: CPT

## 2020-09-24 RX ORDER — CIPROFLOXACIN 500 MG/1
500 TABLET, FILM COATED ORAL 2 TIMES DAILY
Qty: 14 TABLET | Refills: 0 | Status: SHIPPED | OUTPATIENT
Start: 2020-09-24 | End: 2020-10-01

## 2020-09-24 RX ORDER — LANOLIN ALCOHOL/MO/W.PET/CERES
325 CREAM (GRAM) TOPICAL
Qty: 90 TABLET | Refills: 3 | Status: SHIPPED | OUTPATIENT
Start: 2020-09-24 | End: 2021-01-29

## 2020-09-24 RX ORDER — OXYCODONE HYDROCHLORIDE AND ACETAMINOPHEN 5; 325 MG/1; MG/1
1 TABLET ORAL EVERY 6 HOURS PRN
Qty: 9 TABLET | Refills: 0 | Status: SHIPPED | OUTPATIENT
Start: 2020-09-24 | End: 2020-09-27

## 2020-09-24 RX ORDER — LEVOFLOXACIN 500 MG/1
500 TABLET, FILM COATED ORAL DAILY
Status: DISCONTINUED | OUTPATIENT
Start: 2020-09-24 | End: 2020-09-24

## 2020-09-24 RX ORDER — LANOLIN ALCOHOL/MO/W.PET/CERES
325 CREAM (GRAM) TOPICAL
Status: DISCONTINUED | OUTPATIENT
Start: 2020-09-24 | End: 2020-09-24 | Stop reason: HOSPADM

## 2020-09-24 RX ORDER — FOLIC ACID 1 MG/1
1 TABLET ORAL DAILY
Qty: 30 TABLET | Refills: 3 | Status: SHIPPED | OUTPATIENT
Start: 2020-09-24 | End: 2021-04-27 | Stop reason: SDUPTHER

## 2020-09-24 RX ORDER — PREDNISONE 20 MG/1
40 TABLET ORAL DAILY
Qty: 4 TABLET | Refills: 0 | Status: SHIPPED | OUTPATIENT
Start: 2020-09-24 | End: 2020-09-26

## 2020-09-24 RX ORDER — ATORVASTATIN CALCIUM 20 MG/1
40 TABLET, FILM COATED ORAL DAILY
Qty: 30 TABLET | Refills: 3 | Status: SHIPPED | OUTPATIENT
Start: 2020-09-24 | End: 2021-01-22

## 2020-09-24 RX ORDER — POTASSIUM CHLORIDE 20 MEQ/1
40 TABLET, EXTENDED RELEASE ORAL ONCE
Status: COMPLETED | OUTPATIENT
Start: 2020-09-24 | End: 2020-09-24

## 2020-09-24 RX ORDER — MAGNESIUM SULFATE IN WATER 40 MG/ML
2 INJECTION, SOLUTION INTRAVENOUS ONCE
Status: COMPLETED | OUTPATIENT
Start: 2020-09-24 | End: 2020-09-24

## 2020-09-24 RX ORDER — LANOLIN ALCOHOL/MO/W.PET/CERES
100 CREAM (GRAM) TOPICAL DAILY
Qty: 30 TABLET | Refills: 3 | Status: SHIPPED | OUTPATIENT
Start: 2020-09-24 | End: 2021-04-08

## 2020-09-24 RX ORDER — LEVOFLOXACIN 500 MG/1
500 TABLET, FILM COATED ORAL DAILY
Status: DISCONTINUED | OUTPATIENT
Start: 2020-09-25 | End: 2020-09-24 | Stop reason: HOSPADM

## 2020-09-24 RX ADMIN — CIPROFLOXACIN 500 MG: 500 TABLET ORAL at 09:08

## 2020-09-24 RX ADMIN — OXYCODONE HYDROCHLORIDE AND ACETAMINOPHEN 1 TABLET: 5; 325 TABLET ORAL at 04:00

## 2020-09-24 RX ADMIN — Medication 100 MG: at 09:08

## 2020-09-24 RX ADMIN — ASPIRIN 81 MG: 81 TABLET, CHEWABLE ORAL at 09:08

## 2020-09-24 RX ADMIN — POTASSIUM CHLORIDE 40 MEQ: 1500 TABLET, EXTENDED RELEASE ORAL at 09:12

## 2020-09-24 RX ADMIN — AMLODIPINE BESYLATE 10 MG: 10 TABLET ORAL at 09:08

## 2020-09-24 RX ADMIN — PREDNISONE 40 MG: 20 TABLET ORAL at 09:08

## 2020-09-24 RX ADMIN — Medication 10 ML: at 09:08

## 2020-09-24 RX ADMIN — HEPARIN SODIUM 5000 UNITS: 5000 INJECTION INTRAVENOUS; SUBCUTANEOUS at 06:28

## 2020-09-24 RX ADMIN — THERA TABS 1 TABLET: TAB at 09:08

## 2020-09-24 RX ADMIN — ISOSORBIDE MONONITRATE 60 MG: 60 TABLET ORAL at 09:08

## 2020-09-24 RX ADMIN — SPIRONOLACTONE 25 MG: 25 TABLET ORAL at 09:08

## 2020-09-24 RX ADMIN — FERROUS SULFATE TAB EC 325 MG (65 MG FE EQUIVALENT) 325 MG: 325 (65 FE) TABLET DELAYED RESPONSE at 09:08

## 2020-09-24 RX ADMIN — PANTOPRAZOLE SODIUM 40 MG: 40 TABLET, DELAYED RELEASE ORAL at 06:29

## 2020-09-24 RX ADMIN — MAGNESIUM SULFATE 2 G: 2 INJECTION INTRAVENOUS at 09:13

## 2020-09-24 RX ADMIN — HEPARIN SODIUM 5000 UNITS: 5000 INJECTION INTRAVENOUS; SUBCUTANEOUS at 14:25

## 2020-09-24 RX ADMIN — FOLIC ACID 1 MG: 1 TABLET ORAL at 09:08

## 2020-09-24 RX ADMIN — CEFTRIAXONE SODIUM 2 G: 2 INJECTION, POWDER, FOR SOLUTION INTRAMUSCULAR; INTRAVENOUS at 14:25

## 2020-09-24 RX ADMIN — OXYCODONE HYDROCHLORIDE AND ACETAMINOPHEN 1 TABLET: 5; 325 TABLET ORAL at 10:03

## 2020-09-24 RX ADMIN — FLUTICASONE PROPIONATE 1 PUFF: 110 AEROSOL, METERED RESPIRATORY (INHALATION) at 08:44

## 2020-09-24 ASSESSMENT — PAIN SCALES - GENERAL
PAINLEVEL_OUTOF10: 9
PAINLEVEL_OUTOF10: 10

## 2020-09-24 NOTE — PROGRESS NOTES
CLINICAL PHARMACY NOTE: MEDS TO [address] Select Patient?: Yes  Total # of Prescriptions Filled: 7   The following medications were delivered to the patient:  · Vit b  · percoet  · cipro  · Prednisone  · Folic acid  · Ferrous sulfate  · lipitor  Total # of Interventions Completed: 0  Time Spent (min): 0    Additional Documentation:

## 2020-09-24 NOTE — PROGRESS NOTES
Orthopedic Progress Note    Patient:  Ty Rich  YOB: 1965     48 y.o. female    Subjective:  Patient seen and examined this morning. Continues to have right knee pain, but able to ambulate on affected knee. No new complaints or concerns at this time. No issue overnight per nursing and patient. Pain controlled on current regimen. Denies fever, HA, CP, SOB, N/V, dysuria, numbness or tingling. Vitals reviewed, afebrile  60 ft w/ RW     Objective:   Vitals:    09/24/20 0400   BP: (!) 146/108   Pulse: 82   Resp: 21   Temp: 97.6 °F (36.4 °C)   SpO2:      Gen: NAD, cooperative  Cardiovascular: Regular rate, no dependent edema, distal pulses 2+  Respiratory: Chest symmetric, no accessory muscle use, normal respirations, no audible wheezes    MSK: Right knee: Mild effusion present. Warm to touch. No erythema. Tenderness to palpation diffusely about the knee. 0-90 degrees knee ROM that is mildly painful. Compartments soft and compressible. TA/EHL/FHL/GS motor intact. Deep and Superficial Peroneal/Saphenous/Sural SILT. 2+ DP pulse.      Recent Labs     09/23/20  0633   WBC 6.0   HGB 9.3*   HCT 28.8*         K 3.0*   BUN 8   CREATININE 0.69   GLUCOSE 95      Meds: ASA, Ceftriaxone, Heparin   See rec for complete list    Impression/plan: 48 y.o. female being seen for right knee effusion  -History of gout    -F/u aspirate culture  -CRP 25 --> 36, cont to trend  -ID on as consult for assistance in antibiotics management  -Weight bearing: Weightbearing as tolerated right lower extremity  -Medicine team primary  -Pain control per primary  -DVT: Managed per primary  -Ice and elevation for pain/swelling  -Recommend PT/OT evaluation and treatment   -Dispo: pending culture/knee aspirate results  -Please page Ortho with any questions or concerns    Josse Thorne DO  Orthopedic Surgery Resident, PGY-2  [address]
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
## 2020-09-24 NOTE — PROGRESS NOTES
Pt given all d/c paperwork regarding patient's admission, new medications, and follow up appointments. All questions answered at this time. PIV removed and cab called for patient discharge.

## 2020-09-24 NOTE — PLAN OF CARE
Problem: Falls - Risk of:  Goal: Will remain free from falls  Description: Pt remains free of falls at this time. Bed locked in lowest position, siderails x2, call light in reach. Bed alarm set. Non-skid footwear applied. Will continue to monitor. 9/24/2020 0312 by Jackie Lopez RN  Outcome: Ongoing   PT. WITH SIDE RAILS TIMES 2 WHILE IN BED, NON SKID FOOT WEAR IN PLACE, PT. EDUCATED ON FALL SAFETY, PT. USING CALL LIGHT APPROPRIATELY, AND CALL LIGHT WITHIN REACH.
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
## 2020-09-24 NOTE — PROGRESS NOTES
.Wilberto Santana was evaluated today and a DME order was entered for a wheeled walker because she requires this to successfully complete daily living tasks of personal cares and ambulating. A wheeled walker is necessary due to the patient's unsteady gait, upper body weakness, and inability to  an ambulation device; and she can ambulate only by pushing a walker instead of a lesser assistive device such as a cane, crutch, or standard walker. The need for this equipment was discussed with the patient and she understands and is in agreement.     Jose Floyd

## 2020-09-25 ENCOUNTER — TELEPHONE (OUTPATIENT)
Dept: FAMILY MEDICINE CLINIC | Age: 55
End: 2020-09-25

## 2020-09-25 LAB
7-AMINOCLONAZEPAM: <5 NG/ML
ACETAMINOPHEN LEVEL: <5 UG/ML (ref 10–30)
ALPHA HYDROXYALPRAZOLAM: <5 NG/ML
ALPRAZOLAM: <5 NG/ML
AMPHETAMINE: NEGATIVE NG/ML
BARBITURATES: NEGATIVE NG/ML
BENZODIAZEPINES: POSITIVE NG/ML
BUPRENORPHINE: NEGATIVE NG/ML
CHLORDIAZEPOXIDE: <20 NG/ML
CLONAZEPAM: <5 NG/ML
COCAINE: NEGATIVE NG/ML
DIAZEPAM: <5 NG/ML
DRUGS OF ABUSE COMMENT: ABNORMAL
ETHANOL PERCENT: <0.01 %
ETHANOL: <10 MG/DL
LORAZEPAM: 65 NG/ML
METHADONE: NEGATIVE NG/ML
METHAMPHETAMINE: NEGATIVE NG/ML
MIDAZOLAM: <20 NG/ML
MIDAZOLAM: <20 NG/ML
NORDIAZEPAM: <20 NG/ML
OPIATES: NEGATIVE NG/ML
OXAZEPAM: <20 NG/ML
OXYCODONE: NEGATIVE NG/ML
PHENCYCLIDINE: NEGATIVE NG/ML
SALICYLATE LEVEL: <1 MG/DL (ref 3–10)
TEMAZEPAM: <20 NG/ML
THC: NEGATIVE NG/ML
TOXIC TRICYCLIC SC,BLOOD: NEGATIVE

## 2020-09-25 NOTE — TELEPHONE ENCOUNTER
[facility] Transitions Initial Follow Up Call    Call within 2 business days of discharge: Yes     Patient: Supa Goss Patient : 1965 MRN: [id number]    [unfilled]    RARS: Readmission Risk Score: 25       Spoke with:  First attempt to reach out to pt left a vm to [facility] office with the number of the office for Transitional appointment. Will try later.     Discharge department/facility:  [address] [place] [address] services provided:  Obtained and reviewed discharge summary and/or continuity of care documents    Follow Up  Future Appointments   Date Time Provider Anthony Everett   10/10/2020  8:00 AM STA MAMMO RM 1 [facility] MAMMO ST[facility] Radiolog   10/10/2020  9:00 AM STA ULTRASOUND  [facility] [address] STA Radiolog       Raymond Clemens LPN

## 2020-09-27 LAB
CULTURE: ABNORMAL
DIRECT EXAM: ABNORMAL
DIRECT EXAM: ABNORMAL
Lab: ABNORMAL
SPECIMEN DESCRIPTION: ABNORMAL

## 2020-09-28 LAB
CULTURE: NORMAL
CULTURE: NORMAL
Lab: NORMAL
Lab: NORMAL
SPECIMEN DESCRIPTION: NORMAL
SPECIMEN DESCRIPTION: NORMAL

## 2020-10-02 ENCOUNTER — OFFICE VISIT (OUTPATIENT)
Dept: INTERNAL MEDICINE | Age: 55
End: 2020-10-02
Payer: MEDICAID

## 2020-10-02 ENCOUNTER — HOSPITAL ENCOUNTER (OUTPATIENT)
Age: 55
Setting detail: SPECIMEN
Discharge: HOME | End: 2020-10-02
Payer: MEDICAID

## 2020-10-02 VITALS
HEIGHT: 64 IN | DIASTOLIC BLOOD PRESSURE: 86 MMHG | SYSTOLIC BLOOD PRESSURE: 133 MMHG | BODY MASS INDEX: 27.79 KG/M2 | WEIGHT: 162.8 LBS | HEART RATE: 73 BPM

## 2020-10-02 LAB
ABSOLUTE EOS #: 0.12 K/UL (ref 0–0.44)
ABSOLUTE IMMATURE GRANULOCYTE: 0.04 K/UL (ref 0–0.3)
ABSOLUTE LYMPH #: 1.5 K/UL (ref 1.1–3.7)
ABSOLUTE MONO #: 1.39 K/UL (ref 0.1–1.2)
ALBUMIN SERPL-MCNC: 3.8 G/DL (ref 3.5–5.2)
ALBUMIN/GLOBULIN RATIO: 0.9 (ref 1–2.5)
ALP BLD-CCNC: 94 U/L (ref 35–104)
ALT SERPL-CCNC: 18 U/L (ref 5–33)
ANION GAP SERPL CALCULATED.3IONS-SCNC: 16 MMOL/L (ref 9–17)
AST SERPL-CCNC: 20 U/L
BASOPHILS # BLD: 1 % (ref 0–2)
BASOPHILS ABSOLUTE: 0.08 K/UL (ref 0–0.2)
BILIRUB SERPL-MCNC: 0.33 MG/DL (ref 0.3–1.2)
BUN BLDV-MCNC: 7 MG/DL (ref 6–20)
BUN/CREAT BLD: ABNORMAL (ref 9–20)
CALCIUM SERPL-MCNC: 9.6 MG/DL (ref 8.6–10.4)
CHLORIDE BLD-SCNC: 97 MMOL/L (ref 98–107)
CO2: 23 MMOL/L (ref 20–31)
CREAT SERPL-MCNC: 0.91 MG/DL (ref 0.5–0.9)
DIFFERENTIAL TYPE: ABNORMAL
EOSINOPHILS RELATIVE PERCENT: 1 % (ref 1–4)
ESTIMATED AVERAGE GLUCOSE: 131 MG/DL
GFR AFRICAN AMERICAN: >60 ML/MIN
GFR NON-AFRICAN AMERICAN: >60 ML/MIN
GFR SERPL CREATININE-BSD FRML MDRD: ABNORMAL ML/MIN/{1.73_M2}
GFR SERPL CREATININE-BSD FRML MDRD: ABNORMAL ML/MIN/{1.73_M2}
GLUCOSE BLD-MCNC: 81 MG/DL (ref 70–99)
HBA1C MFR BLD: 6.2 % (ref 4–6)
HCT VFR BLD CALC: 33 % (ref 36.3–47.1)
HEMOGLOBIN: 10.4 G/DL (ref 11.9–15.1)
IMMATURE GRANULOCYTES: 0 %
LYMPHOCYTES # BLD: 16 % (ref 24–43)
MCH RBC QN AUTO: 30.5 PG (ref 25.2–33.5)
MCHC RBC AUTO-ENTMCNC: 31.5 G/DL (ref 28.4–34.8)
MCV RBC AUTO: 96.8 FL (ref 82.6–102.9)
MONOCYTES # BLD: 15 % (ref 3–12)
NRBC AUTOMATED: 0 PER 100 WBC
PDW BLD-RTO: 14.6 % (ref 11.8–14.4)
PLATELET # BLD: 773 K/UL (ref 138–453)
PLATELET ESTIMATE: ABNORMAL
PMV BLD AUTO: 9.5 FL (ref 8.1–13.5)
POTASSIUM SERPL-SCNC: 3.4 MMOL/L (ref 3.7–5.3)
RBC # BLD: 3.41 M/UL (ref 3.95–5.11)
RBC # BLD: ABNORMAL 10*6/UL
SEG NEUTROPHILS: 66 % (ref 36–65)
SEGMENTED NEUTROPHILS ABSOLUTE COUNT: 6.01 K/UL (ref 1.5–8.1)
SODIUM BLD-SCNC: 136 MMOL/L (ref 135–144)
TOTAL PROTEIN: 8 G/DL (ref 6.4–8.3)
URIC ACID: 7.3 MG/DL (ref 2.4–5.7)
VITAMIN D 25-HYDROXY: 30.5 NG/ML (ref 30–100)
WBC # BLD: 9.1 K/UL (ref 3.5–11.3)
WBC # BLD: ABNORMAL 10*3/UL

## 2020-10-02 PROCEDURE — 1111F DSCHRG MED/CURRENT MED MERGE: CPT | Performed by: STUDENT IN AN ORGANIZED HEALTH CARE EDUCATION/TRAINING PROGRAM

## 2020-10-02 PROCEDURE — 99213 OFFICE O/P EST LOW 20 MIN: CPT | Performed by: STUDENT IN AN ORGANIZED HEALTH CARE EDUCATION/TRAINING PROGRAM

## 2020-10-02 RX ORDER — SPIRONOLACTONE 100 MG/1
100 TABLET, FILM COATED ORAL DAILY
Qty: 30 TABLET | Refills: 11 | Status: SHIPPED | OUTPATIENT
Start: 2020-10-02 | End: 2021-01-20 | Stop reason: ALTCHOICE

## 2020-10-02 RX ORDER — POTASSIUM CHLORIDE 20 MEQ/1
20 TABLET, EXTENDED RELEASE ORAL DAILY
Qty: 30 TABLET | Refills: 5 | Status: SHIPPED | OUTPATIENT
Start: 2020-10-02 | End: 2021-01-20 | Stop reason: ALTCHOICE

## 2020-10-02 RX ORDER — ACETAMINOPHEN 500 MG
1000 TABLET ORAL EVERY 6 HOURS PRN
Qty: 56 TABLET | Refills: 0 | Status: SHIPPED | OUTPATIENT
Start: 2020-10-02 | End: 2020-10-31

## 2020-10-02 RX ORDER — LANOLIN ALCOHOL/MO/W.PET/CERES
400 CREAM (GRAM) TOPICAL 2 TIMES DAILY
Qty: 60 TABLET | Refills: 22 | Status: ON HOLD | OUTPATIENT
Start: 2020-10-02 | End: 2020-12-07

## 2020-10-02 ASSESSMENT — PATIENT HEALTH QUESTIONNAIRE - PHQ9
SUM OF ALL RESPONSES TO PHQ QUESTIONS 1-9: 0
SUM OF ALL RESPONSES TO PHQ9 QUESTIONS 1 & 2: 0
SUM OF ALL RESPONSES TO PHQ QUESTIONS 1-9: 0
1. LITTLE INTEREST OR PLEASURE IN DOING THINGS: 0
2. FEELING DOWN, DEPRESSED OR HOPELESS: 0

## 2020-10-02 NOTE — PROGRESS NOTES
Post-Discharge Transitional Care Management Services or Hospital Follow Up      Katia Cadena   YOB: 1965    Date of Office Visit:  10/2/2020  Date of Hospital Admission: 9/20/20  Date of Hospital Discharge: 9/24/20  Risk of hospital readmission (high >=14%.  Medium >=10%) :Readmission Risk Score: 18      Care management risk score Rising risk (score 2-5) and Complex Care (Scores >=6): 10     Non face to face  following discharge, date last encounter closed (first attempt may have been earlier): 9/25/2020 10:16 AM    Call initiated 2 business days of discharge: Yes    Patient Active Problem List   Diagnosis    Lung nodule    Obesity    Adrenal adenoma    Goiter    Alcohol abuse    Essential hypertension    Enlarged tonsils    ACE inhibitor-aggravated angioedema    JONES (obstructive sleep apnea)    Dyslipidemia    IGT (impaired glucose tolerance)    Acute alcoholic intoxication without complication (HCC)    Acute renal insufficiency    Effusion of bursa of right knee    Acute cystitis without hematuria       Allergies   Allergen Reactions    Bee Venom Anaphylaxis    Iodine Anaphylaxis and Rash    Lisinopril Swelling and Anaphylaxis     Angioedema    Shellfish-Derived Products Anaphylaxis and Rash     ANGIOEDEMA       Medications listed as ordered at the time of discharge from hospital   [place], [address] [place] [address] Medication Instructions ARC:[id number]    Printed on:10/02/20 0924   Medication Information                      albuterol sulfate (PROAIR RESPICLICK) 279 (90 Base) MCG/ACT aerosol powder inhalation  Inhale 2 puffs into the lungs every 6 hours as needed for Wheezing or Shortness of Breath             amLODIPine (NORVASC) 10 MG tablet  Take 1 tablet by mouth daily             aspirin 81 MG tablet  Take 1 tablet by mouth daily             atenolol (TENORMIN) 50 MG tablet  Take 1 tablet by mouth daily             atorvastatin (LIPITOR) 20 MG tablet  Take 2 tablets by mouth daily Calcium Carbonate-Vitamin D (OYSTER SHELL CALCIUM/D) 500-200 MG-UNIT TABS  TAKE 1 TAB BY MOUTH ONCE A DAY             Cholecalciferol (VITAMIN D3) 14819 UNITS CAPS  Take 1 capsule by mouth Twice a Week             citalopram (CELEXA) 40 MG tablet  Take 40 mg by mouth daily             cyclobenzaprine (FLEXERIL) 5 MG tablet  One to two tid prn back pain or muscle spasm. Will cause drowsiness. Do not drive if taking.             econazole nitrate 1 % cream  Apply topically daily. EPINEPHrine (EPIPEN 2-GRICELDA) 0.3 MG/0.3ML SOAJ injection  Use as directed for allergic reaction             famotidine (PEPCID) 20 MG tablet  Take 20 mg by mouth 2 times daily             ferrous sulfate (FE TABS 325) 325 (65 Fe) MG EC tablet  Take 1 tablet by mouth daily (with breakfast)             FLUoxetine (PROZAC) 40 MG capsule  Take 40 mg by mouth daily             fluticasone (ARNUITY ELLIPTA) 100 MCG/ACT AEPB  Inhale 1 puff into the lungs daily             folic acid (FOLVITE) 1 MG tablet  Take 1 tablet by mouth daily             hydrochlorothiazide (HYDRODIURIL) 25 MG tablet  Take 1 tablet by mouth daily             isosorbide mononitrate (IMDUR) 60 MG extended release tablet  TAKE 1 TAB BY MOUTH IN THE MORNING             lidocaine (XYLOCAINE) 5 % ointment  Apply 1 applicator topically 3 times daily as needed for Pain Apply topically as needed.              loratadine (CLARITIN) 10 MG capsule  Take 1 capsule by mouth daily             lurasidone (LATUDA) 60 MG TABS tablet  Take 60 mg by mouth nightly             metFORMIN (GLUCOPHAGE) 500 MG tablet  Take 1 tablet by mouth 2 times daily (with meals)             mirtazapine (REMERON) 45 MG tablet  Take 45 mg by mouth nightly             mometasone (ASMANEX, 120 METERED DOSES,) 220 MCG/INH inhaler  Inhale 2 puffs into the lungs daily             Multiple Vitamins-Minerals (MULTIVITAMIN WITH MINERALS) tablet  Take 1 tablet by mouth daily             naproxen (NAPROSYN) 500 MG tablet  TAKE ONE TABLET BY MOUTH TWICE A DAY WITH MEALS             pantoprazole (PROTONIX) 40 MG tablet  TAKE 1 TAB BY MOUTH ONCE A DAY             spironolactone (ALDACTONE) 25 MG tablet  Take 1 tablet by mouth daily             vitamin B-1 100 MG tablet  Take 1 tablet by mouth daily             vitamin D3 (CHOLECALCIFEROL) 25 MCG (1000 UT) TABS tablet  Take 1 tablet by mouth daily                   Medications marked \"taking\" at this time  Outpatient Medications Marked as Taking for the 10/2/20 encounter (Office Visit) with Cain Sánchez MD   Medication Sig Dispense Refill    folic acid (FOLVITE) 1 MG tablet Take 1 tablet by mouth daily 30 tablet 3    vitamin B-1 100 MG tablet Take 1 tablet by mouth daily 30 tablet 3    atorvastatin (LIPITOR) 20 MG tablet Take 2 tablets by mouth daily 30 tablet 3    ferrous sulfate (FE TABS 325) 325 (65 Fe) MG EC tablet Take 1 tablet by mouth daily (with breakfast) 90 tablet 3    naproxen (NAPROSYN) 500 MG tablet TAKE ONE TABLET BY MOUTH TWICE A DAY WITH MEALS 60 tablet 0    loratadine (CLARITIN) 10 MG capsule Take 1 capsule by mouth daily 30 capsule 3    isosorbide mononitrate (IMDUR) 60 MG extended release tablet TAKE 1 TAB BY MOUTH IN THE MORNING 30 tablet 3    EPINEPHrine (EPIPEN 2-GRICELDA) 0.3 MG/0.3ML SOAJ injection Use as directed for allergic reaction 1 each 2    Calcium Carbonate-Vitamin D (OYSTER SHELL CALCIUM/D) 500-200 MG-UNIT TABS TAKE 1 TAB BY MOUTH ONCE A DAY 30 tablet 3    vitamin D3 (CHOLECALCIFEROL) 25 MCG (1000 UT) TABS tablet Take 1 tablet by mouth daily 30 tablet 5    FLUoxetine (PROZAC) 40 MG capsule Take 40 mg by mouth daily      pantoprazole (PROTONIX) 40 MG tablet TAKE 1 TAB BY MOUTH ONCE A DAY 60 tablet 3    metFORMIN (GLUCOPHAGE) 500 MG tablet Take 1 tablet by mouth 2 times daily (with meals) 180 tablet 1    fluticasone (ARNUITY ELLIPTA) 100 MCG/ACT AEPB Inhale 1 puff into the lungs daily 30 each 5    spironolactone (ALDACTONE) 25 MG tablet Take 1 tablet by mouth daily 60 tablet 3    hydrochlorothiazide (HYDRODIURIL) 25 MG tablet Take 1 tablet by mouth daily 60 tablet 3    atenolol (TENORMIN) 50 MG tablet Take 1 tablet by mouth daily 60 tablet 3    aspirin 81 MG tablet Take 1 tablet by mouth daily 30 tablet 5    amLODIPine (NORVASC) 10 MG tablet Take 1 tablet by mouth daily 60 tablet 3    albuterol sulfate (PROAIR RESPICLICK) 199 (90 Base) MCG/ACT aerosol powder inhalation Inhale 2 puffs into the lungs every 6 hours as needed for Wheezing or Shortness of Breath 5 Inhaler 3    econazole nitrate 1 % cream Apply topically daily. 85 g 2    lidocaine (XYLOCAINE) 5 % ointment Apply 1 applicator topically 3 times daily as needed for Pain Apply topically as needed.  Multiple Vitamins-Minerals (MULTIVITAMIN WITH MINERALS) tablet Take 1 tablet by mouth daily 30 tablet 3    famotidine (PEPCID) 20 MG tablet Take 20 mg by mouth 2 times daily      citalopram (CELEXA) 40 MG tablet Take 40 mg by mouth daily      mirtazapine (REMERON) 45 MG tablet Take 45 mg by mouth nightly      lurasidone (LATUDA) 60 MG TABS tablet Take 60 mg by mouth nightly      Cholecalciferol (VITAMIN D3) 72442 UNITS CAPS Take 1 capsule by mouth Twice a Week          Medications patient taking as of now reconciled against medications ordered at time of hospital discharge: No    Chief Complaint   Patient presents with    Follow-Up from Hospital     Pt discharged [facility]/24/20    Health Maintenance     Due for foot & eye exam, Hep B Vaccine, and microalbumin        History of Present illness - Follow up of Hospital diagnosis(es):   Admitted to hospital 2 weeks ago with hallucinations and altered mentation found to have toxic metabolic encephalopathy from alcohol use/withdrawal along with UTI, acute renal failure and right knee swelling attributed to gout/arthritis. Treated inpatient with IV hydration, Rocephin thiamine folate multivitamin. Right knee drained by orthopedic surgery showed no evidence of septic arthritis. Inpatient course: Discharge summary reviewed- see chart. Interval history/Current status: Post discharge patient denied any complaints. Denied any episodes of confusion, frequent falls or any other symptoms. She uses cane to ambulate most of the time. Denied intake of alcohol in last 2 weeks post discharge. Patient has history of noncompliance with all her medications. All lab work-up printed and advised to follow-up with work-up. Advised patient to bring the list of home medications at the next visit. Will check with pharmacy as well. A comprehensive review of systems was negative except for: Musculoskeletal: positive for right foot pain. .    Vitals:    10/02/20 0840 10/02/20 0911   BP: (!) 144/93 133/86   Pulse: 73 73   Weight: 162 lb 12.8 oz (73.8 kg)    Height: 5' 4\" (1.626 m)      Body mass index is 27.94 kg/m². Wt Readings from Last 3 Encounters:   10/02/20 162 lb 12.8 oz (73.8 kg)   09/20/20 169 lb (76.7 kg)   07/27/20 169 lb (76.7 kg)     BP Readings from Last 3 Encounters:   10/02/20 133/86   09/24/20 (!) 132/96   07/27/20 109/71        Physical Exam:  General appearance-looks well, not in acute distress. Pulmonary/Chest: clear to auscultation bilaterally  Cardiovascular: normal rate, regular rhythm, no murmurs noticed  Abdomen: soft, non-tender, non-distended  Extremities: Right foot tenderness present on medial and lateral aspetcs, no evident edema, erythema or warmth noticed. Ace wraps in place. Callus on b/l hallux+   Musculoskeletal: normal range of motion, no joint swelling, deformity Neurologic: Grossly normal, gait normal, no hallucinations are not focal neurological deficits. Assessment/Plan:  [facility] discharge follow-up-post discharge medications reviewed. Continued on current medications.      2. Right foot pain-chronic  Callus on bilateral hallux.-no evidence of infection/effusion. Symptomatic care with Tylenol as needed, Voltaren gel and heating pads.    -Health care maintenance-reviewed  Noncompliance/ polypharmacy- patient has history of noncompliance with all her medications. All lab work-up printed and advised to follow-up with work-up. Advised patient to bring the list of home medications at the next visit. Will check with pharmacy as well. Referral sent to GI as well.  -Elevated LFTs likely from alcohol use. Follow-up CMP. Ultrasound liver is ordered. -Iron deficiency anemia. Low iron saturation. On ferrous sulfate pills per patient post discharge. Advised FIT test.    -Essential hypertension-we will recheck home medications with pharmacy and adjust as needed. -Dyslipidemia-ASCVD risk low. Continued on Lipitor 20.  hold aspirin 81 daily. Follow-up lipid panel.    -Prediabetic. Last HbA1c 6.3. On metformin 500  Daily. f/u repeat Hba1c    -Increased uric acid levels. Follow-up repeat uric acid levels. -Mild intermittent asthma controlled on albuterol only as needed  - Mild depressive disorder on Celexa Latuda. Follows up with [facility]. Denied suicidal ideations.  -Diagnosed with JONES in 2017. Does not use CPAP at home. Order new sleep study.  -Health maintenance due for mammogram, Pap smear and fit test.  Ordered all tests and Advised multiple times importance of health maintenance. No testing done as yet by patient.     Medical Decision Making: moderate complexity    Iker Shaw MD      Department of Internal Medicine  [facility]         10/2/2020, 9:24 AM

## 2020-10-02 NOTE — PROGRESS NOTES
Attending Physician Statement  I have discussed the care of [address] [place] [address] including pertinent history and exam findings,  with the resident. I have reviewed the key elements of all parts of the encounter with the resident. I agree with the assessment, plan and orders as documented by the resident.   (GE Modifier)      Polypharmacy with alcohol dependance and complicated with alcohol liver disease , non complaint with treatment and labs  Will get an updated medication list from the pharmacy   D/c HCTZ, AMLODIPINE , increase aldactone to 100 mg   Add mag 400 mg bid   D/c pantoprozole and pepcid on account of polypharmacy  D/c aspirin   GI eval for upper and lower endoscopy, liver USG, alcohol liver disease       MD HAWA Carey  Attending Physician[place] [facility] [place]   Faculty, [place] Residency Program  [facility]  10/2/2020, 10:24 AM

## 2020-10-02 NOTE — PATIENT INSTRUCTIONS
Patient Education        Diet for Chronic Kidney Disease (Before Dialysis): Care Instructions  Your Care Instructions  When you have chronic kidney disease, you need to change your diet to avoid foods that make your kidneys worse. You may need to limit salt, fluids, and protein. You also may need to limit minerals such as potassium and phosphorus. A diet for chronic kidney disease takes planning. A dietitian who specializes in kidney disease can help you plan meals that meet your needs. These guidelines are for people who are not on dialysis. Talk with your doctor or dietitian to make sure your diet is right for your condition. Do not change your diet without talking to your doctor or dietitian. Follow-up care is a key part of your treatment and safety. Be sure to make and go to all appointments, and call your doctor if you are having problems. It's also a good idea to know your test results and keep a list of the medicines you take. How can you care for yourself at home? · Work with your doctor or dietitian to create a food plan. · Do not skip meals or go for many hours without eating. If you do not feel very hungry, try to eat 4 or 5 small meals instead of 1 or 2 big meals. · If you have a hard time eating enough, talk to your doctor or dietitian about ways you can add calories to your diet. · Do not take any vitamins or minerals, supplements, or herbal products without talking to your doctor first.  · Check with your doctor about whether it is safe for you to drink alcohol. To get the right amount of protein  · Ask your doctor or dietitian how much protein you can have each day. Most people with chronic kidney disease need to limit the amount of protein they eat. But you still need some protein to stay healthy. · Include all sources of protein in your daily protein count.  Besides meat, poultry, fish, and eggs, protein is found in milk and milk products, beans and nuts, breads, cereals, and vegetables. To limit salt  · Read food labels on cans and food packages. The labels tell you how much sodium is in each serving. Make sure that you look at the serving size. If you eat more than the serving size, you will get more sodium than what is listed on the label. · Do not add salt to your food. Avoid foods that list salt, sodium, or monosodium glutamate (MSG) as an ingredient. · Buy foods that are labeled \"no salt added,\" \"sodium-free,\" or \"low-sodium. \" Foods labeled \"reduced-sodium\" and \"light sodium\" may still have too much sodium. · Limit processed foods, fast food, and restaurant foods. These types of food are very high in sodium. · Avoid salted pretzels, chips, popcorn, and other salted snacks. · Avoid smoked, cured, salted, and canned meat, fish, and poultry. This includes ham, conklin, hot dogs, and luncheon meats. · You may use lemon, herbs, and spices to flavor your meals. To limit potassium  · Choose low-potassium fruits such as applesauce, pineapple, grapes, blueberries, and raspberries. · Choose low-potassium vegetables such as lettuce, green beans, cucumber, and radishes. · Choose low-potassium foods such as hummus, spaghetti, macaroni, rice, tortillas, and bagels. · Limit or avoid high-potassium foods such as milk, bananas, oranges, cantaloupe, potatoes, spinach, tomatoes, broccoli, and sweet potatoes. · Do not use a salt substitute or lite salt unless your doctor says it is okay. Most salt substitutes and lite salts are high in potassium. To limit phosphorus  · Follow your food plan to know how much milk and milk products you can have. · Limit nuts, peanut butter, seeds, lentils, beans, organ meats, and sardines. · Avoid cola drinks. · Avoid bran breads or bran cereals. If you need to limit fluids  · Know how much fluid you can drink. Every day fill a pitcher with that amount of water.  If you drink another fluid (such as coffee) that day, pour an equal amount of water out of the mario. · Count foods that are liquid at room temperature, such as gelatin dessert and ice cream, as fluids. Where can you learn more? Go to [email or web address] org and sign in to your i[employer] account. Enter J852 in the [facility] box to learn more about \"Diet for Chronic Kidney Disease (Before Dialysis): Care Instructions. \"     If you do not have an account, please click on the \"Sign Up Now\" link. Current as of: August 22, 2019               Content Version: 12.5  © 4464-7870 Healthwise, Incorporated. Care instructions adapted under license by [facility] ([facility]). If you have questions about a medical condition or this instruction, always ask your healthcare professional. [address] any warranty or liability for your use of this information. Medications e-scribe to pharmacy of pt's choice. Script for lab given to pt, no fasting required. Pt will get labs done as soon as possible. Patient to return to clinic 6 months (office will call and schedule appt). AVS reviewed and given to pt. It is very important for your care that you keep your appointment. If for some reason you are unable to keep your appointment it is equally important that you call our office at [phone] to cancel your appointment and reschedule. Failure to do so may result in your termination from our practice.   MB

## 2020-10-10 ENCOUNTER — HOSPITAL ENCOUNTER (OUTPATIENT)
Dept: ULTRASOUND IMAGING | Age: 55
Discharge: HOME | End: 2020-10-12
Payer: MEDICAID

## 2020-10-10 ENCOUNTER — HOSPITAL ENCOUNTER (OUTPATIENT)
Dept: MAMMOGRAPHY | Age: 55
Discharge: HOME | End: 2020-10-12
Payer: MEDICAID

## 2020-10-10 PROCEDURE — 76705 ECHO EXAM OF ABDOMEN: CPT

## 2020-10-10 PROCEDURE — 77067 SCR MAMMO BI INCL CAD: CPT

## 2020-10-12 ENCOUNTER — OFFICE VISIT (OUTPATIENT)
Dept: PODIATRY | Age: 55
End: 2020-10-12
Payer: MEDICAID

## 2020-10-12 VITALS
WEIGHT: 170 LBS | BODY MASS INDEX: 29.02 KG/M2 | DIASTOLIC BLOOD PRESSURE: 84 MMHG | HEART RATE: 81 BPM | HEIGHT: 64 IN | SYSTOLIC BLOOD PRESSURE: 144 MMHG

## 2020-10-12 PROCEDURE — 11055 PARING/CUTG B9 HYPRKER LES 1: CPT | Performed by: STUDENT IN AN ORGANIZED HEALTH CARE EDUCATION/TRAINING PROGRAM

## 2020-10-12 PROCEDURE — G8427 DOCREV CUR MEDS BY ELIG CLIN: HCPCS | Performed by: STUDENT IN AN ORGANIZED HEALTH CARE EDUCATION/TRAINING PROGRAM

## 2020-10-12 PROCEDURE — G8417 CALC BMI ABV UP PARAM F/U: HCPCS | Performed by: STUDENT IN AN ORGANIZED HEALTH CARE EDUCATION/TRAINING PROGRAM

## 2020-10-12 PROCEDURE — 99212 OFFICE O/P EST SF 10 MIN: CPT

## 2020-10-12 PROCEDURE — 3017F COLORECTAL CA SCREEN DOC REV: CPT | Performed by: STUDENT IN AN ORGANIZED HEALTH CARE EDUCATION/TRAINING PROGRAM

## 2020-10-12 PROCEDURE — 1111F DSCHRG MED/CURRENT MED MERGE: CPT | Performed by: STUDENT IN AN ORGANIZED HEALTH CARE EDUCATION/TRAINING PROGRAM

## 2020-10-12 PROCEDURE — 99213 OFFICE O/P EST LOW 20 MIN: CPT | Performed by: STUDENT IN AN ORGANIZED HEALTH CARE EDUCATION/TRAINING PROGRAM

## 2020-10-12 PROCEDURE — G8482 FLU IMMUNIZE ORDER/ADMIN: HCPCS | Performed by: STUDENT IN AN ORGANIZED HEALTH CARE EDUCATION/TRAINING PROGRAM

## 2020-10-12 RX ORDER — PRENATAL VIT 91/IRON/FOLIC/DHA 28-975-200
COMBINATION PACKAGE (EA) ORAL
Qty: 1 TUBE | Refills: 3 | Status: SHIPPED | OUTPATIENT
Start: 2020-10-12 | End: 2021-01-20 | Stop reason: ALTCHOICE

## 2020-10-12 NOTE — PROGRESS NOTES
Patient instructed to remove shoes and socks and instructed to sit in exam chair. Current PCP is Sobeida Fowler MD and date of last visit was 10-2-20. Do you have a follow up visit scheduled? No  Diabetic visit information    Blood pressure (Control is BP <140/90)  BP Readings from Last 3 Encounters:   10/02/20 133/86   09/24/20 (!) 132/96   07/27/20 109/71       BP taken with correct size cuff? - Yes   Repeated if > 140/90 Yes      Tobacco use:  Patient  reports that she quit smoking about 27 years ago. She smoked 0.50 packs per day. She has never used smokeless tobacco.  If Smoker - Cessation materials given? - Yes       Diabetic Health Maintenance Items due  Diabetes Management   Topic Date Due    Diabetic foot exam  01/22/1976    Diabetic retinal exam  01/22/1976    Diabetic microalbuminuria test  06/12/2020       Diabetic retinal exam done in last year? - Yes   If No: remind patient that it is due and they should schedule an exam    Medications  Is patient taking any medications for diabetes? -   Yes  Have blood sugars been controlled? Fasting blood sugars under 120   -   Mays not check at home   Random home sugars or today's POCT glucose is under 180 -   Does not check at home   []  If No to the above then patient should schedule appt with PCP.      Diabetic Plan    A1C Plan  Lab Results   Component Value Date    LABA1C 6.2 (H) 10/02/2020    LABA1C 6.3 (H) 06/30/2020    LABA1C 5.8 06/12/2019      []  If A1C over 8 and last result >3 months ago - Order A1C and refer to PCP   []  If last A1C over 6 months ago - Order A1C and refer to PCP for follow up   []  If elevated blood sugars > 180 - refer to PCP for follow up    []  Blood sugar controlled - A1C under 8 and last check was < 6 months      Cholesterol Plan   Lab Results   Component Value Date    LDLCHOLESTEROL 187 (H) 07/17/2020      []  If LDL > 100 and last result >3 months ago - order Fasting lipids and refer to PCP for follow up   []  If LDL < 100 and over 1 year ago - Order Fasting lipids and refer to PCP for follow up   [] LDL is controlled. LDL < 100 and checked within the last year     Blood Pressure  BP Readings from Last 3 Encounters:   10/02/20 133/86   09/24/20 (!) 132/96   07/27/20 109/71      []  If SBP >140 mmhg - refer to PCP for follow up   []  If DBP > 90 mmhg - refer to PCP for follow up   [] BP is controlled <140/90     Order labs as PCP ordered.   (ie: Lipids, A1C, CMP)

## 2020-10-12 NOTE — PROGRESS NOTES
I performed a history and physical examination of the patient and discussed management with the resident. I reviewed the residents note and agree with the documented findings and plan of care. Any areas of disagreement are noted on the chart. I was personally present for the key portions of any procedures. I have documented in the chart those procedures where I was not present during the key portions. I have reviewed the Podiatry Resident progress note. I agree with the chief complaint, past medical history, past surgical history, allergies, medications, social and family history as documented unless otherwise noted below. Documentation of the HPI, Physical Exam and Medical Decision Making performed by medical students or scribes is based on my personal performance of the HPI, PE and MDM. I have personally evaluated this patient and have completed at least one if not all key elements of the E/M (history, physical exam, and MDM). Additional findings are as noted. Candelario Tubbs D.P.M.

## 2020-10-12 NOTE — PROGRESS NOTES
[address],  [place]  Tel: [phone]   Fax: [phone]    Subjective     CC: Right foot pain    Interval hx:10/12/2020  Patient presents clinic today for follow-up visit of bilateral bunion pain and hammertoe pain of the right foot. Surgery was discussed at her last visit. Since then, patient has received clearance by her primary care doctor. Patient states that she would like to go ahead and schedule for surgery due to the increased amount of pain. Patient continues to apply voltaren cream to her bunion. Patient does have a history of type 2 diabetes, with a current A1c of 6.2 as of 10/2/20 and is a non-smoker. HPI:  Danial Coronado is a 48y.o. year old female who presents to clinic today for evaluation of left hallux pain. The patient states her left great toe became swollen and painful approximately 5 days ago. The patient denies any history of gout. The patient denies any history of acute trauma. The patient does states she eats a diet high in cheese, approximately 1/4 pound of cheese every day. The patient states she also drinks beer daily. The patient denies any open wounds or lacerations. The patient does state that it is difficult to ambulate with a sharp pain. Patient denies any nausea, vomiting, fever, chills, shortness breath. Primary care physician is Ricardo Holter, MD.    ROS:    Constitutional: Denies nausea, vomiting, fever, chills. Neurologic: Denies numbness, tingling, and burning in the feet. Vascular: Denies symptoms of lower extremity claudication. Skin: Denies open wounds. Otherwise negative except as noted in the HPI.      PMH:  Past Medical History:   Diagnosis Date    [facility] 11/18/2017    Alcohol abuse 5/22/2014    Angioedema 11/17/2017    from lisinopril    Anxiety     Asthma     mild persistent asthma, on home resp medications for control    B[facility] 1 disorder ([facility]     Bipolar disorder ([facility]     CAD (coronary artery disease)     no stents    Claustrophobia     Cocaine abuse ([facility]     states used years ago    Depression     GERD (gastroesophageal reflux disease)     Hypertension     Hypertrophic cardiomyopathy ([facility]     Lung nodules     MDRO (multiple drug resistant organisms) resistance     MRSA (methicillin resistant Staphylococcus aureus)     rt hip    Murmur     see cardiac echo result    OA (osteoarthritis)     Obesity 3/28/2013    PUD (peptic ulcer disease)     Thyroid nodule     Tonsillar hypertrophy     Type 2 diabetes mellitus without complication, without long-term current use of insulin ([facility] 2018       Surgical History:   Past Surgical History:   Procedure Laterality Date    CARDIAC CATHETERIZATION      COLONOSCOPY      bx    HYSTERECTOMY      partial hyst    OTHER SURGICAL HISTORY  2015    MRI under anesthesia    THYROID SURGERY      bilat bx    UPPER GASTROINTESTINAL ENDOSCOPY         Social History:  Social History     Tobacco Use    Smoking status: Former Smoker     Packs/day: 0.50     Last attempt to quit: 1992     Years since quittin.8    Smokeless tobacco: Never Used    Tobacco comment: states quiti in the 1980s   Substance Use Topics    Alcohol use: Not Currently     Alcohol/week: 12.0 standard drinks     Types: 12 Cans of beer per week     Comment: 2-3 times per week    Drug use: Not Currently     Types: Cocaine     Comment: last use approximately 14 cocaine       Medications:  Prior to Admission medications    Medication Sig Start Date End Date Taking? Authorizing Provider   terbinafine (LAMISIL) 1 % cream Apply topically 2 times daily.  10/12/20  Yes Valeriy Maciel DPWILLARD   diclofenac sodium (VOLTAREN) 1 % GEL Apply 4 g topically 4 times daily 10/2/20  Yes Claire Cutler MD   spironolactone (ALDACTONE) 100 MG tablet Take 1 tablet by mouth daily 10/2/20  Yes Claire Cutler MD   magnesium oxide (MAG-OX) 400 (240 Mg) MG tablet Take 1 tablet by mouth 2 times daily 10/2/20  Yes Riccardo Tanner MD   potassium chloride (KLOR-CON M) 20 MEQ extended release tablet Take 1 tablet by mouth daily 10/2/20  Yes Riccardo Tanner MD   folic acid (FOLVITE) 1 MG tablet Take 1 tablet by mouth daily 9/24/20  Yes Juanita Fontaine MD   vitamin B-1 100 MG tablet Take 1 tablet by mouth daily 9/24/20  Yes Juanita Fontaine MD   atorvastatin (LIPITOR) 20 MG tablet Take 2 tablets by mouth daily 9/24/20  Yes Juanita Fontaine MD   ferrous sulfate (FE TABS 325) 325 (65 Fe) MG EC tablet Take 1 tablet by mouth daily (with breakfast) 9/24/20  Yes Juanita Fontaine MD   naproxen (NAPROSYN) 500 MG tablet TAKE ONE TABLET BY MOUTH TWICE A DAY WITH MEALS 9/10/20  Yes Farhan Aden MD   loratadine (CLARITIN) 10 MG capsule Take 1 capsule by mouth daily 7/23/20  Yes Farhan Aden MD   EPINEPHrine (EPIPEN 2-GRICELDA) 0.3 MG/0.3ML SOAJ injection Use as directed for allergic reaction 7/17/20  Yes Farhan Aden MD   Calcium Carbonate-Vitamin D (OYSTER SHELL CALCIUM/D) 500-200 MG-UNIT TABS TAKE 1 TAB BY MOUTH ONCE A DAY 7/17/20  Yes Farhan Aden MD   vitamin D3 (CHOLECALCIFEROL) 25 MCG (1000 UT) TABS tablet Take 1 tablet by mouth daily 7/17/20  Yes Farhan Aden MD   FLUoxetine (PROZAC) 40 MG capsule Take 40 mg by mouth daily 1/24/20  Yes Jayshree Calle MD   mometasone (ASMANEX, 120 METERED DOSES,) 220 MCG/INH inhaler Inhale 2 puffs into the lungs daily 1/15/20  Yes Alysa Low MD   metFORMIN (GLUCOPHAGE) 500 MG tablet Take 1 tablet by mouth 2 times daily (with meals) 1/15/20  Yes Alysa Low MD   fluticasone (ARNUITY ELLIPTA) 100 MCG/ACT AEPB Inhale 1 puff into the lungs daily 1/15/20  Yes Alysa Low MD   atenolol (TENORMIN) 50 MG tablet Take 1 tablet by mouth daily 1/15/20  Yes Alysa Low MD   albuterol sulfate (PROAIR RESPICLICK) 542 (90 Base) MCG/ACT aerosol powder inhalation Inhale 2 puffs into the lungs every 6 hours as needed for Wheezing or Shortness of Breath 1/15/20  Yes Sharif Lopez MD   econazole nitrate 1 % cream Apply topically daily. 1/13/20  Yes Elissa Warner DPM   lidocaine (XYLOCAINE) 5 % ointment Apply 1 applicator topically 3 times daily as needed for Pain Apply topically as needed. Yes Historical Provider, MD   Multiple Vitamins-Minerals (MULTIVITAMIN WITH MINERALS) tablet Take 1 tablet by mouth daily 9/27/19  Yes Selena Caceres MD   citalopram (CELEXA) 40 MG tablet Take 40 mg by mouth daily   Yes Historical Provider, MD   mirtazapine (REMERON) 45 MG tablet Take 45 mg by mouth nightly   Yes Historical Provider, MD   lurasidone (LATUDA) 60 MG TABS tablet Take 60 mg by mouth nightly   Yes Historical Provider, MD   Cholecalciferol (VITAMIN D3) 77890 UNITS CAPS Take 1 capsule by mouth Twice a Week 8/12/15  Yes Historical Provider, MD   acetaminophen (APAP EXTRA STRENGTH) 500 MG tablet Take 2 tablets by mouth every 6 hours as needed for Pain 10/2/20 10/9/20  Lex Alfred MD       Objective     Vitals:    10/12/20 1310   BP: (!) 144/84   Pulse: 81       Lab Results   Component Value Date    LABA1C 6.2 (H) 10/02/2020       Physical Exam:  General:  Alert and oriented x3. In no acute distress. Lower Extremity Physical Exam:    Vascular: DP and PT pulses are palpable, Bilateral. CFT <3 seconds to all digits, Bilateral.  Moderate nonpitting edema noted to the left foot. Hair growth is absent to the level of the digits, Bilateral.     Neuro: Saph/sural/SP/DP/plantar sensation intact to light touch. Protective sensation is intact to 10/10 sites as tested with a 5.07g SWMF, Bilateral.     Musculoskeletal: EHL/FHL/GS/TA gross motor intact. Tenderness to palpation of first MTPJ circumferentially b/l. Swelling noted to the first MTPJ around the first metatarsal head, indicative of possible capsulitis. Moderate hallux abductovalgus deformity bilateral, worse on the right. Semi-reducible hammertoe deformity right second digit. Reduced range of motion to bilateral first MTPJ's, worse on the left. Dermatologic: No open lesions, Bilateral.  Interdigital maceration absent, Bilateral.  Nails 1-10 are within normal limits and painted with nail polish. Focal painful hyperkeratotic lesion noted to the subsecond metatarsal head on the right. Xerotic skin noted b/l in moccasin distribution pattern. Imagin20    XR Right foot  Impression   No acute osseous or soft tissue abnormality.       Degenerative change most pronounced at the 1st metatarsophalangeal joint   space with mild hallux valgus deformity         Assessment   Delphine Villalobos is a 48 y.o. female with     Diagnosis Orders   1. Valgus deformity of both great toes     2. Hammer toe of right foot     3. Pain in right foot     4. Pain in left foot          Plan   · Patient examined and evaluated. · Diagnosis and treatment options discussed in detail. · X-ray right foot reviewed with patient-diffuse osteopenia noted to the right foot; hallux abductovalgus deformity noted. · Discussed with patient surgical options to correct bunion deformity and hammertoe deformity of right foot. Discussed risks and benefits of the surgery. Patient verbalized understanding and is amenable for the procedure. Discussed in detail about postoperative course and treatment with patient. · Patient will undergo a bunionectomy with first metatarsal osteotomy b/l and second digit hammertoe correction of the right foot. · Patient has received clearance from her PCP and was previously placed on vitamin D supplements. · Patient will require COVID testing prior to surgery which will be scheduled once date of surgery is finalized. · Hyperkeratotic lesion x1 debrided into level of dermis using #15 blade, without incident  · Patient to RTC 1 week after surgery  · Demographic information forwarded to Dr. Lilian Jackson office for scheduling purposes.   · Please, call the office with any questions or concerns  · Discussed with Dr. Eric Ordonez This Encounter   Medications    terbinafine (LAMISIL) 1 % cream     Sig: Apply topically 2 times daily. Dispense:  1 Tube     Refill:  3     No orders of the defined types were placed in this encounter.     Yady Frances DPM   Podiatric Medicine & Surgery   10/12/2020 at 2:09 PM

## 2020-10-26 LAB
CULTURE: NORMAL
Lab: NORMAL
SPECIMEN DESCRIPTION: NORMAL

## 2020-10-30 NOTE — TELEPHONE ENCOUNTER
Request for acetaminophen - med pended. Please fill if appropriate. Next Visit Date:  Future Appointments   Date Time Provider Anthony Everett   11/13/2020  1:30 PM Leah Arora MD [address] Maintenance   Topic Date Due    Diabetic foot exam  01/22/1976    Diabetic retinal exam  01/22/1976    Hepatitis B vaccine (1 of 3 - Risk 3-dose series) 01/22/1985    Diabetic microalbuminuria test  06/12/2020    Shingles Vaccine (1 of 2) 07/17/2021 (Originally 1/22/2016)    Lipid screen  07/17/2021    A1C test (Diabetic or Prediabetic)  10/02/2021    Potassium monitoring  10/02/2021    Creatinine monitoring  10/02/2021    Breast cancer screen  10/10/2022    Colon cancer screen colonoscopy  12/10/2023    DTaP/Tdap/Td vaccine (3 - Td) 04/14/2027    Flu vaccine  Completed    Pneumococcal 0-64 years Vaccine  Completed    HIV screen  Completed    Hepatitis A vaccine  Aged Out    Hib vaccine  Aged Out    Meningococcal (ACWY) vaccine  Aged Out       Hemoglobin A1C (%)   Date Value   10/02/2020 6.2 (H)   06/30/2020 6.3 (H)   06/12/2019 5.8             ( goal A1C is < 7)   Microalb/Crt.  Ratio (mcg/mg creat)   Date Value   06/12/2019 147 (H)     LDL Cholesterol (mg/dL)   Date Value   07/17/2020 187 (H)       (goal LDL is <100)   AST (U/L)   Date Value   10/02/2020 20     ALT (U/L)   Date Value   10/02/2020 18     BUN (mg/dL)   Date Value   10/02/2020 7     BP Readings from Last 3 Encounters:   10/12/20 (!) 144/84   10/02/20 133/86   09/24/20 (!) 132/96          (goal 120/80)    All Future Testing planned in [facility]  Lab Frequency Next Occurrence   Sleep Study with PAP Titration Once 01/15/2020   C-Reactive Protein Once 09/29/2020         Patient Active Problem List:     Lung nodule     Obesity     Adrenal adenoma     Goiter     Alcohol abuse     Essential hypertension     Enlarged tonsils     ACE inhibitor-aggravated angioedema     JONES (obstructive sleep apnea)     Dyslipidemia     IGT

## 2020-10-31 RX ORDER — PSEUDOEPHED/ACETAMINOPH/DIPHEN 30MG-500MG
TABLET ORAL
Qty: 56 TABLET | Refills: 0 | Status: SHIPPED | OUTPATIENT
Start: 2020-10-31 | End: 2021-01-04

## 2020-11-09 LAB
CULTURE: NORMAL
DIRECT EXAM: NORMAL
Lab: NORMAL
SPECIMEN DESCRIPTION: NORMAL

## 2020-11-13 ENCOUNTER — OFFICE VISIT (OUTPATIENT)
Dept: GASTROENTEROLOGY | Age: 55
End: 2020-11-13
Payer: MEDICAID

## 2020-11-13 ENCOUNTER — TELEPHONE (OUTPATIENT)
Dept: GASTROENTEROLOGY | Age: 55
End: 2020-11-13

## 2020-11-13 VITALS — WEIGHT: 175 LBS | BODY MASS INDEX: 30.04 KG/M2 | SYSTOLIC BLOOD PRESSURE: 154 MMHG | DIASTOLIC BLOOD PRESSURE: 101 MMHG

## 2020-11-13 PROCEDURE — 3017F COLORECTAL CA SCREEN DOC REV: CPT | Performed by: INTERNAL MEDICINE

## 2020-11-13 PROCEDURE — G8417 CALC BMI ABV UP PARAM F/U: HCPCS | Performed by: INTERNAL MEDICINE

## 2020-11-13 PROCEDURE — G8482 FLU IMMUNIZE ORDER/ADMIN: HCPCS | Performed by: INTERNAL MEDICINE

## 2020-11-13 PROCEDURE — G8427 DOCREV CUR MEDS BY ELIG CLIN: HCPCS | Performed by: INTERNAL MEDICINE

## 2020-11-13 PROCEDURE — 99203 OFFICE O/P NEW LOW 30 MIN: CPT | Performed by: INTERNAL MEDICINE

## 2020-11-13 PROCEDURE — 1036F TOBACCO NON-USER: CPT | Performed by: INTERNAL MEDICINE

## 2020-11-13 RX ORDER — OMEPRAZOLE 40 MG/1
40 CAPSULE, DELAYED RELEASE ORAL DAILY
Qty: 30 CAPSULE | Refills: 3 | Status: SHIPPED | OUTPATIENT
Start: 2020-11-13 | End: 2021-01-22 | Stop reason: SDUPTHER

## 2020-11-13 RX ORDER — POLYETHYLENE GLYCOL 3350 17 G/17G
POWDER, FOR SOLUTION ORAL
Qty: 238 G | Refills: 0 | Status: SHIPPED | OUTPATIENT
Start: 2020-11-13 | End: 2020-12-04

## 2020-11-13 ASSESSMENT — ENCOUNTER SYMPTOMS
RESPIRATORY NEGATIVE: 1
ABDOMINAL PAIN: 1
ABDOMINAL DISTENTION: 1
EYES NEGATIVE: 1
DIARRHEA: 1

## 2020-11-13 NOTE — PROGRESS NOTES
Reason for Referral:  Altamese Apgar, MD  [address]    Chief Complaint   Patient presents with    New Patient     Referred for anemia and elevated liver enzymes.  Abdominal Pain     Patient states having lower abdominal pain when she wakes up in the morning, cramping feeling.  Diarrhea     Patient states having loose stools about 3-4 times a day. Denies bleeding/ black stools.  Gastroesophageal Reflux     Patient states taking prilosec and it helps control her symptoms. HISTORY OF PRESENT ILLNESS: Ms.Sonja CHEPE Delarosa is a 48 y.o. female with a past history remarkable for prior history of bipolar d/o, hypertrophic cardiomyopathy, HTN,  Type II DM, prior reported history of PUD, alcohol abuse, referred for evaluation of STAS. Patient denies any overt GI bleeding. Denies any hematochezia, melena, or blood per rectum or hematemesis. Smoker: none  Drinking history: None   Illicit drugs: None  Abdominal surgeries:   Prior Colonoscopy: none recent  Prior EGD:    FH of GI issues:  None      Past Medical,Family, and Social History reviewed and does contribute to the patient presentingcondition. Patient's PMH/PSH,SH,PSYCH Hx, MEDs, ALLERGIES, and ROS were all reviewed and updated in the appropriate sections.     PAST MEDICAL HISTORY:  Past Medical History:   Diagnosis Date    [facility]ANN [facility]) ([facility] 2017    Alcohol abuse 2014    Angioedema 2017    from lisinopril    Anxiety     Asthma     mild persistent asthma, on home resp medications for control    Bipolar 1 disorder (HCC)     Bipolar disorder ([facility]     CAD (coronary artery disease)     no stents    Claustrophobia     Cocaine abuse ([facility]     states used years ago    Depression     GERD (gastroesophageal reflux disease)     Hypertension     Hypertrophic cardiomyopathy ([facility]     Lung nodules     MDRO (multiple drug resistant organisms) resistance     MRSA (methicillin resistant Staphylococcus aureus)     rt hip    Murmur     see cardiac echo result    OA (osteoarthritis)     Obesity 3/28/2013    PUD (peptic ulcer disease)     Thyroid nodule     Tonsillar hypertrophy     Type 2 diabetes mellitus without complication, without long-term current use of insulin [facility] 12/7/2018       Past Surgical History:   Procedure Laterality Date    CARDIAC CATHETERIZATION      COLONOSCOPY      bx    HYSTERECTOMY      partial hyst    OTHER SURGICAL HISTORY  8/24/2015    MRI under anesthesia    THYROID SURGERY      bilat bx    UPPER GASTROINTESTINAL ENDOSCOPY         CURRENT MEDICATIONS:    Current Outpatient Medications:     ACETAMINOPHEN EXTRA STRENGTH 500 MG tablet, TAKE 2 TABLETS BY MOUTH EVERY 6 HOURS AS NEEDED FOR PAIN , Disp: 56 tablet, Rfl: 0    terbinafine (LAMISIL) 1 % cream, Apply topically 2 times daily. , Disp: 1 Tube, Rfl: 3    diclofenac sodium (VOLTAREN) 1 % GEL, Apply 4 g topically 4 times daily, Disp: 1 Tube, Rfl: 0    spironolactone (ALDACTONE) 100 MG tablet, Take 1 tablet by mouth daily, Disp: 30 tablet, Rfl: 11    magnesium oxide (MAG-OX) 400 (240 Mg) MG tablet, Take 1 tablet by mouth 2 times daily, Disp: 60 tablet, Rfl: 22    potassium chloride (KLOR-CON M) 20 MEQ extended release tablet, Take 1 tablet by mouth daily, Disp: 30 tablet, Rfl: 5    folic acid (FOLVITE) 1 MG tablet, Take 1 tablet by mouth daily, Disp: 30 tablet, Rfl: 3    vitamin B-1 100 MG tablet, Take 1 tablet by mouth daily, Disp: 30 tablet, Rfl: 3    atorvastatin (LIPITOR) 20 MG tablet, Take 2 tablets by mouth daily, Disp: 30 tablet, Rfl: 3    ferrous sulfate (FE TABS 325) 325 (65 Fe) MG EC tablet, Take 1 tablet by mouth daily (with breakfast), Disp: 90 tablet, Rfl: 3    naproxen (NAPROSYN) 500 MG tablet, TAKE ONE TABLET BY MOUTH TWICE A DAY WITH MEALS, Disp: 60 tablet, Rfl: 0    loratadine (CLARITIN) 10 MG capsule, Take 1 capsule by mouth daily, Disp: 30 capsule, Rfl: 3   EPINEPHrine (EPIPEN 2-GRICELDA) 0.3 MG/0.3ML SOAJ injection, Use as directed for allergic reaction, Disp: 1 each, Rfl: 2    Calcium Carbonate-Vitamin D (OYSTER SHELL CALCIUM/D) 500-200 MG-UNIT TABS, TAKE 1 TAB BY MOUTH ONCE A DAY, Disp: 30 tablet, Rfl: 3    vitamin D3 (CHOLECALCIFEROL) 25 MCG (1000 UT) TABS tablet, Take 1 tablet by mouth daily, Disp: 30 tablet, Rfl: 5    FLUoxetine (PROZAC) 40 MG capsule, Take 40 mg by mouth daily, Disp: , Rfl:     mometasone (ASMANEX, 120 METERED DOSES,) 220 MCG/INH inhaler, Inhale 2 puffs into the lungs daily, Disp: 1 Inhaler, Rfl: 3    metFORMIN (GLUCOPHAGE) 500 MG tablet, Take 1 tablet by mouth 2 times daily (with meals), Disp: 180 tablet, Rfl: 1    fluticasone (ARNUITY ELLIPTA) 100 MCG/ACT AEPB, Inhale 1 puff into the lungs daily, Disp: 30 each, Rfl: 5    atenolol (TENORMIN) 50 MG tablet, Take 1 tablet by mouth daily, Disp: 60 tablet, Rfl: 3    albuterol sulfate (PROAIR RESPICLICK) 159 (90 Base) MCG/ACT aerosol powder inhalation, Inhale 2 puffs into the lungs every 6 hours as needed for Wheezing or Shortness of Breath, Disp: 5 Inhaler, Rfl: 3    econazole nitrate 1 % cream, Apply topically daily. , Disp: 85 g, Rfl: 2    lidocaine (XYLOCAINE) 5 % ointment, Apply 1 applicator topically 3 times daily as needed for Pain Apply topically as needed. , Disp: , Rfl:     Multiple Vitamins-Minerals (MULTIVITAMIN WITH MINERALS) tablet, Take 1 tablet by mouth daily, Disp: 30 tablet, Rfl: 3    citalopram (CELEXA) 40 MG tablet, Take 40 mg by mouth daily, Disp: , Rfl:     mirtazapine (REMERON) 45 MG tablet, Take 45 mg by mouth nightly, Disp: , Rfl:     lurasidone (LATUDA) 60 MG TABS tablet, Take 60 mg by mouth nightly, Disp: , Rfl:     Cholecalciferol (VITAMIN D3) 92440 UNITS CAPS, Take 1 capsule by mouth Twice a Week, Disp: , Rfl:     ALLERGIES:   Allergies   Allergen Reactions    Bee Venom Anaphylaxis    Iodine Anaphylaxis and Rash    Lisinopril Swelling and Anaphylaxis Forced sexual activity: Not on file   Other Topics Concern    Not on file   Social History Narrative    Not on file         REVIEW OF SYSTEMS: A 12-point review of systems was obtained and pertinent positives and negatives were listed below. REVIEW OF SYSTEMS:     Constitutional: No fever, no chills, no lethargy, no weakness. HEENT:  No headache, otalgia, itchy eyes, nasal discharge or sore throat. Cardiac:  No chest pain, dyspnea, orthopnea or PND. Chest:   No cough, phlegm or wheezing. Abdomen:      Detailed by MA   Neuro:  No focal weakness, abnormal movements or seizure like activity. Skin:   No rashes, no itching. :   No hematuria, no pyuria, no dysuria, no flank pain. Extremities:  No swelling or joint pains. ROS was otherwise negative    Review of Systems   Constitutional: Negative. HENT: Negative. Eyes: Negative. Respiratory: Negative. Cardiovascular: Negative. Gastrointestinal: Positive for abdominal distention, abdominal pain and diarrhea. Endocrine: Negative. Genitourinary: Negative. Musculoskeletal: Positive for arthralgias. Skin: Negative. Allergic/Immunologic: Positive for food allergies. Neurological: Positive for light-headedness. Hematological: Negative. Psychiatric/Behavioral: Positive for sleep disturbance. The patient is nervous/anxious. All other systems reviewed and are negative. PHYSICAL EXAMINATION: Vital signs reviewed per the nursing documentation. BP (!) 154/99   Wt 175 lb (79.4 kg)   BMI 30.04 kg/m²   Body mass index is 30.04 kg/m². Physical Exam    Physical Exam   Constitutional: Patient is oriented to person, place, and time. Patient appears well-developed and well-nourished. HENT:   Head: Normocephalic and atraumatic. Eyes: Pupils are equal, round, and reactive to light. EOM are normal.   Neck: Normal range of motion. Neck supple. No JVD present. No tracheal deviation present. No thyromegaly present. Cardiovascular: Normal rate, regular rhythm, normal heart sounds and intact distal pulses. Pulmonary/Chest: Effort normal and breath sounds normal. No stridor. No respiratory distress. He has no wheezes. He has no rales. He exhibits no tenderness. Abdominal: Soft. Bowel sounds are normal. He exhibits no distension and no mass. There is no tenderness. There is no rebound and no guarding. No hernia. Musculoskeletal: Normal range of motion. Lymphadenopathy:    Patient has no cervical adenopathy. Neurological: Patient is alert and oriented to person, place, and time. Psychiatric: Patient has a normal mood and affect. Patient behavior is normal.       LABORATORY DATA: Reviewed  Lab Results   Component Value Date    WBC 9.1 10/02/2020    HGB 10.4 (L) 10/02/2020    HCT 33.0 (L) 10/02/2020    MCV 96.8 10/02/2020     (H) 10/02/2020     10/02/2020    K 3.4 (L) 10/02/2020    CL 97 (L) 10/02/2020    CO2 23 10/02/2020    BUN 7 10/02/2020    CREATININE 0.91 (H) 10/02/2020    LABPROT 7.5 02/24/2012    LABALBU 3.8 10/02/2020    BILITOT 0.33 10/02/2020    ALKPHOS 94 10/02/2020    AST 20 10/02/2020    ALT 18 10/02/2020    INR 1.0 10/02/2019         Lab Results   Component Value Date    RBC 3.41 (L) 10/02/2020    HGB 10.4 (L) 10/02/2020    MCV 96.8 10/02/2020    MCH 30.5 10/02/2020    MCHC 31.5 10/02/2020    RDW 14.6 (H) 10/02/2020    MPV 9.5 10/02/2020    BASOPCT 1 10/02/2020    LYMPHSABS 1.50 10/02/2020    MONOSABS 1.39 (H) 10/02/2020    NEUTROABS 6.01 10/02/2020    EOSABS 0.12 10/02/2020    BASOSABS 0.08 10/02/2020         DIAGNOSTIC TESTING:     No results found. IMPRESSION:  Ms.Sonja CHEPE Ring is a 48 y.o. female with a past history remarkable for prior history of bipolar d/o, hypertrophic cardiomyopathy, HTN,  Type II DM, prior reported history of PUD, alcohol abuse, referred for evaluation of STAS. Patient denies any overt GI bleeding.   Denies any hematochezia, melena, or blood per rectum or hematemesis. PLAN:    1) STAS versus bone marrow suppression from chronic alcoholism-- Chronic based on history. Obscure source at this time. Will need to repeat no lab values and plan for endoscopic evaluation with an EGD and colonoscopy. Start patient on PPI therapy for mild GERD symptoms. 2) RTC in 6 weeks. Thank you for allowing me to participate in the care of Ms. Judith Xiong. For any further questions please do not hesitate to contact me. I have reviewed and agree with the MA/LPN ROS please refer to their documentation from today's encounter on a separate note. Tanvir Durant MD, MPH   [facility]  Office #: [phone]          this note is created with the assistance of a speech recognition program.  While intending to generate a document that actually reflects the content of the visit, the document can still have some errors including those of syntax and sound a like substitutions which may escape proof reading. It such instances, actual meaning can be extrapolated by contextual diversion.

## 2020-11-29 ENCOUNTER — HOSPITAL ENCOUNTER (OUTPATIENT)
Dept: PREADMISSION TESTING | Age: 55
Setting detail: SPECIMEN
Discharge: HOME | End: 2020-12-03
Payer: MEDICAID

## 2020-11-29 PROCEDURE — U0003 INFECTIOUS AGENT DETECTION BY NUCLEIC ACID (DNA OR RNA); SEVERE ACUTE RESPIRATORY SYNDROME CORONAVIRUS 2 (SARS-COV-2) (CORONAVIRUS DISEASE [COVID-19]), AMPLIFIED PROBE TECHNIQUE, MAKING USE OF HIGH THROUGHPUT TECHNOLOGIES AS DESCRIBED BY CMS-2020-01-R: HCPCS

## 2020-11-30 ENCOUNTER — TELEPHONE (OUTPATIENT)
Dept: PODIATRY | Age: 55
End: 2020-11-30

## 2020-11-30 LAB
SARS-COV-2, RAPID: NORMAL
SARS-COV-2: NORMAL
SARS-COV-2: NOT DETECTED
SOURCE: NORMAL

## 2020-11-30 NOTE — TELEPHONE ENCOUNTER
Patient is scheduled for surgery on 12/7 at 11:30AM at [facility] and for covid testing on 12/4 at the [facility]. Talked to patient on 11/24 and gave her the dates, times and instructions. Patient confirmed and verbalized understanding.

## 2020-12-02 ENCOUNTER — TELEPHONE (OUTPATIENT)
Dept: GASTROENTEROLOGY | Age: 55
End: 2020-12-02

## 2020-12-02 NOTE — TELEPHONE ENCOUNTER
Writer spoke with patient. She is agreeable to [facility] performing procedure.

## 2020-12-03 ENCOUNTER — ANESTHESIA EVENT (OUTPATIENT)
Dept: ENDOSCOPY | Age: 55
End: 2020-12-03
Payer: MEDICAID

## 2020-12-03 ENCOUNTER — HOSPITAL ENCOUNTER (OUTPATIENT)
Age: 55
Setting detail: OUTPATIENT SURGERY
Discharge: HOME | End: 2020-12-03
Attending: INTERNAL MEDICINE | Admitting: INTERNAL MEDICINE
Payer: MEDICAID

## 2020-12-03 ENCOUNTER — ANESTHESIA (OUTPATIENT)
Dept: ENDOSCOPY | Age: 55
End: 2020-12-03
Payer: MEDICAID

## 2020-12-03 VITALS
WEIGHT: 170 LBS | SYSTOLIC BLOOD PRESSURE: 139 MMHG | DIASTOLIC BLOOD PRESSURE: 100 MMHG | BODY MASS INDEX: 30.12 KG/M2 | OXYGEN SATURATION: 100 % | RESPIRATION RATE: 12 BRPM | HEART RATE: 83 BPM | HEIGHT: 63 IN | TEMPERATURE: 97.8 F

## 2020-12-03 VITALS
RESPIRATION RATE: 19 BRPM | SYSTOLIC BLOOD PRESSURE: 125 MMHG | OXYGEN SATURATION: 100 % | DIASTOLIC BLOOD PRESSURE: 99 MMHG

## 2020-12-03 PROCEDURE — 88342 IMHCHEM/IMCYTCHM 1ST ANTB: CPT

## 2020-12-03 PROCEDURE — 2709999900 HC NON-CHARGEABLE SUPPLY: Performed by: INTERNAL MEDICINE

## 2020-12-03 PROCEDURE — 3700000000 HC ANESTHESIA ATTENDED CARE: Performed by: INTERNAL MEDICINE

## 2020-12-03 PROCEDURE — 2500000003 HC RX 250 WO HCPCS: Performed by: NURSE ANESTHETIST, CERTIFIED REGISTERED

## 2020-12-03 PROCEDURE — 3609010300 HC COLONOSCOPY W/BIOPSY SINGLE/MULTIPLE: Performed by: INTERNAL MEDICINE

## 2020-12-03 PROCEDURE — 3700000001 HC ADD 15 MINUTES (ANESTHESIA): Performed by: INTERNAL MEDICINE

## 2020-12-03 PROCEDURE — 7100000011 HC PHASE II RECOVERY - ADDTL 15 MIN: Performed by: INTERNAL MEDICINE

## 2020-12-03 PROCEDURE — 45380 COLONOSCOPY AND BIOPSY: CPT | Performed by: INTERNAL MEDICINE

## 2020-12-03 PROCEDURE — 2580000003 HC RX 258: Performed by: INTERNAL MEDICINE

## 2020-12-03 PROCEDURE — 43239 EGD BIOPSY SINGLE/MULTIPLE: CPT | Performed by: INTERNAL MEDICINE

## 2020-12-03 PROCEDURE — 6360000002 HC RX W HCPCS: Performed by: NURSE ANESTHETIST, CERTIFIED REGISTERED

## 2020-12-03 PROCEDURE — 3609012400 HC EGD TRANSORAL BIOPSY SINGLE/MULTIPLE: Performed by: INTERNAL MEDICINE

## 2020-12-03 PROCEDURE — 88305 TISSUE EXAM BY PATHOLOGIST: CPT

## 2020-12-03 PROCEDURE — 7100000010 HC PHASE II RECOVERY - FIRST 15 MIN: Performed by: INTERNAL MEDICINE

## 2020-12-03 RX ORDER — AMLODIPINE BESYLATE 10 MG/1
TABLET ORAL
COMMUNITY
Start: 2020-10-30 | End: 2021-01-08

## 2020-12-03 RX ORDER — PROPOFOL 10 MG/ML
INJECTION, EMULSION INTRAVENOUS CONTINUOUS PRN
Status: DISCONTINUED | OUTPATIENT
Start: 2020-12-03 | End: 2020-12-03 | Stop reason: SDUPTHER

## 2020-12-03 RX ORDER — PANTOPRAZOLE SODIUM 40 MG/1
TABLET, DELAYED RELEASE ORAL
COMMUNITY
Start: 2020-10-30 | End: 2021-01-20 | Stop reason: ALTCHOICE

## 2020-12-03 RX ORDER — SODIUM CHLORIDE 9 MG/ML
INJECTION, SOLUTION INTRAVENOUS CONTINUOUS
Status: DISCONTINUED | OUTPATIENT
Start: 2020-12-03 | End: 2020-12-03 | Stop reason: HOSPADM

## 2020-12-03 RX ORDER — LIDOCAINE HYDROCHLORIDE 10 MG/ML
INJECTION, SOLUTION EPIDURAL; INFILTRATION; INTRACAUDAL; PERINEURAL PRN
Status: DISCONTINUED | OUTPATIENT
Start: 2020-12-03 | End: 2020-12-03 | Stop reason: SDUPTHER

## 2020-12-03 RX ORDER — PROPOFOL 10 MG/ML
INJECTION, EMULSION INTRAVENOUS PRN
Status: DISCONTINUED | OUTPATIENT
Start: 2020-12-03 | End: 2020-12-03 | Stop reason: SDUPTHER

## 2020-12-03 RX ORDER — LABETALOL HYDROCHLORIDE 5 MG/ML
INJECTION, SOLUTION INTRAVENOUS PRN
Status: DISCONTINUED | OUTPATIENT
Start: 2020-12-03 | End: 2020-12-03 | Stop reason: SDUPTHER

## 2020-12-03 RX ADMIN — Medication 10 MG: at 10:59

## 2020-12-03 RX ADMIN — PROPOFOL 120 MG: 10 INJECTION, EMULSION INTRAVENOUS at 10:52

## 2020-12-03 RX ADMIN — LIDOCAINE HYDROCHLORIDE 50 MG: 10 INJECTION, SOLUTION EPIDURAL; INFILTRATION; INTRACAUDAL; PERINEURAL at 10:52

## 2020-12-03 RX ADMIN — PROPOFOL 50 MG: 10 INJECTION, EMULSION INTRAVENOUS at 10:57

## 2020-12-03 RX ADMIN — PROPOFOL 125 MCG/KG/MIN: 10 INJECTION, EMULSION INTRAVENOUS at 10:52

## 2020-12-03 RX ADMIN — PROPOFOL 50 MG: 10 INJECTION, EMULSION INTRAVENOUS at 11:05

## 2020-12-03 RX ADMIN — SODIUM CHLORIDE: 9 INJECTION, SOLUTION INTRAVENOUS at 10:20

## 2020-12-03 RX ADMIN — PROPOFOL 40 MG: 10 INJECTION, EMULSION INTRAVENOUS at 11:01

## 2020-12-03 ASSESSMENT — PAIN DESCRIPTION - LOCATION: LOCATION: THROAT

## 2020-12-03 ASSESSMENT — PAIN SCALES - GENERAL
PAINLEVEL_OUTOF10: 8
PAINLEVEL_OUTOF10: 0
PAINLEVEL_OUTOF10: 0

## 2020-12-03 ASSESSMENT — PAIN DESCRIPTION - DESCRIPTORS: DESCRIPTORS: SORE

## 2020-12-03 ASSESSMENT — PAIN DESCRIPTION - PAIN TYPE: TYPE: ACUTE PAIN

## 2020-12-03 NOTE — H&P (VIEW-ONLY)
History and Physical    Pt Name: Jaquelin Hall  MRN: [id number]  YOB: 1965  Date of evaluation: 12/3/2020  Primary Care Physician: Allan López MD    SUBJECTIVE:   History of Chief Complaint:    Jaquelin Hall is a 48 y.o. female who is scheduled today for EGD and colonoscopy. She reports history of \"vomiting a lot\", says for a few months as well as chronic diarrhea. She denies prior EGD and colonoscopy, although they are listed in her health history. She reports history of alcoholism, says she has cut down to a couple of beers daily. Hx GERD. Allergies  is allergic to bee venom; iodine; lisinopril; and shellfish-derived products. Medications  Prior to Admission medications    Medication Sig Start Date End Date Taking? Authorizing Provider   omeprazole (PRILOSEC) 40 MG delayed release capsule Take 1 capsule by mouth daily 11/13/20   Tanvir Durant MD   polyethylene glycol [facility]) 17 GM/SCOOP powder Use as directed by following your patient instructions given by office. 11/13/20   Tanvir Durant MD   bisacodyl (DULCOLAX) 5 MG EC tablet TAKE 4 TABS AS DIRECTED BY PHYSICIAN OFFICE 11/13/20   Tanvir Durant MD   magnesium citrate solution Take 296 mLs by mouth once for 1 dose 11/13/20 11/13/20  Tanvir Durant MD   ACETAMINOPHEN EXTRA STRENGTH 500 MG tablet TAKE 2 TABLETS BY MOUTH EVERY 6 HOURS AS NEEDED FOR PAIN  10/31/20   Jose Nelson MD   terbinafine (LAMISIL) 1 % cream Apply topically 2 times daily.  10/12/20   Vero Quispe DPM   diclofenac sodium (VOLTAREN) 1 % GEL Apply 4 g topically 4 times daily 10/2/20   Dian Sullivan MD   spironolactone (ALDACTONE) 100 MG tablet Take 1 tablet by mouth daily 10/2/20   Dian Sullivan MD   magnesium oxide (MAG-OX) 400 (240 Mg) MG tablet Take 1 tablet by mouth 2 times daily 10/2/20   Dian Sullivan MD   potassium chloride (KLOR-CON M) 20 MEQ extended release tablet Take 1 tablet by mouth daily 10/2/20   Dian Sullivan MD   folic acid (FOLVITE) 1 MG tablet Take 1 tablet by mouth daily 9/24/20   Meche Godinez MD   vitamin B-1 100 MG tablet Take 1 tablet by mouth daily 9/24/20   Meche Godinez MD   atorvastatin (LIPITOR) 20 MG tablet Take 2 tablets by mouth daily 9/24/20   Meche Godinez MD   ferrous sulfate (FE TABS 325) 325 (65 Fe) MG EC tablet Take 1 tablet by mouth daily (with breakfast) 9/24/20   Meche Godinez MD   loratadine (CLARITIN) 10 MG capsule Take 1 capsule by mouth daily 7/23/20   Cyndy Godwin MD   EPINEPHrine (EPIPEN 2-GRICELDA) 0.3 MG/0.3ML SOAJ injection Use as directed for allergic reaction 7/17/20   Jose Zuniga MD   Calcium Carbonate-Vitamin D (OYSTER SHELL CALCIUM/D) 500-200 MG-UNIT TABS TAKE 1 TAB BY MOUTH ONCE A DAY 7/17/20   Jose Zuniga MD   vitamin D3 (CHOLECALCIFEROL) 25 MCG (1000 UT) TABS tablet Take 1 tablet by mouth daily 7/17/20   Cyndy Godwin MD   FLUoxetine (PROZAC) 40 MG capsule Take 40 mg by mouth daily 1/24/20   Historical Provider, MD   mometasone (ASMANEX, 120 METERED DOSES,) 220 MCG/INH inhaler Inhale 2 puffs into the lungs daily 1/15/20   Martín Rodas MD   metFORMIN (GLUCOPHAGE) 500 MG tablet Take 1 tablet by mouth 2 times daily (with meals) 1/15/20   Martín Rodas MD   fluticasone (ARNUITY ELLIPTA) 100 MCG/ACT AEPB Inhale 1 puff into the lungs daily 1/15/20   Martín Rodas MD   atenolol (TENORMIN) 50 MG tablet Take 1 tablet by mouth daily 1/15/20   Martín Rodas MD   albuterol sulfate (PROAIR RESPICLICK) 227 (90 Base) MCG/ACT aerosol powder inhalation Inhale 2 puffs into the lungs every 6 hours as needed for Wheezing or Shortness of Breath 1/15/20   Martín Rodas MD   econazole nitrate 1 % cream Apply topically daily. 1/13/20   Eric Campbell DPM   lidocaine (XYLOCAINE) 5 % ointment Apply 1 applicator topically 3 times daily as needed for Pain Apply topically as needed.     Historical Provider, MD   Multiple Vitamins-Minerals (MULTIVITAMIN WITH MINERALS) tablet Take 1 tablet by mouth daily 9/27/19   Jasson Sven Maxwell MD   citalopram (CELEXA) 40 MG tablet Take 40 mg by mouth daily    Historical Provider, MD   mirtazapine (REMERON) 45 MG tablet Take 45 mg by mouth nightly    Historical Provider, MD   lurasidone (LATUDA) 60 MG TABS tablet Take 60 mg by mouth nightly    Historical Provider, MD   Cholecalciferol (VITAMIN D3) 74751 UNITS CAPS Take 1 capsule by mouth Twice a Week 8/12/15   Historical Provider, MD     Past Medical History    has a past medical history of ROXANN (acute kidney injury) ([facility] Alcohol abuse, Angioedema, Anxiety, Asthma, Bipolar 1 disorder ([facility] Bipolar disorder ([facility] CAD (coronary artery disease), Claustrophobia, Cocaine abuse ([facility] Depression, GERD (gastroesophageal reflux disease), Hypertension, Hypertrophic cardiomyopathy ([facility] Lung nodules, MDRO (multiple drug resistant organisms) resistance, MRSA (methicillin resistant Staphylococcus aureus), Murmur, OA (osteoarthritis), Obesity, JONES (obstructive sleep apnea), PUD (peptic ulcer disease), Thyroid nodule, Tonsillar hypertrophy, and Type 2 diabetes mellitus without complication, without long-term current use of insulin ([facility] Past Surgical History   has a past surgical history that includes Hysterectomy; Upper gastrointestinal endoscopy; Colonoscopy; Thyroid surgery; Cardiac catheterization; and other surgical history (8/24/2015). Social History   reports that she quit smoking about 27 years ago. She smoked 0.50 packs per day. She has never used smokeless tobacco.   reports previous alcohol use of about 12.0 standard drinks of alcohol per week. reports previous drug use. Drug: Cocaine.   Marital Status single  Children 3 sons   Occupation none  Family History  Family Status   Relation Name Status    Mother  Alive    Father  Alive    Brother  (Not Specified)   Della Andrew  (Not Specified)    MGM  (Not Specified)   Suri Germantein Sister  (Not Specified)   Suri Jb Sister  (Not Specified)     family history includes Cancer in her brother and maternal grandmother; Heart Disease in her sister; Hypertension in her father; Maribel Ellsworth in her maternal aunt; Other in her sister; Stroke in her mother. OBJECTIVE:   VITALS:  vitals were not taken for this visit. CONSTITUTIONAL:Alert and orientated to person, place and time. No acute distress. Friendly. Vague historian. SKIN:  Warm & dry, no rashes on exposed skin  HEENT: HEAD: Normocephalic, atraumatic        EYES:  PERRL, EOMs intact, conjunctiva clear, wearing glasses       EARS:  Equal bilaterally, no edema or thickening, skin is intact without lumps or lesions. No discharge. NOSE:  Nares patent, septum midline, no rhinorrhea      MOUTH/THROAT:  Mucous membranes moist, tongue is pink, uvula midline, teeth appear to be intact  NECK:  Supple, no lymphadenopathy, full ROM  LUNGS: Respirations even and non-labored. Clear to auscultation bilaterally, no wheezes/rales/rhonchi   CARDIOVASCULAR: regular rate and rhythm, soft murmur  ABDOMEN: soft, non-tender, non-distended, bowel sounds active x 4, rotund   MUSCULOSKELETAL: Full ROM bilateral upper extremities, Full ROM bilateral lower extremities. Strength of 5/5 bilateral upper extremities. Strength 5/5 bilateral lower extremities. VASCULAR:  Brisk cap refill bilateral fingers. Radial pulses are intact, 2+ bilaterally. Dorsalis pedis pulse 2+ bilaterally. No edema or varicosities bilateral lower extremities  NEUROLOGIC: CN II-XII are grossly intact. Gait not assessed.      IMPRESSIONS:   Elevated liver enzymes  Vomiting  Diarrhea      Diagnosis Date    [facility]ANN [facility]) ([facility] 11/18/2017    Alcohol abuse 5/22/2014    Angioedema 11/17/2017    from lisinopril    Anxiety     Asthma     mild persistent asthma, on home resp medications for control    Bipolar 1 disorder (HCC)     Bipolar disorder ([facility]     CAD (coronary artery disease)     no stents    Claustrophobia     Cocaine abuse ([facility]     states used years ago    Depression     GERD (gastroesophageal reflux disease)     Hypertension     Hypertrophic cardiomyopathy (HCC)     Lung nodules     MDRO (multiple drug resistant organisms) resistance     MRSA (methicillin resistant Staphylococcus aureus)     rt hip    Murmur     see cardiac echo result    OA (osteoarthritis)     Obesity 3/28/2013    JONES (obstructive sleep apnea)     PUD (peptic ulcer disease)     Thyroid nodule     Tonsillar hypertrophy     Type 2 diabetes mellitus without complication, without long-term current use of insulin [facility] 12/7/2018     PLANS:   EGD and colonoscopy     EFREN RODNEY  Electronically signed 12/3/2020 at 7:25 AM

## 2020-12-03 NOTE — H&P
History and Physical    Pt Name: Yassine Ngo  MRN: [id number]  YOB: 1965  Date of evaluation: 12/3/2020  Primary Care Physician: Mitesh Del Toro MD    SUBJECTIVE:   History of Chief Complaint:    Yassine Ngo is a 48 y.o. female who is scheduled today for EGD and colonoscopy. She reports history of \"vomiting a lot\", says for a few months as well as chronic diarrhea. She denies prior EGD and colonoscopy, although they are listed in her health history. She reports history of alcoholism, says she has cut down to a couple of beers daily. Hx GERD. Allergies  is allergic to bee venom; iodine; lisinopril; and shellfish-derived products. Medications  Prior to Admission medications    Medication Sig Start Date End Date Taking? Authorizing Provider   omeprazole (PRILOSEC) 40 MG delayed release capsule Take 1 capsule by mouth daily 11/13/20   Matti Solomon MD   polyethylene glycol [facility]) 17 GM/SCOOP powder Use as directed by following your patient instructions given by office. 11/13/20   Matti Solomon MD   bisacodyl (DULCOLAX) 5 MG EC tablet TAKE 4 TABS AS DIRECTED BY PHYSICIAN OFFICE 11/13/20   Matti Solomon MD   magnesium citrate solution Take 296 mLs by mouth once for 1 dose 11/13/20 11/13/20  Matti Solomon MD   ACETAMINOPHEN EXTRA STRENGTH 500 MG tablet TAKE 2 TABLETS BY MOUTH EVERY 6 HOURS AS NEEDED FOR PAIN  10/31/20   Jose Garsia MD   terbinafine (LAMISIL) 1 % cream Apply topically 2 times daily.  10/12/20   Abram Winston DPM   diclofenac sodium (VOLTAREN) 1 % GEL Apply 4 g topically 4 times daily 10/2/20   Alon Calvo MD   spironolactone (ALDACTONE) 100 MG tablet Take 1 tablet by mouth daily 10/2/20   Alon Calvo MD   magnesium oxide (MAG-OX) 400 (240 Mg) MG tablet Take 1 tablet by mouth 2 times daily 10/2/20   Alon Calvo MD   potassium chloride (KLOR-CON M) 20 MEQ extended release tablet Take 1 tablet by mouth daily 10/2/20   Alon Calvo MD   folic acid (FOLVITE) 1 MG tablet Lyndsey Avila MD   citalopram (CELEXA) 40 MG tablet Take 40 mg by mouth daily    Historical Provider, MD   mirtazapine (REMERON) 45 MG tablet Take 45 mg by mouth nightly    Historical Provider, MD   lurasidone (LATUDA) 60 MG TABS tablet Take 60 mg by mouth nightly    Historical Provider, MD   Cholecalciferol (VITAMIN D3) 96049 UNITS CAPS Take 1 capsule by mouth Twice a Week 8/12/15   Historical Provider, MD     Past Medical History    has a past medical history of ROXANN (acute kidney injury) ([facility] Alcohol abuse, Angioedema, Anxiety, Asthma, Bipolar 1 disorder ([facility] [address] Bipolar disorder ([facility] [address] CAD (coronary artery disease), Claustrophobia, Cocaine abuse ([facility] [address] Depression, GERD (gastroesophageal reflux disease), Hypertension, Hypertrophic cardiomyopathy ([facility] [address] Lung nodules, MDRO (multiple drug resistant organisms) resistance, MRSA (methicillin resistant Staphylococcus aureus), Murmur, OA (osteoarthritis), Obesity, JONES (obstructive sleep apnea), PUD (peptic ulcer disease), Thyroid nodule, Tonsillar hypertrophy, and Type 2 diabetes mellitus without complication, without long-term current use of insulin ([facility] Past Surgical History   has a past surgical history that includes Hysterectomy; Upper gastrointestinal endoscopy; Colonoscopy; Thyroid surgery; Cardiac catheterization; and other surgical history (8/24/2015). Social History   reports that she quit smoking about 27 years ago. She smoked 0.50 packs per day. She has never used smokeless tobacco.   reports previous alcohol use of about 12.0 standard drinks of alcohol per week. reports previous drug use. Drug: Cocaine.   Marital Status single  Children 3 sons   Occupation none  Family History  Family Status   Relation Name Status    Mother  Alive    Father  Alive    Brother  (Not Specified)   Gala Nay  (Not Specified)    MGM  (Not Specified)   [employer] Sister  (Not Specified)   [employer] Sister  (Not Specified)     family history includes Cancer in her brother and maternal grandmother; Heart Disease in her sister; Hypertension in her father; Maribel Ellsworth in her maternal aunt; Other in her sister; Stroke in her mother. OBJECTIVE:   VITALS:  vitals were not taken for this visit. CONSTITUTIONAL:Alert and orientated to person, place and time. No acute distress. Friendly. Vague historian. SKIN:  Warm & dry, no rashes on exposed skin  HEENT: HEAD: Normocephalic, atraumatic        EYES:  PERRL, EOMs intact, conjunctiva clear, wearing glasses       EARS:  Equal bilaterally, no edema or thickening, skin is intact without lumps or lesions. No discharge. NOSE:  Nares patent, septum midline, no rhinorrhea      MOUTH/THROAT:  Mucous membranes moist, tongue is pink, uvula midline, teeth appear to be intact  NECK:  Supple, no lymphadenopathy, full ROM  LUNGS: Respirations even and non-labored. Clear to auscultation bilaterally, no wheezes/rales/rhonchi   CARDIOVASCULAR: regular rate and rhythm, soft murmur  ABDOMEN: soft, non-tender, non-distended, bowel sounds active x 4, rotund   MUSCULOSKELETAL: Full ROM bilateral upper extremities, Full ROM bilateral lower extremities. Strength of 5/5 bilateral upper extremities. Strength 5/5 bilateral lower extremities. VASCULAR:  Brisk cap refill bilateral fingers. Radial pulses are intact, 2+ bilaterally. Dorsalis pedis pulse 2+ bilaterally. No edema or varicosities bilateral lower extremities  NEUROLOGIC: CN II-XII are grossly intact. Gait not assessed.      IMPRESSIONS:   Elevated liver enzymes  Vomiting  Diarrhea      Diagnosis Date    ROXANN (acute kidney injury) ([facility] [address] 11/18/2017    Alcohol abuse 5/22/2014    Angioedema 11/17/2017    from lisinopril    Anxiety     Asthma     mild persistent asthma, on home resp medications for control    Bipolar 1 disorder (HCC)     Bipolar disorder ([facility] [facility]     CAD (coronary artery disease)     no stents    Claustrophobia     Cocaine abuse ([facility] [address]     states used years ago    Depression     GERD (gastroesophageal reflux disease)     Hypertension     Hypertrophic cardiomyopathy (HCC)     Lung nodules     MDRO (multiple drug resistant organisms) resistance     MRSA (methicillin resistant Staphylococcus aureus)     rt hip    Murmur     see cardiac echo result    OA (osteoarthritis)     Obesity 3/28/2013    JONES (obstructive sleep apnea)     PUD (peptic ulcer disease)     Thyroid nodule     Tonsillar hypertrophy     Type 2 diabetes mellitus without complication, without long-term current use of insulin [facility] 12/7/2018     PLANS:   EGD and colonoscopy     EFREN RODNEY  Electronically signed 12/3/2020 at 7:25 AM

## 2020-12-03 NOTE — OP NOTE
[address] [facility]      Patient:   Madiha Simpson    :    1965    Referring/PCP: Bubba Wilkins MD  Procedure:   Colonoscopy with biopsy; Esophagogastroduodenoscopy  with biopsy  Facility:  [facility]  Date:     12/3/2020  Endoscopist:  Raymond Bustamante MD, [facility]    Indication: Nausea, vomiting, chronic diarrhea    Postprocedure diagnosis: Gastritis, hiatal hernia, internal hemorrhoids    Anesthesia:  MAC    Complications: None    EBL: None from the procedure    Specimen: Gastric and colon biopsies-sent to the lab    Description of Procedure:  Informed consent was obtained from the patient after explanation of the procedure including indications, description of the procedure,  benefits and possible risks and complications of the procedure, and alternatives. Questions were answered. The patient's history was reviewed and a directed physical examination was performed prior to the procedure. Patient was monitored throughout the procedure with pulse oximetry and periodic assessment of vital signs. Patient was sedated as noted above. With the patient initially in the left lateral decubitus position, a digital rectal examination was performed and revealed negative without mass or lesions. The Olympus video colonoscope was placed in the patient's rectum and advanced without difficulty  to the terminal ileum. The prep was good. Examination of the mucosa was performed during both introduction and withdrawal of the colonoscope. Retroflexed view of the rectum was performed. Findings:   1. Normal terminal ileum  2. Normal entire examined colon. The colon was randomly biopsied with cold biopsy forceps to rule out microscopic colitis. 3. Small grade 1 internal hemorrhoids on retroflexion.     With the patient in the left lateral decubitus position, the Olympus videoendoscope was placed in the patient's mouth and under direct visualization passed into the esophagus. Visualization of the esophagus, stomach, and duodenum was performed during both introduction and withdrawal of the endoscope and retroflexed view of the proximal stomach was obtained. The scope was passed to the second portion of the duodenum. The patient tolerated the procedure well and was taken to the recovery area in good condition. Findings[de-identified]   Esophagus: normal.   Stomach: Stomach had medium sized hiatal hernia. Stomach also had moderate gastritis in the antrum which was characterized by his gastric erythema and mucosal edema. This was biopsied with cold biopsy forceps. The rest of the stomach was otherwise normal.  Duodenum: normal       Recommendations:  -Acid suppression with a proton pump inhibitor daily.  -Await pathology.  -Stop alcohol use. -Follow-up in GI office for alcoholic hepatitis and symptomatic management of her symptoms.     Electronically signed by Dex De La O MD, FACG on 12/3/2020 at 11:15 AM

## 2020-12-03 NOTE — ANESTHESIA PRE PROCEDURE
Department of Anesthesiology  Preprocedure Note       Name:  Master Mercedes   Age:  48 y.o.  :  1965                                          MRN:  [id number]         Date:  12/3/2020      Surgeon: Krystle Swanson):  Shannan Mendez MD    Procedure: Procedure(s):  EGD ESOPHAGOGASTRODUODENOSCOPY  COLONOSCOPY DIAGNOSTIC    Medications prior to admission:   Prior to Admission medications    Medication Sig Start Date End Date Taking? Authorizing Provider   omeprazole (PRILOSEC) 40 MG delayed release capsule Take 1 capsule by mouth daily 20   Shannan Mendez MD   polyethylene glycol [facility]) 17 GM/SCOOP powder Use as directed by following your patient instructions given by office. 20   Shannan Mendez MD   bisacodyl (DULCOLAX) 5 MG EC tablet TAKE 4 TABS AS DIRECTED BY PHYSICIAN OFFICE 20   Shannan Mendez MD   magnesium citrate solution Take 296 mLs by mouth once for 1 dose 20  Shannan Mendez MD   ACETAMINOPHEN EXTRA STRENGTH 500 MG tablet TAKE 2 TABLETS BY MOUTH EVERY 6 HOURS AS NEEDED FOR PAIN  10/31/20   Jose Gilbert MD   terbinafine (LAMISIL) 1 % cream Apply topically 2 times daily.  10/12/20   Valeriy Maciel DPM   diclofenac sodium (VOLTAREN) 1 % GEL Apply 4 g topically 4 times daily 10/2/20   Claire Cutler MD   spironolactone (ALDACTONE) 100 MG tablet Take 1 tablet by mouth daily 10/2/20   Claire Cutler MD   magnesium oxide (MAG-OX) 400 (240 Mg) MG tablet Take 1 tablet by mouth 2 times daily 10/2/20   Claire Cutelr MD   potassium chloride (KLOR-CON M) 20 MEQ extended release tablet Take 1 tablet by mouth daily 10/2/20   Claire Cutler MD   folic acid (FOLVITE) 1 MG tablet Take 1 tablet by mouth daily 20   Susanne Joyce MD   vitamin B-1 100 MG tablet Take 1 tablet by mouth daily 20   Susanne Joyce MD   atorvastatin (LIPITOR) 20 MG tablet Take 2 tablets by mouth daily 20   Susanne Joyce MD   ferrous sulfate (FE TABS 325) 325 (65 Fe) MG EC tablet Take 1 tablet by mouth daily (with breakfast) 9/24/20   Martha Matthews MD   loratadine (CLARITIN) 10 MG capsule Take 1 capsule by mouth daily 7/23/20   Nicolasa Zamora MD   EPINEPHrine (EPIPEN 2-GRICELDA) 0.3 MG/0.3ML SOAJ injection Use as directed for allergic reaction 7/17/20   Jose Zuniga MD   Calcium Carbonate-Vitamin D (OYSTER SHELL CALCIUM/D) 500-200 MG-UNIT TABS TAKE 1 TAB BY MOUTH ONCE A DAY 7/17/20   Jose Zuniga MD   vitamin D3 (CHOLECALCIFEROL) 25 MCG (1000 UT) TABS tablet Take 1 tablet by mouth daily 7/17/20   Nicolasa Zamora MD   FLUoxetine (PROZAC) 40 MG capsule Take 40 mg by mouth daily 1/24/20   Historical Provider, MD   mometasone (ASMANEX, 120 METERED DOSES,) 220 MCG/INH inhaler Inhale 2 puffs into the lungs daily 1/15/20   Abbey Curtis MD   metFORMIN (GLUCOPHAGE) 500 MG tablet Take 1 tablet by mouth 2 times daily (with meals) 1/15/20   Abbey Curtis MD   fluticasone (ARNUITY ELLIPTA) 100 MCG/ACT AEPB Inhale 1 puff into the lungs daily 1/15/20   Abbey Curtis MD   atenolol (TENORMIN) 50 MG tablet Take 1 tablet by mouth daily 1/15/20   Abbey Curtis MD   albuterol sulfate (PROAIR RESPICLICK) 152 (90 Base) MCG/ACT aerosol powder inhalation Inhale 2 puffs into the lungs every 6 hours as needed for Wheezing or Shortness of Breath 1/15/20   Abbey Curtis MD   econazole nitrate 1 % cream Apply topically daily. 1/13/20   Gogo Lopez DPM   lidocaine (XYLOCAINE) 5 % ointment Apply 1 applicator topically 3 times daily as needed for Pain Apply topically as needed.     Historical Provider, MD   Multiple Vitamins-Minerals (MULTIVITAMIN WITH MINERALS) tablet Take 1 tablet by mouth daily 9/27/19   Clayton Thorpe MD   citalopram (CELEXA) 40 MG tablet Take 40 mg by mouth daily    Historical Provider, MD   mirtazapine (REMERON) 45 MG tablet Take 45 mg by mouth nightly    Historical Provider, MD   lurasidone (LATUDA) 60 MG TABS tablet Take 60 mg by mouth nightly    Historical Provider, MD   Cholecalciferol (VITAMIN D3) 08698 UNITS CAPS Take 1 capsule by mouth Twice a Week 8/12/15   Historical Provider, MD       Current medications:    No current facility-administered medications for this encounter. Facility-Administered Medications Ordered in Other Encounters   Medication Dose Route Frequency Provider Last Rate Last Dose    lactated ringers infusion   Intravenous Continuous CECILIA Galloway CRNA   Stopped at 08/24/15 1420       Allergies: Allergies   Allergen Reactions    Bee Venom Anaphylaxis    Iodine Anaphylaxis and Rash    Lisinopril Swelling and Anaphylaxis     Angioedema    Shellfish-Derived Products Anaphylaxis and Rash     ANGIOEDEMA       Problem List:    Patient Active Problem List   Diagnosis Code    Lung nodule R91.1    Obesity E66.9    Adrenal adenoma D35.00    Goiter E04.9    Alcohol abuse F10.10    Essential hypertension I10    Enlarged tonsils J35.1    ACE inhibitor-aggravated angioedema T78. 3XXA, T46.4X5A    JONES (obstructive sleep apnea) G47.33    Dyslipidemia E78.5    IGT (impaired glucose tolerance) R73.02    Acute alcoholic intoxication without complication ([facility]) U78.016    Acute renal insufficiency N28.9    Effusion of bursa of right knee M25.461    Acute cystitis without hematuria N30.00       Past Medical History:        Diagnosis Date    ROXANN [facility]) ([facility] 11/18/2017    Alcohol abuse 5/22/2014    Angioedema 11/17/2017    from lisinopril    Anxiety     Asthma     mild persistent asthma, on home resp medications for control    Bipolar 1 disorder ([facility])     Bipolar disorder ([facility]     CAD (coronary artery disease)     no stents    Claustrophobia     [facility]     states used years ago    Depression     GERD (gastroesophageal reflux disease)     Hypertension     [facility]     Lung nodules     MDRO (multiple drug resistant organisms) resistance     MRSA (methicillin resistant Staphylococcus aureus) rt hip    Murmur     see cardiac echo result    OA (osteoarthritis)     Obesity 3/28/2013    PUD (peptic ulcer disease)     Thyroid nodule     Tonsillar hypertrophy     Type 2 diabetes mellitus without complication, without long-term current use of insulin [facility] 2018       Past Surgical History:        Procedure Laterality Date    CARDIAC CATHETERIZATION      COLONOSCOPY      bx    HYSTERECTOMY      partial hyst    OTHER SURGICAL HISTORY  2015    MRI under anesthesia    THYROID SURGERY      bilat bx    UPPER GASTROINTESTINAL ENDOSCOPY         Social History:    Social History     Tobacco Use    Smoking status: Former Smoker     Packs/day: 0.50     Last attempt to quit: 1992     Years since quittin.9    Smokeless tobacco: Never Used    Tobacco comment: states quiti in the    Substance Use Topics    Alcohol use: Not Currently     Alcohol/week: 12.0 standard drinks     Types: 12 Cans of beer per week     Comment: Quit about 5 months ago 20                                Counseling given: Not Answered  Comment: states quiti in the       Vital Signs (Current): There were no vitals filed for this visit.                                            BP Readings from Last 3 Encounters:   20 (!) 154/101   10/12/20 (!) 144/84   10/02/20 133/86       NPO Status:                                                                                 BMI:   Wt Readings from Last 3 Encounters:   20 175 lb (79.4 kg)   10/12/20 170 lb (77.1 kg)   10/02/20 162 lb 12.8 oz (73.8 kg)     There is no height or weight on file to calculate BMI.    CBC:   Lab Results   Component Value Date    WBC 9.1 10/02/2020    RBC 3.41 10/02/2020    HGB 10.4 10/02/2020    HCT 33.0 10/02/2020    MCV 96.8 10/02/2020    RDW 14.6 10/02/2020     10/02/2020       CMP:   Lab Results   Component Value Date     10/02/2020    K 3.4 10/02/2020    CL 97 10/02/2020    CO2 23 10/02/2020    BUN 7 patient. Plan discussed with CRNA.                   Sandy Ledesma MD   12/3/2020

## 2020-12-04 ENCOUNTER — HOSPITAL ENCOUNTER (OUTPATIENT)
Dept: PREADMISSION TESTING | Age: 55
Setting detail: SPECIMEN
Discharge: HOME | End: 2020-12-08
Payer: MEDICAID

## 2020-12-04 LAB — SURGICAL PATHOLOGY REPORT: NORMAL

## 2020-12-04 PROCEDURE — U0003 INFECTIOUS AGENT DETECTION BY NUCLEIC ACID (DNA OR RNA); SEVERE ACUTE RESPIRATORY SYNDROME CORONAVIRUS 2 (SARS-COV-2) (CORONAVIRUS DISEASE [COVID-19]), AMPLIFIED PROBE TECHNIQUE, MAKING USE OF HIGH THROUGHPUT TECHNOLOGIES AS DESCRIBED BY CMS-2020-01-R: HCPCS

## 2020-12-04 NOTE — ANESTHESIA POSTPROCEDURE EVALUATION
Department of Anesthesiology  Postprocedure Note    Patient: Skye Nance  MRN: [id number]  YOB: 1965  Date of evaluation: 12/4/2020  Time:  9:47 AM     Procedure Summary     Date:  12/03/20 Room / Location:  [address]    Anesthesia Start:  1046 Anesthesia Stop:  7513    Procedures:       EGD BIOPSY (N/A )      COLONOSCOPY WITH BIOPSY (N/A ) Diagnosis:  (ELEVATED LIVER ENZYMES)    Surgeon:  Anyi Rico MD Responsible Provider:  Ruma Mansfield MD    Anesthesia Type:  Not recorded ASA Status:  Not recorded          Anesthesia Type: No value filed. Micheline Phase I: Micheline Score: 10    Micheline Phase II: Micheline Score: 10    Last vitals: Reviewed and per EMR flowsheets.        Anesthesia Post Evaluation    Patient location during evaluation: PACU  Patient participation: complete - patient participated  Level of consciousness: awake  Pain score: 1  Airway patency: patent  Nausea & Vomiting: no nausea and no vomiting  Complications: no  Cardiovascular status: blood pressure returned to baseline and hemodynamically stable  Respiratory status: acceptable  Hydration status: euvolemic

## 2020-12-06 LAB — SARS-COV-2, NAA: NOT DETECTED

## 2020-12-07 ENCOUNTER — ANESTHESIA EVENT (OUTPATIENT)
Dept: OPERATING ROOM | Age: 55
End: 2020-12-07
Payer: MEDICAID

## 2020-12-07 ENCOUNTER — HOSPITAL ENCOUNTER (OUTPATIENT)
Age: 55
Setting detail: OUTPATIENT SURGERY
Discharge: HOME | End: 2020-12-07
Attending: PODIATRIST | Admitting: PODIATRIST
Payer: MEDICAID

## 2020-12-07 ENCOUNTER — ANESTHESIA (OUTPATIENT)
Dept: OPERATING ROOM | Age: 55
End: 2020-12-07
Payer: MEDICAID

## 2020-12-07 VITALS
SYSTOLIC BLOOD PRESSURE: 140 MMHG | RESPIRATION RATE: 8 BRPM | HEIGHT: 64 IN | DIASTOLIC BLOOD PRESSURE: 100 MMHG | WEIGHT: 161.5 LBS | HEART RATE: 85 BPM | BODY MASS INDEX: 27.57 KG/M2 | OXYGEN SATURATION: 93 % | TEMPERATURE: 97.3 F

## 2020-12-07 VITALS — SYSTOLIC BLOOD PRESSURE: 120 MMHG | DIASTOLIC BLOOD PRESSURE: 93 MMHG | OXYGEN SATURATION: 100 % | TEMPERATURE: 98.5 F

## 2020-12-07 LAB
GFR NON-AFRICAN AMERICAN: >60 ML/MIN
GFR SERPL CREATININE-BSD FRML MDRD: >60 ML/MIN
GFR SERPL CREATININE-BSD FRML MDRD: NORMAL ML/MIN/{1.73_M2}
GLUCOSE BLD-MCNC: 105 MG/DL (ref 65–105)
GLUCOSE BLD-MCNC: 95 MG/DL (ref 74–100)
POC CREATININE: 0.69 MG/DL (ref 0.51–1.19)
POC POTASSIUM: 4.7 MMOL/L (ref 3.5–4.5)

## 2020-12-07 PROCEDURE — 6360000002 HC RX W HCPCS: Performed by: ANESTHESIOLOGY

## 2020-12-07 PROCEDURE — 2720000010 HC SURG SUPPLY STERILE: Performed by: PODIATRIST

## 2020-12-07 PROCEDURE — 6360000002 HC RX W HCPCS: Performed by: NURSE ANESTHETIST, CERTIFIED REGISTERED

## 2020-12-07 PROCEDURE — 3700000001 HC ADD 15 MINUTES (ANESTHESIA): Performed by: PODIATRIST

## 2020-12-07 PROCEDURE — 7100000000 HC PACU RECOVERY - FIRST 15 MIN: Performed by: PODIATRIST

## 2020-12-07 PROCEDURE — 2580000003 HC RX 258: Performed by: NURSE ANESTHETIST, CERTIFIED REGISTERED

## 2020-12-07 PROCEDURE — 3600000001 HC SURGERY LEVEL 1  BASE: Performed by: PODIATRIST

## 2020-12-07 PROCEDURE — 7100000001 HC PACU RECOVERY - ADDTL 15 MIN: Performed by: PODIATRIST

## 2020-12-07 PROCEDURE — C1713 ANCHOR/SCREW BN/BN,TIS/BN: HCPCS | Performed by: PODIATRIST

## 2020-12-07 PROCEDURE — 6360000002 HC RX W HCPCS

## 2020-12-07 PROCEDURE — 6360000002 HC RX W HCPCS: Performed by: STUDENT IN AN ORGANIZED HEALTH CARE EDUCATION/TRAINING PROGRAM

## 2020-12-07 PROCEDURE — 2500000003 HC RX 250 WO HCPCS: Performed by: NURSE ANESTHETIST, CERTIFIED REGISTERED

## 2020-12-07 PROCEDURE — 2500000003 HC RX 250 WO HCPCS: Performed by: PODIATRIST

## 2020-12-07 PROCEDURE — 6370000000 HC RX 637 (ALT 250 FOR IP): Performed by: ANESTHESIOLOGY

## 2020-12-07 PROCEDURE — 7100000011 HC PHASE II RECOVERY - ADDTL 15 MIN: Performed by: PODIATRIST

## 2020-12-07 PROCEDURE — 2580000003 HC RX 258: Performed by: PODIATRIST

## 2020-12-07 PROCEDURE — 2580000003 HC RX 258: Performed by: ANESTHESIOLOGY

## 2020-12-07 PROCEDURE — 2709999900 HC NON-CHARGEABLE SUPPLY: Performed by: PODIATRIST

## 2020-12-07 PROCEDURE — 82565 ASSAY OF CREATININE: CPT

## 2020-12-07 PROCEDURE — 84132 ASSAY OF SERUM POTASSIUM: CPT

## 2020-12-07 PROCEDURE — 82947 ASSAY GLUCOSE BLOOD QUANT: CPT

## 2020-12-07 PROCEDURE — 3700000000 HC ANESTHESIA ATTENDED CARE: Performed by: PODIATRIST

## 2020-12-07 PROCEDURE — 3600000011 HC SURGERY LEVEL 1  ADDTL 15MIN: Performed by: PODIATRIST

## 2020-12-07 PROCEDURE — 7100000010 HC PHASE II RECOVERY - FIRST 15 MIN: Performed by: PODIATRIST

## 2020-12-07 DEVICE — ANCHOR SUT DIA2.9MM SH BIOCOMP KNOTLESS FIX PUSHLOCK DX: Type: IMPLANTABLE DEVICE | Site: FOOT | Status: FUNCTIONAL

## 2020-12-07 RX ORDER — SODIUM CHLORIDE, SODIUM LACTATE, POTASSIUM CHLORIDE, CALCIUM CHLORIDE 600; 310; 30; 20 MG/100ML; MG/100ML; MG/100ML; MG/100ML
INJECTION, SOLUTION INTRAVENOUS CONTINUOUS
Status: DISCONTINUED | OUTPATIENT
Start: 2020-12-07 | End: 2020-12-07 | Stop reason: HOSPADM

## 2020-12-07 RX ORDER — OXYCODONE HYDROCHLORIDE AND ACETAMINOPHEN 5; 325 MG/1; MG/1
1 TABLET ORAL PRN
Status: COMPLETED | OUTPATIENT
Start: 2020-12-07 | End: 2020-12-07

## 2020-12-07 RX ORDER — HYDROCODONE BITARTRATE AND ACETAMINOPHEN 5; 325 MG/1; MG/1
1 TABLET ORAL EVERY 6 HOURS PRN
Qty: 28 TABLET | Refills: 0 | Status: SHIPPED | OUTPATIENT
Start: 2020-12-07 | End: 2020-12-14

## 2020-12-07 RX ORDER — FENTANYL CITRATE 50 UG/ML
INJECTION, SOLUTION INTRAMUSCULAR; INTRAVENOUS PRN
Status: DISCONTINUED | OUTPATIENT
Start: 2020-12-07 | End: 2020-12-07 | Stop reason: SDUPTHER

## 2020-12-07 RX ORDER — FENTANYL CITRATE 50 UG/ML
25 INJECTION, SOLUTION INTRAMUSCULAR; INTRAVENOUS EVERY 5 MIN PRN
Status: DISCONTINUED | OUTPATIENT
Start: 2020-12-07 | End: 2020-12-07 | Stop reason: HOSPADM

## 2020-12-07 RX ORDER — LIDOCAINE HYDROCHLORIDE 10 MG/ML
INJECTION, SOLUTION EPIDURAL; INFILTRATION; INTRACAUDAL; PERINEURAL PRN
Status: DISCONTINUED | OUTPATIENT
Start: 2020-12-07 | End: 2020-12-07 | Stop reason: ALTCHOICE

## 2020-12-07 RX ORDER — OXYCODONE HYDROCHLORIDE AND ACETAMINOPHEN 5; 325 MG/1; MG/1
2 TABLET ORAL PRN
Status: COMPLETED | OUTPATIENT
Start: 2020-12-07 | End: 2020-12-07

## 2020-12-07 RX ORDER — HYDROCHLOROTHIAZIDE 25 MG/1
25 TABLET ORAL DAILY
COMMUNITY
End: 2021-01-08

## 2020-12-07 RX ORDER — PROPOFOL 10 MG/ML
INJECTION, EMULSION INTRAVENOUS PRN
Status: DISCONTINUED | OUTPATIENT
Start: 2020-12-07 | End: 2020-12-07 | Stop reason: SDUPTHER

## 2020-12-07 RX ORDER — LABETALOL HYDROCHLORIDE 5 MG/ML
5 INJECTION, SOLUTION INTRAVENOUS EVERY 10 MIN PRN
Status: DISCONTINUED | OUTPATIENT
Start: 2020-12-07 | End: 2020-12-07 | Stop reason: HOSPADM

## 2020-12-07 RX ORDER — DIPHENHYDRAMINE HYDROCHLORIDE 50 MG/ML
12.5 INJECTION INTRAMUSCULAR; INTRAVENOUS
Status: DISCONTINUED | OUTPATIENT
Start: 2020-12-07 | End: 2020-12-07 | Stop reason: HOSPADM

## 2020-12-07 RX ORDER — KETOROLAC TROMETHAMINE 10 MG/1
10 TABLET, FILM COATED ORAL EVERY 6 HOURS PRN
Qty: 20 TABLET | Refills: 0 | Status: SHIPPED | OUTPATIENT
Start: 2020-12-07 | End: 2021-01-20 | Stop reason: ALTCHOICE

## 2020-12-07 RX ORDER — DEXAMETHASONE SODIUM PHOSPHATE 4 MG/ML
4 INJECTION, SOLUTION INTRA-ARTICULAR; INTRALESIONAL; INTRAMUSCULAR; INTRAVENOUS; SOFT TISSUE
Status: DISCONTINUED | OUTPATIENT
Start: 2020-12-07 | End: 2020-12-07 | Stop reason: HOSPADM

## 2020-12-07 RX ORDER — MIDAZOLAM HYDROCHLORIDE 1 MG/ML
INJECTION INTRAMUSCULAR; INTRAVENOUS PRN
Status: DISCONTINUED | OUTPATIENT
Start: 2020-12-07 | End: 2020-12-07 | Stop reason: SDUPTHER

## 2020-12-07 RX ORDER — MEPERIDINE HYDROCHLORIDE 50 MG/ML
12.5 INJECTION INTRAMUSCULAR; INTRAVENOUS; SUBCUTANEOUS EVERY 5 MIN PRN
Status: DISCONTINUED | OUTPATIENT
Start: 2020-12-07 | End: 2020-12-07 | Stop reason: HOSPADM

## 2020-12-07 RX ORDER — SODIUM CHLORIDE, SODIUM LACTATE, POTASSIUM CHLORIDE, CALCIUM CHLORIDE 600; 310; 30; 20 MG/100ML; MG/100ML; MG/100ML; MG/100ML
INJECTION, SOLUTION INTRAVENOUS CONTINUOUS PRN
Status: DISCONTINUED | OUTPATIENT
Start: 2020-12-07 | End: 2020-12-07 | Stop reason: SDUPTHER

## 2020-12-07 RX ORDER — HYDRALAZINE HYDROCHLORIDE 20 MG/ML
5 INJECTION INTRAMUSCULAR; INTRAVENOUS EVERY 10 MIN PRN
Status: DISCONTINUED | OUTPATIENT
Start: 2020-12-07 | End: 2020-12-07 | Stop reason: HOSPADM

## 2020-12-07 RX ORDER — ONDANSETRON 2 MG/ML
4 INJECTION INTRAMUSCULAR; INTRAVENOUS
Status: COMPLETED | OUTPATIENT
Start: 2020-12-07 | End: 2020-12-07

## 2020-12-07 RX ORDER — LIDOCAINE HYDROCHLORIDE 10 MG/ML
INJECTION, SOLUTION EPIDURAL; INFILTRATION; INTRACAUDAL; PERINEURAL PRN
Status: DISCONTINUED | OUTPATIENT
Start: 2020-12-07 | End: 2020-12-07 | Stop reason: SDUPTHER

## 2020-12-07 RX ORDER — PROPOFOL 10 MG/ML
INJECTION, EMULSION INTRAVENOUS CONTINUOUS PRN
Status: DISCONTINUED | OUTPATIENT
Start: 2020-12-07 | End: 2020-12-07 | Stop reason: SDUPTHER

## 2020-12-07 RX ORDER — DEXAMETHASONE SODIUM PHOSPHATE 4 MG/ML
INJECTION, SOLUTION INTRA-ARTICULAR; INTRALESIONAL; INTRAMUSCULAR; INTRAVENOUS; SOFT TISSUE PRN
Status: DISCONTINUED | OUTPATIENT
Start: 2020-12-07 | End: 2020-12-07 | Stop reason: SDUPTHER

## 2020-12-07 RX ORDER — MAGNESIUM HYDROXIDE 1200 MG/15ML
LIQUID ORAL CONTINUOUS PRN
Status: COMPLETED | OUTPATIENT
Start: 2020-12-07 | End: 2020-12-07

## 2020-12-07 RX ADMIN — ONDANSETRON 4 MG: 2 INJECTION INTRAMUSCULAR; INTRAVENOUS at 12:55

## 2020-12-07 RX ADMIN — PROPOFOL 30 MG: 10 INJECTION, EMULSION INTRAVENOUS at 11:57

## 2020-12-07 RX ADMIN — DEXAMETHASONE SODIUM PHOSPHATE 4 MG: 4 INJECTION, SOLUTION INTRA-ARTICULAR; INTRALESIONAL; INTRAMUSCULAR; INTRAVENOUS; SOFT TISSUE at 13:42

## 2020-12-07 RX ADMIN — MIDAZOLAM HYDROCHLORIDE 2 MG: 1 INJECTION, SOLUTION INTRAMUSCULAR; INTRAVENOUS at 11:45

## 2020-12-07 RX ADMIN — CEFAZOLIN 2000 MG: 10 INJECTION, POWDER, FOR SOLUTION INTRAVENOUS at 11:55

## 2020-12-07 RX ADMIN — SODIUM CHLORIDE, POTASSIUM CHLORIDE, SODIUM LACTATE AND CALCIUM CHLORIDE: 600; 310; 30; 20 INJECTION, SOLUTION INTRAVENOUS at 15:06

## 2020-12-07 RX ADMIN — Medication 0.5 MG: at 14:56

## 2020-12-07 RX ADMIN — FENTANYL CITRATE 50 MCG: 50 INJECTION, SOLUTION INTRAMUSCULAR; INTRAVENOUS at 11:45

## 2020-12-07 RX ADMIN — OXYCODONE HYDROCHLORIDE AND ACETAMINOPHEN 2 TABLET: 5; 325 TABLET ORAL at 17:00

## 2020-12-07 RX ADMIN — MIDAZOLAM HYDROCHLORIDE 2 MG: 1 INJECTION, SOLUTION INTRAMUSCULAR; INTRAVENOUS at 11:57

## 2020-12-07 RX ADMIN — LIDOCAINE HYDROCHLORIDE 50 MG: 10 INJECTION, SOLUTION EPIDURAL; INFILTRATION; INTRACAUDAL; PERINEURAL at 11:49

## 2020-12-07 RX ADMIN — SODIUM CHLORIDE, POTASSIUM CHLORIDE, SODIUM LACTATE AND CALCIUM CHLORIDE: 600; 310; 30; 20 INJECTION, SOLUTION INTRAVENOUS at 10:34

## 2020-12-07 RX ADMIN — HYDROMORPHONE HYDROCHLORIDE 0.5 MG: 1 INJECTION, SOLUTION INTRAMUSCULAR; INTRAVENOUS; SUBCUTANEOUS at 14:56

## 2020-12-07 RX ADMIN — PROPOFOL 80 MG: 10 INJECTION, EMULSION INTRAVENOUS at 12:20

## 2020-12-07 RX ADMIN — FENTANYL CITRATE 50 MCG: 50 INJECTION, SOLUTION INTRAMUSCULAR; INTRAVENOUS at 12:05

## 2020-12-07 RX ADMIN — SODIUM CHLORIDE, POTASSIUM CHLORIDE, SODIUM LACTATE AND CALCIUM CHLORIDE: 600; 310; 30; 20 INJECTION, SOLUTION INTRAVENOUS at 11:43

## 2020-12-07 RX ADMIN — PROPOFOL 100 MCG/KG/MIN: 10 INJECTION, EMULSION INTRAVENOUS at 11:49

## 2020-12-07 RX ADMIN — PROPOFOL 30 MG: 10 INJECTION, EMULSION INTRAVENOUS at 12:04

## 2020-12-07 RX ADMIN — PROPOFOL 50 MG: 10 INJECTION, EMULSION INTRAVENOUS at 11:49

## 2020-12-07 ASSESSMENT — PULMONARY FUNCTION TESTS
PIF_VALUE: 1
PIF_VALUE: 2
PIF_VALUE: 1
PIF_VALUE: 2
PIF_VALUE: 1
PIF_VALUE: 2
PIF_VALUE: 1
PIF_VALUE: 2
PIF_VALUE: 0
PIF_VALUE: 1
PIF_VALUE: 2
PIF_VALUE: 3
PIF_VALUE: 2
PIF_VALUE: 18
PIF_VALUE: 1
PIF_VALUE: 2
PIF_VALUE: 3
PIF_VALUE: 2
PIF_VALUE: 3
PIF_VALUE: 2
PIF_VALUE: 3
PIF_VALUE: 2
PIF_VALUE: 1
PIF_VALUE: 3
PIF_VALUE: 2
PIF_VALUE: 1
PIF_VALUE: 2
PIF_VALUE: 1
PIF_VALUE: 2
PIF_VALUE: 0
PIF_VALUE: 7
PIF_VALUE: 2
PIF_VALUE: 1
PIF_VALUE: 2
PIF_VALUE: 9
PIF_VALUE: 2
PIF_VALUE: 2
PIF_VALUE: 3
PIF_VALUE: 18
PIF_VALUE: 2
PIF_VALUE: 2
PIF_VALUE: 1
PIF_VALUE: 2
PIF_VALUE: 10
PIF_VALUE: 1
PIF_VALUE: 2
PIF_VALUE: 1
PIF_VALUE: 1
PIF_VALUE: 0
PIF_VALUE: 2
PIF_VALUE: 3
PIF_VALUE: 2
PIF_VALUE: 2
PIF_VALUE: 1

## 2020-12-07 ASSESSMENT — PAIN SCALES - GENERAL
PAINLEVEL_OUTOF10: 7
PAINLEVEL_OUTOF10: 0
PAINLEVEL_OUTOF10: 0
PAINLEVEL_OUTOF10: 9
PAINLEVEL_OUTOF10: 5

## 2020-12-07 ASSESSMENT — PAIN - FUNCTIONAL ASSESSMENT: PAIN_FUNCTIONAL_ASSESSMENT: 0-10

## 2020-12-07 ASSESSMENT — PAIN DESCRIPTION - PAIN TYPE: TYPE: SURGICAL PAIN

## 2020-12-07 ASSESSMENT — PAIN DESCRIPTION - ORIENTATION: ORIENTATION: RIGHT;LEFT

## 2020-12-07 ASSESSMENT — PAIN DESCRIPTION - LOCATION: LOCATION: FOOT

## 2020-12-07 NOTE — OP NOTE
PODIATRY OP NOTE    PATIENT NAME: Dennis Dickerson  YOB: 1965  -  48 y.o. female  MRN: [id number]  DATE: 12/7/2020  BILLING #: [id number]    Surgeon(s):  Kd Saavedra DPM     ASSISTANTS: Claudio Leonard DPM PGY2    PRE-OP DIAGNOSIS:   1. Hallux abductovalgus, right foot  2. Hallux abductovalgus, left foot  3. Hammertoe deformity, 2nd digit, right foot  4. Contracted metatarsal, 2nd MTPJ, right foot    POST-OP DIAGNOSIS: Same as above. PROCEDURE:   1. Modified Smith bunionectomy, right foot  2. Modified Smith bunionectomy, left foot  3. PIPJ arthroplasty, 2nd digit, right foot  4. Capsulotomy, 2nd MTPJ, right foot    ANESTHESIA: General with local    HEMOSTASIS: Pneumatic ankle tourniquet @ 250 mmHg for 89 minutes to the left foot and 117 minutes to the right foot. ESTIMATED BLOOD LOSS: Less than 5cc. MATERIALS:   Implant Name Type Inv. Item Serial No.  Lot No. LRB No. Used Action   ANCHOR SUT DIA2. 9MM SH BIOCOMP KNOTLESS FIX PUSHLOCK DX  ANCHOR SUT DIA2. 9MM SH BIOCOMP KNOTLESS FIX PUSHLOCK DX  [employer] [id number] Left 1 Implanted   ANCHOR SUT DIA2. 9MM SH BIOCOMP KNOTLESS FIX PUSHLOCK DX  ANCHOR SUT DIA2. 9MM SH BIOCOMP KNOTLESS FIX PUSHLOCK DX  [employer] [id number] Right 2 Implanted   ANCHOR SUT DIA2. 9MM SH BIOCOMP KNOTLESS FIX PUSHLOCK DX  ANCHOR SUT DIA2. 9MM SH BIOCOMP KNOTLESS FIX PUSHLOCK DX  [employer] [id number] Left 1 Implanted       INJECTABLES: 20 cc of 1:1 mix of 0.5% marcaine plain and 1% lidocaine plain preoperatively and 10 cc of 1% lidocaine plain postoperatively    SPECIMEN:   * No specimens in log *    COMPLICATIONS: None    FINDINGS: As expected    The patient was counseled at length about the risks of karla Covid-19 during their perioperative period and any recovery window from their procedure.   The patient was made aware that karla Covid-19  may worsen their prognosis for recovering from their procedure  and lend to a higher morbidity and/or mortality risk. All material risks, benefits, and reasonable alternatives including postponing the procedure were discussed. The patient does wish to proceed with the procedure at this time. Indications for procedure:Patient has had painful bunion deformities for quite some time as well as a painful hammertoe to the 2nd digit of the right foot and has failed conservative treatments and elected to undergo surgery. Risks and benefits were discussed. No guarantees were given or implied. Consent is signed and in the chart. PROCEDURE IN DETAIL: The patient was transported from pre-op to the operating room and placed on the operating table in the supine position with a safety strap across the lap. A pneumatic ankle tourniquet was applied to both ankles. After adequate sedation by the Anesthesia, a pierce block of 10cc of 1:1 mix of 0.5% marcaine plain and 1% lidocaine plain was injected to the bilateral feet for a total of 20 cc. The feet were then prepped and draped in the usual aseptic fashion. The feet were then exsanguinated with an Esmarch bandage. The pneumatic ankle tourniquet was inflated to 250 mmHg on each side. Attention was directed to the right foot bunion deformity. A dorsal medial incision was created over the first metatarsophalangeal joint with a #15 blade. The incision was then deepened. The skin and subcutaneous tissue were then undermined off of the capsule medially. A dorsal linear capsular incision was then created over the first metatarsophalangeal joint. The periosteum and capsule were then reflected off of the first metatarsal head. The medial sesamoidal ligament was identified and isolated. This was then dissected out. There was noted to be a mild medial eminence. The articular cartilage was healthy for patient's age. Medial eminence was resected with a sagittal saw and passed from the table. Attention was then directed to the first interspace where a lateral release was performed.  A combination of sharp and blunt dissection was carried out until the adductor tendon insertions were identified. The adductor tendons were transected as well as a lateral capsulotomy was performed. The adductor tendon was isolated and sutured to the dorsal lateral first metatarsal. Care was taken to preserve the extensor halucis longus tendon. A 0.62 K-wire was then inserted in a retrograde fashion from the distal hallux and proximally into the 1st metatarsal shaft. Wire was bent, cut and a pin cap was placed. Attention was then directed medially where in the first metatarsal head an arthrex mini tight rope was inserted per manufacturers instructions. This was then anchored into the base of the proximal phalanx. It was noted that the deformity was reduced and the hallux was rectus. The medial sesamoidal ligament was tightened and sutured dorsally into the capsule. The capsule was then sutured closed around this. Copious amounts of sterile saline was then used to irrigate the surgical site. The capsule was closed with 2-0 PDS. Subcutaneous closure was performed with 4-0 Vicryl followed by running subcuticular 5-0 monocryl. Steri-Strips were applied. The exact procedure was then performed on the left foot. Attention was then directed to the right 2nd digit where a curvilinear incision was made over the distal 2nd metatarsal to the DIPJ. This was deepened until the extensor tendon and PIPJ were identified. The extensor tend was transected just proximal to the DIPJ and reflected. The head of the proximal phalanx was resected using a sagital saw and the base of the middle phalanx was resected using the sagital saw. The proximal portion was reflected back and a dorsal MTPJ capsulotomy was performed. A mcglammary elevator was used to free the MTPJ. The surgical site was irrigated with copious amounts of saline and the extensor tendon was repaired in the lengthened position using 2-0 PDS.  Attention was then directed distally again and a 0.45 k wire was placed through the distal digit and across the digit to the level of but not through the 2nd MTPJ. The wire was bent and a pin cap was applied. The 2nd digit was noted to be rectus. The skin was then closed in layers using 4-0 vicryl and nylon. Dressings consisted of adaptic over the 2nd digit, 4 x 4s, Kerlix and an Ace bandage. The pneumatic ankle tourniquet was released and immediate hyperemic flush was noted to all five digits of both feet. A surgical boot was then applied postoperatively. The patient tolerated the above procedure and anesthesia well without complications. The patient was transported from the operating room to the PACU with vital signs stable and vascular status intact to the bilateral feet. The patient is to follow up with Dr. Lexa Edgar in his office as directed.      Electronically signed by Vita Lopez DPM on 12/7/2020 at 2:32 PM

## 2020-12-07 NOTE — ANESTHESIA PRE PROCEDURE
Department of Anesthesiology  Preprocedure Note       Name:  Colten Walker   Age:  48 y.o.  :  1965                                          MRN:  [id number]         Date:  2020      Surgeon: Inessa Khalil):  Flavia Sheikh DPM    Procedure: Procedure(s):  2ND TOE HAMMER REPAIR  MCBRIDGE  BUNIONECTOMY, ARTHREX    Medications prior to admission:   Prior to Admission medications    Medication Sig Start Date End Date Taking?  Authorizing Provider   hydroCHLOROthiazide (HYDRODIURIL) 25 MG tablet Take 25 mg by mouth daily   Yes Historical Provider, MD   amLODIPine (NORVASC) 10 MG tablet  10/30/20  Yes Historical Provider, MD   pantoprazole (PROTONIX) 40 MG tablet  10/30/20  Yes Historical Provider, MD   omeprazole (PRILOSEC) 40 MG delayed release capsule Take 1 capsule by mouth daily 20  Yes Luke Joseph MD   ACETAMINOPHEN EXTRA STRENGTH 500 MG tablet TAKE 2 TABLETS BY MOUTH EVERY 6 HOURS AS NEEDED FOR PAIN  10/31/20  Yes Zaida Pardo MD   spironolactone (ALDACTONE) 100 MG tablet Take 1 tablet by mouth daily 10/2/20  Yes Mohit Skinner MD   potassium chloride (KLOR-CON M) 20 MEQ extended release tablet Take 1 tablet by mouth daily 10/2/20  Yes Mohit Skinner MD   folic acid (FOLVITE) 1 MG tablet Take 1 tablet by mouth daily 20  Yes Daphne Solano MD   vitamin B-1 100 MG tablet Take 1 tablet by mouth daily 20  Yes Daphne Solano MD   atorvastatin (LIPITOR) 20 MG tablet Take 2 tablets by mouth daily 20  Yes Daphne Solano MD   ferrous sulfate (FE TABS 325) 325 (65 Fe) MG EC tablet Take 1 tablet by mouth daily (with breakfast) 20  Yes Daphne Solano MD   loratadine (CLARITIN) 10 MG capsule Take 1 capsule by mouth daily 20  Yes Zaida Pardo MD   Calcium Carbonate-Vitamin D (OYSTER SHELL CALCIUM/D) 500-200 MG-UNIT TABS TAKE 1 TAB BY MOUTH ONCE A DAY 20  Yes Zaida Pardo MD   vitamin D3 (CHOLECALCIFEROL) 25 MCG (1000 UT) TABS tablet Take 1 tablet by mouth daily 20  Yes Jose Mainor Powell MD   FLUoxetine (PROZAC) 40 MG capsule Take 40 mg by mouth daily 1/24/20  Yes Historical Provider, MD   metFORMIN (GLUCOPHAGE) 500 MG tablet Take 1 tablet by mouth 2 times daily (with meals) 1/15/20  Yes Bay Torrez MD   atenolol (TENORMIN) 50 MG tablet Take 1 tablet by mouth daily 1/15/20  Yes Bay Torrez MD   citalopram (CELEXA) 40 MG tablet Take 40 mg by mouth daily   Yes Historical Provider, MD   mirtazapine (REMERON) 45 MG tablet Take 45 mg by mouth nightly   Yes Historical Provider, MD   lurasidone (LATUDA) 60 MG TABS tablet Take 60 mg by mouth nightly   Yes Historical Provider, MD   Cholecalciferol (VITAMIN D3) 46117 UNITS CAPS Take 1 capsule by mouth Twice a Week 8/12/15  Yes Historical Provider, MD   terbinafine (LAMISIL) 1 % cream Apply topically 2 times daily. 10/12/20   Bud Agudelo DPM   diclofenac sodium (VOLTAREN) 1 % GEL Apply 4 g topically 4 times daily 10/2/20   Lexi Maxwell MD   EPINEPHrine (EPIPEN 2-GRICELDA) 0.3 MG/0.3ML SOAJ injection Use as directed for allergic reaction 7/17/20   Jose Zuniga MD   mometasone (ASMANEX, 120 METERED DOSES,) 220 MCG/INH inhaler Inhale 2 puffs into the lungs daily  Patient taking differently: Inhale 2 puffs into the lungs as needed  1/15/20   Bay Torrez MD   fluticasone (ARNUITY ELLIPTA) 100 MCG/ACT AEPB Inhale 1 puff into the lungs daily  Patient taking differently: Inhale 1 puff into the lungs as needed  1/15/20   Bay Torrez MD   albuterol sulfate (PROAIR RESPICLICK) 424 (90 Base) MCG/ACT aerosol powder inhalation Inhale 2 puffs into the lungs every 6 hours as needed for Wheezing or Shortness of Breath 1/15/20   Bay Torrez MD   econazole nitrate 1 % cream Apply topically daily. 1/13/20   Anna Lane DPM   lidocaine (XYLOCAINE) 5 % ointment Apply 1 applicator topically 3 times daily as needed for Pain Apply topically as needed.     Historical Provider, MD       Current medications:    No current facility-administered medications for this encounter. Facility-Administered Medications Ordered in Other Encounters   Medication Dose Route Frequency Provider Last Rate Last Dose    lactated ringers infusion   Intravenous Continuous Carlos Radha, APRN - CRNA   Stopped at 08/24/15 1420       Allergies: Allergies   Allergen Reactions    Bee Venom Anaphylaxis    Iodine Anaphylaxis and Rash    Lisinopril Swelling and Anaphylaxis     Angioedema    Shellfish-Derived Products Anaphylaxis and Rash     ANGIOEDEMA       Problem List:    Patient Active Problem List   Diagnosis Code    Lung nodule R91.1    Obesity E66.9    Adrenal adenoma D35.00    Goiter E04.9    Alcohol abuse F10.10    Essential hypertension I10    Enlarged tonsils J35.1    ACE inhibitor-aggravated angioedema T78. 3XXA, T46.4X5A    JONES (obstructive sleep apnea) G47.33    Dyslipidemia E78.5    IGT (impaired glucose tolerance) R73.02    Acute alcoholic intoxication without complication (HCC) M85.139    Acute renal insufficiency N28.9    Effusion of bursa of right knee M25.461    Acute cystitis without hematuria N30.00       Past Medical History:        Diagnosis Date    Angioedema 11/17/2017    from lisinopril    Anxiety     Asthma     mild persistent asthma, on home resp medications for control    Bipolar disorder ([address]     Claustrophobia     Depression     GERD (gastroesophageal reflux disease)     Hypertension     Hypertrophic cardiomyopathy ([address]     Lung nodules     MRSA (methicillin resistant Staphylococcus aureus)     rt hip    Murmur     see cardiac echo result    OA (osteoarthritis)     JONES (obstructive sleep apnea)     Thyroid nodule     Type 2 diabetes mellitus without complication, without long-term current use of insulin ([address] 12/7/2018       Past Surgical History:        Procedure Laterality Date    CARDIAC CATHETERIZATION      COLONOSCOPY      bx    COLONOSCOPY N/A 12/3/2020    COLONOSCOPY WITH BIOPSY performed by Cesar Adams MD at [facility]      partial hyst    OTHER SURGICAL HISTORY  2015    MRI under anesthesia    THYROID SURGERY      bilat bx    UPPER GASTROINTESTINAL ENDOSCOPY      UPPER GASTROINTESTINAL ENDOSCOPY N/A 12/3/2020    EGD BIOPSY performed by [facility]esar [facility] MD at [facility]       Social History:    Social History     Tobacco Use    Smoking status: Former Smoker     Packs/day: 0.50     Last attempt to quit: 1992     Years since quittin.9    Smokeless tobacco: Never Used    Tobacco comment: states quiti in the    Substance Use Topics    Alcohol use: Not Currently     Alcohol/week: 12.0 standard drinks     Types: 12 Cans of beer per week     Comment: Quit about 5 months ago 20                                Counseling given: Not Answered  Comment: states quiti in the       Vital Signs (Current):   Vitals:    20 0938   BP: (!) 160/96   Pulse: 81   Resp: 18   Temp: 97.2 °F (36.2 °C)   TempSrc: Temporal   SpO2: 100%   Weight: 161 lb 8 oz (73.3 kg)   Height: 5' 4\" (1.626 m)                                              BP Readings from Last 3 Encounters:   20 (!) 160/96   20 (!) 139/100   20 (!) 125/99       NPO Status: Time of last liquid consumption:                         Time of last solid consumption:                         Date of last liquid consumption: 20                        Date of last solid food consumption: 20    BMI:   Wt Readings from Last 3 Encounters:   20 161 lb 8 oz (73.3 kg)   20 170 lb (77.1 kg)   20 175 lb (79.4 kg)     Body mass index is 27.72 kg/m².     CBC:   Lab Results   Component Value Date    WBC 9.1 10/02/2020    RBC 3.41 10/02/2020    HGB 10.4 10/02/2020    HCT 33.0 10/02/2020    MCV 96.8 10/02/2020    RDW 14.6 10/02/2020     10/02/2020       CMP:   Lab Results   Component Value Date     10/02/2020    K 3.4 10/02/2020    CL 97 10/02/2020    CO2 23 10/02/2020    BUN 7 10/02/2020    CREATININE 0.69 12/07/2020    CREATININE 0.91 10/02/2020    GFRAA >60 10/02/2020    LABGLOM >60 12/07/2020    GLUCOSE 81 10/02/2020    GLUCOSE 88 02/24/2012    PROT 8.0 10/02/2020    CALCIUM 9.6 10/02/2020    BILITOT 0.33 10/02/2020    ALKPHOS 94 10/02/2020    AST 20 10/02/2020    ALT 18 10/02/2020       POC Tests:   Recent Labs     12/07/20  0948   POCGLU 95   POCK 4.7*       Coags:   Lab Results   Component Value Date    PROTIME 10.6 10/02/2019    INR 1.0 10/02/2019    APTT 22.9 05/20/2014       HCG (If Applicable): No results found for: PREGTESTUR, PREGSERUM, HCG, HCGQUANT     ABGs: No results found for: PHART, PO2ART, WMQ0RFH, UAQ6GAK, BEART, P3QAINDS     Type & Screen (If Applicable):  No results found for: LABABO, LABRH    Drug/Infectious Status (If Applicable):  Lab Results   Component Value Date    HEPCAB NONREACTIVE 05/03/2016       COVID-19 Screening (If Applicable):   Lab Results   Component Value Date    COVID19 Not Detected 12/04/2020         Anesthesia Evaluation  Patient summary reviewed and Nursing notes reviewed  Airway: Mallampati: II  TM distance: >3 FB   Neck ROM: full  Mouth opening: > = 3 FB Dental: normal exam         Pulmonary:normal exam  breath sounds clear to auscultation                             Cardiovascular:            Rhythm: regular  Rate: normal                    Neuro/Psych:               GI/Hepatic/Renal:             Endo/Other:                     Abdominal:       Abdomen: soft. Vascular:                                        Anesthesia Plan      general     ASA 3       Induction: intravenous. MIPS: Postoperative opioids intended and Prophylactic antiemetics administered. Anesthetic plan and risks discussed with patient. Use of blood products discussed with patient whom consented to blood products. Plan discussed with attending and CRNA.     Attending anesthesiologist reviewed and agrees with Pre Eval Ramya Dickinson MD   12/7/2020

## 2020-12-07 NOTE — PROGRESS NOTES
RN spoke with patient's boyfriend Rochelle Romanian  that she needed her walker and she would stop by on her way home to get it. Patient's ride home is son Kelli Leslie at .

## 2020-12-07 NOTE — INTERVAL H&P NOTE
Pt Name: Xochilt Reynoso  MRN: [id number]  YOB: 1965  Date of evaluation: 12/7/2020    I have reviewed the patient's history and physical examination completed on 12/3/20 when she had endoscopies performed. She presents today for scheduled 2nd toe hammer repair- right and mcbridge bunionectomy, bilateral. She reports a longstanding history of foot pain. No changes to history or on examination today, unless noted below. Negative covid-19 screening on 12/4/20.        EFREN BELTRAN APRN-CNP  12/7/20  9:51 AM

## 2020-12-14 ENCOUNTER — OFFICE VISIT (OUTPATIENT)
Dept: PODIATRY | Age: 55
End: 2020-12-14
Payer: MEDICAID

## 2020-12-14 VITALS
HEART RATE: 73 BPM | DIASTOLIC BLOOD PRESSURE: 72 MMHG | TEMPERATURE: 98 F | BODY MASS INDEX: 27.64 KG/M2 | WEIGHT: 161 LBS | SYSTOLIC BLOOD PRESSURE: 111 MMHG

## 2020-12-14 PROCEDURE — 99024 POSTOP FOLLOW-UP VISIT: CPT | Performed by: STUDENT IN AN ORGANIZED HEALTH CARE EDUCATION/TRAINING PROGRAM

## 2020-12-14 RX ORDER — HYDROCODONE BITARTRATE AND ACETAMINOPHEN 5; 325 MG/1; MG/1
1 TABLET ORAL EVERY 6 HOURS PRN
Qty: 12 TABLET | Refills: 0 | Status: SHIPPED | OUTPATIENT
Start: 2020-12-14 | End: 2020-12-17

## 2020-12-14 NOTE — PROGRESS NOTES
Patient instructed to remove shoes and socks and instructed to sit in exam chair. Current PCP is Ricardo Holter, MD and date of last visit wasn/a. Do you have a follow up visit scheduled?   No  If yes, the date is n/a

## 2020-12-15 ENCOUNTER — HOSPITAL ENCOUNTER (OUTPATIENT)
Age: 55
Discharge: HOME | End: 2020-12-15
Payer: MEDICAID

## 2020-12-15 ENCOUNTER — HOSPITAL ENCOUNTER (OUTPATIENT)
Dept: GENERAL RADIOLOGY | Age: 55
Discharge: HOME | End: 2020-12-17
Payer: MEDICAID

## 2020-12-15 ENCOUNTER — HOSPITAL ENCOUNTER (OUTPATIENT)
Age: 55
Discharge: HOME | End: 2020-12-17
Payer: MEDICAID

## 2020-12-15 LAB
HAV IGM SER IA-ACNC: NONREACTIVE
HEPATITIS B CORE IGM ANTIBODY: NONREACTIVE
HEPATITIS B SURFACE ANTIGEN: NONREACTIVE
HEPATITIS C ANTIBODY: NONREACTIVE

## 2020-12-15 PROCEDURE — 80074 ACUTE HEPATITIS PANEL: CPT

## 2020-12-15 PROCEDURE — 73630 X-RAY EXAM OF FOOT: CPT

## 2020-12-15 PROCEDURE — 36415 COLL VENOUS BLD VENIPUNCTURE: CPT

## 2020-12-15 NOTE — PROGRESS NOTES
[address], [address] [facility]  Tel: [phone]   Fax: [phone]    Subjective     CC: s/p Modified Smith bunionectomy, bilateral, PIPJ arthroplasty, 2nd digit, right foot (DOS 12/7/20)    Interval hx:12/14/2020    Patient presents to clinic today for routine follow-up regarding bilateral modified Smith bunionectomy surgery with PIPJ arthroplasty of the second digit on the right foot. Surgery was on 12/7/2020 so this is a 1 week follow-up. Reports pain well-controlled to the bilateral feet, states compliance with nonweightbearing to the bilateral feet with exception of walking a few steps to the bathroom in her home. Denies nausea, vomiting, fever, chills, chest pain, shortness of breath. No other pedal complaints at this time. HPI:  Romina Marrero is a 48y.o. year old female who presents to clinic today for evaluation of left hallux pain. The patient states her left great toe became swollen and painful approximately 5 days ago. The patient denies any history of gout. The patient denies any history of acute trauma. The patient does states she eats a diet high in cheese, approximately 1/4 pound of cheese every day. The patient states she also drinks beer daily. The patient denies any open wounds or lacerations. The patient does state that it is difficult to ambulate with a sharp pain. Patient denies any nausea, vomiting, fever, chills, shortness breath. Primary care physician is Lo Lisa MD.    ROS:    Constitutional: Denies nausea, vomiting, fever, chills. Neurologic: Denies numbness, tingling, and burning in the feet. Vascular: Denies symptoms of lower extremity claudication. Skin: Denies open wounds. Otherwise negative except as noted in the HPI.      PMH:  Past Medical History:   Diagnosis Date    Angioedema 11/17/2017    from lisinopril    Anxiety     Asthma mild persistent asthma, on home resp medications for control    [facility]ipolar disorder ([facility]     Claustrophobia     Depression     GERD (gastroesophageal reflux disease)     Hypertension     Hypertrophic cardiomyopathy ([facility]     Lung nodules     MRSA (methicillin resistant Staphylococcus aureus)     rt hip    Murmur     see cardiac echo result    OA (osteoarthritis)     JONES (obstructive sleep apnea)     Thyroid nodule     Type 2 diabetes mellitus without complication, without long-term current use of insulin ([facility] 2018       Surgical History:   Past Surgical History:   Procedure Laterality Date    BUNIONECTOMY Bilateral 2020    [facility]GE  BUNIONECTOMY, ARTHREX performed by Huy Muller DPM at [place] [address] COLONOSCOPY      bx    COLONOSCOPY N/A 12/3/2020    COLONOSCOPY WITH BIOPSY performed by Davonte Guzmán MD at [address] Bilateral 2020    [place] bunionectomy, right 2nd hammer toe repair     HAMMER TOE SURGERY Right 2020    2ND TOE HAMMER REPAIR performed by Huy Muller DPM at [address] [place] [address]    OTHER SURGICAL HISTORY  2015    MRI under anesthesia    THYROID SURGERY      bilat bx    UPPER GASTROINTESTINAL ENDOSCOPY      UPPER GASTROINTESTINAL ENDOSCOPY N/A 12/3/2020    EGD BIOPSY performed by Davonte Guzmán MD at [facility]       Social History:  Social History     Tobacco Use    Smoking status: Former Smoker     Packs/day: 0.50     Quit date: 1992     Years since quittin.0    Smokeless tobacco: Never Used    Tobacco comment: states Whit Richardson in the 1980s   Substance Use Topics    Alcohol use: Not Currently     Alcohol/week: 12.0 standard drinks     Types: 12 Cans of beer per week     Comment: Quit about 5 months ago 20    Drug use: Not Currently     Types: Cocaine     Comment: last use approximately 14 cocaine       Medications:  Prior to Admission medications Medication Sig Start Date End Date Taking? Authorizing Provider   HYDROcodone-acetaminophen (NORCO) 5-325 MG per tablet Take 1 tablet by mouth every 6 hours as needed for Pain for up to 3 days. Intended supply: 7 days. Take lowest dose possible to manage pain 12/14/20 12/17/20 Yes Obey Jesus DPM   hydroCHLOROthiazide (HYDRODIURIL) 25 MG tablet Take 25 mg by mouth daily   Yes Historical Provider, MD   HYDROcodone-acetaminophen (NORCO) 5-325 MG per tablet Take 1 tablet by mouth every 6 hours as needed for Pain for up to 7 days. Intended supply: 7 days. Take lowest dose possible to manage pain 12/7/20 12/14/20 Yes Obey Jesus DPM   ketorolac (TORADOL) 10 MG tablet Take 1 tablet by mouth every 6 hours as needed for Pain 12/7/20 12/7/21 Yes Obey Jesus DPM   amLODIPine (NORVASC) 10 MG tablet  10/30/20  Yes Historical Provider, MD   pantoprazole (PROTONIX) 40 MG tablet  10/30/20  Yes Historical Provider, MD   omeprazole (PRILOSEC) 40 MG delayed release capsule Take 1 capsule by mouth daily 11/13/20  Yes Nimo Jones MD   ACETAMINOPHEN EXTRA STRENGTH 500 MG tablet TAKE 2 TABLETS BY MOUTH EVERY 6 HOURS AS NEEDED FOR PAIN  10/31/20  Yes Joelle Braswell MD   terbinafine (LAMISIL) 1 % cream Apply topically 2 times daily.  10/12/20  Yes Kate Jimenez DPM   diclofenac sodium (VOLTAREN) 1 % GEL Apply 4 g topically 4 times daily 10/2/20  Yes General Aroldo MD   spironolactone (ALDACTONE) 100 MG tablet Take 1 tablet by mouth daily 10/2/20  Yes General Aroldo MD   potassium chloride (KLOR-CON M) 20 MEQ extended release tablet Take 1 tablet by mouth daily 10/2/20  Yes General Aroldo MD   folic acid (FOLVITE) 1 MG tablet Take 1 tablet by mouth daily 9/24/20  Yes Daryl Contreras MD   vitamin B-1 100 MG tablet Take 1 tablet by mouth daily 9/24/20  Yes Daryl Contreras MD   atorvastatin (LIPITOR) 20 MG tablet Take 2 tablets by mouth daily 9/24/20  Yes Daryl Contreras MD ferrous sulfate (FE TABS 325) 325 (65 Fe) MG EC tablet Take 1 tablet by mouth daily (with breakfast) 9/24/20  Yes Amanda Mckeon MD   loratadine (CLARITIN) 10 MG capsule Take 1 capsule by mouth daily 7/23/20  Yes Jazmyn Mustafa MD   EPINEPHrine (EPIPEN 2-GRICELDA) 0.3 MG/0.3ML SOAJ injection Use as directed for allergic reaction 7/17/20  Yes Jose Zuniga MD   Calcium Carbonate-Vitamin D (OYSTER SHELL CALCIUM/D) 500-200 MG-UNIT TABS TAKE 1 TAB BY MOUTH ONCE A DAY 7/17/20  Yes Jazmyn Mustafa MD   vitamin D3 (CHOLECALCIFEROL) 25 MCG (1000 UT) TABS tablet Take 1 tablet by mouth daily 7/17/20  Yes Jazmyn Mustafa MD   FLUoxetine (PROZAC) 40 MG capsule Take 40 mg by mouth daily 1/24/20  Yes Historical Provider, MD   mometasone (ASMANEX, 120 METERED DOSES,) 220 MCG/INH inhaler Inhale 2 puffs into the lungs daily  Patient taking differently: Inhale 2 puffs into the lungs as needed  1/15/20  Yes Deborah Harkins MD   metFORMIN (GLUCOPHAGE) 500 MG tablet Take 1 tablet by mouth 2 times daily (with meals) 1/15/20  Yes Deborah Harkins MD   fluticasone (ARNUITY ELLIPTA) 100 MCG/ACT AEPB Inhale 1 puff into the lungs daily  Patient taking differently: Inhale 1 puff into the lungs as needed  1/15/20  Yes Deborah Harkins MD   atenolol (TENORMIN) 50 MG tablet Take 1 tablet by mouth daily 1/15/20  Yes Deborah Harkins MD   albuterol sulfate (PROAIR RESPICLICK) 221 (90 Base) MCG/ACT aerosol powder inhalation Inhale 2 puffs into the lungs every 6 hours as needed for Wheezing or Shortness of Breath 1/15/20  Yes Deborah Harkins MD   econazole nitrate 1 % cream Apply topically daily. 1/13/20  Yes Claude Scriver, DPM   lidocaine (XYLOCAINE) 5 % ointment Apply 1 applicator topically 3 times daily as needed for Pain Apply topically as needed.    Yes Historical Provider, MD   citalopram (CELEXA) 40 MG tablet Take 40 mg by mouth daily   Yes Historical Provider, MD · Diagnosis and treatment options discussed in detail. · Educated patient on clinical signs of infection and to present to ER if any such symptoms arise  · Dressings changed to bilateral feet: Steri-Strips reapplied over surgical incision on left foot, dry sterile dressing with Ace bandage applied to both feet. Betadine to pin sites. · Educated patient on proper pin care and not to push the pins back in if they start to back out. Patient verbalized understanding. · X-rays ordered for bilateral feet-will review results at next appointment  · Rx given for 12 tablets of Norco for pain control. · Patient to return to clinic in 1 week for suture removal and review of x-rays  · Please, call the office with any questions or concerns  · Discussed with Dr. Dalia Linn This Encounter   Medications    HYDROcodone-acetaminophen (NORCO) 5-325 MG per tablet     Sig: Take 1 tablet by mouth every 6 hours as needed for Pain for up to 3 days. Intended supply: 7 days.  Take lowest dose possible to manage pain     Dispense:  12 tablet     Refill:  0     Reduce doses taken as pain becomes manageable     Orders Placed This Encounter   Procedures    XR FOOT LEFT (MIN 3 VIEWS)     Standing Status:   Future     Standing Expiration Date:   12/14/2021     Order Specific Question:   Reason for exam:     Answer:   postop films    XR FOOT RIGHT (MIN 3 VIEWS)     Standing Status:   Future     Standing Expiration Date:   12/14/2021     Order Specific Question:   Reason for exam:     Answer:   postop films     Jordi Lancaster DPM   Podiatric Medicine & Surgery   12/14/2020 at 8:30 PM

## 2020-12-28 ENCOUNTER — OFFICE VISIT (OUTPATIENT)
Dept: PODIATRY | Age: 55
End: 2020-12-28
Payer: MEDICAID

## 2020-12-28 VITALS
HEART RATE: 87 BPM | HEIGHT: 64 IN | SYSTOLIC BLOOD PRESSURE: 151 MMHG | DIASTOLIC BLOOD PRESSURE: 96 MMHG | BODY MASS INDEX: 27.49 KG/M2 | WEIGHT: 161 LBS

## 2020-12-28 PROCEDURE — 99999 PR OFFICE/OUTPT VISIT,PROCEDURE ONLY: CPT | Performed by: STUDENT IN AN ORGANIZED HEALTH CARE EDUCATION/TRAINING PROGRAM

## 2020-12-28 NOTE — PROGRESS NOTES
Writer was informed by staff that the patient smelled of alcohol and was being verbally aggressive with staff. Writer questioned patient and she admitted that she had been drinking. Patient presented to the office for a post op appointment to have sutures and pins removed. Writer advised the patient that Zeny Ratliff would discuss the matter with the Dr and come back to the exam room. Writer spoke to Dr who advised that the patient should schedule for an appointment next week to evaluate and remove sutures and pins. When gamar returned to the room the patient has already left the office without having her foot re-wrapped. Patient does have an appointment scheduled for next week and an appointment reminder call will be made to remind the patient of the scheduled appointment. Patient was advised that if she reports to the clinic she will have to reschedule. Patient expressed understanding.

## 2020-12-28 NOTE — PROGRESS NOTES
Patient instructed to remove shoes and socks, instructed to sit in exam chair. Current PCP name is Lo Lisa MD  and date of last visit 10/2/20 . Do you have a follow up visit scheduled?   Yes    If yes the date is 1/20/21

## 2020-12-28 NOTE — PROGRESS NOTES
[address]  [address] [place] [address],  LUCILA Coy  Tel: [phone]   Fax: [phone]    Subjective     Patient was checked and placed in the room however patient was found to be inebriated due to alcohol intoxication. Patient was verbally aggressive with the staff and also smell of alcohol. Patient openly admitted to drinking alcohol prior to visit. We recommended that patient should reschedule appointment for early next week for postop evaluation as well as suture and pin removal.  Patient chose to leave the room before full instructions were given.     Kwame Hudson DPM   Podiatric Medicine & Surgery   12/28/2020 at 4:23 PM

## 2021-01-02 DIAGNOSIS — T78.40XS ALLERGY, SEQUELA: ICD-10-CM

## 2021-01-02 DIAGNOSIS — M79.671 RIGHT FOOT PAIN: ICD-10-CM

## 2021-01-04 ENCOUNTER — OFFICE VISIT (OUTPATIENT)
Dept: PODIATRY | Age: 56
End: 2021-01-04
Payer: MEDICAID

## 2021-01-04 ENCOUNTER — HOSPITAL ENCOUNTER (OUTPATIENT)
Dept: GENERAL RADIOLOGY | Age: 56
Discharge: HOME | End: 2021-01-06
Payer: MEDICAID

## 2021-01-04 ENCOUNTER — HOSPITAL ENCOUNTER (OUTPATIENT)
Age: 56
Discharge: HOME | End: 2021-01-06
Payer: MEDICAID

## 2021-01-04 VITALS
WEIGHT: 173 LBS | DIASTOLIC BLOOD PRESSURE: 94 MMHG | TEMPERATURE: 98 F | SYSTOLIC BLOOD PRESSURE: 139 MMHG | BODY MASS INDEX: 29.7 KG/M2 | HEART RATE: 77 BPM

## 2021-01-04 DIAGNOSIS — Z98.890 STATUS POST RIGHT FOOT SURGERY: ICD-10-CM

## 2021-01-04 DIAGNOSIS — Z98.890 S/P BUNIONECTOMY: ICD-10-CM

## 2021-01-04 DIAGNOSIS — Z98.890 S/P BUNIONECTOMY: Primary | ICD-10-CM

## 2021-01-04 DIAGNOSIS — Z98.890 STATUS POST LEFT FOOT SURGERY: ICD-10-CM

## 2021-01-04 PROCEDURE — 73630 X-RAY EXAM OF FOOT: CPT

## 2021-01-04 PROCEDURE — 99024 POSTOP FOLLOW-UP VISIT: CPT | Performed by: STUDENT IN AN ORGANIZED HEALTH CARE EDUCATION/TRAINING PROGRAM

## 2021-01-04 RX ORDER — ACETAMINOPHEN 500 MG
1000 TABLET ORAL 3 TIMES DAILY
Qty: 540 TABLET | Refills: 1 | Status: SHIPPED | OUTPATIENT
Start: 2021-01-04 | End: 2021-01-20 | Stop reason: ALTCHOICE

## 2021-01-04 RX ORDER — BACITRACIN, NEOMYCIN, POLYMYXIN B 400; 3.5; 5 [USP'U]/G; MG/G; [USP'U]/G
OINTMENT TOPICAL
Qty: 1 TUBE | Refills: 2 | Status: SHIPPED | OUTPATIENT
Start: 2021-01-04 | End: 2021-01-14

## 2021-01-04 RX ORDER — LIDOCAINE HYDROCHLORIDE 20 MG/ML
JELLY TOPICAL PRN
Status: DISCONTINUED | OUTPATIENT
Start: 2021-01-04 | End: 2021-04-08

## 2021-01-04 RX ADMIN — LIDOCAINE HYDROCHLORIDE: 20 JELLY TOPICAL at 15:12

## 2021-01-04 NOTE — TELEPHONE ENCOUNTER
Melvin request for pt requesting medication refill, loratidine acetaminophen    Next Visit Date:1/20/21  Future Appointments   Date Time Provider Anthony Marguerite   1/4/2021  1:15 PM Emma Manjarrez DPM [facility] Podiatry [facility]   1/20/2021  1:20 PM Gino Parmar MD [facility] IM [facility]   2/1/2021  2:00 PM Halie Rooney MD [address] Maintenance   Topic Date Due    Hepatitis B vaccine (1 of 3 - Risk 3-dose series) 01/22/1985    Shingles Vaccine (1 of 2) 07/17/2021 (Originally 1/22/2016)    Lipid screen  07/17/2021    A1C test (Diabetic or Prediabetic)  10/02/2021    Potassium monitoring  12/07/2021    Creatinine monitoring  12/07/2021    Breast cancer screen  10/10/2022    DTaP/Tdap/Td vaccine (3 - Td) 04/14/2027    Colon cancer screen colonoscopy  12/03/2030    Flu vaccine  Completed    Pneumococcal 0-64 years Vaccine  Completed    Hepatitis C screen  Completed    HIV screen  Completed    Hepatitis A vaccine  Aged Out    Hib vaccine  Aged Out    Meningococcal (ACWY) vaccine  Aged Out             (applicable per patient's age: Cancer Screenings, Depression Screening, Fall Risk Screening, Immunizations)    Hemoglobin A1C (%)   Date Value   10/02/2020 6.2 (H)   06/30/2020 6.3 (H)   06/12/2019 5.8     Microalb/Crt.  Ratio (mcg/mg creat)   Date Value   06/12/2019 147 (H)     LDL Cholesterol (mg/dL)   Date Value   07/17/2020 187 (H)     AST (U/L)   Date Value   10/02/2020 20     ALT (U/L)   Date Value   10/02/2020 18     BUN (mg/dL)   Date Value   10/02/2020 7      (goal A1C is < 7)   (goal LDL is <100) need 30-50% reduction from baseline     BP Readings from Last 3 Encounters:   12/28/20 (!) 151/96   12/14/20 111/72   12/07/20 (!) 140/100    (goal /80)      All Future Testing planned in [facility]:  Lab Frequency Next Occurrence   Sleep Study with PAP Titration Once 01/15/2020   C-Reactive Protein Once 09/29/2020   EGD Once 11/27/2020   COLONOSCOPY (Diagnostic) Once 11/27/2020 Pancreatic elastase, fecal Once 11/13/2020            Patient Active Problem List:     Lung nodule     Obesity     Adrenal adenoma     Goiter     Alcohol abuse     Essential hypertension     Enlarged tonsils     ACE inhibitor-aggravated angioedema     JONES (obstructive sleep apnea)     Dyslipidemia     IGT (impaired glucose tolerance)     Acute alcoholic intoxication without complication (HCC)     Acute renal insufficiency     Effusion of bursa of right knee     Acute cystitis without hematuria

## 2021-01-04 NOTE — TELEPHONE ENCOUNTER
Amlodipine and hydrochlorothiazide refill request     Pt has a future appointment           Next Visit Date:  Future Appointments   Date Time Provider Anthony Cutleri   1/11/2021  2:15 PM Nimo Serrato DPM [facility] Podiatry [facility]   1/20/2021  1:20 PM Lianet Alatorre MD [facility] IM [facility]   2/1/2021  2:00 PM Melecio Regan MD [address] Maintenance   Topic Date Due    Hepatitis B vaccine (1 of 3 - Risk 3-dose series) 01/22/1985    Shingles Vaccine (1 of 2) 07/17/2021 (Originally 1/22/2016)    Lipid screen  07/17/2021    A1C test (Diabetic or Prediabetic)  10/02/2021    Potassium monitoring  12/07/2021    Creatinine monitoring  12/07/2021    Breast cancer screen  10/10/2022    DTaP/Tdap/Td vaccine (3 - Td) 04/14/2027    Colon cancer screen colonoscopy  12/03/2030    Flu vaccine  Completed    Pneumococcal 0-64 years Vaccine  Completed    Hepatitis C screen  Completed    HIV screen  Completed    Hepatitis A vaccine  Aged Out    Hib vaccine  Aged Out    Meningococcal (ACWY) vaccine  Aged Out       Hemoglobin A1C (%)   Date Value   10/02/2020 6.2 (H)   06/30/2020 6.3 (H)   06/12/2019 5.8             ( goal A1C is < 7)   Microalb/Crt.  Ratio (mcg/mg creat)   Date Value   06/12/2019 147 (H)     LDL Cholesterol (mg/dL)   Date Value   07/17/2020 187 (H)   06/12/2019 136 (H)       (goal LDL is <100)   AST (U/L)   Date Value   10/02/2020 20     ALT (U/L)   Date Value   10/02/2020 18     BUN (mg/dL)   Date Value   10/02/2020 7     BP Readings from Last 3 Encounters:   01/04/21 (!) 139/94   12/28/20 (!) 151/96   12/14/20 111/72          (goal 120/80)    All Future Testing planned in [facility]  Lab Frequency Next Occurrence   Sleep Study with PAP Titration Once 01/15/2020   C-Reactive Protein Once 09/29/2020   EGD Once 11/27/2020   COLONOSCOPY (Diagnostic) Once 11/27/2020   Pancreatic elastase, fecal Once 11/13/2020               Patient Active Problem List:     Lung nodule     Obesity     Adrenal adenoma     Goiter     Alcohol abuse     Essential hypertension     Enlarged tonsils     ACE inhibitor-aggravated angioedema     JONES (obstructive sleep apnea)     Dyslipidemia     IGT (impaired glucose tolerance)     Acute alcoholic intoxication without complication (HCC)     Acute renal insufficiency     Effusion of bursa of right knee     Acute cystitis without hematuria

## 2021-01-04 NOTE — PROGRESS NOTES
[address],  [place]  Tel: [phone]   Fax: [phone]    Subjective     Procedure: Modified Smith bunionectomy, b/l. PIPJ arthroplasty 2nd digit. DOS: 12/7/2020  POV#: 2    HPI:  This 48 y.o. female presents for a post op visit. Patient states they are doing well. Patient relates having pain, and has been taking tylenol and states she has been taking about 6 pills/day. Patient is concerned about the swelling and appearance of her feet. Patient states she has been icing and elevating the extremity as instructed preoperatively and has been weightbearing in her surgical shoe. Patient is requesting new surgical shoes today because her current shoes are getting worn out. Denies any current nausea, vomiting, fever, chills, shortness of breath, chest pain or calf pain. Denies any other pedal complaints    Primary care physician is Teresa Singh MD.    ROS:    Constitutional: Denies nausea, vomiting, fever, chills. Neurologic: Denies numbness, tingling, and burning in the feet. Vascular: Denies symptoms of lower extremity claudication. Skin: Denies open wounds. Otherwise negative except as noted in the HPI. Musculoskeletal: Pain to surgical sites, feeling of tightness.     PMH:  Past Medical History:   Diagnosis Date    Angioedema 11/17/2017    from lisinopril    Anxiety     Asthma     mild persistent asthma, on home resp medications for control    Bipolar disorder (HCC)     Claustrophobia     Depression     GERD (gastroesophageal reflux disease)     Hypertension     Hypertrophic cardiomyopathy ([address]     Lung nodules     MRSA (methicillin resistant Staphylococcus aureus)     rt hip    Murmur     see cardiac echo result    OA (osteoarthritis)     JONES (obstructive sleep apnea)     Thyroid nodule     Type 2 diabetes mellitus without complication, without long-term current use of insulin ([address] 12/7/2018       Surgical History: Past Surgical History:   Procedure Laterality Date    BUNIONECTOMY Bilateral 2020    [facility]  BUNIONECTOMY, ARTHREX performed by Ricki Ratliff DPM at [place] [address] COLONOSCOPY      bx    COLONOSCOPY N/A 12/3/2020    COLONOSCOPY WITH BIOPSY performed by Mirella Ruggiero MD at [facility] [address] Bilateral 2020    [facility] bunionectomy, right 2nd hammer toe repair     HAMMER TOE SURGERY Right 2020    2ND TOE HAMMER REPAIR performed by Ricki Ratliff DPM at [address]    OTHER SURGICAL HISTORY  2015    MRI under anesthesia    THYROID SURGERY      bilat bx    UPPER GASTROINTESTINAL ENDOSCOPY      UPPER GASTROINTESTINAL ENDOSCOPY N/A 12/3/2020    EGD BIOPSY performed by Mirella Ruggiero MD at [facility]       Social History:  Social History     Tobacco Use    Smoking status: Former Smoker     Packs/day: 0.50     Quit date: 1992     Years since quittin.0    Smokeless tobacco: Never Used    Tobacco comment: states quiti in the 1980s   Substance Use Topics    Alcohol use: Not Currently     Alcohol/week: 12.0 standard drinks     Types: 12 Cans of beer per week     Comment: Quit about 5 months ago 20    Drug use: Not Currently     Types: Cocaine     Comment: last use approximately 14 cocaine       Medications:  Prior to Admission medications    Medication Sig Start Date End Date Taking?  Authorizing Provider   hydroCHLOROthiazide (HYDRODIURIL) 25 MG tablet Take 25 mg by mouth daily   Yes Historical Provider, MD   ketorolac (TORADOL) 10 MG tablet Take 1 tablet by mouth every 6 hours as needed for Pain 20 Yes Gabby Jesus DPM   amLODIPine (NORVASC) 10 MG tablet  10/30/20  Yes Historical Provider, MD   pantoprazole (PROTONIX) 40 MG tablet  10/30/20  Yes Historical Provider, MD omeprazole (PRILOSEC) 40 MG delayed release capsule Take 1 capsule by mouth daily 11/13/20  Yes Pedro Obrien MD   ACETAMINOPHEN EXTRA STRENGTH 500 MG tablet TAKE 2 TABLETS BY MOUTH EVERY 6 HOURS AS NEEDED FOR PAIN  10/31/20  Yes Freddy Cordoba MD   terbinafine (LAMISIL) 1 % cream Apply topically 2 times daily.  10/12/20  Yes Aicha Yañez DPM   diclofenac sodium (VOLTAREN) 1 % GEL Apply 4 g topically 4 times daily 10/2/20  Yes Cathryn Woods MD   spironolactone (ALDACTONE) 100 MG tablet Take 1 tablet by mouth daily 10/2/20  Yes Cathryn Woods MD   potassium chloride (KLOR-CON M) 20 MEQ extended release tablet Take 1 tablet by mouth daily 10/2/20  Yes Cathryn Woods MD   folic acid (FOLVITE) 1 MG tablet Take 1 tablet by mouth daily 9/24/20  Yes Kris rAanda MD   vitamin B-1 100 MG tablet Take 1 tablet by mouth daily 9/24/20  Yes Kris Aranda MD   atorvastatin (LIPITOR) 20 MG tablet Take 2 tablets by mouth daily 9/24/20  Yes Kris Aranda MD   ferrous sulfate (FE TABS 325) 325 (65 Fe) MG EC tablet Take 1 tablet by mouth daily (with breakfast) 9/24/20  Yes Kris Aranda MD   loratadine (CLARITIN) 10 MG capsule Take 1 capsule by mouth daily 7/23/20  Yes Freddy Cordoba MD   EPINEPHrine (EPIPEN 2-GRICELDA) 0.3 MG/0.3ML SOAJ injection Use as directed for allergic reaction 7/17/20  Yes Freddy Cordoba MD   Calcium Carbonate-Vitamin D (OYSTER SHELL CALCIUM/D) 500-200 MG-UNIT TABS TAKE 1 TAB BY MOUTH ONCE A DAY 7/17/20  Yes Freddy Cordoba MD   vitamin D3 (CHOLECALCIFEROL) 25 MCG (1000 UT) TABS tablet Take 1 tablet by mouth daily 7/17/20  Yes Freddy Cordoba MD   FLUoxetine (PROZAC) 40 MG capsule Take 40 mg by mouth daily 1/24/20  Yes Historical Provider, MD   mometasone (ASMANEX, 120 METERED DOSES,) 220 MCG/INH inhaler Inhale 2 puffs into the lungs daily 1/15/20  Yes Frances Dolan MD   metFORMIN (GLUCOPHAGE) 500 MG tablet Take 1 tablet by mouth 2 times daily (with meals) 1/15/20  Yes Frances Dolan MD Musculoskeletal: Pain to palpation of forefoot b/l. 4/5 muscle strength testing b/l, limited by pain. Patient able to plantarflex and dorsiflex ankle b/l. Pins to the b/l hallux and right 2nd digit are stable and do not freely rotate. Patient has no pain to palpation of calf. The calf is soft, supple and nontender without evidence of DVT. Negative Ty's test.  Satisfactory alignment is noted. Imaging:    XR foot  B/L 1/4/2021  IMPRESSION:    Right foot:    1. Interval removal of K-wire from the 1st digit. Prior 1st metatarsal  osteotomy. 2. Orthopedic K-wire traversing the 2nd MTP, DIP and PIP joints. 3. Moderate diffuse edema and soft tissue swelling of the right foot. 4. Moderate degenerative changes of the 1st MTP/MTS joints. 5. No acute fracture or dislocation. No aggressive osseous destruction. Left foot:     1. Orthopedic K-wire traversing the 1st digit. Prior 1st metatarsal  osteotomy. 2. Mild degenerative changes as detailed above. 3. Moderate soft tissue swelling and edema of the left foot. 4. No acute fracture or dislocation. No aggressive osseous destruction. 5. Remote healed fracture deformities of the proximal phalanges of the 4th  and 5th digits. Assessment   Keo Cordero is a 48 y.o. female with     Diagnosis Orders   1. S/P bunionectomy  XR FOOT LEFT (MIN 3 VIEWS)    XR FOOT RIGHT (MIN 3 VIEWS)   2. Status post right foot surgery     3. Status post left foot surgery          Plan   · Patient examined and evaluated  · The patient was educated on clinical examination findings, postoperative prognosis and protocol. All questions were answered to patient's apparent satisfaction. · Sutures were removed at today's visit without incident  · K-wire's were removed from the hallux b/l and the 2nd digit of right foot, without incident. Patient expressed feeling of relief following K-wire removal.  · Betadine was applied to pin removal sites. · Patient to keep b/l foot clean and dry for 3 days, and then may wash the foot with soapy water. Patient to keep pin removal sites clean. Apply antibiotic ointment to this area daily and cover with bandage. · Prescription for antibiotic ointment sent to patient's pharmacy. · Prescription for acetaminophen sent to patient's pharmacy. · Patient to RTC in 1 weeks for follow-up with Dr. Arron Iglesias. · Please, call the office with any questions or concerns     No orders of the defined types were placed in this encounter.     Orders Placed This Encounter   Procedures    XR FOOT LEFT (MIN 3 VIEWS)     Weight bearing     Standing Status:   Future     Number of Occurrences:   1     Standing Expiration Date:   1/4/2022     Order Specific Question:   Reason for exam:     Answer:   evaluate post op    XR FOOT RIGHT (MIN 3 VIEWS)     Weight beaing     Standing Status:   Future     Number of Occurrences:   1     Standing Expiration Date:   1/4/2022     Order Specific Question:   Reason for exam:     Answer:   evaluate post op       Reyes Casanova DPM   Podiatric Medicine & Surgery   1/4/2021 at 2:43 PM

## 2021-01-04 NOTE — PROGRESS NOTES
Patient instructed to remove shoes and socks and instructed to sit in exam chair. Current PCP is Ghazala Dumont MD and date of last visit was 07/17/20. Do you have a follow up visit scheduled?   No  If yes, the date is n/a

## 2021-01-04 NOTE — PROGRESS NOTES
TIMEOUT  Correct patient (patient name and ) YES  Correct Site YES  Correct Procedure YES    BILATERAL FOOT PIN REMOVAL

## 2021-01-06 ENCOUNTER — TELEPHONE (OUTPATIENT)
Dept: PODIATRY | Age: 56
End: 2021-01-06

## 2021-01-06 DIAGNOSIS — M79.671 PAIN IN RIGHT FOOT: ICD-10-CM

## 2021-01-06 DIAGNOSIS — Z98.890 S/P BUNIONECTOMY: Primary | ICD-10-CM

## 2021-01-06 DIAGNOSIS — M79.672 PAIN IN LEFT FOOT: ICD-10-CM

## 2021-01-08 RX ORDER — AMLODIPINE BESYLATE 10 MG/1
TABLET ORAL
Qty: 60 TABLET | Refills: 3 | Status: SHIPPED | OUTPATIENT
Start: 2021-01-08 | End: 2022-02-24 | Stop reason: SDUPTHER

## 2021-01-08 RX ORDER — HYDROCHLOROTHIAZIDE 25 MG/1
TABLET ORAL
Qty: 60 TABLET | Refills: 3 | Status: SHIPPED | OUTPATIENT
Start: 2021-01-08 | End: 2021-01-29

## 2021-01-11 ENCOUNTER — OFFICE VISIT (OUTPATIENT)
Dept: PODIATRY | Age: 56
End: 2021-01-11
Payer: MEDICAID

## 2021-01-11 VITALS
SYSTOLIC BLOOD PRESSURE: 136 MMHG | WEIGHT: 173 LBS | HEIGHT: 64 IN | HEART RATE: 87 BPM | BODY MASS INDEX: 29.53 KG/M2 | DIASTOLIC BLOOD PRESSURE: 86 MMHG

## 2021-01-11 DIAGNOSIS — Z98.890 S/P BUNIONECTOMY: Primary | ICD-10-CM

## 2021-01-11 PROCEDURE — 99024 POSTOP FOLLOW-UP VISIT: CPT | Performed by: STUDENT IN AN ORGANIZED HEALTH CARE EDUCATION/TRAINING PROGRAM

## 2021-01-11 NOTE — PROGRESS NOTES
Patient instructed to remove shoes and socks and instructed to sit in exam chair. Current PCP is Carolynn Navarro MD and date of last visit was 10-2-20. Do you have a follow up visit scheduled?   Yes  If yes, the date is 1-20-21

## 2021-01-11 NOTE — PROGRESS NOTES
[address],  [place]  Tel: [phone]   Fax: [phone]    Subjective     Procedure: Modified Smith bunionectomy, b/l. PIPJ arthroplasty 2nd digit. DOS: 12/7/2020  POV#: 3    HPI:  This 48 y.o. female presents to clinic today for post op visit. Patient relates having aching in her feet and has been taking tylenol to control the pain. Patient states she has been icing and elevating the extremities as advised and has been weightbearing in her surgical shoes. Patient reports she has been putting antibiotic ointment over the incision sites daily with a light dressing. Denies any current nausea, vomiting, fever, chills, shortness of breath, chest pain or calf pain. Denies any other pedal complaints    Primary care physician is Elizabeth Ram MD.    ROS:    Constitutional: Denies nausea, vomiting, fever, chills. Neurologic: Denies numbness, tingling, and burning in the feet. Vascular: Denies symptoms of lower extremity claudication. Skin: Denies open wounds. Otherwise negative except as noted in the HPI. Musculoskeletal: Pain to surgical sites, feeling of tightness.     PMH:  Past Medical History:   Diagnosis Date    Angioedema 11/17/2017    from lisinopril    Anxiety     Asthma     mild persistent asthma, on home resp medications for control    Bipolar disorder (HCC)     Claustrophobia     Depression     GERD (gastroesophageal reflux disease)     Hypertension     Hypertrophic cardiomyopathy ([address]     Lung nodules     MRSA (methicillin resistant Staphylococcus aureus)     rt hip    Murmur     see cardiac echo result    OA (osteoarthritis)     JONES (obstructive sleep apnea)     Thyroid nodule     Type 2 diabetes mellitus without complication, without long-term current use of insulin ([address] 12/7/2018       Surgical History:   Past Surgical History:   Procedure Laterality Date    BUNIONECTOMY Bilateral 12/7/2020 [facility]  BUNIONECTOMY, ARTHREX performed by Dennis Carl DPM at [facility] COLONOSCOPY      bx    COLONOSCOPY N/A 12/3/2020    COLONOSCOPY WITH BIOPSY performed by Megan Castro MD at [address] Bilateral 2020    [facility] bunionectomy, right 2nd hammer toe repair     HAMMER TOE SURGERY Right 2020    2ND TOE HAMMER REPAIR performed by Dennis Carl DPM at [facility]      partial hyst    OTHER SURGICAL HISTORY  2015    MRI under anesthesia    THYROID SURGERY      bilat bx    UPPER GASTROINTESTINAL ENDOSCOPY      UPPER GASTROINTESTINAL ENDOSCOPY N/A 12/3/2020    EGD BIOPSY performed by Megan Castro MD at [facility]       Social History:  Social History     Tobacco Use    Smoking status: Former Smoker     Packs/day: 0.50     Quit date: 1992     Years since quittin.0    Smokeless tobacco: Never Used    Tobacco comment: states quiti in the 1980s   Substance Use Topics    Alcohol use: Not Currently     Alcohol/week: 12.0 standard drinks     Types: 12 Cans of beer per week     Comment: Quit about 5 months ago 20    Drug use: Not Currently     Types: Cocaine     Comment: last use approximately 14 cocaine       Medications:  Prior to Admission medications    Medication Sig Start Date End Date Taking? Authorizing Provider   hydroCHLOROthiazide (HYDRODIURIL) 25 MG tablet TAKE 1 TABLET BY MOUTH DAILY  21  Yes Sandra Terrell MD   amLODIPine (NORVASC) 10 MG tablet TAKE 1 TABLET BY MOUTH DAILY  21  Yes Sandra Terrell MD   diclofenac sodium (VOLTAREN) 1 % GEL Apply 4 g topically 2 times daily 21  Yes Garcia Tubbs DPM   neomycin-bacitracin-polymyxin (NEOSPORIN) 400-5-5000 ointment Apply topically to affected digits daily.  21 Yes Ray Oneill DPM   acetaminophen (TYLENOL) 500 MG tablet Take 2 tablets by mouth 3 times daily 21  Yes Ray Oneill DPM ketorolac (TORADOL) 10 MG tablet Take 1 tablet by mouth every 6 hours as needed for Pain 12/7/20 12/7/21 Yes Matias Jesus DPM   pantoprazole (PROTONIX) 40 MG tablet  10/30/20  Yes Historical Provider, MD   omeprazole (PRILOSEC) 40 MG delayed release capsule Take 1 capsule by mouth daily 11/13/20  Yes Niki Baltazar MD   terbinafine (LAMISIL) 1 % cream Apply topically 2 times daily.  10/12/20  Yes Benjy Pritchett DPM   diclofenac sodium (VOLTAREN) 1 % GEL Apply 4 g topically 4 times daily 10/2/20  Yes Siria Huerta MD   spironolactone (ALDACTONE) 100 MG tablet Take 1 tablet by mouth daily 10/2/20  Yes Siria Huerta MD   potassium chloride (KLOR-CON M) 20 MEQ extended release tablet Take 1 tablet by mouth daily 10/2/20  Yes Siria Huerta MD   folic acid (FOLVITE) 1 MG tablet Take 1 tablet by mouth daily 9/24/20  Yes Anitha Rios MD   vitamin B-1 100 MG tablet Take 1 tablet by mouth daily 9/24/20  Yes Anitha Rios MD   atorvastatin (LIPITOR) 20 MG tablet Take 2 tablets by mouth daily 9/24/20  Yes Anitha Rios MD   ferrous sulfate (FE TABS 325) 325 (65 Fe) MG EC tablet Take 1 tablet by mouth daily (with breakfast) 9/24/20  Yes Anitha Rios MD   EPINEPHrine (EPIPEN 2-GRICELDA) 0.3 MG/0.3ML SOAJ injection Use as directed for allergic reaction 7/17/20  Yes Alexander Gill MD   Calcium Carbonate-Vitamin D (OYSTER SHELL CALCIUM/D) 500-200 MG-UNIT TABS TAKE 1 TAB BY MOUTH ONCE A DAY 7/17/20  Yes Alexander Gill MD   vitamin D3 (CHOLECALCIFEROL) 25 MCG (1000 UT) TABS tablet Take 1 tablet by mouth daily 7/17/20  Yes Alexander Gill MD   FLUoxetine (PROZAC) 40 MG capsule Take 40 mg by mouth daily 1/24/20  Yes Historical Provider, MD   mometasone (ASMANEX, 120 METERED DOSES,) 220 MCG/INH inhaler Inhale 2 puffs into the lungs daily 1/15/20  Yes Miguel Avila MD   metFORMIN (GLUCOPHAGE) 500 MG tablet Take 1 tablet by mouth 2 times daily (with meals) 1/15/20  Yes Miguel Avila MD fluticasone (ARNUITY ELLIPTA) 100 MCG/ACT AEPB Inhale 1 puff into the lungs daily  Patient taking differently: Inhale 1 puff into the lungs as needed  1/15/20  Yes Kayleen Felty, MD   atenolol (TENORMIN) 50 MG tablet Take 1 tablet by mouth daily 1/15/20  Yes Kayleen Felty, MD   albuterol sulfate (PROAIR RESPICLICK) 168 (90 Base) MCG/ACT aerosol powder inhalation Inhale 2 puffs into the lungs every 6 hours as needed for Wheezing or Shortness of Breath 1/15/20  Yes Kayleen Felty, MD   econazole nitrate 1 % cream Apply topically daily. 1/13/20  Yes Mark Aguilera DPM   lidocaine (XYLOCAINE) 5 % ointment Apply 1 applicator topically 3 times daily as needed for Pain Apply topically as needed. Yes Historical Provider, MD   citalopram (CELEXA) 40 MG tablet Take 40 mg by mouth daily   Yes Historical Provider, MD   mirtazapine (REMERON) 45 MG tablet Take 45 mg by mouth nightly   Yes Historical Provider, MD   lurasidone (LATUDA) 60 MG TABS tablet Take 60 mg by mouth nightly   Yes Historical Provider, MD   Cholecalciferol (VITAMIN D3) 89071 UNITS CAPS Take 1 capsule by mouth Twice a Week 8/12/15  Yes Historical Provider, MD   loratadine (CLARITIN) 10 MG tablet TAKE 1 CAPSULE BY MOUTH DAILY  1/12/21   Jose Zuniga MD   naproxen (NAPROSYN) 500 MG tablet TAKE ONE TABLET BY MOUTH TWICE A DAY WITH MEALS  1/12/21   Jose Zuniga MD   ACETAMINOPHEN EXTRA STRENGTH 500 MG tablet TAKE 2 TABLETS BY MOUTH EVERY 6 HOURS AS NEEDED FOR PAIN  1/12/21   Lorraine Lopez MD       Objective     Vitals:    01/11/21 1404   BP: 136/86   Pulse: 87       Lab Results   Component Value Date    LABA1C 6.2 (H) 10/02/2020       Physical Exam:  General:  Alert and oriented x3. In no acute distress. Patient presents weight bearing to the B/L extremity in surgical shoes. Edema noted surrounding the 1st MPJ b/l. Vascular: Pedal pulses are palpable. Capillary refill time is less than three seconds to all digits. Sensations are intact to light touch. Derm: Incision site to the 2nd digit is well coapted without evidence of dehiscence. Incision site to the hallux b/l is well coapted without evidence of dehiscence. Skin appears xerotic, with no open lesions or nodules. No drainage, erythema or foul odor appreciated. Interdigital maceration to 2nd & 3rd interspaces, right. Musculoskeletal: No pain to palpation of forefoot b/l. 4/5 muscle strength testing b/l, limited by pain. Patient able to plantarflex and dorsiflex ankle b/l. Patient has no pain to palpation of calf. The calf is soft, supple and nontender without evidence of DVT. Negative Ty's test.  Satisfactory alignment is noted. Imaging:    XR foot  B/L 1/4/2021  IMPRESSION:    Right foot:    1. Interval removal of K-wire from the 1st digit. Prior 1st metatarsal  osteotomy. 2. Orthopedic K-wire traversing the 2nd MTP, DIP and PIP joints. 3. Moderate diffuse edema and soft tissue swelling of the right foot. 4. Moderate degenerative changes of the 1st MTP/MTS joints. 5. No acute fracture or dislocation. No aggressive osseous destruction. Left foot:     1. Orthopedic K-wire traversing the 1st digit. Prior 1st metatarsal  osteotomy. 2. Mild degenerative changes as detailed above. 3. Moderate soft tissue swelling and edema of the left foot. 4. No acute fracture or dislocation. No aggressive osseous destruction. 5. Remote healed fracture deformities of the proximal phalanges of the 4th  and 5th digits. Assessment   Laura Aragon is a 48 y.o. female with     Diagnosis Orders   1. S/P bunionectomy          Plan   · Patient examined and evaluated  · The patient was educated on clinical examination findings, postoperative prognosis and protocol. All questions were answered to patient's apparent satisfaction. · 2x2 gauze applied to 2nd & 3rd interspaces, right foot. Patient to apply daily. · WBAT in surgical shoes. · Discussed with Dr. Jen Whiteside   · Patient to RTC in 1 weeks for follow-up with Dr. Jen Whiteside. · Please, call the office with any questions or concerns     No orders of the defined types were placed in this encounter. No orders of the defined types were placed in this encounter.       Snow Roper DPM   Podiatric Medicine & Surgery   1/13/2021 at 1:01 PM

## 2021-01-12 RX ORDER — LORATADINE 10 MG/1
TABLET ORAL
Qty: 30 TABLET | Refills: 3 | Status: ON HOLD | OUTPATIENT
Start: 2021-01-12 | End: 2021-08-18

## 2021-01-12 RX ORDER — NAPROXEN 500 MG/1
TABLET ORAL
Qty: 60 TABLET | Refills: 0 | Status: SHIPPED | OUTPATIENT
Start: 2021-01-12 | End: 2021-01-20 | Stop reason: ALTCHOICE

## 2021-01-12 RX ORDER — PSEUDOEPHED/ACETAMINOPH/DIPHEN 30MG-500MG
TABLET ORAL
Qty: 56 TABLET | Refills: 0 | Status: SHIPPED | OUTPATIENT
Start: 2021-01-12 | End: 2021-01-20

## 2021-01-19 DIAGNOSIS — K21.9 GASTROESOPHAGEAL REFLUX DISEASE WITHOUT ESOPHAGITIS: ICD-10-CM

## 2021-01-19 DIAGNOSIS — E78.5 DYSLIPIDEMIA: ICD-10-CM

## 2021-01-19 DIAGNOSIS — I10 ESSENTIAL HYPERTENSION: ICD-10-CM

## 2021-01-19 NOTE — TELEPHONE ENCOUNTER
Request for oyster shell, omeprazole, and imdur. The original prescription for Imdur was discontinued on 10/2/2020 by Chavez Barber MD for the following reason: Therapy completed. Renewing this prescription may not be appropriate. Next Visit Date:  Future Appointments   Date Time Provider Anthony Marguerite   1/20/2021  1:20 PM Chavez Barber MD [facility] IM [facility]   1/25/2021 12:45 PM Marlene Estrada DPM [facility] Podiatry [place] Font   2/1/2021  2:00 PM Kourtney Montiel MD [address] Maintenance   Topic Date Due    Hepatitis B vaccine (1 of 3 - Risk 3-dose series) 01/22/1985    Shingles Vaccine (1 of 2) 07/17/2021 (Originally 1/22/2016)    Lipid screen  07/17/2021    A1C test (Diabetic or Prediabetic)  10/02/2021    Potassium monitoring  12/07/2021    Creatinine monitoring  12/07/2021    Breast cancer screen  10/10/2022    DTaP/Tdap/Td vaccine (3 - Td) 04/14/2027    Colon cancer screen colonoscopy  12/03/2030    Flu vaccine  Completed    Pneumococcal 0-64 years Vaccine  Completed    Hepatitis C screen  Completed    HIV screen  Completed    Hepatitis A vaccine  Aged Out    Hib vaccine  Aged Out    Meningococcal (ACWY) vaccine  Aged Out       Hemoglobin A1C (%)   Date Value   10/02/2020 6.2 (H)   06/30/2020 6.3 (H)   06/12/2019 5.8             ( goal A1C is < 7)   Microalb/Crt.  Ratio (mcg/mg creat)   Date Value   06/12/2019 147 (H)     LDL Cholesterol (mg/dL)   Date Value   07/17/2020 187 (H)       (goal LDL is <100)   AST (U/L)   Date Value   10/02/2020 20     ALT (U/L)   Date Value   10/02/2020 18     BUN (mg/dL)   Date Value   10/02/2020 7     BP Readings from Last 3 Encounters:   01/11/21 136/86   01/04/21 (!) 139/94   12/28/20 (!) 151/96          (goal 120/80)    All Future Testing planned in [facility]  Lab Frequency Next Occurrence   C-Reactive Protein Once 09/29/2020   EGD Once 11/27/2020   COLONOSCOPY (Diagnostic) Once 11/27/2020   Pancreatic elastase, fecal Once 11/13/2020 Patient Active Problem List:     Lung nodule     Obesity     Adrenal adenoma     Goiter     Alcohol abuse     Essential hypertension     Enlarged tonsils     ACE inhibitor-aggravated angioedema     JONES (obstructive sleep apnea)     Dyslipidemia     IGT (impaired glucose tolerance)     Acute alcoholic intoxication without complication (HCC)     Acute renal insufficiency     Effusion of bursa of right knee     Acute cystitis without hematuria

## 2021-01-19 NOTE — TELEPHONE ENCOUNTER
Request for lipitor and atenolol. Next Visit Date:  Future Appointments   Date Time Provider Anthony Marguerite   1/20/2021  1:20 PM Gissell Harkins MD [facility] IM [facility]   1/25/2021 12:45 PM Dashawn Cuevas DPM [facility] Podiatry [facility]   2/1/2021  2:00 PM Anastacio Robbins MD [address] Maintenance   Topic Date Due    Hepatitis B vaccine (1 of 3 - Risk 3-dose series) 01/22/1985    Shingles Vaccine (1 of 2) 07/17/2021 (Originally 1/22/2016)    Lipid screen  07/17/2021    A1C test (Diabetic or Prediabetic)  10/02/2021    Potassium monitoring  12/07/2021    Creatinine monitoring  12/07/2021    Breast cancer screen  10/10/2022    DTaP/Tdap/Td vaccine (3 - Td) 04/14/2027    Colon cancer screen colonoscopy  12/03/2030    Flu vaccine  Completed    Pneumococcal 0-64 years Vaccine  Completed    Hepatitis C screen  Completed    HIV screen  Completed    Hepatitis A vaccine  Aged Out    Hib vaccine  Aged Out    Meningococcal (ACWY) vaccine  Aged Out       Hemoglobin A1C (%)   Date Value   10/02/2020 6.2 (H)   06/30/2020 6.3 (H)   06/12/2019 5.8             ( goal A1C is < 7)   Microalb/Crt.  Ratio (mcg/mg creat)   Date Value   06/12/2019 147 (H)     LDL Cholesterol (mg/dL)   Date Value   07/17/2020 187 (H)       (goal LDL is <100)   AST (U/L)   Date Value   10/02/2020 20     ALT (U/L)   Date Value   10/02/2020 18     BUN (mg/dL)   Date Value   10/02/2020 7     BP Readings from Last 3 Encounters:   01/11/21 136/86   01/04/21 (!) 139/94   12/28/20 (!) 151/96          (goal 120/80)    All Future Testing planned in [facility]  Lab Frequency Next Occurrence   C-Reactive Protein Once 09/29/2020   EGD Once 11/27/2020   COLONOSCOPY (Diagnostic) Once 11/27/2020   Pancreatic elastase, fecal Once 11/13/2020         Patient Active Problem List:     Lung nodule     Obesity     Adrenal adenoma     Goiter     Alcohol abuse     Essential hypertension     Enlarged tonsils     ACE inhibitor-aggravated angioedema

## 2021-01-20 ENCOUNTER — HOSPITAL ENCOUNTER (OUTPATIENT)
Age: 56
Setting detail: SPECIMEN
Discharge: HOME | End: 2021-01-20
Payer: MEDICAID

## 2021-01-20 ENCOUNTER — OFFICE VISIT (OUTPATIENT)
Dept: INTERNAL MEDICINE | Age: 56
End: 2021-01-20
Payer: MEDICAID

## 2021-01-20 VITALS
HEIGHT: 64 IN | WEIGHT: 168 LBS | DIASTOLIC BLOOD PRESSURE: 85 MMHG | SYSTOLIC BLOOD PRESSURE: 126 MMHG | HEART RATE: 80 BPM | BODY MASS INDEX: 28.68 KG/M2 | TEMPERATURE: 96.8 F

## 2021-01-20 DIAGNOSIS — Z13.1 SCREENING FOR DIABETES MELLITUS (DM): ICD-10-CM

## 2021-01-20 DIAGNOSIS — I10 ESSENTIAL HYPERTENSION: Primary | ICD-10-CM

## 2021-01-20 DIAGNOSIS — F10.20 UNCOMPLICATED ALCOHOL DEPENDENCE (HCC): Chronic | ICD-10-CM

## 2021-01-20 DIAGNOSIS — I10 ESSENTIAL HYPERTENSION: ICD-10-CM

## 2021-01-20 LAB
ANION GAP SERPL CALCULATED.3IONS-SCNC: 15 MMOL/L (ref 9–17)
BUN BLDV-MCNC: 6 MG/DL (ref 6–20)
BUN/CREAT BLD: ABNORMAL (ref 9–20)
CALCIUM SERPL-MCNC: 9.6 MG/DL (ref 8.6–10.4)
CHLORIDE BLD-SCNC: 99 MMOL/L (ref 98–107)
CO2: 20 MMOL/L (ref 20–31)
CREAT SERPL-MCNC: 0.84 MG/DL (ref 0.5–0.9)
ESTIMATED AVERAGE GLUCOSE: 123 MG/DL
GFR AFRICAN AMERICAN: >60 ML/MIN
GFR NON-AFRICAN AMERICAN: >60 ML/MIN
GFR SERPL CREATININE-BSD FRML MDRD: ABNORMAL ML/MIN/{1.73_M2}
GFR SERPL CREATININE-BSD FRML MDRD: ABNORMAL ML/MIN/{1.73_M2}
GLUCOSE BLD-MCNC: 86 MG/DL (ref 70–99)
HBA1C MFR BLD: 5.9 % (ref 4–6)
POTASSIUM SERPL-SCNC: 4.5 MMOL/L (ref 3.7–5.3)
SODIUM BLD-SCNC: 134 MMOL/L (ref 135–144)

## 2021-01-20 PROCEDURE — 1036F TOBACCO NON-USER: CPT | Performed by: INTERNAL MEDICINE

## 2021-01-20 PROCEDURE — G8417 CALC BMI ABV UP PARAM F/U: HCPCS | Performed by: INTERNAL MEDICINE

## 2021-01-20 PROCEDURE — 99214 OFFICE O/P EST MOD 30 MIN: CPT | Performed by: INTERNAL MEDICINE

## 2021-01-20 PROCEDURE — 3017F COLORECTAL CA SCREEN DOC REV: CPT | Performed by: INTERNAL MEDICINE

## 2021-01-20 PROCEDURE — G8427 DOCREV CUR MEDS BY ELIG CLIN: HCPCS | Performed by: INTERNAL MEDICINE

## 2021-01-20 PROCEDURE — G8482 FLU IMMUNIZE ORDER/ADMIN: HCPCS | Performed by: INTERNAL MEDICINE

## 2021-01-20 RX ORDER — ISOSORBIDE MONONITRATE 60 MG/1
TABLET, EXTENDED RELEASE ORAL
COMMUNITY
Start: 2021-01-19 | End: 2021-01-29

## 2021-01-20 RX ORDER — QUETIAPINE FUMARATE 50 MG/1
50 TABLET, FILM COATED ORAL NIGHTLY
COMMUNITY
End: 2021-04-27

## 2021-01-20 RX ORDER — QUETIAPINE FUMARATE 50 MG/1
TABLET, FILM COATED ORAL
COMMUNITY
Start: 2021-01-19 | End: 2021-01-20 | Stop reason: ALTCHOICE

## 2021-01-20 SDOH — ECONOMIC STABILITY: INCOME INSECURITY: HOW HARD IS IT FOR YOU TO PAY FOR THE VERY BASICS LIKE FOOD, HOUSING, MEDICAL CARE, AND HEATING?: NOT HARD AT ALL

## 2021-01-20 SDOH — ECONOMIC STABILITY: FOOD INSECURITY: WITHIN THE PAST 12 MONTHS, YOU WORRIED THAT YOUR FOOD WOULD RUN OUT BEFORE YOU GOT MONEY TO BUY MORE.: NEVER TRUE

## 2021-01-20 SDOH — ECONOMIC STABILITY: FOOD INSECURITY: WITHIN THE PAST 12 MONTHS, THE FOOD YOU BOUGHT JUST DIDN'T LAST AND YOU DIDN'T HAVE MONEY TO GET MORE.: NEVER TRUE

## 2021-01-20 SDOH — ECONOMIC STABILITY: TRANSPORTATION INSECURITY
IN THE PAST 12 MONTHS, HAS LACK OF TRANSPORTATION KEPT YOU FROM MEETINGS, WORK, OR FROM GETTING THINGS NEEDED FOR DAILY LIVING?: YES

## 2021-01-20 ASSESSMENT — PATIENT HEALTH QUESTIONNAIRE - PHQ9
8. MOVING OR SPEAKING SO SLOWLY THAT OTHER PEOPLE COULD HAVE NOTICED. OR THE OPPOSITE, BEING SO FIGETY OR RESTLESS THAT YOU HAVE BEEN MOVING AROUND A LOT MORE THAN USUAL: 0
SUM OF ALL RESPONSES TO PHQ9 QUESTIONS 1 & 2: 6
9. THOUGHTS THAT YOU WOULD BE BETTER OFF DEAD, OR OF HURTING YOURSELF: 0
3. TROUBLE FALLING OR STAYING ASLEEP: 3
SUM OF ALL RESPONSES TO PHQ QUESTIONS 1-9: 9
6. FEELING BAD ABOUT YOURSELF - OR THAT YOU ARE A FAILURE OR HAVE LET YOURSELF OR YOUR FAMILY DOWN: 0

## 2021-01-20 ASSESSMENT — ENCOUNTER SYMPTOMS
RESPIRATORY NEGATIVE: 1
GASTROINTESTINAL NEGATIVE: 1

## 2021-01-20 NOTE — PROGRESS NOTES
[facility]      Date of Patient's Visit: 1/20/2021    Progress note    Patient Care Team:  Daryl Oreilly MD as PCP - General (Internal Medicine)  CECILIA Vazquez CNP as PCP - [facility] Empaneled Provider  Vanessa Azul MD as Consulting Physician (Gastroenterology)      CHIEF COMPLAINT  Chief Complaint   Patient presents with    Results    Health Maintenance     decline Hep B for now       Kristen Bah is a 48 y.o. female who presents for f/u on results for her colonoscopy and endoscopy which showed chronic inactive gastritis. She drinks 6-8 16 oz cans of beer every day . She identifies it as a problem and would like to cut it down     bp well controlled   Medication list does not match epic medication list: she is not on spirinolactone 100 mg , KCL, naproxen , toradol etc   Will remove these from her list         ROS  All other review of systems negative, except for those noted. Review of Systems   Constitutional: Negative. HENT: Negative. Respiratory: Negative. Gastrointestinal: Negative. Genitourinary: Negative. Musculoskeletal: Negative. Psychiatric/Behavioral: Negative.         Past Medical History:   Diagnosis Date    Angioedema 11/17/2017    from lisinopril    Anxiety     Asthma     mild persistent asthma, on home resp medications for control    Bipolar disorder (HCC)     Claustrophobia     Depression     GERD (gastroesophageal reflux disease)     Hypertension     Hypertrophic cardiomyopathy ([address]     Lung nodules     MRSA (methicillin resistant Staphylococcus aureus)     rt hip    Murmur     see cardiac echo result    OA (osteoarthritis)     JONES (obstructive sleep apnea)     Thyroid nodule     Type 2 diabetes mellitus without complication, without long-term current use of insulin ([address] 12/7/2018       Past Surgical History:   Procedure Laterality Date    BUNIONECTOMY Bilateral 12/7/2020  Physical activity     Days per week: None     Minutes per session: None    Stress: None   Relationships    Social connections     Talks on phone: None     Gets together: None     Attends Jainism service: None     Active member of club or organization: None     Attends meetings of clubs or organizations: None     Relationship status: None    Intimate partner violence     Fear of current or ex partner: None     Emotionally abused: None     Physically abused: None     Forced sexual activity: None   Other Topics Concern    None   Social History Narrative    None       Current Outpatient Medications   Medication Sig Dispense Refill    isosorbide mononitrate (IMDUR) 60 MG extended release tablet       loratadine (CLARITIN) 10 MG tablet TAKE 1 CAPSULE BY MOUTH DAILY  30 tablet 3    hydroCHLOROthiazide (HYDRODIURIL) 25 MG tablet TAKE 1 TABLET BY MOUTH DAILY  60 tablet 3    amLODIPine (NORVASC) 10 MG tablet TAKE 1 TABLET BY MOUTH DAILY  60 tablet 3    diclofenac sodium (VOLTAREN) 1 % GEL Apply 4 g topically 2 times daily 150 g 2    acetaminophen (TYLENOL) 500 MG tablet Take 2 tablets by mouth 3 times daily 540 tablet 1    omeprazole (PRILOSEC) 40 MG delayed release capsule Take 1 capsule by mouth daily 30 capsule 3    terbinafine (LAMISIL) 1 % cream Apply topically 2 times daily.  1 Tube 3    spironolactone (ALDACTONE) 100 MG tablet Take 1 tablet by mouth daily 30 tablet 11    folic acid (FOLVITE) 1 MG tablet Take 1 tablet by mouth daily 30 tablet 3    vitamin B-1 100 MG tablet Take 1 tablet by mouth daily 30 tablet 3    atorvastatin (LIPITOR) 20 MG tablet Take 2 tablets by mouth daily 30 tablet 3    ferrous sulfate (FE TABS 325) 325 (65 Fe) MG EC tablet Take 1 tablet by mouth daily (with breakfast) 90 tablet 3    EPINEPHrine (EPIPEN 2-GRICELDA) 0.3 MG/0.3ML SOAJ injection Use as directed for allergic reaction 1 each 2  Calcium Carbonate-Vitamin D (OYSTER SHELL CALCIUM/D) 500-200 MG-UNIT TABS TAKE 1 TAB BY MOUTH ONCE A DAY 30 tablet 3    FLUoxetine (PROZAC) 40 MG capsule Take 40 mg by mouth daily      mometasone (ASMANEX, 120 METERED DOSES,) 220 MCG/INH inhaler Inhale 2 puffs into the lungs daily 1 Inhaler 3    fluticasone (ARNUITY ELLIPTA) 100 MCG/ACT AEPB Inhale 1 puff into the lungs daily (Patient taking differently: Inhale 1 puff into the lungs as needed ) 30 each 5    atenolol (TENORMIN) 50 MG tablet Take 1 tablet by mouth daily 60 tablet 3    albuterol sulfate (PROAIR RESPICLICK) 345 (90 Base) MCG/ACT aerosol powder inhalation Inhale 2 puffs into the lungs every 6 hours as needed for Wheezing or Shortness of Breath 5 Inhaler 3    econazole nitrate 1 % cream Apply topically daily. 85 g 2    lidocaine (XYLOCAINE) 5 % ointment Apply 1 applicator topically 3 times daily as needed for Pain Apply topically as needed.       citalopram (CELEXA) 40 MG tablet Take 40 mg by mouth daily      mirtazapine (REMERON) 45 MG tablet Take 45 mg by mouth nightly      lurasidone (LATUDA) 60 MG TABS tablet Take 60 mg by mouth nightly      Cholecalciferol (VITAMIN D3) 60306 UNITS CAPS Take 1 capsule by mouth Twice a Week      ketorolac (TORADOL) 10 MG tablet Take 1 tablet by mouth every 6 hours as needed for Pain (Patient not taking: Reported on 1/20/2021) 20 tablet 0     Current Facility-Administered Medications   Medication Dose Route Frequency Provider Last Rate Last Admin    lidocaine (XYLOCAINE) 2 % uro-jet   Topical PRN Liborio Varma DPM   Given at 01/04/21 1512     Facility-Administered Medications Ordered in Other Visits   Medication Dose Route Frequency Provider Last Rate Last Admin    lactated ringers infusion   Intravenous Continuous CECILIA Deng - FRANCES   Stopped at 08/24/15 1420       Allergies   Allergen Reactions    Bee Venom Anaphylaxis    Iodine Anaphylaxis and Rash  Lisinopril Swelling and Anaphylaxis     Angioedema    Shellfish-Derived Products Anaphylaxis and Rash     ANGIOEDEMA       PHYSICAL EXAM:   Vital Signs:   /85 (Site: Left Upper Arm, Position: Sitting, Cuff Size: Medium Adult)   Pulse 80   Temp 96.8 °F (36 °C) (Infrared)   Ht 5' 4\" (1.626 m)   Wt 168 lb (76.2 kg)   BMI 28.84 kg/m²     BP Readings from Last 3 Encounters:   01/20/21 126/85   01/11/21 136/86   01/04/21 (!) 139/94        Wt Readings from Last 3 Encounters:   01/20/21 168 lb (76.2 kg)   01/11/21 173 lb (78.5 kg)   01/04/21 173 lb (78.5 kg)       Physical Exam  HENT:      Mouth/Throat:      Mouth: Mucous membranes are moist.   Cardiovascular:      Rate and Rhythm: Normal rate. Pulmonary:      Effort: Pulmonary effort is normal.   Abdominal:      General: Abdomen is flat. Musculoskeletal: Normal range of motion. General: Swelling and deformity present. Comments: Foot swelling and tenderness   Skin:     General: Skin is warm. Neurological:      General: No focal deficit present. Mental Status: She is alert. Psychiatric:         Mood and Affect: Mood normal.         Behavior: Behavior normal.         Body mass index is 28.84 kg/m².     RESULTS    Lab Findings    CBC:   Lab Results   Component Value Date    WBC 9.1 10/02/2020    HGB 10.4 10/02/2020     10/02/2020     BMP:   Lab Results   Component Value Date     10/02/2020    K 3.4 10/02/2020    CL 97 10/02/2020    CO2 23 10/02/2020    BUN 7 10/02/2020    CREATININE 0.69 12/07/2020    CREATININE 0.91 10/02/2020    GLUCOSE 81 10/02/2020    GLUCOSE 88 02/24/2012     HEMOGLOBIN A1C:   Lab Results   Component Value Date    LABA1C 6.2 10/02/2020     MICROALBUMIN URINE:   Lab Results   Component Value Date    MICROALBUR 160 06/12/2019     FASTING LIPID PANEL:   Lab Results   Component Value Date    CHOL 300 (H) 06/12/2019     07/17/2020    TRIG 48 06/12/2019     Lab Results   Component Value Date LDLCHOLESTEROL 187 (H) 07/17/2020     LIVER PROFILE:   Lab Results   Component Value Date    ALT 18 10/02/2020    AST 20 10/02/2020    PROT 8.0 10/02/2020    BILITOT 0.33 10/02/2020    BILIDIR 0.20 09/25/2016    LABALBU 3.8 10/02/2020    LABALBU 4.3 02/24/2012      THYROID FUNCTION:   Lab Results   Component Value Date    TSH 1.67 09/20/2020      URINE ANALYSIS: No results found for: [address]    1. Essential hypertension    - Basic Metabolic Panel; Future    2. Screening for diabetes mellitus (DM)    - Hemoglobin A1C; Future    3. Uncomplicated alcohol dependence ([facility]              Follow up Instructions:    1. No follow-ups on file. 2. Reviewed prior labs and health maintenance. 3. Discussed use, benefit, and side effects of prescribed medications. Barriers to medication compliance addressed. All patient questions answered. Pt voiced understanding.      4. Patient given educational materials - see patient instructions      Gordy Galeazzi, MD FASN  Attending Physician, [address] [place] [address], Internal Medicine Residency Program  [address] [place] [address]  1/20/2021, 2:22 PM

## 2021-01-22 RX ORDER — ATORVASTATIN CALCIUM 20 MG/1
TABLET, FILM COATED ORAL
Qty: 30 TABLET | Refills: 3 | Status: SHIPPED | OUTPATIENT
Start: 2021-01-22 | End: 2021-05-12

## 2021-01-22 RX ORDER — ATENOLOL 50 MG/1
50 TABLET ORAL DAILY
Qty: 60 TABLET | Refills: 3 | Status: SHIPPED | OUTPATIENT
Start: 2021-01-22 | End: 2022-02-24 | Stop reason: SDUPTHER

## 2021-01-22 RX ORDER — OMEPRAZOLE 40 MG/1
40 CAPSULE, DELAYED RELEASE ORAL DAILY
Qty: 30 CAPSULE | Refills: 3 | Status: SHIPPED | OUTPATIENT
Start: 2021-01-22 | End: 2021-04-08 | Stop reason: SDUPTHER

## 2021-01-22 RX ORDER — ISOSORBIDE MONONITRATE 60 MG/1
TABLET, EXTENDED RELEASE ORAL
Qty: 30 TABLET | Refills: 3 | OUTPATIENT
Start: 2021-01-22

## 2021-01-22 RX ORDER — B-COMPLEX WITH VITAMIN C
TABLET ORAL
Qty: 30 TABLET | Refills: 3 | Status: SHIPPED | OUTPATIENT
Start: 2021-01-22 | Stop reason: SDUPTHER

## 2021-01-25 ENCOUNTER — OFFICE VISIT (OUTPATIENT)
Dept: PODIATRY | Age: 56
End: 2021-01-25
Payer: MEDICAID

## 2021-01-25 VITALS
BODY MASS INDEX: 28.84 KG/M2 | TEMPERATURE: 98 F | DIASTOLIC BLOOD PRESSURE: 77 MMHG | WEIGHT: 168 LBS | HEART RATE: 87 BPM | SYSTOLIC BLOOD PRESSURE: 131 MMHG

## 2021-01-25 DIAGNOSIS — M79.671 PAIN IN RIGHT FOOT: ICD-10-CM

## 2021-01-25 DIAGNOSIS — M20.41 HAMMER TOE OF RIGHT FOOT: ICD-10-CM

## 2021-01-25 DIAGNOSIS — M79.672 PAIN IN LEFT FOOT: ICD-10-CM

## 2021-01-25 DIAGNOSIS — T81.30XA WOUND DEHISCENCE: ICD-10-CM

## 2021-01-25 DIAGNOSIS — Z98.890 S/P BUNIONECTOMY: Primary | ICD-10-CM

## 2021-01-25 DIAGNOSIS — R73.02 IGT (IMPAIRED GLUCOSE TOLERANCE): ICD-10-CM

## 2021-01-25 PROCEDURE — 1036F TOBACCO NON-USER: CPT | Performed by: STUDENT IN AN ORGANIZED HEALTH CARE EDUCATION/TRAINING PROGRAM

## 2021-01-25 PROCEDURE — G8482 FLU IMMUNIZE ORDER/ADMIN: HCPCS | Performed by: STUDENT IN AN ORGANIZED HEALTH CARE EDUCATION/TRAINING PROGRAM

## 2021-01-25 PROCEDURE — G8427 DOCREV CUR MEDS BY ELIG CLIN: HCPCS | Performed by: STUDENT IN AN ORGANIZED HEALTH CARE EDUCATION/TRAINING PROGRAM

## 2021-01-25 PROCEDURE — G8417 CALC BMI ABV UP PARAM F/U: HCPCS | Performed by: STUDENT IN AN ORGANIZED HEALTH CARE EDUCATION/TRAINING PROGRAM

## 2021-01-25 PROCEDURE — 3017F COLORECTAL CA SCREEN DOC REV: CPT | Performed by: STUDENT IN AN ORGANIZED HEALTH CARE EDUCATION/TRAINING PROGRAM

## 2021-01-25 PROCEDURE — 99213 OFFICE O/P EST LOW 20 MIN: CPT | Performed by: STUDENT IN AN ORGANIZED HEALTH CARE EDUCATION/TRAINING PROGRAM

## 2021-01-25 PROCEDURE — 99212 OFFICE O/P EST SF 10 MIN: CPT | Performed by: STUDENT IN AN ORGANIZED HEALTH CARE EDUCATION/TRAINING PROGRAM

## 2021-01-25 NOTE — PROGRESS NOTES
[address],  [address]  Tel: [phone]   Fax: [phone]    Subjective     Procedure: Modified Smtih bunionectomy, b/l. PIPJ arthroplasty 2nd digit. DOS: 12/7/2020  POV#: 4    HPI:  This 55 y.o. female presents to clinic today for post op visit. The patient states she had her sutures removed at the last appointment and K-wires removed. She states she had a scab develop over the left foot incision site. She has been using antibiotic ointment to the surgical sires. She continues to complain of pain in both feet, she has been compliant with wearing surgical shoes and Ace wraps to both feet. She does not take any oral medication for pain, but has been using Bio-med cream. She is running low and requesting a refill. Denies any current nausea, vomiting, fever, chills, shortness of breath, chest pain or calf pain. Denies any other pedal complaints    Primary care physician is Lorraine Lopez MD.    ROS:    Constitutional: Denies nausea, vomiting, fever, chills. Neurologic: Denies numbness, tingling, and burning in the feet. Vascular: Denies symptoms of lower extremity claudication. Skin: Denies open wounds. Otherwise negative except as noted in the HPI. Musculoskeletal: Pain to surgical sites, feeling of tightness.     PMH:  Past Medical History:   Diagnosis Date    Angioedema 11/17/2017    from lisinopril    Anxiety     Asthma     mild persistent asthma, on home resp medications for control    Bipolar disorder ([facility])     Claustrophobia     Depression     GERD (gastroesophageal reflux disease)     Hypertension     Hypertrophic cardiomyopathy ([address]     Lung nodules     MRSA (methicillin resistant Staphylococcus aureus)     rt hip    Murmur     see cardiac echo result    OA (osteoarthritis)     JONES (obstructive sleep apnea)     Thyroid nodule  Type 2 diabetes mellitus without complication, without long-term current use of insulin ([facility] 2018       Surgical History:   Past Surgical History:   Procedure Laterality Date    BUNIONECTOMY Bilateral 2020    [facility]  BUNIONECTOMY, [address] performed by Anastacio Friedman DPM at [facility] 28 COLONOSCOPY      bx    COLONOSCOPY N/A 12/3/2020    COLONOSCOPY WITH BIOPSY performed by Eddie Pimentel MD at [address] Bilateral 2020    [place] bunionectomy, right 2nd hammer toe repair     HAMMER TOE SURGERY Right 2020    2ND TOE HAMMER REPAIR performed by Anastacio Friedman DPM at [address] [place] [address] [place]      partial hyst    OTHER SURGICAL HISTORY  2015    MRI under anesthesia    THYROID SURGERY      bilat bx    UPPER GASTROINTESTINAL ENDOSCOPY      UPPER GASTROINTESTINAL ENDOSCOPY N/A 12/3/2020    EGD BIOPSY performed by Eddie Pimentel MD at [facility]       Social History:  Social History     Tobacco Use    Smoking status: Former Smoker     Packs/day: 0.50     Quit date: 1992     Years since quittin.1    Smokeless tobacco: Never Used    Tobacco comment: states quiti in the 1980s   Substance Use Topics    Alcohol use: Not Currently     Alcohol/week: 12.0 standard drinks     Types: 12 Cans of beer per week     Comment: Quit about 5 months ago 20    Drug use: Not Currently     Types: Cocaine     Comment: last use approximately 14 cocaine       Medications:  Prior to Admission medications    Medication Sig Start Date End Date Taking?  Authorizing Provider   Calcium Carbonate-Vitamin D (OYSTER SHELL CALCIUM/D) 500-200 MG-UNIT TABS TAKE 1 TAB BY MOUTH ONCE A DAY  21  Yes Kalyani Downing MD   atorvastatin (LIPITOR) 20 MG tablet TAKE 1 TABLET BY MOUTH DAILY  21  Yes Kalyani Downing MD   atenolol (TENORMIN) 50 MG tablet Take 1 tablet by mouth daily 21  Yes Kalyani Downing MD Cholecalciferol (VITAMIN D3) 41028 UNITS CAPS Take 1 capsule by mouth Twice a Week 8/12/15  Yes Historical Provider, MD   diclofenac sodium (VOLTAREN) 1 % GEL Apply 4 g topically 2 times daily  Patient not taking: Reported on 1/20/2021 1/6/21   Sandrine Tubbs DPM   albuterol sulfate (PROAIR RESPICLICK) 073 (90 Base) MCG/ACT aerosol powder inhalation Inhale 2 puffs into the lungs every 6 hours as needed for Wheezing or Shortness of Breath  Patient not taking: Reported on 1/20/2021 1/15/20   Tootie Taylor MD       Objective     Vitals:    01/25/21 1251   BP: 131/77   Pulse: 87   Temp:        Lab Results   Component Value Date    LABA1C 5.9 01/20/2021       Physical Exam:  General:  Alert and oriented x3. In no acute distress. Patient presents weight bearing to the B/L extremity in surgical shoes. Edema noted surrounding the 1st MPJ b/l. Vascular: Pedal pulses are palpable. Capillary refill time is less than three seconds to all digits. Sensations are intact to light touch. Derm: Incision site to the 2nd digit is well coapted without evidence of dehiscence. Right 2nd nail loose distally. Incision site to the right hallux is well coapted without evidence of dehiscence. Left 1st ray dehiscence centrally, no purulence. Skin appears xerotic, with no open lesions or nodules. Musculoskeletal: Pain on palpation of the 1st ray bilateral. 4/5 muscle strength testing b/l, limited by pain. Patient able to plantarflex and dorsiflex ankle b/l. Patient has no pain to palpation of calf. The calf is soft, supple and nontender without evidence of DVT. Negative Ty's test.  Satisfactory alignment is noted. Assessment   Landon Evans is a 55 y.o. female with     Diagnosis Orders   1. S/P bunionectomy     2. IGT (impaired glucose tolerance)     3. Hammer toe of right foot     4. Pain in right foot     5. Pain in left foot     6.  Wound dehiscence          Plan   · Patient examined and evaluated

## 2021-01-25 NOTE — PROGRESS NOTES
I performed a history and physical examination of the patient and discussed management with the resident. I reviewed the residents note and agree with the documented findings and plan of care. Any areas of disagreement are noted on the chart. I was personally present for the key portions of any procedures. I have documented in the chart those procedures where I was not present during the key portions. I have reviewed the Podiatry Resident progress note. I agree with the chief complaint, past medical history, past surgical history, allergies, medications, social and family history as documented unless otherwise noted below. Documentation of the HPI, Physical Exam and Medical Decision Making performed by medical students or scribes is based on my personal performance of the HPI, PE and MDM. I have personally evaluated this patient and have completed at least one if not all key elements of the E/M (history, physical exam, and MDM). Additional findings are as noted. Estiven Tubbs D.P.M.

## 2021-01-25 NOTE — PROGRESS NOTES
Patient instructed to remove shoes and socks and instructed to sit in exam chair. Current PCP is Alexander Gill MD and date of last visit was 01/20/21. Do you have a follow up visit scheduled?   Yes  If yes, the date is 01/29/21

## 2021-01-26 RX ORDER — GAUZE BANDAGE 3"X4"
SPONGE TOPICAL
Qty: 30 EACH | Refills: 2 | Status: SHIPPED | OUTPATIENT
Start: 2021-01-26 | End: 2021-04-08

## 2021-01-26 RX ORDER — GAUZE BANDAGE 3.4"X129"
BANDAGE TOPICAL
Qty: 10 EACH | Refills: 2 | Status: SHIPPED | OUTPATIENT
Start: 2021-01-26 | End: 2021-04-08

## 2021-01-26 RX ORDER — GAUZE BANDAGE 4" X 4"
BANDAGE TOPICAL
Qty: 60 EACH | Refills: 2 | Status: SHIPPED | OUTPATIENT
Start: 2021-01-26 | End: 2021-04-08

## 2021-01-29 ENCOUNTER — OFFICE VISIT (OUTPATIENT)
Dept: INTERNAL MEDICINE | Age: 56
End: 2021-01-29
Payer: MEDICAID

## 2021-01-29 VITALS
BODY MASS INDEX: 28.68 KG/M2 | SYSTOLIC BLOOD PRESSURE: 85 MMHG | HEART RATE: 86 BPM | WEIGHT: 168 LBS | HEIGHT: 64 IN | TEMPERATURE: 97.1 F | DIASTOLIC BLOOD PRESSURE: 58 MMHG

## 2021-01-29 DIAGNOSIS — Z91.81 HISTORY OF RECENT FALL: ICD-10-CM

## 2021-01-29 DIAGNOSIS — J45.20 MILD INTERMITTENT ASTHMA WITHOUT COMPLICATION: ICD-10-CM

## 2021-01-29 DIAGNOSIS — I10 ESSENTIAL HYPERTENSION: Primary | ICD-10-CM

## 2021-01-29 DIAGNOSIS — B07.9 WART ON THUMB: ICD-10-CM

## 2021-01-29 DIAGNOSIS — K21.9 GASTROESOPHAGEAL REFLUX DISEASE WITHOUT ESOPHAGITIS: ICD-10-CM

## 2021-01-29 DIAGNOSIS — E78.5 DYSLIPIDEMIA: ICD-10-CM

## 2021-01-29 DIAGNOSIS — G47.33 OSA (OBSTRUCTIVE SLEEP APNEA): ICD-10-CM

## 2021-01-29 DIAGNOSIS — Z00.00 HEALTH CARE MAINTENANCE: ICD-10-CM

## 2021-01-29 DIAGNOSIS — R73.03 PRE-DIABETES: ICD-10-CM

## 2021-01-29 DIAGNOSIS — F33.1 MODERATE EPISODE OF RECURRENT MAJOR DEPRESSIVE DISORDER (HCC): ICD-10-CM

## 2021-01-29 PROCEDURE — 3017F COLORECTAL CA SCREEN DOC REV: CPT | Performed by: STUDENT IN AN ORGANIZED HEALTH CARE EDUCATION/TRAINING PROGRAM

## 2021-01-29 PROCEDURE — G8427 DOCREV CUR MEDS BY ELIG CLIN: HCPCS | Performed by: STUDENT IN AN ORGANIZED HEALTH CARE EDUCATION/TRAINING PROGRAM

## 2021-01-29 PROCEDURE — 1036F TOBACCO NON-USER: CPT | Performed by: STUDENT IN AN ORGANIZED HEALTH CARE EDUCATION/TRAINING PROGRAM

## 2021-01-29 PROCEDURE — G8417 CALC BMI ABV UP PARAM F/U: HCPCS | Performed by: STUDENT IN AN ORGANIZED HEALTH CARE EDUCATION/TRAINING PROGRAM

## 2021-01-29 PROCEDURE — G8482 FLU IMMUNIZE ORDER/ADMIN: HCPCS | Performed by: STUDENT IN AN ORGANIZED HEALTH CARE EDUCATION/TRAINING PROGRAM

## 2021-01-29 PROCEDURE — 99213 OFFICE O/P EST LOW 20 MIN: CPT | Performed by: STUDENT IN AN ORGANIZED HEALTH CARE EDUCATION/TRAINING PROGRAM

## 2021-01-29 ASSESSMENT — ENCOUNTER SYMPTOMS
BACK PAIN: 0
EYE DISCHARGE: 0
ABDOMINAL PAIN: 0
ABDOMINAL DISTENTION: 0
RHINORRHEA: 0
COUGH: 0
WHEEZING: 0
SHORTNESS OF BREATH: 0
EYE ITCHING: 0

## 2021-01-29 NOTE — PROGRESS NOTES
[facility] [address] [address]  Dept: [phone]  Dept Fax: [phone]    Office Progress/Follow Up Note  Date ofpatient's visit: 1/29/2021  Patient's Name:  Tami Hui YOB: 1965            Patient Care Team:  Lorraine Lopez MD as PCP - General (Internal Medicine)  CECILIA Warner CNP as PCP - [facility] Empaneled Provider  Gilma Milner MD as Consulting Physician (Gastroenterology)  ================================================================    REASON FOR VISIT/CHIEF COMPLAINT:  Annual Exam (patient concerned with bump on right thumb, ) and Health Maintenance (agrees to hepatits B vaccine, )    HISTORY OF PRESENTING ILLNESS:  History was obtained from: patient. Halima cerna 55 y.o. is here for a routine follow-up visit and to review test results. Patient mentioned that she has a bump in the right thumb. On evaluation hyperkeratotic/wart like lesion seen on Rt thumb. No signs symptoms of surrounding infection. Patient has been following with podiatry regularly and underwent bunionectomy recently for callus on bilateral hallux. Patient has essential hypertension. Current B.p 104/73. HR-76  B.p in last three encounters has been well controlled. On review of bubble pack she takes Norvasc 10 ,atenolol 50 mg, HCTZ 25 mg Aldactone 25 mg and Imdur 60 mg daily. Patient has been taking these medications since long. She mentioned having recent episode of fall yesterday. Patient mentioned that she got out of bed and went to bathroom where she felt dizzy and passed out for few seconds. EMS was called and her systolic blood pressure was found to be in lower 90s to 100. Blood glucose was 116. EKG was unremarkable per patient. Denied any similar previous episode, concomitant racing of heart, seizure-like activity or any other associated symptoms. Will review her home medications and adjust accordingly.   Concern for orthostatic hypotension. Last echo 2014 showed LVEF 65% with mild LVH and mild MR and TR     She is prediabetic HbA1c increased from 5.9. On metformin 500 twice daily per medication list. Advised weight loss and DASH diet. She had history of alcohol abuse and used to drink 3 tall beers every day and was even hospitalized for alcohol intoxication. Currently patient denied any alcohol intake. Was former smoker. Denied any other drug abuse    She also has dyslipidemia. Fasting lipid panel showed cholesterol levels more than 340 . Takes Lipitor 20 mg daily. Last LFTs normal.  No history of thyroid disorder. Low ASCVD score. Takes aspirin per medication list.     Has history of major depression and is on [place]. Follows up with [facility]. Denies any suicidal ideations for now. History of obstructive sleep apnea diagnosed with sleep study done in 2017. Did not use CPAP at home. Order new sleep study. Mild intermittent asthma -controlled, denied any wheezing, shortness of breath, productive cough, fever, chills    Health maintenance  Mammogram 10/20-normal  Colonoscopy 12/13-normal  Due for Pap smear. Due for hepatitis B vaccine.       Patient Active Problem List   Diagnosis    Lung nodule    Obesity    Adrenal adenoma    Goiter    Alcohol abuse    Essential hypertension    Enlarged tonsils    ACE inhibitor-aggravated angioedema    JONES (obstructive sleep apnea)    Dyslipidemia    IGT (impaired glucose tolerance)    Acute alcoholic intoxication without complication (HCC)    Acute renal insufficiency    Effusion of bursa of right knee    Acute cystitis without hematuria       Health Maintenance Due   Topic Date Due    Hepatitis B vaccine (1 of 3 - Risk 3-dose series) 01/22/1985       Allergies   Allergen Reactions    Bee Venom Anaphylaxis    Iodine Anaphylaxis and Rash    Lisinopril Swelling and Anaphylaxis     Angioedema    Shellfish-Derived Products Anaphylaxis lidocaine (XYLOCAINE) 5 % ointment Apply 1 applicator topically 3 times daily as needed for Pain Apply topically as needed.  citalopram (CELEXA) 40 MG tablet Take 40 mg by mouth daily      lurasidone (LATUDA) 60 MG TABS tablet Take 60 mg by mouth nightly      Cholecalciferol (VITAMIN D3) 93474 UNITS CAPS Take 1 capsule by mouth Twice a Week       Current Facility-Administered Medications   Medication Dose Route Frequency Provider Last Rate Last Admin    lidocaine (XYLOCAINE) 2 % uro-jet   Topical PRN Ellis Bhatti DPM   Given at 21 1512     Facility-Administered Medications Ordered in Other Visits   Medication Dose Route Frequency Provider Last Rate Last Admin    lactated ringers infusion   Intravenous Continuous CECILIA Rothman - CRNA   Stopped at 08/24/15 1420       Social History     Tobacco Use    Smoking status: Former Smoker     Packs/day: 0.50     Types: Cigarettes     Quit date: 1992     Years since quittin.1    Smokeless tobacco: Never Used    Tobacco comment: [facility] in the 1980s   Substance Use Topics    Alcohol use: Not Currently     Alcohol/week: 12.0 standard drinks     Types: 12 Cans of beer per week     Comment: Quit about 5 months ago 20    Drug use: Not Currently     Types: Cocaine     Comment: last use approximately 14 cocaine       Family History   Problem Relation Age of Onset    Stroke Mother     Hypertension Father     Cancer Brother         bone    Lung Cancer Maternal Aunt     Cancer Maternal Grandmother         eye     Other Sister         aneurysm    Heart Disease Sister         REVIEW OF SYSTEMS:  Review of Systems   Constitutional: Negative for chills, fatigue and fever. HENT: Negative for congestion, hearing loss and rhinorrhea. Eyes: Negative for discharge and itching. Respiratory: Negative for cough, shortness of breath and wheezing. Cardiovascular: Negative for chest pain, palpitations and leg swelling. Gastrointestinal: Negative for abdominal distention and abdominal pain. Endocrine: Negative for polydipsia and polyphagia. Genitourinary: Negative for difficulty urinating and dysuria. Musculoskeletal: Negative for arthralgias and back pain. Right foot pain. Skin: Negative for rash and wound. Hyperkeratotic lesion/wart right thumb. Neurological: Negative for dizziness, seizures, light-headedness and headaches. Psychiatric/Behavioral: Negative for agitation and behavioral problems. PHYSICAL EXAM:  Vitals:    01/29/21 0814 01/29/21 0902 01/29/21 0903 01/29/21 0904   BP: 104/73 106/70 95/67 (!) 85/58   Site:  Left Upper Arm Left Upper Arm Left Upper Arm   Position:  Supine Sitting Standing   Cuff Size:  Medium Adult Medium Adult Medium Adult   Pulse: 76 81 82 86   Temp: 97.1 °F (36.2 °C)      TempSrc: Infrared      Weight: 168 lb (76.2 kg)      Height: 5' 4\" (1.626 m)        BP Readings from Last 3 Encounters:   01/29/21 (!) 85/58   01/25/21 131/77   01/20/21 126/85        Physical Exam  Constitutional:       Appearance: She is well-developed. HENT:      Head: Normocephalic and atraumatic. Eyes:      Conjunctiva/sclera: Conjunctivae normal.      Pupils: Pupils are equal, round, and reactive to light. Neck:      Musculoskeletal: Normal range of motion and neck supple. Thyroid: No thyromegaly. Vascular: No JVD. Cardiovascular:      Rate and Rhythm: Normal rate and regular rhythm. Heart sounds: Normal heart sounds. No murmur. Pulmonary:      Effort: Pulmonary effort is normal.      Breath sounds: Normal breath sounds. No wheezing. Abdominal:      General: Bowel sounds are normal. There is no distension. Palpations: Abdomen is soft. Tenderness: There is no abdominal tenderness. There is no rebound. Musculoskeletal: Normal range of motion. General: No swelling or tenderness. Comments: Callus on bilateral hallux.   Tenderness present bilaterally worse on right side. Skin:     Comments: Hyperkeratotic lesion/wart in the right thumb   Neurological:      Mental Status: She is alert and oriented to person, place, and time. Cranial Nerves: No cranial nerve deficit. Psychiatric:         Behavior: Behavior normal.           DIAGNOSTIC FINDINGS:  CBC:  Lab Results   Component Value Date    WBC 9.1 10/02/2020    HGB 10.4 10/02/2020     10/02/2020       BMP:    Lab Results   Component Value Date     01/20/2021    K 4.5 01/20/2021    CL 99 01/20/2021    CO2 20 01/20/2021    BUN 6 01/20/2021    CREATININE 0.84 01/20/2021    GLUCOSE 86 01/20/2021    GLUCOSE 88 02/24/2012       HEMOGLOBIN A1C:   Lab Results   Component Value Date    LABA1C 5.9 01/20/2021       FASTING LIPID PANEL:  Lab Results   Component Value Date    CHOL 300 (H) 06/12/2019     07/17/2020    TRIG 48 06/12/2019       ASSESSMENT AND PLAN:  Rashard Hsieh was seen today for established new doctor, surgical consult, breast pain and health maintenance. Diagnoses and all orders for this visit:    -Recent fall-concern for orthostatic hypotension  Will review antihypertensive medications. Essential hypertension-blood pressure well controlled on last encounters  On continue Norvasc 10 mg, atenolol 50 mg, HCTZ 25 mg ,Aldactone 25 mg daily and Imdur 60 per bubble pack. Will decrease HCTZ to 12.5, discontinue Imdur, Aldactone for now. Given blood pressure cuff for home blood pressure monitoring. Hyperkeratotic lesion/wart on right thumb-topical salicylic acid application. Bilateral hallux s/p bunionectomy follows up with podiatry   Dyslipidemia-continue Lipitor 20 mg daily. Mild intermittent asthma without complication  Well-controlled, continue albuterol as needed.     Prediabetes. On metformin for 500 mg twice daily, hemoglobin A1c 5.9     JONES (obstructive sleep apnea)  Needs CPAP titration study.   Order already in.     Moderate episode of recurrent major depressive disorder ([facility] [address]  Follows with psychiatry on Celexa, Latuda,  Mood is stable     Gastroesophageal reflux disease without esophagitis  On Protonix 40 mg daily    Allergic state, sequela  On loratadine    FOLLOW UP AND INSTRUCTIONS:  Return in about 4 weeks (around 2/26/2021), or nurse visit for B.P.    · Delphine received counseling on the following healthy behaviors: nutrition, exercise, medication adherence and tobacco cessation    · Discussed use, benefit, and side effects of prescribed medications. Barriers to medication compliance addressed. All patient questions answered. Pt voiced understanding. · Patient given educational materials - see patient instructions    Freddy Cordoba MD      Department of Internal Medicine  [facility] [place]         1/29/2021, 9:24 AM      This note is created with the assistance of a speech-recognition program. While intending to generate a document that actually reflects the content of thevisit, the document can still have some mistakes which may not have been identified and corrected by editing.   \

## 2021-01-31 RX ORDER — HYDROCHLOROTHIAZIDE 25 MG/1
12.5 TABLET ORAL EVERY MORNING
Qty: 60 TABLET | Refills: 0 | Status: SHIPPED | OUTPATIENT
Start: 2021-01-31 | End: 2021-06-11 | Stop reason: SDUPTHER

## 2021-02-01 ENCOUNTER — OFFICE VISIT (OUTPATIENT)
Dept: PODIATRY | Age: 56
End: 2021-02-01
Payer: MEDICAID

## 2021-02-01 VITALS
BODY MASS INDEX: 28.84 KG/M2 | TEMPERATURE: 97.9 F | DIASTOLIC BLOOD PRESSURE: 81 MMHG | SYSTOLIC BLOOD PRESSURE: 122 MMHG | WEIGHT: 168 LBS | HEART RATE: 78 BPM

## 2021-02-01 DIAGNOSIS — L97.522 ULCER OF FOOT, LEFT, WITH FAT LAYER EXPOSED (HCC): ICD-10-CM

## 2021-02-01 DIAGNOSIS — T81.30XA WOUND DEHISCENCE: Primary | ICD-10-CM

## 2021-02-01 DIAGNOSIS — Z98.890 S/P BILATERAL FOOT SURGERY: ICD-10-CM

## 2021-02-01 PROCEDURE — 11042 DBRDMT SUBQ TIS 1ST 20SQCM/<: CPT | Performed by: STUDENT IN AN ORGANIZED HEALTH CARE EDUCATION/TRAINING PROGRAM

## 2021-02-01 PROCEDURE — 99024 POSTOP FOLLOW-UP VISIT: CPT | Performed by: STUDENT IN AN ORGANIZED HEALTH CARE EDUCATION/TRAINING PROGRAM

## 2021-02-01 NOTE — PROGRESS NOTES
I performed a history and physical examination of the patient and discussed management with the resident. I reviewed the residents note and agree with the documented findings and plan of care. Any areas of disagreement are noted on the chart. I was personally present for the key portions of any procedures. I have documented in the chart those procedures where I was not present during the key portions. I have reviewed the Podiatry Resident progress note. I agree with the chief complaint, past medical history, past surgical history, allergies, medications, social and family history as documented unless otherwise noted below. Documentation of the HPI, Physical Exam and Medical Decision Making performed by medical students or scribes is based on my personal performance of the HPI, PE and MDM. I have personally evaluated this patient and have completed at least one if not all key elements of the E/M (history, physical exam, and MDM). Additional findings are as noted. Fausto Tubbs D.P.M.

## 2021-02-01 NOTE — PROGRESS NOTES
Patient instructed to remove shoes and socks and instructed to sit in exam chair. Current PCP is Radha Richmond MD and date of last visit was 01/29/21. Do you have a follow up visit scheduled?   No  If yes, the date is n/a

## 2021-02-02 NOTE — PROGRESS NOTES
 BUNIONECTOMY Bilateral 2020    SHRUTI  BUNIONECTOMY, ARTHREX performed by Cici Chahal DPM at [place] [address] COLONOSCOPY      bx    COLONOSCOPY N/A 12/3/2020    COLONOSCOPY WITH BIOPSY performed by Frandy Link MD at [address] Bilateral 2020    Shruti bunionectomy, right 2nd hammer toe repair     HAMMER TOE SURGERY Right 2020    2ND TOE HAMMER REPAIR performed by Cici Chahal DPM at [place] [address]      partial hyst    OTHER SURGICAL HISTORY  2015    MRI under anesthesia    THYROID SURGERY      bilat bx    UPPER GASTROINTESTINAL ENDOSCOPY      UPPER GASTROINTESTINAL ENDOSCOPY N/A 12/3/2020    EGD BIOPSY performed by Frandy Link MD at [facility]       Social History:  Social History     Tobacco Use    Smoking status: Former Smoker     Packs/day: 0.50     Types: Cigarettes     Quit date: 1992     Years since quittin.1    Smokeless tobacco: Never Used    Tobacco comment: states quiti in the 1980s   Substance Use Topics    Alcohol use: Not Currently     Alcohol/week: 12.0 standard drinks     Types: 12 Cans of beer per week     Comment: Quit about 5 months ago 20    Drug use: Not Currently     Types: Cocaine     Comment: last use approximately 14 cocaine       Medications:  Prior to Admission medications    Medication Sig Start Date End Date Taking?  Authorizing Provider   hydroCHLOROthiazide (HYDRODIURIL) 25 MG tablet Take 0.5 tablets by mouth every morning 21  Yes Jose Banks MD   Gauze Pads & Dressings (KERLIX GAUZE ROLL MEDIUM) MISC CHANGE DRESSING DAILY 21  Yes Raymond Nava DPM   Gauze Pads & Dressings (GAUZE DRESSING) 4\"X4\" PADS CHANGE WOUND DRESSING DAILY 21  Yes Raymond Nava DPM   Wound Dressings (FIBRACOL) MISC 1 box fibracol 2x2 change wound dressing daily 21  Yes Raymond Nava DPM Calcium Carbonate-Vitamin D (OYSTER SHELL CALCIUM/D) 500-200 MG-UNIT TABS TAKE 1 TAB BY MOUTH ONCE A DAY  1/22/21  Yes Shari Navas MD   atorvastatin (LIPITOR) 20 MG tablet TAKE 1 TABLET BY MOUTH DAILY  1/22/21  Yes Shari Navas MD   atenolol (TENORMIN) 50 MG tablet Take 1 tablet by mouth daily 1/22/21  Yes Shari Navas MD   omeprazole (PRILOSEC) 40 MG delayed release capsule Take 1 capsule by mouth daily 1/22/21  Yes Shari Navas MD   QUEtiapine (SEROQUEL) 50 MG tablet Take 50 mg by mouth nightly   Yes Historical Provider, MD   loratadine (CLARITIN) 10 MG tablet TAKE 1 CAPSULE BY MOUTH DAILY  1/12/21  Yes Shari Navas MD   amLODIPine (NORVASC) 10 MG tablet TAKE 1 TABLET BY MOUTH DAILY  1/8/21  Yes Shari Navas MD   diclofenac sodium (VOLTAREN) 1 % GEL Apply 4 g topically 2 times daily 1/6/21  Yes Raul Tubbs DPM   folic acid (FOLVITE) 1 MG tablet Take 1 tablet by mouth daily 9/24/20  Yes Yelena Duval MD   vitamin B-1 100 MG tablet Take 1 tablet by mouth daily 9/24/20  Yes Yelena Duval MD   EPINEPHrine (EPIPEN 2-GRICELDA) 0.3 MG/0.3ML SOAJ injection Use as directed for allergic reaction 7/17/20  Yes Shari Navas MD   FLUoxetine (PROZAC) 40 MG capsule Take 40 mg by mouth daily 1/24/20  Yes Historical Provider, MD   mometasone (ASMANEX, 120 METERED DOSES,) 220 MCG/INH inhaler Inhale 2 puffs into the lungs daily 1/15/20  Yes Harleen Girard MD   fluticasone (ARNUITY ELLIPTA) 100 MCG/ACT AEPB Inhale 1 puff into the lungs daily 1/15/20  Yes Harleen Girard MD   albuterol sulfate (PROAIR RESPICLICK) 351 (90 Base) MCG/ACT aerosol powder inhalation Inhale 2 puffs into the lungs every 6 hours as needed for Wheezing or Shortness of Breath 1/15/20  Yes Harleen Girard MD   econazole nitrate 1 % cream Apply topically daily.  1/13/20  Yes Corine Osborne DPM lidocaine (XYLOCAINE) 5 % ointment Apply 1 applicator topically 3 times daily as needed for Pain Apply topically as needed. Yes Historical Provider, MD   citalopram (CELEXA) 40 MG tablet Take 40 mg by mouth daily   Yes Historical Provider, MD   lurasidone (LATUDA) 60 MG TABS tablet Take 60 mg by mouth nightly   Yes Historical Provider, MD   Cholecalciferol (VITAMIN D3) 73128 UNITS CAPS Take 1 capsule by mouth Twice a Week 8/12/15  Yes Historical Provider, MD       Objective     Vitals:    02/01/21 1237   BP: 122/81   Pulse: 78   Temp: 97.9 °F (36.6 °C)       Lab Results   Component Value Date    LABA1C 5.9 01/20/2021       Physical Exam:  General:  Alert and oriented x3. In no acute distress. Patient presents weight bearing to the B/L extremity in surgical shoes. Edema noted surrounding the 1st MPJ left foot consistent with post op course. Vascular: Pedal pulses are palpable. Capillary refill time is less than three seconds to all digits. Sensations are intact to light touch. Derm: dehiscence wound noted dorsal L 1st mpj measuring 1.5x2.5x0.3cm. wound base is fibrogranular. No purulence, drainage, erythema, increase in warmth, fluctuance. Neg PTB. Nails x10 trimmed,thickened, intact, no interdigital maceration félix    Musculoskeletal: Pain on palpation of dorsal wound left 1st mpj. 4/5 muscle strength testing b/l, limited by pain. Patient able to plantarflex and dorsiflex ankle b/l. Diminished aROM 1st mpj L worse than R. Patient has no pain to palpation of calf. The calf is soft, supple and nontender without evidence of DVT. Negative Ty's test.  Satisfactory alignment is noted. Assessment   Keo Cordero is a 55 y.o. female with     Diagnosis Orders   1. Wound dehiscence  SD DEBRIDEMENT, SKIN, SUB-Q TISSUE,=<20 SQ CM   2.  S/P bilateral foot surgery  SD DEBRIDEMENT, SKIN, SUB-Q TISSUE,=<20 SQ CM 3. Ulcer of foot, left, with fat layer exposed ([address]  IN DEBRIDEMENT, SKIN, SUB-Q TISSUE,=<20 SQ CM        Plan   · Patient examined and evaluated  · The patient was educated on clinical examination findings, postoperative prognosis and protocol. All questions were answered to patient's apparent satisfaction. · Debrided wound down to and including subq tissues using sterile curette. No anesthesia required. Hemostasis with direct pressure. Pt tolearted procedure, 0/10 post debridement. · Dressed L foot with puracol, dsd, ace.  · Okay for transition to normal shoes R foot. Continue ROM/resistence R hallux exercises. · Instructed patient to continue dressings to left foot daily consisting of Puracol, 4x4, kerlix, ace. Dressing supplies ordered from [facility] in surgical shoes. · Rx [employer] cream for pain, avoid L foot wound  · Discussed with Dr. Reeves Factor   · Patient to RTC in 1 week for reeval of wound  · Please, call the office with any questions or concerns     No orders of the defined types were placed in this encounter.     Orders Placed This Encounter   Procedures    IN DEBRIDEMENT, SKIN, SUB-Q TISSUE,=<20 SQ CM       Loli Perez DPM   Podiatric Medicine & Surgery   2/2/2021 at 9:26 AM

## 2021-02-08 ENCOUNTER — OFFICE VISIT (OUTPATIENT)
Dept: PODIATRY | Age: 56
End: 2021-02-08
Payer: MEDICAID

## 2021-02-08 VITALS
SYSTOLIC BLOOD PRESSURE: 137 MMHG | HEIGHT: 64 IN | DIASTOLIC BLOOD PRESSURE: 77 MMHG | HEART RATE: 85 BPM | WEIGHT: 178 LBS | BODY MASS INDEX: 30.39 KG/M2

## 2021-02-08 DIAGNOSIS — Z98.890 S/P BILATERAL FOOT SURGERY: ICD-10-CM

## 2021-02-08 DIAGNOSIS — T81.30XA WOUND DEHISCENCE: Primary | ICD-10-CM

## 2021-02-08 PROCEDURE — 99024 POSTOP FOLLOW-UP VISIT: CPT | Performed by: STUDENT IN AN ORGANIZED HEALTH CARE EDUCATION/TRAINING PROGRAM

## 2021-02-08 NOTE — PROGRESS NOTES
I performed a history and physical examination of the patient and discussed management with the resident. I reviewed the residents note and agree with the documented findings and plan of care. Any areas of disagreement are noted on the chart. I was personally present for the key portions of any procedures. I have documented in the chart those procedures where I was not present during the key portions. I have reviewed the Podiatry Resident progress note. I agree with the chief complaint, past medical history, past surgical history, allergies, medications, social and family history as documented unless otherwise noted below. Documentation of the HPI, Physical Exam and Medical Decision Making performed by medical students or scribes is based on my personal performance of the HPI, PE and MDM. I have personally evaluated this patient and have completed at least one if not all key elements of the E/M (history, physical exam, and MDM). Additional findings are as noted. Yael Tubbs D.P.M.

## 2021-02-08 NOTE — PROGRESS NOTES
I performed a history and physical examination of the patient and discussed management with the resident. I reviewed the residents note and agree with the documented findings and plan of care. Any areas of disagreement are noted on the chart. I was personally present for the key portions of any procedures. I have documented in the chart those procedures where I was not present during the key portions. I have reviewed the Podiatry Resident progress note. I agree with the chief complaint, past medical history, past surgical history, allergies, medications, social and family history as documented unless otherwise noted below. Documentation of the HPI, Physical Exam and Medical Decision Making performed by medical students or scribes is based on my personal performance of the HPI, PE and MDM. I have personally evaluated this patient and have completed at least one if not all key elements of the E/M (history, physical exam, and MDM). Additional findings are as noted. Pieter Tubbs D.P.M.

## 2021-02-08 NOTE — PROGRESS NOTES
[address],  [address] [place]  Tel: [phone]   Fax: [phone]    Subjective     Procedure: Modified Smith bunionectomy, b/l. PIPJ arthroplasty 2nd digit. DOS: 12/7/2020      HPI:  This 55 y.o. female presents to clinic today for post op visit and surgical wound dehiscence of left foot. Patient has continued to apply daily dressings to L foot and remained in surgical shoes to bilateral feet. Patient was told they could transition to regular shoe on right foot but patient says it's too swollen to allow for a regular shoe. Patient has been performing ROM/strength exercises to bilateral feet as tolerated. Pain controlled to both feet, patient has been applying Biomed cream. Denies any trauma to the foot or new drainage noted. No other pedal complaints at this time. Primary care physician is Lexi Irene MD.    ROS:    Constitutional: Denies nausea, vomiting, fever, chills. Neurologic: Denies numbness, tingling, and burning in the feet. Vascular: Denies symptoms of lower extremity claudication. Skin: Denies open wounds. Otherwise negative except as noted in the HPI. Musculoskeletal: Pain to surgical sites, feeling of tightness.     PMH:  Past Medical History:   Diagnosis Date    Angioedema 11/17/2017    from lisinopril    Anxiety     Asthma     mild persistent asthma, on home resp medications for control    Bipolar disorder (HCC)     Claustrophobia     Depression     GERD (gastroesophageal reflux disease)     Hypertension     Hypertrophic cardiomyopathy ([address]     Lung nodules     MRSA (methicillin resistant Staphylococcus aureus)     rt hip    Murmur     see cardiac echo result    OA (osteoarthritis)     JONES (obstructive sleep apnea)     Thyroid nodule     Type 2 diabetes mellitus without complication, without long-term current use of insulin ([address] 12/7/2018       Surgical History:   Past Surgical History: Procedure Laterality Date    BUNIONECTOMY Bilateral 2020    [facility]  BUNIONECTOMY, [address] performed by Enrrique Bonner DPM at [place] [address] COLONOSCOPY      bx    COLONOSCOPY N/A 12/3/2020    COLONOSCOPY WITH BIOPSY performed by Elena Norman MD at [address] Bilateral 2020    rogelio bunionectomy, right 2nd hammer toe repair     HAMMER TOE SURGERY Right 2020    2ND TOE HAMMER REPAIR performed by Enrrique Bonner DPM at [facility]      partial hyst    OTHER SURGICAL HISTORY  2015    MRI under anesthesia    THYROID SURGERY      bilat bx    UPPER GASTROINTESTINAL ENDOSCOPY      UPPER GASTROINTESTINAL ENDOSCOPY N/A 12/3/2020    EGD BIOPSY performed by Elena Norman MD at [facility]       Social History:  Social History     Tobacco Use    Smoking status: Former Smoker     Packs/day: 0.50     Types: Cigarettes     Quit date: 1992     Years since quittin.1    Smokeless tobacco: Never Used    Tobacco comment: states quiti in the 1980s   Substance Use Topics    Alcohol use: Not Currently     Alcohol/week: 12.0 standard drinks     Types: 12 Cans of beer per week     Comment: Quit about 5 months ago 20    Drug use: Not Currently     Types: Cocaine     Comment: last use approximately 14 cocaine       Medications:  Prior to Admission medications    Medication Sig Start Date End Date Taking?  Authorizing Provider   hydroCHLOROthiazide (HYDRODIURIL) 25 MG tablet Take 0.5 tablets by mouth every morning 21  Yes Jose Burgess MD   Gauze Pads & Dressings (KERLIX GAUZE ROLL MEDIUM) MISC CHANGE DRESSING DAILY 21  Yes Deborah Diaz DPM   Gauze Pads & Dressings (GAUZE DRESSING) 4\"X4\" PADS CHANGE WOUND DRESSING DAILY 21  Yes Deborah Diaz DPM   Wound Dressings (FIBRACOL) MISC 1 box fibracol 2x2 change wound dressing daily 21  Yes Deborah Diaz DPM Calcium Carbonate-Vitamin D (OYSTER SHELL CALCIUM/D) 500-200 MG-UNIT TABS TAKE 1 TAB BY MOUTH ONCE A DAY  1/22/21  Yes Ruben Obregon MD   atorvastatin (LIPITOR) 20 MG tablet TAKE 1 TABLET BY MOUTH DAILY  1/22/21  Yes Ruben Obregon MD   atenolol (TENORMIN) 50 MG tablet Take 1 tablet by mouth daily 1/22/21  Yes Ruben Obregon MD   omeprazole (PRILOSEC) 40 MG delayed release capsule Take 1 capsule by mouth daily 1/22/21  Yes Ruben Obregon MD   QUEtiapine (SEROQUEL) 50 MG tablet Take 50 mg by mouth nightly   Yes Historical Provider, MD   loratadine (CLARITIN) 10 MG tablet TAKE 1 CAPSULE BY MOUTH DAILY  1/12/21  Yes Ruben Obregon MD   amLODIPine (NORVASC) 10 MG tablet TAKE 1 TABLET BY MOUTH DAILY  1/8/21  Yes Ruben Obregon MD   diclofenac sodium (VOLTAREN) 1 % GEL Apply 4 g topically 2 times daily 1/6/21  Yes Malorie Tubbs DPM   folic acid (FOLVITE) 1 MG tablet Take 1 tablet by mouth daily 9/24/20  Yes Kelby Oliva MD   vitamin B-1 100 MG tablet Take 1 tablet by mouth daily 9/24/20  Yes Kelby Oliva MD   EPINEPHrine (EPIPEN 2-GRICELDA) 0.3 MG/0.3ML SOAJ injection Use as directed for allergic reaction 7/17/20  Yes Ruben Obregon MD   FLUoxetine (PROZAC) 40 MG capsule Take 40 mg by mouth daily 1/24/20  Yes Historical Provider, MD   mometasone (ASMANEX, 120 METERED DOSES,) 220 MCG/INH inhaler Inhale 2 puffs into the lungs daily 1/15/20  Yes Emanuel Pacheco MD   fluticasone (ARNUITY ELLIPTA) 100 MCG/ACT AEPB Inhale 1 puff into the lungs daily 1/15/20  Yes Emanuel Pacheco MD   albuterol sulfate (PROAIR RESPICLICK) 543 (90 Base) MCG/ACT aerosol powder inhalation Inhale 2 puffs into the lungs every 6 hours as needed for Wheezing or Shortness of Breath 1/15/20  Yes Emanuel Pacheco MD   econazole nitrate 1 % cream Apply topically daily.  1/13/20  Yes Kayleigh Oiler, BRAIN lidocaine (XYLOCAINE) 5 % ointment Apply 1 applicator topically 3 times daily as needed for Pain Apply topically as needed. Yes Historical Provider, MD   citalopram (CELEXA) 40 MG tablet Take 40 mg by mouth daily   Yes Historical Provider, MD   lurasidone (LATUDA) 60 MG TABS tablet Take 60 mg by mouth nightly   Yes Historical Provider, MD   Cholecalciferol (VITAMIN D3) 19808 UNITS CAPS Take 1 capsule by mouth Twice a Week 8/12/15  Yes Historical Provider, MD       Objective     Vitals:    02/08/21 1303   BP: 137/77   Pulse: 85       Lab Results   Component Value Date    LABA1C 5.9 01/20/2021       Physical Exam:  General:  Alert and oriented x3. In no acute distress. Patient presents weight bearing to the B/L extremity in surgical shoes. Edema noted surrounding the 1st MPJ left foot consistent with post op course. Vascular: Pedal pulses are palpable. Capillary refill time is less than three seconds to all digits. Sensations are intact to light touch. Derm: dehiscence wound noted dorsal L 1st mpj measuring 1.0 x 0.5 x 0.1 cm, base is fibrogranular. No purulence, drainage, erythema, increase in warmth, fluctuance. Neg PTB. Nails x10 trimmed,thickened, intact, no interdigital maceration félix    Musculoskeletal: No pain on palpation of dorsal wound left 1st mpj. 4/5 muscle strength testing b/l, limited by pain. Patient able to plantarflex and dorsiflex ankle b/l. Diminished aROM 1st mpj L worse than R. Patient has no pain to palpation of calf. The calf is soft, supple and nontender without evidence of DVT. Negative Ty's test.  Satisfactory alignment is noted. Assessment   Tami Hui is a 55 y.o. female with     Diagnosis Orders   1. Wound dehiscence     2.  S/P bilateral foot surgery          Plan   · Patient examined and evaluated · The patient was educated on clinical examination findings, postoperative prognosis and protocol. All questions were answered to patient's apparent satisfaction. · Dressing applied to L foot: Bandaid, ACE  · Patient okay to transition to regular shoes on both feet as tolerated. · Patient to do daily dressing change of bandaid and ACE bandage for compression to LLE. · Continue applying Biomed cream PRN for pain  · Educated patient on clinical signs of infection and to present to ER if any such symptoms arise. Patient verbalized understanding.   · Discussed with Dr. Parish Rosario   · Patient to RTC in 2 weeks for reeval of wound  · Please, call the office with any questions or concerns   · Discussed with Dr. Gisel Valencia DPM   Podiatric Medicine & Surgery   2/8/2021 at 2:41 PM

## 2021-02-08 NOTE — PROGRESS NOTES
Patient instructed to remove shoes and socks, instructed to sit in exam chair. Current PCP name is Jose Zuniga MD and date of last visit 01/29/2021.  Do you have a follow up visit scheduled?  no    If yes the date is

## 2021-02-22 ENCOUNTER — OFFICE VISIT (OUTPATIENT)
Dept: PODIATRY | Age: 56
End: 2021-02-22
Payer: MEDICAID

## 2021-02-22 VITALS
WEIGHT: 178 LBS | BODY MASS INDEX: 30.39 KG/M2 | HEIGHT: 64 IN | DIASTOLIC BLOOD PRESSURE: 75 MMHG | HEART RATE: 83 BPM | SYSTOLIC BLOOD PRESSURE: 128 MMHG

## 2021-02-22 DIAGNOSIS — Z98.890 S/P BILATERAL FOOT SURGERY: ICD-10-CM

## 2021-02-22 DIAGNOSIS — B35.3 TINEA PEDIS OF BOTH FEET: Primary | ICD-10-CM

## 2021-02-22 PROCEDURE — 99212 OFFICE O/P EST SF 10 MIN: CPT | Performed by: STUDENT IN AN ORGANIZED HEALTH CARE EDUCATION/TRAINING PROGRAM

## 2021-02-22 PROCEDURE — 99214 OFFICE O/P EST MOD 30 MIN: CPT | Performed by: STUDENT IN AN ORGANIZED HEALTH CARE EDUCATION/TRAINING PROGRAM

## 2021-02-22 RX ORDER — KETOCONAZOLE 20 MG/G
CREAM TOPICAL
Qty: 30 G | Refills: 1 | Status: SHIPPED | OUTPATIENT
Start: 2021-02-22 | End: 2021-04-08

## 2021-02-22 NOTE — PATIENT INSTRUCTIONS
Please obtain new supportive shoes  Should only bend at ball of foot/great toe joint  New balance is a good brand    Limit activity on feet. No washing dishes.

## 2021-02-22 NOTE — PROGRESS NOTES
Patient instructed to remove shoes and socks and instructed to sit in exam chair. Current PCP is Yumiko Morris MD and date of last visit was 1-29-21. Do you have a follow up visit scheduled?   No

## 2021-02-23 NOTE — PROGRESS NOTES
[address],  [place]  Tel: [phone]   Fax: [phone]    Subjective     Procedure: Modified Smith bunionectomy, b/l. PIPJ arthroplasty 2nd digit. DOS: 12/7/2020      HPI:  This 55 y.o. female presents to clinic today for post op visit and surgical wound dehiscence of left foot. Patient has discontinued to apply daily dressings to L foot due to wound fully closing and has transition to normal shoes with minimal issues. Patient has been performing ROM/strength exercises to right foot as tolerated. Pain controlled to both feet, patient has been applying Biomed cream. Denies any trauma to the foot or new drainage noted. No other pedal complaints at this time. No calf tenderness, SOB/CP    Primary care physician is Daryl Oreilly MD.    ROS:    Constitutional: Denies nausea, vomiting, fever, chills. Neurologic: Denies numbness, tingling, and burning in the feet. Vascular: Denies symptoms of lower extremity claudication. Skin: Denies open wounds. Otherwise negative except as noted in the HPI. Musculoskeletal: Pain to surgical sites, feeling of tightness.     PMH:  Past Medical History:   Diagnosis Date    Angioedema 11/17/2017    from lisinopril    Anxiety     Asthma     mild persistent asthma, on home resp medications for control    Bipolar disorder (HCC)     Claustrophobia     Depression     GERD (gastroesophageal reflux disease)     Hypertension     Hypertrophic cardiomyopathy ([address]     Lung nodules     MRSA (methicillin resistant Staphylococcus aureus)     rt hip    Murmur     see cardiac echo result    OA (osteoarthritis)     JONES (obstructive sleep apnea)     Thyroid nodule     Type 2 diabetes mellitus without complication, without long-term current use of insulin ([address] 12/7/2018       Surgical History:   Past Surgical History:   Procedure Laterality Date    BUNIONECTOMY Bilateral 12/7/2020 lidocaine (XYLOCAINE) 5 % ointment Apply 1 applicator topically 3 times daily as needed for Pain Apply topically as needed. Yes Historical Provider, MD   citalopram (CELEXA) 40 MG tablet Take 40 mg by mouth daily   Yes Historical Provider, MD   lurasidone (LATUDA) 60 MG TABS tablet Take 60 mg by mouth nightly   Yes Historical Provider, MD   Cholecalciferol (VITAMIN D3) 21534 UNITS CAPS Take 1 capsule by mouth Twice a Week 8/12/15  Yes Historical Provider, MD       Objective     Vitals:    02/22/21 1247   BP: 128/75   Pulse: 83       Lab Results   Component Value Date    LABA1C 5.9 01/20/2021       Physical Exam:  General:  Alert and oriented x3. In no acute distress. Patient presents weight bearing to the B/L extremity in surgical shoes. No edema noted BLE    Vascular: Pedal pulses are palpable. Capillary refill time is less than three seconds to all digits. Sensations are intact to light touch. Derm: Left first MPJ wound fully epithelialized. No purulence, drainage, erythema, increase in warmth, fluctuance. Neg PTB. Nails x10 trimmed,thickened, intact, no interdigital maceration félix    Musculoskeletal: No pain on palpation of dorsal wound left 1st mpj. 4/5 muscle strength testing b/l, limited by pain. Patient able to plantarflex and dorsiflex ankle b/l. Diminished aROM 1st mpj L worse than R. Patient has no pain to palpation of calf. The calf is soft, supple and nontender without evidence of DVT. Negative Ty's test.  Satisfactory alignment is noted. Assessment   Jamal Chi is a 55 y.o. female with     Diagnosis Orders   1. Tinea pedis of both feet  ketoconazole (NIZORAL) 2 % cream   2. S/P bilateral foot surgery          Plan   · Patient examined and evaluated  · The patient was educated on clinical examination findings, postoperative prognosis and protocol. All questions were answered to patient's apparent satisfaction.     · Dressing applied to L foot: Bandaid, ACE · Patient okay to transition to regular shoes on both feet as tolerated. · Surgical incisions fully healed. Okay for showering. ACE bandage for compression to LLE. · Start performing ROM exercises and first MPJ resistance exercises left foot  · Rx ketoconazole for tinea  · Continue applying Biomed cream PRN for pain  · Educated patient on clinical signs of infection and to present to ER if any such symptoms arise. Patient verbalized understanding.   · Patient to RTC in 1 month for reeval of tinea, return to clinic if wound reopens or signs of infection occur  · Please, call the office with any questions or concerns   · Discussed with Dr. Kole Richard DPM   Podiatric Medicine & Surgery   2/23/2021 at 12:43 PM

## 2021-03-01 NOTE — PROGRESS NOTES
I performed a history and physical examination of the patient and discussed management with the resident. I reviewed the residents note and agree with the documented findings and plan of care. Any areas of disagreement are noted on the chart. I was personally present for the key portions of any procedures. I have documented in the chart those procedures where I was not present during the key portions. I have reviewed the Podiatry Resident progress note. I agree with the chief complaint, past medical history, past surgical history, allergies, medications, social and family history as documented unless otherwise noted below. Documentation of the HPI, Physical Exam and Medical Decision Making performed by medical students or scribes is based on my personal performance of the HPI, PE and MDM. I have personally evaluated this patient and have completed at least one if not all key elements of the E/M (history, physical exam, and MDM). Additional findings are as noted. Katheryn Tubbs D.P.M.

## 2021-03-03 NOTE — PROGRESS NOTES
Attending Physician Statement  I have discussed the care of [address] [place] [address] including pertinent history and exam findings,  with the resident. I have reviewed the key elements of all parts of the encounter with the resident. I agree with the assessment, plan and orders as documented by the resident.   (GE Modifier)    MD HAWA Choudhary  Attending Physician, [address] [place] [address], Internal Medicine Residency Program  [address] [place] [address]  3/3/2021, 11:01 AM

## 2021-03-20 ENCOUNTER — HOSPITAL ENCOUNTER (INPATIENT)
Age: 56
LOS: 10 days | Discharge: HOME | DRG: 755 | End: 2021-03-31
Attending: EMERGENCY MEDICINE | Admitting: INTERNAL MEDICINE
Payer: MEDICAID

## 2021-03-20 ENCOUNTER — APPOINTMENT (OUTPATIENT)
Dept: GENERAL RADIOLOGY | Age: 56
DRG: 755 | End: 2021-03-20
Payer: MEDICAID

## 2021-03-20 DIAGNOSIS — E83.42 HYPOMAGNESEMIA: ICD-10-CM

## 2021-03-20 DIAGNOSIS — I10 HYPERTENSION, UNSPECIFIED TYPE: Primary | ICD-10-CM

## 2021-03-20 DIAGNOSIS — R00.2 PALPITATIONS: ICD-10-CM

## 2021-03-20 DIAGNOSIS — R42 DIZZINESS: ICD-10-CM

## 2021-03-20 DIAGNOSIS — M06.00 SERONEGATIVE RHEUMATOID ARTHRITIS (HCC): ICD-10-CM

## 2021-03-20 LAB
ABSOLUTE EOS #: <0.03 K/UL (ref 0–0.44)
ABSOLUTE IMMATURE GRANULOCYTE: <0.03 K/UL (ref 0–0.3)
ABSOLUTE LYMPH #: 1.29 K/UL (ref 1.1–3.7)
ABSOLUTE MONO #: 0.54 K/UL (ref 0.1–1.2)
ALBUMIN SERPL-MCNC: 4.7 G/DL (ref 3.5–5.2)
ALBUMIN/GLOBULIN RATIO: 1.1 (ref 1–2.5)
ALP BLD-CCNC: 117 U/L (ref 35–104)
ALT SERPL-CCNC: 85 U/L (ref 5–33)
ANION GAP SERPL CALCULATED.3IONS-SCNC: 19 MMOL/L (ref 9–17)
AST SERPL-CCNC: 106 U/L
BASOPHILS # BLD: 1 % (ref 0–2)
BASOPHILS ABSOLUTE: 0.04 K/UL (ref 0–0.2)
BILIRUB SERPL-MCNC: 0.32 MG/DL (ref 0.3–1.2)
BILIRUBIN DIRECT: 0.08 MG/DL
BILIRUBIN, INDIRECT: 0.24 MG/DL (ref 0–1)
BUN BLDV-MCNC: 3 MG/DL (ref 6–20)
BUN/CREAT BLD: ABNORMAL (ref 9–20)
CALCIUM SERPL-MCNC: 9.5 MG/DL (ref 8.6–10.4)
CHLORIDE BLD-SCNC: 94 MMOL/L (ref 98–107)
CO2: 19 MMOL/L (ref 20–31)
CREAT SERPL-MCNC: 0.68 MG/DL (ref 0.5–0.9)
D-DIMER QUANTITATIVE: 1.55 MG/L FEU
DIFFERENTIAL TYPE: ABNORMAL
EOSINOPHILS RELATIVE PERCENT: 0 % (ref 1–4)
GFR AFRICAN AMERICAN: >60 ML/MIN
GFR NON-AFRICAN AMERICAN: >60 ML/MIN
GFR SERPL CREATININE-BSD FRML MDRD: ABNORMAL ML/MIN/{1.73_M2}
GFR SERPL CREATININE-BSD FRML MDRD: ABNORMAL ML/MIN/{1.73_M2}
GLOBULIN: ABNORMAL G/DL (ref 1.5–3.8)
GLUCOSE BLD-MCNC: 134 MG/DL (ref 70–99)
HCG QUALITATIVE: NEGATIVE
HCT VFR BLD CALC: 35.4 % (ref 36.3–47.1)
HEMOGLOBIN: 11.6 G/DL (ref 11.9–15.1)
IMMATURE GRANULOCYTES: 0 %
LIPASE: 23 U/L (ref 13–60)
LYMPHOCYTES # BLD: 36 % (ref 24–43)
MAGNESIUM: 1.3 MG/DL (ref 1.6–2.6)
MCH RBC QN AUTO: 29.7 PG (ref 25.2–33.5)
MCHC RBC AUTO-ENTMCNC: 32.8 G/DL (ref 28.4–34.8)
MCV RBC AUTO: 90.5 FL (ref 82.6–102.9)
MONOCYTES # BLD: 15 % (ref 3–12)
NRBC AUTOMATED: 0 PER 100 WBC
PDW BLD-RTO: 15.9 % (ref 11.8–14.4)
PLATELET # BLD: 230 K/UL (ref 138–453)
PLATELET ESTIMATE: ABNORMAL
PMV BLD AUTO: 9.5 FL (ref 8.1–13.5)
POTASSIUM SERPL-SCNC: 3.7 MMOL/L (ref 3.7–5.3)
RBC # BLD: 3.91 M/UL (ref 3.95–5.11)
RBC # BLD: ABNORMAL 10*6/UL
SEG NEUTROPHILS: 48 % (ref 36–65)
SEGMENTED NEUTROPHILS ABSOLUTE COUNT: 1.7 K/UL (ref 1.5–8.1)
SODIUM BLD-SCNC: 132 MMOL/L (ref 135–144)
TOTAL PROTEIN: 9 G/DL (ref 6.4–8.3)
TROPONIN INTERP: ABNORMAL
TROPONIN T: ABNORMAL NG/ML
TROPONIN, HIGH SENSITIVITY: 20 NG/L (ref 0–14)
TSH SERPL DL<=0.05 MIU/L-ACNC: 1.29 MIU/L (ref 0.3–5)
WBC # BLD: 3.6 K/UL (ref 3.5–11.3)
WBC # BLD: ABNORMAL 10*3/UL

## 2021-03-20 PROCEDURE — 2580000003 HC RX 258: Performed by: STUDENT IN AN ORGANIZED HEALTH CARE EDUCATION/TRAINING PROGRAM

## 2021-03-20 PROCEDURE — 83690 ASSAY OF LIPASE: CPT

## 2021-03-20 PROCEDURE — 71045 X-RAY EXAM CHEST 1 VIEW: CPT

## 2021-03-20 PROCEDURE — 93005 ELECTROCARDIOGRAM TRACING: CPT | Performed by: STUDENT IN AN ORGANIZED HEALTH CARE EDUCATION/TRAINING PROGRAM

## 2021-03-20 PROCEDURE — 96375 TX/PRO/DX INJ NEW DRUG ADDON: CPT

## 2021-03-20 PROCEDURE — 84443 ASSAY THYROID STIM HORMONE: CPT

## 2021-03-20 PROCEDURE — U0002 COVID-19 LAB TEST NON-CDC: HCPCS

## 2021-03-20 PROCEDURE — 6370000000 HC RX 637 (ALT 250 FOR IP): Performed by: STUDENT IN AN ORGANIZED HEALTH CARE EDUCATION/TRAINING PROGRAM

## 2021-03-20 PROCEDURE — 84703 CHORIONIC GONADOTROPIN ASSAY: CPT

## 2021-03-20 PROCEDURE — 80076 HEPATIC FUNCTION PANEL: CPT

## 2021-03-20 PROCEDURE — 85025 COMPLETE CBC W/AUTO DIFF WBC: CPT

## 2021-03-20 PROCEDURE — 85379 FIBRIN DEGRADATION QUANT: CPT

## 2021-03-20 PROCEDURE — 84484 ASSAY OF TROPONIN QUANT: CPT

## 2021-03-20 PROCEDURE — 6360000002 HC RX W HCPCS: Performed by: STUDENT IN AN ORGANIZED HEALTH CARE EDUCATION/TRAINING PROGRAM

## 2021-03-20 PROCEDURE — 99283 EMERGENCY DEPT VISIT LOW MDM: CPT

## 2021-03-20 PROCEDURE — 80143 DRUG ASSAY ACETAMINOPHEN: CPT

## 2021-03-20 PROCEDURE — 96374 THER/PROPH/DIAG INJ IV PUSH: CPT

## 2021-03-20 PROCEDURE — 83735 ASSAY OF MAGNESIUM: CPT

## 2021-03-20 PROCEDURE — 80179 DRUG ASSAY SALICYLATE: CPT

## 2021-03-20 PROCEDURE — G0480 DRUG TEST DEF 1-7 CLASSES: HCPCS

## 2021-03-20 PROCEDURE — 80307 DRUG TEST PRSMV CHEM ANLYZR: CPT

## 2021-03-20 PROCEDURE — 80048 BASIC METABOLIC PNL TOTAL CA: CPT

## 2021-03-20 RX ORDER — ACETAMINOPHEN 500 MG
1000 TABLET ORAL ONCE
Status: COMPLETED | OUTPATIENT
Start: 2021-03-20 | End: 2021-03-20

## 2021-03-20 RX ORDER — ONDANSETRON 2 MG/ML
4 INJECTION INTRAMUSCULAR; INTRAVENOUS ONCE
Status: COMPLETED | OUTPATIENT
Start: 2021-03-20 | End: 2021-03-20

## 2021-03-20 RX ORDER — LORAZEPAM 2 MG/ML
0.5 INJECTION INTRAMUSCULAR ONCE
Status: COMPLETED | OUTPATIENT
Start: 2021-03-20 | End: 2021-03-20

## 2021-03-20 RX ORDER — 0.9 % SODIUM CHLORIDE 0.9 %
1000 INTRAVENOUS SOLUTION INTRAVENOUS ONCE
Status: COMPLETED | OUTPATIENT
Start: 2021-03-20 | End: 2021-03-21

## 2021-03-20 RX ADMIN — SODIUM CHLORIDE 1000 ML: 9 INJECTION, SOLUTION INTRAVENOUS at 23:14

## 2021-03-20 RX ADMIN — ONDANSETRON 4 MG: 2 INJECTION INTRAMUSCULAR; INTRAVENOUS at 23:23

## 2021-03-20 RX ADMIN — LORAZEPAM 0.5 MG: 2 INJECTION INTRAMUSCULAR; INTRAVENOUS at 23:23

## 2021-03-20 RX ADMIN — ACETAMINOPHEN 1000 MG: 500 TABLET ORAL at 23:14

## 2021-03-20 ASSESSMENT — PAIN DESCRIPTION - DESCRIPTORS: DESCRIPTORS: THROBBING

## 2021-03-20 ASSESSMENT — PAIN SCALES - GENERAL
PAINLEVEL_OUTOF10: 8
PAINLEVEL_OUTOF10: 8

## 2021-03-20 ASSESSMENT — PAIN DESCRIPTION - PAIN TYPE: TYPE: ACUTE PAIN

## 2021-03-20 ASSESSMENT — PAIN DESCRIPTION - LOCATION: LOCATION: HEAD

## 2021-03-21 ENCOUNTER — APPOINTMENT (OUTPATIENT)
Dept: NUCLEAR MEDICINE | Age: 56
DRG: 755 | End: 2021-03-21
Payer: MEDICAID

## 2021-03-21 PROBLEM — R00.2 PALPITATIONS: Status: ACTIVE | Noted: 2021-03-21

## 2021-03-21 PROBLEM — F31.9 BIPOLAR DISORDER (HCC): Status: ACTIVE | Noted: 2021-03-21

## 2021-03-21 PROBLEM — J45.20 MILD INTERMITTENT ASTHMA WITHOUT COMPLICATION: Status: ACTIVE | Noted: 2021-03-21

## 2021-03-21 LAB
ACETAMINOPHEN LEVEL: <5 UG/ML (ref 10–30)
ALBUMIN SERPL-MCNC: 4.4 G/DL (ref 3.5–5.2)
ALBUMIN/GLOBULIN RATIO: 1.1 (ref 1–2.5)
ALP BLD-CCNC: 103 U/L (ref 35–104)
ALT SERPL-CCNC: 72 U/L (ref 5–33)
AMMONIA: 28 UMOL/L (ref 11–51)
ANION GAP SERPL CALCULATED.3IONS-SCNC: 16 MMOL/L (ref 9–17)
AST SERPL-CCNC: 85 U/L
BILIRUB SERPL-MCNC: 0.49 MG/DL (ref 0.3–1.2)
BUN BLDV-MCNC: 2 MG/DL (ref 6–20)
BUN/CREAT BLD: ABNORMAL (ref 9–20)
CALCIUM IONIZED: 1.1 MMOL/L (ref 1.13–1.33)
CALCIUM SERPL-MCNC: 9.2 MG/DL (ref 8.6–10.4)
CHLORIDE BLD-SCNC: 97 MMOL/L (ref 98–107)
CHOLESTEROL/HDL RATIO: 2.1
CHOLESTEROL: 248 MG/DL
CO2: 21 MMOL/L (ref 20–31)
CREAT SERPL-MCNC: 0.59 MG/DL (ref 0.5–0.9)
ESTIMATED AVERAGE GLUCOSE: 134 MG/DL
ETHANOL PERCENT: 0.18 %
ETHANOL: 185 MG/DL
GFR AFRICAN AMERICAN: >60 ML/MIN
GFR NON-AFRICAN AMERICAN: >60 ML/MIN
GFR SERPL CREATININE-BSD FRML MDRD: ABNORMAL ML/MIN/{1.73_M2}
GFR SERPL CREATININE-BSD FRML MDRD: ABNORMAL ML/MIN/{1.73_M2}
GLUCOSE BLD-MCNC: 103 MG/DL (ref 65–105)
GLUCOSE BLD-MCNC: 121 MG/DL (ref 65–105)
GLUCOSE BLD-MCNC: 125 MG/DL (ref 65–105)
GLUCOSE BLD-MCNC: 92 MG/DL (ref 65–105)
GLUCOSE BLD-MCNC: 97 MG/DL (ref 70–99)
HBA1C MFR BLD: 6.3 % (ref 4–6)
HCT VFR BLD CALC: 33.3 % (ref 36.3–47.1)
HDLC SERPL-MCNC: 117 MG/DL
HEMOGLOBIN: 11.1 G/DL (ref 11.9–15.1)
LDL CHOLESTEROL: 122 MG/DL (ref 0–130)
MAGNESIUM: 1.7 MG/DL (ref 1.6–2.6)
MCH RBC QN AUTO: 30.1 PG (ref 25.2–33.5)
MCHC RBC AUTO-ENTMCNC: 33.3 G/DL (ref 28.4–34.8)
MCV RBC AUTO: 90.2 FL (ref 82.6–102.9)
NRBC AUTOMATED: 0 PER 100 WBC
PDW BLD-RTO: 15.9 % (ref 11.8–14.4)
PHOSPHORUS: 2.8 MG/DL (ref 2.6–4.5)
PLATELET # BLD: 215 K/UL (ref 138–453)
PMV BLD AUTO: 9.3 FL (ref 8.1–13.5)
POTASSIUM SERPL-SCNC: 3.7 MMOL/L (ref 3.7–5.3)
RBC # BLD: 3.69 M/UL (ref 3.95–5.11)
SALICYLATE LEVEL: <1 MG/DL (ref 3–10)
SARS-COV-2, RAPID: NOT DETECTED
SODIUM BLD-SCNC: 134 MMOL/L (ref 135–144)
SPECIMEN DESCRIPTION: NORMAL
TOTAL PROTEIN: 8.3 G/DL (ref 6.4–8.3)
TOXIC TRICYCLIC SC,BLOOD: NEGATIVE
TRIGL SERPL-MCNC: 47 MG/DL
TROPONIN INTERP: ABNORMAL
TROPONIN INTERP: ABNORMAL
TROPONIN T: ABNORMAL NG/ML
TROPONIN T: ABNORMAL NG/ML
TROPONIN, HIGH SENSITIVITY: 19 NG/L (ref 0–14)
TROPONIN, HIGH SENSITIVITY: 20 NG/L (ref 0–14)
VITAMIN B-12: 475 PG/ML (ref 232–1245)
VLDLC SERPL CALC-MCNC: ABNORMAL MG/DL (ref 1–30)
WBC # BLD: 2.9 K/UL (ref 3.5–11.3)

## 2021-03-21 PROCEDURE — 2580000003 HC RX 258: Performed by: NURSE PRACTITIONER

## 2021-03-21 PROCEDURE — 6370000000 HC RX 637 (ALT 250 FOR IP): Performed by: NURSE PRACTITIONER

## 2021-03-21 PROCEDURE — 6360000002 HC RX W HCPCS: Performed by: STUDENT IN AN ORGANIZED HEALTH CARE EDUCATION/TRAINING PROGRAM

## 2021-03-21 PROCEDURE — 83036 HEMOGLOBIN GLYCOSYLATED A1C: CPT

## 2021-03-21 PROCEDURE — 82947 ASSAY GLUCOSE BLOOD QUANT: CPT

## 2021-03-21 PROCEDURE — 85027 COMPLETE CBC AUTOMATED: CPT

## 2021-03-21 PROCEDURE — 80053 COMPREHEN METABOLIC PANEL: CPT

## 2021-03-21 PROCEDURE — 84100 ASSAY OF PHOSPHORUS: CPT

## 2021-03-21 PROCEDURE — 36415 COLL VENOUS BLD VENIPUNCTURE: CPT

## 2021-03-21 PROCEDURE — 83735 ASSAY OF MAGNESIUM: CPT

## 2021-03-21 PROCEDURE — 80061 LIPID PANEL: CPT

## 2021-03-21 PROCEDURE — 99223 1ST HOSP IP/OBS HIGH 75: CPT | Performed by: INTERNAL MEDICINE

## 2021-03-21 PROCEDURE — 82607 VITAMIN B-12: CPT

## 2021-03-21 PROCEDURE — APPSS45 APP SPLIT SHARED TIME 31-45 MINUTES: Performed by: NURSE PRACTITIONER

## 2021-03-21 PROCEDURE — 84484 ASSAY OF TROPONIN QUANT: CPT

## 2021-03-21 PROCEDURE — 2060000000 HC ICU INTERMEDIATE R&B

## 2021-03-21 PROCEDURE — 80307 DRUG TEST PRSMV CHEM ANLYZR: CPT

## 2021-03-21 PROCEDURE — 6360000002 HC RX W HCPCS: Performed by: NURSE PRACTITIONER

## 2021-03-21 PROCEDURE — 82140 ASSAY OF AMMONIA: CPT

## 2021-03-21 PROCEDURE — 82330 ASSAY OF CALCIUM: CPT

## 2021-03-21 PROCEDURE — 94640 AIRWAY INHALATION TREATMENT: CPT

## 2021-03-21 RX ORDER — ONDANSETRON 2 MG/ML
4 INJECTION INTRAMUSCULAR; INTRAVENOUS EVERY 6 HOURS PRN
Status: DISCONTINUED | OUTPATIENT
Start: 2021-03-21 | End: 2021-03-31 | Stop reason: HOSPADM

## 2021-03-21 RX ORDER — CALCIUM CARBONATE/VITAMIN D3 250-3.125
500 TABLET ORAL DAILY
Status: DISCONTINUED | OUTPATIENT
Start: 2021-03-21 | End: 2021-03-31 | Stop reason: HOSPADM

## 2021-03-21 RX ORDER — ACETAMINOPHEN 650 MG/1
650 SUPPOSITORY RECTAL EVERY 6 HOURS PRN
Status: DISCONTINUED | OUTPATIENT
Start: 2021-03-21 | End: 2021-03-31 | Stop reason: HOSPADM

## 2021-03-21 RX ORDER — SODIUM CHLORIDE 0.9 % (FLUSH) 0.9 %
10 SYRINGE (ML) INJECTION PRN
Status: DISCONTINUED | OUTPATIENT
Start: 2021-03-21 | End: 2021-03-31 | Stop reason: HOSPADM

## 2021-03-21 RX ORDER — ACETAMINOPHEN 325 MG/1
650 TABLET ORAL EVERY 6 HOURS PRN
Status: DISCONTINUED | OUTPATIENT
Start: 2021-03-21 | End: 2021-03-31 | Stop reason: HOSPADM

## 2021-03-21 RX ORDER — QUETIAPINE FUMARATE 25 MG/1
50 TABLET, FILM COATED ORAL NIGHTLY
Status: DISCONTINUED | OUTPATIENT
Start: 2021-03-21 | End: 2021-03-31 | Stop reason: HOSPADM

## 2021-03-21 RX ORDER — LORAZEPAM 2 MG/ML
3 INJECTION INTRAMUSCULAR
Status: DISCONTINUED | OUTPATIENT
Start: 2021-03-21 | End: 2021-03-31 | Stop reason: HOSPADM

## 2021-03-21 RX ORDER — DEXTROSE MONOHYDRATE 50 MG/ML
100 INJECTION, SOLUTION INTRAVENOUS PRN
Status: DISCONTINUED | OUTPATIENT
Start: 2021-03-21 | End: 2021-03-31 | Stop reason: HOSPADM

## 2021-03-21 RX ORDER — PROMETHAZINE HYDROCHLORIDE 12.5 MG/1
12.5 TABLET ORAL EVERY 6 HOURS PRN
Status: DISCONTINUED | OUTPATIENT
Start: 2021-03-21 | End: 2021-03-31 | Stop reason: HOSPADM

## 2021-03-21 RX ORDER — FLUTICASONE PROPIONATE 110 UG/1
1 AEROSOL, METERED RESPIRATORY (INHALATION) 2 TIMES DAILY
Status: DISCONTINUED | OUTPATIENT
Start: 2021-03-21 | End: 2021-03-31 | Stop reason: HOSPADM

## 2021-03-21 RX ORDER — ALBUTEROL SULFATE 2.5 MG/3ML
2.5 SOLUTION RESPIRATORY (INHALATION) EVERY 4 HOURS PRN
Status: DISCONTINUED | OUTPATIENT
Start: 2021-03-21 | End: 2021-03-26

## 2021-03-21 RX ORDER — NITROGLYCERIN 0.4 MG/1
0.4 TABLET SUBLINGUAL EVERY 5 MIN PRN
Status: DISCONTINUED | OUTPATIENT
Start: 2021-03-21 | End: 2021-03-31 | Stop reason: HOSPADM

## 2021-03-21 RX ORDER — CITALOPRAM 20 MG/1
40 TABLET ORAL DAILY
Status: DISCONTINUED | OUTPATIENT
Start: 2021-03-21 | End: 2021-03-22

## 2021-03-21 RX ORDER — LORAZEPAM 2 MG/ML
2 INJECTION INTRAMUSCULAR
Status: DISCONTINUED | OUTPATIENT
Start: 2021-03-21 | End: 2021-03-31 | Stop reason: HOSPADM

## 2021-03-21 RX ORDER — ALBUTEROL SULFATE 2.5 MG/3ML
2.5 SOLUTION RESPIRATORY (INHALATION) EVERY 6 HOURS PRN
Status: DISCONTINUED | OUTPATIENT
Start: 2021-03-21 | End: 2021-03-26

## 2021-03-21 RX ORDER — SODIUM CHLORIDE 9 MG/ML
INJECTION, SOLUTION INTRAVENOUS CONTINUOUS
Status: DISCONTINUED | OUTPATIENT
Start: 2021-03-21 | End: 2021-03-28

## 2021-03-21 RX ORDER — LORAZEPAM 2 MG/1
4 TABLET ORAL
Status: DISCONTINUED | OUTPATIENT
Start: 2021-03-21 | End: 2021-03-31 | Stop reason: HOSPADM

## 2021-03-21 RX ORDER — NICOTINE 21 MG/24HR
1 PATCH, TRANSDERMAL 24 HOURS TRANSDERMAL DAILY
Status: DISCONTINUED | OUTPATIENT
Start: 2021-03-21 | End: 2021-03-31 | Stop reason: HOSPADM

## 2021-03-21 RX ORDER — POTASSIUM CHLORIDE 7.45 MG/ML
10 INJECTION INTRAVENOUS PRN
Status: DISCONTINUED | OUTPATIENT
Start: 2021-03-21 | End: 2021-03-31 | Stop reason: HOSPADM

## 2021-03-21 RX ORDER — MAGNESIUM SULFATE IN WATER 40 MG/ML
2000 INJECTION, SOLUTION INTRAVENOUS ONCE
Status: COMPLETED | OUTPATIENT
Start: 2021-03-21 | End: 2021-03-21

## 2021-03-21 RX ORDER — FOLIC ACID 1 MG/1
1 TABLET ORAL DAILY
Status: DISCONTINUED | OUTPATIENT
Start: 2021-03-21 | End: 2021-03-31 | Stop reason: HOSPADM

## 2021-03-21 RX ORDER — ATORVASTATIN CALCIUM 20 MG/1
20 TABLET, FILM COATED ORAL DAILY
Status: DISCONTINUED | OUTPATIENT
Start: 2021-03-21 | End: 2021-03-31 | Stop reason: HOSPADM

## 2021-03-21 RX ORDER — POTASSIUM CHLORIDE 20 MEQ/1
40 TABLET, EXTENDED RELEASE ORAL PRN
Status: DISCONTINUED | OUTPATIENT
Start: 2021-03-21 | End: 2021-03-31 | Stop reason: HOSPADM

## 2021-03-21 RX ORDER — LORAZEPAM 2 MG/ML
4 INJECTION INTRAMUSCULAR
Status: DISCONTINUED | OUTPATIENT
Start: 2021-03-21 | End: 2021-03-31 | Stop reason: HOSPADM

## 2021-03-21 RX ORDER — LORAZEPAM 0.5 MG/1
1 TABLET ORAL
Status: DISCONTINUED | OUTPATIENT
Start: 2021-03-21 | End: 2021-03-31 | Stop reason: HOSPADM

## 2021-03-21 RX ORDER — AMLODIPINE BESYLATE 10 MG/1
10 TABLET ORAL DAILY
Status: DISCONTINUED | OUTPATIENT
Start: 2021-03-21 | End: 2021-03-31 | Stop reason: HOSPADM

## 2021-03-21 RX ORDER — PANTOPRAZOLE SODIUM 40 MG/1
40 TABLET, DELAYED RELEASE ORAL
Status: DISCONTINUED | OUTPATIENT
Start: 2021-03-21 | End: 2021-03-31 | Stop reason: HOSPADM

## 2021-03-21 RX ORDER — ERGOCALCIFEROL 1.25 MG/1
50000 CAPSULE ORAL
Status: DISCONTINUED | OUTPATIENT
Start: 2021-03-22 | End: 2021-03-31 | Stop reason: HOSPADM

## 2021-03-21 RX ORDER — HYDROCHLOROTHIAZIDE 25 MG/1
12.5 TABLET ORAL EVERY MORNING
Status: DISCONTINUED | OUTPATIENT
Start: 2021-03-21 | End: 2021-03-31 | Stop reason: HOSPADM

## 2021-03-21 RX ORDER — DEXTROSE MONOHYDRATE 25 G/50ML
12.5 INJECTION, SOLUTION INTRAVENOUS PRN
Status: DISCONTINUED | OUTPATIENT
Start: 2021-03-21 | End: 2021-03-31 | Stop reason: HOSPADM

## 2021-03-21 RX ORDER — CETIRIZINE HYDROCHLORIDE 10 MG/1
10 TABLET ORAL DAILY
Status: DISCONTINUED | OUTPATIENT
Start: 2021-03-21 | End: 2021-03-31 | Stop reason: HOSPADM

## 2021-03-21 RX ORDER — LORAZEPAM 2 MG/ML
1 INJECTION INTRAMUSCULAR
Status: DISCONTINUED | OUTPATIENT
Start: 2021-03-21 | End: 2021-03-31 | Stop reason: HOSPADM

## 2021-03-21 RX ORDER — LORAZEPAM 0.5 MG/1
2 TABLET ORAL
Status: DISCONTINUED | OUTPATIENT
Start: 2021-03-21 | End: 2021-03-31 | Stop reason: HOSPADM

## 2021-03-21 RX ORDER — LANOLIN ALCOHOL/MO/W.PET/CERES
100 CREAM (GRAM) TOPICAL DAILY
Status: DISCONTINUED | OUTPATIENT
Start: 2021-03-21 | End: 2021-03-31 | Stop reason: HOSPADM

## 2021-03-21 RX ORDER — SODIUM CHLORIDE 0.9 % (FLUSH) 0.9 %
10 SYRINGE (ML) INJECTION EVERY 12 HOURS SCHEDULED
Status: DISCONTINUED | OUTPATIENT
Start: 2021-03-21 | End: 2021-03-31 | Stop reason: HOSPADM

## 2021-03-21 RX ORDER — NICOTINE POLACRILEX 4 MG
15 LOZENGE BUCCAL PRN
Status: DISCONTINUED | OUTPATIENT
Start: 2021-03-21 | End: 2021-03-31 | Stop reason: HOSPADM

## 2021-03-21 RX ORDER — MAGNESIUM SULFATE 1 G/100ML
1000 INJECTION INTRAVENOUS PRN
Status: DISCONTINUED | OUTPATIENT
Start: 2021-03-21 | End: 2021-03-31 | Stop reason: HOSPADM

## 2021-03-21 RX ADMIN — ONDANSETRON 4 MG: 2 INJECTION INTRAMUSCULAR; INTRAVENOUS at 05:27

## 2021-03-21 RX ADMIN — CETIRIZINE HYDROCHLORIDE 10 MG: 10 TABLET ORAL at 09:14

## 2021-03-21 RX ADMIN — AMLODIPINE BESYLATE 10 MG: 10 TABLET ORAL at 09:14

## 2021-03-21 RX ADMIN — CALCIUM CARBONATE-CHOLECALCIFEROL TAB 250 MG-125 UNIT 500 MG: 250-125 TAB at 09:13

## 2021-03-21 RX ADMIN — FLUTICASONE PROPIONATE 1 PUFF: 110 AEROSOL, METERED RESPIRATORY (INHALATION) at 20:32

## 2021-03-21 RX ADMIN — ONDANSETRON 4 MG: 2 INJECTION INTRAMUSCULAR; INTRAVENOUS at 21:20

## 2021-03-21 RX ADMIN — ENOXAPARIN SODIUM 80 MG: 80 INJECTION, SOLUTION INTRAVENOUS; SUBCUTANEOUS at 09:13

## 2021-03-21 RX ADMIN — LORAZEPAM 2 MG: 0.5 TABLET ORAL at 13:23

## 2021-03-21 RX ADMIN — QUETIAPINE FUMARATE 50 MG: 25 TABLET ORAL at 21:19

## 2021-03-21 RX ADMIN — CITALOPRAM 40 MG: 20 TABLET, FILM COATED ORAL at 09:14

## 2021-03-21 RX ADMIN — ENOXAPARIN SODIUM 80 MG: 80 INJECTION, SOLUTION INTRAVENOUS; SUBCUTANEOUS at 02:36

## 2021-03-21 RX ADMIN — ENOXAPARIN SODIUM 80 MG: 80 INJECTION, SOLUTION INTRAVENOUS; SUBCUTANEOUS at 21:18

## 2021-03-21 RX ADMIN — MAGNESIUM SULFATE 2000 MG: 2 INJECTION INTRAVENOUS at 00:13

## 2021-03-21 RX ADMIN — HYDROCHLOROTHIAZIDE 12.5 MG: 25 TABLET ORAL at 09:17

## 2021-03-21 RX ADMIN — FLUTICASONE PROPIONATE 1 PUFF: 110 AEROSOL, METERED RESPIRATORY (INHALATION) at 08:20

## 2021-03-21 RX ADMIN — ACETAMINOPHEN 650 MG: 325 TABLET ORAL at 09:14

## 2021-03-21 RX ADMIN — LORAZEPAM 2 MG: 2 INJECTION INTRAMUSCULAR at 11:11

## 2021-03-21 RX ADMIN — SODIUM CHLORIDE: 9 INJECTION, SOLUTION INTRAVENOUS at 02:37

## 2021-03-21 RX ADMIN — ACETAMINOPHEN 650 MG: 325 TABLET ORAL at 23:34

## 2021-03-21 RX ADMIN — LORAZEPAM 2 MG: 0.5 TABLET ORAL at 21:19

## 2021-03-21 RX ADMIN — FOLIC ACID 1 MG: 1 TABLET ORAL at 09:14

## 2021-03-21 RX ADMIN — SODIUM CHLORIDE, PRESERVATIVE FREE 10 ML: 5 INJECTION INTRAVENOUS at 21:19

## 2021-03-21 RX ADMIN — ATORVASTATIN CALCIUM 20 MG: 20 TABLET, FILM COATED ORAL at 09:17

## 2021-03-21 RX ADMIN — Medication 100 MG: at 09:14

## 2021-03-21 ASSESSMENT — ENCOUNTER SYMPTOMS
GASTROINTESTINAL NEGATIVE: 1
SHORTNESS OF BREATH: 1
WHEEZING: 0
CHEST TIGHTNESS: 1
ABDOMINAL PAIN: 0
BACK PAIN: 0
NAUSEA: 0
VOMITING: 0
COUGH: 0
WHEEZING: 1
EYES NEGATIVE: 1

## 2021-03-21 ASSESSMENT — PAIN SCALES - GENERAL
PAINLEVEL_OUTOF10: 10
PAINLEVEL_OUTOF10: 3
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
## 2021-03-21 NOTE — ED PROVIDER NOTES
[address]     Emergency Department     Faculty Attestation    I performed a history and physical examination of the patient and discussed management with the resident. I have reviewed and agree with the residents findings including all diagnostic interpretations, and treatment plans as written. Any areas of disagreement are noted on the chart. I was personally present for the key portions of any procedures. I have documented in the chart those procedures where I was not present during the key portions. I have reviewed the emergency nurses triage note. I agree with the chief complaint, past medical history, past surgical history, allergies, medications, social and family history as documented unless otherwise noted below. Documentation of the HPI, Physical Exam and Medical Decision Making performed by lazarus is based on my personal performance of the HPI, PE and MDM. For Physician Assistant/ Nurse Practitioner cases/documentation I have personally evaluated this patient and have completed at least one if not all key elements of the E/M (history, physical exam, and MDM). Additional findings are as noted. 54 yo F c/o htn, out of meds, c/o sob, palpitations, no cp, no fever,   pe Cele RN escort for exam, hypertensive, gcs 15, neck supple, abdomen non tender, no distension, no rigidity, no guard, no calf tenderness, no calf swelling,     Admitting for further eval, cannot perform ct d/t allergy, > lovenox    EKG Interpretation    Interpreted by me  Sinus tach, heart rate 108, no ischemia, left axis, LVH, QT corrected 482  Paired with EKG September 2020, stable,    CRITICAL CARE: There was a high probability of clinically significant/life threatening deterioration in this patient's condition which required my urgent intervention. Total critical care time was 5 minutes. This excludes any time for separately reportable procedures.        Nupur Singh DO     Theron Marmolejo, DO  03/20/21 [facility], DO  03/21/21 9575 Nava , DO  03/21/21 6675

## 2021-03-21 NOTE — ED NOTES
ED to inpatient nurses report    Chief Complaint   Patient presents with    Palpitations    Hypertension      [facility] ED from [facility]  LOC: alert and orientated to name, place, date  Vital signs   Vitals:    03/21/21 0001 03/21/21 0031 03/21/21 0116 03/21/21 0231   BP: (!) 169/99 (!) 145/99 (!) 157/95 (!) 140/96   Pulse: 98 94 92 91   Resp:       Temp:       TempSrc:       SpO2: 94% 96% 95% 94%   Weight:       Height:          Oxygen Baseline     Current needs required   LDAs:   Peripheral IV 03/20/21 Right Antecubital (Active)   Site Assessment Clean;Dry; Intact 03/20/21 2310   Line Status Brisk blood return;Flushed;Specimen collected;Normal saline locked 03/20/21 2310   Dressing Status Clean;Dry; Intact 03/20/21 2310   Dressing Intervention New 03/20/21 2310     Mobility: Independent  Pending ED orders: none  Present condition:   Code Status: Full   Consults:  [x]  Hospitalist              Completed  [x] yes [] no  []  Medicine  Completed  [] yes [] No  []  Cardiology  Completed  [] yes [] No  []  GI   Completed  [] yes [] No  []  Neurology  Completed  [] yes [] No  []  Nephrology Completed  [] yes [] No  []  Vascular  Completed  [] yes [] No   []  Surgery  Completed  [] yes [] No   []  Urology  Completed  [] yes [] No   []  Plastics  Completed  [] yes [] No   []  ENT  Completed  [] yes [] No   []  Other   Completed  [] yes [] No  Pertinent event(s)   Pertinent event(s)   Electronically signed by Mera Brandon RN on 3/21/2021 at 4:26 AM       Priya Gautam RN  03/21/21 5909

## 2021-03-21 NOTE — PROGRESS NOTES
cm)  · Current Body Weight: 172 lb 6.4 oz (78.2 kg)   · Usual Body Weight: (~168 lbs per EHR)     · Ideal Body Weight: 130 lbs; % Ideal Body Weight 132.6 %   · BMI: 27.8  · BMI Categories: Overweight (BMI 25.0-29. 9)       Nutrition Diagnosis:   · Predicted inadequate energy intake related to psychological cause or life stress as evidenced by poor intake prior to admission(per pt report)      Nutrition Interventions:   Food and/or Nutrient Delivery:  Start Oral Nutrition Supplement, Continue Current Diet  Nutrition Education/Counseling:  No recommendation at this time   Coordination of Nutrition Care:  Continue to monitor while inpatient    Goals:  Meet greater than 50% of estimated nutrient needs       Nutrition Monitoring and Evaluation:   Behavioral-Environmental Outcomes:  None Identified   Food/Nutrient Intake Outcomes:  Food and Nutrient Intake, Supplement Intake  Physical Signs/Symptoms Outcomes:  Weight, Biochemical Data, Nutrition Focused Physical Findings     Discharge Planning:     Too soon to determine     Electronically signed by Elvi Lancaster MS, RD, LD on 3/21/21 at 11:49 AM EDT    Contact: [phone]

## 2021-03-21 NOTE — PLAN OF CARE
Nutrition Problem #1: Predicted inadequate energy intake  Intervention: Food and/or Nutrient Delivery: Start Oral Nutrition Supplement, Continue Current Diet  Nutritional Goals: Meet greater than 50% of estimated nutrient needs

## 2021-03-21 NOTE — ED NOTES
Pt came to ED with c/o SOB, palpitations, and HA. Pt reported she has hx of HTN and has not been taking her medications. Pt stated she has had decreased appetite for the last month along with nausea. Pt stated she has had a lot of stressful situations in her life recently. Pt reports she has been drinking a 6 pack of beer daily. Pt was sent my nurse at half-way for HTN. Pt SBP in 190's PTA.       Heather Jones, RN  03/21/21 1316

## 2021-03-21 NOTE — CARE COORDINATION
Case Management Initial Discharge Plan  Delphine Flanagan,             Met with:patient to discuss discharge plans. Information verified: address, contacts, phone number, , insurance Yes    Emergency Contact/Next of Kin name & number: josue patel     PCP: Miles Schwartz MD  Date of last visit:     Insurance Provider: [employer]     Discharge Planning    Living Arrangements:    from [facility]          Patient able to perform ADL's:Independent        Receiving oral anticoagulation therapy? No       Evaluation: no    Expected Discharge date:   3/22    Patient expects to be discharged to:   [facility]    Transportation provider: noemy   Transportation arrangements needed for discharge: No    Readmission Risk              Risk of Unplanned Readmission:        24             Does patient have a readmission risk score greater than 14?: Yes  If yes, follow-up appointment must be made within 7 days of discharge.      Goals of Care: return to adl without palpitations & chest pressure      Discharge Plan: return to [facility]           Electronically signed by Araceli Crocker RN on 3/21/21 at 9:12 AM EDT

## 2021-03-21 NOTE — FLOWSHEET NOTE
Assessment.  responded from a referral from the nurse about distress and also a spiritual care consult for Advance Care directives. Upon entering, the  was told that he was not allowed to visit the patient by an officer. He said that I would have to obtain permission from the half-way in order to see her. The  attempted to leave, but then the officer repented and allowed the  to stay as long as he didn't \"get too close or hand her anything. \" Gurinder Blanco sat and spoke with Vijay Negrete. She appears to be in serious spiritual distress. She shared her story with the .  sat with her in her grief and tears. Because the  was not able to hand her anything, he did not feel as if he could fill out the Advance Care directives. Meeting was interrupted by medical rounding.  exited without having mentioned Advance Care directives. Intervention.  gave Delphine's burden to God through prayer. He used active listening and pastoral presence. Outcome. Connection to God. Community. Comfort. Plan. Chaplains will remain available to offer spiritual and emotional support as needed. 03/21/21 0955   Encounter Summary   Services provided to: Patient   Referral/Consult From: Nurse   Support System Family members   Continue Visiting   (3/21/2021)   Complexity of Encounter High   Length of Encounter 45 minutes   Spiritual Assessment Completed Yes   Spiritual/Christianity   Type Spiritual struggle   Assessment Tearful; Approachable   Intervention Prayer; Active listening;Explored feelings, thoughts, concerns;Sustaining presence/ Ministry of presence   Outcome Connection/belonging;Comfort;Expressed gratitude; Tearful
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
## 2021-03-22 ENCOUNTER — APPOINTMENT (OUTPATIENT)
Dept: NUCLEAR MEDICINE | Age: 56
DRG: 755 | End: 2021-03-22
Payer: MEDICAID

## 2021-03-22 ENCOUNTER — APPOINTMENT (OUTPATIENT)
Dept: GENERAL RADIOLOGY | Age: 56
DRG: 755 | End: 2021-03-22
Payer: MEDICAID

## 2021-03-22 LAB
EKG ATRIAL RATE: 108 BPM
EKG P AXIS: 54 DEGREES
EKG P-R INTERVAL: 174 MS
EKG Q-T INTERVAL: 360 MS
EKG QRS DURATION: 90 MS
EKG QTC CALCULATION (BAZETT): 482 MS
EKG R AXIS: 0 DEGREES
EKG T AXIS: 83 DEGREES
EKG VENTRICULAR RATE: 108 BPM
GLUCOSE BLD-MCNC: 120 MG/DL (ref 65–105)
GLUCOSE BLD-MCNC: 125 MG/DL (ref 65–105)
GLUCOSE BLD-MCNC: 172 MG/DL (ref 65–105)
GLUCOSE BLD-MCNC: 193 MG/DL (ref 65–105)
TOTAL CK: 179 U/L (ref 26–192)

## 2021-03-22 PROCEDURE — 6370000000 HC RX 637 (ALT 250 FOR IP): Performed by: PSYCHIATRY & NEUROLOGY

## 2021-03-22 PROCEDURE — 36415 COLL VENOUS BLD VENIPUNCTURE: CPT

## 2021-03-22 PROCEDURE — 2580000003 HC RX 258: Performed by: NURSE PRACTITIONER

## 2021-03-22 PROCEDURE — 82550 ASSAY OF CK (CPK): CPT

## 2021-03-22 PROCEDURE — 78582 LUNG VENTILAT&PERFUS IMAGING: CPT

## 2021-03-22 PROCEDURE — 6370000000 HC RX 637 (ALT 250 FOR IP): Performed by: NURSE PRACTITIONER

## 2021-03-22 PROCEDURE — 99233 SBSQ HOSP IP/OBS HIGH 50: CPT | Performed by: INTERNAL MEDICINE

## 2021-03-22 PROCEDURE — 6360000002 HC RX W HCPCS: Performed by: NURSE PRACTITIONER

## 2021-03-22 PROCEDURE — 94761 N-INVAS EAR/PLS OXIMETRY MLT: CPT

## 2021-03-22 PROCEDURE — 93010 ELECTROCARDIOGRAM REPORT: CPT | Performed by: INTERNAL MEDICINE

## 2021-03-22 PROCEDURE — A9540 TC99M MAA: HCPCS | Performed by: NURSE PRACTITIONER

## 2021-03-22 PROCEDURE — A9538 TC99M PYROPHOSPHATE: HCPCS | Performed by: NURSE PRACTITIONER

## 2021-03-22 PROCEDURE — 2060000000 HC ICU INTERMEDIATE R&B

## 2021-03-22 PROCEDURE — 94640 AIRWAY INHALATION TREATMENT: CPT

## 2021-03-22 PROCEDURE — 71045 X-RAY EXAM CHEST 1 VIEW: CPT

## 2021-03-22 PROCEDURE — 90792 PSYCH DIAG EVAL W/MED SRVCS: CPT | Performed by: PSYCHIATRY & NEUROLOGY

## 2021-03-22 PROCEDURE — 82947 ASSAY GLUCOSE BLOOD QUANT: CPT

## 2021-03-22 PROCEDURE — 3430000000 HC RX DIAGNOSTIC RADIOPHARMACEUTICAL: Performed by: NURSE PRACTITIONER

## 2021-03-22 RX ORDER — SERTRALINE HYDROCHLORIDE 25 MG/1
25 TABLET, FILM COATED ORAL DAILY
Status: DISCONTINUED | OUTPATIENT
Start: 2021-03-22 | End: 2021-03-31 | Stop reason: HOSPADM

## 2021-03-22 RX ORDER — 0.9 % SODIUM CHLORIDE 0.9 %
500 INTRAVENOUS SOLUTION INTRAVENOUS ONCE
Status: COMPLETED | OUTPATIENT
Start: 2021-03-22 | End: 2021-03-23

## 2021-03-22 RX ORDER — HYDROCODONE BITARTRATE AND ACETAMINOPHEN 5; 325 MG/1; MG/1
1 TABLET ORAL ONCE
Status: COMPLETED | OUTPATIENT
Start: 2021-03-22 | End: 2021-03-22

## 2021-03-22 RX ORDER — CITALOPRAM 20 MG/1
20 TABLET ORAL DAILY
Status: DISCONTINUED | OUTPATIENT
Start: 2021-03-23 | End: 2021-03-31 | Stop reason: HOSPADM

## 2021-03-22 RX ADMIN — FLUTICASONE PROPIONATE 1 PUFF: 110 AEROSOL, METERED RESPIRATORY (INHALATION) at 08:57

## 2021-03-22 RX ADMIN — HYDROCODONE BITARTRATE AND ACETAMINOPHEN 1 TABLET: 5; 325 TABLET ORAL at 03:22

## 2021-03-22 RX ADMIN — ASPIRIN 325 MG: 325 TABLET, COATED ORAL at 09:05

## 2021-03-22 RX ADMIN — CITALOPRAM 40 MG: 20 TABLET, FILM COATED ORAL at 09:05

## 2021-03-22 RX ADMIN — LORAZEPAM 1 MG: 2 INJECTION INTRAMUSCULAR; INTRAVENOUS at 18:02

## 2021-03-22 RX ADMIN — QUETIAPINE FUMARATE 50 MG: 25 TABLET ORAL at 20:18

## 2021-03-22 RX ADMIN — INSULIN LISPRO 1 UNITS: 100 INJECTION, SOLUTION INTRAVENOUS; SUBCUTANEOUS at 15:28

## 2021-03-22 RX ADMIN — ACETAMINOPHEN 650 MG: 325 TABLET ORAL at 09:08

## 2021-03-22 RX ADMIN — CALCIUM CARBONATE-CHOLECALCIFEROL TAB 250 MG-125 UNIT 500 MG: 250-125 TAB at 09:06

## 2021-03-22 RX ADMIN — SODIUM CHLORIDE 500 ML: 0.9 INJECTION, SOLUTION INTRAVENOUS at 23:13

## 2021-03-22 RX ADMIN — LORAZEPAM 2 MG: 2 INJECTION INTRAMUSCULAR at 20:19

## 2021-03-22 RX ADMIN — LORAZEPAM 1 MG: 2 INJECTION INTRAMUSCULAR; INTRAVENOUS at 09:01

## 2021-03-22 RX ADMIN — AMLODIPINE BESYLATE 10 MG: 10 TABLET ORAL at 09:05

## 2021-03-22 RX ADMIN — ONDANSETRON 4 MG: 2 INJECTION INTRAMUSCULAR; INTRAVENOUS at 22:40

## 2021-03-22 RX ADMIN — ONDANSETRON 4 MG: 2 INJECTION INTRAMUSCULAR; INTRAVENOUS at 19:05

## 2021-03-22 RX ADMIN — ATORVASTATIN CALCIUM 20 MG: 20 TABLET, FILM COATED ORAL at 09:06

## 2021-03-22 RX ADMIN — LORAZEPAM 4 MG: 2 INJECTION INTRAMUSCULAR; INTRAVENOUS at 22:39

## 2021-03-22 RX ADMIN — SODIUM CHLORIDE, PRESERVATIVE FREE 10 ML: 5 INJECTION INTRAVENOUS at 09:08

## 2021-03-22 RX ADMIN — INSULIN LISPRO 1 UNITS: 100 INJECTION, SOLUTION INTRAVENOUS; SUBCUTANEOUS at 09:09

## 2021-03-22 RX ADMIN — LORAZEPAM 2 MG: 2 INJECTION INTRAMUSCULAR at 12:10

## 2021-03-22 RX ADMIN — FOLIC ACID 1 MG: 1 TABLET ORAL at 09:05

## 2021-03-22 RX ADMIN — ACETAMINOPHEN 650 MG: 325 TABLET ORAL at 18:01

## 2021-03-22 RX ADMIN — Medication 45 MILLICURIE: at 13:10

## 2021-03-22 RX ADMIN — ERGOCALCIFEROL 50000 UNITS: 1.25 CAPSULE ORAL at 09:01

## 2021-03-22 RX ADMIN — PANTOPRAZOLE SODIUM 40 MG: 40 TABLET, DELAYED RELEASE ORAL at 06:18

## 2021-03-22 RX ADMIN — ENOXAPARIN SODIUM 80 MG: 80 INJECTION, SOLUTION INTRAVENOUS; SUBCUTANEOUS at 20:18

## 2021-03-22 RX ADMIN — LORAZEPAM 2 MG: 2 INJECTION INTRAMUSCULAR at 19:09

## 2021-03-22 RX ADMIN — CETIRIZINE HYDROCHLORIDE 10 MG: 10 TABLET ORAL at 09:06

## 2021-03-22 RX ADMIN — ENOXAPARIN SODIUM 80 MG: 80 INJECTION, SOLUTION INTRAVENOUS; SUBCUTANEOUS at 09:09

## 2021-03-22 RX ADMIN — Medication 7 MILLICURIE: at 13:36

## 2021-03-22 RX ADMIN — HYDROCHLOROTHIAZIDE 12.5 MG: 25 TABLET ORAL at 09:07

## 2021-03-22 RX ADMIN — Medication 100 MG: at 09:01

## 2021-03-22 RX ADMIN — SODIUM CHLORIDE, PRESERVATIVE FREE 10 ML: 5 INJECTION INTRAVENOUS at 20:19

## 2021-03-22 RX ADMIN — SERTRALINE 25 MG: 25 TABLET, FILM COATED ORAL at 15:28

## 2021-03-22 RX ADMIN — LORAZEPAM 2 MG: 2 INJECTION INTRAMUSCULAR at 03:23

## 2021-03-22 ASSESSMENT — PAIN SCALES - GENERAL
PAINLEVEL_OUTOF10: 4
PAINLEVEL_OUTOF10: 7
PAINLEVEL_OUTOF10: 10
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
## 2021-03-22 NOTE — PROGRESS NOTES
Angioedema    Shellfish-Derived Products Anaphylaxis and Rash     ANGIOEDEMA       Current Meds:   Scheduled Meds:    [START ON 3/23/2021] citalopram  20 mg Oral Daily    sertraline  25 mg Oral Daily    amLODIPine  10 mg Oral Daily    atorvastatin  20 mg Oral Daily    oyster shell calcium/vitamin D  500 mg Oral Daily    vitamin D  50,000 Units Oral Once per day on Mon Thu    fluticasone  1 puff Inhalation BID    folic acid  1 mg Oral Daily    hydroCHLOROthiazide  12.5 mg Oral QAM    cetirizine  10 mg Oral Daily    pantoprazole  40 mg Oral QAM AC    QUEtiapine  50 mg Oral Nightly    thiamine  100 mg Oral Daily    sodium chloride flush  10 mL Intravenous 2 times per day    aspirin  325 mg Oral Daily    nicotine  1 patch Transdermal Daily    insulin lispro  0-6 Units Subcutaneous TID WC    insulin lispro  0-3 Units Subcutaneous Nightly    enoxaparin  1 mg/kg Subcutaneous BID     Continuous Infusions:    dextrose      sodium chloride 75 mL/hr at 03/21/21 0237     PRN Meds: albuterol, glucose, dextrose, glucagon (rDNA), dextrose, sodium chloride flush, promethazine **OR** ondansetron, acetaminophen **OR** acetaminophen, magnesium hydroxide, potassium chloride **OR** potassium alternative oral replacement **OR** potassium chloride, potassium chloride, magnesium sulfate, nitroGLYCERIN, perflutren lipid microspheres, LORazepam **OR** LORazepam **OR** LORazepam **OR** LORazepam **OR** LORazepam **OR** LORazepam **OR** LORazepam **OR** LORazepam, albuterol    Data:     Past Medical History:   has a past medical history of Angioedema, Anxiety, Asthma, Bipolar disorder ([facility] [address] Claustrophobia, Depression, GERD (gastroesophageal reflux disease), Hypertension, Hypertrophic cardiomyopathy ([facility] [address] Lung nodules, MRSA (methicillin resistant Staphylococcus aureus), Murmur, OA (osteoarthritis), JONES (obstructive sleep apnea), Thyroid nodule, and Type 2 diabetes mellitus without complication, without long-term current 03/21/21  0725 03/21/21  1317 03/21/21  1631 03/21/21  2124 03/22/21  0644   PROT 9.0*  --  8.3  --   --   --   --    LABALBU 4.7  --  4.4  --   --   --   --    LABA1C  --   --  6.3*  --   --   --   --    TSH 1.29  --   --   --   --   --   --    *  --  85*  --   --   --   --    ALT 85*  --  72*  --   --   --   --    ALKPHOS 117*  --  103  --   --   --   --    BILITOT 0.32  --  0.49  --   --   --   --    BILIDIR 0.08  --   --   --   --   --   --    AMMONIA  --   --  28  --   --   --   --    LIPASE 23  --   --   --   --   --   --    CHOL  --   --  248*  --   --   --   --    HDL  --   --  117  --   --   --   --    LDLCHOLESTEROL  --   --  122  --   --   --   --    CHOLHDLRATIO  --   --  2.1  --   --   --   --    TRIG  --   --  47  --   --   --   --    VLDL  --   --  NOT REPORTED  --   --   --   --    POCGLU  --  103  --  121* 92 125* 172*     ABG:  Lab Results   Component Value Date    FIO2 INFORMATION NOT PROVIDED 09/20/2020     Lab Results   Component Value Date/Time    SPECIAL NOT REPORTED 09/23/2020 10:09 AM     Lab Results   Component Value Date/Time    CULTURE NO SIGNIFICANT GROWTH 09/23/2020 10:09 AM       Radiology:  Valrom[facility] Chest Portable    Result Date: 3/20/2021  No acute process.        Physical Examination:        General appearance:  alert, cooperative and no distress  Mental Status:  oriented to person, place and time and normal affect  Lungs:  clear to auscultation bilaterally, normal effort  Heart:  regular rate and rhythm, no murmur  Abdomen:  soft, nontender, nondistended, normal bowel sounds, no masses, hepatomegaly, splenomegaly  Extremities:  no edema, redness, tenderness in the calves  Skin:  no gross lesions, rashes, induration    Assessment:        Hospital Problems           Last Modified POA    * (Principal) Palpitations 3/21/2021 Yes    Alcohol abuse 3/21/2021 Yes    Hypertension 3/21/2021 Yes    Essential hypertension 3/21/2021 Yes    ACE inhibitor-aggravated angioedema 3/21/2021 Yes Bipolar disorder ([facility] [address] 3/21/2021 Yes    Mild intermittent asthma without complication 8/23/5404 Yes          Plan:        Chest pressure and palpitations  -Awaiting VQ scan    Hypertension  -Continue Norvasc and hydrochlorothiazide    Alcohol abuse  -Continue [facility] protocol    Bipolar disorder  -Psychiatry consulted  -Patient to be admitted to inpatient psych facility once medically cleared    Asthma  -Continue inhalers    Amanda Dumont MD  3/22/2021  11:07 AM

## 2021-03-22 NOTE — PROGRESS NOTES
Physical Therapy    DATE: 3/22/2021    NAME: Clinton Cordoba  MRN: [id number]   : 1965      Patient not seen this date for Physical Therapy due to: Other: pt off unit for V/Q scan. PT will check back 3/23/21.       Electronically signed by Daksha Feliciano PT on 3/22/2021 at 1:48 PM

## 2021-03-23 PROBLEM — R00.2 PALPITATIONS: Status: RESOLVED | Noted: 2021-03-21 | Resolved: 2021-03-23

## 2021-03-23 LAB
ABSOLUTE EOS #: <0.03 K/UL (ref 0–0.44)
ABSOLUTE IMMATURE GRANULOCYTE: 0.07 K/UL (ref 0–0.3)
ABSOLUTE LYMPH #: 0.74 K/UL (ref 1.1–3.7)
ABSOLUTE MONO #: 1.03 K/UL (ref 0.1–1.2)
ANION GAP SERPL CALCULATED.3IONS-SCNC: 12 MMOL/L (ref 9–17)
BASOPHILS # BLD: 0 % (ref 0–2)
BASOPHILS ABSOLUTE: <0.03 K/UL (ref 0–0.2)
BILIRUBIN URINE: NEGATIVE
BUN BLDV-MCNC: 8 MG/DL (ref 6–20)
BUN/CREAT BLD: ABNORMAL (ref 9–20)
CALCIUM SERPL-MCNC: 9 MG/DL (ref 8.6–10.4)
CHLORIDE BLD-SCNC: 98 MMOL/L (ref 98–107)
CO2: 23 MMOL/L (ref 20–31)
COLOR: YELLOW
COMMENT UA: NORMAL
CREAT SERPL-MCNC: 0.73 MG/DL (ref 0.5–0.9)
DIFFERENTIAL TYPE: ABNORMAL
EOSINOPHILS RELATIVE PERCENT: 0 % (ref 1–4)
GFR AFRICAN AMERICAN: >60 ML/MIN
GFR NON-AFRICAN AMERICAN: >60 ML/MIN
GFR SERPL CREATININE-BSD FRML MDRD: ABNORMAL ML/MIN/{1.73_M2}
GFR SERPL CREATININE-BSD FRML MDRD: ABNORMAL ML/MIN/{1.73_M2}
GLUCOSE BLD-MCNC: 103 MG/DL (ref 65–105)
GLUCOSE BLD-MCNC: 108 MG/DL (ref 70–99)
GLUCOSE BLD-MCNC: 111 MG/DL (ref 65–105)
GLUCOSE BLD-MCNC: 122 MG/DL (ref 65–105)
GLUCOSE BLD-MCNC: 85 MG/DL (ref 65–105)
GLUCOSE URINE: NEGATIVE
HCT VFR BLD CALC: 31.2 % (ref 36.3–47.1)
HEMOGLOBIN: 10 G/DL (ref 11.9–15.1)
IMMATURE GRANULOCYTES: 1 %
KETONES, URINE: NEGATIVE
LACTIC ACID, SEPSIS WHOLE BLOOD: 0.7 MMOL/L (ref 0.5–1.9)
LACTIC ACID, SEPSIS: NORMAL MMOL/L (ref 0.5–1.9)
LEUKOCYTE ESTERASE, URINE: NEGATIVE
LYMPHOCYTES # BLD: 9 % (ref 24–43)
MAGNESIUM: 1.4 MG/DL (ref 1.6–2.6)
MCH RBC QN AUTO: 30.1 PG (ref 25.2–33.5)
MCHC RBC AUTO-ENTMCNC: 32.1 G/DL (ref 28.4–34.8)
MCV RBC AUTO: 94 FL (ref 82.6–102.9)
MONOCYTES # BLD: 12 % (ref 3–12)
NITRITE, URINE: NEGATIVE
NRBC AUTOMATED: 0 PER 100 WBC
PDW BLD-RTO: 15.9 % (ref 11.8–14.4)
PH UA: 6 (ref 5–8)
PLATELET # BLD: 159 K/UL (ref 138–453)
PLATELET ESTIMATE: ABNORMAL
PMV BLD AUTO: 10.2 FL (ref 8.1–13.5)
POTASSIUM SERPL-SCNC: 3.2 MMOL/L (ref 3.7–5.3)
PROTEIN UA: NEGATIVE
RBC # BLD: 3.32 M/UL (ref 3.95–5.11)
RBC # BLD: ABNORMAL 10*6/UL
SEG NEUTROPHILS: 78 % (ref 36–65)
SEGMENTED NEUTROPHILS ABSOLUTE COUNT: 6.51 K/UL (ref 1.5–8.1)
SODIUM BLD-SCNC: 133 MMOL/L (ref 135–144)
SPECIFIC GRAVITY UA: 1.02 (ref 1–1.03)
TURBIDITY: CLEAR
URINE HGB: NEGATIVE
UROBILINOGEN, URINE: NORMAL
WBC # BLD: 8.4 K/UL (ref 3.5–11.3)
WBC # BLD: ABNORMAL 10*3/UL

## 2021-03-23 PROCEDURE — 6360000002 HC RX W HCPCS: Performed by: NURSE PRACTITIONER

## 2021-03-23 PROCEDURE — 6370000000 HC RX 637 (ALT 250 FOR IP): Performed by: NURSE PRACTITIONER

## 2021-03-23 PROCEDURE — 85025 COMPLETE CBC W/AUTO DIFF WBC: CPT

## 2021-03-23 PROCEDURE — 99239 HOSP IP/OBS DSCHRG MGMT >30: CPT | Performed by: INTERNAL MEDICINE

## 2021-03-23 PROCEDURE — 2060000000 HC ICU INTERMEDIATE R&B

## 2021-03-23 PROCEDURE — 2580000003 HC RX 258: Performed by: NURSE PRACTITIONER

## 2021-03-23 PROCEDURE — 80048 BASIC METABOLIC PNL TOTAL CA: CPT

## 2021-03-23 PROCEDURE — 87040 BLOOD CULTURE FOR BACTERIA: CPT

## 2021-03-23 PROCEDURE — 51701 INSERT BLADDER CATHETER: CPT

## 2021-03-23 PROCEDURE — 84132 ASSAY OF SERUM POTASSIUM: CPT

## 2021-03-23 PROCEDURE — 6370000000 HC RX 637 (ALT 250 FOR IP): Performed by: PSYCHIATRY & NEUROLOGY

## 2021-03-23 PROCEDURE — 81003 URINALYSIS AUTO W/O SCOPE: CPT

## 2021-03-23 PROCEDURE — 97530 THERAPEUTIC ACTIVITIES: CPT

## 2021-03-23 PROCEDURE — 51798 US URINE CAPACITY MEASURE: CPT

## 2021-03-23 PROCEDURE — 97163 PT EVAL HIGH COMPLEX 45 MIN: CPT

## 2021-03-23 PROCEDURE — 36415 COLL VENOUS BLD VENIPUNCTURE: CPT

## 2021-03-23 PROCEDURE — 83605 ASSAY OF LACTIC ACID: CPT

## 2021-03-23 PROCEDURE — 6370000000 HC RX 637 (ALT 250 FOR IP): Performed by: INTERNAL MEDICINE

## 2021-03-23 PROCEDURE — 94640 AIRWAY INHALATION TREATMENT: CPT

## 2021-03-23 PROCEDURE — 83735 ASSAY OF MAGNESIUM: CPT

## 2021-03-23 PROCEDURE — 82947 ASSAY GLUCOSE BLOOD QUANT: CPT

## 2021-03-23 RX ORDER — CITALOPRAM 20 MG/1
20 TABLET ORAL DAILY
Qty: 30 TABLET | Refills: 3 | Status: ON HOLD | OUTPATIENT
Start: 2021-03-24 | End: 2021-08-21 | Stop reason: HOSPADM

## 2021-03-23 RX ORDER — IBUPROFEN 600 MG/1
600 TABLET ORAL ONCE
Status: COMPLETED | OUTPATIENT
Start: 2021-03-23 | End: 2021-03-23

## 2021-03-23 RX ORDER — SERTRALINE HYDROCHLORIDE 25 MG/1
25 TABLET, FILM COATED ORAL DAILY
Qty: 30 TABLET | Refills: 3 | Status: SHIPPED | OUTPATIENT
Start: 2021-03-24 | End: 2021-04-08

## 2021-03-23 RX ORDER — LIDOCAINE 40 MG/G
CREAM TOPICAL PRN
Status: DISCONTINUED | OUTPATIENT
Start: 2021-03-23 | End: 2021-03-31 | Stop reason: HOSPADM

## 2021-03-23 RX ADMIN — MAGNESIUM SULFATE HEPTAHYDRATE 1000 MG: 1 INJECTION, SOLUTION INTRAVENOUS at 21:52

## 2021-03-23 RX ADMIN — FOLIC ACID 1 MG: 1 TABLET ORAL at 08:22

## 2021-03-23 RX ADMIN — CALCIUM CARBONATE-CHOLECALCIFEROL TAB 250 MG-125 UNIT 500 MG: 250-125 TAB at 08:22

## 2021-03-23 RX ADMIN — PANTOPRAZOLE SODIUM 40 MG: 40 TABLET, DELAYED RELEASE ORAL at 06:12

## 2021-03-23 RX ADMIN — LORAZEPAM 1 MG: 0.5 TABLET ORAL at 18:38

## 2021-03-23 RX ADMIN — LORAZEPAM 2 MG: 2 INJECTION INTRAMUSCULAR at 02:40

## 2021-03-23 RX ADMIN — SODIUM CHLORIDE, PRESERVATIVE FREE 10 ML: 5 INJECTION INTRAVENOUS at 08:22

## 2021-03-23 RX ADMIN — MAGNESIUM SULFATE HEPTAHYDRATE 1000 MG: 1 INJECTION, SOLUTION INTRAVENOUS at 20:27

## 2021-03-23 RX ADMIN — POTASSIUM CHLORIDE 10 MEQ: 7.46 INJECTION, SOLUTION INTRAVENOUS at 06:12

## 2021-03-23 RX ADMIN — POTASSIUM CHLORIDE 10 MEQ: 10 INJECTION, SOLUTION INTRAVENOUS at 10:00

## 2021-03-23 RX ADMIN — HYDROCHLOROTHIAZIDE 12.5 MG: 25 TABLET ORAL at 08:22

## 2021-03-23 RX ADMIN — ACETAMINOPHEN 650 MG: 325 TABLET ORAL at 18:38

## 2021-03-23 RX ADMIN — ENOXAPARIN SODIUM 80 MG: 80 INJECTION, SOLUTION INTRAVENOUS; SUBCUTANEOUS at 08:22

## 2021-03-23 RX ADMIN — LIDOCAINE: 40 CREAM TOPICAL at 18:38

## 2021-03-23 RX ADMIN — LORAZEPAM 4 MG: 2 INJECTION INTRAMUSCULAR; INTRAVENOUS at 01:32

## 2021-03-23 RX ADMIN — LORAZEPAM 1 MG: 2 INJECTION INTRAMUSCULAR; INTRAVENOUS at 21:37

## 2021-03-23 RX ADMIN — ACETAMINOPHEN 650 MG: 325 TABLET ORAL at 02:20

## 2021-03-23 RX ADMIN — LORAZEPAM 2 MG: 2 INJECTION INTRAMUSCULAR at 04:27

## 2021-03-23 RX ADMIN — ENOXAPARIN SODIUM 80 MG: 80 INJECTION, SOLUTION INTRAVENOUS; SUBCUTANEOUS at 19:49

## 2021-03-23 RX ADMIN — ATORVASTATIN CALCIUM 20 MG: 20 TABLET, FILM COATED ORAL at 08:22

## 2021-03-23 RX ADMIN — CETIRIZINE HYDROCHLORIDE 10 MG: 10 TABLET ORAL at 08:22

## 2021-03-23 RX ADMIN — QUETIAPINE FUMARATE 50 MG: 25 TABLET ORAL at 19:50

## 2021-03-23 RX ADMIN — IBUPROFEN 600 MG: 600 TABLET ORAL at 03:40

## 2021-03-23 RX ADMIN — PROMETHAZINE HYDROCHLORIDE 12.5 MG: 12.5 TABLET ORAL at 08:32

## 2021-03-23 RX ADMIN — FLUTICASONE PROPIONATE 1 PUFF: 110 AEROSOL, METERED RESPIRATORY (INHALATION) at 21:06

## 2021-03-23 RX ADMIN — POTASSIUM CHLORIDE 10 MEQ: 7.46 INJECTION, SOLUTION INTRAVENOUS at 08:22

## 2021-03-23 RX ADMIN — ASPIRIN 325 MG: 325 TABLET, COATED ORAL at 08:22

## 2021-03-23 RX ADMIN — ACETAMINOPHEN 650 MG: 325 TABLET ORAL at 23:20

## 2021-03-23 RX ADMIN — POTASSIUM CHLORIDE 10 MEQ: 10 INJECTION, SOLUTION INTRAVENOUS at 10:42

## 2021-03-23 RX ADMIN — Medication 100 MG: at 08:22

## 2021-03-23 RX ADMIN — SERTRALINE 25 MG: 25 TABLET, FILM COATED ORAL at 08:22

## 2021-03-23 RX ADMIN — LORAZEPAM 2 MG: 2 INJECTION INTRAMUSCULAR at 08:21

## 2021-03-23 RX ADMIN — AMLODIPINE BESYLATE 10 MG: 10 TABLET ORAL at 08:22

## 2021-03-23 RX ADMIN — CITALOPRAM 20 MG: 20 TABLET, FILM COATED ORAL at 08:22

## 2021-03-23 ASSESSMENT — PAIN DESCRIPTION - PAIN TYPE
TYPE: ACUTE PAIN

## 2021-03-23 ASSESSMENT — PAIN DESCRIPTION - LOCATION
LOCATION: GENERALIZED

## 2021-03-23 ASSESSMENT — PAIN DESCRIPTION - DESCRIPTORS
DESCRIPTORS: ACHING

## 2021-03-23 ASSESSMENT — PAIN SCALES - GENERAL: PAINLEVEL_OUTOF10: 8

## 2021-03-23 NOTE — PROGRESS NOTES
Writer attempted to phone report to [facility], phone order received that pt. Needs PT eval done before they can place her in a bed.   Will notify PT.

## 2021-03-23 NOTE — PROGRESS NOTES
Physical Therapy    Facility/Department: [facility] CAR 3  Initial Assessment    NAME: Colten Walker  : 1965  MRN: [id number]    Date of Service: 3/23/2021     Chief Complaint   Patient presents with    Palpitations    Hypertension       Discharge Recommendations: Further therapy recommended at discharge. PT Equipment Recommendations  Other: Pt currently unsafe to perform functional mobility without skilled assistance. CTA pending progression of mobility. Assessment   Body structures, Functions, Activity limitations: Decreased functional mobility ; Increased pain;Decreased balance;Decreased ROM; Decreased strength;Decreased high-level IADLs;Decreased safe awareness;Decreased cognition;Decreased endurance  Assessment: Pt attempted to ambulate 1ft laterally along EOB this date with significant difficulty. Pt required [address] x2 for STS transfers this date demonstrating poor balance throughout. Pt is a high fall risk and currently unsafe to return to prior living arrangements d/t decreased strength, signficant pain, decreased balance, and poor safety awareness. Pt would benefit from continued skilled physical therapy to address these deficits. Prognosis: Fair  Decision Making: High Complexity  PT Education: Goals; General Safety;Gait Training;PT Role;Plan of Care;Transfer Training;Functional Mobility Training  Barriers to Learning: none  REQUIRES PT FOLLOW UP: Yes  Activity Tolerance  Activity Tolerance: Patient limited by pain; Patient limited by fatigue       Patient Diagnosis(es): The primary encounter diagnosis was Hypertension, unspecified type. Diagnoses of Dizziness, Palpitations, and Hypomagnesemia were also pertinent to this visit.      has a past medical history of Angioedema, Anxiety, Asthma, Bipolar disorder ([address] Claustrophobia, Depression, GERD (gastroesophageal reflux disease), Hypertension, Hypertrophic cardiomyopathy ([address] Lung nodules, MRSA (methicillin resistant Staphylococcus aureus), Murmur, OA (osteoarthritis), JONES (obstructive sleep apnea), Thyroid nodule, and Type 2 diabetes mellitus without complication, without long-term current use of insulin ([facility] [address] has a past surgical history that includes Hysterectomy; Upper gastrointestinal endoscopy; Colonoscopy; Thyroid surgery; Cardiac catheterization; other surgical history (8/24/2015); Upper gastrointestinal endoscopy (N/A, 12/3/2020); Colonoscopy (N/A, 12/3/2020); Foot surgery (Bilateral, 12/07/2020); Hammer toe surgery (Right, 12/7/2020); and Bunionectomy (Bilateral, 12/7/2020). Restrictions  Restrictions/Precautions  Restrictions/Precautions: Up as Tolerated, Fall Risk  Required Braces or Orthoses?: No  Vision/Hearing  Vision: Impaired  Vision Exceptions: Wears glasses at all times  Hearing: Exceptions to [facility]  Hearing Exceptions: Hard of hearing/hearing concerns     Subjective  General  Patient assessed for rehabilitation services?: Yes  Response To Previous Treatment: Not applicable  Family / Caregiver Present: No  Follows Commands: Impaired(Pt only following simple commands with extra time required to perform tasks)  Subjective  Subjective: RN and pt agreeable to therapy. Pt supine in bed upon arrival.  Pt demonstrating severe pain but cooperative throughout. Pain Screening  Patient Currently in Pain: Yes  Pain Assessment  Pain Assessment: Faces  Clark-Baker Pain Rating: Hurts whole lot(w/ activity and touch)  Pain Type: Acute pain  Pain Location: Generalized  Non-Pharmaceutical Pain Intervention(s): Ambulation/Increased Activity; Distraction  Response to Pain Intervention: Patient Satisfied  Vital Signs  Patient Currently in Pain: Yes       Orientation  Orientation  Overall Orientation Status: Impaired  Orientation Level: Oriented to time;Oriented to situation;Oriented to person;Disoriented to place(Pt thought she was at [facility])  Social/Functional History  Social/Functional History  Lives With: Significant other  Type of Home: House  Home Layout: One level  Home Access: Stairs to enter with rails  Entrance Stairs - Number of Steps: 3  Entrance Stairs - Rails: Both  Bathroom Shower/Tub: Tub/Shower unit  Bathroom Toilet: Standard  Bathroom Equipment: Grab bars in shower  Home Equipment: Standard walker, Quad cane(Pt reports primarily using quad cane at baseline, walker PRN)  Receives Help From: Friend(s)  ADL Assistance: Needs assistance(boyfriend assists)  Homemaking Assistance: Needs assistance(boyfriend performs all housemaking tasks)  Homemaking Responsibilities: No(boyfriend performs)  Ambulation Assistance: Independent  Transfer Assistance: Independent  Active : No  Mode of Transportation: Cab  Occupation: On disability  Leisure & Hobbies: crosswPrivatext puzzles  Cognition   Cognition  Overall Cognitive Status: Exceptions  Arousal/Alertness: Delayed responses to stimuli  Following Commands: Follows one step commands with increased time; Follows one step commands with repetition  Attention Span: Attends with cues to redirect; Difficulty attending to directions  Safety Judgement: Decreased awareness of need for assistance;Decreased awareness of need for safety  Problem Solving: Decreased awareness of errors  Initiation: Requires cues for some  Sequencing: Requires cues for some  Cognition Comment: Pt demonstrating difficulty maintaining alertness throughout eval this date requiring max verbal cueing to maintain eyes open to writer throughout with poor return demo.     Objective          Joint Mobility  Spine: WFL  ROM RLE: WFL  ROM LLE: WFL  ROM RUE: Shoulder flex to ~50 degrees, elbow and wrist WFL  ROM LUE: Shoulder flex to ~50 degrees, elbow and wrist WFL  Strength RLE  Strength RLE: Exception  R Hip Flexion: 3-/5  R Knee Flexion: 4-/5  R Knee Extension: 3-/5  R Ankle Dorsiflexion: 3-/5  R Ankle Plantar flexion: 4-/5  Strength LLE  Strength LLE: Exception  Comment: Not formally assessed d/t increased pain throughout eval and poor sitting tolerance. Movement against gravity noted at hip, knee, and ankle throughout functional mobility. Strength RUE  Strength RUE: Exception  Comment: Not formally assessed d/t pain with movement. Movement against gravity noted at shoulder and elbow throughout eval  R Shoulder Flexion: 3-/5  Strength LUE  Strength LUE: Exception  Comment: Not formally assessed d/t pain with movement. Movement against gravity noted at shoulder and elbow throughout eval  L Shoulder Flexion: 3-/5  Tone RLE  RLE Tone: Normotonic  Tone LLE  LLE Tone: Normotonic  Sensation  Overall Sensation Status: WFL(denies numbness/tingling)  Bed mobility  Supine to Sit: Moderate assistance(Assist for trunk and BLE progression)  Sit to Supine: Moderate assistance;2 Person assistance(one person to assist with trunk and second person to assist with BLE progression)  Scooting: Maximal assistance  Comment: Pt requiring extra time to perform bed mobility this date and with significant pain throughout. Pt demonstrating increased difficulty with progression of RLE/RUE d/t increased pain and weakness of right side. Pt's resting HR was ~108bpm which increased to 124bpm upon sitting EOB. Pt's HR returned to 108bpm with ~1min seated rest.  Transfers  Sit to Stand: Moderate Assistance;2 Person Assistance  Stand to sit: Moderate Assistance(To control descent)  Comment: Pt performed STS transfer with poor steadiness throughout. Pt requiring min verbal cueing to achieve upright position upon standing with fair return demo. Pt requiring max verbal and tactile cueing for proper hand placement throughout transfers with RW with poor return demo. Pt stood ~4min this date with posterior lean requiring ModA from writer to maintain standing position. Ambulation  Ambulation?: Yes  Ambulation 1  Surface: level tile  Device: Rolling Walker  Assistance: Maximum assistance  Gait Deviations: Slow Kelli; Shuffles;Decreased step length;Decreased step height  Distance: 1ft  Comments: Pt unable to progress BLE for further ambulation d/t reported pain. Pt requiring max verbal and tactile cueing for progression of RW and BLE throughout with poor return demo. Pt with poor standing tolerance and assisted to seated position after 1 incident of R knee buckling without fall. Stairs/Curb  Stairs?: No     Balance  Posture: Fair  Sitting - Static: -;Good  Sitting - Dynamic: Fair;+  Standing - Static: Poor;+  Standing - Dynamic: Poor  Comments: Standing balance assessed with RW        Plan   Plan  Times per week: 5-6x / week  Current Treatment Recommendations: Strengthening, Neuromuscular Re-education, ROM, Balance Training, Endurance Training, Safety Education & Training, Patient/Caregiver Education & Training, Equipment Evaluation, Education, & procurement, Functional Mobility Training, Transfer Training, Gait Training, Stair training  Safety Devices  Type of devices: Bed alarm in place, Call light within reach, Gait belt, Left in bed, Nurse notified, Sitter present  Restraints  Initially in place: No      AM-PAC Score     AM-PAC Inpatient Mobility without Stair Climbing Raw Score : 10 (03/23/21 1439)  AM-PAC Inpatient without Stair Climbing T-Scale Score : 34.07 (03/23/21 1439)  Mobility Inpatient CMS 0-100% Score: 71.66 (03/23/21 1439)  Mobility Inpatient without Stair CMS G-Code Modifier : CL (03/23/21 1439)       Goals  Short term goals  Time Frame for Short term goals: 14 visits  Short term goal 1: Pt to demonstrate bed mobility SBA  Short term goal 2: Pt to perform functional transfers CGA  Short term goal 3: Ambulate 50ft with RW Tahmina  Short term goal 4: Pt to actively participate in at least 30 minutes of physical therapy to demonstrate increased endurance  Short term goal 5: Pt to demonstrate at least fair- dynamic standing balance to decrease fall risk  Patient Goals   Patient goals :  To get better, to go home       Therapy Time   Individual Concurrent Group Co-treatment   Time In 1325         Time Out 1353         Minutes 28         Timed Code Treatment Minutes: [place]    Evaluation/treatment performed by Student PT under the supervision of co-signing PT who agrees with all evaluation/treatment and documentation.

## 2021-03-23 NOTE — PROGRESS NOTES
April from [facility] phoned and said to hold off on sending pt. As they are having a Dr. Macias Leaver over her info.

## 2021-03-23 NOTE — CARE COORDINATION
Discharge order noted. Dr Tera Jenkins note states pt is medically cleared for discharge to psych facility. Met with pt. She signed voluntary consent. [facility]. Pt accepted by Dr Venus Dye. Voluntary consent faxed to [facility]. Transport request faxed to [facility]    1624 pt unable to walk. Charge nurse at [facility] requested PT eval in order to place pt in appropriate bed. After PT evaluation, bed 120 received. [facility]  Voicemail left for charge RN at [place] [phone] received call from Lady Reyes at [facility] confirming  time of 10:30pm    Discharge [facility] Case Management Department  Written by: Polo Scott RN    Patient Name: Jessa Dangelo  Attending Provider: Daryl Davey MD  Admit Date: 3/20/2021 10:51 PM  MRN: [id number]  Account: [de-identified]                     : 1965  Discharge Date: 3/23/2021      [facility]

## 2021-03-23 NOTE — PROGRESS NOTES
Writer attempted to assist pt. To BR, Pt. Assisted with the help of 2 to standing position. Pt. Unable to ambulate, BSC set at bedside for pt. Pt. Unable to void, bladder scan >564. Pt. Assisted x2 back into bed.   Will notify
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
## 2021-03-23 NOTE — DISCHARGE INSTR - COC
Continuity of Care Form    Patient Name: Fito Abdul   :  1965  MRN:  [id number]    Admit date:  3/20/2021  Discharge date:  3/31/21    Code Status Order: Full Code   Advance Directives:   [facility] Documentation     Date/Time Healthcare Directive Type of Healthcare Directive Copy in [facility] [address] Agent's Name Healthcare Agent's Phone Number    21 [phone]  No, patient does not have an advance directive for healthcare treatment -- -- -- -- --          Admitting Physician:  Derald Jeans, MD  PCP: Terrell Gonzalez MD    Discharging Nurse: Talon Mena Unit/Room#: 3010/3010-01  Discharging Unit Phone Number: [phone]    Emergency Contact:   Extended Emergency Contact Information  Primary Emergency Contact: Sheryl Pelayo  Address: N/A  Home Phone: [phone]  Relation: Aunt/Uncle   needed?  No    Past Surgical History:  Past Surgical History:   Procedure Laterality Date    BUNIONECTOMY Bilateral 2020    [facility]  BUNIONECTOMY, [address] performed by Anel Ortez DPM at [facility]      bx    COLONOSCOPY N/A 12/3/2020    COLONOSCOPY WITH BIOPSY performed by Maria E Figueroa MD at [address] Bilateral 2020    [facility] bunionectomy, right 2nd hammer toe repair     HAMMER TOE SURGERY Right 2020    2ND TOE HAMMER REPAIR performed by Anel Ortez DPM at [facility]      partial hyst    OTHER SURGICAL HISTORY  2015    MRI under anesthesia    THYROID SURGERY      bilat bx    UPPER GASTROINTESTINAL ENDOSCOPY      UPPER GASTROINTESTINAL ENDOSCOPY N/A 12/3/2020    EGD BIOPSY performed by Maria E Figueroa MD at [facility]       Immunization History:   Immunization History   Administered Date(s) Administered    Influenza Vaccine, unspecified formulation 2017, 10/09/2019    Influenza, Quadv, IM, (6 mo and older Fluzone, Flulaval, Fluarix and 3 yrs and older Afluria) 10/09/2019    Influenza, Quadv, IM, PF (6 mo and older Fluzone, Flulaval, Fluarix, and 3 yrs and older Afluria) 11/17/2017, 09/22/2020    Pneumococcal Polysaccharide ([id number]) 05/09/2017    Tdap (Boostrix, Adacel) 07/03/2014, 04/14/2017       Active Problems:  Patient Active Problem List   Diagnosis Code    Lung nodule R91.1    Obesity E66.9    Adrenal adenoma D35.00    Goiter E04.9    Hypertension I10    Alcohol abuse F10.10    Essential hypertension I10    Enlarged tonsils J35.1    ACE inhibitor-aggravated angioedema T78. 3XXA, T46.4X5A    JONES (obstructive sleep apnea) G47.33    Dyslipidemia E78.5    IGT (impaired glucose tolerance) R73.02    Acute alcoholic intoxication without complication ([facility]) U97.249    Acute renal insufficiency N28.9    Effusion of bursa of right knee M25.461    Acute cystitis without hematuria N30.00    Bipolar disorder ([facility]) F31.9    Mild intermittent asthma without complication S33.63    Inflammatory polyarthritis ([facility] [address] M06.4    Seronegative rheumatoid arthritis ([facility] [address] M06.00       Isolation/Infection:   Isolation          No Isolation        Patient Infection Status     Infection Onset Added Last Indicated Last Indicated By Review Planned Expiration Resolved Resolved By    None active    Resolved    COVID-19 Rule Out 03/25/21 03/25/21 03/25/21 COVID-19 (Ordered)   03/26/21 Rule-Out Test Resulted    COVID-19 Rule Out 03/20/21 03/20/21 03/20/21 COVID-19, Rapid (Ordered)   03/21/21 Rule-Out Test Resulted    COVID-19 Rule Out 12/04/20 12/05/20 12/04/20 Covid-19 Ambulatory (Ordered)   12/06/20 Rule-Out Test Resulted    COVID-19 Rule Out 11/29/20 11/29/20 11/29/20 COVID-19 (Ordered)   11/30/20 Rule-Out Test Resulted    MRSA  07/10/13 07/10/13 Kalen Luna RN   10/02/19 Kalen Luna RN          Nurse Assessment:  Last Vital Signs: /86   Pulse 77   Temp 98.3 °F (36.8 °C) (Oral)   Resp 16   Ht 5' 6\" (1.676 m)   Wt 180 lb 6.4 oz (81.8 kg)   SpO2 98%   BMI 29.12 kg/m²     Last documented pain score (0-10 scale): Pain Level: 10  Last Weight:   Wt Readings from Last 1 Encounters:   03/29/21 180 lb 6.4 oz (81.8 kg)     Mental Status:  oriented and alert    IV Access:  - None    Nursing Mobility/ADLs:  Walking   Assisted  Transfer  Assisted  Bathing  Assisted  Dressing  Assisted  Toileting  Assisted  Feeding  Assisted  Med Admin  Assisted  Med Delivery   whole    Wound Care Documentation and Therapy:        Elimination:  Continence:   · Bowel: Yes  · Bladder: Yes  Urinary Catheter: None   Colostomy/Ileostomy/Ileal Conduit:  No       Date of Last BM: ***    Intake/Output Summary (Last 24 hours) at 3/31/2021 1710  Last data filed at 3/31/2021 8510  Gross per 24 hour   Intake    Output 350 ml   Net -350 ml     I/O last 3 completed shifts:  In: -   Out: 350 [Urine:350]    Safety Concerns: At Risk for Falls    Impairments/Disabilities:      None    Nutrition Therapy:  Current Nutrition Therapy:   - Oral Diet:  General    Routes of Feeding: Oral  Liquids: No Restrictions  Daily Fluid Restriction: no  Last Modified Barium Swallow with Video (Video Swallowing Test): not done    Treatments at the Time of Hospital Discharge:   Respiratory Treatments: ***  Oxygen Therapy:  is not on home oxygen therapy.   Ventilator:    - No ventilator support    Rehab Therapies: Physical Therapy and Occupational Therapy  Weight Bearing Status/Restrictions: No weight bearing restirctions  Other Medical Equipment (for information only, NOT a DME order):  walker  Other Treatments: ***    Patient's personal belongings (please select all that are sent with patient):  None    RN SIGNATURE:  Electronically signed by Rosetta Sahni RN on 3/31/21 at 5:20 PM EDT    CASE MANAGEMENT/SOCIAL WORK SECTION    Inpatient Status Date: 3/21/21    Readmission Risk Assessment Score:  Readmission Risk              Risk of Unplanned Readmission:        29           Discharging to Facility/ Agency   · Name:   · Address:  · Phone:  · Fax:    Dialysis Facility (if applicable)   · Name:  · Address:  · Dialysis Schedule:  · Phone:  · Fax:    / signature: Electronically signed by Tristan Kayser, RN on 3/31/21 at 5:09 PM EDT    PHYSICIAN SECTION    Prognosis: Good    Condition at Discharge: Stable    Rehab Potential (if transferring to Rehab): Good    Recommended Labs or Other Treatments After Discharge: Continue current treatment    Physician Certification: I certify the above information and transfer of Lexi Ceja  is necessary for the continuing treatment of the diagnosis listed and that she requires Inpatient psych facility for less 30 days.      Update Admission H&P: No change in H&P    PHYSICIAN SIGNATURE:  Electronically signed by Aliyah Obregon MD on 3/31/21 at 4:03 PM EDT

## 2021-03-24 LAB
-: NORMAL
ABSOLUTE EOS #: <0.03 K/UL (ref 0–0.44)
ABSOLUTE IMMATURE GRANULOCYTE: 0.05 K/UL (ref 0–0.3)
ABSOLUTE LYMPH #: 0.98 K/UL (ref 1.1–3.7)
ABSOLUTE MONO #: 1.27 K/UL (ref 0.1–1.2)
AMORPHOUS: NORMAL
ANION GAP SERPL CALCULATED.3IONS-SCNC: 10 MMOL/L (ref 9–17)
BACTERIA: NORMAL
BASOPHILS # BLD: 0 % (ref 0–2)
BASOPHILS ABSOLUTE: 0.03 K/UL (ref 0–0.2)
BILIRUBIN URINE: NEGATIVE
BUN BLDV-MCNC: 10 MG/DL (ref 6–20)
BUN/CREAT BLD: ABNORMAL (ref 9–20)
CALCIUM SERPL-MCNC: 9.3 MG/DL (ref 8.6–10.4)
CASTS UA: NORMAL /LPF (ref 0–8)
CHLORIDE BLD-SCNC: 94 MMOL/L (ref 98–107)
CO2: 23 MMOL/L (ref 20–31)
COLOR: ABNORMAL
COMMENT UA: ABNORMAL
CREAT SERPL-MCNC: 0.63 MG/DL (ref 0.5–0.9)
CRYSTALS, UA: NORMAL /HPF
DIFFERENTIAL TYPE: ABNORMAL
EOSINOPHILS RELATIVE PERCENT: 0 % (ref 1–4)
EPITHELIAL CELLS UA: NORMAL /HPF (ref 0–5)
GFR AFRICAN AMERICAN: >60 ML/MIN
GFR NON-AFRICAN AMERICAN: >60 ML/MIN
GFR SERPL CREATININE-BSD FRML MDRD: ABNORMAL ML/MIN/{1.73_M2}
GFR SERPL CREATININE-BSD FRML MDRD: ABNORMAL ML/MIN/{1.73_M2}
GLUCOSE BLD-MCNC: 111 MG/DL (ref 70–99)
GLUCOSE BLD-MCNC: 119 MG/DL (ref 65–105)
GLUCOSE BLD-MCNC: 142 MG/DL (ref 65–105)
GLUCOSE BLD-MCNC: 160 MG/DL (ref 65–105)
GLUCOSE BLD-MCNC: 163 MG/DL (ref 65–105)
GLUCOSE URINE: NEGATIVE
HCT VFR BLD CALC: 32.5 % (ref 36.3–47.1)
HEMOGLOBIN: 10.7 G/DL (ref 11.9–15.1)
IMMATURE GRANULOCYTES: 1 %
KETONES, URINE: NEGATIVE
LACTIC ACID, SEPSIS WHOLE BLOOD: 1.2 MMOL/L (ref 0.5–1.9)
LACTIC ACID, SEPSIS: NORMAL MMOL/L (ref 0.5–1.9)
LEUKOCYTE ESTERASE, URINE: ABNORMAL
LYMPHOCYTES # BLD: 10 % (ref 24–43)
MAGNESIUM: 2 MG/DL (ref 1.6–2.6)
MCH RBC QN AUTO: 30.3 PG (ref 25.2–33.5)
MCHC RBC AUTO-ENTMCNC: 32.9 G/DL (ref 28.4–34.8)
MCV RBC AUTO: 92.1 FL (ref 82.6–102.9)
MONOCYTES # BLD: 13 % (ref 3–12)
MUCUS: NORMAL
NITRITE, URINE: NEGATIVE
NRBC AUTOMATED: 0 PER 100 WBC
OTHER OBSERVATIONS UA: NORMAL
PDW BLD-RTO: 15.9 % (ref 11.8–14.4)
PH UA: 6 (ref 5–8)
PLATELET # BLD: 192 K/UL (ref 138–453)
PLATELET ESTIMATE: ABNORMAL
PMV BLD AUTO: 11.3 FL (ref 8.1–13.5)
POTASSIUM SERPL-SCNC: 3.1 MMOL/L (ref 3.7–5.3)
POTASSIUM SERPL-SCNC: 3.6 MMOL/L (ref 3.7–5.3)
PROTEIN UA: ABNORMAL
RBC # BLD: 3.53 M/UL (ref 3.95–5.11)
RBC # BLD: ABNORMAL 10*6/UL
RBC UA: NORMAL /HPF (ref 0–4)
RENAL EPITHELIAL, UA: NORMAL /HPF
SEG NEUTROPHILS: 76 % (ref 36–65)
SEGMENTED NEUTROPHILS ABSOLUTE COUNT: 7.53 K/UL (ref 1.5–8.1)
SODIUM BLD-SCNC: 127 MMOL/L (ref 135–144)
SPECIFIC GRAVITY UA: 1.02 (ref 1–1.03)
TRICHOMONAS: NORMAL
TURBIDITY: CLEAR
URINE HGB: NEGATIVE
UROBILINOGEN, URINE: NORMAL
WBC # BLD: 9.9 K/UL (ref 3.5–11.3)
WBC # BLD: ABNORMAL 10*3/UL
WBC UA: NORMAL /HPF (ref 0–5)
YEAST: NORMAL

## 2021-03-24 PROCEDURE — 81001 URINALYSIS AUTO W/SCOPE: CPT

## 2021-03-24 PROCEDURE — 51798 US URINE CAPACITY MEASURE: CPT

## 2021-03-24 PROCEDURE — 82947 ASSAY GLUCOSE BLOOD QUANT: CPT

## 2021-03-24 PROCEDURE — 51701 INSERT BLADDER CATHETER: CPT

## 2021-03-24 PROCEDURE — 2580000003 HC RX 258: Performed by: INTERNAL MEDICINE

## 2021-03-24 PROCEDURE — 6370000000 HC RX 637 (ALT 250 FOR IP): Performed by: NURSE PRACTITIONER

## 2021-03-24 PROCEDURE — 94761 N-INVAS EAR/PLS OXIMETRY MLT: CPT

## 2021-03-24 PROCEDURE — 80048 BASIC METABOLIC PNL TOTAL CA: CPT

## 2021-03-24 PROCEDURE — 6360000002 HC RX W HCPCS: Performed by: NURSE PRACTITIONER

## 2021-03-24 PROCEDURE — 83605 ASSAY OF LACTIC ACID: CPT

## 2021-03-24 PROCEDURE — 6360000002 HC RX W HCPCS: Performed by: INTERNAL MEDICINE

## 2021-03-24 PROCEDURE — 2580000003 HC RX 258: Performed by: NURSE PRACTITIONER

## 2021-03-24 PROCEDURE — 6370000000 HC RX 637 (ALT 250 FOR IP): Performed by: PSYCHIATRY & NEUROLOGY

## 2021-03-24 PROCEDURE — 85025 COMPLETE CBC W/AUTO DIFF WBC: CPT

## 2021-03-24 PROCEDURE — 94640 AIRWAY INHALATION TREATMENT: CPT

## 2021-03-24 PROCEDURE — 36415 COLL VENOUS BLD VENIPUNCTURE: CPT

## 2021-03-24 PROCEDURE — 2060000000 HC ICU INTERMEDIATE R&B

## 2021-03-24 PROCEDURE — 99233 SBSQ HOSP IP/OBS HIGH 50: CPT | Performed by: INTERNAL MEDICINE

## 2021-03-24 PROCEDURE — 2500000003 HC RX 250 WO HCPCS: Performed by: NURSE PRACTITIONER

## 2021-03-24 RX ORDER — METOPROLOL TARTRATE 5 MG/5ML
5 INJECTION INTRAVENOUS EVERY 4 HOURS PRN
Status: DISCONTINUED | OUTPATIENT
Start: 2021-03-24 | End: 2021-03-31 | Stop reason: HOSPADM

## 2021-03-24 RX ADMIN — ENOXAPARIN SODIUM 80 MG: 80 INJECTION, SOLUTION INTRAVENOUS; SUBCUTANEOUS at 21:56

## 2021-03-24 RX ADMIN — CITALOPRAM 20 MG: 20 TABLET, FILM COATED ORAL at 10:35

## 2021-03-24 RX ADMIN — FOLIC ACID 1 MG: 1 TABLET ORAL at 10:35

## 2021-03-24 RX ADMIN — POTASSIUM CHLORIDE 40 MEQ: 1500 TABLET, EXTENDED RELEASE ORAL at 00:53

## 2021-03-24 RX ADMIN — LORAZEPAM 1 MG: 0.5 TABLET ORAL at 23:17

## 2021-03-24 RX ADMIN — METOPROLOL TARTRATE 5 MG: 1 INJECTION, SOLUTION INTRAVENOUS at 06:01

## 2021-03-24 RX ADMIN — ASPIRIN 325 MG: 325 TABLET, COATED ORAL at 10:36

## 2021-03-24 RX ADMIN — CETIRIZINE HYDROCHLORIDE 10 MG: 10 TABLET ORAL at 10:36

## 2021-03-24 RX ADMIN — AMLODIPINE BESYLATE 10 MG: 10 TABLET ORAL at 10:36

## 2021-03-24 RX ADMIN — CEFTRIAXONE SODIUM 1000 MG: 1 INJECTION, POWDER, FOR SOLUTION INTRAMUSCULAR; INTRAVENOUS at 15:19

## 2021-03-24 RX ADMIN — ENOXAPARIN SODIUM 80 MG: 80 INJECTION, SOLUTION INTRAVENOUS; SUBCUTANEOUS at 10:37

## 2021-03-24 RX ADMIN — ACETAMINOPHEN 650 MG: 325 TABLET ORAL at 14:46

## 2021-03-24 RX ADMIN — Medication 100 MG: at 10:36

## 2021-03-24 RX ADMIN — INSULIN LISPRO 1 UNITS: 100 INJECTION, SOLUTION INTRAVENOUS; SUBCUTANEOUS at 16:18

## 2021-03-24 RX ADMIN — SODIUM CHLORIDE: 9 INJECTION, SOLUTION INTRAVENOUS at 21:56

## 2021-03-24 RX ADMIN — HYDROCHLOROTHIAZIDE 12.5 MG: 25 TABLET ORAL at 10:36

## 2021-03-24 RX ADMIN — PANTOPRAZOLE SODIUM 40 MG: 40 TABLET, DELAYED RELEASE ORAL at 06:01

## 2021-03-24 RX ADMIN — SODIUM CHLORIDE, PRESERVATIVE FREE 10 ML: 5 INJECTION INTRAVENOUS at 21:08

## 2021-03-24 RX ADMIN — ATORVASTATIN CALCIUM 20 MG: 20 TABLET, FILM COATED ORAL at 21:08

## 2021-03-24 RX ADMIN — FLUTICASONE PROPIONATE 1 PUFF: 110 AEROSOL, METERED RESPIRATORY (INHALATION) at 07:56

## 2021-03-24 RX ADMIN — CALCIUM CARBONATE-CHOLECALCIFEROL TAB 250 MG-125 UNIT 500 MG: 250-125 TAB at 10:36

## 2021-03-24 RX ADMIN — FLUTICASONE PROPIONATE 1 PUFF: 110 AEROSOL, METERED RESPIRATORY (INHALATION) at 20:42

## 2021-03-24 RX ADMIN — SERTRALINE 25 MG: 25 TABLET, FILM COATED ORAL at 10:37

## 2021-03-24 ASSESSMENT — PAIN DESCRIPTION - ORIENTATION: ORIENTATION: RIGHT;LEFT;LOWER

## 2021-03-24 ASSESSMENT — ENCOUNTER SYMPTOMS: TACHYPNEA: 1

## 2021-03-24 ASSESSMENT — PAIN DESCRIPTION - LOCATION: LOCATION: GENERALIZED

## 2021-03-24 ASSESSMENT — PAIN DESCRIPTION - PAIN TYPE: TYPE: ACUTE PAIN

## 2021-03-24 ASSESSMENT — PAIN SCALES - GENERAL: PAINLEVEL_OUTOF10: 8

## 2021-03-24 ASSESSMENT — PAIN DESCRIPTION - DESCRIPTORS: DESCRIPTORS: ACHING

## 2021-03-24 NOTE — PROGRESS NOTES
Physical Therapy    DATE: 3/24/2021    NAME: Lexi Ceja  MRN: [id number]   : 1965      Patient not seen this date for Physical Therapy due to: Other: unstable vitals.  Pt running mariana temp of 102.6      Electronically signed by Gisel Bowser PTA on 3/24/2021 at 2:47 PM

## 2021-03-24 NOTE — PROGRESS NOTES
Writer messaged NP with internal medicine regarding conversation with sepsis RN, pts urinary retention, and pts fever of 102.9 F. Orders for blood cultures and urine culture were placed. Pt straight cathed and urine culture sent.

## 2021-03-24 NOTE — PROGRESS NOTES
[facility]  Office: [address], DO, Arvin Christopher, DO, Romelia Eileen, DO, Ilene Covert Blood, DO, Gema Dior MD, Wesley Moore MD, Tio Barrett MD, Dangelo Olvera MD, Collette Denmark, MD, Justin Terrell MD, Libra Su MD, Alok Shaw MD, Hoang Ariza MD, Hannah Alfred, DO, Sarah Gonzalez MD, Irvin Valero DO, Pepper Castillo MD,  Judith Norman DO, Jane Veloz MD, Eliott Spatz, MD, Arsenio Flores, [facility], [facility] Danni, CNP, Chaim Ann, CNP, Amber Guidry, CNS, Jerome Gill, CNP, Maci Cuellar, CNP, Cris Dyson, CNP, Hattie Brunson, CNP, Edwin Hobbs, CNP, LEONARDO IyerC, Whitney Kaplan, [facility], Duane Abed, CNP, Kim Hanson, CNP, Sameera Reddy, CNP, Lo Wood, CNP, Sergey Dey, CNP, Juanda Flow, [address] [place] [address]    Progress Note    3/24/2021    11:01 AM    Name:   Tamara James  MRN:     [id number]     Acct:      [de-identified]   Room:   [facility] Day:  3  Admit Date:  3/20/2021 10:51 PM    PCP:   Que Barlow MD  Code Status:  Full Code    Subjective:     C/C:   Chief Complaint   Patient presents with    Palpitations    Hypertension     Interval History Status: improved. Patient was seen and evaluated at bedside this morning. She reports feeling slightly better this morning. Brief History:     See H&P    Review of Systems:     Constitutional:  negative for chills, fevers, sweats  Respiratory:  negative for cough, dyspnea on exertion, shortness of breath, wheezing  Cardiovascular:  negative for chest pain, chest pressure/discomfort, lower extremity edema, palpitations  Gastrointestinal:  negative for abdominal pain, constipation, diarrhea, nausea, vomiting  Neurological:  negative for dizziness, headache    Medications: Allergies:     Allergies   Allergen Reactions    Bee Venom Anaphylaxis    Iodine Anaphylaxis and Rash    Lisinopril Swelling and Anaphylaxis Angioedema    Shellfish-Derived Products Anaphylaxis and Rash     ANGIOEDEMA       Current Meds:   Scheduled Meds:    cefTRIAXone (ROCEPHIN) IV  1,000 mg Intravenous Q24H    citalopram  20 mg Oral Daily    sertraline  25 mg Oral Daily    amLODIPine  10 mg Oral Daily    atorvastatin  20 mg Oral Daily    oyster shell calcium/vitamin D  500 mg Oral Daily    vitamin D  50,000 Units Oral Once per day on Mon Thu    fluticasone  1 puff Inhalation BID    folic acid  1 mg Oral Daily    hydroCHLOROthiazide  12.5 mg Oral QAM    cetirizine  10 mg Oral Daily    pantoprazole  40 mg Oral QAM AC    QUEtiapine  50 mg Oral Nightly    thiamine  100 mg Oral Daily    sodium chloride flush  10 mL Intravenous 2 times per day    aspirin  325 mg Oral Daily    nicotine  1 patch Transdermal Daily    insulin lispro  0-6 Units Subcutaneous TID WC    insulin lispro  0-3 Units Subcutaneous Nightly    enoxaparin  1 mg/kg Subcutaneous BID     Continuous Infusions:    dextrose      sodium chloride Stopped (03/23/21 1212)     PRN Meds: metoprolol, lidocaine, albuterol, glucose, dextrose, glucagon (rDNA), dextrose, sodium chloride flush, promethazine **OR** ondansetron, acetaminophen **OR** acetaminophen, magnesium hydroxide, potassium chloride **OR** potassium alternative oral replacement **OR** potassium chloride, potassium chloride, magnesium sulfate, nitroGLYCERIN, perflutren lipid microspheres, LORazepam **OR** LORazepam **OR** LORazepam **OR** LORazepam **OR** LORazepam **OR** LORazepam **OR** LORazepam **OR** LORazepam, albuterol    Data:     Past Medical History:   has a past medical history of Angioedema, Anxiety, Asthma, Bipolar disorder (HCC), Claustrophobia, Depression, GERD (gastroesophageal reflux disease), Hypertension, Hypertrophic cardiomyopathy ([facility] [address] Lung nodules, MRSA (methicillin resistant Staphylococcus aureus), Murmur, OA (osteoarthritis), JONES (obstructive sleep apnea), Thyroid nodule, and Type 2 diabetes mellitus without complication, without long-term current use of insulin ([address] Social History:   reports that she quit smoking about 28 years ago. Her smoking use included cigarettes. She has a 10.00 pack-year smoking history. She has never used smokeless tobacco. She reports previous alcohol use of about 12.0 standard drinks of alcohol per week. She reports previous drug use. Drug: Cocaine. Family History:   Family History   Problem Relation Age of Onset    Stroke Mother     Hypertension Father     Cancer Brother         bone    Lung Cancer Maternal Aunt     Cancer Maternal Grandmother         eye     Other Sister         aneurysm    Heart Disease Sister        Vitals:  BP (!) 154/91   Pulse 91   Temp 99.6 °F (37.6 °C) (Oral)   Resp 20   Ht 5' 6\" (1.676 m)   Wt 169 lb 5 oz (76.8 kg)   SpO2 99%   BMI 27.33 kg/m²   Temp (24hrs), Av.8 °F (37.7 °C), Min:98.4 °F (36.9 °C), Max:102.9 °F (39.4 °C)    Recent Labs     21  1136 21  1621 21  1941 21  0719   POCGLU 85 111* 122* 119*       I/O (24Hr):     Intake/Output Summary (Last 24 hours) at 3/24/2021 1101  Last data filed at 3/24/2021 0322  Gross per 24 hour   Intake --   Output 1200 ml   Net -1200 ml       Labs:  Hematology:  Recent Labs     21  0425 21  0715   WBC 8.4 9.9   RBC 3.32* 3.53*   HGB 10.0* 10.7*   HCT 31.2* 32.5*   MCV 94.0 92.1   MCH 30.1 30.3   MCHC 32.1 32.9   RDW 15.9* 15.9*    192   MPV 10.2 11.3     Chemistry:  Recent Labs     21  1126 21  0425 21  2336 21  0715   NA  --  133*  --  127*   K  --  3.2* 3.1* 3.6*   CL  --  98  --  94*   CO2  --  23  --  23   GLUCOSE  --  108*  --  111*   BUN  --  8  --  10   CREATININE  --  0.73  --  0.63   MG  --  1.4* 2.0  --    ANIONGAP  --  12  --  10   LABGLOM  --  >60  --  >60   GFRAA  --  >60  --  >60   CALCIUM  --  9.0  --  9.3   CKTOTAL 179  --   --   --      Recent Labs     21  0813 21  1131 03/23/21  1621 03/23/21  1941 03/24/21  0719   POCGLU 125* 103 85 111* 122* 119*     ABG:  Lab Results   Component Value Date    FIO2 INFORMATION NOT PROVIDED 09/20/2020     Lab Results   Component Value Date/Time    SPECIAL L HAND 11ML 03/23/2021 09:02 PM     Lab Results   Component Value Date/Time    CULTURE NO GROWTH 9 HOURS 03/23/2021 09:02 PM       Radiology:  Xr Chest Portable    Result Date: 3/20/2021  No acute process. Physical Examination:        General appearance:  alert, cooperative and no distress  Mental Status:  oriented to person, place and time and normal affect  Lungs:  clear to auscultation bilaterally, normal effort  Heart:  regular rate and rhythm, no murmur  Abdomen:  soft, nontender, nondistended, normal bowel sounds, no masses, hepatomegaly, splenomegaly  Extremities:  no edema, redness, tenderness in the calves  Skin:  no gross lesions, rashes, induration    Assessment:        Hospital Problems           Last Modified POA    Alcohol abuse 3/23/2021 Yes    Hypertension 3/23/2021 Yes    Essential hypertension 3/23/2021 Yes    ACE inhibitor-aggravated angioedema 3/23/2021 Yes    Bipolar disorder ([facility] [address] 3/23/2021 Yes    Mild intermittent asthma without complication 8/63/1460 Yes          Plan:        Chest pressure and palpitations  -VQ scan negative    Hypertension  -Continue Norvasc and hydrochlorothiazide    Alcohol abuse  -Continue [facility] protocol    Bipolar disorder  -Psychiatry consulted  -Patient to be admitted to inpatient psych facility once medically cleared    Asthma  -Continue inhalers    UTI  -Continue Rocephin    Urinary retention  -Likely secondary to above  -Continue monitoring    Disposition: Patient was cleared for discharge medically yesterday however patient started having some urinary retention. She was noted to have UTI. She has been started on IV antibiotics. Discharge has been canceled for now.     Fracisco Robins MD  3/24/2021  11:01 AM

## 2021-03-24 NOTE — PLAN OF CARE
Problem: Falls - Risk of:  Goal: Will remain free from falls  Description: Will remain free from falls  Outcome: Ongoing  Goal: Absence of physical injury  Description: Absence of physical injury  Outcome: Ongoing     Problem: Suicide risk  Goal: Provide patient with safe environment  Description: Provide patient with safe environment  Outcome: Ongoing     Problem: Nutrition  Goal: Optimal nutrition therapy  Description: Nutrition Problem #1: Predicted inadequate energy intake  Intervention: Food and/or Nutrient Delivery: Start Oral Nutrition Supplement, Continue Current Diet  Nutritional Goals: Meet greater than 50% of estimated nutrient needs     Outcome: Ongoing     Problem: Pain:  Goal: Pain level will decrease  Description: Pain level will decrease  Outcome: Ongoing  Goal: Control of acute pain  Description: Control of acute pain  Outcome: Ongoing  Goal: Control of chronic pain  Description: Control of chronic pain  Outcome: Ongoing

## 2021-03-24 NOTE — PROGRESS NOTES
Writer messaged NP with internal medicine regarding patient's BP of 156/96. NP ordered 5mg of IV lopressor. Writer administered ordered lopressor and resulting BP was 154/91 with a heart rate of 91. Writer is awaiting further orders and will treat pt accordingly. Writer will cont to monitor.

## 2021-03-24 NOTE — VCC REMOTE MONITORING
Called. Spoke with Catrachito Stinson RN re: SB criteria, 3/4 SIRS. Pt febrile 102.9.     Thank Ernestina Ledesma [address] [place] [address] Dr   [phone]

## 2021-03-25 LAB
ABSOLUTE EOS #: 0 K/UL (ref 0–0.4)
ABSOLUTE IMMATURE GRANULOCYTE: 0 K/UL (ref 0–0.3)
ABSOLUTE LYMPH #: 0.71 K/UL (ref 1–4.8)
ABSOLUTE MONO #: 2.22 K/UL (ref 0.1–0.8)
ANION GAP SERPL CALCULATED.3IONS-SCNC: 12 MMOL/L (ref 9–17)
BASOPHILS # BLD: 0 % (ref 0–2)
BASOPHILS ABSOLUTE: 0 K/UL (ref 0–0.2)
BUN BLDV-MCNC: 11 MG/DL (ref 6–20)
BUN/CREAT BLD: ABNORMAL (ref 9–20)
CALCIUM SERPL-MCNC: 8.7 MG/DL (ref 8.6–10.4)
CHLORIDE BLD-SCNC: 97 MMOL/L (ref 98–107)
CO2: 22 MMOL/L (ref 20–31)
CREAT SERPL-MCNC: 0.75 MG/DL (ref 0.5–0.9)
DIFFERENTIAL TYPE: ABNORMAL
EOSINOPHILS RELATIVE PERCENT: 0 % (ref 1–4)
GFR AFRICAN AMERICAN: >60 ML/MIN
GFR NON-AFRICAN AMERICAN: >60 ML/MIN
GFR SERPL CREATININE-BSD FRML MDRD: ABNORMAL ML/MIN/{1.73_M2}
GFR SERPL CREATININE-BSD FRML MDRD: ABNORMAL ML/MIN/{1.73_M2}
GLUCOSE BLD-MCNC: 115 MG/DL (ref 70–99)
GLUCOSE BLD-MCNC: 118 MG/DL (ref 65–105)
GLUCOSE BLD-MCNC: 162 MG/DL (ref 65–105)
GLUCOSE BLD-MCNC: 191 MG/DL (ref 65–105)
HCT VFR BLD CALC: 29.2 % (ref 36.3–47.1)
HEMOGLOBIN: 9.7 G/DL (ref 11.9–15.1)
IMMATURE GRANULOCYTES: 0 %
LACTIC ACID, SEPSIS WHOLE BLOOD: 1.4 MMOL/L (ref 0.5–1.9)
LACTIC ACID, SEPSIS: NORMAL MMOL/L (ref 0.5–1.9)
LYMPHOCYTES # BLD: 7 % (ref 24–44)
MAGNESIUM: 1.6 MG/DL (ref 1.6–2.6)
MCH RBC QN AUTO: 30 PG (ref 25.2–33.5)
MCHC RBC AUTO-ENTMCNC: 33.2 G/DL (ref 28.4–34.8)
MCV RBC AUTO: 90.4 FL (ref 82.6–102.9)
MONOCYTES # BLD: 22 % (ref 1–7)
MORPHOLOGY: ABNORMAL
NRBC AUTOMATED: 0 PER 100 WBC
PDW BLD-RTO: 15.6 % (ref 11.8–14.4)
PLATELET # BLD: 214 K/UL (ref 138–453)
PLATELET ESTIMATE: ABNORMAL
PMV BLD AUTO: 10.5 FL (ref 8.1–13.5)
POTASSIUM SERPL-SCNC: 2.7 MMOL/L (ref 3.7–5.3)
POTASSIUM SERPL-SCNC: 3.7 MMOL/L (ref 3.7–5.3)
RBC # BLD: 3.23 M/UL (ref 3.95–5.11)
RBC # BLD: ABNORMAL 10*6/UL
SEG NEUTROPHILS: 71 % (ref 36–66)
SEGMENTED NEUTROPHILS ABSOLUTE COUNT: 7.17 K/UL (ref 1.8–7.7)
SODIUM BLD-SCNC: 131 MMOL/L (ref 135–144)
WBC # BLD: 10.1 K/UL (ref 3.5–11.3)
WBC # BLD: ABNORMAL 10*3/UL

## 2021-03-25 PROCEDURE — 6370000000 HC RX 637 (ALT 250 FOR IP): Performed by: NURSE PRACTITIONER

## 2021-03-25 PROCEDURE — 36415 COLL VENOUS BLD VENIPUNCTURE: CPT

## 2021-03-25 PROCEDURE — 83735 ASSAY OF MAGNESIUM: CPT

## 2021-03-25 PROCEDURE — 6370000000 HC RX 637 (ALT 250 FOR IP): Performed by: PSYCHIATRY & NEUROLOGY

## 2021-03-25 PROCEDURE — 2580000003 HC RX 258: Performed by: INTERNAL MEDICINE

## 2021-03-25 PROCEDURE — 51798 US URINE CAPACITY MEASURE: CPT

## 2021-03-25 PROCEDURE — 6360000002 HC RX W HCPCS: Performed by: NURSE PRACTITIONER

## 2021-03-25 PROCEDURE — 99232 SBSQ HOSP IP/OBS MODERATE 35: CPT | Performed by: INTERNAL MEDICINE

## 2021-03-25 PROCEDURE — 83605 ASSAY OF LACTIC ACID: CPT

## 2021-03-25 PROCEDURE — 94640 AIRWAY INHALATION TREATMENT: CPT

## 2021-03-25 PROCEDURE — U0005 INFEC AGEN DETEC AMPLI PROBE: HCPCS

## 2021-03-25 PROCEDURE — 80048 BASIC METABOLIC PNL TOTAL CA: CPT

## 2021-03-25 PROCEDURE — 97530 THERAPEUTIC ACTIVITIES: CPT

## 2021-03-25 PROCEDURE — 84132 ASSAY OF SERUM POTASSIUM: CPT

## 2021-03-25 PROCEDURE — 97110 THERAPEUTIC EXERCISES: CPT

## 2021-03-25 PROCEDURE — 6360000002 HC RX W HCPCS: Performed by: INTERNAL MEDICINE

## 2021-03-25 PROCEDURE — 85025 COMPLETE CBC W/AUTO DIFF WBC: CPT

## 2021-03-25 PROCEDURE — U0003 INFECTIOUS AGENT DETECTION BY NUCLEIC ACID (DNA OR RNA); SEVERE ACUTE RESPIRATORY SYNDROME CORONAVIRUS 2 (SARS-COV-2) (CORONAVIRUS DISEASE [COVID-19]), AMPLIFIED PROBE TECHNIQUE, MAKING USE OF HIGH THROUGHPUT TECHNOLOGIES AS DESCRIBED BY CMS-2020-01-R: HCPCS

## 2021-03-25 PROCEDURE — 87040 BLOOD CULTURE FOR BACTERIA: CPT

## 2021-03-25 PROCEDURE — 2060000000 HC ICU INTERMEDIATE R&B

## 2021-03-25 PROCEDURE — 82947 ASSAY GLUCOSE BLOOD QUANT: CPT

## 2021-03-25 RX ORDER — SODIUM CHLORIDE 0.9 % (FLUSH) 0.9 %
10 SYRINGE (ML) INJECTION PRN
Status: DISCONTINUED | OUTPATIENT
Start: 2021-03-25 | End: 2021-03-31 | Stop reason: HOSPADM

## 2021-03-25 RX ORDER — ALLOPURINOL 100 MG/1
100 TABLET ORAL DAILY
Status: DISCONTINUED | OUTPATIENT
Start: 2021-03-25 | End: 2021-03-31 | Stop reason: HOSPADM

## 2021-03-25 RX ORDER — SODIUM CHLORIDE 0.9 % (FLUSH) 0.9 %
10 SYRINGE (ML) INJECTION EVERY 12 HOURS SCHEDULED
Status: DISCONTINUED | OUTPATIENT
Start: 2021-03-25 | End: 2021-03-31 | Stop reason: HOSPADM

## 2021-03-25 RX ORDER — POTASSIUM CHLORIDE 20 MEQ/1
20 TABLET, EXTENDED RELEASE ORAL
Status: DISCONTINUED | OUTPATIENT
Start: 2021-03-25 | End: 2021-03-28

## 2021-03-25 RX ADMIN — ATORVASTATIN CALCIUM 20 MG: 20 TABLET, FILM COATED ORAL at 08:18

## 2021-03-25 RX ADMIN — FOLIC ACID 1 MG: 1 TABLET ORAL at 08:18

## 2021-03-25 RX ADMIN — QUETIAPINE FUMARATE 50 MG: 25 TABLET ORAL at 20:36

## 2021-03-25 RX ADMIN — CEFTRIAXONE SODIUM 1000 MG: 1 INJECTION, POWDER, FOR SOLUTION INTRAMUSCULAR; INTRAVENOUS at 10:26

## 2021-03-25 RX ADMIN — MAGNESIUM SULFATE HEPTAHYDRATE 1000 MG: 1 INJECTION, SOLUTION INTRAVENOUS at 16:28

## 2021-03-25 RX ADMIN — ENOXAPARIN SODIUM 80 MG: 80 INJECTION, SOLUTION INTRAVENOUS; SUBCUTANEOUS at 08:20

## 2021-03-25 RX ADMIN — POTASSIUM CHLORIDE 10 MEQ: 10 INJECTION, SOLUTION INTRAVENOUS at 14:00

## 2021-03-25 RX ADMIN — FLUTICASONE PROPIONATE 1 PUFF: 110 AEROSOL, METERED RESPIRATORY (INHALATION) at 20:22

## 2021-03-25 RX ADMIN — POTASSIUM CHLORIDE 10 MEQ: 10 INJECTION, SOLUTION INTRAVENOUS at 09:18

## 2021-03-25 RX ADMIN — CITALOPRAM 20 MG: 20 TABLET, FILM COATED ORAL at 08:18

## 2021-03-25 RX ADMIN — QUETIAPINE FUMARATE 50 MG: 25 TABLET ORAL at 00:07

## 2021-03-25 RX ADMIN — POTASSIUM CHLORIDE 10 MEQ: 10 INJECTION, SOLUTION INTRAVENOUS at 12:52

## 2021-03-25 RX ADMIN — SERTRALINE 25 MG: 25 TABLET, FILM COATED ORAL at 08:18

## 2021-03-25 RX ADMIN — HYDROCHLOROTHIAZIDE 12.5 MG: 25 TABLET ORAL at 08:18

## 2021-03-25 RX ADMIN — CETIRIZINE HYDROCHLORIDE 10 MG: 10 TABLET ORAL at 08:19

## 2021-03-25 RX ADMIN — ASPIRIN 325 MG: 325 TABLET, COATED ORAL at 08:19

## 2021-03-25 RX ADMIN — POTASSIUM CHLORIDE 10 MEQ: 10 INJECTION, SOLUTION INTRAVENOUS at 10:27

## 2021-03-25 RX ADMIN — Medication 100 MG: at 08:18

## 2021-03-25 RX ADMIN — ACETAMINOPHEN 650 MG: 325 TABLET ORAL at 04:07

## 2021-03-25 RX ADMIN — SODIUM CHLORIDE: 9 INJECTION, SOLUTION INTRAVENOUS at 20:36

## 2021-03-25 RX ADMIN — ENOXAPARIN SODIUM 80 MG: 80 INJECTION, SOLUTION INTRAVENOUS; SUBCUTANEOUS at 20:36

## 2021-03-25 RX ADMIN — POTASSIUM CHLORIDE 10 MEQ: 10 INJECTION, SOLUTION INTRAVENOUS at 08:15

## 2021-03-25 RX ADMIN — AMLODIPINE BESYLATE 10 MG: 10 TABLET ORAL at 08:18

## 2021-03-25 RX ADMIN — POTASSIUM CHLORIDE 10 MEQ: 10 INJECTION, SOLUTION INTRAVENOUS at 11:30

## 2021-03-25 RX ADMIN — CALCIUM CARBONATE-CHOLECALCIFEROL TAB 250 MG-125 UNIT 500 MG: 250-125 TAB at 08:19

## 2021-03-25 RX ADMIN — ERGOCALCIFEROL 50000 UNITS: 1.25 CAPSULE ORAL at 08:18

## 2021-03-25 RX ADMIN — LORAZEPAM 1 MG: 2 INJECTION INTRAMUSCULAR; INTRAVENOUS at 04:44

## 2021-03-25 RX ADMIN — MAGNESIUM SULFATE HEPTAHYDRATE 1000 MG: 1 INJECTION, SOLUTION INTRAVENOUS at 17:16

## 2021-03-25 RX ADMIN — ACETAMINOPHEN 650 MG: 325 TABLET ORAL at 20:36

## 2021-03-25 ASSESSMENT — PAIN DESCRIPTION - ORIENTATION: ORIENTATION: RIGHT;LEFT;LOWER

## 2021-03-25 ASSESSMENT — PAIN SCALES - GENERAL: PAINLEVEL_OUTOF10: 8

## 2021-03-25 ASSESSMENT — PAIN DESCRIPTION - PAIN TYPE: TYPE: ACUTE PAIN

## 2021-03-25 NOTE — PROGRESS NOTES
Weight: 172 lb (78 kg)    · Usual Body Weight: (~168 lbs per EHR)     · Ideal Body Weight: 130 lbs; % Ideal Body Weight 130 %   · BMI: 27.3  · BMI Categories: Overweight (BMI 25.0-29. 9)       Nutrition Diagnosis:   · Inadequate oral intake related to psychological cause or life stress as evidenced by intake 0-25%(Need for ONS)      Nutrition Interventions:   Food and/or Nutrient Delivery:  Continue Current Diet, Modify Oral Nutrition Supplement  Nutrition Education/Counseling:  Education not indicated   Coordination of Nutrition Care:  Continue to monitor while inpatient    Goals:  Meet greater than 50% of estimated nutrient needs       Nutrition Monitoring and Evaluation:   Food/Nutrient Intake Outcomes:  Food and Nutrient Intake, Supplement Intake  Physical Signs/Symptoms Outcomes:  Biochemical Data, Nutrition Focused Physical Findings, Skin, Weight, GI Status, Fluid Status or Edema     Discharge Planning:     Too soon to determine     Electronically signed by Stuart Jackson RD, LD on 3/25/21 at 1:01 PM EDT    Contact: [phone]

## 2021-03-25 NOTE — PROGRESS NOTES
Angioedema    Shellfish-Derived Products Anaphylaxis and Rash     ANGIOEDEMA       Current Meds:   Scheduled Meds:    allopurinol  100 mg Oral Daily    potassium chloride  20 mEq Oral Daily with breakfast    sodium chloride flush  10 mL Intravenous 2 times per day    cefTRIAXone (ROCEPHIN) IV  1,000 mg Intravenous Q24H    citalopram  20 mg Oral Daily    sertraline  25 mg Oral Daily    amLODIPine  10 mg Oral Daily    atorvastatin  20 mg Oral Daily    oyster shell calcium/vitamin D  500 mg Oral Daily    vitamin D  50,000 Units Oral Once per day on Mon Thu    fluticasone  1 puff Inhalation BID    folic acid  1 mg Oral Daily    hydroCHLOROthiazide  12.5 mg Oral QAM    cetirizine  10 mg Oral Daily    pantoprazole  40 mg Oral QAM AC    QUEtiapine  50 mg Oral Nightly    thiamine  100 mg Oral Daily    sodium chloride flush  10 mL Intravenous 2 times per day    aspirin  325 mg Oral Daily    nicotine  1 patch Transdermal Daily    insulin lispro  0-6 Units Subcutaneous TID WC    insulin lispro  0-3 Units Subcutaneous Nightly    enoxaparin  1 mg/kg Subcutaneous BID     Continuous Infusions:    dextrose      sodium chloride 100 mL/hr at 03/24/21 2156     PRN Meds: sodium chloride flush, metoprolol, lidocaine, albuterol, glucose, dextrose, glucagon (rDNA), dextrose, sodium chloride flush, promethazine **OR** ondansetron, acetaminophen **OR** acetaminophen, magnesium hydroxide, potassium chloride **OR** potassium alternative oral replacement **OR** potassium chloride, potassium chloride, magnesium sulfate, nitroGLYCERIN, perflutren lipid microspheres, LORazepam **OR** LORazepam **OR** LORazepam **OR** LORazepam **OR** LORazepam **OR** LORazepam **OR** LORazepam **OR** LORazepam, albuterol    Data:     Past Medical History:   has a past medical history of Angioedema, Anxiety, Asthma, Bipolar disorder (HCC), Claustrophobia, Depression, GERD (gastroesophageal reflux disease), Hypertension, Hypertrophic cardiomyopathy ([facility] [address] Lung nodules, MRSA (methicillin resistant Staphylococcus aureus), Murmur, OA (osteoarthritis), JONES (obstructive sleep apnea), Thyroid nodule, and Type 2 diabetes mellitus without complication, without long-term current use of insulin ([facility] [address] Social History:   reports that she quit smoking about 28 years ago. Her smoking use included cigarettes. She has a 10.00 pack-year smoking history. She has never used smokeless tobacco. She reports previous alcohol use of about 12.0 standard drinks of alcohol per week. She reports previous drug use. Drug: Cocaine. Family History:   Family History   Problem Relation Age of Onset    Stroke Mother     Hypertension Father     Cancer Brother         bone    Lung Cancer Maternal Aunt     Cancer Maternal Grandmother         eye     Other Sister         aneurysm    Heart Disease Sister        Vitals:  BP (!) 132/92   Pulse 93   Temp 100.9 °F (38.3 °C) (Oral)   Resp 24   Ht 5' 6\" (1.676 m)   Wt 169 lb 5 oz (76.8 kg)   SpO2 99%   BMI 27.33 kg/m²   Temp (24hrs), Av.2 °F (38.4 °C), Min:99.6 °F (37.6 °C), Max:102.6 °F (39.2 °C)    Recent Labs     21  1606 21  0654 21  1139   POCGLU 160* 142* 118* 162*       I/O (24Hr):     Intake/Output Summary (Last 24 hours) at 3/25/2021 1250  Last data filed at 3/25/2021 0700  Gross per 24 hour   Intake 2034 ml   Output --   Net 2034 ml       Labs:  Hematology:  Recent Labs     21  0425 21  0715 21  0703   WBC 8.4 9.9 10.1   RBC 3.32* 3.53* 3.23*   HGB 10.0* 10.7* 9.7*   HCT 31.2* 32.5* 29.2*   MCV 94.0 92.1 90.4   MCH 30.1 30.3 30.0   MCHC 32.1 32.9 33.2   RDW 15.9* 15.9* 15.6*    192 214   MPV 10.2 11.3 10.5     Chemistry:  Recent Labs     21  0425 21  2336 21  0715 21  0703   *  --  127* 131*   K 3.2* 3.1* 3.6* 2.7*   CL 98  --  94* 97*   CO2 23  --  23 22   GLUCOSE 108*  --  111* 115*   BUN 8  --  10 11   CREATININE 0.73  --  0.63 0.75   MG 1.4* 2.0  --  1.6   ANIONGAP 12  --  10 12   LABGLOM >60  --  >60 >60   GFRAA >60  --  >60 >60   CALCIUM 9.0  --  9.3 8.7     Recent Labs     03/24/21  0719 03/24/21  1123 03/24/21  1606 03/24/21 2009 03/25/21  0654 03/25/21  1139   POCGLU 119* 163* 160* 142* 118* 162*     ABG:  Lab Results   Component Value Date    FIO2 INFORMATION NOT PROVIDED 09/20/2020     Lab Results   Component Value Date/Time    SPECIAL L HAND 11ML 03/23/2021 09:02 PM     Lab Results   Component Value Date/Time    CULTURE NO GROWTH 1 DAY 03/23/2021 09:02 PM       Radiology:  Xr Chest Portable    Result Date: 3/20/2021  No acute process.        Physical Examination:        General appearance:  alert, cooperative and no distress  Mental Status:  oriented to person, place and time and normal affect  Lungs:  clear to auscultation bilaterally, normal effort  Heart:  regular rate and rhythm, no murmur  Abdomen:  soft, nontender, nondistended, normal bowel sounds, no masses, hepatomegaly, splenomegaly  Extremities:  no edema, redness, tenderness in the calves  Skin:  no gross lesions, rashes, induration    Assessment:        Hospital Problems           Last Modified POA    Alcohol abuse 3/23/2021 Yes    Hypertension 3/23/2021 Yes    Essential hypertension 3/23/2021 Yes    ACE inhibitor-aggravated angioedema 3/23/2021 Yes    Bipolar disorder ([facility] [address] 3/23/2021 Yes    Mild intermittent asthma without complication 2/16/7944 Yes          Plan:        Chest pressure and palpitations  -VQ scan negative    Hypertension  -Continue Norvasc and hydrochlorothiazide    Alcohol abuse  -Continue [facility] protocol    Bipolar disorder  -Psychiatry consulted  -Patient to be admitted to inpatient psych facility once medically cleared    Asthma  -Continue inhalers    UTI  -Continue Rocephin    Urinary retention  -Likely secondary to above  -Continue monitoring  -Resolving    Gout  -Allopurinol started    Disposition: Patient was cleared for discharge medically yesterday however patient started having some urinary retention. She was noted to have UTI. She has been started on IV antibiotics. Discharge has been canceled for now.     Derald Jeans, MD  3/25/2021  12:50 PM

## 2021-03-25 NOTE — PROGRESS NOTES
Physical Therapy  Facility/Department: [facility] CAR 3  Daily Treatment Note  NAME: Chioma Macias  : 1965  MRN: [id number]    Date of Service: 3/25/2021    Discharge Recommendations: Further therapy recommended at discharge. PT Equipment Recommendations  Other: Pt currently unsafe to perform functional mobility without skilled assistance. CTA pending progression of mobility. Assessment   Body structures, Functions, Activity limitations: Decreased functional mobility ; Increased pain;Decreased balance;Decreased ROM; Decreased strength;Decreased high-level IADLs;Decreased safe awareness;Decreased cognition;Decreased endurance  Assessment: Pt performed STS from elevated bed height ModA x2 demonstrating poor balance and decreased standing tolerance. Pt is currently a high fall risk and unsafe to return to prior living arrangements d/t decreased strength, decreased ROM, significant pain, decreased balance, and poor safety awareness. Pt would benefit from continued skilled physical therapy to address these deficits. Prognosis: Guarded  Decision Making: High Complexity  PT Education: General Safety;Transfer Training  Patient Education: Pt educated in supine bed exercises including proper frequency to perform throughout admission. Pt verbalized understanding. Barriers to Learning: none  REQUIRES PT FOLLOW UP: Yes  Activity Tolerance  Activity Tolerance: Patient limited by fatigue;Patient limited by pain     Patient Diagnosis(es): The primary encounter diagnosis was Hypertension, unspecified type. Diagnoses of Dizziness, Palpitations, and Hypomagnesemia were also pertinent to this visit.      has a past medical history of Angioedema, Anxiety, Asthma, Bipolar disorder ([address] Claustrophobia, Depression, GERD (gastroesophageal reflux disease), Hypertension, Hypertrophic cardiomyopathy ([address] Lung nodules, MRSA (methicillin resistant Staphylococcus aureus), Murmur, OA (osteoarthritis), JONES (obstructive sleep apnea), errors  Initiation: Requires cues for some  Sequencing: Requires cues for some  Cognition Comment: Pt demonstrating difficulty maintaining alertness throughout eval this date requiring max verbal cueing to maintain eyes open to writer throughout with poor return demo. Objective   Bed mobility  Rolling to Right: Moderate assistance  Supine to Sit: Moderate assistance;2 Person assistance(Assist for BLE and trunk progression)  Sit to Supine: Moderate assistance;2 Person assistance(Assist for trunk and BLE progression)  Scooting: Maximal assistance  Comment: Pt requiring increased time to perform bed mobility this date with significant pain throughout. Pt requiring max verbal cueing for proper breathing techniques throughout mobility for pain relief with fair return demo. Pt sat EOB ~8min this date requiring CGA for sitting balance throughout. Pt rolled to R x2 this date to use bedpan at end of session requiring ModA to maintain sidelying throughout. Transfers  Sit to Stand: Moderate Assistance;2 Person Assistance  Stand to sit: Moderate Assistance;2 Person Assistance  Comment: Pt attempted STS transfer from lowered bed height demonstrating inability to achieve standing position despite MaxA x2. Pt performed STS transfer from elevated bed height with ModA x2 demonstrating poor balance throughout and requiring max verbal and tactile cueing for proper hand placement throughout with poor return demo. Pt stood ~1min this date demonstrating a significant posterior lean requiring max verbal and tactile cueing to correct with poor return demo.   Ambulation  Ambulation?: No(Ambulation not attempted this date d/t poor standing tolerance.)  More Ambulation?: No  Stairs/Curb  Stairs?: No     Balance  Posture: Fair  Sitting - Static: Good;-  Sitting - Dynamic: Fair;+  Standing - Static: Poor;+  Standing - Dynamic: Poor  Comments: Standing balance assessed with RW  Exercises  Quad Sets: x 10 BLE  Heelslides: AAROM x 10 Evaluation/treatment performed by Student PT under the supervision of co-signing PT who agrees with all evaluation/treatment and documentation.

## 2021-03-25 NOTE — CARE COORDINATION
Consult received this date re: alcohol abuse  Chart reviewed  Met with pt this date , she kept her eyes closed during conversation  Pt states she drinks a 6pk of beer everyday. Denies any drug use. Pt states she has been drinking alcohol for a very longtime. Past rehab at [address] [place] [address] and [facility]. states she is presently linked with [facility] and sees psychiatrist Dr Lizbeth Aguirre. Last appt 2-3weeks ago via video chat/Skype  Pt states she plans to continue current treatment at [address] [place] [address] and possibly  seek alcohol treatment there also. Pt declined any additional alcohol tx resources. Psych input noted with plan for [facility] once medically cleared.

## 2021-03-26 LAB
ABSOLUTE EOS #: 0 K/UL (ref 0–0.4)
ABSOLUTE IMMATURE GRANULOCYTE: 0 K/UL (ref 0–0.3)
ABSOLUTE LYMPH #: 0.91 K/UL (ref 1–4.8)
ABSOLUTE MONO #: 1.11 K/UL (ref 0.1–0.8)
ANION GAP SERPL CALCULATED.3IONS-SCNC: 13 MMOL/L (ref 9–17)
BASOPHILS # BLD: 0 % (ref 0–2)
BASOPHILS ABSOLUTE: 0 K/UL (ref 0–0.2)
BUN BLDV-MCNC: 9 MG/DL (ref 6–20)
BUN/CREAT BLD: ABNORMAL (ref 9–20)
CALCIUM SERPL-MCNC: 8.7 MG/DL (ref 8.6–10.4)
CHLORIDE BLD-SCNC: 95 MMOL/L (ref 98–107)
CO2: 21 MMOL/L (ref 20–31)
CREAT SERPL-MCNC: 0.7 MG/DL (ref 0.5–0.9)
DIFFERENTIAL TYPE: ABNORMAL
EOSINOPHILS RELATIVE PERCENT: 0 % (ref 1–4)
GFR AFRICAN AMERICAN: >60 ML/MIN
GFR NON-AFRICAN AMERICAN: >60 ML/MIN
GFR SERPL CREATININE-BSD FRML MDRD: ABNORMAL ML/MIN/{1.73_M2}
GFR SERPL CREATININE-BSD FRML MDRD: ABNORMAL ML/MIN/{1.73_M2}
GLUCOSE BLD-MCNC: 101 MG/DL (ref 65–105)
GLUCOSE BLD-MCNC: 106 MG/DL (ref 65–105)
GLUCOSE BLD-MCNC: 127 MG/DL (ref 70–99)
GLUCOSE BLD-MCNC: 130 MG/DL (ref 65–105)
GLUCOSE BLD-MCNC: 152 MG/DL (ref 65–105)
HCT VFR BLD CALC: 28 % (ref 36.3–47.1)
HEMOGLOBIN: 9.3 G/DL (ref 11.9–15.1)
IMMATURE GRANULOCYTES: 0 %
LYMPHOCYTES # BLD: 9 % (ref 24–44)
MAGNESIUM: 1.6 MG/DL (ref 1.6–2.6)
MCH RBC QN AUTO: 30.5 PG (ref 25.2–33.5)
MCHC RBC AUTO-ENTMCNC: 33.2 G/DL (ref 28.4–34.8)
MCV RBC AUTO: 91.8 FL (ref 82.6–102.9)
MONOCYTES # BLD: 11 % (ref 1–7)
MORPHOLOGY: ABNORMAL
NRBC AUTOMATED: 0 PER 100 WBC
PDW BLD-RTO: 16 % (ref 11.8–14.4)
PLATELET # BLD: 266 K/UL (ref 138–453)
PLATELET ESTIMATE: ABNORMAL
PMV BLD AUTO: 10.2 FL (ref 8.1–13.5)
POTASSIUM SERPL-SCNC: 3.1 MMOL/L (ref 3.7–5.3)
RBC # BLD: 3.05 M/UL (ref 3.95–5.11)
RBC # BLD: ABNORMAL 10*6/UL
SARS-COV-2: NORMAL
SARS-COV-2: NOT DETECTED
SEG NEUTROPHILS: 80 % (ref 36–66)
SEGMENTED NEUTROPHILS ABSOLUTE COUNT: 8.08 K/UL (ref 1.8–7.7)
SODIUM BLD-SCNC: 129 MMOL/L (ref 135–144)
SOURCE: NORMAL
WBC # BLD: 10.1 K/UL (ref 3.5–11.3)
WBC # BLD: ABNORMAL 10*3/UL

## 2021-03-26 PROCEDURE — 2060000000 HC ICU INTERMEDIATE R&B

## 2021-03-26 PROCEDURE — 80048 BASIC METABOLIC PNL TOTAL CA: CPT

## 2021-03-26 PROCEDURE — 94640 AIRWAY INHALATION TREATMENT: CPT

## 2021-03-26 PROCEDURE — 36415 COLL VENOUS BLD VENIPUNCTURE: CPT

## 2021-03-26 PROCEDURE — 94761 N-INVAS EAR/PLS OXIMETRY MLT: CPT

## 2021-03-26 PROCEDURE — 6360000002 HC RX W HCPCS: Performed by: NURSE PRACTITIONER

## 2021-03-26 PROCEDURE — 82947 ASSAY GLUCOSE BLOOD QUANT: CPT

## 2021-03-26 PROCEDURE — 99233 SBSQ HOSP IP/OBS HIGH 50: CPT | Performed by: INTERNAL MEDICINE

## 2021-03-26 PROCEDURE — 2580000003 HC RX 258: Performed by: INTERNAL MEDICINE

## 2021-03-26 PROCEDURE — 6370000000 HC RX 637 (ALT 250 FOR IP): Performed by: PSYCHIATRY & NEUROLOGY

## 2021-03-26 PROCEDURE — 85025 COMPLETE CBC W/AUTO DIFF WBC: CPT

## 2021-03-26 PROCEDURE — 83735 ASSAY OF MAGNESIUM: CPT

## 2021-03-26 PROCEDURE — APPSS30 APP SPLIT SHARED TIME 16-30 MINUTES: Performed by: NURSE PRACTITIONER

## 2021-03-26 PROCEDURE — 6360000002 HC RX W HCPCS: Performed by: INTERNAL MEDICINE

## 2021-03-26 PROCEDURE — 6370000000 HC RX 637 (ALT 250 FOR IP): Performed by: NURSE PRACTITIONER

## 2021-03-26 PROCEDURE — 99254 IP/OBS CNSLTJ NEW/EST MOD 60: CPT | Performed by: INTERNAL MEDICINE

## 2021-03-26 PROCEDURE — 6370000000 HC RX 637 (ALT 250 FOR IP): Performed by: INTERNAL MEDICINE

## 2021-03-26 RX ORDER — ALBUTEROL SULFATE 90 UG/1
2 AEROSOL, METERED RESPIRATORY (INHALATION) EVERY 6 HOURS PRN
Status: DISCONTINUED | OUTPATIENT
Start: 2021-03-26 | End: 2021-03-31 | Stop reason: HOSPADM

## 2021-03-26 RX ADMIN — Medication 100 MG: at 08:03

## 2021-03-26 RX ADMIN — ALLOPURINOL 100 MG: 100 TABLET ORAL at 08:06

## 2021-03-26 RX ADMIN — CITALOPRAM 20 MG: 20 TABLET, FILM COATED ORAL at 08:04

## 2021-03-26 RX ADMIN — HYDROCHLOROTHIAZIDE 12.5 MG: 25 TABLET ORAL at 08:04

## 2021-03-26 RX ADMIN — LORAZEPAM 1 MG: 2 INJECTION INTRAMUSCULAR; INTRAVENOUS at 21:33

## 2021-03-26 RX ADMIN — FLUTICASONE PROPIONATE 1 PUFF: 110 AEROSOL, METERED RESPIRATORY (INHALATION) at 09:28

## 2021-03-26 RX ADMIN — MAGNESIUM SULFATE HEPTAHYDRATE 1000 MG: 1 INJECTION, SOLUTION INTRAVENOUS at 11:10

## 2021-03-26 RX ADMIN — ACETAMINOPHEN 650 MG: 325 TABLET ORAL at 18:20

## 2021-03-26 RX ADMIN — ACETAMINOPHEN 650 MG: 325 TABLET ORAL at 23:36

## 2021-03-26 RX ADMIN — QUETIAPINE FUMARATE 50 MG: 25 TABLET ORAL at 23:36

## 2021-03-26 RX ADMIN — POTASSIUM CHLORIDE 40 MEQ: 1500 TABLET, EXTENDED RELEASE ORAL at 09:58

## 2021-03-26 RX ADMIN — CALCIUM CARBONATE-CHOLECALCIFEROL TAB 250 MG-125 UNIT 500 MG: 250-125 TAB at 08:04

## 2021-03-26 RX ADMIN — POTASSIUM CHLORIDE 20 MEQ: 1500 TABLET, EXTENDED RELEASE ORAL at 08:03

## 2021-03-26 RX ADMIN — ATORVASTATIN CALCIUM 20 MG: 20 TABLET, FILM COATED ORAL at 08:04

## 2021-03-26 RX ADMIN — ENOXAPARIN SODIUM 80 MG: 80 INJECTION, SOLUTION INTRAVENOUS; SUBCUTANEOUS at 08:04

## 2021-03-26 RX ADMIN — MAGNESIUM SULFATE HEPTAHYDRATE 1000 MG: 1 INJECTION, SOLUTION INTRAVENOUS at 09:59

## 2021-03-26 RX ADMIN — FLUTICASONE PROPIONATE 1 PUFF: 110 AEROSOL, METERED RESPIRATORY (INHALATION) at 20:03

## 2021-03-26 RX ADMIN — ASPIRIN 325 MG: 325 TABLET, COATED ORAL at 08:04

## 2021-03-26 RX ADMIN — AMLODIPINE BESYLATE 10 MG: 10 TABLET ORAL at 08:04

## 2021-03-26 RX ADMIN — ACETAMINOPHEN 650 MG: 325 TABLET ORAL at 05:50

## 2021-03-26 RX ADMIN — FOLIC ACID 1 MG: 1 TABLET ORAL at 08:05

## 2021-03-26 RX ADMIN — SERTRALINE 25 MG: 25 TABLET, FILM COATED ORAL at 08:04

## 2021-03-26 RX ADMIN — SODIUM CHLORIDE: 9 INJECTION, SOLUTION INTRAVENOUS at 21:37

## 2021-03-26 RX ADMIN — CEFTRIAXONE SODIUM 1000 MG: 1 INJECTION, POWDER, FOR SOLUTION INTRAMUSCULAR; INTRAVENOUS at 08:05

## 2021-03-26 RX ADMIN — PANTOPRAZOLE SODIUM 40 MG: 40 TABLET, DELAYED RELEASE ORAL at 08:04

## 2021-03-26 RX ADMIN — CETIRIZINE HYDROCHLORIDE 10 MG: 10 TABLET ORAL at 08:04

## 2021-03-26 ASSESSMENT — PAIN SCALES - GENERAL
PAINLEVEL_OUTOF10: 0
PAINLEVEL_OUTOF10: 5
PAINLEVEL_OUTOF10: 4

## 2021-03-26 ASSESSMENT — PAIN - FUNCTIONAL ASSESSMENT: PAIN_FUNCTIONAL_ASSESSMENT: PREVENTS OR INTERFERES SOME ACTIVE ACTIVITIES AND ADLS

## 2021-03-26 ASSESSMENT — PAIN DESCRIPTION - ONSET: ONSET: GRADUAL

## 2021-03-26 ASSESSMENT — PAIN DESCRIPTION - FREQUENCY: FREQUENCY: INTERMITTENT

## 2021-03-26 ASSESSMENT — PAIN DESCRIPTION - LOCATION
LOCATION: GENERALIZED
LOCATION: GENERALIZED

## 2021-03-26 ASSESSMENT — PAIN DESCRIPTION - DESCRIPTORS: DESCRIPTORS: ACHING;SORE

## 2021-03-26 NOTE — PROGRESS NOTES
The Respiratory Therapy Department completed the Respiratory Care evaluation. No therapy is indicated at this time. The reason therapy is not indicated is due to the following:     [] Patient does not meet indications for therapy. [x] Patient has returned to their home regimen.    [] Patient frequency has changed to prn, nursing to assess and call if needed  . Respiratory Care will not see this patient further unless otherwise requested. Please call the respiratory care charge therapist with questions or concerns at extension [phone].  Thank you for using the Respiratory Therapy Protocol Program.    ANASTASIA MORENO   11:17 AM
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
## 2021-03-26 NOTE — PROGRESS NOTES
[facility]  Office: [address], DO, Michi Diaz, DO, Nikita Kajal, DO, Tulio Stewart Tessa, DO, Lonny Kaplan MD, Jacqueline Bailey MD, Mary Rodriguez MD, Willette Klinefelter, MD, Serenity Sánchez MD, Hernesto Craig MD, Nita Greco MD, Kev Thorne MD, Hoang Ortiz MD, Phan Morse, DO, Easton Walker MD, Humble Eller, DO, Ty Schulz MD,  Aldo Shay, DO, Maico Zurita MD, Fracisco Robins MD, Xochilt Church, [facility], [facility], CNP, Geoffrey Hemp, CNP, Ermelinda Izaguirre, CNS, Yuridia Manriquez, CNP, Colten January, CNP, [place], CNP, Cm Villeda, CNP, Neva Siu, CNP, Gurwinder Tomas PA-C, Jong Robledo, [facility], Yon Carvajal, CNP, Carla Tavera, CNP, Evi Nguyen, CNP, Marland Cowden, CNP, Karie Haq, CNP, Casandra Sanchez, [address] [place] [address]    Progress Note    3/26/2021    11:11 AM    Name:   Shashank Velasco  MRN:     [id number]     Acct:      [de-identified]   Room:   [address] [place] [address] Day:  5  Admit Date:  3/20/2021 10:51 PM    PCP:   Viry Parsons MD  Code Status:  Full Code    Subjective:     C/C:   Chief Complaint   Patient presents with    Palpitations    Hypertension     Interval History Status: improved. Patient was seen and evaluated at bedside this morning. She reports feeling slightly better this morning. Brief History:     See H&P    Review of Systems:     Constitutional:  negative for chills, fevers, sweats  Respiratory:  negative for cough, dyspnea on exertion, shortness of breath, wheezing  Cardiovascular:  negative for chest pain, chest pressure/discomfort, lower extremity edema, palpitations  Gastrointestinal:  negative for abdominal pain, constipation, diarrhea, nausea, vomiting  Neurological:  negative for dizziness, headache    Medications: Allergies:     Allergies   Allergen Reactions    Bee Venom Anaphylaxis    Iodine Anaphylaxis and Rash    Lisinopril Swelling and Anaphylaxis Angioedema    Shellfish-Derived Products Anaphylaxis and Rash     ANGIOEDEMA       Current Meds:   Scheduled Meds:    allopurinol  100 mg Oral Daily    potassium chloride  20 mEq Oral Daily with breakfast    sodium chloride flush  10 mL Intravenous 2 times per day    cefTRIAXone (ROCEPHIN) IV  1,000 mg Intravenous Q24H    citalopram  20 mg Oral Daily    sertraline  25 mg Oral Daily    amLODIPine  10 mg Oral Daily    atorvastatin  20 mg Oral Daily    oyster shell calcium/vitamin D  500 mg Oral Daily    vitamin D  50,000 Units Oral Once per day on Mon Thu    fluticasone  1 puff Inhalation BID    folic acid  1 mg Oral Daily    hydroCHLOROthiazide  12.5 mg Oral QAM    cetirizine  10 mg Oral Daily    pantoprazole  40 mg Oral QAM AC    QUEtiapine  50 mg Oral Nightly    thiamine  100 mg Oral Daily    sodium chloride flush  10 mL Intravenous 2 times per day    aspirin  325 mg Oral Daily    nicotine  1 patch Transdermal Daily    insulin lispro  0-6 Units Subcutaneous TID WC    insulin lispro  0-3 Units Subcutaneous Nightly    enoxaparin  1 mg/kg Subcutaneous BID     Continuous Infusions:    dextrose      sodium chloride 100 mL/hr at 03/25/21 2036     PRN Meds: sodium chloride flush, metoprolol, lidocaine, albuterol, glucose, dextrose, glucagon (rDNA), dextrose, sodium chloride flush, promethazine **OR** ondansetron, acetaminophen **OR** acetaminophen, magnesium hydroxide, potassium chloride **OR** potassium alternative oral replacement **OR** potassium chloride, potassium chloride, magnesium sulfate, nitroGLYCERIN, perflutren lipid microspheres, LORazepam **OR** LORazepam **OR** LORazepam **OR** LORazepam **OR** LORazepam **OR** LORazepam **OR** LORazepam **OR** LORazepam, albuterol    Data:     Past Medical History:   has a past medical history of Angioedema, Anxiety, Asthma, Bipolar disorder (HCC), Claustrophobia, Depression, GERD (gastroesophageal reflux disease), Hypertension, Hypertrophic cardiomyopathy ([facility] [address] Lung nodules, MRSA (methicillin resistant Staphylococcus aureus), Murmur, OA (osteoarthritis), JONES (obstructive sleep apnea), Thyroid nodule, and Type 2 diabetes mellitus without complication, without long-term current use of insulin ([facility] [address] Social History:   reports that she quit smoking about 28 years ago. Her smoking use included cigarettes. She has a 10.00 pack-year smoking history. She has never used smokeless tobacco. She reports previous alcohol use of about 12.0 standard drinks of alcohol per week. She reports previous drug use. Drug: Cocaine. Family History:   Family History   Problem Relation Age of Onset    Stroke Mother     Hypertension Father     Cancer Brother         bone    Lung Cancer Maternal Aunt     Cancer Maternal Grandmother         eye     Other Sister         aneurysm    Heart Disease Sister        Vitals:  /83   Pulse 102   Temp 99.3 °F (37.4 °C) (Oral)   Resp 28   Ht 5' 6\" (1.676 m)   Wt 169 lb 5 oz (76.8 kg)   SpO2 97%   BMI 27.33 kg/m²   Temp (24hrs), Av.7 °F (38.2 °C), Min:98.5 °F (36.9 °C), Max:102.9 °F (39.4 °C)    Recent Labs     21  0654 21  1139 21  1950 21  0754   POCGLU 118* 162* 191* 130*       I/O (24Hr):     Intake/Output Summary (Last 24 hours) at 3/26/2021 1111  Last data filed at 3/26/2021 0409  Gross per 24 hour   Intake 1065 ml   Output 875 ml   Net 190 ml       Labs:  Hematology:  Recent Labs     21  0715 21  0703 21  0716   WBC 9.9 10.1 10.1   RBC 3.53* 3.23* 3.05*   HGB 10.7* 9.7* 9.3*   HCT 32.5* 29.2* 28.0*   MCV 92.1 90.4 91.8   MCH 30.3 30.0 30.5   MCHC 32.9 33.2 33.2   RDW 15.9* 15.6* 16.0*    214 266   MPV 11.3 10.5 10.2     Chemistry:  Recent Labs     21  2336 21  0715 21  0703 21  1521 21  0716   NA  --  127* 131*  --  129*   K 3.1* 3.6* 2.7* 3.7 3.1*   CL  --  94* 97*  --  95*   CO2  --  -     GLUCOSE  --  111* 115*  -- 127*   BUN  --  10 11  --  9   CREATININE  --  0.63 0.75  --  0.70   MG 2.0  --  1.6  --  1.6   ANIONGAP  --  10 12  --  13   LABGLOM  --  >60 >60  --  >60   GFRAA  --  >60 >60  --  >60   CALCIUM  --  9.3 8.7  --  8.7     Recent Labs     03/24/21  1606 03/24/21 2009 03/25/21  0654 03/25/21  1139 03/25/21  1950 03/26/21  0754   POCGLU 160* 142* 118* 162* 191* 130*     ABG:  Lab Results   Component Value Date    FIO2 INFORMATION NOT PROVIDED 09/20/2020     Lab Results   Component Value Date/Time    SPECIAL R HAND 11 ML 03/25/2021 07:03 PM     Lab Results   Component Value Date/Time    CULTURE NO GROWTH 11 HOURS 03/25/2021 07:03 PM       Radiology:  Xr Chest Portable    Result Date: 3/20/2021  No acute process.        Physical Examination:        General appearance:  alert, cooperative and no distress  Mental Status:  oriented to person, place and time and normal affect  Lungs:  clear to auscultation bilaterally, normal effort  Heart:  regular rate and rhythm, no murmur  Abdomen:  soft, nontender, nondistended, normal bowel sounds, no masses, hepatomegaly, splenomegaly  Extremities:  no edema, redness, tenderness in the calves  Skin:  no gross lesions, rashes, induration    Assessment:        Hospital Problems           Last Modified POA    Alcohol abuse 3/23/2021 Yes    Hypertension 3/23/2021 Yes    Essential hypertension 3/23/2021 Yes    ACE inhibitor-aggravated angioedema 3/23/2021 Yes    Bipolar disorder ([facility] [address] 3/23/2021 Yes    Mild intermittent asthma without complication 0/07/3400 Yes          Plan:        Chest pressure and palpitations  -VQ scan negative    Hypertension  -Continue Norvasc and hydrochlorothiazide    Alcohol abuse  -Continue [facility] protocol    Bipolar disorder  -Psychiatry consulted  -Patient to be admitted to inpatient psych facility once medically cleared    Asthma  -Continue inhalers    UTI  -Continue Rocephin  -Pt with persistent fevers despite being on antibiotics  -Consult ID    Persistent fevers  - ID consulted    Urinary retention  -Likely secondary to above  -Continue monitoring  -Resolving    Hypokalemia  - replaced    Gout  -Allopurinol started    Disposition: Patient was cleared for discharge medically yesterday however patient started having some urinary retention. She was noted to have UTI. She has been started on IV antibiotics. Discharge has been canceled for now.     Aliyah Obregon MD  3/26/2021  11:11 AM

## 2021-03-26 NOTE — PLAN OF CARE
Problem: Falls - Risk of:  Goal: Will remain free from falls  Description: Will remain free from falls  Outcome: Ongoing  Goal: Absence of physical injury  Description: Absence of physical injury  Outcome: Ongoing     Problem: Suicide risk  Goal: Provide patient with safe environment  Description: Provide patient with safe environment  Outcome: Ongoing     Problem: Nutrition  Goal: Optimal nutrition therapy  Description: Nutrition Problem #1: Predicted inadequate energy intake  Intervention: Food and/or Nutrient Delivery: Start Oral Nutrition Supplement, Continue Current Diet  Nutritional Goals: Meet greater than 50% of estimated nutrient needs     Outcome: Ongoing     Problem: Pain:  Goal: Pain level will decrease  Description: Pain level will decrease  Outcome: Ongoing  Goal: Control of acute pain  Description: Control of acute pain  Outcome: Ongoing  Goal: Control of chronic pain  Description: Control of chronic pain  Outcome: Ongoing     Problem: Skin Integrity:  Goal: Will show no infection signs and symptoms  Description: Will show no infection signs and symptoms  Outcome: Ongoing  Goal: Absence of new skin breakdown  Description: Absence of new skin breakdown  Outcome: Ongoing

## 2021-03-27 LAB
ABSOLUTE EOS #: 0 K/UL (ref 0–0.44)
ABSOLUTE IMMATURE GRANULOCYTE: 0.13 K/UL (ref 0–0.3)
ABSOLUTE LYMPH #: 1.03 K/UL (ref 1.1–3.7)
ABSOLUTE MONO #: 2.06 K/UL (ref 0.1–1.2)
ANION GAP SERPL CALCULATED.3IONS-SCNC: 13 MMOL/L (ref 9–17)
BASOPHILS # BLD: 0 % (ref 0–2)
BASOPHILS ABSOLUTE: 0 K/UL (ref 0–0.2)
BUN BLDV-MCNC: 8 MG/DL (ref 6–20)
BUN/CREAT BLD: ABNORMAL (ref 9–20)
CALCIUM SERPL-MCNC: 8.8 MG/DL (ref 8.6–10.4)
CHLORIDE BLD-SCNC: 96 MMOL/L (ref 98–107)
CO2: 21 MMOL/L (ref 20–31)
CREAT SERPL-MCNC: 0.57 MG/DL (ref 0.5–0.9)
DIFFERENTIAL TYPE: ABNORMAL
EOSINOPHILS RELATIVE PERCENT: 0 % (ref 1–4)
GFR AFRICAN AMERICAN: >60 ML/MIN
GFR NON-AFRICAN AMERICAN: >60 ML/MIN
GFR SERPL CREATININE-BSD FRML MDRD: ABNORMAL ML/MIN/{1.73_M2}
GFR SERPL CREATININE-BSD FRML MDRD: ABNORMAL ML/MIN/{1.73_M2}
GLUCOSE BLD-MCNC: 102 MG/DL (ref 70–99)
GLUCOSE BLD-MCNC: 107 MG/DL (ref 65–105)
GLUCOSE BLD-MCNC: 109 MG/DL (ref 65–105)
GLUCOSE BLD-MCNC: 110 MG/DL (ref 65–105)
HCT VFR BLD CALC: 26.2 % (ref 36.3–47.1)
HEMOGLOBIN: 8.7 G/DL (ref 11.9–15.1)
IMMATURE GRANULOCYTES: 1 %
LYMPHOCYTES # BLD: 8 % (ref 24–43)
MAGNESIUM: 1.7 MG/DL (ref 1.6–2.6)
MCH RBC QN AUTO: 30.2 PG (ref 25.2–33.5)
MCHC RBC AUTO-ENTMCNC: 33.2 G/DL (ref 28.4–34.8)
MCV RBC AUTO: 91 FL (ref 82.6–102.9)
MONOCYTES # BLD: 16 % (ref 3–12)
MORPHOLOGY: ABNORMAL
NRBC AUTOMATED: 0 PER 100 WBC
PDW BLD-RTO: 16.1 % (ref 11.8–14.4)
PLATELET # BLD: 334 K/UL (ref 138–453)
PLATELET ESTIMATE: ABNORMAL
PMV BLD AUTO: 9.7 FL (ref 8.1–13.5)
POTASSIUM SERPL-SCNC: 3.1 MMOL/L (ref 3.7–5.3)
RBC # BLD: 2.88 M/UL (ref 3.95–5.11)
RBC # BLD: ABNORMAL 10*6/UL
SEG NEUTROPHILS: 75 % (ref 36–65)
SEGMENTED NEUTROPHILS ABSOLUTE COUNT: 9.68 K/UL (ref 1.5–8.1)
SODIUM BLD-SCNC: 130 MMOL/L (ref 135–144)
WBC # BLD: 12.9 K/UL (ref 3.5–11.3)
WBC # BLD: ABNORMAL 10*3/UL

## 2021-03-27 PROCEDURE — 2060000000 HC ICU INTERMEDIATE R&B

## 2021-03-27 PROCEDURE — 80048 BASIC METABOLIC PNL TOTAL CA: CPT

## 2021-03-27 PROCEDURE — 99232 SBSQ HOSP IP/OBS MODERATE 35: CPT | Performed by: INTERNAL MEDICINE

## 2021-03-27 PROCEDURE — 85025 COMPLETE CBC W/AUTO DIFF WBC: CPT

## 2021-03-27 PROCEDURE — 94640 AIRWAY INHALATION TREATMENT: CPT

## 2021-03-27 PROCEDURE — 6370000000 HC RX 637 (ALT 250 FOR IP): Performed by: NURSE PRACTITIONER

## 2021-03-27 PROCEDURE — 6360000002 HC RX W HCPCS: Performed by: NURSE PRACTITIONER

## 2021-03-27 PROCEDURE — 6370000000 HC RX 637 (ALT 250 FOR IP): Performed by: PSYCHIATRY & NEUROLOGY

## 2021-03-27 PROCEDURE — 6360000002 HC RX W HCPCS: Performed by: INTERNAL MEDICINE

## 2021-03-27 PROCEDURE — 97530 THERAPEUTIC ACTIVITIES: CPT

## 2021-03-27 PROCEDURE — 94761 N-INVAS EAR/PLS OXIMETRY MLT: CPT

## 2021-03-27 PROCEDURE — 6370000000 HC RX 637 (ALT 250 FOR IP): Performed by: INTERNAL MEDICINE

## 2021-03-27 PROCEDURE — 97110 THERAPEUTIC EXERCISES: CPT

## 2021-03-27 PROCEDURE — 82947 ASSAY GLUCOSE BLOOD QUANT: CPT

## 2021-03-27 PROCEDURE — 36415 COLL VENOUS BLD VENIPUNCTURE: CPT

## 2021-03-27 PROCEDURE — 83735 ASSAY OF MAGNESIUM: CPT

## 2021-03-27 RX ADMIN — HYDROCHLOROTHIAZIDE 12.5 MG: 25 TABLET ORAL at 09:05

## 2021-03-27 RX ADMIN — QUETIAPINE FUMARATE 50 MG: 25 TABLET ORAL at 21:21

## 2021-03-27 RX ADMIN — CITALOPRAM 20 MG: 20 TABLET, FILM COATED ORAL at 09:06

## 2021-03-27 RX ADMIN — ALLOPURINOL 100 MG: 100 TABLET ORAL at 09:04

## 2021-03-27 RX ADMIN — FOLIC ACID 1 MG: 1 TABLET ORAL at 09:05

## 2021-03-27 RX ADMIN — FLUTICASONE PROPIONATE 1 PUFF: 110 AEROSOL, METERED RESPIRATORY (INHALATION) at 08:45

## 2021-03-27 RX ADMIN — ASPIRIN 325 MG: 325 TABLET, COATED ORAL at 09:04

## 2021-03-27 RX ADMIN — ACETAMINOPHEN 650 MG: 325 TABLET ORAL at 19:28

## 2021-03-27 RX ADMIN — PROMETHAZINE HYDROCHLORIDE 12.5 MG: 12.5 TABLET ORAL at 21:21

## 2021-03-27 RX ADMIN — ATORVASTATIN CALCIUM 20 MG: 20 TABLET, FILM COATED ORAL at 09:06

## 2021-03-27 RX ADMIN — MAGNESIUM SULFATE HEPTAHYDRATE 1000 MG: 1 INJECTION, SOLUTION INTRAVENOUS at 13:49

## 2021-03-27 RX ADMIN — ENOXAPARIN SODIUM 40 MG: 40 INJECTION SUBCUTANEOUS at 09:04

## 2021-03-27 RX ADMIN — POTASSIUM CHLORIDE 40 MEQ: 1500 TABLET, EXTENDED RELEASE ORAL at 09:03

## 2021-03-27 RX ADMIN — CALCIUM CARBONATE-CHOLECALCIFEROL TAB 250 MG-125 UNIT 500 MG: 250-125 TAB at 09:06

## 2021-03-27 RX ADMIN — ACETAMINOPHEN 650 MG: 325 TABLET ORAL at 04:10

## 2021-03-27 RX ADMIN — SERTRALINE 25 MG: 25 TABLET, FILM COATED ORAL at 09:06

## 2021-03-27 RX ADMIN — POTASSIUM CHLORIDE 20 MEQ: 1500 TABLET, EXTENDED RELEASE ORAL at 09:05

## 2021-03-27 RX ADMIN — FLUTICASONE PROPIONATE 1 PUFF: 110 AEROSOL, METERED RESPIRATORY (INHALATION) at 19:33

## 2021-03-27 RX ADMIN — MAGNESIUM SULFATE HEPTAHYDRATE 1000 MG: 1 INJECTION, SOLUTION INTRAVENOUS at 12:37

## 2021-03-27 RX ADMIN — PANTOPRAZOLE SODIUM 40 MG: 40 TABLET, DELAYED RELEASE ORAL at 09:05

## 2021-03-27 RX ADMIN — Medication 100 MG: at 09:04

## 2021-03-27 RX ADMIN — ONDANSETRON 4 MG: 2 INJECTION INTRAMUSCULAR; INTRAVENOUS at 11:30

## 2021-03-27 RX ADMIN — CETIRIZINE HYDROCHLORIDE 10 MG: 10 TABLET ORAL at 09:04

## 2021-03-27 RX ADMIN — AMLODIPINE BESYLATE 10 MG: 10 TABLET ORAL at 09:10

## 2021-03-27 ASSESSMENT — PAIN SCALES - GENERAL
PAINLEVEL_OUTOF10: 4
PAINLEVEL_OUTOF10: 4
PAINLEVEL_OUTOF10: 5

## 2021-03-27 ASSESSMENT — PAIN DESCRIPTION - LOCATION
LOCATION: GENERALIZED

## 2021-03-27 ASSESSMENT — PAIN DESCRIPTION - PAIN TYPE
TYPE: ACUTE PAIN

## 2021-03-27 ASSESSMENT — ENCOUNTER SYMPTOMS
BACK PAIN: 1
RESPIRATORY NEGATIVE: 1
GASTROINTESTINAL NEGATIVE: 1

## 2021-03-27 ASSESSMENT — PAIN - FUNCTIONAL ASSESSMENT: PAIN_FUNCTIONAL_ASSESSMENT: PREVENTS OR INTERFERES SOME ACTIVE ACTIVITIES AND ADLS

## 2021-03-27 ASSESSMENT — PAIN DESCRIPTION - DESCRIPTORS: DESCRIPTORS: ACHING

## 2021-03-27 NOTE — PROGRESS NOTES
Angioedema    Shellfish-Derived Products Anaphylaxis and Rash     ANGIOEDEMA       Current Meds:   Scheduled Meds:    enoxaparin  40 mg Subcutaneous Daily    allopurinol  100 mg Oral Daily    potassium chloride  20 mEq Oral Daily with breakfast    sodium chloride flush  10 mL Intravenous 2 times per day    citalopram  20 mg Oral Daily    sertraline  25 mg Oral Daily    amLODIPine  10 mg Oral Daily    atorvastatin  20 mg Oral Daily    oyster shell calcium/vitamin D  500 mg Oral Daily    vitamin D  50,000 Units Oral Once per day on Mon Thu    fluticasone  1 puff Inhalation BID    folic acid  1 mg Oral Daily    hydroCHLOROthiazide  12.5 mg Oral QAM    cetirizine  10 mg Oral Daily    pantoprazole  40 mg Oral QAM AC    QUEtiapine  50 mg Oral Nightly    thiamine  100 mg Oral Daily    sodium chloride flush  10 mL Intravenous 2 times per day    aspirin  325 mg Oral Daily    nicotine  1 patch Transdermal Daily    insulin lispro  0-6 Units Subcutaneous TID WC    insulin lispro  0-3 Units Subcutaneous Nightly     Continuous Infusions:    dextrose      sodium chloride 125 mL/hr at 03/26/21 2137     PRN Meds: albuterol sulfate HFA, sodium chloride flush, metoprolol, lidocaine, glucose, dextrose, glucagon (rDNA), dextrose, sodium chloride flush, promethazine **OR** ondansetron, acetaminophen **OR** acetaminophen, magnesium hydroxide, potassium chloride **OR** potassium alternative oral replacement **OR** potassium chloride, potassium chloride, magnesium sulfate, nitroGLYCERIN, perflutren lipid microspheres, LORazepam **OR** LORazepam **OR** LORazepam **OR** LORazepam **OR** LORazepam **OR** LORazepam **OR** LORazepam **OR** LORazepam    Data:     Past Medical History:   has a past medical history of Angioedema, Anxiety, Asthma, Bipolar disorder (HCC), Claustrophobia, Depression, GERD (gastroesophageal reflux disease), Hypertension, Hypertrophic cardiomyopathy ([facility] [address] Lung nodules, MRSA (methicillin resistant Staphylococcus aureus), Murmur, OA (osteoarthritis), JONES (obstructive sleep apnea), Thyroid nodule, and Type 2 diabetes mellitus without complication, without long-term current use of insulin ([address] Social History:   reports that she quit smoking about 28 years ago. Her smoking use included cigarettes. She has a 10.00 pack-year smoking history. She has never used smokeless tobacco. She reports previous alcohol use of about 12.0 standard drinks of alcohol per week. She reports previous drug use. Drug: Cocaine. Family History:   Family History   Problem Relation Age of Onset    Stroke Mother     Hypertension Father     Cancer Brother         bone    Lung Cancer Maternal Aunt     Cancer Maternal Grandmother         eye     Other Sister         aneurysm    Heart Disease Sister        Vitals:  BP (!) 154/90   Pulse 104   Temp 99.5 °F (37.5 °C) (Oral)   Resp (!) 31   Ht 5' 6\" (1.676 m)   Wt 175 lb 4.3 oz (79.5 kg)   SpO2 96%   BMI 28.29 kg/m²   Temp (24hrs), Av.2 °F (37.3 °C), Min:98.6 °F (37 °C), Max:100.2 °F (37.9 °C)    Recent Labs     21  1154 21  1557 21  2112 21  0732   POCGLU 152* 106* 101 110*       I/O (24Hr):     Intake/Output Summary (Last 24 hours) at 3/27/2021 1348  Last data filed at 3/27/2021 0735  Gross per 24 hour   Intake 3579 ml   Output 400 ml   Net 3179 ml       Labs:  Hematology:  Recent Labs     21  0703 21  0716 21  0723   WBC 10.1 10.1 12.9*   RBC 3.23* 3.05* 2.88*   HGB 9.7* 9.3* 8.7*   HCT 29.2* 28.0* 26.2*   MCV 90.4 91.8 91.0   MCH 30.0 30.5 30.2   MCHC 33.2 33.2 33.2   RDW 15.6* 16.0* 16.1*    266 334   MPV 10.5 10.2 9.7     Chemistry:  Recent Labs     21  0703 21  1521 21  0716 21  0723   *  --  129* 130*   K 2.7* 3.7 3.1* 3.1*   CL 97*  --  95* 96*   CO2   --     GLUCOSE 115*  --  127* 102*   BUN 11  --  9 8   CREATININE 0.75  --  0.70 0.57   MG 1.6  --  1.6 1.7   ANIONGAP 12  --  13 13   LABGLOM >60  --  >60 >60   GFRAA >60  --  >60 >60   CALCIUM 8.7  --  8.7 8.8     Recent Labs     03/25/21  1950 03/26/21  0754 03/26/21  1154 03/26/21  1557 03/26/21  2112 03/27/21  0732   POCGLU 191* 130* 152* 106* 101 110*     ABG:  Lab Results   Component Value Date    FIO2 INFORMATION NOT PROVIDED 09/20/2020     Lab Results   Component Value Date/Time    SPECIAL R HAND 11 ML 03/25/2021 07:03 PM     Lab Results   Component Value Date/Time    CULTURE NO GROWTH 2 DAYS 03/25/2021 07:03 PM       Radiology:  Xr Chest Portable    Result Date: 3/20/2021  No acute process. Physical Examination:        General appearance:  alert, cooperative and no distress  Mental Status:  oriented to person, place and time and normal affect  Lungs:  clear to auscultation bilaterally, normal effort  Heart:  regular rate and rhythm, no murmur  Abdomen:  soft, nontender, nondistended, normal bowel sounds, no masses, hepatomegaly, splenomegaly  Extremities:  no edema, redness, tenderness in the calves  Skin:  no gross lesions, rashes, induration    Assessment:        Hospital Problems           Last Modified POA    Alcohol abuse 3/23/2021 Yes    Hypertension 3/23/2021 Yes    Essential hypertension 3/23/2021 Yes    ACE inhibitor-aggravated angioedema 3/23/2021 Yes    Bipolar disorder ([facility] [address] 3/23/2021 Yes    Mild intermittent asthma without complication 1/96/2158 Yes    Dizziness 3/26/2021 Yes    Fever 3/26/2021 Yes          Plan:        Chest pressure and palpitations  -VQ scan negative    Hypertension  -Continue Norvasc and hydrochlorothiazide    Alcohol abuse  -Continue [facility] protocol    Bipolar disorder  -Psychiatry consulted  -Patient to be admitted to inpatient psych facility once medically cleared    Asthma  -Continue inhalers    UTI  -No cultures were sent since WBC count was less than 10 and urinalysis. Antibiotics discontinued by infectious disease.     Persistent fevers  - ID consulted    Urinary retention  -Likely secondary to above  -Continue monitoring  -Resolving    Hypokalemia  - replaced    Gout  -Allopurinol started    Disposition: Patient was cleared for discharge medically yesterday however patient started having some urinary retention. She was noted to have UTI. She has been started on IV antibiotics. Discharge has been canceled for now.     Ivonne Salazar MD  3/27/2021  1:48 PM

## 2021-03-27 NOTE — PROGRESS NOTES
Physical Therapy  Facility/Department: [facility] 3  Daily Treatment Note  NAME: Mary Killian  : 1965  MRN: [id number]    Date of Service: 3/27/2021    Discharge Recommendations: Further therapy recommended at discharge. PT Equipment Recommendations  Other: Pt currently unsafe to perform functional mobility without skilled assistance. CTA pending progression of mobility. Assessment     Body structures, Functions, Activity limitations: Decreased functional mobility ; Increased pain;Decreased balance;Decreased ROM; Decreased strength;Decreased high-level IADLs;Decreased safe awareness;Decreased cognition;Decreased endurance  Assessment: Pt declined to perform STS from an elevated bed height d/t \"Gout pain\". ModA x 2 demonstrating poor balance and decreased sitting balance tolerance. Pt is currently a high fall risk and unsafe to return to prior living arrangements d/t decreased strength, decreased ROM, significant pain, decreased balance, and poor safety awareness. Pt would benefit from continued skilled physical therapy to address these deficits. Prognosis: Guarded  Decision Making: High Complexity  PT Education: General Safety;Transfer Training  Patient Education: Pt educated in supine bed exercises including proper frequency to perform throughout admission. Pt verbalized understanding. Barriers to Learning: none  REQUIRES PT FOLLOW UP: Yes  Activity Tolerance  Activity Tolerance: Patient limited by fatigue;Patient limited by pain         Patient Diagnosis(es): The primary encounter diagnosis was Hypertension, unspecified type. Diagnoses of Dizziness, Palpitations, and Hypomagnesemia were also pertinent to this visit.      has a past medical history of Angioedema, Anxiety, Asthma, Bipolar disorder ([address] Claustrophobia, Depression, GERD (gastroesophageal reflux disease), Hypertension, Hypertrophic cardiomyopathy ([address] Lung nodules, MRSA (methicillin resistant Staphylococcus aureus), Murmur, OA (osteoarthritis), JONES (obstructive sleep apnea), Thyroid nodule, and Type 2 diabetes mellitus without complication, without long-term current use of insulin ([facility] [address] has a past surgical history that includes Hysterectomy; Upper gastrointestinal endoscopy; Colonoscopy; Thyroid surgery; Cardiac catheterization; other surgical history (8/24/2015); Upper gastrointestinal endoscopy (N/A, 12/3/2020); Colonoscopy (N/A, 12/3/2020); Foot surgery (Bilateral, 12/07/2020); Hammer toe surgery (Right, 12/7/2020); and Bunionectomy (Bilateral, 12/7/2020). Restrictions  Restrictions/Precautions  Restrictions/Precautions: Up as Tolerated, Fall Risk  Required Braces or Orthoses?: No  Subjective    Pt supine in bed willing to sit up on the EOB and reach out of her base of support. Pt c/o 10/10 \"Gout\" pain in her knee/ankle joints. Pt does state that she has global pain from her B Shlrds down to her feet. Worse pain from knees down. Orientation    Overall Orientation Status: Within Functional Limits  Objective     Bed mobility  Rolling to Right: Moderate assistance  Supine to Sit: Moderate assistance;2 Person assistance(Assist for BLE and trunk progression)  Sit to Supine: Moderate assistance;2 Person assistance(Assist for trunk and BLE progression)  Scooting: Maximal assistance  Comment: Pt requiring increased time to perform bed mobility this date with significant pain throughout. Pt requiring max verbal cueing for proper breathing techniques throughout mobility for pain relief with fair return demo. Pt sat EOB ~12 min this date requiring Mod. A x 1 at first pt demonstrated a slight retro lean. CGA after sitting for a couple minutes.   Ambulation  Ambulation?: No (Ambulation not attempted this date.)  Stairs/Curb  Stairs?: No  Balance  Posture: Fair  Sitting - Static: Good;-  Sitting - Dynamic: Fair;+    Exercises  Hip Flexion: Seated marches AAROM x 10 BLE  Knee Long Arc Quad: RLE x 10,  LLE AAROM x 10  Ankle Pumps: AAROM x 10 BLE  Reaching outside of her base of support with B UE's x 5 reps each. Comments: Pt requiring increased time to perform exercises this date with significant pain. Pt requiring assist from writer for most exercises and demonstrating stiffness throughout BLE in all joints. Goals  Short term goals  Time Frame for Short term goals: 14 visits  Short term goal 1: Pt to demonstrate bed mobility SBA  Short term goal 2: Pt to perform functional transfers CGA  Short term goal 3: Ambulate 50ft with RW Tahmina  Short term goal 4: Pt to actively participate in at least 30 minutes of physical therapy to demonstrate increased endurance  Short term goal 5: Pt to demonstrate at least fair- dynamic standing balance to decrease fall risk  Patient Goals   Patient goals :  To get better, to go home    Plan    Plan  Times per week: 5-6x / week  Current Treatment Recommendations: Strengthening, Neuromuscular Re-education, ROM, Balance Training, Endurance Training, Safety Education & Training, Patient/Caregiver Education & Training, Equipment Evaluation, Education, & procurement, Functional Mobility Training, Transfer Training, Gait Training, Stair training  Safety Devices  Type of devices: Call light within reach, Gait belt, Left in bed, Sitter present, Nurse notified(Sitter present stating bed alarm doesn't need to be turned on.)  Restraints  Initially in place: No     Therapy Time   Individual Concurrent Group Co-treatment   Time In  255         Time Out  325         Minutes  [id number] [facility] [address], PTA
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
## 2021-03-28 ENCOUNTER — APPOINTMENT (OUTPATIENT)
Dept: GENERAL RADIOLOGY | Age: 56
DRG: 755 | End: 2021-03-28
Payer: MEDICAID

## 2021-03-28 LAB
ABSOLUTE EOS #: 0 K/UL (ref 0–0.4)
ABSOLUTE IMMATURE GRANULOCYTE: 0 K/UL (ref 0–0.3)
ABSOLUTE LYMPH #: 1.48 K/UL (ref 1–4.8)
ABSOLUTE MONO #: 1.8 K/UL (ref 0.1–0.8)
ANION GAP SERPL CALCULATED.3IONS-SCNC: 14 MMOL/L (ref 9–17)
BASOPHILS # BLD: 1 % (ref 0–2)
BASOPHILS ABSOLUTE: 0.16 K/UL (ref 0–0.2)
BUN BLDV-MCNC: 9 MG/DL (ref 6–20)
BUN/CREAT BLD: ABNORMAL (ref 9–20)
CALCIUM SERPL-MCNC: 8.7 MG/DL (ref 8.6–10.4)
CHLORIDE BLD-SCNC: 94 MMOL/L (ref 98–107)
CO2: 20 MMOL/L (ref 20–31)
CREAT SERPL-MCNC: 0.54 MG/DL (ref 0.5–0.9)
DIFFERENTIAL TYPE: ABNORMAL
EOSINOPHILS RELATIVE PERCENT: 0 % (ref 1–4)
GFR AFRICAN AMERICAN: >60 ML/MIN
GFR NON-AFRICAN AMERICAN: >60 ML/MIN
GFR SERPL CREATININE-BSD FRML MDRD: ABNORMAL ML/MIN/{1.73_M2}
GFR SERPL CREATININE-BSD FRML MDRD: ABNORMAL ML/MIN/{1.73_M2}
GLUCOSE BLD-MCNC: 108 MG/DL (ref 70–99)
GLUCOSE BLD-MCNC: 128 MG/DL (ref 65–105)
GLUCOSE BLD-MCNC: 132 MG/DL (ref 65–105)
HCT VFR BLD CALC: 25.3 % (ref 36.3–47.1)
HEMOGLOBIN: 8.4 G/DL (ref 11.9–15.1)
IMMATURE GRANULOCYTES: 0 %
LACTIC ACID, WHOLE BLOOD: 1.4 MMOL/L (ref 0.7–2.1)
LACTIC ACID: NORMAL MMOL/L
LYMPHOCYTES # BLD: 9 % (ref 24–44)
MAGNESIUM: 1.8 MG/DL (ref 1.6–2.6)
MCH RBC QN AUTO: 30.4 PG (ref 25.2–33.5)
MCHC RBC AUTO-ENTMCNC: 33.2 G/DL (ref 28.4–34.8)
MCV RBC AUTO: 91.7 FL (ref 82.6–102.9)
MONOCYTES # BLD: 11 % (ref 1–7)
MORPHOLOGY: ABNORMAL
NRBC AUTOMATED: 0 PER 100 WBC
PDW BLD-RTO: 16.1 % (ref 11.8–14.4)
PLATELET # BLD: 420 K/UL (ref 138–453)
PLATELET ESTIMATE: ABNORMAL
PMV BLD AUTO: 9.9 FL (ref 8.1–13.5)
POTASSIUM SERPL-SCNC: 3.2 MMOL/L (ref 3.7–5.3)
PROCALCITONIN: 1.39 NG/ML
RBC # BLD: 2.76 M/UL (ref 3.95–5.11)
RBC # BLD: ABNORMAL 10*6/UL
SEG NEUTROPHILS: 79 % (ref 36–66)
SEGMENTED NEUTROPHILS ABSOLUTE COUNT: 12.96 K/UL (ref 1.8–7.7)
SODIUM BLD-SCNC: 128 MMOL/L (ref 135–144)
WBC # BLD: 16.4 K/UL (ref 3.5–11.3)
WBC # BLD: ABNORMAL 10*3/UL

## 2021-03-28 PROCEDURE — 74018 RADEX ABDOMEN 1 VIEW: CPT

## 2021-03-28 PROCEDURE — 6370000000 HC RX 637 (ALT 250 FOR IP): Performed by: NURSE PRACTITIONER

## 2021-03-28 PROCEDURE — 94761 N-INVAS EAR/PLS OXIMETRY MLT: CPT

## 2021-03-28 PROCEDURE — 36415 COLL VENOUS BLD VENIPUNCTURE: CPT

## 2021-03-28 PROCEDURE — 2580000003 HC RX 258: Performed by: INTERNAL MEDICINE

## 2021-03-28 PROCEDURE — 94640 AIRWAY INHALATION TREATMENT: CPT

## 2021-03-28 PROCEDURE — 85025 COMPLETE CBC W/AUTO DIFF WBC: CPT

## 2021-03-28 PROCEDURE — 2580000003 HC RX 258: Performed by: NURSE PRACTITIONER

## 2021-03-28 PROCEDURE — 83605 ASSAY OF LACTIC ACID: CPT

## 2021-03-28 PROCEDURE — 83735 ASSAY OF MAGNESIUM: CPT

## 2021-03-28 PROCEDURE — 2060000000 HC ICU INTERMEDIATE R&B

## 2021-03-28 PROCEDURE — 99233 SBSQ HOSP IP/OBS HIGH 50: CPT | Performed by: INTERNAL MEDICINE

## 2021-03-28 PROCEDURE — 6360000002 HC RX W HCPCS: Performed by: INTERNAL MEDICINE

## 2021-03-28 PROCEDURE — 82947 ASSAY GLUCOSE BLOOD QUANT: CPT

## 2021-03-28 PROCEDURE — 84145 PROCALCITONIN (PCT): CPT

## 2021-03-28 PROCEDURE — 99232 SBSQ HOSP IP/OBS MODERATE 35: CPT | Performed by: INTERNAL MEDICINE

## 2021-03-28 PROCEDURE — 6370000000 HC RX 637 (ALT 250 FOR IP): Performed by: PSYCHIATRY & NEUROLOGY

## 2021-03-28 PROCEDURE — 80048 BASIC METABOLIC PNL TOTAL CA: CPT

## 2021-03-28 PROCEDURE — 6370000000 HC RX 637 (ALT 250 FOR IP): Performed by: INTERNAL MEDICINE

## 2021-03-28 RX ORDER — POTASSIUM CHLORIDE 20 MEQ/1
40 TABLET, EXTENDED RELEASE ORAL
Status: DISCONTINUED | OUTPATIENT
Start: 2021-03-28 | End: 2021-03-31 | Stop reason: HOSPADM

## 2021-03-28 RX ADMIN — LIDOCAINE: 40 CREAM TOPICAL at 08:48

## 2021-03-28 RX ADMIN — ACETAMINOPHEN 650 MG: 325 TABLET ORAL at 00:55

## 2021-03-28 RX ADMIN — ATORVASTATIN CALCIUM 20 MG: 20 TABLET, FILM COATED ORAL at 08:45

## 2021-03-28 RX ADMIN — SODIUM CHLORIDE, PRESERVATIVE FREE 10 ML: 5 INJECTION INTRAVENOUS at 21:17

## 2021-03-28 RX ADMIN — FLUTICASONE PROPIONATE 1 PUFF: 110 AEROSOL, METERED RESPIRATORY (INHALATION) at 19:59

## 2021-03-28 RX ADMIN — ACETAMINOPHEN 650 MG: 325 TABLET ORAL at 21:14

## 2021-03-28 RX ADMIN — SODIUM CHLORIDE: 9 INJECTION, SOLUTION INTRAVENOUS at 00:55

## 2021-03-28 RX ADMIN — POTASSIUM CHLORIDE 40 MEQ: 1500 TABLET, EXTENDED RELEASE ORAL at 12:29

## 2021-03-28 RX ADMIN — ALLOPURINOL 100 MG: 100 TABLET ORAL at 08:44

## 2021-03-28 RX ADMIN — FOLIC ACID 1 MG: 1 TABLET ORAL at 08:44

## 2021-03-28 RX ADMIN — FLUTICASONE PROPIONATE 1 PUFF: 110 AEROSOL, METERED RESPIRATORY (INHALATION) at 08:28

## 2021-03-28 RX ADMIN — POTASSIUM CHLORIDE 20 MEQ: 1500 TABLET, EXTENDED RELEASE ORAL at 08:44

## 2021-03-28 RX ADMIN — PANTOPRAZOLE SODIUM 40 MG: 40 TABLET, DELAYED RELEASE ORAL at 08:44

## 2021-03-28 RX ADMIN — CITALOPRAM 20 MG: 20 TABLET, FILM COATED ORAL at 08:45

## 2021-03-28 RX ADMIN — CETIRIZINE HYDROCHLORIDE 10 MG: 10 TABLET ORAL at 08:44

## 2021-03-28 RX ADMIN — QUETIAPINE FUMARATE 50 MG: 25 TABLET ORAL at 21:16

## 2021-03-28 RX ADMIN — SERTRALINE 25 MG: 25 TABLET, FILM COATED ORAL at 08:44

## 2021-03-28 RX ADMIN — PROMETHAZINE HYDROCHLORIDE 12.5 MG: 12.5 TABLET ORAL at 21:14

## 2021-03-28 RX ADMIN — ENOXAPARIN SODIUM 40 MG: 40 INJECTION SUBCUTANEOUS at 08:42

## 2021-03-28 RX ADMIN — HYDROCHLOROTHIAZIDE 12.5 MG: 25 TABLET ORAL at 08:43

## 2021-03-28 RX ADMIN — CALCIUM CARBONATE-CHOLECALCIFEROL TAB 250 MG-125 UNIT 500 MG: 250-125 TAB at 08:44

## 2021-03-28 RX ADMIN — ACETAMINOPHEN 650 MG: 325 TABLET ORAL at 08:39

## 2021-03-28 RX ADMIN — AMLODIPINE BESYLATE 10 MG: 10 TABLET ORAL at 08:44

## 2021-03-28 RX ADMIN — Medication 100 MG: at 08:44

## 2021-03-28 RX ADMIN — ASPIRIN 325 MG: 325 TABLET, COATED ORAL at 08:44

## 2021-03-28 ASSESSMENT — PAIN SCALES - GENERAL
PAINLEVEL_OUTOF10: 4
PAINLEVEL_OUTOF10: 10
PAINLEVEL_OUTOF10: 9
PAINLEVEL_OUTOF10: 4
PAINLEVEL_OUTOF10: 8
PAINLEVEL_OUTOF10: 4

## 2021-03-28 ASSESSMENT — PAIN DESCRIPTION - PROGRESSION
CLINICAL_PROGRESSION: NOT CHANGED
CLINICAL_PROGRESSION: NOT CHANGED

## 2021-03-28 ASSESSMENT — PAIN DESCRIPTION - DESCRIPTORS
DESCRIPTORS: SPASM;SHARP
DESCRIPTORS: SHARP
DESCRIPTORS: ACHING

## 2021-03-28 ASSESSMENT — PAIN - FUNCTIONAL ASSESSMENT
PAIN_FUNCTIONAL_ASSESSMENT: PREVENTS OR INTERFERES WITH ALL ACTIVE AND SOME PASSIVE ACTIVITIES
PAIN_FUNCTIONAL_ASSESSMENT: PREVENTS OR INTERFERES SOME ACTIVE ACTIVITIES AND ADLS

## 2021-03-28 ASSESSMENT — ENCOUNTER SYMPTOMS
BACK PAIN: 1
RESPIRATORY NEGATIVE: 1

## 2021-03-28 ASSESSMENT — PAIN DESCRIPTION - ONSET
ONSET: ON-GOING

## 2021-03-28 ASSESSMENT — PAIN DESCRIPTION - FREQUENCY
FREQUENCY: INTERMITTENT
FREQUENCY: CONTINUOUS

## 2021-03-28 ASSESSMENT — PAIN DESCRIPTION - LOCATION
LOCATION: GENERALIZED
LOCATION: GENERALIZED

## 2021-03-28 ASSESSMENT — PAIN DESCRIPTION - PAIN TYPE: TYPE: ACUTE PAIN;NEUROPATHIC PAIN

## 2021-03-28 ASSESSMENT — PAIN DESCRIPTION - ORIENTATION: ORIENTATION: RIGHT;LEFT;LOWER
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
## 2021-03-29 PROBLEM — M06.4 INFLAMMATORY POLYARTHRITIS (HCC): Status: ACTIVE | Noted: 2021-03-29

## 2021-03-29 LAB
ABSOLUTE EOS #: 0 K/UL (ref 0–0.44)
ABSOLUTE IMMATURE GRANULOCYTE: 0.39 K/UL (ref 0–0.3)
ABSOLUTE LYMPH #: 1.37 K/UL (ref 1.1–3.7)
ABSOLUTE MONO #: 2.15 K/UL (ref 0.1–1.2)
ANGIOTENSIN-CONVERTING ENZYME: 33 U/L (ref 8–52)
ANION GAP SERPL CALCULATED.3IONS-SCNC: 15 MMOL/L (ref 9–17)
BASOPHILS # BLD: 0 % (ref 0–2)
BASOPHILS ABSOLUTE: 0 K/UL (ref 0–0.2)
BUN BLDV-MCNC: 10 MG/DL (ref 6–20)
BUN/CREAT BLD: ABNORMAL (ref 9–20)
C-REACTIVE PROTEIN: 381.1 MG/L (ref 0–5)
CALCIUM SERPL-MCNC: 9.3 MG/DL (ref 8.6–10.4)
CHLORIDE BLD-SCNC: 96 MMOL/L (ref 98–107)
CO2: 20 MMOL/L (ref 20–31)
CREAT SERPL-MCNC: 0.51 MG/DL (ref 0.5–0.9)
DIFFERENTIAL TYPE: ABNORMAL
EOSINOPHILS RELATIVE PERCENT: 0 % (ref 1–4)
GFR AFRICAN AMERICAN: >60 ML/MIN
GFR NON-AFRICAN AMERICAN: >60 ML/MIN
GFR SERPL CREATININE-BSD FRML MDRD: ABNORMAL ML/MIN/{1.73_M2}
GFR SERPL CREATININE-BSD FRML MDRD: ABNORMAL ML/MIN/{1.73_M2}
GLUCOSE BLD-MCNC: 105 MG/DL (ref 65–105)
GLUCOSE BLD-MCNC: 127 MG/DL (ref 70–99)
GLUCOSE BLD-MCNC: 91 MG/DL (ref 65–105)
GLUCOSE BLD-MCNC: 94 MG/DL (ref 65–105)
GLUCOSE BLD-MCNC: 98 MG/DL (ref 65–105)
HCT VFR BLD CALC: 26.3 % (ref 36.3–47.1)
HEMOGLOBIN: 8.7 G/DL (ref 11.9–15.1)
IMMATURE GRANULOCYTES: 2 %
LYMPHOCYTES # BLD: 7 % (ref 24–43)
MCH RBC QN AUTO: 30.4 PG (ref 25.2–33.5)
MCHC RBC AUTO-ENTMCNC: 33.1 G/DL (ref 28.4–34.8)
MCV RBC AUTO: 92 FL (ref 82.6–102.9)
MONOCYTES # BLD: 11 % (ref 3–12)
MORPHOLOGY: ABNORMAL
NRBC AUTOMATED: 0 PER 100 WBC
PDW BLD-RTO: 16.4 % (ref 11.8–14.4)
PLATELET # BLD: 569 K/UL (ref 138–453)
PLATELET ESTIMATE: ABNORMAL
PMV BLD AUTO: 9.6 FL (ref 8.1–13.5)
POTASSIUM SERPL-SCNC: 3.8 MMOL/L (ref 3.7–5.3)
RBC # BLD: 2.86 M/UL (ref 3.95–5.11)
RBC # BLD: ABNORMAL 10*6/UL
SEDIMENTATION RATE, ERYTHROCYTE: 119 MM (ref 0–30)
SEG NEUTROPHILS: 80 % (ref 36–65)
SEGMENTED NEUTROPHILS ABSOLUTE COUNT: 15.59 K/UL (ref 1.5–8.1)
SODIUM BLD-SCNC: 131 MMOL/L (ref 135–144)
URIC ACID: 4.9 MG/DL (ref 2.4–5.7)
WBC # BLD: 19.5 K/UL (ref 3.5–11.3)
WBC # BLD: ABNORMAL 10*3/UL

## 2021-03-29 PROCEDURE — 85025 COMPLETE CBC W/AUTO DIFF WBC: CPT

## 2021-03-29 PROCEDURE — 94761 N-INVAS EAR/PLS OXIMETRY MLT: CPT

## 2021-03-29 PROCEDURE — 2060000000 HC ICU INTERMEDIATE R&B

## 2021-03-29 PROCEDURE — 36415 COLL VENOUS BLD VENIPUNCTURE: CPT

## 2021-03-29 PROCEDURE — 97110 THERAPEUTIC EXERCISES: CPT

## 2021-03-29 PROCEDURE — 2580000003 HC RX 258: Performed by: NURSE PRACTITIONER

## 2021-03-29 PROCEDURE — 94640 AIRWAY INHALATION TREATMENT: CPT

## 2021-03-29 PROCEDURE — 85652 RBC SED RATE AUTOMATED: CPT

## 2021-03-29 PROCEDURE — 6370000000 HC RX 637 (ALT 250 FOR IP): Performed by: NURSE PRACTITIONER

## 2021-03-29 PROCEDURE — 86140 C-REACTIVE PROTEIN: CPT

## 2021-03-29 PROCEDURE — 2580000003 HC RX 258: Performed by: INTERNAL MEDICINE

## 2021-03-29 PROCEDURE — 99233 SBSQ HOSP IP/OBS HIGH 50: CPT | Performed by: INTERNAL MEDICINE

## 2021-03-29 PROCEDURE — 6360000002 HC RX W HCPCS: Performed by: INTERNAL MEDICINE

## 2021-03-29 PROCEDURE — APPSS30 APP SPLIT SHARED TIME 16-30 MINUTES: Performed by: NURSE PRACTITIONER

## 2021-03-29 PROCEDURE — 86038 ANTINUCLEAR ANTIBODIES: CPT

## 2021-03-29 PROCEDURE — 80048 BASIC METABOLIC PNL TOTAL CA: CPT

## 2021-03-29 PROCEDURE — 6370000000 HC RX 637 (ALT 250 FOR IP): Performed by: INTERNAL MEDICINE

## 2021-03-29 PROCEDURE — 82164 ANGIOTENSIN I ENZYME TEST: CPT

## 2021-03-29 PROCEDURE — 6370000000 HC RX 637 (ALT 250 FOR IP): Performed by: PSYCHIATRY & NEUROLOGY

## 2021-03-29 PROCEDURE — 82947 ASSAY GLUCOSE BLOOD QUANT: CPT

## 2021-03-29 PROCEDURE — 84550 ASSAY OF BLOOD/URIC ACID: CPT

## 2021-03-29 RX ORDER — METHYLPREDNISOLONE SODIUM SUCCINATE 40 MG/ML
40 INJECTION, POWDER, LYOPHILIZED, FOR SOLUTION INTRAMUSCULAR; INTRAVENOUS EVERY 12 HOURS
Status: COMPLETED | OUTPATIENT
Start: 2021-03-29 | End: 2021-03-31

## 2021-03-29 RX ADMIN — CALCIUM CARBONATE-CHOLECALCIFEROL TAB 250 MG-125 UNIT 500 MG: 250-125 TAB at 08:51

## 2021-03-29 RX ADMIN — POTASSIUM CHLORIDE 40 MEQ: 1500 TABLET, EXTENDED RELEASE ORAL at 08:51

## 2021-03-29 RX ADMIN — ERGOCALCIFEROL 50000 UNITS: 1.25 CAPSULE ORAL at 08:50

## 2021-03-29 RX ADMIN — SERTRALINE 25 MG: 25 TABLET, FILM COATED ORAL at 08:51

## 2021-03-29 RX ADMIN — PROMETHAZINE HYDROCHLORIDE 12.5 MG: 12.5 TABLET ORAL at 16:44

## 2021-03-29 RX ADMIN — SODIUM CHLORIDE, PRESERVATIVE FREE 10 ML: 5 INJECTION INTRAVENOUS at 14:13

## 2021-03-29 RX ADMIN — PANTOPRAZOLE SODIUM 40 MG: 40 TABLET, DELAYED RELEASE ORAL at 08:51

## 2021-03-29 RX ADMIN — FLUTICASONE PROPIONATE 1 PUFF: 110 AEROSOL, METERED RESPIRATORY (INHALATION) at 09:05

## 2021-03-29 RX ADMIN — PROMETHAZINE HYDROCHLORIDE 12.5 MG: 12.5 TABLET ORAL at 22:07

## 2021-03-29 RX ADMIN — FOLIC ACID 1 MG: 1 TABLET ORAL at 08:52

## 2021-03-29 RX ADMIN — ATORVASTATIN CALCIUM 20 MG: 20 TABLET, FILM COATED ORAL at 08:50

## 2021-03-29 RX ADMIN — CETIRIZINE HYDROCHLORIDE 10 MG: 10 TABLET ORAL at 08:52

## 2021-03-29 RX ADMIN — ACETAMINOPHEN 650 MG: 325 TABLET ORAL at 05:45

## 2021-03-29 RX ADMIN — SODIUM CHLORIDE, PRESERVATIVE FREE 10 ML: 5 INJECTION INTRAVENOUS at 22:10

## 2021-03-29 RX ADMIN — Medication 100 MG: at 08:50

## 2021-03-29 RX ADMIN — METHYLPREDNISOLONE SODIUM SUCCINATE 40 MG: 40 INJECTION, POWDER, FOR SOLUTION INTRAMUSCULAR; INTRAVENOUS at 22:06

## 2021-03-29 RX ADMIN — HYDROCHLOROTHIAZIDE 12.5 MG: 25 TABLET ORAL at 08:51

## 2021-03-29 RX ADMIN — AMLODIPINE BESYLATE 10 MG: 10 TABLET ORAL at 08:50

## 2021-03-29 RX ADMIN — ALLOPURINOL 100 MG: 100 TABLET ORAL at 08:50

## 2021-03-29 RX ADMIN — ACETAMINOPHEN 650 MG: 325 TABLET ORAL at 22:07

## 2021-03-29 RX ADMIN — ENOXAPARIN SODIUM 40 MG: 40 INJECTION SUBCUTANEOUS at 08:53

## 2021-03-29 RX ADMIN — QUETIAPINE FUMARATE 50 MG: 25 TABLET ORAL at 22:06

## 2021-03-29 RX ADMIN — CITALOPRAM 20 MG: 20 TABLET, FILM COATED ORAL at 08:52

## 2021-03-29 RX ADMIN — FLUTICASONE PROPIONATE 1 PUFF: 110 AEROSOL, METERED RESPIRATORY (INHALATION) at 22:02

## 2021-03-29 RX ADMIN — ASPIRIN 325 MG: 325 TABLET, COATED ORAL at 08:50

## 2021-03-29 ASSESSMENT — PAIN DESCRIPTION - PROGRESSION: CLINICAL_PROGRESSION: NOT CHANGED

## 2021-03-29 ASSESSMENT — ENCOUNTER SYMPTOMS
ABDOMINAL DISTENTION: 1
BACK PAIN: 1
EYE REDNESS: 0
RESPIRATORY NEGATIVE: 1
SHORTNESS OF BREATH: 0
SORE THROAT: 0
ABDOMINAL DISTENTION: 0
ABDOMINAL PAIN: 0
COUGH: 0
CONSTIPATION: 0
PHOTOPHOBIA: 0
BLOOD IN STOOL: 0

## 2021-03-29 ASSESSMENT — PAIN SCALES - GENERAL
PAINLEVEL_OUTOF10: 10
PAINLEVEL_OUTOF10: 8
PAINLEVEL_OUTOF10: 10

## 2021-03-29 ASSESSMENT — PAIN DESCRIPTION - FREQUENCY: FREQUENCY: CONTINUOUS

## 2021-03-29 ASSESSMENT — PAIN DESCRIPTION - LOCATION: LOCATION: FOOT;HAND

## 2021-03-29 ASSESSMENT — PAIN - FUNCTIONAL ASSESSMENT: PAIN_FUNCTIONAL_ASSESSMENT: PREVENTS OR INTERFERES WITH MANY ACTIVE NOT PASSIVE ACTIVITIES

## 2021-03-29 ASSESSMENT — PAIN DESCRIPTION - PAIN TYPE: TYPE: ACUTE PAIN;NEUROPATHIC PAIN

## 2021-03-29 NOTE — PROGRESS NOTES
[facility]  Office: [address], DO, Jasonchrissy Goncalves, DO, Estiven Morrison, DO, Harvey Zarate, DO, Pati Jennings MD, Dutch You MD, Marlena Jimenes MD, Ricci Chin MD, Mavis Schmitt MD, Thania Keating MD, Olaf Noyola MD, Conchita Lopez MD, Hoang De Guzman MD, Ainsley Campos, DO, Hazel Escamilla MD, Gene Durant DO, Renan Rain MD,  Dean Barragan, DO, Val Caceres MD, Dale Bailey MD, Reny Meza, [facility], [place] [address] [facility], [facility], Martin Rascon, CNP, Sabi An, CNS, Romel Hudsone, Corine Powers, CNP, Joy Liner, CNP, Fracisco Guevara, CNP, Nicolette Haney, CNP, LEONARDO MeadeC, Paco Mcdowell, [facility], Everton Singh, CNP, Brooks Brown, CNP, Elsie Carlos Enrique, CNP, Maryann Dumont, CNP, David Caballero, CNP, Aman Grate, [address] [facility]    Progress Note    3/29/2021    12:03 PM    Name:   Zofia Taylor  MRN:     [id number]     Acct:      [de-identified]   Room:   [address] [place] [address] Day:  8  Admit Date:  3/20/2021 10:51 PM    PCP:   Rocio Garcia MD  Code Status:  Full Code    Subjective:     C/C:   Chief Complaint   Patient presents with    Palpitations    Hypertension     Interval History Status: improved. Patient was seen and evaluated at bedside this morning. She reports feeling slightly better this morning. Still continues to have joint pains. Brief History:     See H&P    Review of Systems:     Constitutional:  negative for chills, fevers, sweats  Respiratory:  negative for cough, dyspnea on exertion, shortness of breath, wheezing  Cardiovascular:  negative for chest pain, chest pressure/discomfort, lower extremity edema, palpitations  Gastrointestinal:  negative for abdominal pain, constipation, diarrhea, nausea, vomiting  Neurological:  negative for dizziness, headache    Medications: Allergies:     Allergies   Allergen Reactions    Bee Venom Anaphylaxis    Iodine Anaphylaxis and Rash    Lisinopril Swelling and Anaphylaxis     Angioedema    Shellfish-Derived Products Anaphylaxis and Rash     ANGIOEDEMA       Current Meds:   Scheduled Meds:    potassium chloride  40 mEq Oral Daily with breakfast    enoxaparin  40 mg Subcutaneous Daily    allopurinol  100 mg Oral Daily    sodium chloride flush  10 mL Intravenous 2 times per day    citalopram  20 mg Oral Daily    sertraline  25 mg Oral Daily    amLODIPine  10 mg Oral Daily    atorvastatin  20 mg Oral Daily    oyster shell calcium/vitamin D  500 mg Oral Daily    vitamin D  50,000 Units Oral Once per day on Mon Thu    fluticasone  1 puff Inhalation BID    folic acid  1 mg Oral Daily    hydroCHLOROthiazide  12.5 mg Oral QAM    cetirizine  10 mg Oral Daily    pantoprazole  40 mg Oral QAM AC    QUEtiapine  50 mg Oral Nightly    thiamine  100 mg Oral Daily    sodium chloride flush  10 mL Intravenous 2 times per day    aspirin  325 mg Oral Daily    nicotine  1 patch Transdermal Daily    insulin lispro  0-6 Units Subcutaneous TID WC    insulin lispro  0-3 Units Subcutaneous Nightly     Continuous Infusions:    dextrose       PRN Meds: albuterol sulfate HFA, sodium chloride flush, metoprolol, lidocaine, glucose, dextrose, glucagon (rDNA), dextrose, sodium chloride flush, promethazine **OR** ondansetron, acetaminophen **OR** acetaminophen, magnesium hydroxide, potassium chloride **OR** potassium alternative oral replacement **OR** potassium chloride, potassium chloride, magnesium sulfate, nitroGLYCERIN, perflutren lipid microspheres, LORazepam **OR** LORazepam **OR** LORazepam **OR** LORazepam **OR** LORazepam **OR** LORazepam **OR** LORazepam **OR** LORazepam    Data:     Past Medical History:   has a past medical history of Angioedema, Anxiety, Asthma, Bipolar disorder (HCC), Claustrophobia, Depression, GERD (gastroesophageal reflux disease), Hypertension, Hypertrophic cardiomyopathy ([facility] [address] Lung nodules, MRSA (methicillin resistant Staphylococcus aureus), Murmur, OA (osteoarthritis), JONES (obstructive sleep apnea), Thyroid nodule, and Type 2 diabetes mellitus without complication, without long-term current use of insulin ([address] Social History:   reports that she quit smoking about 28 years ago. Her smoking use included cigarettes. She has a 10.00 pack-year smoking history. She has never used smokeless tobacco. She reports previous alcohol use of about 12.0 standard drinks of alcohol per week. She reports previous drug use. Drug: Cocaine. Family History:   Family History   Problem Relation Age of Onset    Stroke Mother     Hypertension Father     Cancer Brother         bone    Lung Cancer Maternal Aunt     Cancer Maternal Grandmother         eye     Other Sister         aneurysm    Heart Disease Sister        Vitals:  BP (!) 142/87   Pulse 113   Temp 99 °F (37.2 °C) (Oral)   Resp (!) 31   Ht 5' 6\" (1.676 m)   Wt 180 lb 6.4 oz (81.8 kg)   SpO2 97%   BMI 29.12 kg/m²   Temp (24hrs), Av.2 °F (37.3 °C), Min:98.3 °F (36.8 °C), Max:99.7 °F (37.6 °C)    Recent Labs     21  1927 21  1150 21  2113 21  0647   POCGLU 107* 132* 128* 105       I/O (24Hr):     Intake/Output Summary (Last 24 hours) at 3/29/2021 1203  Last data filed at 3/29/2021 0930  Gross per 24 hour   Intake 474 ml   Output --   Net 474 ml       Labs:  Hematology:  Recent Labs     21  0723 21  0352 21  0449   WBC 12.9* 16.4* 19.5*   RBC 2.88* 2.76* 2.86*   HGB 8.7* 8.4* 8.7*   HCT 26.2* 25.3* 26.3*   MCV 91.0 91.7 92.0   MCH 30.2 30.4 30.4   MCHC 33.2 33.2 33.1   RDW 16.1* 16.1* 16.4*    420 569*   MPV 9.7 9.9 9.6     Chemistry:  Recent Labs     21  0723 21  0352 21  1215 21  0449   * 128*  --  131*   K 3.1* 3.2*  --  3.8   CL 96* 94*  --  96*   CO2 21 20  --  20   GLUCOSE 102* 108*  --  127*   BUN 8 9  --  10   CREATININE 0.57 0.54  --  0.51   MG 1.7 1.8  --   --    ANIONGAP 13 14  --  15 LABGLOM >60 >60  --  >60   GFRAA >60 >60  --  >60   CALCIUM 8.8 8.7  --  9.3   LACTACIDWB  --   --  1.4  --      Recent Labs     03/27/21  0732 03/27/21  1647 03/27/21  1927 03/28/21  1150 03/28/21  2113 03/29/21  0647   POCGLU 110* 109* 107* 132* 128* 105     ABG:  Lab Results   Component Value Date    FIO2 INFORMATION NOT PROVIDED 09/20/2020     Lab Results   Component Value Date/Time    SPECIAL R HAND 11 ML 03/25/2021 07:03 PM     Lab Results   Component Value Date/Time    CULTURE NO GROWTH 4 DAYS 03/25/2021 07:03 PM       Radiology:  Xr Chest Portable    Result Date: 3/20/2021  No acute process. Physical Examination:        General appearance:  alert, cooperative and no distress  Mental Status:  oriented to person, place and time and normal affect  Lungs:  clear to auscultation bilaterally, normal effort  Heart:  regular rate and rhythm, no murmur  Abdomen:  soft, nontender, nondistended, normal bowel sounds, no masses, hepatomegaly, splenomegaly  Extremities:  no edema, redness, tenderness in the calves  Skin:  no gross lesions, rashes, induration    Assessment:        Hospital Problems           Last Modified POA    Alcohol abuse 3/23/2021 Yes    Hypertension 3/23/2021 Yes    Essential hypertension 3/23/2021 Yes    ACE inhibitor-aggravated angioedema 3/23/2021 Yes    Bipolar disorder ([address] 3/23/2021 Yes    Mild intermittent asthma without complication 6/54/4725 Yes    Dizziness 3/26/2021 Yes    Fever 3/26/2021 Yes          Plan:        Chest pressure and palpitations  -VQ scan negative    Hypertension  -Continue Norvasc and hydrochlorothiazide    Alcohol abuse  -Continue [facility] protocol    Bipolar disorder  -Psychiatry consulted  -Patient to be admitted to inpatient psych facility once medically cleared    Asthma  -Continue inhalers    UTI  -No cultures were sent since WBC count was less than 10 and urinalysis. Antibiotics discontinued by infectious disease.     Persistent fevers  - ID consulted, no infectious etiology found  - Rheumatology consulted    Urinary retention  -Likely secondary to above  -Continue monitoring  -Resolving    Hypokalemia  - replaced    Gout  -Allopurinol started    Abdominal distention  -Mild ileus noted  -Patient is having bowel movements    Hyponatremia  -discontinue IV fluids  -Improving    Leukocytosis  -No infectious etiology found  -Rheumatology consult pending        Vamshi Obrien MD  3/29/2021  12:03 PM

## 2021-03-29 NOTE — PROGRESS NOTES
Comprehensive Nutrition Assessment    Type and Reason for Visit:  Reassess    Nutrition Recommendations/Plan:  -Continue general diet   -Continue glucerna supplements BID   -Will continue to monitor po intake and weights     Nutrition Assessment:  Pt stated that her appetite is improving but dislikes the hospital food. Pt states that she has been drinking 100% of her glucerna supplements. Staff continue to encourage adequate po/fluid intake. Will continue to monitor. Malnutrition Assessment:  Malnutrition Status: At risk for malnutrition (Comment)    Context:  Social/Environmental Circumstances     Findings of the 6 clinical characteristics of malnutrition:  Energy Intake:  7 - 50% or less estimated energy requirements for 1 month or longer(per pt report)  Weight Loss:  (wt flux from 161-178 lbs over 9 mo per EMR)     Body Fat Loss:  No significant body fat loss     Muscle Mass Loss:  No significant muscle mass loss    Fluid Accumulation:  1 - Mild(not related to malnutrition) Extremities   Strength:  Not Performed    Estimated Daily Nutrient Needs:  Energy (kcal):  22-25 kcal/kg = 5873-1443 kcals/day; Weight Used for Energy Requirements:  Current     Protein (g):  1.3 gm/kg = 75-80 gm/day; Weight Used for Protein Requirements:  Ideal          Nutrition Related Findings:  BM 3/28; Na 131      Wounds:  None       Current Nutrition Therapies:    DIET GENERAL; No Caffeine  Dietary Nutrition Supplements: Diabetic Oral Supplement    Anthropometric Measures:  · Height: 5' 6\" (167.6 cm)  · Current Body Weight: 180 lb (81.6 kg)   · Admission Body Weight: 172 lb (78 kg)    · Usual Body Weight: (~168 lbs per EHR)     · Ideal Body Weight: 130 lbs; % Ideal Body Weight 138.5 %   · BMI: 29.1  · BMI Categories: Overweight (BMI 25.0-29. 9)       Nutrition Diagnosis:   · Inadequate oral intake related to (dislikes hospital food) as evidenced by intake 0-25%, intake 26-50%(Need for ONS)      Nutrition Interventions:   Food

## 2021-03-29 NOTE — ADT AUTH CERT
Utilization Reviews       Cardiology [address] Day 8 (3/28/2021) by Damian Fajardo RN       Review Status Review Entered   Completed 3/29/2021 16:08      Criteria Review      Care Day: 8 Care Date: 3/28/2021 Level of Care:    Guideline Day 3    Level Of Care    ( ) * Activity level acceptable    (X) Floor to discharge    3/29/2021 4:08 PM EDT by Jennifer Lopez      intermediate    Clinical Status    (X) * Hemodynamic stability    3/29/2021 4:08 PM EDT by Jennifer Lopez      hr 112   bp 162/91    ( ) * Cardiovascular status acceptable    ( ) * Cardiac inflammation absent or controlled    ( ) * General Discharge Criteria met    Interventions    (X) * Intake acceptable    ( ) * No inpatient interventions needed    3/29/2021 4:08 PM EDT by Jennifer Lopez      ivf 125 mlhr   suicide precautions  bedside sitter 1:1    * Milestone   Additional Notes   3/28/21      Medicine    Patient was seen and evaluated at bedside this morning.  She reports feeling slightly better this morning. Still continues to have joint pains.        General appearance:  alert, cooperative and no distress   Mental Status:  oriented to person, place and time and normal affect   Lungs:  clear to auscultation bilaterally, normal effort   Heart:  regular rate and rhythm, no murmur   Abdomen:  soft, nontender, nondistended, normal bowel sounds, no masses, hepatomegaly, splenomegaly   Extremities:  no edema, redness, tenderness in the calves   Skin:  no gross lesions, rashes, induration      Chest pressure and palpitations   -VQ scan negative       Hypertension   -Continue Norvasc and hydrochlorothiazide       Alcohol abuse   -Continue [facility] protocol       Bipolar disorder   -Psychiatry consulted   -Patient to be admitted to inpatient psych facility once medically cleared       Asthma   -Continue inhalers       UTI   -No cultures were sent since WBC count was less than 10 and urinalysis.  Antibiotics discontinued by infectious disease.       Persistent fevers   - ID consulted, no infectious etiology found   - Rheumatology consulted       Urinary retention   -Likely secondary to above   -Continue monitoring   -Resolving       Hypokalemia   - replaced       Gout   -Allopurinol started       Abdominal distention   -KUB   -order lactic acid       Hyponatremia   -discontinue IV fluids          ID   Reason for F/U :   Fevers       Impression :   1. Intermittent fevers   2. Polyarthritis-pattern seems to suggest autoimmune process and patient has synovitis on exam with associated effusion   3. Hyponatremia   4. Urinary retention with concern for UTI   5. Depression/bipolar disorder   6. Essential hypertension   7. Hypertrophic cardiomyopathy   8. Diabetes mellitus type 2   9. Alcohol abuse       Recommendations:   · The patient continues to have intermittent fevers and had a spike up to 102 degrees overnight. · While the white blood cell count did increase slightly again there remains no focal findings to suggest an acute infectious process. · She continues to complain of diffuse arthralgias and myalgias as well as some neck pain. · Concern is for an autoimmune disease though inflammatory markers are not markedly elevated.    · Rheumatology consult is pending   · She will continue off antimicrobial therapy at this time       Infection Control Recommendations:   Universal precautions       99.7 (37.6) 26 112 162/91 - Semi fowlers - - 95 None (Room air) Pain 10         Sodium: 128 (L)   Potassium: 3.2 (L)   Chloride: 94 (L)   Magnesium: 1.8   Glucose: 108 (H)   WBC: 16.4 (H)   RBC: 2.76 (L)   Hemoglobin Quant: 8.4 (L)   Hematocrit: 25.3 (L)      XR ABDOMEN (KUB) (SINGLE AP VIEW)   Diffuse gaseous distention of the intestinal tract, mild, favoring ileus.       Labs daily, glucose 4xd, general diet no caffeine with supplemnts 2xd,    Daily wt, fall precautions, I ando  q8h, epc, cont pulse ox s precautions, suicide precautions, telemetry, up as marilee vs      Zyloprim 100 Norvasc and hydrochlorothiazide       Alcohol abuse   -Continue [facility] protocol       Bipolar disorder   -Psychiatry consulted   -Patient to be admitted to inpatient psych facility once medically cleared       Asthma   -Continue inhalers       UTI   -No cultures were sent since WBC count was less than 10 and urinalysis.  Antibiotics discontinued by infectious disease.       Persistent fevers   - ID consulted       Urinary retention   -Likely secondary to above   -Continue monitoring   -Resolving       Hypokalemia   - replaced       Gout   -Allopurinol started      Physical Therapy   Discharge Recommendations: Further therapy recommended at discharge.     PT Equipment Recommendations   Other: Pt currently unsafe to perform functional mobility without skilled assistance.  CTA pending progression of mobility. Assessment      Body structures, Functions, Activity limitations: Decreased functional mobility ; Increased pain;Decreased balance;Decreased ROM; Decreased strength;Decreased high-level IADLs;Decreased safe awareness;Decreased cognition;Decreased endurance   Assessment: Pt declined to perform STS from an elevated bed height d/t \"Gout pain\". ModA x 2 demonstrating poor balance and decreased sitting balance tolerance.  Pt is currently a high fall risk and unsafe to return to prior living arrangements d/t decreased strength, decreased ROM, significant pain, decreased balance, and poor safety awareness.  Pt would benefit from continued skilled physical therapy to address these deficits. Prognosis: Guarded   Decision Making: High Complexity         ID   Reason for F/U :   Fevers       Impression :   1. Intermittent fevers   2. Polyarthritis-pattern seems to suggest autoimmune process and patient has synovitis on exam with associated effusion   3. Hyponatremia   4. Urinary retention with concern for UTI   5. Depression/bipolar disorder   6. Essential hypertension   7. Hypertrophic cardiomyopathy   8.  Diabetes mellitus type 2   9. Alcohol abuse       Recommendations:   · All culture data remains negative thus far. · COVID-19 testing was negative. · The patient does have intermittent fevers as well as associated synovitis with polyarthritis   · The concern is point in time is for autoimmune process and I doubt that she would have gout in all these joints. · I would suggest a rheumatology evaluation   · There is no evidence of UTI   · Patient continues off antimicrobial therapy       Infection Control Recommendations:   Universal precautions      Neck:       Musculoskeletal: Muscular tenderness present. Cardiovascular:       Rate and Rhythm: Regular rhythm.       Heart sounds: Normal heart sounds. Pulmonary:       Effort: Pulmonary effort is normal.       Breath sounds: Normal breath sounds. Abdominal:       General: Bowel sounds are normal.       Palpations: Abdomen is soft. Musculoskeletal:          General: Swelling and tenderness present.       Comments: The patient has significant synovitis at multiple joints bilaterally, and the patient has appreciable bilateral knee effusions.    She has tenderness at multiple joints with Munoz palpation          102.7 (39.3) 27 106 145/91 - Semi fowlers - - 96 None (Room air) Pain 4         Sodium: 130 (L)   Potassium: 3.1 (L)   Chloride: 96 (L)   GFR Non-: >60   GFR African American: >60   Magnesium: 1.7   Glucose: 102 (H)   WBC: 12.9 (H)   RBC: 2.88 (L)   Hemoglobin Quant: 8.7 (L)   Hematocrit: 26.2 (L)      Labs daily, glucose 4xd, general diet no caffeine with supplemnts 2xd,    Daily wt, fall precautions, I ando  q8h, epc, cont pulse ox s precautions, suicide precautions, telemetry, up as marilee vs      Zyloprim 100 mg daily, norvasc daily, asa 325 mg daily,lipitor 20 mg daily, zyrtec daily, celexa daily, flovent inh 2xd, folic acid daily, hydrodiuril 12. 5 mg daily,  ca/vit d daily, protonix daily, klor con 20 meq daily, seroquel 50 mg nighly, zoloft daily, thimaine 100 mg daily, ivf 125 mlhr , tylenol  650 mg q6hprn x2,  klor con  40 meq pnr x1,   lovenox 40 mg daily, phenergan 12.5 mg q6hprn x1,       Dc plan   bhi

## 2021-03-29 NOTE — PROGRESS NOTES
insulin ([facility] [address] has a past surgical history that includes Hysterectomy; Upper gastrointestinal endoscopy; Colonoscopy; Thyroid surgery; Cardiac catheterization; other surgical history (8/24/2015); Upper gastrointestinal endoscopy (N/A, 12/3/2020); Colonoscopy (N/A, 12/3/2020); Foot surgery (Bilateral, 12/07/2020); Hammer toe surgery (Right, 12/7/2020); and Bunionectomy (Bilateral, 12/7/2020). Restrictions  Restrictions/Precautions  Restrictions/Precautions: Up as Tolerated, Fall Risk  Required Braces or Orthoses?: No     Subjective   General  Chart Reviewed: Yes  Response To Previous Treatment: Patient with no complaints from previous session. Family / Caregiver Present: No  Subjective  Subjective: RN and pt agreeable to therapy. Pt supine in bed upon arrival.  Pt c/o severe pain  Pain Screening  Patient Currently in Pain: Yes  Pain Assessment  Pain Assessment: 0-10  Pain Level: 10  Pain Location: Foot;Hand  Pain Orientation: Right;Left  Non-Pharmaceutical Pain Intervention(s): Repositioned;Rest;Ambulation/Increased Activity; Distraction  Response to Pain Intervention: None  Vital Signs  Patient Currently in Pain: Yes       Orientation  Orientation  Overall Orientation Status: Within Functional Limits  Orientation Level: Oriented to time;Oriented to situation;Oriented to person;Oriented to place    Objective    Exercises  Comments: Pt requiring increased time to perform exercises this date with significant pain. Pt reported requiring assist from writer for most exercises, but appeared to be giving more effort when the PTA was assisting  Other exercises  Other exercises?: Yes  Other exercises 1: Supine BLE Ex's: Ankle pumps, heel slide, short arc quads, straight leg raises. Reps 15  ([facility], see comments)  Other exercises 2: Upper extremity exercises: Bicep curl, shoulder flexion/extension, punches, tricep curl, shoulder abduction/adduction.  Reps: 15  ([facility], see comments)       Goals  Short term goals  Time Frame for Short term goals: 14 visits  Short term goal 1: Pt to demonstrate bed mobility SBA  Short term goal 2: Pt to perform functional transfers CGA  Short term goal 3: Ambulate 50ft with RW Tahmina  Short term goal 4: Pt to actively participate in at least 30 minutes of physical therapy to demonstrate increased endurance  Short term goal 5: Pt to demonstrate at least fair- dynamic standing balance to decrease fall risk  Patient Goals   Patient goals :  To get better, to go home    Plan    Plan  Times per week: 5-6x / week  Current Treatment Recommendations: Strengthening, Neuromuscular Re-education, ROM, Balance Training, Endurance Training, Safety Education & Training, Patient/Caregiver Education & Training, Equipment Evaluation, Education, & procurement, Functional Mobility Training, Transfer Training, Gait Training, Stair training  Safety Devices  Type of devices: Call light within reach, Left in bed, Sitter present, Nurse notified, All fall risk precautions in place, Patient at risk for falls  Restraints  Initially in place: No     Therapy Time   Individual Concurrent Group Co-treatment   Time In 1526         Time Out 1554         Minutes 28         Timed Code Treatment Minutes: Elvira 128, PTA
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
## 2021-03-29 NOTE — CONSULTS
36.9. Uric acid 7.3. She is not on any medications - NSAIDs, Steroids, Colchicine. She was started on Allopurinol on this admission, 4 days now. Significant labs this admission:  WBC trending up, 19.5 today with intermittent low grade fevers. . Creatine normal. UA with trace protein  CXR normal, no B/L hilar lymphadenopathy. Xray feet shows bilateral diffuse edema and soft tissue swelling, along with post op changes. No fractures or osseous destruction.     Past Medical History:    Past Medical History:   Diagnosis Date    Angioedema 11/17/2017    from lisinopril    Anxiety     Asthma     mild persistent asthma, on home resp medications for control    Bipolar disorder (HCC)     Claustrophobia     Depression     GERD (gastroesophageal reflux disease)     Hypertension     Hypertrophic cardiomyopathy ([address]     Lung nodules     MRSA (methicillin resistant Staphylococcus aureus)     rt hip    Murmur     see cardiac echo result    OA (osteoarthritis)     JONES (obstructive sleep apnea)     Thyroid nodule     Type 2 diabetes mellitus without complication, without long-term current use of insulin ([address] 12/7/2018       Past Surgical History:    Past Surgical History:   Procedure Laterality Date    BUNIONECTOMY Bilateral 12/7/2020    [facility]  BUNIONECTOMY, ARTHREX performed by Arianna Sparks DPM at [place] [address] COLONOSCOPY      bx    COLONOSCOPY N/A 12/3/2020    COLONOSCOPY WITH BIOPSY performed by Edwin Lua MD at [address] Bilateral 12/07/2020    [place] bunionectomy, right 2nd hammer toe repair     HAMMER TOE SURGERY Right 12/7/2020    2ND TOE HAMMER REPAIR performed by Arianna Sparks DPM at [address] [place] [address]    OTHER SURGICAL HISTORY  8/24/2015    MRI under anesthesia    THYROID SURGERY      bilat bx    UPPER GASTROINTESTINAL ENDOSCOPY      UPPER GASTROINTESTINAL ENDOSCOPY N/A 12/3/2020    EGD BIOPSY performed by Tanja Mixon MD at [facility]       Allergies:  Bee venom, Iodine, Lisinopril, and Shellfish-derived products      Current Medications:    Current Facility-Administered Medications   Medication Dose Route Frequency Provider Last Rate Last Admin    potassium chloride (KLOR-CON M) extended release tablet 40 mEq  40 mEq Oral Daily with breakfast Aliyah Obregon MD   40 mEq at 03/28/21 1229    albuterol sulfate  (90 Base) MCG/ACT inhaler 2 puff  2 puff Inhalation Q6H PRN Aliyah Obregon MD        enoxaparin (LOVENOX) injection 40 mg  40 mg Subcutaneous Daily Aliyah Obregon MD   40 mg at 03/28/21 0842    allopurinol (ZYLOPRIM) tablet 100 mg  100 mg Oral Daily Aliyah Obregon MD   100 mg at 03/28/21 0844    sodium chloride flush 0.9 % injection 10 mL  10 mL Intravenous 2 times per day Aliyah Obregon MD   10 mL at 03/28/21 2117    sodium chloride flush 0.9 % injection 10 mL  10 mL Intravenous PRN Aliyah Obregon MD        metoprolol (LOPRESSOR) injection 5 mg  5 mg Intravenous Q4H PRN CECILIA Nye CNP   5 mg at 03/24/21 0601    lidocaine (LMX) 4 % cream   Topical PRN Aliyah Obregon MD   Given at 03/28/21 0848    citalopram (CELEXA) tablet 20 mg  20 mg Oral Daily Tamar Arora MD   20 mg at 03/28/21 0845    sertraline (ZOLOFT) tablet 25 mg  25 mg Oral Daily Tamar Arora MD   25 mg at 03/28/21 0844    amLODIPine (NORVASC) tablet 10 mg  10 mg Oral Daily CECILIA Sarabia CNP   10 mg at 03/28/21 0844    atorvastatin (LIPITOR) tablet 20 mg  20 mg Oral Daily CECILIA Sarabia CNP   20 mg at 03/28/21 0845    oyster shell calcium/vitamin D 250-125 MG-UNIT per tablet 500 mg  500 mg Oral Daily CECILIA Sarabia CNP   500 mg at 03/28/21 0844    vitamin D (ERGOCALCIFEROL) capsule 50,000 Units  50,000 Units Oral Once per day on Mon Thu Art Raymond, APRN - CNP   50,000 Units at 03/25/21 0818    fluticasone (FLOVENT HFA) 110 MCG/ACT inhaler 1 puff  1 puff Inhalation BID Pam Spencer, APRN - CNP   1 puff at 75/42/80 1119    folic acid (FOLVITE) tablet 1 mg  1 mg Oral Daily Pam Spencer, APRN - CNP   1 mg at 03/28/21 0844    hydroCHLOROthiazide (HYDRODIURIL) tablet 12.5 mg  12.5 mg Oral QAM Pam Murisaías, APRN - CNP   12.5 mg at 03/28/21 8520    cetirizine (ZYRTEC) tablet 10 mg  10 mg Oral Daily Pam Murisaías, APRN - CNP   10 mg at 03/28/21 0844    pantoprazole (PROTONIX) tablet 40 mg  40 mg Oral QAM AC Pam Spencer, APRN - CNP   40 mg at 03/28/21 0844    QUEtiapine (SEROQUEL) tablet 50 mg  50 mg Oral Nightly Pam Spencer, APRN - CNP   50 mg at 03/28/21 2116    thiamine tablet 100 mg  100 mg Oral Daily Pam Spencer, APRN - CNP   100 mg at 03/28/21 0844    glucose (GLUTOSE) 40 % oral gel 15 g  15 g Oral PRN Pam Spencer, APRN - CNP        dextrose 50 % IV solution  12.5 g Intravenous PRN Pam Spencer, APRN - CNP        glucagon (rDNA) injection 1 mg  1 mg Intramuscular PRN Pam Spencer, APRN - CNP        dextrose 5 % solution  100 mL/hr Intravenous PRN Pam Spencer, APRN - CNP        sodium chloride flush 0.9 % injection 10 mL  10 mL Intravenous 2 times per day Pam Spencer, APRN - CNP   10 mL at 03/28/21 2117    sodium chloride flush 0.9 % injection 10 mL  10 mL Intravenous PRN Pam Spencer, APRN - CNP        promethazine (PHENERGAN) tablet 12.5 mg  12.5 mg Oral Q6H PRN Pam Spencer, APRN - CNP   12.5 mg at 03/28/21 2114    Or    ondansetron (ZOFRAN) injection 4 mg  4 mg Intravenous Q6H PRN Pam Spencer, APRN - CNP   4 mg at 03/27/21 1130    acetaminophen (TYLENOL) tablet 650 mg  650 mg Oral Q6H PRN Pam Spencer, APRN - CNP   650 mg at 03/29/21 0545    Or    acetaminophen (TYLENOL) suppository 650 mg  650 mg Rectal Q6H PRN Pam Spencer APRN - CNP        magnesium hydroxide (MILK OF MAGNESIA) 400 MG/5ML suspension 30 mL  30 mL Oral Maddison Wiley APRN - CNP   2 mg at 03/23/21 5000    Or    LORazepam (ATIVAN) tablet 3 mg  3 mg Oral Q1H PRN Mena Piper, APRN - CNP        Or    LORazepam (ATIVAN) injection 3 mg  3 mg Intravenous Q1H PRN Mena Piper, APRN - CNP        Or    LORazepam (ATIVAN) tablet 4 mg  4 mg Oral Q1H PRN Mena Piper, APRN - CNP        Or    LORazepam (ATIVAN) injection 4 mg  4 mg Intravenous Q1H PRN Mena Piper, APRN - CNP   4 mg at 03/23/21 0132     Facility-Administered Medications Ordered in Other Encounters   Medication Dose Route Frequency Provider Last Rate Last Admin    lactated ringers infusion   Intravenous Continuous Amandeepbassem Pablo APRN - CRNA   Stopped at 08/24/15 1420       Home Medications:    Prior to Admission medications    Medication Sig Start Date End Date Taking? Authorizing Provider   sertraline (ZOLOFT) 25 MG tablet Take 1 tablet by mouth daily 3/24/21  Yes Deana Hollingsworth MD   citalopram (CELEXA) 20 MG tablet Take 1 tablet by mouth daily 3/24/21  Yes Deana Hollingsworth MD   ketoconazole (NIZORAL) 2 % cream Apply topically daily.  2/22/21   Kaela Priestly, DPM   hydroCHLOROthiazide (HYDRODIURIL) 25 MG tablet Take 0.5 tablets by mouth every morning 1/31/21   Jose Whalen MD   Gauze Pads & Dressings (KERLIX GAUZE ROLL MEDIUM) MISC CHANGE DRESSING DAILY 1/26/21   House Hands, DPM   Gauze Pads & Dressings (GAUZE DRESSING) 4\"X4\" PADS CHANGE WOUND DRESSING DAILY 1/26/21   House Hands, DPM   Wound Dressings (FIBRACOL) MISC 1 box fibracol 2x2 change wound dressing daily 1/26/21   House Hands, DPM   Calcium Carbonate-Vitamin D (OYSTER SHELL CALCIUM/D) 500-200 MG-UNIT TABS TAKE 1 TAB BY MOUTH ONCE A DAY  1/22/21   oJse Zuniga MD   atorvastatin (LIPITOR) 20 MG tablet TAKE 1 TABLET BY MOUTH DAILY  1/22/21   Jose Whalen MD   atenolol (TENORMIN) 50 MG tablet Take 1 tablet by mouth daily 1/22/21   Gelacio Bartlett MD   omeprazole (PRILOSEC) 40 MG delayed release capsule photophobia, redness and visual disturbance. Respiratory: Negative for cough and shortness of breath. Cardiovascular: Negative for chest pain. Gastrointestinal: Negative for abdominal distention, abdominal pain, blood in stool and constipation. Endocrine: Positive for polyphagia. Negative for polyuria. Musculoskeletal: Positive for arthralgias, back pain, gait problem, joint swelling, myalgias and neck pain. Skin: Negative for rash. Neurological: Positive for headaches. Negative for dizziness, tremors, speech difficulty and weakness. Psychiatric/Behavioral: Positive for agitation. Negative for confusion. Family History:       Problem Relation Age of Onset    Stroke Mother     Hypertension Father     Cancer Brother         bone    Lung Cancer Maternal Aunt     Cancer Maternal Grandmother         eye     Other Sister         aneurysm    Heart Disease Sister        Social History:    Social History     Tobacco Use    Smoking status: Former Smoker     Packs/day: 0.50     Years: 20.00     Pack years: 10.00     Types: Cigarettes     Quit date: 1992     Years since quittin.2    Smokeless tobacco: Never Used    Tobacco comment: states quiti in the 1980s   Substance Use Topics    Alcohol use: Not Currently     Alcohol/week: 12.0 standard drinks     Types: 12 Cans of beer per week     Comment: Quit about 5 months ago 20    Drug use: Not Currently     Types: Cocaine     Comment: last use approximately 14 cocaine       PHYSICAL EXAM:      Vitals:    BP (!) 149/93   Pulse 95   Temp 99.5 °F (37.5 °C) (Oral)   Resp 23   Ht 5' 6\" (1.676 m)   Wt 180 lb 6.4 oz (81.8 kg)   SpO2 97%   BMI 29.12 kg/m²   GENERAL EXAM: On exam- pt appears stated age. She is in mild distress due to pain  NEURO:  Alert and oriented x 3. Cranial nerves intact Motor strength 2-3/5 in all 4 extremities due to pain. Reflexes 1+ in all 4 extremities  HEENT: head- atraumatic-normocephalic.   No discharge from ears, nose or throat. No temporal tenderness  NECK: Supple. No jugular venous distention. LUNGS: Bilateral chest movements without the use of accessory muscles. No crackles or diminished breath sounds. Respirations easy, nonlabored, breath sounds clear. CARDIOVASCULAR:  Heart rate and rhythm regular. PSM heard  ABDOMEN: Abdomen soft, nondistended, nontender, bowel sounds present. No palpable hernias noted. RECTAL: exam deferred. EXTREMITIES: Severe b/l leg swelling with edeam. Bilateral feet swopllen, tender to touch. ROM limited. MUSCULOSKELETAL:   NECK: ROM limited. Mild tenderness  SHOULDERS: ROM limited due to pain. Better than other joints. No swelling or redness. ELBOWS:  No swelling, warmth. ROM limited  WRISTS: Severe swelling of wrist joints with severe tenderness and warmth. Synovisits present  MCP: Swollen, warma dn tender. Synovitis+. Especially 1sta nd 2nd  PIP: Diffuse tenderness and swelling  DIP: Tenderness and swelling +  No flexor crepitus, : 2+/2+  HIPS: Unable to perform  KNEES: no swelling, no warmth. Mild effusion on the right. ANKLES:  Severe warmth, swelling and tenderness.  ROM limited  TOES: Severe warmth, swelling and tenderness  SKIN: No rashes    DATA:    CBC with Differential:    Lab Results   Component Value Date    WBC 19.5 03/29/2021    RBC 2.86 03/29/2021    HGB 8.7 03/29/2021    HCT 26.3 03/29/2021     03/29/2021    MCV 92.0 03/29/2021    MCH 30.4 03/29/2021    MCHC 33.1 03/29/2021    RDW 16.4 03/29/2021    LYMPHOPCT 7 03/29/2021    MONOPCT 11 03/29/2021    BASOPCT 0 03/29/2021    MONOSABS 2.15 03/29/2021    LYMPHSABS 1.37 03/29/2021    EOSABS 0.00 03/29/2021    BASOSABS 0.00 03/29/2021    DIFFTYPE NOT REPORTED 03/29/2021     BMP:    Lab Results   Component Value Date     03/29/2021    K 3.8 03/29/2021    CL 96 03/29/2021    CO2 20 03/29/2021    BUN 10 03/29/2021    LABALBU 4.4 03/21/2021    LABALBU 4.3 02/24/2012    CREATININE 0.51 03/29/2021 CALCIUM 9.3 03/29/2021    GFRAA >60 03/29/2021    LABGLOM >60 03/29/2021    GLUCOSE 127 03/29/2021    GLUCOSE 88 02/24/2012     Hepatic Function Panel:    Lab Results   Component Value Date    ALKPHOS 103 03/21/2021    ALT 72 03/21/2021    AST 85 03/21/2021    PROT 8.3 03/21/2021    BILITOT 0.49 03/21/2021    BILIDIR 0.08 03/20/2021    IBILI 0.24 03/20/2021    LABALBU 4.4 03/21/2021    LABALBU 4.3 02/24/2012     Uric Acid:    Lab Results   Component Value Date    URICACID 4.9 03/29/2021     Last 3 Troponin:    Lab Results   Component Value Date    TROPONINI 0.00 11/17/2017    TROPONINI 0.00 09/25/2016    TROPONINI 0.00 09/25/2016     U/A:    Lab Results   Component Value Date    COLORU DARK YELLOW 03/24/2021    PROTEINU TRACE 03/24/2021    PHUR 6.0 03/24/2021    WBCUA 2 TO 5 03/24/2021    RBCUA 2 TO 5 03/24/2021    MUCUS NOT REPORTED 03/24/2021    TRICHOMONAS NOT REPORTED 03/24/2021    YEAST NOT REPORTED 03/24/2021    BACTERIA NOT REPORTED 03/24/2021    SPECGRAV 1.018 03/24/2021    LEUKOCYTESUR TRACE 03/24/2021    UROBILINOGEN Normal 03/24/2021    BILIRUBINUR NEGATIVE 03/24/2021    GLUCOSEU NEGATIVE 03/24/2021    AMORPHOUS NOT REPORTED 03/24/2021     JARRELL:    Lab Results   Component Value Date    JARRELL NEGATIVE 09/23/2020     RF:    Lab Results   Component Value Date    RF 13.2 09/23/2020       IMPRESSION/RECOMMENDATIONS:      Patient Active Problem List   Diagnosis    Lung nodule    Obesity    Adrenal adenoma    Goiter    Hypertension    Alcohol abuse    Essential hypertension    Enlarged tonsils    ACE inhibitor-aggravated angioedema    JONES (obstructive sleep apnea)    Dyslipidemia    IGT (impaired glucose tolerance)    Acute alcoholic intoxication without complication (HCC)    Acute renal insufficiency    Effusion of bursa of right knee    Acute cystitis without hematuria    Bipolar disorder (HCC)    Mild intermittent asthma without complication    Dizziness    Fever    Inflammatory polyarthritis ([facility]       1. Inflammatory Polyarthritis  High clinical suspicion of Inflammatory joint disease, unknown diagnosis at this point  Previous high ESR/CRP and joint fluid analysis in favour of diagnosis  Obtain ESR, CRP, JARRELL, Uric acid, ACE levels  Repeat UA and urine protein levels  Patient will most likely need to be started on high dose steroids  Might need joint fluid analysis    Plan discussed with Dr Niall Solo  Thank you for the interesting consult    Abhishek Pedro MD  3/29/2021 8:30 AM   PGY-3 INTERNAL MEDICINE RESIDENT  Rheumatic and immunologic diseases  [facility] [address] [place] [address]  [phone]    Attending Note:  I have discussed the care of the patient including pertinent history and exam findings, with Dr. Gianna Pollock. I have seen and examined the patient and the key elements of all parts of the encounter have been performed by me. I agree with the assessment, plan and orders as documented by Dr. Gianna Pollock. Seronegative polyarticular inflammatory arthritis (Gout vs seronegative RA) Her joint fluid analysis from September was negative for crystals but her Uric acid was elevated at 7.3. Her uric acid today is 4.8  Will try her On Solumedrol 40 mg Q 12 hours  Awaiting JARRELL (RF, CCP and ACE are all normal)  Close monitoring of blood sugar while on steroids  Case D/W nurse    Tello Bass M.D.  [address] of [address] [place] [address]  [phone]

## 2021-03-29 NOTE — PLAN OF CARE
Problem: Falls - Risk of:  Goal: Will remain free from falls  Description: Will remain free from falls  3/29/2021 0258 by Whitney Perez RN  Outcome: Ongoing  3/28/2021 1420 by Joelle Mosquera RN  Outcome: Ongoing  Goal: Absence of physical injury  Description: Absence of physical injury  3/29/2021 0258 by Whitney Perez RN  Outcome: Ongoing  3/28/2021 1420 by Joelle Mosquera RN  Outcome: Ongoing     Problem: Suicide risk  Goal: Provide patient with safe environment  Description: Provide patient with safe environment  3/29/2021 0258 by Whitney Perez RN  Outcome: Ongoing  3/28/2021 1420 by Joelle Mosquera RN  Outcome: Ongoing     Problem: Nutrition  Goal: Optimal nutrition therapy  Description: Nutrition Problem #1: Predicted inadequate energy intake  Intervention: Food and/or Nutrient Delivery: Start Oral Nutrition Supplement, Continue Current Diet  Nutritional Goals: Meet greater than 50% of estimated nutrient needs     3/29/2021 0258 by Whitney Perez RN  Outcome: Ongoing  3/28/2021 1420 by Joelle Mosquera RN  Outcome: Ongoing     Problem: Pain:  Goal: Pain level will decrease  Description: Pain level will decrease  3/29/2021 0258 by Whitney Perez RN  Outcome: Ongoing  3/28/2021 1420 by Joelle Mosquera RN  Outcome: Ongoing  Goal: Control of acute pain  Description: Control of acute pain  3/29/2021 0258 by Whitney Perez RN  Outcome: Ongoing  3/28/2021 1420 by Joelle Mosquera RN  Outcome: Ongoing  Goal: Control of chronic pain  Description: Control of chronic pain  3/29/2021 0258 by Whitney Perez RN  Outcome: Ongoing  3/28/2021 1420 by Joelle Mosquera RN  Outcome: Ongoing     Problem: Skin Integrity:  Goal: Will show no infection signs and symptoms  Description: Will show no infection signs and symptoms  3/29/2021 0258 by Whitney Perez RN  Outcome: Ongoing  3/28/2021 1420 by Joelle Mosquera RN  Outcome: Ongoing  Goal: Absence of new skin breakdown  Description: Absence of new skin breakdown  3/29/2021 0258 by Laura Sanches, RN  Outcome: Ongoing  3/28/2021 1420 by Caleb San RN  Outcome: Ongoing

## 2021-03-30 PROBLEM — M06.00 SERONEGATIVE RHEUMATOID ARTHRITIS (HCC): Status: ACTIVE | Noted: 2021-03-30

## 2021-03-30 LAB
ABSOLUTE EOS #: 0 K/UL (ref 0–0.4)
ABSOLUTE IMMATURE GRANULOCYTE: 0 K/UL (ref 0–0.3)
ABSOLUTE LYMPH #: 0.45 K/UL (ref 1–4.8)
ABSOLUTE MONO #: 1.13 K/UL (ref 0.1–0.8)
ANION GAP SERPL CALCULATED.3IONS-SCNC: 14 MMOL/L (ref 9–17)
ANTI-NUCLEAR ANTIBODY (ANA): NEGATIVE
BASOPHILS # BLD: 0 % (ref 0–2)
BASOPHILS ABSOLUTE: 0 K/UL (ref 0–0.2)
BUN BLDV-MCNC: 13 MG/DL (ref 6–20)
BUN/CREAT BLD: ABNORMAL (ref 9–20)
CALCIUM SERPL-MCNC: 9.2 MG/DL (ref 8.6–10.4)
CHLORIDE BLD-SCNC: 96 MMOL/L (ref 98–107)
CO2: 20 MMOL/L (ref 20–31)
CREAT SERPL-MCNC: 0.54 MG/DL (ref 0.5–0.9)
CULTURE: NORMAL
CULTURE: NORMAL
DIFFERENTIAL TYPE: ABNORMAL
EOSINOPHILS RELATIVE PERCENT: 0 % (ref 1–4)
GFR AFRICAN AMERICAN: >60 ML/MIN
GFR NON-AFRICAN AMERICAN: >60 ML/MIN
GFR SERPL CREATININE-BSD FRML MDRD: ABNORMAL ML/MIN/{1.73_M2}
GFR SERPL CREATININE-BSD FRML MDRD: ABNORMAL ML/MIN/{1.73_M2}
GLUCOSE BLD-MCNC: 136 MG/DL (ref 65–105)
GLUCOSE BLD-MCNC: 143 MG/DL (ref 70–99)
GLUCOSE BLD-MCNC: 164 MG/DL (ref 65–105)
GLUCOSE BLD-MCNC: 183 MG/DL (ref 65–105)
GLUCOSE BLD-MCNC: 209 MG/DL (ref 65–105)
HCT VFR BLD CALC: 30.8 % (ref 36.3–47.1)
HEMOGLOBIN: 10 G/DL (ref 11.9–15.1)
IMMATURE GRANULOCYTES: 0 %
LYMPHOCYTES # BLD: 2 % (ref 24–44)
Lab: NORMAL
Lab: NORMAL
MCH RBC QN AUTO: 30 PG (ref 25.2–33.5)
MCHC RBC AUTO-ENTMCNC: 32.5 G/DL (ref 28.4–34.8)
MCV RBC AUTO: 92.5 FL (ref 82.6–102.9)
MONOCYTES # BLD: 5 % (ref 1–7)
MORPHOLOGY: ABNORMAL
NRBC AUTOMATED: 0 PER 100 WBC
PDW BLD-RTO: 16.6 % (ref 11.8–14.4)
PLATELET # BLD: 676 K/UL (ref 138–453)
PLATELET ESTIMATE: ABNORMAL
PMV BLD AUTO: 9.1 FL (ref 8.1–13.5)
POTASSIUM SERPL-SCNC: 4.2 MMOL/L (ref 3.7–5.3)
RBC # BLD: 3.33 M/UL (ref 3.95–5.11)
RBC # BLD: ABNORMAL 10*6/UL
SEG NEUTROPHILS: 93 % (ref 36–66)
SEGMENTED NEUTROPHILS ABSOLUTE COUNT: 20.92 K/UL (ref 1.8–7.7)
SODIUM BLD-SCNC: 130 MMOL/L (ref 135–144)
SPECIMEN DESCRIPTION: NORMAL
SPECIMEN DESCRIPTION: NORMAL
WBC # BLD: 22.5 K/UL (ref 3.5–11.3)
WBC # BLD: ABNORMAL 10*3/UL

## 2021-03-30 PROCEDURE — 6360000002 HC RX W HCPCS: Performed by: STUDENT IN AN ORGANIZED HEALTH CARE EDUCATION/TRAINING PROGRAM

## 2021-03-30 PROCEDURE — 85025 COMPLETE CBC W/AUTO DIFF WBC: CPT

## 2021-03-30 PROCEDURE — 2580000003 HC RX 258: Performed by: INTERNAL MEDICINE

## 2021-03-30 PROCEDURE — APPSS30 APP SPLIT SHARED TIME 16-30 MINUTES: Performed by: NURSE PRACTITIONER

## 2021-03-30 PROCEDURE — 6370000000 HC RX 637 (ALT 250 FOR IP): Performed by: INTERNAL MEDICINE

## 2021-03-30 PROCEDURE — 80048 BASIC METABOLIC PNL TOTAL CA: CPT

## 2021-03-30 PROCEDURE — 6370000000 HC RX 637 (ALT 250 FOR IP): Performed by: NURSE PRACTITIONER

## 2021-03-30 PROCEDURE — 87186 SC STD MICRODIL/AGAR DIL: CPT

## 2021-03-30 PROCEDURE — 2580000003 HC RX 258: Performed by: NURSE PRACTITIONER

## 2021-03-30 PROCEDURE — 97110 THERAPEUTIC EXERCISES: CPT

## 2021-03-30 PROCEDURE — 99233 SBSQ HOSP IP/OBS HIGH 50: CPT | Performed by: INTERNAL MEDICINE

## 2021-03-30 PROCEDURE — 6370000000 HC RX 637 (ALT 250 FOR IP): Performed by: PSYCHIATRY & NEUROLOGY

## 2021-03-30 PROCEDURE — 2060000000 HC ICU INTERMEDIATE R&B

## 2021-03-30 PROCEDURE — 82947 ASSAY GLUCOSE BLOOD QUANT: CPT

## 2021-03-30 PROCEDURE — 87088 URINE BACTERIA CULTURE: CPT

## 2021-03-30 PROCEDURE — 6360000002 HC RX W HCPCS: Performed by: INTERNAL MEDICINE

## 2021-03-30 PROCEDURE — 36415 COLL VENOUS BLD VENIPUNCTURE: CPT

## 2021-03-30 PROCEDURE — 94640 AIRWAY INHALATION TREATMENT: CPT

## 2021-03-30 PROCEDURE — 87086 URINE CULTURE/COLONY COUNT: CPT

## 2021-03-30 PROCEDURE — 99232 SBSQ HOSP IP/OBS MODERATE 35: CPT | Performed by: INTERNAL MEDICINE

## 2021-03-30 RX ORDER — PREDNISONE 10 MG/1
10 TABLET ORAL 2 TIMES DAILY
Status: DISCONTINUED | OUTPATIENT
Start: 2021-03-31 | End: 2021-03-31 | Stop reason: HOSPADM

## 2021-03-30 RX ORDER — HYDROXYCHLOROQUINE SULFATE 200 MG/1
200 TABLET, FILM COATED ORAL 2 TIMES DAILY
Status: DISCONTINUED | OUTPATIENT
Start: 2021-03-31 | End: 2021-03-31 | Stop reason: HOSPADM

## 2021-03-30 RX ADMIN — QUETIAPINE FUMARATE 50 MG: 25 TABLET ORAL at 20:42

## 2021-03-30 RX ADMIN — FOLIC ACID 1 MG: 1 TABLET ORAL at 08:39

## 2021-03-30 RX ADMIN — ATORVASTATIN CALCIUM 20 MG: 20 TABLET, FILM COATED ORAL at 08:39

## 2021-03-30 RX ADMIN — INSULIN LISPRO 1 UNITS: 100 INJECTION, SOLUTION INTRAVENOUS; SUBCUTANEOUS at 17:35

## 2021-03-30 RX ADMIN — METHYLPREDNISOLONE SODIUM SUCCINATE 40 MG: 40 INJECTION, POWDER, FOR SOLUTION INTRAMUSCULAR; INTRAVENOUS at 06:59

## 2021-03-30 RX ADMIN — CETIRIZINE HYDROCHLORIDE 10 MG: 10 TABLET ORAL at 08:39

## 2021-03-30 RX ADMIN — CITALOPRAM 20 MG: 20 TABLET, FILM COATED ORAL at 08:39

## 2021-03-30 RX ADMIN — SERTRALINE 25 MG: 25 TABLET, FILM COATED ORAL at 08:39

## 2021-03-30 RX ADMIN — FLUTICASONE PROPIONATE 1 PUFF: 110 AEROSOL, METERED RESPIRATORY (INHALATION) at 08:45

## 2021-03-30 RX ADMIN — PANTOPRAZOLE SODIUM 40 MG: 40 TABLET, DELAYED RELEASE ORAL at 05:48

## 2021-03-30 RX ADMIN — POTASSIUM CHLORIDE 40 MEQ: 1500 TABLET, EXTENDED RELEASE ORAL at 08:38

## 2021-03-30 RX ADMIN — METHYLPREDNISOLONE SODIUM SUCCINATE 40 MG: 40 INJECTION, POWDER, FOR SOLUTION INTRAMUSCULAR; INTRAVENOUS at 18:42

## 2021-03-30 RX ADMIN — ALLOPURINOL 100 MG: 100 TABLET ORAL at 08:39

## 2021-03-30 RX ADMIN — HYDROCHLOROTHIAZIDE 12.5 MG: 25 TABLET ORAL at 08:39

## 2021-03-30 RX ADMIN — SODIUM CHLORIDE, PRESERVATIVE FREE 10 ML: 5 INJECTION INTRAVENOUS at 20:38

## 2021-03-30 RX ADMIN — AMLODIPINE BESYLATE 10 MG: 10 TABLET ORAL at 08:39

## 2021-03-30 RX ADMIN — Medication 100 MG: at 08:39

## 2021-03-30 RX ADMIN — SODIUM CHLORIDE, PRESERVATIVE FREE 10 ML: 5 INJECTION INTRAVENOUS at 08:40

## 2021-03-30 RX ADMIN — ENOXAPARIN SODIUM 40 MG: 40 INJECTION SUBCUTANEOUS at 08:38

## 2021-03-30 RX ADMIN — ACETAMINOPHEN 650 MG: 325 TABLET ORAL at 17:34

## 2021-03-30 RX ADMIN — ASPIRIN 325 MG: 325 TABLET, COATED ORAL at 08:39

## 2021-03-30 RX ADMIN — FLUTICASONE PROPIONATE 1 PUFF: 110 AEROSOL, METERED RESPIRATORY (INHALATION) at 22:15

## 2021-03-30 RX ADMIN — INSULIN LISPRO 1 UNITS: 100 INJECTION, SOLUTION INTRAVENOUS; SUBCUTANEOUS at 20:34

## 2021-03-30 RX ADMIN — CALCIUM CARBONATE-CHOLECALCIFEROL TAB 250 MG-125 UNIT 500 MG: 250-125 TAB at 08:39

## 2021-03-30 ASSESSMENT — PAIN SCALES - GENERAL
PAINLEVEL_OUTOF10: 2
PAINLEVEL_OUTOF10: 10

## 2021-03-30 NOTE — PLAN OF CARE
Outcome: Ongoing     Problem: Injury - Risk of, Substance Overdose:  Goal: No signs of physiological stress  Description: Absence of drug withdrawal signs and symptoms  Outcome: Ongoing  Goal: Ability to remain free from injury will improve  Description: Ability to remain free from injury will improve  Outcome: Ongoing     Problem: Mood - Altered:  Goal: Mood stable  Description: Mood stable  Outcome: Ongoing     Problem: Violence - Risk of, Self/Other-Directed:  Goal: Knowledge of developmental care interventions  Description: Absence of violence  Outcome: Ongoing

## 2021-03-30 NOTE — PROGRESS NOTES
Rheumatology Progress Note  3/30/2021 5:10 PM  Subjective:   Admit Date: 3/20/2021  PCP: Landon Braun MD  Interval History:     Patient is feeling much better today  Her joint pain and myalgias are better  She looks happy  She took a few steps from her bed to the chair and sitting there comfortably  She only complains of some pain in her feet and hands but overall feels well  Vitals stable    Diet: DIET GENERAL; No Caffeine  Dietary Nutrition Supplements: Diabetic Oral Supplement  Medications:   Scheduled Meds:   [START ON 3/31/2021] predniSONE  10 mg Oral BID    [START ON 3/31/2021] hydroxychloroquine  200 mg Oral BID    methylPREDNISolone  40 mg Intravenous Q12H    potassium chloride  40 mEq Oral Daily with breakfast    enoxaparin  40 mg Subcutaneous Daily    allopurinol  100 mg Oral Daily    sodium chloride flush  10 mL Intravenous 2 times per day    citalopram  20 mg Oral Daily    sertraline  25 mg Oral Daily    amLODIPine  10 mg Oral Daily    atorvastatin  20 mg Oral Daily    oyster shell calcium/vitamin D  500 mg Oral Daily    vitamin D  50,000 Units Oral Once per day on Mon Thu    fluticasone  1 puff Inhalation BID    folic acid  1 mg Oral Daily    hydroCHLOROthiazide  12.5 mg Oral QAM    cetirizine  10 mg Oral Daily    pantoprazole  40 mg Oral QAM AC    QUEtiapine  50 mg Oral Nightly    thiamine  100 mg Oral Daily    sodium chloride flush  10 mL Intravenous 2 times per day    aspirin  325 mg Oral Daily    nicotine  1 patch Transdermal Daily    insulin lispro  0-6 Units Subcutaneous TID WC    insulin lispro  0-3 Units Subcutaneous Nightly     Continuous Infusions:   dextrose       CBC:   Recent Labs     03/28/21  0352 03/29/21  0449 03/30/21  0406   WBC 16.4* 19.5* 22.5*   HGB 8.4* 8.7* 10.0*    569* 676*     BMP:    Recent Labs     03/28/21  0352 03/29/21  0449 03/30/21  0406   * 131* 130*   K 3.2* 3.8 4.2   CL 94* 96* 96*   CO2 20 20 20   BUN 9 10 13 CREATININE 0.54 0.51 0.54   GLUCOSE 108* 127* 143*     Hepatic: No results for input(s): AST, ALT, ALB, BILITOT, ALKPHOS in the last 72 hours. Troponin: No results for input(s): TROPONINI in the last 72 hours. BNP: No results for input(s): BNP in the last 72 hours. Lipids: No results for input(s): CHOL, HDL in the last 72 hours. Invalid input(s): LDLCALCU  INR: No results for input(s): INR in the last 72 hours. Objective:   Vitals: /84   Pulse 86   Temp 98.9 °F (37.2 °C) (Oral)   Resp 19   Ht 5' 6\" (1.676 m)   Wt 180 lb 6.4 oz (81.8 kg)   SpO2 98%   BMI 29.12 kg/m²     GENERAL EXAM: On exam- pt appears stated age. Comfortable and resting in chair  NEURO:  Alert and oriented x 3. Cranial nerves intact   Motor strength 4/5 in all 4 extremities due to. Reflexes 2+ in all 4 extremities  HEENT: head- atraumatic-normocephalic. No discharge from ears, nose or throat. No temporal tenderness  NECK: Supple. No jugular venous distention. LUNGS: Bilateral chest movements without the use of accessory muscles. No crackles or diminished breath sounds. Respirations easy, nonlabored, breath sounds clear. CARDIOVASCULAR:  Heart rate and rhythm regular. PSM heard  ABDOMEN: Abdomen soft, nondistended, nontender, bowel sounds present. No palpable hernias noted. RECTAL: exam deferred. EXTREMITIES:  Moderate pedal and feet edema present. Better than yesterday. No warmth or redness. Mild tenderness present at ankle joint. ROM limited. MUSCULOSKELETAL:   NECK: ROM limited. No swelling, warmth or tenderness  SHOULDERS: ROM  better than yesterday, almost full abduction. No swelling or redness. ELBOWS:  No swelling, warmth. ROM improved. Some pain with flexion  WRISTS: Swelling of wrist joints better but tenderness present. Synovisits present  MCP:  Diffusely swollen bilaterally. Synovitis+. Especially 1st and 2nd  PIP:  Some swelling of bilateral PIP joints present. Tenderness better.   DIP: Tenderness and swelling +  No flexor crepitus, : 3+/3+  HIPS: Unable to perform  KNEES: no tenderness, no warmth. Mild lateral effusion on the right. ANKLES:   Tender ankle joints. ROM limited due to pain. Swelling present. TOES:  Swelling and tenderness better. Range of motion better  SKIN: No rashes    Assessment and Plan:     Principal Problem (Resolved):    Palpitations  Active Problems:    Alcohol abuse    Hypertension    Essential hypertension    ACE inhibitor-aggravated angioedema    Bipolar disorder (HCC)    Mild intermittent asthma without complication    Dizziness    Fever    Inflammatory polyarthritis ([address]    1. Inflammatory polyarthritis, most likely seronegative rheumatoid arthritis  Some concern for gouty polyarthritis with uric acid 7.3 in September 2020 but 4.8 now   Markedly elevated CRP and ESR  RA and anti-CCF negative in September 2020. JARRELL negative X 2. Continue IV Solu-Medrol 40 mg every 12 hours, total 4 doses  Start prednisone 10 mg twice daily tomorrow  Start hydroxychloroquine 200 mg twice daily starting tomorrow  Can discontinue allopurinol  Patient can be discharged on these p.o. meds when okay with primary    Thank you for the interesting consult  We will continue to follow until patient is in hospital  Follow-up outpatient with Dr. Seema Miranda in 4 to 6 weeks after discharge      Keyshawn Pacheco MD  3/30/2021 5:10 PM   PGY-3 INTERNAL MEDICINE RESIDENT  Rheumatic and immunologic diseases  [facility] [address] [place] [address]  [phone]    Attending Note:  I have discussed the care of the patient including pertinent history and exam findings, with DR. Rosy Abarca. I have seen and examined the patient and the key elements of all parts of the encounter have been performed by me. I agree with the assessment, plan and orders as documented by Dr. Rosy Abarca. Patient is feeling much better.   There is a marked decrease in her pain and swelling  We will treat her as seronegative rheumatoid arthritis  We will switch Solu-Medrol to prednisone 10 mg twice daily. She should stay on the 10 mg twice daily for a week then she should go down to 10 mg daily thereafter. We will start her on hydroxychloroquine 200 mg twice daily  Can go off the allopurinol  No objection to discharge if okay with other services  Follow-up with me in the clinic in 4-6 weeks    Jad Friday[address]AV[address] of [address] [place] [address]  [phone]
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
## 2021-03-30 NOTE — PROGRESS NOTES
BRONCHOSPASM/BRONCHOCONSTRICTION    IMPROVE  AERATION/BREATHSOUNDS  ADMINISTER BRONCHODILATOR THERAPY AS APPROPRIATE  ASSESS BREATH SOUNDS  PATIENT EDUCATION AS NEEDED

## 2021-03-30 NOTE — PROGRESS NOTES
Physical Therapy  Facility/Department: [facility] CAR 3  Daily Treatment Note  NAME: Shashank Velasco  : 1965  MRN: [id number]    Date of Service: 3/30/2021    Discharge Recommendations:  Patient would benefit from continued therapy after discharge   PT Equipment Recommendations  Other: CTA pending progression of mobility. Pt currently unsafe to perform functional mobility without skilled assistance. Assessment   Assessment: Pt transferred from bed to chair with MaxA x1. UE and LE AROM exercises completed. Pt would benefit from continued skilled PT to address deficits. Prognosis: Guarded  REQUIRES PT FOLLOW UP: Yes  Activity Tolerance  Activity Tolerance: Patient limited by fatigue;Patient limited by endurance     Patient Diagnosis(es): The primary encounter diagnosis was Hypertension, unspecified type. Diagnoses of Dizziness, Palpitations, and Hypomagnesemia were also pertinent to this visit. has a past medical history of Angioedema, Anxiety, Asthma, Bipolar disorder ([address] Claustrophobia, Depression, GERD (gastroesophageal reflux disease), Hypertension, Hypertrophic cardiomyopathy ([address] Lung nodules, MRSA (methicillin resistant Staphylococcus aureus), Murmur, OA (osteoarthritis), JONES (obstructive sleep apnea), Thyroid nodule, and Type 2 diabetes mellitus without complication, without long-term current use of insulin ([address] has a past surgical history that includes Hysterectomy; Upper gastrointestinal endoscopy; Colonoscopy; Thyroid surgery; Cardiac catheterization; other surgical history (2015); Upper gastrointestinal endoscopy (N/A, 12/3/2020); Colonoscopy (N/A, 12/3/2020); Foot surgery (Bilateral, 2020); Hammer toe surgery (Right, 2020); and Bunionectomy (Bilateral, 2020).     Restrictions  Restrictions/Precautions  Restrictions/Precautions: Up as Tolerated, Fall Risk  Required Braces or Orthoses?: No  Subjective   General  Response To Previous Treatment: Patient with no complaints

## 2021-03-30 NOTE — PROGRESS NOTES
Patient is requesting that a person go to her home to check it out and make sure things are ok. Writer was not sure if the request should be asked of social work or case management. Advised of patient's question to the dayshift nurse and request info passed on to next shift as well.

## 2021-03-30 NOTE — PROGRESS NOTES
[facility]  Office: [address], DO, Patria Gipsontodd, DO, Susan Cortes, DO, Pabloisaac Moran Blood, DO, Jeannette Youngblood MD, Ortega Connell MD, Alaina Morgan MD, Partha Valles MD, Mandi Wheatley MD, Exie Ahumada, MD, Jagjit Wills MD, Collin Zhang MD, Hoang Wills MD, Erich Burden, DO, Ruben Salazar MD, Kath Johnson DO, Armando Jenkins MD,  Vanessa Gillis DO, Alireza Chu MD, Vanessa Linn MD, Ania Marr, [facility], [facility], [facility], Jonathan Nicholas, [facility], Allison Tierney, CNS, Isaías Jenkins, CNP, Benjy Lozadaet, CNP, Marlin Israel, CNP, Lauri Adams, CNP, Ryan Soto, CNP, Ryan Terrell, PA-C, José Miguel Carlos, [facility], Yolie Taylor, CNP, Darren Ordoñez, CNP, Sugar Ornelas, CNP, Vijay Gorman, CNP, Gato Parra, CNP, Javid Arcos, [facility]    Progress Note    3/30/2021    11:11 AM    Name:   Laura Doherty  MRN:     [id number]     Acct:      [de-identified]   Room:   [address] [place] [address] Day:  9  Admit Date:  3/20/2021 10:51 PM    PCP:   Lanie Walters MD  Code Status:  Full Code    Subjective:     C/C:   Chief Complaint   Patient presents with    Palpitations    Hypertension     Interval History Status: improved. Patient was seen and evaluated at bedside this morning. Pt reports feeling much better today. She feels like steroids her helping. She is able to get around little bit better today. Brief History:     See H&P    Review of Systems:     Constitutional:  negative for chills, fevers, sweats  Respiratory:  negative for cough, dyspnea on exertion, shortness of breath, wheezing  Cardiovascular:  negative for chest pain, chest pressure/discomfort, lower extremity edema, palpitations  Gastrointestinal:  negative for abdominal pain, constipation, diarrhea, nausea, vomiting  Neurological:  negative for dizziness, headache    Medications: Allergies:     Allergies   Allergen Reactions    Bee Venom Anaphylaxis    Iodine Anaphylaxis and Rash    Lisinopril Swelling and Anaphylaxis     Angioedema    Shellfish-Derived Products Anaphylaxis and Rash     ANGIOEDEMA       Current Meds:   Scheduled Meds:    methylPREDNISolone  40 mg Intravenous Q12H    potassium chloride  40 mEq Oral Daily with breakfast    enoxaparin  40 mg Subcutaneous Daily    allopurinol  100 mg Oral Daily    sodium chloride flush  10 mL Intravenous 2 times per day    citalopram  20 mg Oral Daily    sertraline  25 mg Oral Daily    amLODIPine  10 mg Oral Daily    atorvastatin  20 mg Oral Daily    oyster shell calcium/vitamin D  500 mg Oral Daily    vitamin D  50,000 Units Oral Once per day on Mon Thu    fluticasone  1 puff Inhalation BID    folic acid  1 mg Oral Daily    hydroCHLOROthiazide  12.5 mg Oral QAM    cetirizine  10 mg Oral Daily    pantoprazole  40 mg Oral QAM AC    QUEtiapine  50 mg Oral Nightly    thiamine  100 mg Oral Daily    sodium chloride flush  10 mL Intravenous 2 times per day    aspirin  325 mg Oral Daily    nicotine  1 patch Transdermal Daily    insulin lispro  0-6 Units Subcutaneous TID WC    insulin lispro  0-3 Units Subcutaneous Nightly     Continuous Infusions:    dextrose       PRN Meds: albuterol sulfate HFA, sodium chloride flush, metoprolol, lidocaine, glucose, dextrose, glucagon (rDNA), dextrose, sodium chloride flush, promethazine **OR** ondansetron, acetaminophen **OR** acetaminophen, magnesium hydroxide, potassium chloride **OR** potassium alternative oral replacement **OR** potassium chloride, potassium chloride, magnesium sulfate, nitroGLYCERIN, perflutren lipid microspheres, LORazepam **OR** LORazepam **OR** LORazepam **OR** LORazepam **OR** LORazepam **OR** LORazepam **OR** LORazepam **OR** LORazepam    Data:     Past Medical History:   has a past medical history of Angioedema, Anxiety, Asthma, Bipolar disorder ([facility] Claustrophobia, Depression, GERD (gastroesophageal reflux disease), Hypertension, Hypertrophic cardiomyopathy ([facility] Lung nodules, MRSA (methicillin resistant Staphylococcus aureus), Murmur, OA (osteoarthritis), JONES (obstructive sleep apnea), Thyroid nodule, and Type 2 diabetes mellitus without complication, without long-term current use of insulin ([facility] Social History:   reports that she quit smoking about 28 years ago. Her smoking use included cigarettes. She has a 10.00 pack-year smoking history. She has never used smokeless tobacco. She reports previous alcohol use of about 12.0 standard drinks of alcohol per week. She reports previous drug use. Drug: Cocaine. Family History:   Family History   Problem Relation Age of Onset    Stroke Mother     Hypertension Father     Cancer Brother         bone    Lung Cancer Maternal Aunt     Cancer Maternal Grandmother         eye     Other Sister         aneurysm    Heart Disease Sister        Vitals:  BP (!) 150/91   Pulse 85   Temp 98.2 °F (36.8 °C) (Oral)   Resp 22   Ht 5' 6\" (1.676 m)   Wt 180 lb 6.4 oz (81.8 kg)   SpO2 97%   BMI 29.12 kg/m²   Temp (24hrs), Av.5 °F (36.9 °C), Min:98.2 °F (36.8 °C), Max:99 °F (37.2 °C)    Recent Labs     21  1150 21  1633 21  1949 21  0733   POCGLU 98 94 91 136*       I/O (24Hr):     Intake/Output Summary (Last 24 hours) at 3/30/2021 1111  Last data filed at 3/30/2021 0549  Gross per 24 hour   Intake 790 ml   Output 900 ml   Net -110 ml       Labs:  Hematology:  Recent Labs     21  0352 21  0449 21  1153 21  0406   WBC 16.4* 19.5*  --  22.5*   RBC 2.76* 2.86*  --  3.33*   HGB 8.4* 8.7*  --  10.0*   HCT 25.3* 26.3*  --  30.8*   MCV 91.7 92.0  --  92.5   MCH 30.4 30.4  --  30.0   MCHC 33.2 33.1  --  32.5   RDW 16.1* 16.4*  --  16.6*    569*  --  676*   MPV 9.9 9.6  --  9.1   SEDRATE  --   --  119*  --    CRP  --   --  381.1*  --      Chemistry:  Recent Labs     21  0352 21  1215 21  0449 21  0406   NA 128*  --  131* 130*   K 3.2*  --  3.8 4.2   CL 94*  --  96* 96*   CO2 20  --  20 20   GLUCOSE 108*  --  127* 143*   BUN 9  --  10 13   CREATININE 0.54  --  0.51 0.54   MG 1.8  --   --   --    ANIONGAP 14  --  15 14   LABGLOM >60  --  >60 >60   GFRAA >60  --  >60 >60   CALCIUM 8.7  --  9.3 9.2   LACTACIDWB  --  1.4  --   --      Recent Labs     03/28/21  2113 03/29/21  0647 03/29/21  1150 03/29/21  1153 03/29/21  1633 03/29/21  1949 03/30/21  0733   URICACID  --   --   --  4.9  --   --   --    POCGLU 128* 105 98  --  94 91 136*     ABG:  Lab Results   Component Value Date    FIO2 INFORMATION NOT PROVIDED 09/20/2020     Lab Results   Component Value Date/Time    SPECIAL R HAND 11 ML 03/25/2021 07:03 PM     Lab Results   Component Value Date/Time    CULTURE NO GROWTH 5 DAYS 03/25/2021 07:03 PM       Radiology:  Xr Chest Portable    Result Date: 3/20/2021  No acute process.        Physical Examination:        General appearance:  alert, cooperative and no distress  Mental Status:  oriented to person, place and time and normal affect  Lungs:  clear to auscultation bilaterally, normal effort  Heart:  regular rate and rhythm, no murmur  Abdomen:  soft, nontender, nondistended, normal bowel sounds, no masses, hepatomegaly, splenomegaly  Extremities:  no edema, redness, tenderness in the calves  Skin:  no gross lesions, rashes, induration    Assessment:        Hospital Problems           Last Modified POA    Alcohol abuse 3/23/2021 Yes    Hypertension 3/23/2021 Yes    Essential hypertension 3/23/2021 Yes    ACE inhibitor-aggravated angioedema 3/23/2021 Yes    Bipolar disorder ([facility] 3/23/2021 Yes    Mild intermittent asthma without complication 1/22/0965 Yes    Dizziness 3/26/2021 Yes    Fever 3/26/2021 Yes    Inflammatory polyarthritis ([facility] 3/29/2021 Yes          Plan:        Chest pressure and palpitations  -VQ scan negative    Hypertension  -Continue Norvasc and hydrochlorothiazide    Alcohol abuse  -Continue CIWA protocol    Bipolar disorder  -Psychiatry consulted  -Patient to be admitted to inpatient psych facility once medically cleared    Asthma  -Continue inhalers    UTI  -No cultures were sent since WBC count was less than 10 and urinalysis. Antibiotics discontinued by infectious disease.   -UA ordered per ID for urinary retention     Persistent fevers  - ID consulted, no infectious etiology found  - Rheumatology consulted  - polyarthritis inflammatory process suspected   - On Solumedrol     Urinary retention  -UA ordered    Hypokalemia  - replaced    Gout  -Allopurinol started  -Rheumatology on board    Abdominal distention  -Mild ileus noted  -Patient is having bowel movements    Hyponatremia  -discontinue IV fluids  -Improving    Leukocytosis  -No infectious etiology found  -Rheumatology on board        Lizzie Crigler, MD  3/30/2021  11:11 AM

## 2021-03-31 VITALS
BODY MASS INDEX: 28.99 KG/M2 | RESPIRATION RATE: 16 BRPM | WEIGHT: 180.4 LBS | TEMPERATURE: 98.3 F | DIASTOLIC BLOOD PRESSURE: 86 MMHG | HEIGHT: 66 IN | OXYGEN SATURATION: 98 % | HEART RATE: 77 BPM | SYSTOLIC BLOOD PRESSURE: 129 MMHG

## 2021-03-31 LAB
ABSOLUTE EOS #: 0 K/UL (ref 0–0.4)
ABSOLUTE IMMATURE GRANULOCYTE: 0.25 K/UL (ref 0–0.3)
ABSOLUTE LYMPH #: 1.27 K/UL (ref 1–4.8)
ABSOLUTE MONO #: 1.27 K/UL (ref 0.1–0.8)
ANION GAP SERPL CALCULATED.3IONS-SCNC: 14 MMOL/L (ref 9–17)
BASOPHILS # BLD: 0 % (ref 0–2)
BASOPHILS ABSOLUTE: 0 K/UL (ref 0–0.2)
BUN BLDV-MCNC: 20 MG/DL (ref 6–20)
BUN/CREAT BLD: ABNORMAL (ref 9–20)
CALCIUM SERPL-MCNC: 9.2 MG/DL (ref 8.6–10.4)
CHLORIDE BLD-SCNC: 97 MMOL/L (ref 98–107)
CO2: 23 MMOL/L (ref 20–31)
CREAT SERPL-MCNC: 0.51 MG/DL (ref 0.5–0.9)
CULTURE: NORMAL
CULTURE: NORMAL
DIFFERENTIAL TYPE: ABNORMAL
EOSINOPHILS RELATIVE PERCENT: 0 % (ref 1–4)
GFR AFRICAN AMERICAN: >60 ML/MIN
GFR NON-AFRICAN AMERICAN: >60 ML/MIN
GFR SERPL CREATININE-BSD FRML MDRD: ABNORMAL ML/MIN/{1.73_M2}
GFR SERPL CREATININE-BSD FRML MDRD: ABNORMAL ML/MIN/{1.73_M2}
GLUCOSE BLD-MCNC: 190 MG/DL (ref 70–99)
GLUCOSE BLD-MCNC: 196 MG/DL (ref 65–105)
GLUCOSE BLD-MCNC: 211 MG/DL (ref 65–105)
GLUCOSE BLD-MCNC: 214 MG/DL (ref 65–105)
HCT VFR BLD CALC: 26.6 % (ref 36.3–47.1)
HEMOGLOBIN: 8.9 G/DL (ref 11.9–15.1)
IMMATURE GRANULOCYTES: 1 %
LYMPHOCYTES # BLD: 5 % (ref 24–44)
Lab: NORMAL
Lab: NORMAL
MAGNESIUM: 1.8 MG/DL (ref 1.6–2.6)
MCH RBC QN AUTO: 30 PG (ref 25.2–33.5)
MCHC RBC AUTO-ENTMCNC: 33.5 G/DL (ref 28.4–34.8)
MCV RBC AUTO: 89.6 FL (ref 82.6–102.9)
MONOCYTES # BLD: 5 % (ref 1–7)
MORPHOLOGY: ABNORMAL
NRBC AUTOMATED: 0 PER 100 WBC
PDW BLD-RTO: 16.4 % (ref 11.8–14.4)
PLATELET # BLD: 872 K/UL (ref 138–453)
PLATELET ESTIMATE: ABNORMAL
PMV BLD AUTO: 9.3 FL (ref 8.1–13.5)
POTASSIUM SERPL-SCNC: 3.5 MMOL/L (ref 3.7–5.3)
RBC # BLD: 2.97 M/UL (ref 3.95–5.11)
RBC # BLD: ABNORMAL 10*6/UL
SEG NEUTROPHILS: 89 % (ref 36–66)
SEGMENTED NEUTROPHILS ABSOLUTE COUNT: 22.51 K/UL (ref 1.8–7.7)
SODIUM BLD-SCNC: 134 MMOL/L (ref 135–144)
SPECIMEN DESCRIPTION: NORMAL
SPECIMEN DESCRIPTION: NORMAL
WBC # BLD: 25.3 K/UL (ref 3.5–11.3)
WBC # BLD: ABNORMAL 10*3/UL

## 2021-03-31 PROCEDURE — 6360000002 HC RX W HCPCS: Performed by: STUDENT IN AN ORGANIZED HEALTH CARE EDUCATION/TRAINING PROGRAM

## 2021-03-31 PROCEDURE — 81001 URINALYSIS AUTO W/SCOPE: CPT

## 2021-03-31 PROCEDURE — 94761 N-INVAS EAR/PLS OXIMETRY MLT: CPT

## 2021-03-31 PROCEDURE — 6370000000 HC RX 637 (ALT 250 FOR IP): Performed by: PSYCHIATRY & NEUROLOGY

## 2021-03-31 PROCEDURE — APPSS30 APP SPLIT SHARED TIME 16-30 MINUTES: Performed by: NURSE PRACTITIONER

## 2021-03-31 PROCEDURE — 97530 THERAPEUTIC ACTIVITIES: CPT

## 2021-03-31 PROCEDURE — 82947 ASSAY GLUCOSE BLOOD QUANT: CPT

## 2021-03-31 PROCEDURE — 80048 BASIC METABOLIC PNL TOTAL CA: CPT

## 2021-03-31 PROCEDURE — 97110 THERAPEUTIC EXERCISES: CPT

## 2021-03-31 PROCEDURE — 82570 ASSAY OF URINE CREATININE: CPT

## 2021-03-31 PROCEDURE — 99232 SBSQ HOSP IP/OBS MODERATE 35: CPT | Performed by: INTERNAL MEDICINE

## 2021-03-31 PROCEDURE — 84156 ASSAY OF PROTEIN URINE: CPT

## 2021-03-31 PROCEDURE — 6370000000 HC RX 637 (ALT 250 FOR IP): Performed by: NURSE PRACTITIONER

## 2021-03-31 PROCEDURE — 83735 ASSAY OF MAGNESIUM: CPT

## 2021-03-31 PROCEDURE — 36415 COLL VENOUS BLD VENIPUNCTURE: CPT

## 2021-03-31 PROCEDURE — 99239 HOSP IP/OBS DSCHRG MGMT >30: CPT | Performed by: INTERNAL MEDICINE

## 2021-03-31 PROCEDURE — 6360000002 HC RX W HCPCS: Performed by: INTERNAL MEDICINE

## 2021-03-31 PROCEDURE — 94640 AIRWAY INHALATION TREATMENT: CPT

## 2021-03-31 PROCEDURE — 6370000000 HC RX 637 (ALT 250 FOR IP): Performed by: INTERNAL MEDICINE

## 2021-03-31 PROCEDURE — 6370000000 HC RX 637 (ALT 250 FOR IP): Performed by: STUDENT IN AN ORGANIZED HEALTH CARE EDUCATION/TRAINING PROGRAM

## 2021-03-31 PROCEDURE — 85025 COMPLETE CBC W/AUTO DIFF WBC: CPT

## 2021-03-31 PROCEDURE — 99232 SBSQ HOSP IP/OBS MODERATE 35: CPT | Performed by: PSYCHIATRY & NEUROLOGY

## 2021-03-31 RX ORDER — HYDROXYCHLOROQUINE SULFATE 200 MG/1
200 TABLET, FILM COATED ORAL 2 TIMES DAILY
Qty: 60 TABLET | Refills: 0 | Status: SHIPPED | OUTPATIENT
Start: 2021-03-31 | End: 2021-06-11 | Stop reason: SDUPTHER

## 2021-03-31 RX ORDER — PREDNISONE 10 MG/1
10 TABLET ORAL 2 TIMES DAILY
Qty: 40 TABLET | Refills: 0 | Status: SHIPPED | OUTPATIENT
Start: 2021-03-31 | End: 2021-04-20

## 2021-03-31 RX ADMIN — ENOXAPARIN SODIUM 40 MG: 40 INJECTION SUBCUTANEOUS at 09:31

## 2021-03-31 RX ADMIN — METHYLPREDNISOLONE SODIUM SUCCINATE 40 MG: 40 INJECTION, POWDER, FOR SOLUTION INTRAMUSCULAR; INTRAVENOUS at 06:10

## 2021-03-31 RX ADMIN — ALLOPURINOL 100 MG: 100 TABLET ORAL at 09:31

## 2021-03-31 RX ADMIN — PANTOPRAZOLE SODIUM 40 MG: 40 TABLET, DELAYED RELEASE ORAL at 06:10

## 2021-03-31 RX ADMIN — ASPIRIN 325 MG: 325 TABLET, COATED ORAL at 09:32

## 2021-03-31 RX ADMIN — HYDROCHLOROTHIAZIDE 12.5 MG: 25 TABLET ORAL at 09:32

## 2021-03-31 RX ADMIN — CETIRIZINE HYDROCHLORIDE 10 MG: 10 TABLET ORAL at 09:31

## 2021-03-31 RX ADMIN — Medication 100 MG: at 09:31

## 2021-03-31 RX ADMIN — CITALOPRAM 20 MG: 20 TABLET, FILM COATED ORAL at 09:31

## 2021-03-31 RX ADMIN — FOLIC ACID 1 MG: 1 TABLET ORAL at 09:33

## 2021-03-31 RX ADMIN — POTASSIUM CHLORIDE 40 MEQ: 1500 TABLET, EXTENDED RELEASE ORAL at 09:32

## 2021-03-31 RX ADMIN — PREDNISONE 10 MG: 10 TABLET ORAL at 09:31

## 2021-03-31 RX ADMIN — FLUTICASONE PROPIONATE 1 PUFF: 110 AEROSOL, METERED RESPIRATORY (INHALATION) at 09:05

## 2021-03-31 RX ADMIN — CALCIUM CARBONATE-CHOLECALCIFEROL TAB 250 MG-125 UNIT 500 MG: 250-125 TAB at 09:31

## 2021-03-31 RX ADMIN — ACETAMINOPHEN 650 MG: 325 TABLET ORAL at 12:11

## 2021-03-31 RX ADMIN — AMLODIPINE BESYLATE 10 MG: 10 TABLET ORAL at 09:31

## 2021-03-31 RX ADMIN — SERTRALINE 25 MG: 25 TABLET, FILM COATED ORAL at 09:31

## 2021-03-31 RX ADMIN — HYDROXYCHLOROQUINE SULFATE 200 MG: 200 TABLET ORAL at 09:32

## 2021-03-31 ASSESSMENT — PAIN DESCRIPTION - LOCATION: LOCATION: KNEE;LEG;HEAD

## 2021-03-31 ASSESSMENT — PAIN SCALES - GENERAL
PAINLEVEL_OUTOF10: 10
PAINLEVEL_OUTOF10: 0
PAINLEVEL_OUTOF10: 10

## 2021-03-31 ASSESSMENT — PAIN DESCRIPTION - ONSET: ONSET: ON-GOING

## 2021-03-31 ASSESSMENT — PAIN DESCRIPTION - DESCRIPTORS: DESCRIPTORS: ACHING;DISCOMFORT

## 2021-03-31 ASSESSMENT — PAIN DESCRIPTION - ORIENTATION: ORIENTATION: LEFT;MID

## 2021-03-31 ASSESSMENT — PAIN DESCRIPTION - PAIN TYPE: TYPE: ACUTE PAIN

## 2021-03-31 NOTE — PROGRESS NOTES
Pt given discharge instructions and follow up information. Medications delivered via meds to beds no questions at this time.

## 2021-03-31 NOTE — PLAN OF CARE
Problem: Falls - Risk of:  Goal: Will remain free from falls  Description: Will remain free from falls  3/31/2021 0407 by Sophia Salazar RN  Outcome: Ongoing  3/30/2021 1742 by Shabbir Jay RN  Outcome: Ongoing  Goal: Absence of physical injury  Description: Absence of physical injury  3/31/2021 0407 by Sophia Salazar RN  Outcome: Ongoing  3/30/2021 1742 by Shabbir Jay RN  Outcome: Ongoing     Problem: Suicide risk  Goal: Provide patient with safe environment  Description: Provide patient with safe environment  3/31/2021 0407 by Sophia Salazar RN  Outcome: Ongoing  3/30/2021 1742 by Shabbir Jay RN  Outcome: Ongoing     Problem: Nutrition  Goal: Optimal nutrition therapy  Description: Nutrition Problem #1: Predicted inadequate energy intake  Intervention: Food and/or Nutrient Delivery: Start Oral Nutrition Supplement, Continue Current Diet  Nutritional Goals: Meet greater than 50% of estimated nutrient needs     3/31/2021 0407 by Sophia Salazar RN  Outcome: Ongoing  3/30/2021 1742 by Shabbir Jay RN  Outcome: Ongoing     Problem: Pain:  Goal: Pain level will decrease  Description: Pain level will decrease  3/31/2021 0407 by Sophia Salazar RN  Outcome: Ongoing  3/30/2021 1742 by Shabbir Jay RN  Outcome: Ongoing  Goal: Control of acute pain  Description: Control of acute pain  3/31/2021 0407 by Sophia Salazar RN  Outcome: Ongoing  3/30/2021 1742 by Shabbir Jay RN  Outcome: Ongoing  Goal: Control of chronic pain  Description: Control of chronic pain  3/31/2021 0407 by Sophia Salazar RN  Outcome: Ongoing  3/30/2021 1742 by Shabbir Jay RN  Outcome: Ongoing     Problem: Skin Integrity:  Goal: Will show no infection signs and symptoms  Description: Will show no infection signs and symptoms  3/31/2021 0407 by Sophia Salazar RN  Outcome: Ongoing  3/30/2021 1742 by Shabbir Jay RN  Outcome: Ongoing  Goal: Absence of new skin breakdown  Description: Absence of new skin breakdown  3/31/2021 0407 by Tamar Jones RN  Outcome: Ongoing  3/30/2021 1742 by Louis Alfaro RN  Outcome: Ongoing     Problem: Coping - Ineffective, Individual:  Goal: Ability to identify and develop effective coping behavior will improve  Description: Ability to identify and develop effective coping behavior will improve  3/31/2021 0407 by Tamar Jones RN  Outcome: Ongoing  3/30/2021 1742 by Louis Alfaro RN  Outcome: Ongoing     Problem: Discharge Planning:  Goal: Absence of hematoma at arterial access site  Description: Participation in substance abuse program  3/31/2021 0407 by Tamar Jones RN  Outcome: Ongoing  3/30/2021 1742 by Louis Alfaro RN  Outcome: Ongoing     Problem: Health Maintenance - Impaired:  Goal: Ability to manage health-related needs will improve  Description: Ability to manage health-related needs will improve  3/31/2021 0407 by Tamar Jonse RN  Outcome: Ongoing  3/30/2021 1742 by Louis Alfaro RN  Outcome: Ongoing     Problem: Injury - Risk of, Substance Overdose:  Goal: No signs of physiological stress  Description: Absence of drug withdrawal signs and symptoms  3/31/2021 0407 by Tamar Jones RN  Outcome: Ongoing  3/30/2021 1742 by Louis Alfaro RN  Outcome: Ongoing  Goal: Ability to remain free from injury will improve  Description: Ability to remain free from injury will improve  3/31/2021 0407 by Tamar Jones RN  Outcome: Ongoing  3/30/2021 1742 by Louis Alfaro RN  Outcome: Ongoing     Problem: Mood - Altered:  Goal: Mood stable  Description: Mood stable  3/31/2021 0407 by Tamar Jones RN  Outcome: Ongoing  3/30/2021 1742 by Louis Alfaro RN  Outcome: Ongoing     Problem: Violence - Risk of, Self/Other-Directed:  Goal: Knowledge of developmental care interventions  Description: Absence of violence  3/31/2021 0407 by Tamar Jones RN  Outcome: Ongoing 3/30/2021 1742 by Jonathan Cross, RN  Outcome: Ongoing
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
## 2021-03-31 NOTE — PROGRESS NOTES
CLINICAL PHARMACY NOTE: MEDS TO [address] Select Patient?: Yes  Total # of Prescriptions Filled: 2    The following medications were delivered to the patient:  · Plaquenil  · prednisone  Total # of Interventions Completed: 0  Time Spent (min): 0    Additional Documentation:

## 2021-03-31 NOTE — PROGRESS NOTES
Physical Therapy  Facility/Department: [facility] 3  Daily Treatment Note  NAME: Eddie Dean  : 1965  MRN: [id number]    Date of Service: 3/31/2021    Discharge Recommendations:  Patient would benefit from continued therapy after discharge   PT Equipment Recommendations  Other: CTA pending progression of mobility. Pt currently with decreased activity tolerance, amb unsafe to assess this date. Assessment   Body structures, Functions, Activity limitations: Decreased functional mobility ; Increased pain;Decreased balance;Decreased ROM; Decreased strength;Decreased high-level IADLs;Decreased safe awareness;Decreased cognition;Decreased endurance  Assessment: Pt completes transfers with noris mckeon with CGA. Pt completes transfers with RW and min A. Pt currently requires physical assist 24 hrs a day. Pt would benefit from continued therapy to address deficits. Prognosis: Good  Decision Making: Medium Complexity  PT Education: Goals;PT Role;Plan of Care; Injury Prevention;Transfer Training;General Safety;Equipment  Barriers to Learning: none  REQUIRES PT FOLLOW UP: Yes  Activity Tolerance  Activity Tolerance: Patient limited by fatigue;Patient limited by endurance     Patient Diagnosis(es): The primary encounter diagnosis was Hypertension, unspecified type. Diagnoses of Dizziness, Palpitations, Hypomagnesemia, and Seronegative rheumatoid arthritis ([address] were also pertinent to this visit. has a past medical history of Angioedema, Anxiety, Asthma, Bipolar disorder ([address] Claustrophobia, Depression, GERD (gastroesophageal reflux disease), Hypertension, Hypertrophic cardiomyopathy ([address] Lung nodules, MRSA (methicillin resistant Staphylococcus aureus), Murmur, OA (osteoarthritis), JONES (obstructive sleep apnea), Thyroid nodule, and Type 2 diabetes mellitus without complication, without long-term current use of insulin ([address] has a past surgical history that includes Hysterectomy;  Upper gastrointestinal endoscopy; Colonoscopy; Thyroid surgery; Cardiac catheterization; other surgical history (8/24/2015); Upper gastrointestinal endoscopy (N/A, 12/3/2020); Colonoscopy (N/A, 12/3/2020); Foot surgery (Bilateral, 12/07/2020); Hammer toe surgery (Right, 12/7/2020); and Bunionectomy (Bilateral, 12/7/2020). Restrictions  Restrictions/Precautions  Restrictions/Precautions: Up as Tolerated, Fall Risk  Required Braces or Orthoses?: No  Subjective   General  Response To Previous Treatment: Patient with no complaints from previous session. Family / Caregiver Present: No  Subjective  Subjective: RN and pt agreeable to PT. Pt agreeable and pleasant. Pt supine in bed upon arrival.  Pain Screening  Patient Currently in Pain: Yes  Pain Assessment  Pain Assessment: 0-10  Pain Level: 10  Pain Type: Acute pain  Pain Location: Knee;Leg;Head  Pain Orientation: Left;Mid  Pain Descriptors: Aching;Discomfort  Pain Frequency: Continuous  Pain Onset: On-going  Non-Pharmaceutical Pain Intervention(s): Repositioned; Ambulation/Increased Activity  Response to Pain Intervention: Patient Satisfied  Vital Signs  Patient Currently in Pain: Yes       Orientation  Orientation  Overall Orientation Status: Within Functional Limits  Cognition   Cognition  Overall Cognitive Status: Exceptions  Following Commands: Follows one step commands with increased time; Follows one step commands with repetition  Attention Span: Attends with cues to redirect; Difficulty attending to directions  Safety Judgement: Decreased awareness of need for assistance;Decreased awareness of need for safety  Problem Solving: Decreased awareness of errors  Initiation: Requires cues for some  Sequencing: Requires cues for some  Objective   Bed mobility  Sit to Supine: Stand by assistance  Scooting: Stand by assistance  Comment: HOB elevated  Transfers  Sit to Stand: Contact guard assistance;Minimal Assistance  Stand to sit: Contact guard assistance;Minimal Assistance  Comment: Performed sit to

## 2021-03-31 NOTE — CARE COORDINATION
TRANSITIONAL CARE PLANNING/ 2 Rehab Tyshawn Day: 10    Reason for Admission: Palpitations [R00.2]          Readmission Risk              Risk of Unplanned Readmission:        29            Patient goals/Treatment Preferences/Transitional Plan:  per report using noris steady to transfer to chair. Met with patient today voiced general weakness especially on left side. Sitter @ bedside. Will needs psych re-eval when medically stable to determine if safe to dc to non psych facility. Discussed if cleared could potentially go to snf/rehab became tearful, said she is afraid of losing her house and wants to go home with home care. Asked to talk to her zepf  [place] [address] gave her phone number and portable phone to call. Gave [facility] home care & snf lists to review. 15:34 psych as cleared her   16:50 met with patient discussed snf vs hc refuses snf agreeable to referral to [facility] or [facility] home care  Verified address & phone number  16:59 spoke to rolanda RODRIGUES unable to accept [facility] with [facility] reviewing   17:20 met with patient, will need a ride, cab requested @ 18:30. Agreeable to meds to beds called pharmacy asked to deliver to room.    Plan return home with roommate who cooks & cleans for her, already has a walker, avs updated with f/u

## 2021-03-31 NOTE — PROGRESS NOTES
Department of Psychiatry  Consult Service  Progress Note     Reason for Consult: r/o suicidal ideatioms    SUBJECTIVE:    Patient denies any active suicidal or homicidal ideation plan or intent. Reports that she has been feeling significantly better for last few days. She did report having suicidal thoughts when she initially came in however mentions that they have improved as her medical symptoms improved. She was requesting for home health aide as she has limited support at home. Eating and sleeping well. Taking meds, tolerating well without side effects. Denies any auditory or visual hallucinations. Denies any other psychotic symptoms today.     OBJECTIVE      Medications  Current Facility-Administered Medications: predniSONE (DELTASONE) tablet 10 mg, 10 mg, Oral, BID  hydroxychloroquine (PLAQUENIL) tablet 200 mg, 200 mg, Oral, BID  potassium chloride (KLOR-CON M) extended release tablet 40 mEq, 40 mEq, Oral, Daily with breakfast  albuterol sulfate  (90 Base) MCG/ACT inhaler 2 puff, 2 puff, Inhalation, Q6H PRN  enoxaparin (LOVENOX) injection 40 mg, 40 mg, Subcutaneous, Daily  allopurinol (ZYLOPRIM) tablet 100 mg, 100 mg, Oral, Daily  sodium chloride flush 0.9 % injection 10 mL, 10 mL, Intravenous, 2 times per day  sodium chloride flush 0.9 % injection 10 mL, 10 mL, Intravenous, PRN  metoprolol (LOPRESSOR) injection 5 mg, 5 mg, Intravenous, Q4H PRN  lidocaine (LMX) 4 % cream, , Topical, PRN  citalopram (CELEXA) tablet 20 mg, 20 mg, Oral, Daily  sertraline (ZOLOFT) tablet 25 mg, 25 mg, Oral, Daily  amLODIPine (NORVASC) tablet 10 mg, 10 mg, Oral, Daily  atorvastatin (LIPITOR) tablet 20 mg, 20 mg, Oral, Daily  oyster shell calcium/vitamin D 250-125 MG-UNIT per tablet 500 mg, 500 mg, Oral, Daily  vitamin D (ERGOCALCIFEROL) capsule 50,000 Units, 50,000 Units, Oral, Once per day on Mon Thu  fluticasone (FLOVENT HFA) 110 MCG/ACT inhaler 1 puff, 1 puff, Inhalation, BID  folic acid (FOLVITE) tablet 1 mg, 1 mg, Oral, Daily  hydroCHLOROthiazide (HYDRODIURIL) tablet 12.5 mg, 12.5 mg, Oral, QAM  cetirizine (ZYRTEC) tablet 10 mg, 10 mg, Oral, Daily  pantoprazole (PROTONIX) tablet 40 mg, 40 mg, Oral, QAM AC  QUEtiapine (SEROQUEL) tablet 50 mg, 50 mg, Oral, Nightly  thiamine tablet 100 mg, 100 mg, Oral, Daily  glucose (GLUTOSE) 40 % oral gel 15 g, 15 g, Oral, PRN  dextrose 50 % IV solution, 12.5 g, Intravenous, PRN  glucagon (rDNA) injection 1 mg, 1 mg, Intramuscular, PRN  dextrose 5 % solution, 100 mL/hr, Intravenous, PRN  sodium chloride flush 0.9 % injection 10 mL, 10 mL, Intravenous, 2 times per day  sodium chloride flush 0.9 % injection 10 mL, 10 mL, Intravenous, PRN  promethazine (PHENERGAN) tablet 12.5 mg, 12.5 mg, Oral, Q6H PRN **OR** ondansetron (ZOFRAN) injection 4 mg, 4 mg, Intravenous, Q6H PRN  acetaminophen (TYLENOL) tablet 650 mg, 650 mg, Oral, Q6H PRN **OR** acetaminophen (TYLENOL) suppository 650 mg, 650 mg, Rectal, Q6H PRN  magnesium hydroxide (MILK OF MAGNESIA) 400 MG/5ML suspension 30 mL, 30 mL, Oral, Daily PRN  aspirin EC tablet 325 mg, 325 mg, Oral, Daily  potassium chloride (KLOR-CON M) extended release tablet 40 mEq, 40 mEq, Oral, PRN **OR** potassium bicarb-citric acid (EFFER-K) effervescent tablet 40 mEq, 40 mEq, Oral, PRN **OR** potassium chloride 10 mEq/100 mL IVPB (Peripheral Line), 10 mEq, Intravenous, PRN  potassium chloride 10 mEq/100 mL IVPB (Peripheral Line), 10 mEq, Intravenous, PRN  magnesium sulfate 1000 mg in dextrose 5% 100 mL IVPB, 1,000 mg, Intravenous, PRN  nitroGLYCERIN (NITROSTAT) SL tablet 0.4 mg, 0.4 mg, Sublingual, Q5 Min PRN  perflutren lipid microspheres (DEFINITY) injection 1.65 mg, 1.5 mL, Intravenous, ONCE PRN  nicotine (NICODERM CQ) 21 MG/24HR 1 patch, 1 patch, Transdermal, Daily  insulin lispro (HUMALOG) injection vial 0-6 Units, 0-6 Units, Subcutaneous, TID WC  insulin lispro (HUMALOG) injection vial 0-3 Units, 0-3 Units, Subcutaneous, Nightly  LORazepam (ATIVAN) tablet 1 mg, 1 mg, Oral, Q1H PRN **OR** LORazepam (ATIVAN) injection 1 mg, 1 mg, Intravenous, Q1H PRN **OR** LORazepam (ATIVAN) tablet 2 mg, 2 mg, Oral, Q1H PRN **OR** LORazepam (ATIVAN) injection 2 mg, 2 mg, Intravenous, Q1H PRN **OR** LORazepam (ATIVAN) tablet 3 mg, 3 mg, Oral, Q1H PRN **OR** LORazepam (ATIVAN) injection 3 mg, 3 mg, Intravenous, Q1H PRN **OR** LORazepam (ATIVAN) tablet 4 mg, 4 mg, Oral, Q1H PRN **OR** LORazepam (ATIVAN) injection 4 mg, 4 mg, Intravenous, Q1H PRN  Facility-Administered Medications Ordered in Other Encounters: lactated ringers infusion, , Intravenous, Continuous     Physical  /84   Pulse 77   Temp 98.3 °F (36.8 °C) (Oral)   Resp 16   Ht 5' 6\" (1.676 m)   Wt 180 lb 6.4 oz (81.8 kg)   SpO2 98%   BMI 29.12 kg/m²     Mental Status Examination:    Level of consciousness:  Within normal limits  Appearance: good grooming  Behavior/Motor: No abnormalities noted  Attitude toward examiner:  cooperative  Speech:  Spontaneous, normal rate and volume  Mood: Anxious  Affect:  Full range  Thought processes: coherent  Thought content: denies Suicidal ideations, denies any homicidal ideations.    denies hallucinations or delusions, does not appear to be responding to internal stimuli   Memory: age appropriate  Insight & Judgement: improving  Medication side effects:  denies       ASSESSMENT    Schizoaffective disorder, depressed type    Patient Active Problem List   Diagnosis    Lung nodule    Obesity    Adrenal adenoma    Goiter    Hypertension    Alcohol abuse    Essential hypertension    Enlarged tonsils    ACE inhibitor-aggravated angioedema    JONES (obstructive sleep apnea)    Dyslipidemia    IGT (impaired glucose tolerance)    Acute alcoholic intoxication without complication (HCC)    Acute renal insufficiency    Effusion of bursa of right knee    Acute cystitis without hematuria    Bipolar disorder (HCC)    Mild intermittent asthma without complication    Dizziness    Fever  Inflammatory polyarthritis ([facility]    Seronegative rheumatoid arthritis ([facility] [address]        PLAN  Continue current psychotropic medications   Can discharge home after she is medically stable  Does not require admission to [facility]. Recommend social work to connect her back with [address]. Recommend social work to assist her with home health aide process. Electronically signed by Arabella Leigh on 3/31/2021 at 3:09 PM time spent 25 minutes         Huyen Baer is a 55 y.o. female being evaluated by a Virtual Visit (video visit) encounter to address concerns as mentioned above. A caregiver was present in the room along with the patient. Pursuant to the emergency declaration under the [address] [place] [address] authority and the [employer] Act, this Virtual Visit was conducted with patient's (and/or legal guardian's) consent, to reduce the patient's risk of exposure to COVID-19 and provide necessary medical care. Services were provided through a video synchronous discussion virtually to substitute for in-person visit by provider. Patient is present at [address] [place] [address] and I am physically present at [facility]    --Arabella Leigh MD on 3/31/2021 at 3:14 PM    An electronic signature was used to authenticate this note. **This report has been created using voice recognition software. It may contain minor errors which are inherent in voice recognition technology. **

## 2021-03-31 NOTE — PROGRESS NOTES
Essential hypertension     Enlarged tonsils     ACE inhibitor-aggravated angioedema     JONES (obstructive sleep apnea)     Dyslipidemia     IGT (impaired glucose tolerance)     Acute alcoholic intoxication without complication (HCC)     Acute renal insufficiency     Effusion of bursa of right knee     Acute cystitis without hematuria     Bipolar disorder (HCC)     Mild intermittent asthma without complication     Inflammatory polyarthritis (HCC)     Seronegative rheumatoid arthritis ([facility] [address]    1. Inflammatory polyarthritis, suspected seronegative Rheumatoid arthritis  - continue Prednisone 10 mg twice daily for a total of 1 week. Then switch to 10 mg daily  - Continue Hydroxychloroquine 200 mg BID. She will need an appointment with her ophthalmologist for retinal monitoring on this medication.   - OK to discharge from our standpoint.  - Will follow up as outpatient         Deann Leon MD  PGY-3, Internal medicine resident  [place], [place]  3/31/2021 3:58 PM     Attending Note:  I have discussed the care of the patient including pertinent history and exam findings, with Dr. Roby Gonzales. I have seen and examined the patient and the key elements of all parts of the encounter have been performed by me. I agree with the assessment, plan and orders as documented by the Dr. Roby Gonzales. She continues to do well. Continue current regimen of prednisone 10 mg twice daily for a week and then 10 mg daily thereafter  Continue hydroxychloroquine 200 mg twice daily  No objection to discharge  Follow-up with me in 4-6 weeks     Darien Cohen M.D.  [address]  [phone]

## 2021-04-01 ENCOUNTER — CARE COORDINATION (OUTPATIENT)
Dept: CASE MANAGEMENT | Age: 56
End: 2021-04-01

## 2021-04-01 DIAGNOSIS — M06.4 INFLAMMATORY POLYARTHRITIS (HCC): Primary | ICD-10-CM

## 2021-04-01 LAB
CULTURE: ABNORMAL
Lab: ABNORMAL
SPECIMEN DESCRIPTION: ABNORMAL

## 2021-04-01 NOTE — CARE COORDINATION
[facility] Initial Follow Up Call    Call within 2 business days of discharge: Yes    Patient: Jessa Dangelo Patient : 1965   MRN: [id number]  Reason for Admission: palpitations, inflammatory polyarthritis, behavioral history - bipolar disorder, admitted with suicidal thoughts which resolved before DC & cleared by psychiatry to return home  Discharge Date: 3/31/21 RARS: Readmission Risk Score: 29      Last Discharge [address]       Complaint Diagnosis Description Type Department Provider    3/20/21 Palpitations; Hypertension Hypertension, unspecified type . .. ED to Hosp-Admission (Discharged) (ADMITTED) [facility] Daryl Davey MD; Edith Danielson, ... Non-face-to-face services provided:  Obtained and reviewed discharge summary and/or continuity of care documents  Communication with home health agencies or other community services the patient is currently using-[facility] starting care today  Assessment and support for treatment adherence and medication management-reviewed new medications - others are packaged in blister pack per [facility] which she is continuing     Spoke with: patient, aunt, Trevor . Patient reports that she is fatigued & didn't realize she was so weak & that she thinks she should have gone to a rehab facility to increase her strength. She admitted that she wanted to see her home & make sure is was OK & now that she knows that, thinks it's best to get some rehab. She has already spoken with [facility] this AM & home care visit is arranged for today. Arrangements are being made by [facility] for admission to a facility for strengthening. I contacted [facility] also, spoke with Mandi Saenz & confirmed same. Has walker @ home. Currently she is at home with her roommate. Denies any further palpitations or dizziness. Aches are mostly on left side - confirms taking prednisone as prescribed by rheumatology for inflammatory polyarthritis.     Care Transitions will continue to follow. Patient & her Aunt Sheryl have CTN contact #. Care Transitions 24 Hour Call    Do you have any ongoing symptoms?: Yes  Patient-reported symptoms: Fatigue  Interventions for patient-reported symptoms: Notified Home Care (Comment: [facility] start of care 4/1)  Do you have a copy of your discharge instructions?: Yes  Do you have all of your prescriptions and are they filled?: Yes  Have you been contacted by a [address]?: No  Have you scheduled your follow up appointment?:  (Comment: [facility] is facilitating admission to facility)  Were you discharged with any Home Care or Post Acute Services: Yes  Post Acute Services: Home Health (Comment: [facility] affiliation)  Do you feel like you have everything you need to keep you well at home?: No (Comment: patient has roommate & aunt who checks in with her, but feels she should have gone to a rehab facility to build up strength)  Care Transitions Interventions         Was this an external facility discharge? No     Challenges to be reviewed by the provider   Additional needs identified to be addressed with provider Yes  home health care-is arranging admission to rehab facility - patient thinks she is too weak to stay at home             Method of communication with provider : spoke with [facility] who is in touch with provider  Discussed COVID-19 related testing which was available at this time. Test results were negative. Patient informed of results, if available? Yes  Patient has had vaccine    Advance Care Planning:   Does patient have an Advance Directive:  not on file. Was this a readmission?  No  Patient stated reason for admission: palpitations, inflammatory poyarthritis, behavioral symptoms  Patients top risk factors for readmission: functional cognitive ability, functional physical ability, ineffective coping, medical condition and multiple health system providers    Care Transition Nurse (CTN) contacted the patient by telephone to perform post hospital discharge assessment. Verified name and  with patient as identifiers. Provided introduction to self, and explanation of the CTN role. CTN reviewed discharge instructions, medical action plan and red flags with patient who verbalized understanding. Patient given an opportunity to ask questions and does not have any further questions or concerns at this time. Were discharge instructions available to patient? Yes. Reviewed appropriate site of care based on symptoms and resources available to patient including: PCP, Specialist, Home health and CTN. The patient agrees to contact the PCP office for questions related to their healthcare. Medication reconciliation was performed with patient, who verbalizes understanding of administration of home medications. Has bl[facility] pack [facility] + new medications from [facility] to beds    Covid Risk Education    Patient has following risk factors of: asthma and chronic kidney disease. Education provided regarding infection prevention, and signs and symptoms of COVID-19 and when to seek medical attention with patient who verbalized understanding. Discussed exposure protocols and quarantine From CDC: Are you at higher risk for severe illness?   and given an opportunity for questions and concerns. The patient agrees to contact the COVID-19 hotline [phone] or PCP office for questions related to COVID-19. For more information on steps you can take to protect yourself, see CDC's How to Protect Yourself     Was patient discharged with a pulse oximeter? Yes Discussed and confirmed pulse oximeter discharge instructions and when to notify provider or seek emergency care. Patient/family/caregiver given information for     Discussed follow-up appointments. If no appointment was previously scheduled, appointment scheduling offered: Yes. Is follow up appointment scheduled within 7 days of discharge?  Not scheduled yet - HC arranging admission to rehab    Plan for follow-up call in 3-5 days based on severity of symptoms and risk factors. Plan for next call: symptom management-home care referral to facility, follow up appointment-check assistance and transportation  CTN provided contact information for future needs.           Follow Up    Zelda Richardson RN

## 2021-04-02 ENCOUNTER — CARE COORDINATION (OUTPATIENT)
Dept: CASE MANAGEMENT | Age: 56
End: 2021-04-02

## 2021-04-02 NOTE — CARE COORDINATION
[facility] 45 Transitions Follow Up Call    2021    Patient: Colten Walker  Patient : 1965   MRN: [id number]    Reason for Admission: palpitations, inflammatory poyarthritis, behavioral symptoms  Discharge Date: 3/31/21   RARS: Readmission Risk Score: 34       Spoke with: [facility], patient. Patient reports feeling \"so much better\" today & sounds cheerful after home care nurse visited today for initial visit. Patient feels better & wants to stay at home. Using walker & getting around better - Trevor is setting up PT to start next week. Patient has scheduled her cab to coordinate with her hospital f/u appt next week. She also scheduled an appt with [facility] & realizes the importance of that since her psych meds are regulated there. Left leg continues to be swollen, but improved. .  She had home care nurse look at it  Continues to take tapering doses of prednisone for polyarthritis. Care Transition will continue to follow & patient has CTN contact #. Confirmed that patient has home care # & is aware to contact them if a problem arises over weekend. Care Transitions Subsequent and Final Call    Schedule Follow Up Appointment with PCP: Completed  Subsequent and Final Calls  Do you have any ongoing symptoms?: Yes  Onset of Patient-reported symptoms:  In the past 7 days  Patient-reported symptoms: Other  Interventions for patient-reported symptoms: Other  Have your medications changed?: No  Do you have any questions related to your medications?: No  Do you currently have any active services?: Yes  Are you currently active with any services?: Home Health  Do you have any needs or concerns that I can assist you with?: No  Care Transitions Interventions  Other Interventions:           Needs to be reviewed by the provider   Additional needs identified to be addressed with provider Yes  home health care-scheduled next week with PT - has transportation arranged for her PCP appt next week Method of communication with provider : none    Discussed COVID-19 related testing which was available at this time. Test results were negative. Patient informed of results, if available? Yes    Care Transition Nurse (CTN) contacted the patient by telephone to follow up after admission  Verified name and  with patient as identifiers. Addressed changes since last contact: symptom management-reassess and follow up appointment-review/remind of appt  Discharged needs reviewed: home health care-[facility] continues and transportation-cab is arranged for PCP appt  Follow up appointment completed? Not yet - scheduled for     Advance Care Planning:   Does patient have an Advance Directive:  not on file. CTN reviewed discharge instructions, medical action plan and red flags with patient and discussed any barriers to care and/or understanding of plan of care after discharge. Discussed appropriate site of care based on symptoms and resources available to patient including: PCP, Specialist, Home health and CTN. The patient agrees to contact the PCP office for questions related to their healthcare. Patients top risk factors for readmission: functional cognitive ability, functional physical ability, ineffective coping, medical condition and multiple health system providers  Interventions to address risk factors: Scheduled appointment with PCP-, Obtained and reviewed discharge summary and/or continuity of care documents and Communication with home health agencies or other community services the patient is currently using-[facility] contacted & update received    Discussed follow-up appointments. If no appointment was previously scheduled, appointment scheduling offered: Yes Is follow up appointment scheduled within 7 days of discharge? No - is scheduled within 8 days of DC -her cab had to be scheduled several days in advance of her appt & this was earliest date to get cab service.   Discharged 3/31 - appt is on 4/8  Non-[facility] follow up appointment(s): [facility]    Plan for follow-up call in 3-5 days based on severity of symptoms and risk factors. Plan for next call: follow up appointment-remind for 4/8, check left leg swelling  CTN provided contact information for future needs.         Follow Up  Future Appointments   Date Time Provider Anthony Everett   4/8/2021 10:00 AM Casi Ayala MD [facility] Dayana Morataya RN intact

## 2021-04-05 ENCOUNTER — CARE COORDINATION (OUTPATIENT)
Dept: CASE MANAGEMENT | Age: 56
End: 2021-04-05

## 2021-04-06 NOTE — TELEPHONE ENCOUNTER
Will follow up .     Corin Ramon MD      Department of Internal Medicine  [facility]         4/6/2021, 11:19 AM

## 2021-04-06 NOTE — CARE COORDINATION
appointment was previously scheduled, appointment scheduling offered: Yes Is follow up appointment scheduled within 7 days of discharge? Yes      Plan for follow-up call in 3-5 days based on severity of symptoms and risk factors. Plan for next call: symptom management-reassess polymyalgia after PT, continues prednisone, follow up appointment-4/8 & remind of upcoming ID appt and referrals-check for new referral to rheumatology  CTN provided contact information for future needs.           Follow Up  Future Appointments   Date Time Provider Anthony Everett   4/8/2021 10:00 AM Jorge L Ruffin MD [id number]   4/13/2021  3:45 PM Mariama Mason MD INFT DISEASE Eileen Del Rosario RN

## 2021-04-08 ENCOUNTER — OFFICE VISIT (OUTPATIENT)
Dept: INTERNAL MEDICINE | Age: 56
End: 2021-04-08
Payer: MEDICAID

## 2021-04-08 ENCOUNTER — HOSPITAL ENCOUNTER (OUTPATIENT)
Age: 56
Setting detail: SPECIMEN
Discharge: HOME | End: 2021-04-08
Payer: MEDICAID

## 2021-04-08 ENCOUNTER — TELEPHONE (OUTPATIENT)
Dept: INTERNAL MEDICINE CLINIC | Age: 56
End: 2021-04-08

## 2021-04-08 VITALS
SYSTOLIC BLOOD PRESSURE: 115 MMHG | BODY MASS INDEX: 29.06 KG/M2 | WEIGHT: 170.2 LBS | HEART RATE: 64 BPM | HEIGHT: 64 IN | TEMPERATURE: 97.3 F | DIASTOLIC BLOOD PRESSURE: 81 MMHG

## 2021-04-08 DIAGNOSIS — D50.8 OTHER IRON DEFICIENCY ANEMIA: ICD-10-CM

## 2021-04-08 DIAGNOSIS — I10 ESSENTIAL HYPERTENSION: Primary | ICD-10-CM

## 2021-04-08 DIAGNOSIS — Z91.14 NON COMPLIANCE W MEDICATION REGIMEN: ICD-10-CM

## 2021-04-08 DIAGNOSIS — R73.03 PRE-DIABETES: ICD-10-CM

## 2021-04-08 DIAGNOSIS — D75.839 THROMBOCYTOSIS: Primary | ICD-10-CM

## 2021-04-08 DIAGNOSIS — F32.5 MAJOR DEPRESSION IN REMISSION (HCC): ICD-10-CM

## 2021-04-08 DIAGNOSIS — K21.9 GASTROESOPHAGEAL REFLUX DISEASE WITHOUT ESOPHAGITIS: ICD-10-CM

## 2021-04-08 DIAGNOSIS — M06.00 SERONEGATIVE RHEUMATOID ARTHRITIS (HCC): ICD-10-CM

## 2021-04-08 DIAGNOSIS — E78.5 DYSLIPIDEMIA: ICD-10-CM

## 2021-04-08 DIAGNOSIS — J45.20 MILD INTERMITTENT ASTHMA WITHOUT COMPLICATION: ICD-10-CM

## 2021-04-08 DIAGNOSIS — G47.33 OSA (OBSTRUCTIVE SLEEP APNEA): ICD-10-CM

## 2021-04-08 LAB
ABSOLUTE EOS #: 0.03 K/UL (ref 0–0.44)
ABSOLUTE IMMATURE GRANULOCYTE: 0.08 K/UL (ref 0–0.3)
ABSOLUTE LYMPH #: 1.04 K/UL (ref 1.1–3.7)
ABSOLUTE MONO #: 0.63 K/UL (ref 0.1–1.2)
ABSOLUTE RETIC #: 0.06 M/UL (ref 0.03–0.08)
BASOPHILS # BLD: 0 % (ref 0–2)
BASOPHILS ABSOLUTE: 0.03 K/UL (ref 0–0.2)
DIFFERENTIAL TYPE: ABNORMAL
EOSINOPHILS RELATIVE PERCENT: 0 % (ref 1–4)
FERRITIN: 321 UG/L (ref 13–150)
FOLATE: 14.8 NG/ML
HCT VFR BLD CALC: 29.3 % (ref 36.3–47.1)
HEMOGLOBIN: 9.2 G/DL (ref 11.9–15.1)
IMMATURE GRANULOCYTES: 1 %
IMMATURE RETIC FRACT: 25.6 % (ref 2.7–18.3)
IRON SATURATION: 19 % (ref 20–55)
IRON: 47 UG/DL (ref 37–145)
LYMPHOCYTES # BLD: 11 % (ref 24–43)
MCH RBC QN AUTO: 30.1 PG (ref 25.2–33.5)
MCHC RBC AUTO-ENTMCNC: 31.4 G/DL (ref 28.4–34.8)
MCV RBC AUTO: 95.8 FL (ref 82.6–102.9)
MONOCYTES # BLD: 7 % (ref 3–12)
NRBC AUTOMATED: 0 PER 100 WBC
PDW BLD-RTO: 16.7 % (ref 11.8–14.4)
PLATELET # BLD: ABNORMAL K/UL (ref 138–453)
PLATELET ESTIMATE: ABNORMAL
PLATELET, FLUORESCENCE: 1103 K/UL (ref 138–453)
PLATELET, IMMATURE FRACTION: 1.1 % (ref 1.1–10.3)
PMV BLD AUTO: ABNORMAL FL (ref 8.1–13.5)
RBC # BLD: 3.06 M/UL (ref 3.95–5.11)
RBC # BLD: ABNORMAL 10*6/UL
RETIC %: 1.9 % (ref 0.5–1.9)
RETIC HEMOGLOBIN: 36 PG (ref 28.2–35.7)
SEG NEUTROPHILS: 81 % (ref 36–65)
SEGMENTED NEUTROPHILS ABSOLUTE COUNT: 7.62 K/UL (ref 1.5–8.1)
TOTAL IRON BINDING CAPACITY: 243 UG/DL (ref 250–450)
UNSATURATED IRON BINDING CAPACITY: 196 UG/DL (ref 112–347)
VITAMIN B-12: 498 PG/ML (ref 232–1245)
WBC # BLD: 9.4 K/UL (ref 3.5–11.3)
WBC # BLD: ABNORMAL 10*3/UL

## 2021-04-08 PROCEDURE — 1111F DSCHRG MED/CURRENT MED MERGE: CPT | Performed by: STUDENT IN AN ORGANIZED HEALTH CARE EDUCATION/TRAINING PROGRAM

## 2021-04-08 PROCEDURE — 99213 OFFICE O/P EST LOW 20 MIN: CPT | Performed by: STUDENT IN AN ORGANIZED HEALTH CARE EDUCATION/TRAINING PROGRAM

## 2021-04-08 RX ORDER — ACETAMINOPHEN 500 MG
500 TABLET ORAL EVERY 6 HOURS PRN
COMMUNITY
End: 2021-04-27

## 2021-04-08 RX ORDER — FLUOXETINE HYDROCHLORIDE 40 MG/1
40 CAPSULE ORAL DAILY
COMMUNITY
End: 2021-04-27

## 2021-04-08 RX ORDER — OMEPRAZOLE 20 MG/1
20 CAPSULE, DELAYED RELEASE ORAL DAILY
Qty: 30 CAPSULE | Refills: 0 | Status: SHIPPED | OUTPATIENT
Start: 2021-04-08 | End: 2021-05-12

## 2021-04-08 RX ORDER — ISOSORBIDE MONONITRATE 30 MG/1
30 TABLET, EXTENDED RELEASE ORAL DAILY
COMMUNITY
End: 2021-06-11

## 2021-04-08 RX ORDER — FERROUS SULFATE 325(65) MG
325 TABLET ORAL
COMMUNITY
End: 2022-02-24 | Stop reason: SDUPTHER

## 2021-04-08 RX ORDER — NAPROXEN 500 MG/1
500 TABLET ORAL 2 TIMES DAILY WITH MEALS
COMMUNITY
End: 2021-04-27

## 2021-04-08 NOTE — PATIENT INSTRUCTIONS
Script for lab given to pt, no fasting required. Pt will get labs done before next appt. Referral for Rheumatology sent to [facility]  they will call pt for appt, copy of referral with number and address given to pt. Pt should call referral number if not heard from within a couple of weeks. Patient to return to clinic 2 months   AVS reviewed and given to pt. It is very important for your care that you keep your appointment. If for some reason you are unable to keep your appointment it is equally important that you call our office at [phone] to cancel your appointment and reschedule. Failure to do so may result in your termination from our practice.   MB

## 2021-04-08 NOTE — TELEPHONE ENCOUNTER
Received perfect serve regarding critical lab value of platelets about 1.1 million. Upon chart review, it looks like patients platelets have progressively been increasing for the last few weeks. Previously also she had a couple of high lab values but they normalised afterwards. I called the patient and informed her of the high platelet count. Upon our discussion, patient is completely asymptomatic, no evidence of any pain or swelling in the extremities or fever or shortness of breath. No prior history of any splenectomy, no personal or family history of any leukemia/lymphoma's. Patient advised to come to the ER immediately if she develops the symptoms. Patient also advised that her thrombocytosis could most likely be reactive due to recent stress however we will repeat another CBC in 2 weeks and if continues to be high, will consult hematology oncology. Patient voiced satisfaction with the plan. All questions answered. Message sent to PCP. Will place an order for CBC in 2 weeks.     Baldomero Valencia MD       Internal Medicine Resident PGY-3  4/8/2021, 6:24 PM

## 2021-04-08 NOTE — PROGRESS NOTES
daily             Wound Dressings (FIBRACOL) MISC  1 box fibracol 2x2 change wound dressing daily                   Medications marked \"taking\" at this time  Outpatient Medications Marked as Taking for the 4/8/21 encounter (Office Visit) with Yee Sidhu MD   Medication Sig Dispense Refill    predniSONE (DELTASONE) 10 MG tablet Take 1 tablet by mouth 2 times daily for 20 days Followed by 1 tablet by mouth after 10 days 40 tablet 0    hydroxychloroquine (PLAQUENIL) 200 MG tablet Take 1 tablet by mouth 2 times daily 60 tablet 0    sertraline (ZOLOFT) 25 MG tablet Take 1 tablet by mouth daily 30 tablet 3    citalopram (CELEXA) 20 MG tablet Take 1 tablet by mouth daily 30 tablet 3    hydroCHLOROthiazide (HYDRODIURIL) 25 MG tablet Take 0.5 tablets by mouth every morning 60 tablet 0    Wound Dressings (FIBRACOL) MISC 1 box fibracol 2x2 change wound dressing daily 30 each 2    Calcium Carbonate-Vitamin D (OYSTER SHELL CALCIUM/D) 500-200 MG-UNIT TABS TAKE 1 TAB BY MOUTH ONCE A DAY  30 tablet 3    atorvastatin (LIPITOR) 20 MG tablet TAKE 1 TABLET BY MOUTH DAILY  30 tablet 3    atenolol (TENORMIN) 50 MG tablet Take 1 tablet by mouth daily 60 tablet 3    omeprazole (PRILOSEC) 40 MG delayed release capsule Take 1 capsule by mouth daily 30 capsule 3    QUEtiapine (SEROQUEL) 50 MG tablet Take 50 mg by mouth nightly      loratadine (CLARITIN) 10 MG tablet TAKE 1 CAPSULE BY MOUTH DAILY  30 tablet 3    amLODIPine (NORVASC) 10 MG tablet TAKE 1 TABLET BY MOUTH DAILY  60 tablet 3    diclofenac sodium (VOLTAREN) 1 % GEL Apply 4 g topically 2 times daily 077 g 2    folic acid (FOLVITE) 1 MG tablet Take 1 tablet by mouth daily 30 tablet 3    vitamin B-1 100 MG tablet Take 1 tablet by mouth daily 30 tablet 3    EPINEPHrine (EPIPEN 2-GRICELDA) 0.3 MG/0.3ML SOAJ injection Use as directed for allergic reaction 1 each 2    FLUoxetine (PROZAC) 40 MG capsule Take 40 mg by mouth daily      mometasone (ASMANEX, 120 METERED DOSES,) 220 MCG/INH inhaler Inhale 2 puffs into the lungs daily 1 Inhaler 3    fluticasone (ARNUITY ELLIPTA) 100 MCG/ACT AEPB Inhale 1 puff into the lungs daily 30 each 5    albuterol sulfate (PROAIR RESPICLICK) 752 (90 Base) MCG/ACT aerosol powder inhalation Inhale 2 puffs into the lungs every 6 hours as needed for Wheezing or Shortness of Breath 5 Inhaler 3    econazole nitrate 1 % cream Apply topically daily. 85 g 2    lurasidone (LATUDA) 60 MG TABS tablet Take 60 mg by mouth nightly      Cholecalciferol (VITAMIN D3) 32007 UNITS CAPS Take 1 capsule by mouth Twice a Week          Medications patient taking as of now reconciled against medications ordered at time of hospital discharge: Yes    Chief Complaint   Patient presents with   [address] from [facility]     Discharged from [address] [place] [address] on 3/31/21    Hypertension    Health Maintenance     Due for Covid vaccine and Hep B    Referral - General     Patient wants a referral to Rheumatology     Other     Patient needs a script for a shower chair       History of Present illness - Follow up of Hospital diagnosis(es):   Follow-up for hypertensive crisis. Inpatient course: Discharge summary reviewed- see chart. Patient was admitted to hospital for hypertensive crisis due to medication noncompliance. Later she was also admitted in inpatient psychiatry unit for major depression. Interval history/Current status:   Post hospital discharge patient has been well. Denies any current complaints except fatigue. She has been checking her blood pressure at home which has been normal.  Current blood pressure normal.  Denies any chest pain, shortness of breath, pedal edema or any other symptoms. On chart review she is found to have low hemoglobin of around 8 during admission. Her baseline hemoglobin runs 10-11. Denied any melena, hematochezia, recent weight loss. Last EGD/colonoscopy in 2013 showed mild duodenal ulcer, hiatal hernia and hemorrhoids.     Patient has essential hypertension. B.p in all last three encounters has been well controlled. On review of bubble pack she takes Norvasc 10 ,atenolol 50 mg, HCTZ 12.5 mg. Last echo 2014 showed LVEF 65% with mild LVH and mild MR and TR    Major depression-on Latuda, Seroquel, Celexa. Follows up with [facility]. Denies any suicidal ideations for now. Other comorbidities discussed:  She is prediabetic HbA1c increased to 6.3- 3/2021. On metformin 500 twice daily per medication list. Advised weight loss and DASH diet.     Alcohol abuse. Per patient she has not consumed alcohol since last 1 month. Was former smoker. Denied any other drug abuse currently     Dyslipidemia. On Lipitor 20 mg. Low ASCVD score. Takes aspirin per medication list.     obstructive sleep apnea diagnosed with sleep study done in 2017. CPAP titration study was not done.      Mild intermittent asthma -controlled on albuterol     Health maintenance  Mammogram 10/20-normal  Colonoscopy 12/13-hemorrhoids, hiatal hernia and mild duodenal ulcer   Has hysterectomy done many years ago with preservation of her ovaries per patient. Due for hepatitis B vaccine. A comprehensive review of systems was negative except for: As mentioned in HPI    Vitals:    04/08/21 1022   BP: 115/81   Pulse: 64   Temp: 97.3 °F (36.3 °C)   TempSrc: Infrared   Weight: 170 lb 3.2 oz (77.2 kg)   Height: 5' 4\" (1.626 m)     Body mass index is 29.21 kg/m².    Wt Readings from Last 3 Encounters:   04/08/21 170 lb 3.2 oz (77.2 kg)   03/29/21 180 lb 6.4 oz (81.8 kg)   02/22/21 178 lb (80.7 kg)     BP Readings from Last 3 Encounters:   04/08/21 115/81   03/31/21 129/86   02/22/21 128/75        Physical Exam:  General Appearance: alert and oriented to person, place and time, well developed and well- nourished, in no acute distress  Skin: warm and dry, no rash or erythema  Head: normocephalic and atraumatic  Eyes: pupils equal, round, and reactive to light, extraocular eye movements intact, conjunctivae normal  ENT: tympanic membrane, external ear and ear canal normal bilaterally, nose without deformity, nasal mucosa and turbinates normal without polyps  Neck: supple and non-tender without mass, no thyromegaly or thyroid nodules, no cervical lymphadenopathy  Pulmonary/Chest: clear to auscultation bilaterally- no wheezes, rales or rhonchi, normal air movement, no respiratory distress  Cardiovascular: normal rate, regular rhythm, normal S1 and S2, no murmurs, rubs, clicks, or gallops, distal pulses intact, no carotid bruits  Abdomen: soft, non-tender, non-distended, normal bowel sounds, no masses or organomegaly  Extremities: no cyanosis, clubbing or edema  Musculoskeletal: normal range of motion, no joint swelling, deformity or tenderness  Neurologic: reflexes normal and symmetric, no cranial nerve deficit, gait, coordination and speech normal    Assessment/Plan:    1. Essential hypertension-stable post hospital discharge  Hospital admission for hypertensive crisis due to running out of medications. On continue Norvasc 10 mg, atenolol 50 mg, HCTZ to 12.5. Advised medication compliance. Blood pressure monitoring at home  Medication checked with pharmacy. Questionable compliance. 2.  Anemia- follow-up iron studies.-Oral ferrous sulfate. Further recommendations post lab results(EGD/colonoscopy/GI referral)     3.  Episode of major depression-Denied any suicidal and homicidal ideations currently. patient was discharged on multiple medications. No psychiatry note on file. Will discontinue all medications except  Celexa, Latuda, Seroquel. Follow[facility]up with [facility] advised to review medications as needed. 4. Dyslipidemia-continue Lipitor 20 mg daily.     5. Mild intermittent asthma without complication  Well-controlled, continue albuterol as needed.     6. Prediabetes. On metformin for 500 mg daily, hemoglobin A1c 6.3    7.  Possible seronegative rheumatoid arthritis-on prednisone 10 twice daily for 20 days followed by 1 tablet for 10 days. Plaquenil. External referral sent to rheumatology depending on patient's insurance coverage.     8. JONES (obstructive sleep apnea)  Needs CPAP titration study. Order already in.     9. Gastroesophageal reflux disease without esophagitis  On Protonix 20 mg daily.      10.  Allergic state, sequela  On loratadine          Medical Decision Making: high complexity      Vincenzo Reyes MD      Department of Internal Medicine  [facility]         4/8/2021, 10:59 AM

## 2021-04-08 NOTE — PROGRESS NOTES
Attending Physician Statement  I have discussed the care of [address], including pertinent history and exam findings with the resident. I have reviewed the key elements of all parts of the encounter with the resident. I agree with the assessment, and status of the problem list as documented. Diagnosis Orders   1. Essential hypertension  CA DISCHARGE MEDS RECONCILED W/ CURRENT OUTPATIENT MED LIST   2. Major depression in remission ([address]     3. Dyslipidemia     4. JONES (obstructive sleep apnea)     5. Gastroesophageal reflux disease without esophagitis     6. Pre-diabetes     7. Mild intermittent asthma without complication     8. Other iron deficiency anemia  Iron And TIBC    Ferritin    Path Review, Smear    Vitamin B12 & Folate   9. Seronegative rheumatoid arthritis [facility])  External Referral To Rheumatology   Patient recently in the hospital for hypertensive urgency due to running out of medications and blood pressure today is well controlled. Patient was seen by psychiatry during the same admission at this time she has multiple SSRIs and we have called pharmacy to get an accurate med list of what she is actually being dispensed. Patient will wait in the clinic until we have reviewed the list and we will reduce her medications and  her appropriately before leaving the clinic today. patient follows up with the zef for her psychiatric needs. Patient also was recently found to have seronegative RA. The plan and orders should include   Orders Placed This Encounter   Procedures    Iron And TIBC    Ferritin    Path Review, Smear    Vitamin B12 & Folate    External Referral To Rheumatology    CA DISCHARGE MEDS RECONCILED W/ CURRENT OUTPATIENT MED LIST    and this was also documented by the resident. The medication list was reviewed with the resident and is up to date. The return visit should be in 1 month .     Dr Elena Friedman MD, [place]  [facility] , Department of Internal Medicine Resident Ambulatory Site Medical Director  [facility] Internal Medicine  [address] [facility] [facility]  Internal Medicine Clerkship - Oscar Mota    4/8/2021, 11:13 AM

## 2021-04-09 ENCOUNTER — TELEPHONE (OUTPATIENT)
Dept: INTERNAL MEDICINE | Age: 56
End: 2021-04-09

## 2021-04-09 ENCOUNTER — CARE COORDINATION (OUTPATIENT)
Dept: CASE MANAGEMENT | Age: 56
End: 2021-04-09

## 2021-04-09 DIAGNOSIS — D75.839 THROMBOCYTOSIS: Primary | ICD-10-CM

## 2021-04-09 LAB
PATHOLOGIST REVIEW: NORMAL
SURGICAL PATHOLOGY REPORT: NORMAL

## 2021-04-09 NOTE — TELEPHONE ENCOUNTER
Referral sent to Hematology for further work up of anemia and thrombocytosis.     Terrell Gonzalez MD      Department of Internal Medicine  [facility] 21 4/9/2021, 2:23 PM

## 2021-04-09 NOTE — PROGRESS NOTES
Physician Progress Note      Jadiel Claros  [facility] #:                  [id number]  :                       1965  ADMIT DATE:       3/20/2021 10:51 PM  [address] DATE:        3/31/2021 6:45 PM  RESPONDING  PROVIDER #:        Jignesh Baires MD          QUERY TEXT:    Pt admitted with palpitations. Pt noted to have alcohol intoxication,   electrolyte abnormalities,  rheumatoid arthritis, anxiety. H&P documents   \"Palpitations: Suspect secondary to situational anxiety, continue IV fluids   for adequate hydration, plan for VQ scan this morning to rule out PE given   elevated D-dimer, check electrolyte panel this morning and replace as   warranted, and continuous telemetry\". Consult 3/29: Cardiac evaluation normal   and VQ scan negative for PE. She was alcohol intoxicated with electrolyte   abnormalities on admission. If possible, please document in progr    The medical record reflects the following:  Risk Factors: Anxiety,  Rheumatoid Arthritis, Alcohol Intoxications  Clinical Indicators: ETOH- 185, sodium -130-134, potassium - 2.7-4.2, CO2-   20-23, CRP-381.1, ALT -72-85, AST- , d-dimer- 1.55, PN - rheumatology -   diagnosed with seronegative rheumatoid arthritis. VQ can - negative for PE. Treatment: PO- Plaquenil, Prednisone, IV-  with bolus, potassium   replacement. thank you, Nayelipeyman Cardoza, [facility]  [phone]  Options provided:  -- palpitations due to situational anxiety  -- palpitations due to alcohol intoxication  -- palpitations due to electrolyte abnormalities  -- palpitations due to rheumatoid arthritis  -- palpitations due to other, please specify  -- Other - I will add my own diagnosis  -- Disagree - Not applicable / Not valid  -- Disagree - Clinically unable to determine / Unknown  -- Refer to Clinical Documentation Reviewer    PROVIDER RESPONSE TEXT:    This patient has palpitations due to situational anxiety.     Query created by: Rosangela Gonzalez on 2021 2:47 PM      Electronically signed by:  Joseline Guaman MD 4/9/2021 4:07 PM

## 2021-04-09 NOTE — CARE COORDINATION
[facility] Transitions Follow Up Call - patient returned my call    2021    Patient: Chayito Butler  Patient : 1965   MRN: [id number]    Reason for Admission:  palpitations, inflammatory poyarthritis, behavioral symptoms  Discharge Date: 3/31/21 RARS: Readmission Risk Score: 34       Spoke with: patient who had her PCP appt yesterday - is thankful she went - medication adjustment & patient is happy she is getting it straightened out - was taking some medications she didn't need to take. Home care nurse is following as is PT/OT. Joint aching is still present - mostly left ankle - elbows, neck, knees - is following up with rheumatologist & ID. Has appt  also to see hematologist for her elevated platelet count - thrombocytosis. She continues to be upbeat & optimistic that she is getting all checked out. Care Transitions continues to follow - reminded her of all upcoming appointments      Care Transitions Subsequent and Final Call    Schedule Follow Up Appointment with PCP: Completed  Subsequent and Final Calls  Do you have any ongoing symptoms?: Yes  Onset of Patient-reported symptoms: In the past 7 days  Interventions for patient-reported symptoms: Other  Have your medications changed?: Yes  Patient Reports: readjusted medications  Do you have any questions related to your medications?: No  Do you currently have any active services?: Yes  Are you currently active with any services?: Home Health  Do you have any needs or concerns that I can assist you with?: No  Care Transitions Interventions  Other Interventions:           Needs to be reviewed by the provider   Additional needs identified to be addressed with provider No  in process of follow up plan            Method of communication with provider : none - recent visit with PCP  clarified some issues with patient & has upcoming appts scheduled with specialists    Discussed COVID-19 related testing which was available at this time. Test results were negative. Patient informed of results, if available? Yes     Care Transition Nurse (CTN) contacted the patient by telephone to follow up after admission  Verified name and  with patient as identifiersCare Transition Nurse (CTN) contacted the patient by telephone to follow up after admission Verified name and  with patient as identifiers. Addressed changes since last contact: reviewed PCP appt, new appts  Discharged needs reviewed: home health care-ongoing, PT-ongoing and OT-starting next week  Follow up appointment completed? Yes    Advance Care Planning:   Does patient have an Advance Directive:  reviewed and current. CTN reviewed discharge instructions, medical action plan and red flags with patient and discussed any barriers to care and/or understanding of plan of care after discharge. Discussed appropriate site of care based on symptoms and resources available to patient including: PCP, Specialist, Home health and CTN. The patient agrees to contact the PCP office for questions related to their healthcare. Patients top risk factors for readmission: ineffective coping, lack of knowledge about disease, medical condition and multiple health system providers  Interventions to address risk factors: Scheduled appointment with PCP- completed, Scheduled appointment with Specialist-ID, rheumatology, hematology and Obtained and reviewed discharge summary and/or continuity of care documents    Discussed follow-up appointments. If no appointment was previously scheduled, appointment scheduling offered: Yes Is follow up appointment scheduled within 7 days of discharge? Yes      Plan for follow-up call in 5-7 days based on severity of symptoms and risk factors. Plan for next call: symptom management-reassess, follow up appointment-review specialist appts next week and medication management-review any changes  CTN provided contact information for future needs.         Follow Up  Future Appointments   Date Time Provider Anthony Cutleri   4/12/2021  3:45 PM Jigna Alvarez MD [facility]   4/13/2021  3:45 PM Edgar Moseley MD INFT DISEASE [facility]   6/11/2021  9:30 AM Jessica Cohen MD [address] [place] [address] Alicia Bond RN

## 2021-04-12 ENCOUNTER — TELEPHONE (OUTPATIENT)
Dept: ONCOLOGY | Age: 56
End: 2021-04-12

## 2021-04-12 ENCOUNTER — HOSPITAL ENCOUNTER (OUTPATIENT)
Facility: MEDICAL CENTER | Age: 56
End: 2021-04-12
Payer: MEDICAID

## 2021-04-12 NOTE — TELEPHONE ENCOUNTER
PATIENT CALLED TO CANCEL, AS SHE HAS A COVID SHOT SCHEDULED AT THE SAME TIME. PATIENT IS RESCHEDULED FOR 4/16/21.

## 2021-04-13 ENCOUNTER — HOSPITAL ENCOUNTER (OUTPATIENT)
Facility: MEDICAL CENTER | Age: 56
End: 2021-04-13
Payer: MEDICAID

## 2021-04-13 ENCOUNTER — OFFICE VISIT (OUTPATIENT)
Dept: INFECTIOUS DISEASES | Age: 56
End: 2021-04-13
Payer: MEDICAID

## 2021-04-13 ENCOUNTER — TELEPHONE (OUTPATIENT)
Dept: INTERNAL MEDICINE | Age: 56
End: 2021-04-13

## 2021-04-13 VITALS
HEIGHT: 64 IN | HEART RATE: 95 BPM | SYSTOLIC BLOOD PRESSURE: 124 MMHG | RESPIRATION RATE: 18 BRPM | OXYGEN SATURATION: 99 % | DIASTOLIC BLOOD PRESSURE: 73 MMHG | WEIGHT: 170 LBS | BODY MASS INDEX: 29.02 KG/M2 | TEMPERATURE: 98.4 F

## 2021-04-13 DIAGNOSIS — M06.00 SERONEGATIVE RHEUMATOID ARTHRITIS (HCC): Primary | ICD-10-CM

## 2021-04-13 DIAGNOSIS — J45.20 MILD INTERMITTENT ASTHMA WITHOUT COMPLICATION: ICD-10-CM

## 2021-04-13 DIAGNOSIS — T46.4X5D ANGIOTENSIN CONVERTING ENZYME INHIBITOR-AGGRAVATED ANGIOEDEMA, SUBSEQUENT ENCOUNTER: ICD-10-CM

## 2021-04-13 DIAGNOSIS — R91.1 LUNG NODULE: ICD-10-CM

## 2021-04-13 DIAGNOSIS — E78.5 DYSLIPIDEMIA: ICD-10-CM

## 2021-04-13 DIAGNOSIS — M06.4 INFLAMMATORY POLYARTHRITIS (HCC): ICD-10-CM

## 2021-04-13 DIAGNOSIS — I10 ESSENTIAL HYPERTENSION: ICD-10-CM

## 2021-04-13 DIAGNOSIS — T78.3XXD ANGIOTENSIN CONVERTING ENZYME INHIBITOR-AGGRAVATED ANGIOEDEMA, SUBSEQUENT ENCOUNTER: ICD-10-CM

## 2021-04-13 DIAGNOSIS — E04.9 GOITER: ICD-10-CM

## 2021-04-13 PROCEDURE — 99213 OFFICE O/P EST LOW 20 MIN: CPT | Performed by: INTERNAL MEDICINE
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
## 2021-04-13 NOTE — TELEPHONE ENCOUNTER
[facility] form came from fax, states, we do not take community [employer] plan    Scanned form to pt chart

## 2021-04-15 ENCOUNTER — CARE COORDINATION (OUTPATIENT)
Dept: CASE MANAGEMENT | Age: 56
End: 2021-04-15

## 2021-04-15 NOTE — CARE COORDINATION
[facility] Transitions Follow Up Call -Attempted to reach patient for subsequent transitional call #1. VM left to return call to CTN. Reminder left on message re: appt scheduled for tomorrow  with hematology. 4/15/2021    Patient: Daniel Bay  Patient : 1965   MRN: [id number]    Reason for Admission:  palpitations, inflammatory poyarthritis, behavioral symptoms  Discharge Date: 3/31/21   RARS: Readmission Risk Score: 29    Noted that patient was seen by ID on , had covid vaccine this week. Hematology appt tomorrow.        Follow Up  Future Appointments   Date Time Provider Anthony Everett   2021 10:30 AM Denver Genre, MD [id number]   2021  9:30 AM Cody Calvert MD [facility] [place], RN

## 2021-04-16 ENCOUNTER — TELEPHONE (OUTPATIENT)
Dept: ONCOLOGY | Age: 56
End: 2021-04-16

## 2021-04-16 ENCOUNTER — INITIAL CONSULT (OUTPATIENT)
Dept: ONCOLOGY | Age: 56
End: 2021-04-16
Payer: MEDICAID

## 2021-04-16 ENCOUNTER — TELEPHONE (OUTPATIENT)
Dept: INTERNAL MEDICINE | Age: 56
End: 2021-04-16

## 2021-04-16 VITALS
WEIGHT: 168.4 LBS | HEIGHT: 64 IN | SYSTOLIC BLOOD PRESSURE: 125 MMHG | DIASTOLIC BLOOD PRESSURE: 88 MMHG | TEMPERATURE: 97.3 F | BODY MASS INDEX: 28.75 KG/M2 | HEART RATE: 90 BPM

## 2021-04-16 DIAGNOSIS — D75.839 THROMBOCYTOSIS: Primary | ICD-10-CM

## 2021-04-16 PROCEDURE — 99244 OFF/OP CNSLTJ NEW/EST MOD 40: CPT | Performed by: INTERNAL MEDICINE

## 2021-04-16 PROCEDURE — G8417 CALC BMI ABV UP PARAM F/U: HCPCS | Performed by: INTERNAL MEDICINE

## 2021-04-16 PROCEDURE — 99211 OFF/OP EST MAY X REQ PHY/QHP: CPT | Performed by: INTERNAL MEDICINE

## 2021-04-16 PROCEDURE — G8427 DOCREV CUR MEDS BY ELIG CLIN: HCPCS | Performed by: INTERNAL MEDICINE

## 2021-04-16 NOTE — TELEPHONE ENCOUNTER
[facility] ARRIVES AMBULATORY FOR CONSULTATION APPT  DR Ana Luisa Omalley IN TO SEE PATIENT  ORDERS RECEIVED  NEED LABS ESR & SI PARADIGM TEST NEXT WEEK  RV 3-4 WEEKS FOR RESULTS  LABS SED RATE & PARADIGM 4/21/21 @10AM  MD VISIT 5/14/21 @8AM  AVS PRINTED AND GIVEN TO PATIENT WITH INSTRUCTIONS  PATIENT DISCHARGED AMBULATORY

## 2021-04-16 NOTE — TELEPHONE ENCOUNTER
Pt called [facility] doesn't take her insurance for her rheumatology referral. She needs to go some where else. Writer tried to call [facility] and [facility] both offices closed for the day. Writer told pt she needs to call insurance company to see where she can go. External referral was done on 4/8/21, it can be faxed to which ever office she can go to.

## 2021-04-16 NOTE — LETTER
Danny Guzman MD    4/16/2021     Jorge L Ruffin MD   [address]    Dear Stephanie Shaw : Thank you for referring Eloise Dupree, 1965, to me for evaluation. Below are the relevant portions of my assessment and plan of care. DIAGNOSIS:   1. Thrombocytosis   2. Normocytic anemia  3. Diffuse arthralgia/. possible RA     CURRENT THERAPY:  Plan for work up    BRIEF CASE HISTORY:   Eloise Dupree is a very pleasant 55 y.o. female who is referred to us for thrombocytosis. She was recently in house with uncontrolled hypertension and lab work showed elevated platelets. She was also found to have possible RA or gout and needs to consult with rheumatology for joint pain and swelling. She was given Prednisone in house to manage swelling. She reports she feels improved since admission. She has history of bursa and has had knees drained by ortho surg. PAST MEDICAL HISTORY: has a past medical history of Angioedema, Anxiety, Asthma, Bipolar disorder ([address] Claustrophobia, Depression, GERD (gastroesophageal reflux disease), Hypertension, Hypertrophic cardiomyopathy ([address] Lung nodules, MRSA (methicillin resistant Staphylococcus aureus), Murmur, OA (osteoarthritis), JONES (obstructive sleep apnea), Thyroid nodule, and Type 2 diabetes mellitus without complication, without long-term current use of insulin ([address] PAST SURGICAL HISTORY: has a past surgical history that includes Hysterectomy; Upper gastrointestinal endoscopy; Colonoscopy; Thyroid surgery; Cardiac catheterization; other surgical history (8/24/2015); Upper gastrointestinal endoscopy (N/A, 12/3/2020); Colonoscopy (N/A, 12/3/2020); Foot surgery (Bilateral, 12/07/2020); Hammer toe surgery (Right, 12/7/2020); and Bunionectomy (Bilateral, 12/7/2020).      CURRENT MEDICATIONS:  has a current medication list which includes the following prescription(s): omeprazole, fluoxetine, vitamin d3, isosorbide mononitrate, thiamine hcl, ferrous sulfate, acetaminophen, naproxen, prednisone, hydroxychloroquine, citalopram, hydrochlorothiazide, oyster shell calcium/d, atorvastatin, atenolol, quetiapine, loratadine, amlodipine, diclofenac sodium, folic acid, albuterol sulfate, and lurasidone. ALLERGIES:  is allergic to bee venom; iodine; lisinopril; and shellfish-derived products. FAMILY HISTORY: Negative for any hematological or oncological conditions. SOCIAL HISTORY:  reports that she quit smoking about 28 years ago. Her smoking use included cigarettes. She has a 10.00 pack-year smoking history. She has never used smokeless tobacco. She reports previous alcohol use of about 12.0 standard drinks of alcohol per week. She reports previous drug use. Drug: Cocaine. REVIEW OF SYSTEMS:   General: No fever or night sweats. Weight is stable. ENT: No double or blurred vision, no tinnitus or hearing problem, no dysphagia or sore throat   Respiratory: No chest pain, no shortness of breath, no cough or hemoptysis. Cardiovascular: Denies chest pain, PND or orthopnea. No L E swelling or palpitations. Gastrointestinal: No nausea or vomiting, abdominal pain, diarrhea or constipation. Genitourinary: Denies dysuria, hematuria, frequency, urgency or incontinence. Neurological: Denies headaches, decreased LOC, no sensory or motor focal deficits. Musculoskeletal: No back pain; +joint pain and swelling  Skin: There are no rashes or bleeding. Psychiatric: No anxiety, no depression. Endocrine: No diabetes or thyroid disease. Hematologic: No bleeding, no adenopathy. PHYSICAL EXAM: Shows a well appearing 55y.o.-year-old female who is not in pain or distress. Vital Signs: Blood pressure 125/88, pulse 90, temperature 97.3 °F (36.3 °C), temperature source Temporal, height 5' 4\" (1.626 m), weight 168 lb 6.4 oz (76.4 kg), not currently breastfeeding. HEENT: Normocephalic and atraumatic. Pupils are equal, round, reactive to light and accommodation.  Extraocular muscles are intact. Neck: Showed no JVD, no carotid bruit . Lungs: Clear to auscultation bilaterally. Heart: systolic murmur. Abdomen: Soft, nontender. No hepatosplenomegaly. Extremities: Lower extremities show no edema, clubbing, or cyanosis. UE: Bilateral olecranon bursa Breasts: Examination not done today. Neuro exam: intact cranial nerves bilaterally no motor or sensory deficit, gait is normal. Lymphatic: no adenopathy appreciated in the supraclavicular, axillary, cervical or inguinal area    REVIEW OF LABORATORY DATA:   Lab Results   Component Value Date    WBC 9.4 04/08/2021    HGB 9.2 (L) 04/08/2021    HCT 29.3 (L) 04/08/2021    MCV 95.8 04/08/2021    PLT See Reflexed IPF Result 04/08/2021     plt [id number]  Lab Results   Component Value Date    IRON 47 04/08/2021    TIBC 243 (L) 04/08/2021    FERRITIN 321 (H) 04/08/2021       REVIEW OF RADIOLOGICAL RESULTS:     IMPRESSION:   1. Thrombocytosis  2. Normocytic anemia   3. Plan for [facility] testing    PLAN:   1. We discussed her recent hospitalization and findings showing anemia and thrombocytopenia. 2. I explained plan for [facility] testing for further assessment. Especially to exclude underlying ET  3. Clinically, I suspect that she has anemia of chronic illness and reactive thrombocytosis from her rheumatologic condition. I advised her to get follow-up with rheumatology. She was referred but she is looking for a rheumatologist that takes her insurance  4. We had discussion regarding her other symptoms and I agree she needs to be seen by rheumatology. 5. Return to discuss results in 3-4 weeks. If you have questions, please do not hesitate to call me. I look forward to following Delphine along with you.     Sincerely,    Joelle Krabbe, MD  Hematology/ Oncology  Cell [phone]

## 2021-04-16 NOTE — PROGRESS NOTES
DIAGNOSIS:   1. Thrombocytosis   2. Normocytic anemia  3. Diffuse arthralgia/. possible RA     CURRENT THERAPY:  Plan for work up    BRIEF CASE HISTORY:   Kim Nixon is a very pleasant 55 y.o. female who is referred to us for thrombocytosis. She was recently in house with uncontrolled hypertension and lab work showed elevated platelets. She was also found to have possible RA or gout and needs to consult with rheumatology for joint pain and swelling. She was given Prednisone in house to manage swelling. She reports she feels improved since admission. She has history of bursa and has had knees drained by ortho surg. PAST MEDICAL HISTORY: has a past medical history of Angioedema, Anxiety, Asthma, Bipolar disorder ([address] Claustrophobia, Depression, GERD (gastroesophageal reflux disease), Hypertension, Hypertrophic cardiomyopathy ([address] Lung nodules, MRSA (methicillin resistant Staphylococcus aureus), Murmur, OA (osteoarthritis), JONES (obstructive sleep apnea), Thyroid nodule, and Type 2 diabetes mellitus without complication, without long-term current use of insulin ([facility] PAST SURGICAL HISTORY: has a past surgical history that includes Hysterectomy; Upper gastrointestinal endoscopy; Colonoscopy; Thyroid surgery; Cardiac catheterization; other surgical history (8/24/2015); Upper gastrointestinal endoscopy (N/A, 12/3/2020); Colonoscopy (N/A, 12/3/2020); Foot surgery (Bilateral, 12/07/2020); Hammer toe surgery (Right, 12/7/2020); and Bunionectomy (Bilateral, 12/7/2020). CURRENT MEDICATIONS:  has a current medication list which includes the following prescription(s): omeprazole, fluoxetine, vitamin d3, isosorbide mononitrate, thiamine hcl, ferrous sulfate, acetaminophen, naproxen, prednisone, hydroxychloroquine, citalopram, hydrochlorothiazide, oyster shell calcium/d, atorvastatin, atenolol, quetiapine, loratadine, amlodipine, diclofenac sodium, folic acid, albuterol sulfate, and lurasidone. ALLERGIES:  is allergic to bee venom; iodine; lisinopril; and shellfish-derived products. FAMILY HISTORY: Negative for any hematological or oncological conditions. SOCIAL HISTORY:  reports that she quit smoking about 28 years ago. Her smoking use included cigarettes. She has a 10.00 pack-year smoking history. She has never used smokeless tobacco. She reports previous alcohol use of about 12.0 standard drinks of alcohol per week. She reports previous drug use. Drug: Cocaine. REVIEW OF SYSTEMS:   General: No fever or night sweats. Weight is stable. ENT: No double or blurred vision, no tinnitus or hearing problem, no dysphagia or sore throat   Respiratory: No chest pain, no shortness of breath, no cough or hemoptysis. Cardiovascular: Denies chest pain, PND or orthopnea. No L E swelling or palpitations. Gastrointestinal: No nausea or vomiting, abdominal pain, diarrhea or constipation. Genitourinary: Denies dysuria, hematuria, frequency, urgency or incontinence. Neurological: Denies headaches, decreased LOC, no sensory or motor focal deficits. Musculoskeletal: No back pain; +joint pain and swelling  Skin: There are no rashes or bleeding. Psychiatric: No anxiety, no depression. Endocrine: No diabetes or thyroid disease. Hematologic: No bleeding, no adenopathy. PHYSICAL EXAM: Shows a well appearing 55y.o.-year-old female who is not in pain or distress. Vital Signs: Blood pressure 125/88, pulse 90, temperature 97.3 °F (36.3 °C), temperature source Temporal, height 5' 4\" (1.626 m), weight 168 lb 6.4 oz (76.4 kg), not currently breastfeeding. HEENT: Normocephalic and atraumatic. Pupils are equal, round, reactive to light and accommodation. Extraocular muscles are intact. Neck: Showed no JVD, no carotid bruit . Lungs: Clear to auscultation bilaterally. Heart: systolic murmur. Abdomen: Soft, nontender. No hepatosplenomegaly. Extremities: Lower extremities show no edema, clubbing, or cyanosis.  UE: Bilateral olecranon bursa Breasts: Examination not done today. Neuro exam: intact cranial nerves bilaterally no motor or sensory deficit, gait is normal. Lymphatic: no adenopathy appreciated in the supraclavicular, axillary, cervical or inguinal area    REVIEW OF LABORATORY DATA:   Lab Results   Component Value Date    WBC 9.4 04/08/2021    HGB 9.2 (L) 04/08/2021    HCT 29.3 (L) 04/08/2021    MCV 95.8 04/08/2021    PLT See Reflexed IPF Result 04/08/2021     plt 1103  Lab Results   Component Value Date    IRON 47 04/08/2021    TIBC 243 (L) 04/08/2021    FERRITIN 321 (H) 04/08/2021       REVIEW OF RADIOLOGICAL RESULTS:     IMPRESSION:   1. Thrombocytosis  2. Normocytic anemia   3. Plan for [facility] testing    PLAN:   1. We discussed her recent hospitalization and findings showing anemia and thrombocytopenia. 2. I explained plan for [facility] testing for further assessment. Especially to exclude underlying ET  3. Clinically, I suspect that she has anemia of chronic illness and reactive thrombocytosis from her rheumatologic condition. I advised her to get follow-up with rheumatology. She was referred but she is looking for a rheumatologist that takes her insurance  4. We had discussion regarding her other symptoms and I agree she needs to be seen by rheumatology. 5. Return to discuss results in 3-4 weeks.

## 2021-04-20 ENCOUNTER — CARE COORDINATION (OUTPATIENT)
Dept: CASE MANAGEMENT | Age: 56
End: 2021-04-20

## 2021-04-20 NOTE — CARE COORDINATION
[facility] Transitions Follow Up Call    2021    Patient: Severa Cambric  Patient : 1965   MRN: [id number]    Reason for Admission:  palpitations, inflammatory poyarthritis, behavioral symptoms  Discharge Date: 3/31/21 RARS: Readmission Risk Score: 34      Spoke with patient who says her pain & rheumatoid symptoms are acting up again - course of prednisone is completed & her joints are more swollen & painful. She tells me that her insurance did not cover rheumatology referral at [facility]. Patient needs assistance with a rheumatology referral. [facility] insurance.   Needs to be seen in the meantime by PCP office      Needs to be reviewed by the provider   Additional needs identified to be addressed with provider Yes  increase in rheumatic symptoms, completed prednisone - can't get into rheumatology - insurance coverage - needs another referral and needs to be seen in office in meantime           Method of communication with provider : chart routing           Follow Up  Future Appointments   Date Time Provider Anthony Everett   2021 10:00 AM SCHEDULE, [facility]   2021  8:00 AM Valentin Pulliam MD [facility]   2021  9:30 AM Yee Sidhu MD [facility] MOISE Lutz RN

## 2021-04-20 NOTE — TELEPHONE ENCOUNTER
PC from patient regarding rheumotology referral, patient stated her insurance company does not cover any rheumatology office, she's asking for an referral to an office that can help with her severe pain. She would like an referral to an direct doctor and office, she does not want an external referral she isn't capable of finding an doctor on her own.

## 2021-04-21 ENCOUNTER — CARE COORDINATION (OUTPATIENT)
Dept: CASE MANAGEMENT | Age: 56
End: 2021-04-21

## 2021-04-21 ENCOUNTER — TELEPHONE (OUTPATIENT)
Dept: INTERNAL MEDICINE | Age: 56
End: 2021-04-21

## 2021-04-21 ENCOUNTER — HOSPITAL ENCOUNTER (OUTPATIENT)
Facility: MEDICAL CENTER | Age: 56
Discharge: HOME | End: 2021-04-21
Payer: MEDICAID

## 2021-04-21 DIAGNOSIS — M06.00 SERONEGATIVE RHEUMATOID ARTHRITIS (HCC): Primary | ICD-10-CM

## 2021-04-21 LAB — SEDIMENTATION RATE, ERYTHROCYTE: 98 MM (ref 0–30)

## 2021-04-21 PROCEDURE — 85652 RBC SED RATE AUTOMATED: CPT

## 2021-04-21 PROCEDURE — 36415 COLL VENOUS BLD VENIPUNCTURE: CPT

## 2021-04-21 RX ORDER — PREDNISONE 10 MG/1
10 TABLET ORAL DAILY
Qty: 10 TABLET | Refills: 0 | Status: SHIPPED | OUTPATIENT
Start: 2021-04-21 | End: 2021-04-27 | Stop reason: SDUPTHER

## 2021-04-21 NOTE — TELEPHONE ENCOUNTER
Phone call from [address] [place] [address] stating the patient is having a flare up of her rheumatoid arthritis. Patient was suppose to be on Prednisone for 30 days, but when the patient was in the hospital the end of March the Prednisone was changed and the patient only received a 17 day supply. She is asking if a script for the prednisone can be sent to the pharmacy. Patient has an appt on 4/27. Health Maintenance   Topic Date Due    Hepatitis B vaccine (1 of 3 - Risk 3-dose series) Never done    Shingles Vaccine (1 of 2) 07/17/2021 (Originally 1/22/2016)    A1C test (Diabetic or Prediabetic)  03/21/2022    Lipid screen  03/21/2022    Potassium monitoring  03/31/2022    Creatinine monitoring  03/31/2022    Breast cancer screen  10/10/2022    DTaP/Tdap/Td vaccine (3 - Td) 04/14/2027    Colon cancer screen colonoscopy  12/03/2030    Flu vaccine  Completed    Pneumococcal 0-64 years Vaccine  Completed    COVID-19 Vaccine  Completed    Hepatitis C screen  Completed    HIV screen  Completed    Hepatitis A vaccine  Aged Out    Hib vaccine  Aged Out    Meningococcal (ACWY) vaccine  Aged Out             (applicable per patient's age: Cancer Screenings, Depression Screening, Fall Risk Screening, Immunizations)    Hemoglobin A1C (%)   Date Value   03/21/2021 6.3 (H)   01/20/2021 5.9   10/02/2020 6.2 (H)     Microalb/Crt.  Ratio (mcg/mg creat)   Date Value   06/12/2019 147 (H)     LDL Cholesterol (mg/dL)   Date Value   03/21/2021 122     AST (U/L)   Date Value   03/21/2021 85 (H)     ALT (U/L)   Date Value   03/21/2021 72 (H)     BUN (mg/dL)   Date Value   03/31/2021 20      (goal A1C is < 7)   (goal LDL is <100) need 30-50% reduction from baseline     BP Readings from Last 3 Encounters:   04/16/21 125/88   04/13/21 124/73   04/08/21 115/81    (goal /80)      All Future Testing planned in [facility]:  Lab Frequency Next Occurrence   C-Reactive Protein Once 09/29/2020   EGD Once 11/27/2020 COLONOSCOPY (Diagnostic) Once 11/27/2020   Pancreatic elastase, fecal Once 11/13/2020   ECHO Complete 2D W Doppler W Color Once 02/07/2021   CBC With Auto Differential Once 04/22/2021   Miscellaneous Sendout 1 Once 04/16/2021   Sedimentation Rate Once 04/16/2021       Next Visit Date:  Future Appointments   Date Time Provider Anthony Everett   4/27/2021 11:00 AM Chelo Acosta MD [facility] IM [facility]   5/14/2021  8:00 AM Priya Hdz MD [facility]   6/11/2021  9:30 AM Chelo Acosta MD [facility] IM [facility]            Patient Active Problem List:     Lung nodule     Obesity     Adrenal adenoma     Goiter     Hypertension     Alcohol abuse     Essential hypertension     Enlarged tonsils     ACE inhibitor-aggravated angioedema     JONES (obstructive sleep apnea)     Dyslipidemia     IGT (impaired glucose tolerance)     Acute alcoholic intoxication without complication (HCC)     Acute renal insufficiency     Effusion of bursa of right knee     Acute cystitis without hematuria     Bipolar disorder (HCC)     Mild intermittent asthma without complication     Inflammatory polyarthritis ([address]     Seronegative rheumatoid arthritis ([address]

## 2021-04-21 NOTE — TELEPHONE ENCOUNTER
Pt was advised to take prednisone 20 mg for 20 days followed by 10 mg prednisone for 10 days at time of hospital discharge. Prescribed prednisone 10 mg for 10 days. Continue hydroxychloroquine as advised.  Rheumatology referral.      Ernestina Caldera MD      Department of Internal Medicine  [facility]         4/21/2021, 3:13 PM

## 2021-04-21 NOTE — CARE COORDINATION
Yes  medications-prednisone order           Method of communication with provider : phone - spoke with PCP office staff, Yumiko Valera who is relaying information to PCP    Discussed COVID-19 related testing which was available at this time. Test results were negative. Patient informed of results, if available? Yes     Care Transition Nurse (CTN) contacted the patient by telephone to follow up after admission  Verified name and  with patient as identifiersCare Transition Nurse (CTN) contacted the patient by telephone to follow up after admission Verified name and  with patient as identifiers.     Addressed changes since last contact: reviewed PCP appt, new appts  Discharged needs reviewed: home health care-ongoing, PT-ongoing and OT-Follow up appointment completed? Yes     Advance Care Planning:   Does patient have an Advance Directive:  reviewed and current.      CTN reviewed discharge instructions, medical action plan and red flags with patient and discussed any barriers to care and/or understanding of plan of care after discharge. Discussed appropriate site of care based on symptoms and resources available to patient including: PCP, Specialist, Home health and CTN. The patient agrees to contact the PCP office for questions related to their healthcare.      Patients top risk factors for readmission: ineffective coping, lack of knowledge about disease, medical condition and multiple health system providers  Interventions to address risk factors: Scheduled appointment with PCP- completed, Scheduled appointment with Specialist-ID, rheumatology, hematology and Obtained and reviewed discharge summary and/or continuity of care documents     Discussed follow-up appointments. If no appointment was previously scheduled, appointment scheduling offered: Yes Is follow up appointment scheduled within 7 days of discharge? Yes         Plan for follow-up call in 1-2 days based on severity of symptoms and risk factors.   Plan for next call: symptom management-reassess, follow up appointment-scheduled for 4/27 - VV if needs sooner and medication management-check for prednisone script  CTN provided contact information for future needs.           Follow Up  Future Appointments   Date Time Provider Anthony Everett   4/27/2021 11:00 AM Chelo Acosta MD [facility]   5/14/2021  8:00 AM Laura Andrea MD [facility]   6/11/2021  9:30 AM Chelo Acosta MD [facility] Carolynn Perdomo RN

## 2021-04-22 ENCOUNTER — CARE COORDINATION (OUTPATIENT)
Dept: CASE MANAGEMENT | Age: 56
End: 2021-04-22

## 2021-04-22 ENCOUNTER — TELEPHONE (OUTPATIENT)
Dept: ONCOLOGY | Age: 56
End: 2021-04-22

## 2021-04-22 NOTE — CARE COORDINATION
care-ongoing, PT-ongoing and OT-Follow up appointment completed? Yes     Advance Care Planning:   Does patient have an Advance Directive:  reviewed and current.      CTN reviewed discharge instructions, medical action plan and red flags with patient and discussed any barriers to care and/or understanding of plan of care after discharge. Discussed appropriate site of care based on symptoms and resources available to patient including: PCP, Specialist, Home health and CTN. The patient agrees to contact the PCP office for questions related to their healthcare.      Patients top risk factors for readmission: ineffective coping, lack of knowledge about disease, medical condition and multiple health system providers  Interventions to address risk factors: Scheduled appointment with PCP-4/8 completed, Scheduled appointment with Specialist-ID, rheumatology, hematology and Obtained and reviewed discharge summary and/or continuity of care documents     Discussed follow-up appointments. If no appointment was previously scheduled, appointment scheduling offered: Yes Is follow up appointment scheduled within 7 days of discharge? Yes         Plan for follow-up call in 1-2 days based on severity of symptoms and risk factors. Plan for next call: symptom management-reassess, follow up appointment-scheduled for 4/27 - VV if needs sooner and medication management-check for prednisone script  CTN provided contact information for future needs.         I  Plan for follow-up call in 1-2 days based on severity of symptoms and risk factors. Plan for next call: symptom management-reassess, follow up appointment-remind 4/27 appt and medication management-confirm taking new prednisone dosage  CTN provided contact information for future needs.         Follow Up  Future Appointments   Date Time Provider Anthony Everett   4/27/2021 11:00 AM Corin Ramon MD [facility]   5/14/2021  8:00 AM Kit Hdz MD [facility] Hillary Oppenheim 6/11/2021  9:30 AM Mohini Charles MD [facility] Nba Bautista RN

## 2021-04-22 NOTE — TELEPHONE ENCOUNTER
PT WAS DRAWN FOR A PARADIGM TEST & SED RATE. BLOOD WAS SENT OUT ALONG WITH PARADIGM ORDER DEMOGRAPHICS & MD NOTE. ORDER SCANNED INTO CHART.

## 2021-04-26 ENCOUNTER — TELEPHONE (OUTPATIENT)
Dept: ONCOLOGY | Age: 56
End: 2021-04-26

## 2021-04-27 ENCOUNTER — OFFICE VISIT (OUTPATIENT)
Dept: INTERNAL MEDICINE | Age: 56
End: 2021-04-27
Payer: MEDICAID

## 2021-04-27 VITALS
HEART RATE: 99 BPM | HEIGHT: 64 IN | BODY MASS INDEX: 27.83 KG/M2 | SYSTOLIC BLOOD PRESSURE: 144 MMHG | DIASTOLIC BLOOD PRESSURE: 99 MMHG | WEIGHT: 163 LBS

## 2021-04-27 DIAGNOSIS — E78.5 DYSLIPIDEMIA: ICD-10-CM

## 2021-04-27 DIAGNOSIS — F32.5 MAJOR DEPRESSION IN REMISSION (HCC): ICD-10-CM

## 2021-04-27 DIAGNOSIS — I10 ESSENTIAL HYPERTENSION: ICD-10-CM

## 2021-04-27 DIAGNOSIS — D64.9 NORMOCYTIC NORMOCHROMIC ANEMIA: ICD-10-CM

## 2021-04-27 DIAGNOSIS — D75.839 THROMBOCYTOSIS: ICD-10-CM

## 2021-04-27 DIAGNOSIS — M06.00 SERONEGATIVE RHEUMATOID ARTHRITIS (HCC): Primary | ICD-10-CM

## 2021-04-27 PROCEDURE — G8417 CALC BMI ABV UP PARAM F/U: HCPCS | Performed by: STUDENT IN AN ORGANIZED HEALTH CARE EDUCATION/TRAINING PROGRAM

## 2021-04-27 PROCEDURE — 99213 OFFICE O/P EST LOW 20 MIN: CPT | Performed by: STUDENT IN AN ORGANIZED HEALTH CARE EDUCATION/TRAINING PROGRAM

## 2021-04-27 PROCEDURE — 1036F TOBACCO NON-USER: CPT | Performed by: STUDENT IN AN ORGANIZED HEALTH CARE EDUCATION/TRAINING PROGRAM

## 2021-04-27 PROCEDURE — 99211 OFF/OP EST MAY X REQ PHY/QHP: CPT | Performed by: INTERNAL MEDICINE

## 2021-04-27 PROCEDURE — 3017F COLORECTAL CA SCREEN DOC REV: CPT | Performed by: STUDENT IN AN ORGANIZED HEALTH CARE EDUCATION/TRAINING PROGRAM

## 2021-04-27 PROCEDURE — G8427 DOCREV CUR MEDS BY ELIG CLIN: HCPCS | Performed by: STUDENT IN AN ORGANIZED HEALTH CARE EDUCATION/TRAINING PROGRAM

## 2021-04-27 PROCEDURE — 1111F DSCHRG MED/CURRENT MED MERGE: CPT | Performed by: STUDENT IN AN ORGANIZED HEALTH CARE EDUCATION/TRAINING PROGRAM

## 2021-04-27 RX ORDER — ACETAMINOPHEN 500 MG
1000 TABLET ORAL EVERY 6 HOURS PRN
Qty: 60 TABLET | Refills: 0 | Status: SHIPPED | OUTPATIENT
Start: 2021-04-27 | End: 2021-06-11 | Stop reason: SDUPTHER

## 2021-04-27 RX ORDER — PREDNISONE 10 MG/1
10 TABLET ORAL DAILY
Qty: 20 TABLET | Refills: 0 | Status: SHIPPED | OUTPATIENT
Start: 2021-04-27 | End: 2021-05-17

## 2021-04-27 RX ORDER — FOLIC ACID 1 MG/1
1 TABLET ORAL DAILY
Qty: 30 TABLET | Refills: 3 | Status: SHIPPED | OUTPATIENT
Start: 2021-04-27 | End: 2022-04-18 | Stop reason: SDUPTHER

## 2021-04-27 RX ORDER — MELOXICAM 15 MG/1
15 TABLET ORAL DAILY
Qty: 30 TABLET | Refills: 0 | Status: SHIPPED | OUTPATIENT
Start: 2021-04-27 | End: 2021-06-11 | Stop reason: SDUPTHER

## 2021-04-27 ASSESSMENT — ENCOUNTER SYMPTOMS
ABDOMINAL DISTENTION: 0
ABDOMINAL PAIN: 0
BACK PAIN: 0
EYE DISCHARGE: 0
RHINORRHEA: 0
WHEEZING: 0
COUGH: 0
EYE ITCHING: 0
SHORTNESS OF BREATH: 0

## 2021-04-27 NOTE — PATIENT INSTRUCTIONS
Follow-up appointment scheduled for 6/11/21 at 9:30a , AVS given to patient. Medications e-scribe to pharmacy of pt's choice.      Referral to Rheumatology was placed, summary of care printed and faxed to office, phone numbers given to pt, they will contact office for an appt

## 2021-04-27 NOTE — PROGRESS NOTES
Risk 3-dose series) Never done       Allergies   Allergen Reactions    Bee Venom Anaphylaxis    Iodine Anaphylaxis and Rash    Lisinopril Swelling and Anaphylaxis     Angioedema    Shellfish-Derived Products Anaphylaxis and Rash     ANGIOEDEMA         Current Outpatient Medications   Medication Sig Dispense Refill    predniSONE (DELTASONE) 10 MG tablet Take 1 tablet by mouth daily for 10 days 10 tablet 0    omeprazole (PRILOSEC) 20 MG delayed release capsule Take 1 capsule by mouth daily 30 capsule 0    FLUoxetine (PROZAC) 40 MG capsule Take 40 mg by mouth daily      Cholecalciferol (VITAMIN D3) 25 MCG (1000 UT) CAPS Take 1,000 Units by mouth daily      isosorbide mononitrate (IMDUR) 30 MG extended release tablet Take 30 mg by mouth daily      Thiamine HCl (VITAMIN B-1 PO) Take 100 mg by mouth daily      ferrous sulfate (IRON 325) 325 (65 Fe) MG tablet Take 325 mg by mouth daily (with breakfast)      acetaminophen (TYLENOL) 500 MG tablet Take 500 mg by mouth every 6 hours as needed for Pain Indications: 2 tablets by mouth 3 times daily      naproxen (NAPROSYN) 500 MG tablet Take 500 mg by mouth 2 times daily (with meals)      hydroxychloroquine (PLAQUENIL) 200 MG tablet Take 1 tablet by mouth 2 times daily 60 tablet 0    citalopram (CELEXA) 20 MG tablet Take 1 tablet by mouth daily 30 tablet 3    hydroCHLOROthiazide (HYDRODIURIL) 25 MG tablet Take 0.5 tablets by mouth every morning 60 tablet 0    Calcium Carbonate-Vitamin D (OYSTER SHELL CALCIUM/D) 500-200 MG-UNIT TABS TAKE 1 TAB BY MOUTH ONCE A DAY  30 tablet 3    atorvastatin (LIPITOR) 20 MG tablet TAKE 1 TABLET BY MOUTH DAILY  30 tablet 3    atenolol (TENORMIN) 50 MG tablet Take 1 tablet by mouth daily 60 tablet 3    QUEtiapine (SEROQUEL) 50 MG tablet Take 50 mg by mouth nightly      loratadine (CLARITIN) 10 MG tablet TAKE 1 CAPSULE BY MOUTH DAILY  30 tablet 3    amLODIPine (NORVASC) 10 MG tablet TAKE 1 TABLET BY MOUTH DAILY  60 tablet 3    diclofenac sodium (VOLTAREN) 1 % GEL Apply 4 g topically 2 times daily 028 g 2    folic acid (FOLVITE) 1 MG tablet Take 1 tablet by mouth daily 30 tablet 3    albuterol sulfate (PROAIR RESPICLICK) 942 (90 Base) MCG/ACT aerosol powder inhalation Inhale 2 puffs into the lungs every 6 hours as needed for Wheezing or Shortness of Breath 5 Inhaler 3    lurasidone (LATUDA) 60 MG TABS tablet Take 60 mg by mouth nightly       No current facility-administered medications for this visit. Social History     Tobacco Use    Smoking status: Former Smoker     Packs/day: 0.50     Years: 20.00     Pack years: 10.00     Types: Cigarettes     Quit date: 1992     Years since quittin.3    Smokeless tobacco: Never Used    Tobacco comment: states quiti in the 1980s   Substance Use Topics    Alcohol use: Not Currently     Alcohol/week: 12.0 standard drinks     Types: 12 Cans of beer per week     Comment: Quit about 5 months ago 20    Drug use: Not Currently     Types: Cocaine     Comment: last use approximately 14 cocaine       Family History   Problem Relation Age of Onset    Stroke Mother     Hypertension Father     Cancer Brother         bone    Lung Cancer Maternal Aunt     Cancer Maternal Grandmother         eye     Other Sister         aneurysm    Heart Disease Sister         REVIEW OF SYSTEMS:  Review of Systems   Constitutional: Negative for chills, fatigue and fever. HENT: Negative for congestion, hearing loss and rhinorrhea. Eyes: Negative for discharge and itching. Respiratory: Negative for cough, shortness of breath and wheezing. Cardiovascular: Negative for chest pain, palpitations and leg swelling. Gastrointestinal: Negative for abdominal distention and abdominal pain. Endocrine: Negative for polydipsia and polyphagia. Genitourinary: Negative for difficulty urinating and dysuria. Musculoskeletal: Negative for arthralgias and back pain.         Bilateral MCP joint tenderness and swelling present. .   Skin: Negative for rash and wound. No erythema or rash noted. Neurological: Negative for dizziness, seizures, light-headedness and headaches. Psychiatric/Behavioral: Negative for agitation and behavioral problems. PHYSICAL EXAM:  Vitals:    04/27/21 1045   BP: (!) 144/99   Site: Left Upper Arm   Position: Sitting   Cuff Size: Medium Adult   Pulse: 99   Weight: 163 lb (73.9 kg)   Height: 5' 4\" (1.626 m)     BP Readings from Last 3 Encounters:   04/27/21 (!) 144/99   04/16/21 125/88   04/13/21 124/73        Physical Exam  Constitutional:       Appearance: She is well-developed. HENT:      Head: Normocephalic and atraumatic. Eyes:      Conjunctiva/sclera: Conjunctivae normal.      Pupils: Pupils are equal, round, and reactive to light. Neck:      Musculoskeletal: Normal range of motion and neck supple. Thyroid: No thyromegaly. Vascular: No JVD. Cardiovascular:      Rate and Rhythm: Normal rate and regular rhythm. Heart sounds: Normal heart sounds. No murmur. Pulmonary:      Effort: Pulmonary effort is normal.      Breath sounds: Normal breath sounds. No wheezing. Abdominal:      General: Bowel sounds are normal. There is no distension. Palpations: Abdomen is soft. Tenderness: There is no abdominal tenderness. There is no rebound. Musculoskeletal: Normal range of motion. General: No swelling or tenderness. Comments: Bilateral MCP joint tenderness and synovitis noted. Bilateral elbow joint involvement. Range of motion intact   Neurological:      Mental Status: She is alert and oriented to person, place, and time. Cranial Nerves: No cranial nerve deficit.    Psychiatric:         Behavior: Behavior normal.           DIAGNOSTIC FINDINGS:  CBC:  Lab Results   Component Value Date    WBC 9.4 04/08/2021    HGB 9.2 04/08/2021    PLT See Reflexed IPF Result 04/08/2021       BMP:    Lab Results   Component Value Date years ago with preservation of her ovaries per patient. Due for hepatitis B vaccine. FOLLOW UP AND INSTRUCTIONS:  Return in about 2 months (around 6/27/2021). · Delphine received counseling on the following healthy behaviors: nutrition, exercise, medication adherence and tobacco cessation    · Discussed use, benefit, and side effects of prescribed medications. Barriers to medication compliance addressed. All patient questions answered. Pt voiced understanding. · Patient given educational materials - see patient instructions    Latrell Galvez MD      Department of Internal Medicine  [facility] [place]         4/27/2021, 11:29 AM      This note is created with the assistance of a speech-recognition program. While intending to generate a document that actually reflects the content of thevisit, the document can still have some mistakes which may not have been identified and corrected by editing.   \

## 2021-04-27 NOTE — PROGRESS NOTES
Attending Physician Statement  I have discussed the care of [address] including pertinent history and exam findings,  with the resident. I have reviewed the key elements of all parts of the encounter with the resident. I agree with the assessment, plan and orders as documented by the resident.   (GE Modifier)    MD HAWA Craig  Attending Physician, [address] [place] [address], Internal Medicine Residency Program  [address] [place] [address]  4/27/2021, 11:57 AM

## 2021-04-28 ENCOUNTER — TELEPHONE (OUTPATIENT)
Dept: INTERNAL MEDICINE | Age: 56
End: 2021-04-28

## 2021-04-28 ENCOUNTER — CARE COORDINATION (OUTPATIENT)
Dept: CASE MANAGEMENT | Age: 56
End: 2021-04-28

## 2021-04-28 DIAGNOSIS — M06.00 SERONEGATIVE RHEUMATOID ARTHRITIS (HCC): Primary | ICD-10-CM

## 2021-04-28 NOTE — TELEPHONE ENCOUNTER
FYI after pt appointment she went and called [facility] as I directed her to, she was told by myself that I had called gave them her info and I was told her insurance was accepted, when pt called she was told it was not, I also called pt insurance company and was given 5 names which none where taking patients insurance , ill call pt again.

## 2021-04-28 NOTE — TELEPHONE ENCOUNTER
Signed referral to [facility] .     Vincenzo Reyes MD      Department of Internal Medicine  [facility]         4/28/2021, 9:25 AM

## 2021-04-28 NOTE — CARE COORDINATION
[facility] Transitions Follow Up Call    2021    Patient: Marvin Ugalde  Patient : 1965   MRN: [id number]    Reason for Admission: palpitations, inflammatory polyarthritis, behavioral history - bipolar disorder  Discharge Date: 3/31/21   RARS: Readmission Risk Score: 34      Spoke with: patient -incoming message from patient that she finally is referred to a rheumatologist who her insurance covers, but by the time I called her back - she had talked with that physician's office at [facility] & was denied. Will continue to attempt to help patient get this solved - PCP office has been active in assisting, but various offices have told them also that they do not take patient's insurance. Patient has spoken with [employer] to explore coverage & hasa not been successful. [facility] has also attempted. Noted that med asst at PCP office is still attempting to resolve this issue. Patient was seen by PCP yesterday & another script for prednisone ordered for another 20 days - patient is getting frustrated. Joints are still sore & swollen. Weight stable at 166.       Care Transitions Subsequent and Final Call    Schedule Follow Up Appointment with PCP: Completed  Subsequent and Final Calls  Do you have any ongoing symptoms?: Yes  Onset of Patient-reported symptoms: Other  Patient-reported symptoms: Pain  Interventions for patient-reported symptoms: Notified Home Care, Notified PCP/Physician, Other  Have your medications changed?: Yes  Do you have any questions related to your medications?: Yes  Patient Reports: wonders how long she will be on prednisone - awaiting rheumatology appt  Are you currently active with any services?: Home Health  Do you have any needs or concerns that I can assist you with?: Yes (Comment: insurance coverage for rheumatology)  Care Transitions Interventions  Other Interventions:           Needs to be reviewed by the provider   Additional needs identified to be addressed with provider Yes  office & SW still working on insurance coverage for a rheumatologist - [facility]           Method of communication with provider : patient was seen by PCP yesterday & office staff is actively working on this issue    COVID-19 related testing which was available at this time. Test results were negative. Patient informed of results, if available? Yes     Care Transition Nurse (CTN) contacted the patient by telephone to follow up after admission  Verified name and  with patient as identifiersCare Transition Nurse (CTN) contacted the patient by telephone to follow up after admission       Addressed changes since last contact: reviewed PCP appt, new appts  Discharged needs reviewed: home health care-ongoing, PT-ongoing and OT-Follow up appointment completed? Yes    Advance Care Planning:   Does patient have an Advance Directive:  reviewed and current. CTN reviewed discharge instructions, medical action plan and red flags with patient and discussed any barriers to care and/or understanding of plan of care after discharge. Discussed appropriate site of care based on symptoms and resources available to patient including: PCP, Specialist, Home health and CTN. The patient agrees to contact the PCP office for questions related to their healthcare. PPatients top risk factors for readmission: ineffective coping, lack of knowledge about disease, medical condition and multiple health system providers  Interventions to address risk factors: Scheduled appointment with PCP- completed, Scheduled appointment with Specialist-ID, rheumatology, hematology and Obtained and reviewed discharge summary and/or continuity of care documents    Discussed follow-up appointments. If no appointment was previously scheduled, appointment scheduling offered: Yes Is follow up appointment scheduled within 7 days of discharge? Yes    Plan for follow-up call in 1-2 days based on severity of symptoms and risk factors.   Plan for next call: symptom management-reassess - on 20 more days of prednisone, follow up appointment-another PCP appt yesterday, 4/27 and referrals-still having issues with rheumatology coverage with insurance - continued attempts - ongoing  CTN provided contact information for future needs.       Follow Up  Future Appointments   Date Time Provider Anthony Everett   5/14/2021  8:00 AM [facility]   6/11/2021  9:30 AM Gilson Morrison MD [facility] MOISE Vo RN

## 2021-04-29 ENCOUNTER — CARE COORDINATION (OUTPATIENT)
Dept: CASE MANAGEMENT | Age: 56
End: 2021-04-29

## 2021-04-29 NOTE — CARE COORDINATION
[facility] Transitions Follow Up Call    2021    Patient: Mare Castaneda    Patient : 1965   MRN: [id number]        Spoke with: Beth Moseley, transportation services at [facility] ([phone])    Checking with Beth Moseley re: a possible courtesy ride to patient's new rheumalologist in case she is called in for an appt sooner than what is already scheduled for August.  new rheumatologist -Dr Steve Davis at [address] [place] [address]      Patient always arranges her transportation with her medical cab service through her [facility] insurance which requires at least 48 hours notice. Since she will be on Rheumatology cancellation list, this would require obtaining a ride at moment's notice - Beth Moseley will check & then inform CTN who will inform patient of potential plan.       Future Appointments   Date Time Provider Anthony Everett   2021  8:00 AM Fabian Perkins MD [facility]   2021  9:30 AM Gil Toledo MD [facility] MOISE Jackson RN

## 2021-04-30 ENCOUNTER — CARE COORDINATION (OUTPATIENT)
Dept: CASE MANAGEMENT | Age: 56
End: 2021-04-30

## 2021-04-30 ENCOUNTER — CARE COORDINATION (OUTPATIENT)
Dept: CARE COORDINATION | Age: 56
End: 2021-04-30

## 2021-04-30 NOTE — CARE COORDINATION
prednisone which were ordered by PCP after discharge. Is patient active with support services? [facility] cab service, [facility] center for psych meds    Continued Care Coordination Recommended:  yes - patient is in agreement - routed.       Problem List:   Patient Active Problem List   Diagnosis    Lung nodule    Obesity    Adrenal adenoma    Goiter    Hypertension    Alcohol abuse    Essential hypertension    Enlarged tonsils    ACE inhibitor-aggravated angioedema    JONES (obstructive sleep apnea)    Dyslipidemia    IGT (impaired glucose tolerance)    Acute alcoholic intoxication without complication (HCC)    Acute renal insufficiency    Effusion of bursa of right knee    Acute cystitis without hematuria    Bipolar disorder (HCC)    Mild intermittent asthma without complication    Inflammatory polyarthritis ([address]    Seronegative rheumatoid arthritis ([address]         Follow up appointments:    Future Appointments   Date Time Provider [facility]   5/14/2021  8:00 AM Corina Gonzalez MD [facility]   6/11/2021  9:30 AM Viry Parsons MD [facility] MOISE Hernández RN

## 2021-04-30 NOTE — CARE COORDINATION
Patient referred to [facility] for enrollment in Care Management. Will call patient on Monday.

## 2021-05-03 ENCOUNTER — CARE COORDINATION (OUTPATIENT)
Dept: CARE COORDINATION | Age: 56
End: 2021-05-03

## 2021-05-03 NOTE — CARE COORDINATION
Ambulatory Care Coordination Note  5/3/2021  CM Risk Score: 10  Charlson 10 Year Mortality Risk Score: 23%     ACC: Regis Gonzalez RN    Summary Note: Attempted to reach patient for follow up and enrollment in [facility]. Left message on her voicemail with call back number. Ambulatory Care Coordination Assessment    Care Coordination Protocol  Program Enrollment: Complex Care  Referral from Primary Care Provider: No  Week 1 - Initial Assessment     Do you have all of your prescriptions and are they filled?: Yes  How often do you have trouble taking your medications the way you have been told to take them?: I always take them as prescribed. Do you have Home O2 Therapy?: No                        Suggested Interventions and Community Resources                  Prior to Admission medications    Medication Sig Start Date End Date Taking?  Authorizing Provider   folic acid (FOLVITE) 1 MG tablet Take 1 tablet by mouth daily 4/27/21   Estefania Ray MD   predniSONE (DELTASONE) 10 MG tablet Take 1 tablet by mouth daily for 20 days 4/27/21 5/17/21  Estefania Ray MD   meloxicam (MOBIC) 15 MG tablet Take 1 tablet by mouth daily 4/27/21   Estefania Ray MD   acetaminophen (APAP EXTRA STRENGTH) 500 MG tablet Take 2 tablets by mouth every 6 hours as needed for Pain 4/27/21   Estefania Ray MD   omeprazole (PRILOSEC) 20 MG delayed release capsule Take 1 capsule by mouth daily 4/8/21   Chelo Acosta MD   Cholecalciferol (VITAMIN D3) 25 MCG (1000 UT) CAPS Take 1,000 Units by mouth daily    Historical Provider, MD   isosorbide mononitrate (IMDUR) 30 MG extended release tablet Take 30 mg by mouth daily    Historical Provider, MD   ferrous sulfate (IRON 325) 325 (65 Fe) MG tablet Take 325 mg by mouth daily (with breakfast)    Historical Provider, MD   hydroxychloroquine (PLAQUENIL) 200 MG tablet Take 1 tablet by mouth 2 times daily 3/31/21   Daryl Davey MD   citalopram (CELEXA) 20 MG tablet Take 1 tablet by mouth daily 3/24/21   Michelle Melendez MD   hydroCHLOROthiazide (HYDRODIURIL) 25 MG tablet Take 0.5 tablets by mouth every morning 1/31/21   Jose Zuniga MD   Calcium Carbonate-Vitamin D (OYSTER SHELL CALCIUM/D) 500-200 MG-UNIT TABS TAKE 1 TAB BY MOUTH ONCE A DAY  1/22/21   Jorge L Ruffin MD   atorvastatin (LIPITOR) 20 MG tablet TAKE 1 TABLET BY MOUTH DAILY  1/22/21   Jose Sanchez MD   atenolol (TENORMIN) 50 MG tablet Take 1 tablet by mouth daily 1/22/21   Jorge L Ruffin MD   loratadine (CLARITIN) 10 MG tablet TAKE 1 CAPSULE BY MOUTH DAILY  1/12/21   Jose Zuniga MD   amLODIPine (NORVASC) 10 MG tablet TAKE 1 TABLET BY MOUTH DAILY  1/8/21   Jose Zuniga MD   diclofenac sodium (VOLTAREN) 1 % GEL Apply 4 g topically 2 times daily 1/6/21   Jake Clark DPM   albuterol sulfate (PROAIR RESPICLICK) 878 (90 Base) MCG/ACT aerosol powder inhalation Inhale 2 puffs into the lungs every 6 hours as needed for Wheezing or Shortness of Breath 1/15/20   Silvina Moore MD   lurasidone (LATUDA) 60 MG TABS tablet Take 60 mg by mouth nightly    Historical Provider, MD       Future Appointments   Date Time Provider Anthony Everett   5/14/2021  8:00 AM Odessa Plata MD [facility]   6/11/2021  9:30 AM Jorge L Ruffin MD [address] [place] [address]

## 2021-05-06 ENCOUNTER — HOSPITAL ENCOUNTER (OUTPATIENT)
Facility: MEDICAL CENTER | Age: 56
End: 2021-05-06
Payer: MEDICAID

## 2021-05-11 ENCOUNTER — CARE COORDINATION (OUTPATIENT)
Dept: CASE MANAGEMENT | Age: 56
End: 2021-05-11

## 2021-05-11 ENCOUNTER — CARE COORDINATION (OUTPATIENT)
Dept: CARE COORDINATION | Age: 56
End: 2021-05-11

## 2021-05-11 DIAGNOSIS — E78.5 DYSLIPIDEMIA: ICD-10-CM

## 2021-05-11 DIAGNOSIS — I10 ESSENTIAL HYPERTENSION: ICD-10-CM

## 2021-05-11 DIAGNOSIS — K21.9 GASTROESOPHAGEAL REFLUX DISEASE WITHOUT ESOPHAGITIS: ICD-10-CM

## 2021-05-11 NOTE — TELEPHONE ENCOUNTER
Noted. Thank you. She still has a full prescription of prednisone at the pharmacy. I will be calling her.

## 2021-05-11 NOTE — CARE COORDINATION
[facility] Transitions Incoming call from patient   2021    Patient: Melba Saint  Patient : 1965   MRN: [id number]    RARS: Readmission Risk Score: 29    Patient called CTN (recent care transitions episode completed & referred to [facility]). Patient just called to inform CTN that she is still on waiting list for rheumatology appt for new patient appointment in August.  She is on their cancellation list & hopes appt will be moved up sooner. She says she completed her prednisone last week & now has increased swelling/discomfort on left foot & ankle. Taking tylenol & applying ice. Patient knows that courses of prednisone help her symptoms, but says she worries about being on prednisone long term. I contacted/updated CARLOTA Valdivia who is following patient now. Patient tells me that she doesn't know how to retrieve her voicemail & unless she knows the #, she doesn't .       Follow Up  Future Appointments   Date Time Provider Anthony Everett   2021  8:30 AM Gera Kiran MD  Cancer Ct [facility]   2021  9:30 AM Kassandra Mcmahon MD [facility] MOISE Juan Friraz, ELICIA

## 2021-05-12 ENCOUNTER — CARE COORDINATION (OUTPATIENT)
Dept: CASE MANAGEMENT | Age: 56
End: 2021-05-12

## 2021-05-12 RX ORDER — OMEPRAZOLE 20 MG/1
20 CAPSULE, DELAYED RELEASE ORAL DAILY
Qty: 30 CAPSULE | Refills: 0 | Status: SHIPPED | OUTPATIENT
Start: 2021-05-12 | End: 2021-06-11 | Stop reason: SDUPTHER

## 2021-05-12 RX ORDER — ATORVASTATIN CALCIUM 20 MG/1
TABLET, FILM COATED ORAL
Qty: 30 TABLET | Refills: 3 | Status: SHIPPED | OUTPATIENT
Start: 2021-05-12 | Stop reason: SDUPTHER

## 2021-05-12 RX ORDER — ISOSORBIDE MONONITRATE 60 MG/1
TABLET, EXTENDED RELEASE ORAL
Qty: 30 TABLET | Refills: 3 | Status: SHIPPED | OUTPATIENT
Start: 2021-05-12 | End: 2021-06-11

## 2021-05-12 NOTE — CARE COORDINATION
Care Transitions    Patient contacted CTN to inform me that she \"feels 80%\" better than she did after restarting prednisone yesterday. Yvonne VAN who is following patient is aware.

## 2021-05-12 NOTE — TELEPHONE ENCOUNTER
Request for Imdur and prilosec. Imdur The original prescription was discontinued on 10/2/2020 by Jose Gallego MD for the following reason: Therapy completed. Renewing this prescription may not be appropriate. Last Visit Date: 4/27/2021  Next Visit Date:  Future Appointments   Date Time Provider Anthony Everett   5/21/2021  8:30 AM Danay Chaudhari MD [facility]   6/11/2021  9:30 AM Ernestina Caldera MD [facility] IM [address] Maintenance   Topic Date Due    Hepatitis B vaccine (1 of 3 - Risk 3-dose series) Never done    Shingles Vaccine (1 of 2) 07/17/2021 (Originally 1/22/2016)    A1C test (Diabetic or Prediabetic)  03/21/2022    Lipid screen  03/21/2022    Potassium monitoring  03/31/2022    Creatinine monitoring  03/31/2022    Breast cancer screen  10/10/2022    DTaP/Tdap/Td vaccine (3 - Td) 04/14/2027    Colon cancer screen colonoscopy  12/03/2030    Flu vaccine  Completed    Pneumococcal 0-64 years Vaccine  Completed    COVID-19 Vaccine  Completed    Hepatitis C screen  Completed    HIV screen  Completed    Hepatitis A vaccine  Aged Out    Hib vaccine  Aged Out    Meningococcal (ACWY) vaccine  Aged Out       Hemoglobin A1C (%)   Date Value   03/21/2021 6.3 (H)   01/20/2021 5.9   10/02/2020 6.2 (H)             ( goal A1C is < 7)   Microalb/Crt.  Ratio (mcg/mg creat)   Date Value   06/12/2019 147 (H)     LDL Cholesterol (mg/dL)   Date Value   03/21/2021 122       (goal LDL is <100)   AST (U/L)   Date Value   03/21/2021 85 (H)     ALT (U/L)   Date Value   03/21/2021 72 (H)     BUN (mg/dL)   Date Value   03/31/2021 20     BP Readings from Last 3 Encounters:   04/27/21 (!) 144/99   04/16/21 125/88   04/13/21 124/73          (goal 120/80)    All Future Testing planned in [facility]  Lab Frequency Next Occurrence   C-Reactive Protein Once 09/29/2020   EGD Once 11/27/2020   COLONOSCOPY (Diagnostic) Once 11/27/2020   Pancreatic elastase, fecal Once 11/13/2020   ECHO Complete 2D W Doppler W Color Once 02/07/2021   CBC With Auto Differential Once 04/22/2021   Miscellaneous Sendout 1 Once 04/16/2021   Sedimentation Rate Once 04/16/2021   Comprehensive Metabolic Panel Once 71/63/9137         Patient Active Problem List:     Lung nodule     Obesity     Adrenal adenoma     Goiter     Hypertension     Alcohol abuse     Essential hypertension     Enlarged tonsils     ACE inhibitor-aggravated angioedema     JONES (obstructive sleep apnea)     Dyslipidemia     IGT (impaired glucose tolerance)     Acute alcoholic intoxication without complication (HCC)     Acute renal insufficiency     Effusion of bursa of right knee     Acute cystitis without hematuria     Bipolar disorder (HCC)     Mild intermittent asthma without complication     Inflammatory polyarthritis (HCC)     Seronegative rheumatoid arthritis ([facility]

## 2021-05-13 ENCOUNTER — HOSPITAL ENCOUNTER (OUTPATIENT)
Facility: MEDICAL CENTER | Age: 56
End: 2021-05-13
Payer: MEDICAID

## 2021-05-13 ENCOUNTER — CARE COORDINATION (OUTPATIENT)
Dept: CARE COORDINATION | Age: 56
End: 2021-05-13

## 2021-05-13 NOTE — CARE COORDINATION
to manage Medications: Independent  Ability to prepare Food Preparation: Needs Assistance  Ability to maintain home (clean home, laundry): Needs Assistance  Ability to drive and/or has transportation: Needs Assistance  Ability to do shopping: Needs Assistance  Ability to manage finances: Independent  Is patient able to live independently?: Yes     Current Housing: Private Residence                 Suggested Interventions and [address]: Completed (Comment: Josette Cortez- already in place)     [address]: Completed (Comment: Trevor- already in place)                  Prior to Admission medications    Medication Sig Start Date End Date Taking?  Authorizing Provider   omeprazole (PRILOSEC) 20 MG delayed release capsule TAKE 1 CAPSULE BY MOUTH DAILY  5/12/21   Cody Calvert MD   isosorbide mononitrate (IMDUR) 60 MG extended release tablet TAKE ONE TABLET BY MOUTH EVERY MORNING 5/12/21   Cody Calvert MD   atorvastatin (LIPITOR) 20 MG tablet TAKE 1 TABLET BY MOUTH DAILY  5/12/21   Cody Calvert MD   folic acid (FOLVITE) 1 MG tablet Take 1 tablet by mouth daily 4/27/21   Tanya Oneill MD   predniSONE (DELTASONE) 10 MG tablet Take 1 tablet by mouth daily for 20 days 4/27/21 5/17/21  Tanya Oneill MD   meloxicam (MOBIC) 15 MG tablet Take 1 tablet by mouth daily 4/27/21   Tanya Oneill MD   acetaminophen (APAP EXTRA STRENGTH) 500 MG tablet Take 2 tablets by mouth every 6 hours as needed for Pain 4/27/21   Tanya Oneill MD   Cholecalciferol (VITAMIN D3) 25 MCG (1000 UT) CAPS Take 1,000 Units by mouth daily    Historical Provider, MD   isosorbide mononitrate (IMDUR) 30 MG extended release tablet Take 30 mg by mouth daily    Historical Provider, MD   ferrous sulfate (IRON 325) 325 (65 Fe) MG tablet Take 325 mg by mouth daily (with breakfast)    Historical Provider, MD   hydroxychloroquine (PLAQUENIL) 200 MG tablet Take 1 tablet by mouth 2 times daily 3/31/21   Nabor Myers MD citalopram (CELEXA) 20 MG tablet Take 1 tablet by mouth daily 3/24/21   Lizzie Crigler, MD   hydroCHLOROthiazide (HYDRODIURIL) 25 MG tablet Take 0.5 tablets by mouth every morning 1/31/21   Jose Zuniga MD   Calcium Carbonate-Vitamin D (OYSTER SHELL CALCIUM/D) 500-200 MG-UNIT TABS TAKE 1 TAB BY MOUTH ONCE A DAY  1/22/21   Jose Zuniga MD   atenolol (TENORMIN) 50 MG tablet Take 1 tablet by mouth daily 1/22/21   Josue Harry MD   loratadine (CLARITIN) 10 MG tablet TAKE 1 CAPSULE BY MOUTH DAILY  1/12/21   Jose Zuniga MD   amLODIPine (NORVASC) 10 MG tablet TAKE 1 TABLET BY MOUTH DAILY  1/8/21   Jose Weir MD   diclofenac sodium (VOLTAREN) 1 % GEL Apply 4 g topically 2 times daily 1/6/21   Torsten Ceja DPM   albuterol sulfate (PROAIR RESPICLICK) 325 (90 Base) MCG/ACT aerosol powder inhalation Inhale 2 puffs into the lungs every 6 hours as needed for Wheezing or Shortness of Breath 1/15/20   Landon Braun MD   lurasidone (LATUDA) 60 MG TABS tablet Take 60 mg by mouth nightly    Historical Provider, MD       Future Appointments   Date Time Provider Anthony Everett   5/21/2021  8:30 AM Chase Stout MD [facility]   6/11/2021  9:30 AM Josue Harry MD [address] [place] [address] Laura Vallejo

## 2021-05-19 ENCOUNTER — TELEPHONE (OUTPATIENT)
Dept: INTERNAL MEDICINE | Age: 56
End: 2021-05-19

## 2021-05-19 ENCOUNTER — CARE COORDINATION (OUTPATIENT)
Dept: CARE COORDINATION | Age: 56
End: 2021-05-19

## 2021-05-19 NOTE — CARE COORDINATION
Ambulatory Care Coordination Note  5/19/2021  CM Risk Score: 10  Charlson 10 Year Mortality Risk Score: 23%     ACC: Kaela Cadet RN    Summary Note: Received a voicemail from patient stating thank you for the letter and handouts she had received from [employer]. Called and spoke with patient. She reports still having swelling in her ankles and elbows. She is taking the prednisone. Should have enough to last until 5/30/21. Rheumatology appt still scheduled for August. She reports that the home care nurse was out today and her \"BP was down\". She reported a reading of 180/80 and her oxygen saturation was 98%. She also reported that she fell in the bathroom, 4 days ago, and hit the left side of her head on the toilet. Asked if the home care nurse had checked her head and she stated that she had. She denied any headache or vision problems. She said this is the second time she has fallen. This time she got up during the night and lost her balance. She said she was outside and had to get in the house. She denied any needs. Will follow up with her next week. Will review SDOH  Will set goals with patient. Care Coordination Interventions    Program Enrollment: Complex Care  Referral from Primary Care Provider: No  Suggested Interventions and Community Resources  Behavorial Health: Completed (Comment: Rafa Vaca- already in place)  [address]: Completed (Comment: Trevor- already in place)         Goals Addressed    None         Prior to Admission medications    Medication Sig Start Date End Date Taking?  Authorizing Provider   omeprazole (PRILOSEC) 20 MG delayed release capsule TAKE 1 CAPSULE BY MOUTH DAILY  5/12/21   Jose Zuniga MD   isosorbide mononitrate (IMDUR) 60 MG extended release tablet TAKE ONE TABLET BY MOUTH EVERY MORNING 5/12/21   Yee Sidhu MD   atorvastatin (LIPITOR) 20 MG tablet TAKE 1 TABLET BY MOUTH DAILY  5/12/21   Jose Zuniga MD   folic acid (FOLVITE) 1 MG tablet Take 1 tablet by mouth daily 4/27/21   Ernesto San MD   meloxicam (MOBIC) 15 MG tablet Take 1 tablet by mouth daily 4/27/21   Ernesto San MD   acetaminophen (APAP EXTRA STRENGTH) 500 MG tablet Take 2 tablets by mouth every 6 hours as needed for Pain 4/27/21   Ernesto San MD   Cholecalciferol (VITAMIN D3) 25 MCG (1000 UT) CAPS Take 1,000 Units by mouth daily    Historical Provider, MD   isosorbide mononitrate (IMDUR) 30 MG extended release tablet Take 30 mg by mouth daily    Historical Provider, MD   ferrous sulfate (IRON 325) 325 (65 Fe) MG tablet Take 325 mg by mouth daily (with breakfast)    Historical Provider, MD   hydroxychloroquine (PLAQUENIL) 200 MG tablet Take 1 tablet by mouth 2 times daily 3/31/21   Roshni Aquino MD   citalopram (CELEXA) 20 MG tablet Take 1 tablet by mouth daily 3/24/21   Roshni Aquino MD   hydroCHLOROthiazide (HYDRODIURIL) 25 MG tablet Take 0.5 tablets by mouth every morning 1/31/21   Jose Zuniga MD   Calcium Carbonate-Vitamin D (OYSTER SHELL CALCIUM/D) 500-200 MG-UNIT TABS TAKE 1 TAB BY MOUTH ONCE A DAY  1/22/21   Catalino Andrew MD   atenolol (TENORMIN) 50 MG tablet Take 1 tablet by mouth daily 1/22/21   Catalino Andrew MD   loratadine (CLARITIN) 10 MG tablet TAKE 1 CAPSULE BY MOUTH DAILY  1/12/21   Jose Zuniga MD   amLODIPine (NORVASC) 10 MG tablet TAKE 1 TABLET BY MOUTH DAILY  1/8/21   Joes Aamto MD   diclofenac sodium (VOLTAREN) 1 % GEL Apply 4 g topically 2 times daily 1/6/21   Julissa Arevaloo, DPM   albuterol sulfate (PROAIR RESPICLICK) 942 (90 Base) MCG/ACT aerosol powder inhalation Inhale 2 puffs into the lungs every 6 hours as needed for Wheezing or Shortness of Breath 1/15/20   Jhon Perdomo MD   lurasidone (LATUDA) 60 MG TABS tablet Take 60 mg by mouth nightly    Historical Provider, MD       Future Appointments   Date Time Provider Anthony Everett   5/21/2021  8:30 AM Walter Martinez MD [facility]   6/11/2021  9:30 AM Catalino Andrew MD [facility] IM

## 2021-05-21 NOTE — TELEPHONE ENCOUNTER
Discussed with patient in details about rheumatology referral and her last visit. Because of her insurance issues extensive search was made by admit to refer to rheumatology. She was advised to go to Dr. Violette Alcocer at [facility]. Looks like patient still has some issues in setting up an appointment with dermatologist.  Alfreda Hallie her to continue hydroxychloroquine and prednisone for now. We will continue to follow. . Advised patient to change her insurance or discuss self-pay with the rheumatology office      Casi Ayala MD      Department of Internal Medicine  [facility]         5/21/2021, 11:34 AM

## 2021-05-21 NOTE — TELEPHONE ENCOUNTER
If pt continues to experience headaches or any other symptoms post fall she is advised to go to ER for further work up.     Catalino Andrew MD      Department of Internal Medicine  [facility]         5/20/2021, 11:13 PM
Differential Once 04/22/2021   Miscellaneous Sendout 1 Once 04/16/2021   Comprehensive Metabolic Panel Once 50/30/8782       Next Visit Date:  Future Appointments   Date Time Provider Anthony Everett   5/21/2021  8:30 AM Tristan Kimball MD [facility]   6/11/2021  9:30 AM Humble Callahan MD [facility]            Patient Active Problem List:     Lung nodule     Obesity     Adrenal adenoma     Goiter     Hypertension     Alcohol abuse     Essential hypertension     Enlarged tonsils     ACE inhibitor-aggravated angioedema     JONES (obstructive sleep apnea)     Dyslipidemia     IGT (impaired glucose tolerance)     Acute alcoholic intoxication without complication (HCC)     Acute renal insufficiency     Effusion of bursa of right knee     Acute cystitis without hematuria     Bipolar disorder (HCC)     Mild intermittent asthma without complication     Inflammatory polyarthritis (HCC)     Seronegative rheumatoid arthritis ([facility] [address]

## 2021-05-26 NOTE — TELEPHONE ENCOUNTER
Spoke with pt she has an upcoming appt in August to see a rheumatologist office is located in [facility]. Writer gave message to cont with medication.

## 2021-05-28 NOTE — TELEPHONE ENCOUNTER
Pt called to let office know that she has upcoming appt in August and she is on a list to come in sooner if available. She is just worried about being on prednisone for a long period of time.

## 2021-05-28 NOTE — TELEPHONE ENCOUNTER
She has office appointment with me on 6/11. Will discuss with patient.     Gil Toledo MD      Department of Internal Medicine  [facility]         5/28/2021, 11:16 AM

## 2021-06-07 ENCOUNTER — HOSPITAL ENCOUNTER (OUTPATIENT)
Facility: MEDICAL CENTER | Age: 56
End: 2021-06-07
Payer: MEDICAID

## 2021-06-07 RX ORDER — GAUZE BANDAGE 2" X 2"
BANDAGE TOPICAL
Qty: 30 TABLET | Refills: 2 | Status: SHIPPED | OUTPATIENT
Start: 2021-06-07 | End: 2021-06-11

## 2021-06-07 NOTE — TELEPHONE ENCOUNTER
List:     Lung nodule     Obesity     Adrenal adenoma     Goiter     Hypertension     Alcohol abuse     Essential hypertension     Enlarged tonsils     ACE inhibitor-aggravated angioedema     JONES (obstructive sleep apnea)     Dyslipidemia     IGT (impaired glucose tolerance)     Acute alcoholic intoxication without complication (HCC)     Acute renal insufficiency     Effusion of bursa of right knee     Acute cystitis without hematuria     Bipolar disorder (HCC)     Mild intermittent asthma without complication     Inflammatory polyarthritis (HCC)     Seronegative rheumatoid arthritis ([address]

## 2021-06-09 NOTE — TELEPHONE ENCOUNTER
Refill request for thiamine mononitrate 100 MG tablet. If appropriate please send medication(s) to patients pharmacy. Next appt: 6/11/2021      Health Maintenance   Topic Date Due    Shingles Vaccine (1 of 2) 07/17/2021 (Originally 1/22/2016)    A1C test (Diabetic or Prediabetic)  03/21/2022    Lipid screen  03/21/2022    Potassium monitoring  03/31/2022    Creatinine monitoring  03/31/2022    Breast cancer screen  10/10/2022    DTaP/Tdap/Td vaccine (3 - Td or Tdap) 04/14/2027    Colon cancer screen colonoscopy  12/03/2030    Pneumococcal 0-64 years Vaccine (2 of 2) 01/22/2031    Flu vaccine  Completed    COVID-19 Vaccine  Completed    Hepatitis C screen  Completed    HIV screen  Completed    Hepatitis A vaccine  Aged Out    Hepatitis B vaccine  Aged Out    Hib vaccine  Aged Out    Meningococcal (ACWY) vaccine  Aged Out       Hemoglobin A1C (%)   Date Value   03/21/2021 6.3 (H)   01/20/2021 5.9   10/02/2020 6.2 (H)             ( goal A1C is < 7)   Microalb/Crt.  Ratio (mcg/mg creat)   Date Value   06/12/2019 147 (H)     LDL Cholesterol (mg/dL)   Date Value   03/21/2021 122       (goal LDL is <100)   AST (U/L)   Date Value   03/21/2021 85 (H)     ALT (U/L)   Date Value   03/21/2021 72 (H)     BUN (mg/dL)   Date Value   03/31/2021 20     BP Readings from Last 3 Encounters:   04/27/21 (!) 144/99   04/16/21 125/88   04/13/21 124/73          (goal 120/80)          Patient Active Problem List:     Lung nodule     Obesity     Adrenal adenoma     Goiter     Hypertension     Alcohol abuse     Essential hypertension     Enlarged tonsils     ACE inhibitor-aggravated angioedema     JONES (obstructive sleep apnea)     Dyslipidemia     IGT (impaired glucose tolerance)     Acute alcoholic intoxication without complication (HCC)     Acute renal insufficiency     Effusion of bursa of right knee     Acute cystitis without hematuria     Bipolar disorder (HCC)     Mild intermittent asthma without complication

## 2021-06-11 ENCOUNTER — OFFICE VISIT (OUTPATIENT)
Dept: INTERNAL MEDICINE | Age: 56
End: 2021-06-11
Payer: MEDICAID

## 2021-06-11 VITALS
DIASTOLIC BLOOD PRESSURE: 88 MMHG | HEIGHT: 64 IN | WEIGHT: 177 LBS | SYSTOLIC BLOOD PRESSURE: 142 MMHG | BODY MASS INDEX: 30.22 KG/M2 | HEART RATE: 73 BPM

## 2021-06-11 DIAGNOSIS — K21.9 GASTROESOPHAGEAL REFLUX DISEASE WITHOUT ESOPHAGITIS: ICD-10-CM

## 2021-06-11 DIAGNOSIS — D75.839 THROMBOCYTOSIS: ICD-10-CM

## 2021-06-11 DIAGNOSIS — I10 ESSENTIAL HYPERTENSION: ICD-10-CM

## 2021-06-11 DIAGNOSIS — F32.5 MAJOR DEPRESSION IN REMISSION (HCC): ICD-10-CM

## 2021-06-11 DIAGNOSIS — M06.00 SERONEGATIVE RHEUMATOID ARTHRITIS (HCC): Primary | ICD-10-CM

## 2021-06-11 DIAGNOSIS — D64.9 NORMOCYTIC NORMOCHROMIC ANEMIA: ICD-10-CM

## 2021-06-11 DIAGNOSIS — T78.40XS ALLERGY, SEQUELA: ICD-10-CM

## 2021-06-11 DIAGNOSIS — R73.03 PRE-DIABETES: ICD-10-CM

## 2021-06-11 LAB — HBA1C MFR BLD: 5.5 %

## 2021-06-11 PROCEDURE — 99213 OFFICE O/P EST LOW 20 MIN: CPT | Performed by: STUDENT IN AN ORGANIZED HEALTH CARE EDUCATION/TRAINING PROGRAM

## 2021-06-11 PROCEDURE — G8417 CALC BMI ABV UP PARAM F/U: HCPCS | Performed by: STUDENT IN AN ORGANIZED HEALTH CARE EDUCATION/TRAINING PROGRAM

## 2021-06-11 PROCEDURE — G8427 DOCREV CUR MEDS BY ELIG CLIN: HCPCS | Performed by: STUDENT IN AN ORGANIZED HEALTH CARE EDUCATION/TRAINING PROGRAM

## 2021-06-11 PROCEDURE — 83036 HEMOGLOBIN GLYCOSYLATED A1C: CPT | Performed by: STUDENT IN AN ORGANIZED HEALTH CARE EDUCATION/TRAINING PROGRAM

## 2021-06-11 PROCEDURE — 99211 OFF/OP EST MAY X REQ PHY/QHP: CPT | Performed by: INTERNAL MEDICINE

## 2021-06-11 PROCEDURE — 1036F TOBACCO NON-USER: CPT | Performed by: STUDENT IN AN ORGANIZED HEALTH CARE EDUCATION/TRAINING PROGRAM

## 2021-06-11 PROCEDURE — 3017F COLORECTAL CA SCREEN DOC REV: CPT | Performed by: STUDENT IN AN ORGANIZED HEALTH CARE EDUCATION/TRAINING PROGRAM

## 2021-06-11 RX ORDER — GAUZE BANDAGE 2" X 2"
BANDAGE TOPICAL
Qty: 30 TABLET | Refills: 2 | Status: SHIPPED | OUTPATIENT
Start: 2021-06-11 | End: 2021-07-14

## 2021-06-11 RX ORDER — HYDROXYCHLOROQUINE SULFATE 200 MG/1
200 TABLET, FILM COATED ORAL 2 TIMES DAILY
Qty: 60 TABLET | Refills: 0 | Status: SHIPPED | OUTPATIENT
Start: 2021-06-11 | End: 2021-07-15

## 2021-06-11 RX ORDER — HYDROCHLOROTHIAZIDE 25 MG/1
25 TABLET ORAL EVERY MORNING
Qty: 60 TABLET | Refills: 0 | Status: ON HOLD | OUTPATIENT
Start: 2021-06-11 | End: 2021-08-21 | Stop reason: HOSPADM

## 2021-06-11 RX ORDER — OMEPRAZOLE 20 MG/1
20 CAPSULE, DELAYED RELEASE ORAL DAILY
Qty: 30 CAPSULE | Refills: 0 | Status: ON HOLD | OUTPATIENT
Start: 2021-06-11 | End: 2021-08-18

## 2021-06-11 RX ORDER — ISOSORBIDE MONONITRATE 30 MG/1
30 TABLET, EXTENDED RELEASE ORAL DAILY
Qty: 30 TABLET | Status: CANCELLED | OUTPATIENT
Start: 2021-06-11

## 2021-06-11 RX ORDER — PREDNISONE 10 MG/1
10 TABLET ORAL DAILY
Qty: 30 TABLET | Refills: 0 | Status: SHIPPED | OUTPATIENT
Start: 2021-06-11 | End: 2021-07-08 | Stop reason: SDUPTHER

## 2021-06-11 RX ORDER — POTASSIUM CHLORIDE 20 MEQ/1
20 TABLET, EXTENDED RELEASE ORAL DAILY
Qty: 30 TABLET | Refills: 5 | Status: ON HOLD | OUTPATIENT
Start: 2021-06-11 | End: 2021-08-21 | Stop reason: HOSPADM

## 2021-06-11 RX ORDER — ACETAMINOPHEN 500 MG
1000 TABLET ORAL EVERY 6 HOURS PRN
Qty: 60 TABLET | Refills: 0 | Status: SHIPPED | OUTPATIENT
Start: 2021-06-11 | End: 2022-04-18 | Stop reason: SDUPTHER

## 2021-06-11 RX ORDER — MELOXICAM 15 MG/1
15 TABLET ORAL DAILY
Qty: 30 TABLET | Refills: 0 | Status: SHIPPED | OUTPATIENT
Start: 2021-06-11 | End: 2021-07-14

## 2021-06-11 RX ORDER — LORATADINE 10 MG/1
TABLET ORAL
Qty: 30 TABLET | Refills: 3 | Status: CANCELLED | OUTPATIENT
Start: 2021-06-11

## 2021-06-11 RX ORDER — FERROUS SULFATE 325(65) MG
325 TABLET ORAL
Qty: 30 TABLET | Status: CANCELLED | OUTPATIENT
Start: 2021-06-11

## 2021-06-11 ASSESSMENT — ENCOUNTER SYMPTOMS
EYE ITCHING: 0
BACK PAIN: 0
RHINORRHEA: 0
SHORTNESS OF BREATH: 0
ABDOMINAL DISTENTION: 0
ABDOMINAL PAIN: 0
COUGH: 0
EYE DISCHARGE: 0
WHEEZING: 0

## 2021-06-11 NOTE — PROGRESS NOTES
Attending Physician Statement  I have discussed the care of [address] including pertinent history and exam findings,  with the resident. I have reviewed the key elements of all parts of the encounter with the resident. I agree with the assessment, plan and orders as documented by the resident.   (GE Modifier)    MD HAWA Gibson  Attending Physician, [address] [place] [address], Internal Medicine Residency Program  [address] [place] [address]  6/11/2021, 10:00 AM

## 2021-06-11 NOTE — PROGRESS NOTES
[facility] [address] [facility]  [address] [facility] [address]  Dept: [phone]  Dept Fax: [phone]    Office Progress/Follow Up Note  Date ofpatient's visit: 6/11/2021  Patient's Name:  Blanche Mcpherson YOB: 1965            Patient Care Team:  Casi Ayala MD as PCP - General (Internal Medicine)  CECILIA Rutherford CNP as PCP - [facility]  Steffen Smith MD as Consulting Physician (Gastroenterology)  Coco Tom RN as Ambulatory Care Manager  ================================================================    REASON FOR VISIT/CHIEF COMPLAINT:  Follow-up (2 month follow up Arthritits and HTN)    HISTORY OF PRESENTING ILLNESS:  History was obtained from: patient. Tila Rose a 55 y.o. is here for   flare of her rheumatoid arthritis. Patient was diagnosed with inflammatory polyarthritis and suspected seronegative rheumatoid arthritis during her hospital admission for hypertensive crisis. Was started on hydroxychloroquine 200 mg twice daily and prednisone 10 mg daily. She was referred to rheumatology but because of her insurance issues could not see a rheumatologist as yet. Her appointment is on 13th August.  Per patient she has been out of her prednisone 10 mg daily and has intermittent episodes of flare. On evaluation synovitis of bilateral MCP joints and elbow joint present. Per patient her symptoms have been better than before but worsen when she is out of her prednisone. We will continue hydroxychloroquine and prednisone 10 mg daily. She was also found to have thrombocytosis and normocytic normochromic anemia. Evaluated by heme oncology. Likely has anemia of chronic inflammatory disease and reactive thrombocytosis. Is due for Tempus testing to exclude essential thrombocytosis per hematology. No headache, blurring of vision or any other symptoms suggestive of hypercoagulability currently.   Flow cytometry was negative for blast cells, negative Keegan stat mutation. If indicated clinically bone marrow biopsy will be considered by heme-onc. Anemia of chronic inflammation likely from underlying rheumatological disease. Normocytic normochromic anemia on evaluation. Last EGD/colonoscopy in 2013 showed mild duodenal ulcer, hiatal hernia and hemorrhoids.     Patient has essential hypertension. Blood pressure is higher today 142/ 88. Patient mentioned that she checks her blood pressure twice at home which remains normal.  She is on Norvasc 10 ,atenolol 50 mg, HCTZ 12.5 mg. Last echo 2014 showed LVEF 65% with mild LVH and mild MR and TR  Current blood pressure systolic 836N due to pain.       Major depression-on Latuda, Zoloft and Celexa.  Follows up with [facility]. Denies any suicidal ideations for now.     Other comorbidities discussed:  She is prediabetic HbA1c increased to 6.3- 3/2021.  On metformin 500 twice daily per medication list. Advised weight loss and DASH diet.     Alcohol abuse. Per patient she has not consumed alcohol since last 1 month. Was former smoker.  Denied any other drug abuse currently     Dyslipidemia. On Lipitor 20 mg.   Low ASCVD score. Takes aspirin per medication list.      obstructive sleep apnea diagnosed with sleep study done in 2017.    CPAP titration study was not done.      Mild intermittent asthma -controlled on albuterol     Health maintenance  Mammogram 10/20-normal  Colonoscopy 12/13-hemorrhoids, hiatal hernia and mild duodenal ulcer   Has hysterectomy done many years ago with preservation of her ovaries per patient.  .      Patient Active Problem List   Diagnosis    Lung nodule    Obesity    Adrenal adenoma    Goiter    Hypertension    Alcohol abuse    Essential hypertension    Enlarged tonsils    ACE inhibitor-aggravated angioedema    JONES (obstructive sleep apnea)    Dyslipidemia    IGT (impaired glucose tolerance)    Acute alcoholic intoxication without complication ([facility] [address]    Acute renal insufficiency    Effusion of bursa of right knee    Acute cystitis without hematuria    Bipolar disorder (HCC)    Mild intermittent asthma without complication    Inflammatory polyarthritis ([facility] [facility]    Seronegative rheumatoid arthritis ([facility] [address]       There are no preventive care reminders to display for this patient.     Allergies   Allergen Reactions    Bee Venom Anaphylaxis    Iodine Anaphylaxis and Rash    Lisinopril Swelling and Anaphylaxis     Angioedema    Shellfish-Derived Products Anaphylaxis and Rash     ANGIOEDEMA         Current Outpatient Medications   Medication Sig Dispense Refill    thiamine mononitrate 100 MG tablet TAKE 1 TABLET BY MOUTH ONCE DAILY  30 tablet 2    omeprazole (PRILOSEC) 20 MG delayed release capsule TAKE 1 CAPSULE BY MOUTH DAILY  30 capsule 0    atorvastatin (LIPITOR) 20 MG tablet TAKE 1 TABLET BY MOUTH DAILY  30 tablet 3    folic acid (FOLVITE) 1 MG tablet Take 1 tablet by mouth daily 30 tablet 3    meloxicam (MOBIC) 15 MG tablet Take 1 tablet by mouth daily 30 tablet 0    acetaminophen (APAP EXTRA STRENGTH) 500 MG tablet Take 2 tablets by mouth every 6 hours as needed for Pain 60 tablet 0    Cholecalciferol (VITAMIN D3) 25 MCG (1000 UT) CAPS Take 1,000 Units by mouth daily      isosorbide mononitrate (IMDUR) 30 MG extended release tablet Take 30 mg by mouth daily      ferrous sulfate (IRON 325) 325 (65 Fe) MG tablet Take 325 mg by mouth daily (with breakfast)      hydroxychloroquine (PLAQUENIL) 200 MG tablet Take 1 tablet by mouth 2 times daily 60 tablet 0    citalopram (CELEXA) 20 MG tablet Take 1 tablet by mouth daily 30 tablet 3    hydroCHLOROthiazide (HYDRODIURIL) 25 MG tablet Take 0.5 tablets by mouth every morning 60 tablet 0    Calcium Carbonate-Vitamin D (OYSTER SHELL CALCIUM/D) 500-200 MG-UNIT TABS TAKE 1 TAB BY MOUTH ONCE A DAY  30 tablet 3    atenolol (TENORMIN) 50 MG tablet Take 1 tablet by mouth daily 60 tablet 3    loratadine (CLARITIN) 10 MG tablet TAKE 1 CAPSULE BY MOUTH DAILY  30 tablet 3    amLODIPine (NORVASC) 10 MG tablet TAKE 1 TABLET BY MOUTH DAILY  60 tablet 3    diclofenac sodium (VOLTAREN) 1 % GEL Apply 4 g topically 2 times daily 150 g 2    albuterol sulfate (PROAIR RESPICLICK) 177 (90 Base) MCG/ACT aerosol powder inhalation Inhale 2 puffs into the lungs every 6 hours as needed for Wheezing or Shortness of Breath 5 Inhaler 3    lurasidone (LATUDA) 60 MG TABS tablet Take 60 mg by mouth nightly      isosorbide mononitrate (IMDUR) 60 MG extended release tablet TAKE ONE TABLET BY MOUTH EVERY MORNING (Patient not taking: Reported on 2021) 30 tablet 3     No current facility-administered medications for this visit. Social History     Tobacco Use    Smoking status: Former Smoker     Packs/day: 0.50     Years: 20.00     Pack years: 10.00     Types: Cigarettes     Quit date: 1992     Years since quittin.4    Smokeless tobacco: Never Used    Tobacco comment: states quiti in the 1980s   Substance Use Topics    Alcohol use: Not Currently     Alcohol/week: 12.0 standard drinks     Types: 12 Cans of beer per week     Comment: Quit about 5 months ago 20    Drug use: Not Currently     Types: Cocaine     Comment: last use approximately 14 cocaine       Family History   Problem Relation Age of Onset    Stroke Mother     Hypertension Father     Cancer Brother         bone    Lung Cancer Maternal Aunt     Cancer Maternal Grandmother         eye     Other Sister         aneurysm    Heart Disease Sister         REVIEW OF SYSTEMS:  Review of Systems   Constitutional: Negative for chills, fatigue and fever. HENT: Negative for congestion, hearing loss and rhinorrhea. Eyes: Negative for discharge and itching. Respiratory: Negative for cough, shortness of breath and wheezing. Cardiovascular: Negative for chest pain, palpitations and leg swelling.    Gastrointestinal: Negative for abdominal distention and abdominal pain. Endocrine: Negative for polydipsia and polyphagia. Genitourinary: Negative for difficulty urinating and dysuria. Musculoskeletal: Positive for joint swelling and myalgias. Negative for arthralgias and back pain. Bilateral MCP, PIP joint and elbow joint tenderness and swelling present. .   Skin: Negative for rash and wound. No erythema or rash noted. Neurological: Negative for dizziness, seizures, light-headedness and headaches. Psychiatric/Behavioral: Negative for agitation and behavioral problems. PHYSICAL EXAM:  Vitals:    06/11/21 0922 06/11/21 0928   BP: (!) 155/92 (!) 142/88   Site: Right Upper Arm Right Upper Arm   Position: Sitting Sitting   Cuff Size: Medium Adult Medium Adult   Pulse: 79 73   Weight: 177 lb (80.3 kg)    Height: 5' 4\" (1.626 m)      BP Readings from Last 3 Encounters:   06/11/21 (!) 142/88   04/27/21 (!) 144/99   04/16/21 125/88        Physical Exam  Constitutional:       Appearance: She is well-developed. HENT:      Head: Normocephalic and atraumatic. Eyes:      Conjunctiva/sclera: Conjunctivae normal.      Pupils: Pupils are equal, round, and reactive to light. Neck:      Thyroid: No thyromegaly. Vascular: No JVD. Cardiovascular:      Rate and Rhythm: Normal rate and regular rhythm. Heart sounds: Normal heart sounds. No murmur heard. Pulmonary:      Effort: Pulmonary effort is normal.      Breath sounds: Normal breath sounds. No wheezing. Abdominal:      General: Bowel sounds are normal. There is no distension. Palpations: Abdomen is soft. Tenderness: There is no abdominal tenderness. There is no rebound. Musculoskeletal:         General: Swelling and tenderness present. Normal range of motion. Cervical back: Normal range of motion and neck supple. Comments: Bilateral MCP joint tenderness and synovitis noted. Bilateral elbow joint tenderness and swelling+.   Range of motion intact   Neurological: Mental Status: She is alert and oriented to person, place, and time. Cranial Nerves: No cranial nerve deficit. Psychiatric:         Behavior: Behavior normal.           DIAGNOSTIC FINDINGS:  CBC:  Lab Results   Component Value Date    WBC 9.4 04/08/2021    HGB 9.2 04/08/2021    PLT See Reflexed IPF Result 04/08/2021       BMP:    Lab Results   Component Value Date     03/31/2021    K 3.5 03/31/2021    CL 97 03/31/2021    CO2 23 03/31/2021    BUN 20 03/31/2021    CREATININE 0.51 03/31/2021    GLUCOSE 190 03/31/2021    GLUCOSE 88 02/24/2012       HEMOGLOBIN A1C:   Lab Results   Component Value Date    LABA1C 6.3 03/21/2021       FASTING LIPID PANEL:  Lab Results   Component Value Date    CHOL 248 (H) 03/21/2021     03/21/2021    TRIG 47 03/21/2021       ASSESSMENT AND PLAN:  Eric Appiah was seen today for established new doctor, surgical consult, breast pain and health maintenance. Diagnoses and all orders for this visit:    Inflammatory polyarthritis suspected seronegative rheumatoid arthritis-continue hydroxychloroquine 200 twice daily and prednisone 10 mg daily. Rheumatology visit on 13th August..    Anemia of chronic inflammation likely from underlying rheumatological disease. Normocytic normochromic anemia-stable  Last EGD/colonoscopy in 2013 showed mild duodenal ulcer, hiatal hernia and hemorrhoids.     Essential hypertension-systolic blood pressure 785G today due to pain. Repeat blood pressure. Continue. Norvasc 10 ,atenolol 50 mg, HCTZ 12.5 mg. Last echo 2014 showed LVEF 65% with mild LVH and mild MR and TR     Major depression-on Latuda, Zoloft and Celexa.  [facility]. Denies any suicidal ideations for now.      prediabetic HbA1c increased to 6.3- 3/2021.  On metformin 500 twice daily per medication list. Advised weight loss and DASH diet.     Alcohol abuse in the past and former smoker.     Dyslipidemia. On Lipitor 20 mg.   Low ASCVD score. Takes aspirin per medication list.      obstructive sleep apnea diagnosed with sleep study done in 2017.    CPAP titration study was not done.      Mild intermittent asthma -controlled on albuterol     Health maintenance  Mammogram 10/20-normal  Colonoscopy 12/13-hemorrhoids, hiatal hernia and mild duodenal ulcer   Has hysterectomy done many years ago with preservation of her ovaries per patient. Due for hepatitis B vaccine. FOLLOW UP AND INSTRUCTIONS:  No follow-ups on file. · Delphine received counseling on the following healthy behaviors: nutrition, exercise, medication adherence and tobacco cessation    · Discussed use, benefit, and side effects of prescribed medications. Barriers to medication compliance addressed. All patient questions answered. Pt voiced understanding. · Patient given educational materials - see patient instructions    Miles Schwartz MD      Department of Internal Medicine  [facility]         6/11/2021, 9:42 AM      This note is created with the assistance of a speech-recognition program. While intending to generate a document that actually reflects the content of thevisit, the document can still have some mistakes which may not have been identified and corrected by editing.   \

## 2021-06-14 ENCOUNTER — OFFICE VISIT (OUTPATIENT)
Dept: ONCOLOGY | Age: 56
End: 2021-06-14
Payer: MEDICAID

## 2021-06-14 ENCOUNTER — TELEPHONE (OUTPATIENT)
Dept: ONCOLOGY | Age: 56
End: 2021-06-14

## 2021-06-14 VITALS
TEMPERATURE: 97.5 F | WEIGHT: 170.2 LBS | RESPIRATION RATE: 16 BRPM | DIASTOLIC BLOOD PRESSURE: 93 MMHG | SYSTOLIC BLOOD PRESSURE: 131 MMHG | BODY MASS INDEX: 29.21 KG/M2 | HEART RATE: 85 BPM

## 2021-06-14 DIAGNOSIS — D75.839 THROMBOCYTOSIS: Primary | ICD-10-CM

## 2021-06-14 DIAGNOSIS — M06.4 INFLAMMATORY POLYARTHRITIS (HCC): ICD-10-CM

## 2021-06-14 PROCEDURE — G8417 CALC BMI ABV UP PARAM F/U: HCPCS | Performed by: INTERNAL MEDICINE

## 2021-06-14 PROCEDURE — 99214 OFFICE O/P EST MOD 30 MIN: CPT | Performed by: INTERNAL MEDICINE

## 2021-06-14 PROCEDURE — 99211 OFF/OP EST MAY X REQ PHY/QHP: CPT | Performed by: INTERNAL MEDICINE

## 2021-06-14 PROCEDURE — 99211 OFF/OP EST MAY X REQ PHY/QHP: CPT

## 2021-06-14 PROCEDURE — 3017F COLORECTAL CA SCREEN DOC REV: CPT | Performed by: INTERNAL MEDICINE

## 2021-06-14 PROCEDURE — 1036F TOBACCO NON-USER: CPT | Performed by: INTERNAL MEDICINE

## 2021-06-14 PROCEDURE — G8427 DOCREV CUR MEDS BY ELIG CLIN: HCPCS | Performed by: INTERNAL MEDICINE

## 2021-06-14 RX ORDER — HYDROCODONE BITARTRATE AND ACETAMINOPHEN 5; 325 MG/1; MG/1
1 TABLET ORAL EVERY 6 HOURS PRN
Qty: 120 TABLET | Refills: 0 | Status: SHIPPED | OUTPATIENT
Start: 2021-06-14 | End: 2021-07-14

## 2021-06-14 NOTE — PROGRESS NOTES
DIAGNOSIS:   1. Thrombocytosis   2. Normocytic anemia  3. Diffuse arthralgia/. possible RA     CURRENT THERAPY:  NGS testing is negative for  mutation    BRIEF CASE HISTORY:   Amanda Garcia is a very pleasant 55 y.o. female who is referred to us for thrombocytosis. She was recently in house with uncontrolled hypertension and lab work showed elevated platelets. She was also found to have possible RA or gout and needs to consult with rheumatology for joint pain and swelling. She was given Prednisone in house to manage swelling. She reports she feels improved since admission. She has history of bursa and has had knees drained by ortho surg. We saw the patient and decided to proceed with NGS testing. That was negative for JAK2 mutation, DAGOBERTO R mutation and MPL mutation. Platelets are above 1 million. INTERIM HISTORY  The patient comes in today for follow-up, she continues to complain of severe pain and swelling in her elbows and other joints of the body especially small joints of the hand. She has an appointment with rheumatology but not until August.  She is taking a lot of acetaminophen and she is worried about the amount of Tylenol she can take every day. PAST MEDICAL HISTORY: has a past medical history of Angioedema, Anxiety, Asthma, Bipolar disorder ([address] Claustrophobia, Depression, GERD (gastroesophageal reflux disease), Hypertension, Hypertrophic cardiomyopathy ([address] Lung nodules, MRSA (methicillin resistant Staphylococcus aureus), Murmur, OA (osteoarthritis), JONES (obstructive sleep apnea), Thyroid nodule, and Type 2 diabetes mellitus without complication, without long-term current use of insulin ([address] PAST SURGICAL HISTORY: has a past surgical history that includes Hysterectomy; Upper gastrointestinal endoscopy; Colonoscopy; Thyroid surgery; Cardiac catheterization; other surgical history (8/24/2015); Upper gastrointestinal endoscopy (N/A, 12/3/2020); Colonoscopy (N/A, 12/3/2020);  Foot surgery (Bilateral, 12/07/2020); Hammer toe surgery (Right, 12/7/2020); and Bunionectomy (Bilateral, 12/7/2020). CURRENT MEDICATIONS:  has a current medication list which includes the following prescription(s): thiamine mononitrate, meloxicam, acetaminophen, omeprazole, hydroxychloroquine, hydrochlorothiazide, prednisone, potassium chloride, atorvastatin, folic acid, vitamin d3, ferrous sulfate, citalopram, oyster shell calcium/d, atenolol, loratadine, amlodipine, diclofenac sodium, albuterol sulfate, and lurasidone. ALLERGIES:  is allergic to bee venom, iodine, lisinopril, and shellfish-derived products. FAMILY HISTORY: Negative for any hematological or oncological conditions. SOCIAL HISTORY:  reports that she quit smoking about 28 years ago. Her smoking use included cigarettes. She has a 10.00 pack-year smoking history. She has never used smokeless tobacco. She reports previous alcohol use of about 12.0 standard drinks of alcohol per week. She reports previous drug use. Drug: Cocaine. REVIEW OF SYSTEMS:   General: No fever or night sweats. Weight is stable. ENT: No double or blurred vision, no tinnitus or hearing problem, no dysphagia or sore throat   Respiratory: No chest pain, no shortness of breath, no cough or hemoptysis. Cardiovascular: Denies chest pain, PND or orthopnea. No L E swelling or palpitations. Gastrointestinal: No nausea or vomiting, abdominal pain, diarrhea or constipation. Genitourinary: Denies dysuria, hematuria, frequency, urgency or incontinence. Neurological: Denies headaches, decreased LOC, no sensory or motor focal deficits. Musculoskeletal: No back pain; +joint pain and swelling as discussed  Skin: There are no rashes or bleeding. Psychiatric: No anxiety, no depression. Endocrine: No diabetes or thyroid disease. Hematologic: No bleeding, no adenopathy. PHYSICAL EXAM: Shows a well appearing 55y.o.-year-old female who is not in pain or distress.  Vital Signs: Blood pressure (!) 131/93, pulse 85, temperature 97.5 °F (36.4 °C), temperature source Temporal, resp. rate 16, weight 170 lb 3.2 oz (77.2 kg), not currently breastfeeding. HEENT: Normocephalic and atraumatic. Pupils are equal, round, reactive to light and accommodation. Extraocular muscles are intact. Neck: Showed no JVD, no carotid bruit . Lungs: Clear to auscultation bilaterally. Heart: systolic murmur. Abdomen: Soft, nontender. No hepatosplenomegaly. Extremities: Lower extremities show no edema, clubbing, or cyanosis. Evidence of inflammatory arthritis and small fingers of the hand and wrist as well as the elbows. She has olecranon bursa with significant pain and tenderness. Breasts: Examination not done today. Neuro exam: intact cranial nerves bilaterally no motor or sensory deficit, gait is normal. Lymphatic: no adenopathy appreciated in the supraclavicular, axillary, cervical or inguinal area    REVIEW OF LABORATORY DATA:   Lab Results   Component Value Date    WBC 9.4 04/08/2021    HGB 9.2 (L) 04/08/2021    HCT 29.3 (L) 04/08/2021    MCV 95.8 04/08/2021    PLT See Reflexed IPF Result 04/08/2021     plt 1103  Lab Results   Component Value Date    IRON 47 04/08/2021    TIBC 243 (L) 04/08/2021    FERRITIN 321 (H) 04/08/2021       REVIEW OF RADIOLOGICAL RESULTS:     IMPRESSION:   1. Thrombocytosis  2. Normocytic anemia   3. Evidence of inflammatory arthritis/bursitis  4. NGS testing is negative for  mutation     PLAN:   While the work-up is negative for  mutation, that does not completely exclude blood disorders. However, clinically, I suspect her thrombocytosis is reactive and possibly related to her inflammatory arthritis. She has been prescribed steroids by primary care, she has an appointment of rheumatology and will see if we can move up the appointment. I am worried about the amount of acetaminophen she is taking every day.   We will write for Norco to help decrease the amount of NSAIDs until she sees the rheumatologist.  Once her arthritis is controlled, I would plan to repeat her CBC, if her platelets do not improve, plan to do a bone marrow aspiration biopsy. The patient verbalized understanding and she will come back in 3 months.   I was very clear about my intentions not to give her long-term pain medications, and the Clio prescription was just until she sees the rheumatologist.

## 2021-06-28 ENCOUNTER — CARE COORDINATION (OUTPATIENT)
Dept: CARE COORDINATION | Age: 56
End: 2021-06-28

## 2021-06-28 NOTE — CARE COORDINATION
Ambulatory Care Coordination Note  6/28/2021  CM Risk Score: 9  Charlson 10 Year Mortality Risk Score: 23%     ACC: Desiree Small, RN    Summary Note: Called patient for follow up. She said she was napping. Was agreeable to conversation. Had f/u with PCP and hem/onc. Prednisone was refilled. Also started on Norco, per hem/onc for pain, until seen by rheumatologist. Rheumatology appt still scheduled for 8/13/21. She had ran out of prednisone for a week before seeing the doctor and having it refilled. Suggested she call for a refill when she has a few days left. She said \"I feel good though. \" Explained that she feels good because she is taking the prednisone and medications that were prescribed. She still has swelling in her ankles and elbows. States it's about the same as it was. Asked about scheduling f/u with PCP. She stated that she wanted to continue with her nap and she would call in a couple of days to schedule. Will follow up in a week. Care Coordination Interventions    Program Enrollment: Complex Care  Referral from Primary Care Provider: No  Suggested Interventions and Community Resources  [facility] (Comment: Sly Haney- already in place)  [address]: Completed (Comment: Trevor- already in place)         Goals Addressed                 This Visit's Progress     Medication Management        I will take my medication as directed. I will notify my provider of any problems with medications, like adverse effects or side effects. I will notify my provider/Care Coordinator if I am unable to afford my medications. I will notify my provider for advice before I stop taking any of my medication. Barriers: lack of education  Plan for overcoming my barriers: Care Management   Confidence: 7/10  Anticipated Goal Completion Date: 8/30/2021            Prior to Admission medications    Medication Sig Start Date End Date Taking?  Authorizing Provider   HYDROcodone-acetaminophen (NORCO) 5-325 MG per tablet Take 1 tablet by mouth every 6 hours as needed for Pain for up to 30 days.  Intended supply: 30 days 6/14/21 7/14/21  Aurea Garcia MD   thiamine mononitrate 100 MG tablet TAKE 1 TABLET BY MOUTH ONCE DAILY  6/11/21   Jose Zuniga MD   meloxicam (MOBIC) 15 MG tablet Take 1 tablet by mouth daily 6/11/21   Sintia Kennedy MD   acetaminophen (APAP EXTRA STRENGTH) 500 MG tablet Take 2 tablets by mouth every 6 hours as needed for Pain 6/11/21   Sintia Kennedy MD   omeprazole (PRILOSEC) 20 MG delayed release capsule Take 1 capsule by mouth daily 6/11/21   Sintia Kennedy MD   hydroxychloroquine (PLAQUENIL) 200 MG tablet Take 1 tablet by mouth 2 times daily 6/11/21   Sintia Kennedy MD   hydroCHLOROthiazide (HYDRODIURIL) 25 MG tablet Take 1 tablet by mouth every morning 6/11/21   Sintia Kennedy MD   predniSONE (DELTASONE) 10 MG tablet Take 1 tablet by mouth daily 6/11/21 7/11/21  Sintia Kennedy MD   potassium chloride (KLOR-CON M) 20 MEQ extended release tablet Take 1 tablet by mouth daily 6/11/21   Sintia Kennedy MD   atorvastatin (LIPITOR) 20 MG tablet TAKE 1 TABLET BY MOUTH DAILY  5/12/21   Gilson Morrison MD   folic acid (FOLVITE) 1 MG tablet Take 1 tablet by mouth daily 4/27/21   Sintia Kennedy MD   Cholecalciferol (VITAMIN D3) 25 MCG (1000 UT) CAPS Take 1,000 Units by mouth daily    Historical Provider, MD   ferrous sulfate (IRON 325) 325 (65 Fe) MG tablet Take 325 mg by mouth daily (with breakfast)    Historical Provider, MD   citalopram (CELEXA) 20 MG tablet Take 1 tablet by mouth daily 3/24/21   Marylou Hogue MD   Calcium Carbonate-Vitamin D (OYSTER SHELL CALCIUM/D) 500-200 MG-UNIT TABS TAKE 1 TAB BY MOUTH ONCE A DAY  1/22/21   Gilson Morrison MD   atenolol (TENORMIN) 50 MG tablet Take 1 tablet by mouth daily 1/22/21   Gilson Morrison MD   loratadine (CLARITIN) 10 MG tablet TAKE 1 CAPSULE BY MOUTH DAILY  1/12/21   Gilson Morrison MD   amLODIPine (NORVASC) 10 MG tablet TAKE 1 TABLET BY MOUTH DAILY  1/8/21   Jose Zuniga MD   diclofenac sodium (VOLTAREN) 1 % GEL Apply 4 g topically 2 times daily 1/6/21   Isma Weber, DPM   albuterol sulfate (PROAIR RESPICLICK) 244 (90 Base) MCG/ACT aerosol powder inhalation Inhale 2 puffs into the lungs every 6 hours as needed for Wheezing or Shortness of Breath 1/15/20   Marcelino Navarro MD   lurasidone (LATUDA) 60 MG TABS tablet Take 60 mg by mouth nightly    Historical Provider, MD       Future Appointments   Date Time Provider Anthony Everett   9/20/2021  8:15 AM Gera Kiran MD [facility]

## 2021-06-29 ENCOUNTER — CARE COORDINATION (OUTPATIENT)
Dept: CARE COORDINATION | Age: 56
End: 2021-06-29

## 2021-06-29 ASSESSMENT — LIFESTYLE VARIABLES
HOW OFTEN DO YOU HAVE A DRINK CONTAINING ALCOHOL: 4 OR MORE TIMES A WEEK
HOW MANY STANDARD DRINKS CONTAINING ALCOHOL DO YOU HAVE ON A TYPICAL DAY: 1 OR 2

## 2021-06-30 NOTE — CARE COORDINATION
Ambulatory Care Coordination Note  6/29/2021  CM Risk Score: 9  Charlson 10 Year Mortality Risk Score: 23%     ACC: Butch Gibbs RN    Summary Note: Received a call from patient, apologizing for wanting to sleep yesterday when ACM called. Reassured her that it was no problem. She was calling to schedule a f/u with new provider, as her doctor has graduated and will be leaving. Scheduled for 8/17/21 with Dr. Johanna Mills. She said she has a f/u at [facility] next week, 7/6/21. Talked about her BP being high due to stress. She says she stays in her room all day, in the dark, and cool, to help with the stress. She said if she goes out and walks around, her BP goes up. She says she doesn't like taking medicine and doesn't want to be on so many pills. Then she states she is taking the Norco for pain and the [facility] center prescribed her something to help her sleep, but she is not sure of the name of it. She reports that she sometimes takes it during the day also; along with drinking a couple of beers daily. Stressed the importance of not mixing her medications with alcohol due to over sedation. Encouraged her to make sure she informs the [facility] center of all the medications she is taking. Will follow up next week to see if they made any medication changes. Will follow up on her blood pressure. Care Coordination Interventions    Program Enrollment: Complex Care  Referral from Primary Care Provider: No  Suggested Interventions and Community Resources  [facility] (Comment: [facility]- already in place)  [address]: Completed (Comment: [facility]- already in place)         Goals Addressed    None         Prior to Admission medications    Medication Sig Start Date End Date Taking? Authorizing Provider   HYDROcodone-acetaminophen (NORCO) 5-325 MG per tablet Take 1 tablet by mouth every 6 hours as needed for Pain for up to 30 days.  Intended supply: 30 days 6/14/21 7/14/21  Prudence Crowell MD thiamine mononitrate 100 MG tablet TAKE 1 TABLET BY MOUTH ONCE DAILY  6/11/21   Jose Zuniga MD   meloxicam (MOBIC) 15 MG tablet Take 1 tablet by mouth daily 6/11/21   Bethany Flores MD   acetaminophen (APAP EXTRA STRENGTH) 500 MG tablet Take 2 tablets by mouth every 6 hours as needed for Pain 6/11/21   Bethany Flores MD   omeprazole (PRILOSEC) 20 MG delayed release capsule Take 1 capsule by mouth daily 6/11/21   Bethany Flores MD   hydroxychloroquine (PLAQUENIL) 200 MG tablet Take 1 tablet by mouth 2 times daily 6/11/21   Bethany Flores MD   hydroCHLOROthiazide (HYDRODIURIL) 25 MG tablet Take 1 tablet by mouth every morning 6/11/21   Bethany Flores MD   predniSONE (DELTASONE) 10 MG tablet Take 1 tablet by mouth daily 6/11/21 7/11/21  Bethany Flores MD   potassium chloride (KLOR-CON M) 20 MEQ extended release tablet Take 1 tablet by mouth daily 6/11/21   Bethany Flores MD   atorvastatin (LIPITOR) 20 MG tablet TAKE 1 TABLET BY MOUTH DAILY  5/12/21   Rocio Garcia MD   folic acid (FOLVITE) 1 MG tablet Take 1 tablet by mouth daily 4/27/21   Bethany Flores MD   Cholecalciferol (VITAMIN D3) 25 MCG (1000 UT) CAPS Take 1,000 Units by mouth daily    Historical Provider, MD   ferrous sulfate (IRON 325) 325 (65 Fe) MG tablet Take 325 mg by mouth daily (with breakfast)    Historical Provider, MD   citalopram (CELEXA) 20 MG tablet Take 1 tablet by mouth daily 3/24/21   Dale Bailey MD   Calcium Carbonate-Vitamin D (OYSTER SHELL CALCIUM/D) 500-200 MG-UNIT TABS TAKE 1 TAB BY MOUTH ONCE A DAY  1/22/21   Rocio Garcia MD   atenolol (TENORMIN) 50 MG tablet Take 1 tablet by mouth daily 1/22/21   Rocio Garcia MD   loratadine (CLARITIN) 10 MG tablet TAKE 1 CAPSULE BY MOUTH DAILY  1/12/21   Rocio Garcia MD   amLODIPine (NORVASC) 10 MG tablet TAKE 1 TABLET BY MOUTH DAILY  1/8/21   Rocio Garcia MD   diclofenac sodium (VOLTAREN) 1 % GEL Apply 4 g topically 2 times daily 1/6/21   Clovia Kary, DPM   albuterol sulfate

## 2021-07-07 ENCOUNTER — CARE COORDINATION (OUTPATIENT)
Dept: CARE COORDINATION | Age: 56
End: 2021-07-07

## 2021-07-07 DIAGNOSIS — M06.00 SERONEGATIVE RHEUMATOID ARTHRITIS (HCC): ICD-10-CM

## 2021-07-07 RX ORDER — FLUOXETINE HYDROCHLORIDE 40 MG/1
40 CAPSULE ORAL DAILY
COMMUNITY

## 2021-07-07 NOTE — TELEPHONE ENCOUNTER
Patient is in need of a refill on her prednisone. Her new patient appt with rheumatology is scheduled for 8/13/21. Pended medication for signature. Health Maintenance   Topic Date Due    Shingles Vaccine (1 of 2) 07/17/2021 (Originally 1/22/2016)    Flu vaccine (1) 09/01/2021    Lipid screen  03/21/2022    Potassium monitoring  03/31/2022    Creatinine monitoring  03/31/2022    Breast cancer screen  10/10/2022    Diabetes screen  06/11/2024    DTaP/Tdap/Td vaccine (3 - Td or Tdap) 04/14/2027    Colon cancer screen colonoscopy  12/03/2030    Pneumococcal 0-64 years Vaccine (2 of 2 - PPSV23) 01/22/2031    COVID-19 Vaccine  Completed    Hepatitis C screen  Completed    HIV screen  Completed    Hepatitis A vaccine  Aged Out    Hepatitis B vaccine  Aged Out    Hib vaccine  Aged Out    Meningococcal (ACWY) vaccine  Aged Out             (applicable per patient's age: Cancer Screenings, Depression Screening, Fall Risk Screening, Immunizations)    Hemoglobin A1C (%)   Date Value   06/11/2021 5.5   03/21/2021 6.3 (H)   01/20/2021 5.9     Microalb/Crt.  Ratio (mcg/mg creat)   Date Value   06/12/2019 147 (H)     LDL Cholesterol (mg/dL)   Date Value   03/21/2021 122     AST (U/L)   Date Value   03/21/2021 85 (H)     ALT (U/L)   Date Value   03/21/2021 72 (H)     BUN (mg/dL)   Date Value   03/31/2021 20      (goal A1C is < 7)   (goal LDL is <100) need 30-50% reduction from baseline     BP Readings from Last 3 Encounters:   06/14/21 (!) 131/93   06/11/21 (!) 142/88   04/27/21 (!) 144/99    (goal /80)      All Future Testing planned in [facility]:  Lab Frequency Next Occurrence   C-Reactive Protein Once 09/29/2020   EGD Once 11/27/2020   Pancreatic elastase, fecal Once 11/13/2020   ECHO Complete 2D W Doppler W Color Once 02/07/2021   CBC With Auto Differential Once 04/22/2021   Miscellaneous Sendout 1 Once 04/16/2021   Comprehensive Metabolic Panel Once 40/95/6380   CBC With Auto Differential Once 06/14/2021 Next Visit Date:  Future Appointments   Date Time Provider Anthony Cutleri   8/17/2021  8:30 AM Antonieta Marino MD [facility]   9/20/2021  8:15 AM Chuckie Hdz MD [facility]            Patient Active Problem List:     Lung nodule     Obesity     Adrenal adenoma     Goiter     Hypertension     Alcohol abuse     Essential hypertension     Enlarged tonsils     ACE inhibitor-aggravated angioedema     JONES (obstructive sleep apnea)     Dyslipidemia     IGT (impaired glucose tolerance)     Acute alcoholic intoxication without complication (HCC)     Acute renal insufficiency     Effusion of bursa of right knee     Acute cystitis without hematuria     Bipolar disorder (HCC)     Mild intermittent asthma without complication     Inflammatory polyarthritis (HCC)     Seronegative rheumatoid arthritis ([address]

## 2021-07-07 NOTE — CARE COORDINATION
Ambulatory Care Coordination Note  7/7/2021  CM Risk Score: 9  Charlson 10 Year Mortality Risk Score: 23%     ACC: Frederick Mei, RN    Summary Note: Called patient for follow up. She states she is doing a little better. She is taking her steroids regularly and realizes it is helping when she stays on it. She has about a week's supply left. Sent a refill request to provider. She says she is feeling better, and eating better. She had recent follow up with, Dr. Ivan Tejeda, at the [facility]. Medication list updated. She follows up with them on August 1st.   Her BP continues to run high, 140's/90's. She states medication compliance. Will follow up next week to make sure she was able to get the steroid refill. Care Coordination Interventions    Program Enrollment: Complex Care  Referral from Primary Care Provider: No  Suggested Interventions and Community Resources  [facility] (Comment: Eliseo Purvis- already in place)  [address]: Completed (Comment: Trevor- already in place)         Goals Addressed                 This Visit's Progress     Medication Management   On track     I will take my medication as directed. I will notify my provider of any problems with medications, like adverse effects or side effects. I will notify my provider/Care Coordinator if I am unable to afford my medications. I will notify my provider for advice before I stop taking any of my medication. Barriers: lack of education  Plan for overcoming my barriers: Care Management   Confidence: 7/10  Anticipated Goal Completion Date: 8/30/2021            Prior to Admission medications    Medication Sig Start Date End Date Taking?  Authorizing Provider   FLUoxetine (PROZAC) 40 MG capsule Take 40 mg by mouth daily   Yes Historical Provider, MD   citalopram (CELEXA) 20 MG tablet Take 1 tablet by mouth daily 3/24/21  Yes Chuckie Durant MD   atenolol (TENORMIN) 50 MG tablet Take 1 tablet by mouth daily 1/22/21  Yes Lo Lisa MD   amLODIPine (NORVASC) 10 MG tablet TAKE 1 TABLET BY MOUTH DAILY  1/8/21  Yes Lo Lisa MD   lurasidone (LATUDA) 60 MG TABS tablet Take 60 mg by mouth nightly   Yes Historical Provider, MD   HYDROcodone-acetaminophen (NORCO) 5-325 MG per tablet Take 1 tablet by mouth every 6 hours as needed for Pain for up to 30 days.  Intended supply: 30 days 6/14/21 7/14/21  Aldo Hdz MD   thiamine mononitrate 100 MG tablet TAKE 1 TABLET BY MOUTH ONCE DAILY  6/11/21   Jose Zuniga MD   meloxicam (MOBIC) 15 MG tablet Take 1 tablet by mouth daily 6/11/21   Kaushal Holland MD   acetaminophen (APAP EXTRA STRENGTH) 500 MG tablet Take 2 tablets by mouth every 6 hours as needed for Pain 6/11/21   Kaushal Holland MD   omeprazole (PRILOSEC) 20 MG delayed release capsule Take 1 capsule by mouth daily 6/11/21   Kaushal Holland MD   hydroxychloroquine (PLAQUENIL) 200 MG tablet Take 1 tablet by mouth 2 times daily 6/11/21   Kaushal Holland MD   hydroCHLOROthiazide (HYDRODIURIL) 25 MG tablet Take 1 tablet by mouth every morning 6/11/21   Kaushal Holland MD   predniSONE (DELTASONE) 10 MG tablet Take 1 tablet by mouth daily 6/11/21 7/11/21  Kaushal Holland MD   potassium chloride (KLOR-CON M) 20 MEQ extended release tablet Take 1 tablet by mouth daily 6/11/21   Kaushal Holland MD   atorvastatin (LIPITOR) 20 MG tablet TAKE 1 TABLET BY MOUTH DAILY  5/12/21   Lo Lisa MD   folic acid (FOLVITE) 1 MG tablet Take 1 tablet by mouth daily 4/27/21   Kaushal Holland MD   Cholecalciferol (VITAMIN D3) 25 MCG (1000 UT) CAPS Take 1,000 Units by mouth daily    Historical Provider, MD   ferrous sulfate (IRON 325) 325 (65 Fe) MG tablet Take 325 mg by mouth daily (with breakfast)    Historical Provider, MD   Calcium Carbonate-Vitamin D (OYSTER SHELL CALCIUM/D) 500-200 MG-UNIT TABS TAKE 1 TAB BY MOUTH ONCE A DAY  1/22/21   Jose Zuniga MD   loratadine (CLARITIN) 10 MG tablet TAKE 1 CAPSULE BY MOUTH DAILY

## 2021-07-08 DIAGNOSIS — M06.00 SERONEGATIVE RHEUMATOID ARTHRITIS (HCC): ICD-10-CM

## 2021-07-08 RX ORDER — PREDNISONE 10 MG/1
10 TABLET ORAL DAILY
Qty: 30 TABLET | Refills: 0 | OUTPATIENT
Start: 2021-07-08 | End: 2021-08-07

## 2021-07-08 RX ORDER — PREDNISONE 10 MG/1
10 TABLET ORAL DAILY
Qty: 30 TABLET | Refills: 0 | Status: SHIPPED | OUTPATIENT
Start: 2021-07-08 | End: 2021-08-07

## 2021-07-08 NOTE — TELEPHONE ENCOUNTER
Patient needs a refill on her prednisone. Still waiting to get in to see rheumatologist on 8/13/21. Pended medication for signature. Health Maintenance   Topic Date Due    Shingles Vaccine (1 of 2) 07/17/2021 (Originally 1/22/2016)    Flu vaccine (1) 09/01/2021    Lipid screen  03/21/2022    Potassium monitoring  03/31/2022    Creatinine monitoring  03/31/2022    Breast cancer screen  10/10/2022    Diabetes screen  06/11/2024    DTaP/Tdap/Td vaccine (3 - Td or Tdap) 04/14/2027    Colon cancer screen colonoscopy  12/03/2030    Pneumococcal 0-64 years Vaccine (2 of 2 - PPSV23) 01/22/2031    COVID-19 Vaccine  Completed    Hepatitis C screen  Completed    HIV screen  Completed    Hepatitis A vaccine  Aged Out    Hepatitis B vaccine  Aged Out    Hib vaccine  Aged Out    Meningococcal (ACWY) vaccine  Aged Out             (applicable per patient's age: Cancer Screenings, Depression Screening, Fall Risk Screening, Immunizations)    Hemoglobin A1C (%)   Date Value   06/11/2021 5.5   03/21/2021 6.3 (H)   01/20/2021 5.9     Microalb/Crt.  Ratio (mcg/mg creat)   Date Value   06/12/2019 147 (H)     LDL Cholesterol (mg/dL)   Date Value   03/21/2021 122     AST (U/L)   Date Value   03/21/2021 85 (H)     ALT (U/L)   Date Value   03/21/2021 72 (H)     BUN (mg/dL)   Date Value   03/31/2021 20      (goal A1C is < 7)   (goal LDL is <100) need 30-50% reduction from baseline     BP Readings from Last 3 Encounters:   06/14/21 (!) 131/93   06/11/21 (!) 142/88   04/27/21 (!) 144/99    (goal /80)      All Future Testing planned in [facility]:  Lab Frequency Next Occurrence   C-Reactive Protein Once 09/29/2020   EGD Once 11/27/2020   Pancreatic elastase, fecal Once 11/13/2020   ECHO Complete 2D W Doppler W Color Once 02/07/2021   CBC With Auto Differential Once 04/22/2021   Miscellaneous Sendout 1 Once 04/16/2021   Comprehensive Metabolic Panel Once 97/96/5535   CBC With Auto Differential Once 06/14/2021       Next Visit Date:  Future Appointments   Date Time Provider Anthony Everett   8/17/2021  8:30 AM Alysa Blanco MD [facility] [address] [facility]   9/20/2021  8:15 AM Timur Hdz MD  [facility]            Patient Active Problem List:     Lung nodule     Obesity     Adrenal adenoma     Goiter     Hypertension     Alcohol abuse     Essential hypertension     Enlarged tonsils     ACE inhibitor-aggravated angioedema     JONES (obstructive sleep apnea)     Dyslipidemia     IGT (impaired glucose tolerance)     Acute alcoholic intoxication without complication (HCC)     Acute renal insufficiency     Effusion of bursa of right knee     Acute cystitis without hematuria     Bipolar disorder (HCC)     Mild intermittent asthma without complication     Inflammatory polyarthritis (HCC)     Seronegative rheumatoid arthritis ([facility] [address]

## 2021-07-13 DIAGNOSIS — M06.00 SERONEGATIVE RHEUMATOID ARTHRITIS (HCC): ICD-10-CM

## 2021-07-13 NOTE — TELEPHONE ENCOUNTER
Refill request for thiamine mononitrate 100 MG tablet. If appropriate please send medication(s) to patients pharmacy. Next appt: 8/17/21 [facility]      Health Maintenance   Topic Date Due    Shingles Vaccine (1 of 2) 07/17/2021 (Originally 1/22/2016)    Flu vaccine (1) 09/01/2021    Lipid screen  03/21/2022    Potassium monitoring  03/31/2022    Creatinine monitoring  03/31/2022    Breast cancer screen  10/10/2022    Diabetes screen  06/11/2024    DTaP/Tdap/Td vaccine (3 - Td or Tdap) 04/14/2027    Colon cancer screen colonoscopy  12/03/2030    Pneumococcal 0-64 years Vaccine (2 of 2 - PPSV23) 01/22/2031    COVID-19 Vaccine  Completed    Hepatitis C screen  Completed    HIV screen  Completed    Hepatitis A vaccine  Aged Out    Hepatitis B vaccine  Aged Out    Hib vaccine  Aged Out    Meningococcal (ACWY) vaccine  Aged Out       Hemoglobin A1C (%)   Date Value   06/11/2021 5.5   03/21/2021 6.3 (H)   01/20/2021 5.9             ( goal A1C is < 7)   Microalb/Crt.  Ratio (mcg/mg creat)   Date Value   06/12/2019 147 (H)     LDL Cholesterol (mg/dL)   Date Value   03/21/2021 122       (goal LDL is <100)   AST (U/L)   Date Value   03/21/2021 85 (H)     ALT (U/L)   Date Value   03/21/2021 72 (H)     BUN (mg/dL)   Date Value   03/31/2021 20     BP Readings from Last 3 Encounters:   06/14/21 (!) 131/93   06/11/21 (!) 142/88   04/27/21 (!) 144/99          (goal 120/80)          Patient Active Problem List:     Lung nodule     Obesity     Adrenal adenoma     Goiter     Hypertension     Alcohol abuse     Essential hypertension     Enlarged tonsils     ACE inhibitor-aggravated angioedema     JONES (obstructive sleep apnea)     Dyslipidemia     IGT (impaired glucose tolerance)     Acute alcoholic intoxication without complication (HCC)     Acute renal insufficiency     Effusion of bursa of right knee     Acute cystitis without hematuria     Bipolar disorder (HCC)     Mild intermittent asthma without complication [facility] ([facility]     Seronegative rheumatoid arthritis ([facility]

## 2021-07-13 NOTE — TELEPHONE ENCOUNTER
Refill request for Plaquenil and Meloxicam. If appropriate please send medication(s) to patients pharmacy. Next appt: 8/17/21 [facility]      Health Maintenance   Topic Date Due    Shingles Vaccine (1 of 2) 07/17/2021 (Originally 1/22/2016)    Flu vaccine (1) 09/01/2021    Lipid screen  03/21/2022    Potassium monitoring  03/31/2022    Creatinine monitoring  03/31/2022    Breast cancer screen  10/10/2022    Diabetes screen  06/11/2024    DTaP/Tdap/Td vaccine (3 - Td or Tdap) 04/14/2027    Colon cancer screen colonoscopy  12/03/2030    Pneumococcal 0-64 years Vaccine (2 of 2 - PPSV23) 01/22/2031    COVID-19 Vaccine  Completed    Hepatitis C screen  Completed    HIV screen  Completed    Hepatitis A vaccine  Aged Out    Hepatitis B vaccine  Aged Out    Hib vaccine  Aged Out    Meningococcal (ACWY) vaccine  Aged Out       Hemoglobin A1C (%)   Date Value   06/11/2021 5.5   03/21/2021 6.3 (H)   01/20/2021 5.9             ( goal A1C is < 7)   Microalb/Crt.  Ratio (mcg/mg creat)   Date Value   06/12/2019 147 (H)     LDL Cholesterol (mg/dL)   Date Value   03/21/2021 122       (goal LDL is <100)   AST (U/L)   Date Value   03/21/2021 85 (H)     ALT (U/L)   Date Value   03/21/2021 72 (H)     BUN (mg/dL)   Date Value   03/31/2021 20     BP Readings from Last 3 Encounters:   06/14/21 (!) 131/93   06/11/21 (!) 142/88   04/27/21 (!) 144/99          (goal 120/80)          Patient Active Problem List:     Lung nodule     Obesity     Adrenal adenoma     Goiter     Hypertension     Alcohol abuse     Essential hypertension     Enlarged tonsils     ACE inhibitor-aggravated angioedema     JONES (obstructive sleep apnea)     Dyslipidemia     IGT (impaired glucose tolerance)     Acute alcoholic intoxication without complication (HCC)     Acute renal insufficiency     Effusion of bursa of right knee     Acute cystitis without hematuria     Bipolar disorder (HCC)     Mild intermittent asthma without complication [facility] ([facility]     Seronegative rheumatoid arthritis ([facility]

## 2021-07-14 RX ORDER — GAUZE BANDAGE 2" X 2"
BANDAGE TOPICAL
Qty: 30 TABLET | Refills: 2 | Status: SHIPPED | OUTPATIENT
Start: 2021-07-14

## 2021-07-15 DIAGNOSIS — E55.9 VITAMIN D DEFICIENCY: ICD-10-CM

## 2021-07-15 RX ORDER — HYDROXYCHLOROQUINE SULFATE 200 MG/1
200 TABLET, FILM COATED ORAL 2 TIMES DAILY
Qty: 60 TABLET | Refills: 0 | Status: ON HOLD | OUTPATIENT
Start: 2021-07-15 | End: 2021-08-18

## 2021-07-15 RX ORDER — MELOXICAM 15 MG/1
15 TABLET ORAL DAILY
Qty: 30 TABLET | Refills: 0 | Status: ON HOLD | OUTPATIENT
Start: 2021-07-15 | End: 2021-08-18

## 2021-07-15 NOTE — TELEPHONE ENCOUNTER
Request for Vitamin D. The original prescription was discontinued on 1/20/2021 by Aleah Osborne MD for the following reason: Therapy completed. Renewing this prescription may not be appropriate. Next Visit Date:  Future Appointments   Date Time Provider Anthony Everett   8/17/2021  8:30 AM Salvador Aguilera MD [facility]   9/20/2021  8:15 AM Latosha Hdz MD [address] [facility] Maintenance   Topic Date Due    Shingles Vaccine (1 of 2) 07/17/2021 (Originally 1/22/2016)    Flu vaccine (1) 09/01/2021    Lipid screen  03/21/2022    Potassium monitoring  03/31/2022    Creatinine monitoring  03/31/2022    Breast cancer screen  10/10/2022    Diabetes screen  06/11/2024    DTaP/Tdap/Td vaccine (3 - Td or Tdap) 04/14/2027    Colon cancer screen colonoscopy  12/03/2030    Pneumococcal 0-64 years Vaccine (2 of 2 - PPSV23) 01/22/2031    COVID-19 Vaccine  Completed    Hepatitis C screen  Completed    HIV screen  Completed    Hepatitis A vaccine  Aged Out    Hepatitis B vaccine  Aged Out    Hib vaccine  Aged Out    Meningococcal (ACWY) vaccine  Aged Out       Hemoglobin A1C (%)   Date Value   06/11/2021 5.5   03/21/2021 6.3 (H)   01/20/2021 5.9             ( goal A1C is < 7)   Microalb/Crt.  Ratio (mcg/mg creat)   Date Value   06/12/2019 147 (H)     LDL Cholesterol (mg/dL)   Date Value   03/21/2021 122       (goal LDL is <100)   AST (U/L)   Date Value   03/21/2021 85 (H)     ALT (U/L)   Date Value   03/21/2021 72 (H)     BUN (mg/dL)   Date Value   03/31/2021 20     BP Readings from Last 3 Encounters:   06/14/21 (!) 131/93   06/11/21 (!) 142/88   04/27/21 (!) 144/99          (goal 120/80)    All Future Testing planned in [facility]  Lab Frequency Next Occurrence   C-Reactive Protein Once 09/29/2020   EGD Once 11/27/2020   Pancreatic elastase, fecal Once 11/13/2020   ECHO Complete 2D W Doppler W Color Once 02/07/2021   CBC With Auto Differential Once 04/22/2021   Miscellaneous Sendout 1 Once 04/16/2021   Comprehensive Metabolic Panel Once 98/28/2656   CBC With Auto Differential Once 06/14/2021         Patient Active Problem List:     Lung nodule     Obesity     Adrenal adenoma     Goiter     Hypertension     Alcohol abuse     Essential hypertension     Enlarged tonsils     ACE inhibitor-aggravated angioedema     JONES (obstructive sleep apnea)     Dyslipidemia     IGT (impaired glucose tolerance)     Acute alcoholic intoxication without complication (HCC)     Acute renal insufficiency     Effusion of bursa of right knee     Acute cystitis without hematuria     Bipolar disorder (HCC)     Mild intermittent asthma without complication     Inflammatory polyarthritis (HCC)     Seronegative rheumatoid arthritis ([facility]

## 2021-07-16 RX ORDER — MELATONIN
1000 DAILY
Qty: 30 TABLET | Refills: 5 | Status: SHIPPED | OUTPATIENT
Start: 2021-07-16 | End: 2021-10-05 | Stop reason: ALTCHOICE

## 2021-07-16 NOTE — TELEPHONE ENCOUNTER
Refill on Mirtazapine and Famotidine. Last seen on 2/15/18, medication pending. Next Visit Date:  Future Appointments  Date Time Provider Anthony Everett   5/17/2018 9:30 AM Roxie Cabello MD [facility] [facility]   Topic Date Due    Shingles Vaccine (1 of 2 - 2 Dose Series) 01/22/2016    A1C test (Diabetic or Prediabetic)  11/15/2018    Potassium monitoring  02/15/2019    Creatinine monitoring  02/15/2019    Breast cancer screen  05/05/2019    Lipid screen  06/21/2021    Colon cancer screen colonoscopy  12/10/2023    DTaP/Tdap/Td vaccine (3 - Td) 04/14/2027    Flu vaccine  Completed    Pneumococcal med risk  Completed    HIV screen  Completed       Hemoglobin A1C (%)   Date Value   11/15/2017 6.2 (H)   08/23/2017 6.7 (H)   01/20/2015 5.5             ( goal A1C is < 7)   Microalb/Crt.  Ratio (mcg/mg creat)   Date Value   12/18/2012 15     LDL Cholesterol (mg/dL)   Date Value   06/21/2016 139 (H)       (goal LDL is <100)   AST (U/L)   Date Value   09/25/2016 29     ALT (U/L)   Date Value   09/25/2016 29     BUN (mg/dL)   Date Value   02/15/2018 11     BP Readings from Last 3 Encounters:   02/15/18 119/85   11/21/17 (!) 126/93   11/18/17 119/78          (goal 120/80)    All Future Testing planned in [facility]  Lab Frequency Next Occurrence   Sleep Study with PAP Titration Once 04/21/2017               Patient Active Problem List:     HTN (hypertension)     Major depressive disorder, recurrent episode ([address]     Lung nodule     Obesity     Adrenal adenoma     Goiter     Alcohol abuse     Essential hypertension     Hypokalemia     Enlarged tonsils     Angio-edema     ACE inhibitor-aggravated angioedema     JONES (obstructive sleep apnea)     ROXANN (acute kidney injury) ([address]         , Hospital chart (includes HIE)

## 2021-08-06 ENCOUNTER — TELEPHONE (OUTPATIENT)
Dept: INTERNAL MEDICINE | Age: 56
End: 2021-08-06

## 2021-08-06 NOTE — LETTER
[address]  [address] [facility] [address]  Phone: [phone]  Fax: [phone]    Star Ellis MD        August 6, 2021    [address] [address]      Dear Lorenzo Hobson: This letter is a reminder that you may have diagnostic testing that has not been completed. It is important to your well-being that these test(s) are performed. Please find the outstanding test(s) attached. If you could please have these completed before your next appointment. You can have the test completed at any [facility] facility or Lab. Please see the order for scheduling instructions. Any testing that needs completed other than blood work or xray's please call [phone] to schedule an appointment. Otherwise can be done at any [facility]. Please call our office at Dept: [phone] for additional information on the outstanding tests or let us know if they have been completed so we may update your chart. If you have any questions or concerns, please don't hesitate to call.     Sincerely,        Star Ellis MD

## 2021-08-16 ENCOUNTER — APPOINTMENT (OUTPATIENT)
Dept: CT IMAGING | Age: 56
DRG: 425 | End: 2021-08-16
Payer: MEDICAID

## 2021-08-16 ENCOUNTER — APPOINTMENT (OUTPATIENT)
Dept: GENERAL RADIOLOGY | Age: 56
DRG: 425 | End: 2021-08-16
Payer: MEDICAID

## 2021-08-16 ENCOUNTER — HOSPITAL ENCOUNTER (INPATIENT)
Age: 56
LOS: 5 days | Discharge: HOME | DRG: 425 | End: 2021-08-21
Attending: EMERGENCY MEDICINE | Admitting: INTERNAL MEDICINE
Payer: MEDICAID

## 2021-08-16 DIAGNOSIS — E87.6 HYPOKALEMIA: Primary | ICD-10-CM

## 2021-08-16 DIAGNOSIS — N17.9 AKI (ACUTE KIDNEY INJURY) (HCC): ICD-10-CM

## 2021-08-16 DIAGNOSIS — E87.8 HYPOCHLOREMIA: ICD-10-CM

## 2021-08-16 DIAGNOSIS — E83.51 HYPOCALCEMIA: ICD-10-CM

## 2021-08-16 DIAGNOSIS — R77.8 ELEVATED TROPONIN: ICD-10-CM

## 2021-08-16 LAB
ABSOLUTE EOS #: <0.03 K/UL (ref 0–0.44)
ABSOLUTE IMMATURE GRANULOCYTE: 0.09 K/UL (ref 0–0.3)
ABSOLUTE LYMPH #: 1 K/UL (ref 1.1–3.7)
ABSOLUTE MONO #: 0.76 K/UL (ref 0.1–1.2)
ALBUMIN SERPL-MCNC: 3.3 G/DL (ref 3.5–5.2)
ALBUMIN/GLOBULIN RATIO: 1 (ref 1–2.5)
ALP BLD-CCNC: 164 U/L (ref 35–104)
ALT SERPL-CCNC: 63 U/L (ref 5–33)
ANION GAP SERPL CALCULATED.3IONS-SCNC: 17 MMOL/L (ref 9–17)
AST SERPL-CCNC: 90 U/L
BASOPHILS # BLD: 0 % (ref 0–2)
BASOPHILS ABSOLUTE: <0.03 K/UL (ref 0–0.2)
BILIRUB SERPL-MCNC: 0.52 MG/DL (ref 0.3–1.2)
BUN BLDV-MCNC: 10 MG/DL (ref 6–20)
BUN/CREAT BLD: ABNORMAL (ref 9–20)
CALCIUM IONIZED: 0.81 MMOL/L (ref 1.13–1.33)
CALCIUM SERPL-MCNC: 7.1 MG/DL (ref 8.6–10.4)
CHLORIDE BLD-SCNC: 90 MMOL/L (ref 98–107)
CO2: 33 MMOL/L (ref 20–31)
CREAT SERPL-MCNC: 1.14 MG/DL (ref 0.5–0.9)
DIFFERENTIAL TYPE: ABNORMAL
EOSINOPHILS RELATIVE PERCENT: 0 % (ref 1–4)
GFR AFRICAN AMERICAN: 60 ML/MIN
GFR NON-AFRICAN AMERICAN: 49 ML/MIN
GFR SERPL CREATININE-BSD FRML MDRD: ABNORMAL ML/MIN/{1.73_M2}
GFR SERPL CREATININE-BSD FRML MDRD: ABNORMAL ML/MIN/{1.73_M2}
GLUCOSE BLD-MCNC: 214 MG/DL (ref 70–99)
HCT VFR BLD CALC: 32.9 % (ref 36.3–47.1)
HEMOGLOBIN: 11.1 G/DL (ref 11.9–15.1)
IMMATURE GRANULOCYTES: 1 %
LIPASE: 32 U/L (ref 13–60)
LYMPHOCYTES # BLD: 9 % (ref 24–43)
MAGNESIUM: 1.1 MG/DL (ref 1.6–2.6)
MCH RBC QN AUTO: 31.6 PG (ref 25.2–33.5)
MCHC RBC AUTO-ENTMCNC: 33.7 G/DL (ref 28.4–34.8)
MCV RBC AUTO: 93.7 FL (ref 82.6–102.9)
MONOCYTES # BLD: 7 % (ref 3–12)
NRBC AUTOMATED: 0 PER 100 WBC
PARTIAL THROMBOPLASTIN TIME: 21.5 SEC (ref 20.5–30.5)
PARTIAL THROMBOPLASTIN TIME: 36.5 SEC (ref 20.5–30.5)
PDW BLD-RTO: 15.5 % (ref 11.8–14.4)
PLATELET # BLD: 506 K/UL (ref 138–453)
PLATELET ESTIMATE: ABNORMAL
PMV BLD AUTO: 8.8 FL (ref 8.1–13.5)
POTASSIUM SERPL-SCNC: 1.8 MMOL/L (ref 3.7–5.3)
RBC # BLD: 3.51 M/UL (ref 3.95–5.11)
RBC # BLD: ABNORMAL 10*6/UL
SEG NEUTROPHILS: 83 % (ref 36–65)
SEGMENTED NEUTROPHILS ABSOLUTE COUNT: 9.23 K/UL (ref 1.5–8.1)
SODIUM BLD-SCNC: 140 MMOL/L (ref 135–144)
TOTAL CK: 5944 U/L (ref 26–192)
TOTAL PROTEIN: 6.7 G/DL (ref 6.4–8.3)
TROPONIN INTERP: ABNORMAL
TROPONIN INTERP: ABNORMAL
TROPONIN T: ABNORMAL NG/ML
TROPONIN T: ABNORMAL NG/ML
TROPONIN, HIGH SENSITIVITY: 132 NG/L (ref 0–14)
TROPONIN, HIGH SENSITIVITY: 144 NG/L (ref 0–14)
WBC # BLD: 11.1 K/UL (ref 3.5–11.3)
WBC # BLD: ABNORMAL 10*3/UL

## 2021-08-16 PROCEDURE — 83690 ASSAY OF LIPASE: CPT

## 2021-08-16 PROCEDURE — 6360000002 HC RX W HCPCS: Performed by: STUDENT IN AN ORGANIZED HEALTH CARE EDUCATION/TRAINING PROGRAM

## 2021-08-16 PROCEDURE — 80048 BASIC METABOLIC PNL TOTAL CA: CPT

## 2021-08-16 PROCEDURE — 96367 TX/PROPH/DG ADDL SEQ IV INF: CPT

## 2021-08-16 PROCEDURE — 93005 ELECTROCARDIOGRAM TRACING: CPT | Performed by: STUDENT IN AN ORGANIZED HEALTH CARE EDUCATION/TRAINING PROGRAM

## 2021-08-16 PROCEDURE — 71045 X-RAY EXAM CHEST 1 VIEW: CPT

## 2021-08-16 PROCEDURE — 74176 CT ABD & PELVIS W/O CONTRAST: CPT

## 2021-08-16 PROCEDURE — 85025 COMPLETE CBC W/AUTO DIFF WBC: CPT

## 2021-08-16 PROCEDURE — 81001 URINALYSIS AUTO W/SCOPE: CPT

## 2021-08-16 PROCEDURE — 82550 ASSAY OF CK (CPK): CPT

## 2021-08-16 PROCEDURE — 96375 TX/PRO/DX INJ NEW DRUG ADDON: CPT

## 2021-08-16 PROCEDURE — 2580000003 HC RX 258: Performed by: STUDENT IN AN ORGANIZED HEALTH CARE EDUCATION/TRAINING PROGRAM

## 2021-08-16 PROCEDURE — 85730 THROMBOPLASTIN TIME PARTIAL: CPT

## 2021-08-16 PROCEDURE — 83735 ASSAY OF MAGNESIUM: CPT

## 2021-08-16 PROCEDURE — 82330 ASSAY OF CALCIUM: CPT

## 2021-08-16 PROCEDURE — 6360000002 HC RX W HCPCS

## 2021-08-16 PROCEDURE — 80053 COMPREHEN METABOLIC PANEL: CPT

## 2021-08-16 PROCEDURE — 99285 EMERGENCY DEPT VISIT HI MDM: CPT

## 2021-08-16 PROCEDURE — 2500000003 HC RX 250 WO HCPCS: Performed by: STUDENT IN AN ORGANIZED HEALTH CARE EDUCATION/TRAINING PROGRAM

## 2021-08-16 PROCEDURE — 36415 COLL VENOUS BLD VENIPUNCTURE: CPT

## 2021-08-16 PROCEDURE — 84484 ASSAY OF TROPONIN QUANT: CPT

## 2021-08-16 PROCEDURE — 96365 THER/PROPH/DIAG IV INF INIT: CPT

## 2021-08-16 PROCEDURE — 6370000000 HC RX 637 (ALT 250 FOR IP): Performed by: STUDENT IN AN ORGANIZED HEALTH CARE EDUCATION/TRAINING PROGRAM

## 2021-08-16 PROCEDURE — 2060000000 HC ICU INTERMEDIATE R&B

## 2021-08-16 RX ORDER — CITALOPRAM 20 MG/1
20 TABLET ORAL DAILY
Status: DISCONTINUED | OUTPATIENT
Start: 2021-08-17 | End: 2021-08-17 | Stop reason: SDDI

## 2021-08-16 RX ORDER — FLUOXETINE HYDROCHLORIDE 20 MG/1
40 CAPSULE ORAL DAILY
Status: DISCONTINUED | OUTPATIENT
Start: 2021-08-17 | End: 2021-08-21 | Stop reason: HOSPADM

## 2021-08-16 RX ORDER — ONDANSETRON 2 MG/ML
4 INJECTION INTRAMUSCULAR; INTRAVENOUS EVERY 6 HOURS PRN
Status: DISCONTINUED | OUTPATIENT
Start: 2021-08-16 | End: 2021-08-21 | Stop reason: HOSPADM

## 2021-08-16 RX ORDER — POTASSIUM CHLORIDE 20 MEQ/1
40 TABLET, EXTENDED RELEASE ORAL ONCE
Status: COMPLETED | OUTPATIENT
Start: 2021-08-16 | End: 2021-08-16

## 2021-08-16 RX ORDER — ONDANSETRON 4 MG/1
4 TABLET, ORALLY DISINTEGRATING ORAL EVERY 8 HOURS PRN
Status: DISCONTINUED | OUTPATIENT
Start: 2021-08-16 | End: 2021-08-21 | Stop reason: HOSPADM

## 2021-08-16 RX ORDER — CALCIUM GLUCONATE 20 MG/ML
1000 INJECTION, SOLUTION INTRAVENOUS ONCE
Status: COMPLETED | OUTPATIENT
Start: 2021-08-16 | End: 2021-08-16

## 2021-08-16 RX ORDER — FOLIC ACID 1 MG/1
1 TABLET ORAL DAILY
Status: DISCONTINUED | OUTPATIENT
Start: 2021-08-17 | End: 2021-08-21 | Stop reason: HOSPADM

## 2021-08-16 RX ORDER — LANOLIN ALCOHOL/MO/W.PET/CERES
325 CREAM (GRAM) TOPICAL
Status: DISCONTINUED | OUTPATIENT
Start: 2021-08-17 | End: 2021-08-21 | Stop reason: HOSPADM

## 2021-08-16 RX ORDER — HEPARIN SODIUM 10000 [USP'U]/100ML
12 INJECTION, SOLUTION INTRAVENOUS CONTINUOUS
Status: DISCONTINUED | OUTPATIENT
Start: 2021-08-16 | End: 2021-08-20

## 2021-08-16 RX ORDER — ASPIRIN 81 MG/1
324 TABLET, CHEWABLE ORAL ONCE
Status: COMPLETED | OUTPATIENT
Start: 2021-08-16 | End: 2021-08-16

## 2021-08-16 RX ORDER — ALBUTEROL SULFATE 2.5 MG/3ML
2.5 SOLUTION RESPIRATORY (INHALATION) EVERY 4 HOURS PRN
Status: DISCONTINUED | OUTPATIENT
Start: 2021-08-16 | End: 2021-08-17

## 2021-08-16 RX ORDER — HYDROXYCHLOROQUINE SULFATE 200 MG/1
200 TABLET, FILM COATED ORAL 2 TIMES DAILY
Status: DISCONTINUED | OUTPATIENT
Start: 2021-08-17 | End: 2021-08-21 | Stop reason: HOSPADM

## 2021-08-16 RX ORDER — HEPARIN SODIUM 1000 [USP'U]/ML
4000 INJECTION, SOLUTION INTRAVENOUS; SUBCUTANEOUS PRN
Status: DISCONTINUED | OUTPATIENT
Start: 2021-08-16 | End: 2021-08-20

## 2021-08-16 RX ORDER — SODIUM CHLORIDE 0.9 % (FLUSH) 0.9 %
5-40 SYRINGE (ML) INJECTION EVERY 12 HOURS SCHEDULED
Status: DISCONTINUED | OUTPATIENT
Start: 2021-08-16 | End: 2021-08-21 | Stop reason: HOSPADM

## 2021-08-16 RX ORDER — CALCIUM CARBONATE-CHOLECALCIFEROL TAB 250 MG-125 UNIT 250-125 MG-UNIT
2 TAB ORAL DAILY
Status: DISCONTINUED | OUTPATIENT
Start: 2021-08-17 | End: 2021-08-21 | Stop reason: HOSPADM

## 2021-08-16 RX ORDER — GAUZE BANDAGE 2" X 2"
50 BANDAGE TOPICAL DAILY
Status: DISCONTINUED | OUTPATIENT
Start: 2021-08-17 | End: 2021-08-21 | Stop reason: HOSPADM

## 2021-08-16 RX ORDER — PANTOPRAZOLE SODIUM 40 MG/1
40 TABLET, DELAYED RELEASE ORAL
Status: DISCONTINUED | OUTPATIENT
Start: 2021-08-17 | End: 2021-08-21 | Stop reason: HOSPADM

## 2021-08-16 RX ORDER — ATORVASTATIN CALCIUM 20 MG/1
20 TABLET, FILM COATED ORAL DAILY
Status: DISCONTINUED | OUTPATIENT
Start: 2021-08-17 | End: 2021-08-18

## 2021-08-16 RX ORDER — SODIUM CHLORIDE, SODIUM LACTATE, POTASSIUM CHLORIDE, CALCIUM CHLORIDE 600; 310; 30; 20 MG/100ML; MG/100ML; MG/100ML; MG/100ML
INJECTION, SOLUTION INTRAVENOUS CONTINUOUS
Status: DISCONTINUED | OUTPATIENT
Start: 2021-08-17 | End: 2021-08-20

## 2021-08-16 RX ORDER — VITAMIN B COMPLEX
1000 TABLET ORAL DAILY
Status: DISCONTINUED | OUTPATIENT
Start: 2021-08-17 | End: 2021-08-21 | Stop reason: HOSPADM

## 2021-08-16 RX ORDER — SODIUM CHLORIDE 9 MG/ML
25 INJECTION, SOLUTION INTRAVENOUS PRN
Status: DISCONTINUED | OUTPATIENT
Start: 2021-08-16 | End: 2021-08-21 | Stop reason: HOSPADM

## 2021-08-16 RX ORDER — ACETAMINOPHEN 325 MG/1
650 TABLET ORAL EVERY 4 HOURS PRN
Status: DISCONTINUED | OUTPATIENT
Start: 2021-08-16 | End: 2021-08-21 | Stop reason: HOSPADM

## 2021-08-16 RX ORDER — HEPARIN SODIUM 1000 [USP'U]/ML
4000 INJECTION, SOLUTION INTRAVENOUS; SUBCUTANEOUS ONCE
Status: COMPLETED | OUTPATIENT
Start: 2021-08-16 | End: 2021-08-16

## 2021-08-16 RX ORDER — POTASSIUM CHLORIDE 20 MEQ/1
20 TABLET, EXTENDED RELEASE ORAL DAILY
Status: DISCONTINUED | OUTPATIENT
Start: 2021-08-17 | End: 2021-08-17 | Stop reason: SDUPTHER

## 2021-08-16 RX ORDER — 0.9 % SODIUM CHLORIDE 0.9 %
1000 INTRAVENOUS SOLUTION INTRAVENOUS ONCE
Status: COMPLETED | OUTPATIENT
Start: 2021-08-16 | End: 2021-08-16

## 2021-08-16 RX ORDER — POTASSIUM CHLORIDE 7.45 MG/ML
10 INJECTION INTRAVENOUS ONCE
Status: COMPLETED | OUTPATIENT
Start: 2021-08-16 | End: 2021-08-16

## 2021-08-16 RX ORDER — HEPARIN SODIUM 1000 [USP'U]/ML
2000 INJECTION, SOLUTION INTRAVENOUS; SUBCUTANEOUS PRN
Status: DISCONTINUED | OUTPATIENT
Start: 2021-08-16 | End: 2021-08-20

## 2021-08-16 RX ORDER — SODIUM CHLORIDE 0.9 % (FLUSH) 0.9 %
5-40 SYRINGE (ML) INJECTION PRN
Status: DISCONTINUED | OUTPATIENT
Start: 2021-08-16 | End: 2021-08-21 | Stop reason: HOSPADM

## 2021-08-16 RX ORDER — MAGNESIUM SULFATE IN WATER 40 MG/ML
2000 INJECTION, SOLUTION INTRAVENOUS ONCE
Status: COMPLETED | OUTPATIENT
Start: 2021-08-16 | End: 2021-08-16

## 2021-08-16 RX ADMIN — CALCIUM GLUCONATE 1000 MG: 20 INJECTION, SOLUTION INTRAVENOUS at 16:05

## 2021-08-16 RX ADMIN — POTASSIUM CHLORIDE 40 MEQ: 1500 TABLET, EXTENDED RELEASE ORAL at 16:26

## 2021-08-16 RX ADMIN — HEPARIN SODIUM 4000 UNITS: 1000 INJECTION INTRAVENOUS; SUBCUTANEOUS at 17:43

## 2021-08-16 RX ADMIN — MAGNESIUM SULFATE HEPTAHYDRATE 2000 MG: 40 INJECTION, SOLUTION INTRAVENOUS at 16:04

## 2021-08-16 RX ADMIN — SODIUM CHLORIDE 1000 ML: 9 INJECTION, SOLUTION INTRAVENOUS at 16:50

## 2021-08-16 RX ADMIN — HEPARIN SODIUM AND DEXTROSE 12 UNITS/KG/HR: 10000; 5 INJECTION INTRAVENOUS at 17:44

## 2021-08-16 RX ADMIN — ASPIRIN 324 MG: 81 TABLET, CHEWABLE ORAL at 17:45

## 2021-08-16 RX ADMIN — POTASSIUM CHLORIDE 10 MEQ: 10 INJECTION, SOLUTION INTRAVENOUS at 16:04

## 2021-08-16 RX ADMIN — HEPARIN SODIUM 2000 UNITS: 1000 INJECTION INTRAVENOUS; SUBCUTANEOUS at 23:59

## 2021-08-16 ASSESSMENT — ENCOUNTER SYMPTOMS
BLOOD IN STOOL: 0
NAUSEA: 1
DIARRHEA: 1
SHORTNESS OF BREATH: 0
ABDOMINAL PAIN: 1
CONSTIPATION: 0
COUGH: 0
RESPIRATORY NEGATIVE: 1
VOMITING: 1
EYES NEGATIVE: 1
WHEEZING: 0
VOMITING: 0
ALLERGIC/IMMUNOLOGIC NEGATIVE: 1
SORE THROAT: 0
RHINORRHEA: 0

## 2021-08-16 NOTE — ED PROVIDER NOTES
[address] [facility]  ED  Emergency Department Encounter  EmergencyMedicine Resident     Pt Name:Delphine Trevino  MRN: [id number]  [place] 1965  Date of evaluation: 8/16/21  PCP:  Jennifer Garcia MD    This patient was evaluated in the Emergency Department for symptoms described in the history of present illness. The patient was evaluated in the context of the global COVID-19 pandemic, which necessitated consideration that the patient might be at risk for infection with the SARS-CoV-2 virus that causes COVID-19. Institutional protocols and algorithms that pertain to the evaluation of patients at risk for COVID-19 are in a state of rapid change based on information released by regulatory bodies including the CDC and federal and state organizations. These policies and algorithms were followed during the patient's care in the ED. CHIEF COMPLAINT       Chief Complaint   Patient presents with    Abnormal Lab       HISTORY OF PRESENT ILLNESS  (Location/Symptom, Timing/Onset, Context/Setting, Quality, Duration, Modifying Factors, Severity.)      Dennis Dickerson is a 55 y.o. female who presents with diarrhea. Patient presents to the emergency department with 2 months of epigastric abdominal mass and pain, described as throbbing, worse with eating, better with rest, intermittent, mild to moderate severity, associated with nausea, diarrhea, decrease in weight (approximately 35 pounds in the past 2 months). Patient denies fevers, chills, headaches, visual changes, cough, congestion, chest pain, shortness of breath, vomiting, dysuria, hematuria, hematochezia, melena. Patient went to see her primary care provider, obtained labs, potassium 1.9, calcium low, patient was recommended to come to the emergency department for evaluation.     PAST MEDICAL / SURGICAL / SOCIAL / FAMILY HISTORY      has a past medical history of Angioedema, Anxiety, Asthma, Bipolar disorder ([address] Claustrophobia, Depression, GERD (gastroesophageal reflux disease), Hypertension, Hypertrophic cardiomyopathy ([facility] Lung nodules, MRSA (methicillin resistant Staphylococcus aureus), Murmur, OA (osteoarthritis), JONES (obstructive sleep apnea), Thyroid nodule, and Type 2 diabetes mellitus without complication, without long-term current use of insulin ([facility] has a past surgical history that includes Hysterectomy; Upper gastrointestinal endoscopy; Colonoscopy; Thyroid surgery; Cardiac catheterization; other surgical history (2015); Upper gastrointestinal endoscopy (N/A, 12/3/2020); Colonoscopy (N/A, 12/3/2020); Foot surgery (Bilateral, 2020); Hammer toe surgery (Right, 2020); and Bunionectomy (Bilateral, 2020).       Social History     Socioeconomic History    Marital status: Single     Spouse name: Not on file    Number of children: Not on file    Years of education: Not on file    Highest education level: Not on file   Occupational History    Not on file   Tobacco Use    Smoking status: Former Smoker     Packs/day: 0.50     Years: 20.00     Pack years: 10.00     Types: Cigarettes     Quit date: 1992     Years since quittin.6    Smokeless tobacco: Never Used    Tobacco comment: states Ursula Lynette in the 1980s   Substance and Sexual Activity    Alcohol use: Not Currently     Alcohol/week: 12.0 standard drinks     Types: 12 Cans of beer per week     Comment: Quit about 5 months ago 20    Drug use: Not Currently     Types: Cocaine     Comment: last use approximately 14 cocaine    Sexual activity: Yes     Partners: Male   Other Topics Concern    Not on file   Social History Narrative    Not on file     Social Determinants of Health     Financial Resource Strain: Low Risk     Difficulty of Paying Living Expenses: Not hard at all   Food Insecurity: No Food Insecurity    Worried About Running Out of Food in the Last Year: Never true    Dahiana of Food in the Last Year: Never true   Transportation Needs: Unmet Transportation Needs    Lack of Transportation (Medical): Yes    Lack of Transportation (Non-Medical): Yes   Physical Activity:     Days of Exercise per Week:     Minutes of Exercise per Session:    Stress:     Feeling of Stress :    Social Connections:     Frequency of Communication with Friends and Family:     Frequency of Social Gatherings with Friends and Family:     Attends Nondenominational Services:     Active Member of Clubs or Organizations:     Attends Club or Organization Meetings:     Marital Status:    Intimate Partner Violence:     Fear of Current or Ex-Partner:     Emotionally Abused:     Physically Abused:     Sexually Abused:        Family History   Problem Relation Age of Onset    Stroke Mother     Hypertension Father     Cancer Brother         bone    Lung Cancer Maternal Aunt     Cancer Maternal Grandmother         eye     Other Sister         aneurysm    Heart Disease Sister        Allergies:  Bee venom, Iodine, Lisinopril, and Shellfish-derived products    Home Medications:  Prior to Admission medications    Medication Sig Start Date End Date Taking?  Authorizing Provider   vitamin D3 (CHOLECALCIFEROL) 25 MCG (1000 UT) TABS tablet TAKE 1 TABLET BY MOUTH DAILY  7/16/21   Ayana Manzanares MD   hydroxychloroquine (PLAQUENIL) 200 MG tablet TAKE 1 TABLET BY MOUTH 2 TIMES DAILY  7/15/21   Ayana Manzanares MD   meloxicam (MOBIC) 15 MG tablet TAKE 1 TABLET BY MOUTH DAILY  7/15/21   Ayana Manzanares MD   thiamine mononitrate 100 MG tablet TAKE 1 TABLET BY MOUTH ONCE DAILY  7/14/21   Ayana Manzanares MD   FLUoxetine (PROZAC) 40 MG capsule Take 40 mg by mouth daily    Historical Provider, MD   acetaminophen (APAP EXTRA STRENGTH) 500 MG tablet Take 2 tablets by mouth every 6 hours as needed for Pain 6/11/21   Milagros Later, MD   omeprazole (PRILOSEC) 20 MG delayed release capsule Take 1 capsule by mouth daily 6/11/21   Milagros Later, MD   hydroCHLOROthiazide (HYDRODIURIL) 25 MG tablet Take 1 tablet by mouth every morning 6/11/21   Shai Swan MD   potassium chloride (KLOR-CON M) 20 MEQ extended release tablet Take 1 tablet by mouth daily 6/11/21   Shai Swan MD   atorvastatin (LIPITOR) 20 MG tablet TAKE 1 TABLET BY MOUTH DAILY  5/12/21   Kelley Grimes MD   folic acid (FOLVITE) 1 MG tablet Take 1 tablet by mouth daily 4/27/21   Shai Swan MD   Cholecalciferol (VITAMIN D3) 25 MCG (1000 UT) CAPS Take 1,000 Units by mouth daily    Historical Provider, MD   ferrous sulfate (IRON 325) 325 (65 Fe) MG tablet Take 325 mg by mouth daily (with breakfast)    Historical Provider, MD   citalopram (CELEXA) 20 MG tablet Take 1 tablet by mouth daily 3/24/21   Juve Bettencourt MD   Calcium Carbonate-Vitamin D (OYSTER SHELL CALCIUM/D) 500-200 MG-UNIT TABS TAKE 1 TAB BY MOUTH ONCE A DAY  1/22/21   Kelley Grimes MD   atenolol (TENORMIN) 50 MG tablet Take 1 tablet by mouth daily 1/22/21   Kelley Grimes MD   loratadine (CLARITIN) 10 MG tablet TAKE 1 CAPSULE BY MOUTH DAILY  1/12/21   Kelley Grimes MD   amLODIPine (NORVASC) 10 MG tablet TAKE 1 TABLET BY MOUTH DAILY  1/8/21   Kelley Grimes MD   diclofenac sodium (VOLTAREN) 1 % GEL Apply 4 g topically 2 times daily 1/6/21   Miguelina Mosher DPM   albuterol sulfate (PROAIR RESPICLICK) 204 (90 Base) MCG/ACT aerosol powder inhalation Inhale 2 puffs into the lungs every 6 hours as needed for Wheezing or Shortness of Breath 1/15/20   Ila Luna MD   lurasidone (LATUDA) 60 MG TABS tablet Take 60 mg by mouth nightly    Historical Provider, MD       REVIEW OF SYSTEMS    (2-9 systems for level 4, 10 or more for level 5)      Review of Systems   Constitutional: Positive for unexpected weight change. Negative for chills, diaphoresis, fatigue and fever. HENT: Negative for congestion, rhinorrhea and sore throat. Eyes: Negative for visual disturbance. Respiratory: Negative for cough, shortness of breath and wheezing.     Cardiovascular: Negative for chest pain, palpitations and leg swelling. Gastrointestinal: Positive for abdominal pain, diarrhea and nausea. Negative for blood in stool, constipation and vomiting. Genitourinary: Negative for dysuria, frequency, hematuria and urgency. Musculoskeletal: Negative for neck pain and neck stiffness. Skin: Negative for pallor and rash. Neurological: Negative for dizziness, syncope, weakness, light-headedness and headaches. Psychiatric/Behavioral: Negative for confusion. PHYSICAL EXAM   (up to 7 for level 4, 8 or more for level 5)      INITIAL VITALS:   /83   Pulse 77   Temp 98.2 °F (36.8 °C) (Oral)   Resp 18   Wt 170 lb (77.1 kg)   SpO2 95%   BMI 29.18 kg/m²     Physical Exam  Constitutional:       General: She is not in acute distress. Appearance: Normal appearance. She is well-developed. She is not ill-appearing, toxic-appearing or diaphoretic. HENT:      Head: Normocephalic and atraumatic. Right Ear: External ear normal.      Left Ear: External ear normal.   Eyes:      General:         Right eye: No discharge. Left eye: No discharge. Extraocular Movements: Extraocular movements intact. Pupils: Pupils are equal, round, and reactive to light. Neck:      Vascular: No JVD. Trachea: No tracheal deviation. Cardiovascular:      Rate and Rhythm: Normal rate and regular rhythm. Heart sounds: Normal heart sounds. No murmur heard. No friction rub. No gallop. Pulmonary:      Effort: Pulmonary effort is normal. No respiratory distress. Breath sounds: Normal breath sounds. No stridor. No wheezing, rhonchi or rales. Chest:      Chest wall: No tenderness. Abdominal:      General: There is no distension. Palpations: Abdomen is soft. There is no mass. Tenderness: There is abdominal tenderness. There is no right CVA tenderness, left CVA tenderness or guarding.       Comments: Patient has epigastric tenderness to palpation, no overlying skin changes, negative Sánchez sign, no ecchymosis, no other abdominal tenderness, no CVA tenderness bilaterally. Musculoskeletal:         General: No tenderness. Normal range of motion. Cervical back: Normal range of motion and neck supple. Right lower leg: No edema. Left lower leg: No edema. Skin:     General: Skin is warm. Capillary Refill: Capillary refill takes less than 2 seconds. Neurological:      General: No focal deficit present. Mental Status: She is alert and oriented to person, place, and time. Cranial Nerves: No cranial nerve deficit. Sensory: No sensory deficit. Motor: No weakness.       Coordination: Coordination normal.      Gait: Gait normal.   Psychiatric:         Mood and Affect: Mood normal.         Behavior: Behavior normal.         DIFFERENTIAL  DIAGNOSIS     PLAN (LABS / IMAGING / EKG):  Orders Placed This Encounter   Procedures    CT ABDOMEN PELVIS WO CONTRAST Additional Contrast? None    XR CHEST PORTABLE    CBC Auto Differential    Comprehensive Metabolic Panel w/ Reflex to MG    Lipase    Troponin    Urinalysis Reflex to Culture    MAGNESIUM    CK    Calcium, Ionized    PREVIOUS SPECIMEN    Troponin    APTT    Diet NPO    Vital signs per unit routine    Notify physician    Notify physician    Up as tolerated    Full Code    Inpatient consult to Cardiology    Inpatient consult to Internal Medicine    EKG 12 Lead    PATIENT STATUS (FROM ED OR OR/PROCEDURAL) Inpatient       MEDICATIONS ORDERED:  Orders Placed This Encounter   Medications    potassium chloride (KLOR-CON M) extended release tablet 40 mEq    magnesium sulfate 2000 mg in 50 mL IVPB premix    potassium chloride 10 mEq/100 mL IVPB (Peripheral Line)    DISCONTD: calcium gluconate 1,000 mg in dextrose 5 % 100 mL IVPB    calcium gluconate 1000 mg in sodium chloride 50 mL    0.9 % sodium chloride bolus    heparin (porcine) injection 4,000 Units    heparin (porcine) injection 4,000 Units    heparin (porcine) injection 2,000 Units    heparin 25,000 units in dextrose 5% 250 mL (premix) infusion    aspirin chewable tablet 324 mg    0.9 % sodium chloride infusion    acetaminophen (TYLENOL) tablet 650 mg    OR Linked Order Group     ondansetron (ZOFRAN-ODT) disintegrating tablet 4 mg     ondansetron (ZOFRAN) injection 4 mg       DDX:     DIAGNOSTIC RESULTS / EMERGENCY DEPARTMENT COURSE / MDM   LAB RESULTS:  Results for orders placed or performed during the hospital encounter of 08/16/21   CBC Auto Differential   Result Value Ref Range    WBC 11.1 3.5 - 11.3 k/uL    RBC 3.51 (L) 3.95 - 5.11 m/uL    Hemoglobin 11.1 (L) 11.9 - 15.1 g/dL    Hematocrit 32.9 (L) 36.3 - 47.1 %    MCV 93.7 82.6 - 102.9 fL    MCH 31.6 25.2 - 33.5 pg    MCHC 33.7 28.4 - 34.8 g/dL    RDW 15.5 (H) 11.8 - 14.4 %    Platelets 487 (H) 149 - 453 k/uL    MPV 8.8 8.1 - 13.5 fL    NRBC Automated 0.0 0.0 per 100 WBC    Differential Type NOT REPORTED     Seg Neutrophils 83 (H) 36 - 65 %    Lymphocytes 9 (L) 24 - 43 %    Monocytes 7 3 - 12 %    Eosinophils % 0 (L) 1 - 4 %    Basophils 0 0 - 2 %    Immature Granulocytes 1 (H) 0 %    Segs Absolute 9.23 (H) 1.50 - 8.10 k/uL    Absolute Lymph # 1.00 (L) 1.10 - 3.70 k/uL    Absolute Mono # 0.76 0.10 - 1.20 k/uL    Absolute Eos # <0.03 0.00 - 0.44 k/uL    Basophils Absolute <0.03 0.00 - 0.20 k/uL    Absolute Immature Granulocyte 0.09 0.00 - 0.30 k/uL    WBC Morphology NOT REPORTED     RBC Morphology ANISOCYTOSIS PRESENT     Platelet Estimate NOT REPORTED    Comprehensive Metabolic Panel w/ Reflex to MG   Result Value Ref Range    Glucose 214 (H) 70 - 99 mg/dL    BUN 10 6 - 20 mg/dL    CREATININE 1.14 (H) 0.50 - 0.90 mg/dL    Bun/Cre Ratio NOT REPORTED 9 - 20    Calcium 7.1 (L) 8.6 - 10.4 mg/dL    Sodium 140 135 - 144 mmol/L    Potassium 1.8 (LL) 3.7 - 5.3 mmol/L    Chloride 90 (L) 98 - 107 mmol/L    CO2 33 (H) 20 - 31 mmol/L    Anion Gap 17 9 - 17 mmol/L    Alkaline Phosphatase 164 (H) 35 - 104 U/L    ALT 63 (H) 5 - 33 U/L    AST 90 (H) <32 U/L    Total Bilirubin 0.52 0.3 - 1.2 mg/dL    Total Protein 6.7 6.4 - 8.3 g/dL    Albumin 3.3 (L) 3.5 - 5.2 g/dL    Albumin/Globulin Ratio 1.0 1.0 - 2.5    GFR Non- 49 (L) >60 mL/min    GFR African American 60 (L) >60 mL/min    GFR Comment          GFR Staging NOT REPORTED    Lipase   Result Value Ref Range    Lipase 32 13 - 60 U/L   Troponin   Result Value Ref Range    Troponin, High Sensitivity 144 (HH) 0 - 14 ng/L    Troponin T NOT REPORTED <0.03 ng/mL    Troponin Interp NOT REPORTED    MAGNESIUM   Result Value Ref Range    Magnesium 1.1 (L) 1.6 - 2.6 mg/dL   CK   Result Value Ref Range    Total CK 5,944 (H) 26 - 192 U/L   Calcium, Ionized   Result Value Ref Range    Calcium, Ion 0.81 (L) 1.13 - 1.33 mmol/L   Troponin   Result Value Ref Range    Troponin, High Sensitivity 132 (HH) 0 - 14 ng/L    Troponin T NOT REPORTED <0.03 ng/mL    Troponin Interp NOT REPORTED    APTT   Result Value Ref Range    PTT 21.5 20.5 - 30.5 sec       IMPRESSION: 55-year-old female with low potassium on outpatient labs, abdominal pain and diarrhea for the past 2 months associated with weight loss. Will obtain CT abdomen pelvis to evaluate for cancer, perforated ulcer, abscess, any other acute abdominal etiology. Will obtain basic labs to evaluate for electrolyte disturbances, anemia, pancreatitis, liver/gallbladder etiology. As patient had critically low potassium and calcium, will presumptively start patient on potassium, calcium, magnesium. As patient is having epigastric pain, will obtain EKG, troponins for further evaluation.     RADIOLOGY:  CT ABDOMEN PELVIS WO CONTRAST Additional Contrast? None    Result Date: 8/16/2021  EXAMINATION: CT OF THE ABDOMEN AND PELVIS WITHOUT CONTRAST 8/16/2021 3:57 pm TECHNIQUE: CT of the abdomen and pelvis was performed without the administration of intravenous contrast. Multiplanar reformatted images are PORTABLE    Result Date: 8/16/2021  EXAMINATION: ONE XRAY VIEW OF THE CHEST 8/16/2021 3:51 pm COMPARISON: March 22, 2021 chest examination HISTORY: ORDERING SYSTEM PROVIDED HISTORY: Epigastric pain TECHNOLOGIST PROVIDED HISTORY: Epigastric pain Reason for Exam: epigastric pain FINDINGS: Stable normal cardiopericardial silhouette/mild tortuosity of the thoracic aorta There are no significant pleural, parenchymal or mediastinal findings     No acute cardiopulmonary findings       EKG  EKG Interpretation    Interpreted by me    Rhythm: normal sinus   Rate: normal  Axis: Leftward axis  Ectopy: none  Conduction: normal  ST Segments: no acute change  T Waves: no acute change  Q Waves: none    Clinical Impression: Leftward axis with LVH, T wave inversions in the lateral leads and precordial leads. T wave inversions of the precordial leads are new. All EKG's are interpreted by the Emergency Department Physician who either signs or Co-signs this chart in the absence of a cardiologist.    EMERGENCY DEPARTMENT COURSE:  Patient came to emergency department, HPI and physical exam were conducted. All nursing notes were reviewed. Patient has severe electrolyte derangements, replacing magnesium, calcium, potassium at this time. Patient has new T wave inversions, significantly elevated troponin, discussed with cardiology who recommended heparin and continue to trend troponins. Patient has slight ROXANN, will administer fluids as well. Will admit patient to internal medicine for further management. PROCEDURES:      CONSULTS:  IP CONSULT TO CARDIOLOGY  IP CONSULT TO INTERNAL MEDICINE    CRITICAL CARE:      FINAL IMPRESSION      1. Hypokalemia    2. Hypocalcemia    3. Hypochloremia    4. Elevated troponin    5. ROXANN (acute kidney injury) ([address]          DISPOSITION / PLAN     DISPOSITION admitted      PATIENT REFERRED TO:  No follow-up provider specified.     DISCHARGE MEDICATIONS:  New Prescriptions    No medications on file Delilah Fernando MD  Emergency Medicine Resident    (Please note that portions of thisnote were completed with a voice recognition program.  Efforts were made to edit the dictations but occasionally words are mis-transcribed.)        Delilah Fernando MD  Resident  08/16/21 [id number]

## 2021-08-16 NOTE — CARE COORDINATION
Case Management Initial Discharge Plan  Delphine Flanagan,             Met with:patient to discuss discharge plans. Information verified: address, contacts, phone number, , insurance Yes  Insurance Provider: [facility]    Emergency Contact/Next of Kin name & number: Rea Sherman aunt [phone]   Who are involved in patient's support system? Boyfriend livess with her while sick     PCP: Jennifer Garcia MD  Date of last visit: last week       Discharge Planning    Living Arrangements:        Home has 1 stories  3 stairs to climb to get into front door, 3stairs to climb to reach second floor  Location of bedroom/bathroom in home main    Patient able to perform ADL's:Assisted    Current Services (outpatient & in home) walker cane   DME equipment:   DME provider:     Is patient receiving oral anticoagulation therapy? No    If indicated:   Physician managing anticoagulation treatment:   Where does patient obtain lab work for ATC treatment? Potential Assistance Needed:       Patient agreeable to home care: No  [place] of choice provided:  no    Prior SNF/Rehab Placement and Facility:   Agreeable to SNF/Rehab: No  [facility]      Evaluation: no    Expected Discharge date:       Patient expects to be discharged to: If home: is the family and/or caregiver wiling & able to provide support at home? family  Who will be providing this support? Follow Up Appointment: Best Day/ Time:      Transportation provider: family  Transportation arrangements needed for discharge: No    Readmission Risk              Risk of Unplanned Readmission:  18             Does patient have a readmission risk score greater than 14?: No  If yes, follow-up appointment must be made within 7 days of discharge.      Goals of Care:       Educated pt on transitional options, will provide freedom of choice and are agreeable with plan      Discharge Plan: home with home care pt relates she wants [facility] will send referral she had them before and they helped with her meds           Electronically signed by Abdi El RN on 8/16/21 at 7:18 PM EDT

## 2021-08-16 NOTE — ED PROVIDER NOTES
[facility]  FACULTY HANDOFF       Handoff taken on the following patient from prior Attending Physician: Dr. Jamal Vargas  Pt Name: Pan Carter  PCP:  Farhan Aden MD    Attestation  I was available and discussed any additional care issues that arose and coordinated the management plans with the resident(s) caring for the patient during my duty period. Any areas of disagreement with resident's documentation of care or procedures are noted on the chart. I was personally present for the key portions of any/all procedures during my duty period. I have documented in the chart those procedures where I was not present during the key portions.          [address] [facility]       Chief Complaint   Patient presents with    Abnormal Lab         CURRENT MEDICATIONS     Previous Medications  Previous Medications    ACETAMINOPHEN (APAP EXTRA STRENGTH) 500 MG TABLET    Take 2 tablets by mouth every 6 hours as needed for Pain    ALBUTEROL SULFATE (PROAIR RESPICLICK) 243 (90 BASE) MCG/ACT AEROSOL POWDER INHALATION    Inhale 2 puffs into the lungs every 6 hours as needed for Wheezing or Shortness of Breath    AMLODIPINE (NORVASC) 10 MG TABLET    TAKE 1 TABLET BY MOUTH DAILY     ATENOLOL (TENORMIN) 50 MG TABLET    Take 1 tablet by mouth daily    ATORVASTATIN (LIPITOR) 20 MG TABLET    TAKE 1 TABLET BY MOUTH DAILY     CALCIUM CARBONATE-VITAMIN D (OYSTER SHELL CALCIUM/D) 500-200 MG-UNIT TABS    TAKE 1 TAB BY MOUTH ONCE A DAY     CHOLECALCIFEROL (VITAMIN D3) 25 MCG (1000 UT) CAPS    Take 1,000 Units by mouth daily    CITALOPRAM (CELEXA) 20 MG TABLET    Take 1 tablet by mouth daily    DICLOFENAC SODIUM (VOLTAREN) 1 % GEL    Apply 4 g topically 2 times daily    FERROUS SULFATE (IRON 325) 325 (65 FE) MG TABLET    Take 325 mg by mouth daily (with breakfast)    FLUOXETINE (PROZAC) 40 MG CAPSULE    Take 40 mg by mouth daily    FOLIC ACID (FOLVITE) 1 MG TABLET    Take 1 tablet by mouth daily    HYDROCHLOROTHIAZIDE (HYDRODIURIL) 25 MG TABLET    Take 1 tablet by mouth every morning    HYDROXYCHLOROQUINE (PLAQUENIL) 200 MG TABLET    TAKE 1 TABLET BY MOUTH 2 TIMES DAILY     LORATADINE (CLARITIN) 10 MG TABLET    TAKE 1 CAPSULE BY MOUTH DAILY     LURASIDONE (LATUDA) 60 MG TABS TABLET    Take 60 mg by mouth nightly    MELOXICAM (MOBIC) 15 MG TABLET    TAKE 1 TABLET BY MOUTH DAILY     OMEPRAZOLE (PRILOSEC) 20 MG DELAYED RELEASE CAPSULE    Take 1 capsule by mouth daily    POTASSIUM CHLORIDE (KLOR-CON M) 20 MEQ EXTENDED RELEASE TABLET    Take 1 tablet by mouth daily    THIAMINE MONONITRATE 100 MG TABLET    TAKE 1 TABLET BY MOUTH ONCE DAILY     VITAMIN D3 (CHOLECALCIFEROL) 25 MCG (1000 UT) TABS TABLET    TAKE 1 TABLET BY MOUTH DAILY        Encounter Medications  Orders Placed This Encounter   Medications    potassium chloride (KLOR-CON M) extended release tablet 40 mEq    magnesium sulfate 2000 mg in 50 mL IVPB premix    potassium chloride 10 mEq/100 mL IVPB (Peripheral Line)    DISCONTD: calcium gluconate 1,000 mg in dextrose 5 % 100 mL IVPB    calcium gluconate 1000 mg in sodium chloride 50 mL    0.9 % sodium chloride bolus    heparin (porcine) injection 4,000 Units    heparin (porcine) injection 4,000 Units    heparin (porcine) injection 2,000 Units    heparin 25,000 units in dextrose 5% 250 mL (premix) infusion    aspirin chewable tablet 324 mg       ALLERGIES     is allergic to bee venom, iodine, lisinopril, and shellfish-derived products. RECENT VITALS:   Temp: 98.2 °F (36.8 °C),  Pulse: 77, Resp: 13, BP: 108/75    RADIOLOGY:   CT ABDOMEN PELVIS WO CONTRAST Additional Contrast? None   Preliminary Result   No acute CT abnormality within the abdomen or pelvis. Gallbladder is nondistended with layering biliary sludge and/or small   gallstones. No pericholecystic inflammatory changes or CT evidence of acute   cholecystitis. Hepatic steatosis.          XR CHEST PORTABLE   Final Result   No acute cardiopulmonary findings LABS:  Labs Reviewed   CBC WITH AUTO DIFFERENTIAL - Abnormal; Notable for the following components:       Result Value    RBC 3.51 (*)     Hemoglobin 11.1 (*)     Hematocrit 32.9 (*)     RDW 15.5 (*)     Platelets 707 (*)     Seg Neutrophils 83 (*)     Lymphocytes 9 (*)     Eosinophils % 0 (*)     Immature Granulocytes 1 (*)     Segs Absolute 9.23 (*)     Absolute Lymph # 1.00 (*)     All other components within normal limits   COMPREHENSIVE METABOLIC PANEL W/ REFLEX TO MG FOR LOW K - Abnormal; Notable for the following components:    Glucose 214 (*)     CREATININE 1.14 (*)     Calcium 7.1 (*)     Potassium 1.8 (*)     Chloride 90 (*)     CO2 33 (*)     Alkaline Phosphatase 164 (*)     ALT 63 (*)     AST 90 (*)     Albumin 3.3 (*)     GFR Non- 49 (*)     GFR  60 (*)     All other components within normal limits   TROPONIN - Abnormal; Notable for the following components:    Troponin, High Sensitivity 144 (*)     All other components within normal limits   MAGNESIUM - Abnormal; Notable for the following components:    Magnesium 1.1 (*)     All other components within normal limits   CK - Abnormal; Notable for the following components: Total CK 5,944 (*)     All other components within normal limits   CALCIUM, IONIZED - Abnormal; Notable for the following components:    Calcium, Ion 0.81 (*)     All other components within normal limits   LIPASE   URINE RT REFLEX TO CULTURE   PREVIOUS SPECIMEN   TROPONIN   APTT   APTT   APTT           PLAN/ TASKS OUTSTANDING   Patient presents with multiple months of diarrhea with potassium of 1.9. Patient admitted. Patient receiving magnesium, potassium, calcium.         (Please note that portions of this note were completed with a voice recognition program.  Efforts were made to edit the dictations but occasionally words are mis-transcribed.)    Briseyda Goins MD, MD,   Attending Emergency Physician         Briseyda Goins MD  08/19/21 [address]

## 2021-08-16 NOTE — ED PROVIDER NOTES
[facility]     Emergency Department     Faculty Attestation    I performed a history and physical examination of the patient and discussed management with the resident. I have reviewed and agree with the residents findings including all diagnostic interpretations, and treatment plans as written. Any areas of disagreement are noted on the chart. I was personally present for the key portions of any procedures. I have documented in the chart those procedures where I was not present during the key portions. I have reviewed the emergency nurses triage note. I agree with the chief complaint, past medical history, past surgical history, allergies, medications, social and family history as documented unless otherwise noted below. Documentation of the HPI, Physical Exam and Medical Decision Making performed by josefinaibhector is based on my personal performance of the HPI, PE and MDM. For Physician Assistant/ Nurse Practitioner cases/documentation I have personally evaluated this patient and have completed at least one if not all key elements of the E/M (history, physical exam, and MDM). Additional findings are as noted. Patient having chronic diarrhea 10+ episodes of watery diarrhea daily for the last 2 months. Is followed with rheumatology and had labs done on August 13. Which she got the results for 3 days ago showing a potassium of 1.9. Patient was told to come to the emergency room the patient unable to do so at that time. And then came today. He is also having some epigastric pain. She is on hydrochlorothiazide which she takes daily as well as 20 mEq potassium daily and she has been taking. Patient currently on steroids at this time for rheumatoid arthritic flare. No new other medications.     Patient on exam is well-appearing nontoxic speaking in full sentences moving all extremities equally abdomen has tenderness palpation in the epigastric region,

## 2021-08-16 NOTE — ED NOTES
Pt to ED with complaints of abnormal potassium. Pt states that her doctor's office called and told her that her potassium was low and to report to hospital. Pt states that other than being more fatigued than normal she feels fine.  Pt denies any chest pain or SOB      Geno Lazaro RN  08/16/21 1103

## 2021-08-17 LAB
-: NORMAL
ALBUMIN SERPL-MCNC: 2.8 G/DL (ref 3.5–5.2)
ALBUMIN/GLOBULIN RATIO: 0.9 (ref 1–2.5)
ALP BLD-CCNC: 142 U/L (ref 35–104)
ALT SERPL-CCNC: 50 U/L (ref 5–33)
AMORPHOUS: NORMAL
ANION GAP SERPL CALCULATED.3IONS-SCNC: 13 MMOL/L (ref 9–17)
ANION GAP SERPL CALCULATED.3IONS-SCNC: 15 MMOL/L (ref 9–17)
ANION GAP SERPL CALCULATED.3IONS-SCNC: 15 MMOL/L (ref 9–17)
AST SERPL-CCNC: 62 U/L
BACTERIA: NORMAL
BILIRUB SERPL-MCNC: 0.55 MG/DL (ref 0.3–1.2)
BILIRUBIN URINE: NEGATIVE
BUN BLDV-MCNC: 6 MG/DL (ref 6–20)
BUN BLDV-MCNC: 7 MG/DL (ref 6–20)
BUN BLDV-MCNC: 8 MG/DL (ref 6–20)
BUN/CREAT BLD: ABNORMAL (ref 9–20)
CALCIUM SERPL-MCNC: 6.7 MG/DL (ref 8.6–10.4)
CALCIUM SERPL-MCNC: 7.1 MG/DL (ref 8.6–10.4)
CALCIUM SERPL-MCNC: 7.2 MG/DL (ref 8.6–10.4)
CASTS UA: NORMAL /LPF (ref 0–8)
CHLORIDE BLD-SCNC: 93 MMOL/L (ref 98–107)
CHLORIDE BLD-SCNC: 94 MMOL/L (ref 98–107)
CHLORIDE BLD-SCNC: 95 MMOL/L (ref 98–107)
CO2: 31 MMOL/L (ref 20–31)
CO2: 32 MMOL/L (ref 20–31)
CO2: 33 MMOL/L (ref 20–31)
COLOR: YELLOW
COMMENT UA: ABNORMAL
CREAT SERPL-MCNC: 0.85 MG/DL (ref 0.5–0.9)
CREAT SERPL-MCNC: 0.96 MG/DL (ref 0.5–0.9)
CREAT SERPL-MCNC: 1.03 MG/DL (ref 0.5–0.9)
CRYSTALS, UA: NORMAL /HPF
EPITHELIAL CELLS UA: NORMAL /HPF (ref 0–5)
GFR AFRICAN AMERICAN: >60 ML/MIN
GFR NON-AFRICAN AMERICAN: 56 ML/MIN
GFR NON-AFRICAN AMERICAN: >60 ML/MIN
GFR NON-AFRICAN AMERICAN: >60 ML/MIN
GFR SERPL CREATININE-BSD FRML MDRD: ABNORMAL ML/MIN/{1.73_M2}
GLUCOSE BLD-MCNC: 103 MG/DL (ref 70–99)
GLUCOSE BLD-MCNC: 116 MG/DL (ref 70–99)
GLUCOSE BLD-MCNC: 85 MG/DL (ref 70–99)
GLUCOSE URINE: NEGATIVE
KETONES, URINE: NEGATIVE
LEUKOCYTE ESTERASE, URINE: ABNORMAL
LV EF: 65 %
LVEF MODALITY: NORMAL
Lab: NORMAL
MICRO OVA & PARASITES: NORMAL
MUCUS: NORMAL
NITRITE, URINE: NEGATIVE
OTHER OBSERVATIONS UA: NORMAL
PARTIAL THROMBOPLASTIN TIME: 38.1 SEC (ref 20.5–30.5)
PARTIAL THROMBOPLASTIN TIME: 45.9 SEC (ref 20.5–30.5)
PARTIAL THROMBOPLASTIN TIME: 50.1 SEC (ref 20.5–30.5)
PARTIAL THROMBOPLASTIN TIME: 68.4 SEC (ref 20.5–30.5)
PH UA: 8 (ref 5–8)
PHOSPHORUS: 2 MG/DL (ref 2.6–4.5)
POTASSIUM SERPL-SCNC: 1.7 MMOL/L (ref 3.7–5.3)
POTASSIUM SERPL-SCNC: 1.9 MMOL/L (ref 3.7–5.3)
POTASSIUM SERPL-SCNC: 1.9 MMOL/L (ref 3.7–5.3)
PROTEIN UA: NEGATIVE
RBC UA: NORMAL /HPF (ref 0–4)
RENAL EPITHELIAL, UA: NORMAL /HPF
SERUM OSMOLALITY: 284 MOSM/KG (ref 275–295)
SODIUM BLD-SCNC: 139 MMOL/L (ref 135–144)
SODIUM BLD-SCNC: 140 MMOL/L (ref 135–144)
SODIUM BLD-SCNC: 142 MMOL/L (ref 135–144)
SPECIFIC GRAVITY UA: 1.01 (ref 1–1.03)
SPECIMEN DESCRIPTION: NORMAL
TOTAL CK: 3905 U/L (ref 26–192)
TOTAL PROTEIN: 5.9 G/DL (ref 6.4–8.3)
TRICHOMONAS: NORMAL
TURBIDITY: CLEAR
URINE HGB: NEGATIVE
UROBILINOGEN, URINE: NORMAL
WBC UA: NORMAL /HPF (ref 0–5)
YEAST: NORMAL

## 2021-08-17 PROCEDURE — 87328 CRYPTOSPORIDIUM AG IA: CPT

## 2021-08-17 PROCEDURE — 87506 IADNA-DNA/RNA PROBE TQ 6-11: CPT

## 2021-08-17 PROCEDURE — 83520 IMMUNOASSAY QUANT NOS NONAB: CPT

## 2021-08-17 PROCEDURE — 6360000002 HC RX W HCPCS: Performed by: STUDENT IN AN ORGANIZED HEALTH CARE EDUCATION/TRAINING PROGRAM

## 2021-08-17 PROCEDURE — 85730 THROMBOPLASTIN TIME PARTIAL: CPT

## 2021-08-17 PROCEDURE — 2580000003 HC RX 258: Performed by: STUDENT IN AN ORGANIZED HEALTH CARE EDUCATION/TRAINING PROGRAM

## 2021-08-17 PROCEDURE — 84133 ASSAY OF URINE POTASSIUM: CPT

## 2021-08-17 PROCEDURE — 84100 ASSAY OF PHOSPHORUS: CPT

## 2021-08-17 PROCEDURE — 6360000002 HC RX W HCPCS

## 2021-08-17 PROCEDURE — 82438 ASSAY OTHER FLUID CHLORIDES: CPT

## 2021-08-17 PROCEDURE — 83986 ASSAY PH BODY FLUID NOS: CPT

## 2021-08-17 PROCEDURE — 84999 UNLISTED CHEMISTRY PROCEDURE: CPT

## 2021-08-17 PROCEDURE — 87329 GIARDIA AG IA: CPT

## 2021-08-17 PROCEDURE — 6370000000 HC RX 637 (ALT 250 FOR IP): Performed by: STUDENT IN AN ORGANIZED HEALTH CARE EDUCATION/TRAINING PROGRAM

## 2021-08-17 PROCEDURE — 2500000003 HC RX 250 WO HCPCS: Performed by: STUDENT IN AN ORGANIZED HEALTH CARE EDUCATION/TRAINING PROGRAM

## 2021-08-17 PROCEDURE — 87324 CLOSTRIDIUM AG IA: CPT

## 2021-08-17 PROCEDURE — 83993 ASSAY FOR CALPROTECTIN FECAL: CPT

## 2021-08-17 PROCEDURE — 93306 TTE W/DOPPLER COMPLETE: CPT

## 2021-08-17 PROCEDURE — 80048 BASIC METABOLIC PNL TOTAL CA: CPT

## 2021-08-17 PROCEDURE — 99223 1ST HOSP IP/OBS HIGH 75: CPT | Performed by: INTERNAL MEDICINE

## 2021-08-17 PROCEDURE — 2060000000 HC ICU INTERMEDIATE R&B

## 2021-08-17 PROCEDURE — 83935 ASSAY OF URINE OSMOLALITY: CPT

## 2021-08-17 PROCEDURE — 83930 ASSAY OF BLOOD OSMOLALITY: CPT

## 2021-08-17 PROCEDURE — 80053 COMPREHEN METABOLIC PANEL: CPT

## 2021-08-17 PROCEDURE — 94761 N-INVAS EAR/PLS OXIMETRY MLT: CPT

## 2021-08-17 PROCEDURE — 87449 NOS EACH ORGANISM AG IA: CPT

## 2021-08-17 PROCEDURE — 82550 ASSAY OF CK (CPK): CPT

## 2021-08-17 PROCEDURE — 99222 1ST HOSP IP/OBS MODERATE 55: CPT | Performed by: INTERNAL MEDICINE

## 2021-08-17 PROCEDURE — 94640 AIRWAY INHALATION TREATMENT: CPT

## 2021-08-17 PROCEDURE — 84302 ASSAY OF SWEAT SODIUM: CPT

## 2021-08-17 PROCEDURE — 36415 COLL VENOUS BLD VENIPUNCTURE: CPT

## 2021-08-17 RX ORDER — CALCIUM GLUCONATE 20 MG/ML
2000 INJECTION, SOLUTION INTRAVENOUS ONCE
Status: COMPLETED | OUTPATIENT
Start: 2021-08-17 | End: 2021-08-17

## 2021-08-17 RX ORDER — POTASSIUM CHLORIDE 7.45 MG/ML
INJECTION INTRAVENOUS
Status: COMPLETED
Start: 2021-08-17 | End: 2021-08-17

## 2021-08-17 RX ORDER — MAGNESIUM SULFATE IN WATER 40 MG/ML
2000 INJECTION, SOLUTION INTRAVENOUS ONCE
Status: COMPLETED | OUTPATIENT
Start: 2021-08-17 | End: 2021-08-17

## 2021-08-17 RX ORDER — POTASSIUM CHLORIDE 20 MEQ/1
40 TABLET, EXTENDED RELEASE ORAL 2 TIMES DAILY
Status: DISCONTINUED | OUTPATIENT
Start: 2021-08-17 | End: 2021-08-20

## 2021-08-17 RX ORDER — CALCIUM GLUCONATE 20 MG/ML
1000 INJECTION, SOLUTION INTRAVENOUS ONCE
Status: COMPLETED | OUTPATIENT
Start: 2021-08-17 | End: 2021-08-17

## 2021-08-17 RX ORDER — IPRATROPIUM BROMIDE AND ALBUTEROL SULFATE 2.5; .5 MG/3ML; MG/3ML
1 SOLUTION RESPIRATORY (INHALATION)
Status: DISCONTINUED | OUTPATIENT
Start: 2021-08-17 | End: 2021-08-18

## 2021-08-17 RX ORDER — MULTIVITAMIN WITH IRON
1 TABLET ORAL DAILY
Status: DISCONTINUED | OUTPATIENT
Start: 2021-08-17 | End: 2021-08-21 | Stop reason: HOSPADM

## 2021-08-17 RX ORDER — POTASSIUM CHLORIDE 7.45 MG/ML
40 INJECTION INTRAVENOUS ONCE
Status: COMPLETED | OUTPATIENT
Start: 2021-08-17 | End: 2021-08-17

## 2021-08-17 RX ORDER — 0.9 % SODIUM CHLORIDE 0.9 %
1000 INTRAVENOUS SOLUTION INTRAVENOUS ONCE
Status: COMPLETED | OUTPATIENT
Start: 2021-08-17 | End: 2021-08-17

## 2021-08-17 RX ORDER — POTASSIUM CHLORIDE 20 MEQ/1
TABLET, EXTENDED RELEASE ORAL
Status: DISPENSED
Start: 2021-08-17 | End: 2021-08-17

## 2021-08-17 RX ORDER — POTASSIUM CHLORIDE 7.45 MG/ML
10 INJECTION INTRAVENOUS
Status: COMPLETED | OUTPATIENT
Start: 2021-08-17 | End: 2021-08-17

## 2021-08-17 RX ADMIN — IPRATROPIUM BROMIDE AND ALBUTEROL SULFATE 1 AMPULE: .5; 3 SOLUTION RESPIRATORY (INHALATION) at 16:40

## 2021-08-17 RX ADMIN — FOLIC ACID 1 MG: 1 TABLET ORAL at 08:40

## 2021-08-17 RX ADMIN — SODIUM CHLORIDE, POTASSIUM CHLORIDE, SODIUM LACTATE AND CALCIUM CHLORIDE: 600; 310; 30; 20 INJECTION, SOLUTION INTRAVENOUS at 22:30

## 2021-08-17 RX ADMIN — POTASSIUM CHLORIDE 40 MEQ: 1500 TABLET, EXTENDED RELEASE ORAL at 20:51

## 2021-08-17 RX ADMIN — ACETAMINOPHEN 650 MG: 325 TABLET ORAL at 20:49

## 2021-08-17 RX ADMIN — SODIUM PHOSPHATE, MONOBASIC, MONOHYDRATE 20 MMOL: 276; 142 INJECTION, SOLUTION INTRAVENOUS at 16:22

## 2021-08-17 RX ADMIN — IPRATROPIUM BROMIDE AND ALBUTEROL SULFATE 1 AMPULE: .5; 3 SOLUTION RESPIRATORY (INHALATION) at 08:37

## 2021-08-17 RX ADMIN — ACETAMINOPHEN 650 MG: 325 TABLET ORAL at 08:44

## 2021-08-17 RX ADMIN — IPRATROPIUM BROMIDE AND ALBUTEROL SULFATE 1 AMPULE: .5; 3 SOLUTION RESPIRATORY (INHALATION) at 11:36

## 2021-08-17 RX ADMIN — LURASIDONE HYDROCHLORIDE 60 MG: 60 TABLET, FILM COATED ORAL at 01:48

## 2021-08-17 RX ADMIN — CALCIUM GLUCONATE 1000 MG: 20 INJECTION, SOLUTION INTRAVENOUS at 01:35

## 2021-08-17 RX ADMIN — HEPARIN SODIUM AND DEXTROSE 16 UNITS/KG/HR: 10000; 5 INJECTION INTRAVENOUS at 17:37

## 2021-08-17 RX ADMIN — MAGNESIUM SULFATE 2000 MG: 2 INJECTION INTRAVENOUS at 11:40

## 2021-08-17 RX ADMIN — HYDROXYCHLOROQUINE SULFATE 200 MG: 200 TABLET, FILM COATED ORAL at 08:42

## 2021-08-17 RX ADMIN — CALCIUM CARBONATE-CHOLECALCIFEROL TAB 250 MG-125 UNIT 500 MG: 250-125 TAB at 08:40

## 2021-08-17 RX ADMIN — FERROUS SULFATE TAB EC 325 MG (65 MG FE EQUIVALENT) 325 MG: 325 (65 FE) TABLET DELAYED RESPONSE at 08:40

## 2021-08-17 RX ADMIN — SODIUM CHLORIDE, PRESERVATIVE FREE 10 ML: 5 INJECTION INTRAVENOUS at 08:42

## 2021-08-17 RX ADMIN — Medication 50 MG: at 08:41

## 2021-08-17 RX ADMIN — SODIUM CHLORIDE 1000 ML: 9 INJECTION, SOLUTION INTRAVENOUS at 11:12

## 2021-08-17 RX ADMIN — SODIUM CHLORIDE, POTASSIUM CHLORIDE, SODIUM LACTATE AND CALCIUM CHLORIDE: 600; 310; 30; 20 INJECTION, SOLUTION INTRAVENOUS at 00:38

## 2021-08-17 RX ADMIN — LURASIDONE HYDROCHLORIDE 60 MG: 60 TABLET, FILM COATED ORAL at 20:52

## 2021-08-17 RX ADMIN — POTASSIUM CHLORIDE 10 MEQ: 10 INJECTION, SOLUTION INTRAVENOUS at 10:59

## 2021-08-17 RX ADMIN — ATORVASTATIN CALCIUM 20 MG: 20 TABLET, FILM COATED ORAL at 08:41

## 2021-08-17 RX ADMIN — Medication 1000 UNITS: at 08:40

## 2021-08-17 RX ADMIN — SODIUM CHLORIDE, PRESERVATIVE FREE 10 ML: 5 INJECTION INTRAVENOUS at 21:00

## 2021-08-17 RX ADMIN — CALCIUM GLUCONATE 2000 MG: 20 INJECTION, SOLUTION INTRAVENOUS at 12:06

## 2021-08-17 RX ADMIN — POTASSIUM CHLORIDE 10 MEQ: 10 INJECTION, SOLUTION INTRAVENOUS at 12:00

## 2021-08-17 RX ADMIN — HYDROXYCHLOROQUINE SULFATE 200 MG: 200 TABLET, FILM COATED ORAL at 01:48

## 2021-08-17 RX ADMIN — MAGNESIUM SULFATE 2000 MG: 2 INJECTION INTRAVENOUS at 01:38

## 2021-08-17 RX ADMIN — FLUOXETINE HYDROCHLORIDE 40 MG: 20 CAPSULE ORAL at 08:41

## 2021-08-17 RX ADMIN — Medication 1 TABLET: at 12:05

## 2021-08-17 RX ADMIN — HEPARIN SODIUM 2000 UNITS: 1000 INJECTION INTRAVENOUS; SUBCUTANEOUS at 11:13

## 2021-08-17 RX ADMIN — POTASSIUM CHLORIDE 40 MEQ: 1500 TABLET, EXTENDED RELEASE ORAL at 10:59

## 2021-08-17 RX ADMIN — PANTOPRAZOLE SODIUM 40 MG: 40 TABLET, DELAYED RELEASE ORAL at 06:53

## 2021-08-17 RX ADMIN — POTASSIUM CHLORIDE 20 MEQ: 1500 TABLET, EXTENDED RELEASE ORAL at 08:39

## 2021-08-17 RX ADMIN — POTASSIUM CHLORIDE 40 MEQ: 7.46 INJECTION, SOLUTION INTRAVENOUS at 05:19

## 2021-08-17 RX ADMIN — HYDROXYCHLOROQUINE SULFATE 200 MG: 200 TABLET, FILM COATED ORAL at 20:49

## 2021-08-17 ASSESSMENT — PAIN SCALES - GENERAL
PAINLEVEL_OUTOF10: 6
PAINLEVEL_OUTOF10: 6
PAINLEVEL_OUTOF10: 4
PAINLEVEL_OUTOF10: 5
PAINLEVEL_OUTOF10: 5
PAINLEVEL_OUTOF10: 7

## 2021-08-17 ASSESSMENT — ENCOUNTER SYMPTOMS: TACHYPNEA: 1

## 2021-08-17 ASSESSMENT — PAIN DESCRIPTION - PAIN TYPE: TYPE: ACUTE PAIN

## 2021-08-17 ASSESSMENT — PAIN DESCRIPTION - LOCATION: LOCATION: HEAD

## 2021-08-17 NOTE — PROGRESS NOTES
[facility]  Internal Medicine Teaching Residency Program  Inpatient Daily Progress Note  ______________________________________________________________________________    Patient: Master Mercedes  YOB: 1965   JTO:[id number]    Acct: [de-identified]     Room: [address] [place] [address]  Admit date: 8/16/2021  Today's date: 08/17/21  Number of days in the hospital: 1    SUBJECTIVE   Admitting Diagnosis: <principal problem not specified>  CC: diarrhea since two months    Pt examined at bedside. No acute issues overnight. Pt did have 8 episodes of diarrhea last night. Denies any abdominal pain/vomiting. Chart & results reviewed. Patient is eating ok, sleeping ok, normal bowel/ bladder movements. Patient is able to ambulate. Patient on room air       ROS:  Constitutional:  negative for chills, fevers, sweats  Respiratory:  negative for cough, dyspnea on exertion, hemoptysis, shortness of breath, wheezing  Cardiovascular:  negative for chest pain, chest pressure/discomfort, lower extremity edema, palpitations  Gastrointestinal:  negative for abdominal pain, constipation, diarrhea, nausea, vomiting  Neurological:  negative for dizziness, headache    BRIEF HISTORY     The patient is a pleasant 55 y.o. female presents with a chief complaint of diarrhea since the last 2 months. Patient reports having 10-15 episodes per day. Patient saw her rheumatologist last Friday and had some labs done which showed  low potassium of 1.9 that is when her rheumatologist told her to go to the ED. but the patient came today. Patient reports that the stools are brown, watery, with mucus, jelly like. Patient also reports episodes of vomiting since last 2 months. Reports abdominal pain, nausea vomiting. Change in appetite and weight. Patient reports having lost 30 to 35 pounds in the span of last 6 months. Patient follows rheumatologist for joint pains, rheumatoid arthritis versus gout. Patient reports numbness in her feet. Patient also reports episodes of flareup when the wrists swell. Patient also reports an episode of passing out a week ago. Pt feels frustrated that she has to take too many medications, started crying while telling that. Pt says she get frustrated and stop taking them and end up this way. Pt admits drinking alcohol, since the last 2 months pt has been drinking 16 ounce can every other day. Pt has h/o CAD, h/o stents put in the past.  Pt takes Norvasc for hypertension, takes Lipitor and hydrochlorothiazide. Pt takes hydroxychloroquine and steroids for rheumatoid arthritis. OBJECTIVE     Vital Signs:  /87   Pulse 97   Temp 98.3 °F (36.8 °C) (Oral)   Resp 10   Ht 5' 5\" (1.651 m)   Wt 174 lb 2.6 oz (79 kg)   SpO2 100%   BMI 28.98 kg/m²     Temp (24hrs), Av.3 °F (36.8 °C), Min:98.2 °F (36.8 °C), Max:98.4 °F (36.9 °C)    In: 1200   Out: -     Physical Exam:  Physical Exam  Constitutional:       Appearance: Normal appearance. HENT:      Head: Normocephalic and atraumatic. Nose: Nose normal.      Mouth/Throat:      Mouth: Mucous membranes are moist.   Eyes:      Extraocular Movements: Extraocular movements intact. Cardiovascular:      Rate and Rhythm: Normal rate and regular rhythm. Pulses: Normal pulses. Heart sounds: Normal heart sounds. No murmur heard. Pulmonary:      Effort: No respiratory distress. Breath sounds: Normal breath sounds. No wheezing or rales. Abdominal:      General: There is no distension. Palpations: Abdomen is soft. Tenderness: There is no abdominal tenderness. Musculoskeletal:         General: No swelling. Normal range of motion. Cervical back: Normal range of motion. No rigidity. Right lower leg: No edema. Left lower leg: No edema. Skin:     General: Skin is warm. Coloration: Skin is not jaundiced. Findings: No bruising or rash.    Neurological:      General: No focal deficit present. Mental Status: She is alert and oriented to person, place, and time. Mental status is at baseline. Psychiatric:         Mood and Affect: Mood normal.         Behavior: Behavior normal.           Medications:  Scheduled Medications:    ipratropium-albuterol  1 ampule Inhalation Q4H WA    multivitamin  1 tablet Oral Daily    sodium chloride flush  5-40 mL Intravenous 2 times per day    thiamine mononitrate  50 mg Oral Daily    Vitamin D  1,000 Units Oral Daily    potassium chloride  20 mEq Oral Daily    pantoprazole  40 mg Oral QAM AC    lurasidone  60 mg Oral Nightly    hydroxychloroquine  200 mg Oral BID    FLUoxetine  40 mg Oral Daily    folic acid  1 mg Oral Daily    ferrous sulfate  325 mg Oral Daily with breakfast    Calcium Carb-Cholecalciferol  2 tablet Oral Daily    atorvastatin  20 mg Oral Daily     Continuous Infusions:    heparin (PORCINE) Infusion 14 Units/kg/hr (08/16/21 9129)    sodium chloride      lactated ringers 125 mL/hr at 08/17/21 0038     PRN Medicationsheparin (porcine), 4,000 Units, PRN  heparin (porcine), 2,000 Units, PRN  sodium chloride flush, 5-40 mL, PRN  sodium chloride, 25 mL, PRN  acetaminophen, 650 mg, Q4H PRN  ondansetron, 4 mg, Q8H PRN   Or  ondansetron, 4 mg, Q6H PRN        Diagnostic Labs:  CBC:   Recent Labs     08/16/21  1557   WBC 11.1   RBC 3.51*   HGB 11.1*   HCT 32.9*   MCV 93.7   RDW 15.5*   *     BMP:   Recent Labs     08/16/21  1557 08/16/21  2301 08/17/21  0717    140 142   K 1.8* 1.9* 1.7*   CL 90* 94* 95*   CO2 33* 33* 32*   BUN 10 8 7   CREATININE 1.14* 1.03* 0.85     BNP: No results for input(s): BNP in the last 72 hours. PT/INR: No results for input(s): PROTIME, INR in the last 72 hours. APTT:   Recent Labs     08/16/21  1802 08/16/21  2301 08/17/21  0717   APTT 21.5 36.5* 38.1*     CARDIAC ENZYMES: No results for input(s): CKMB, CKMBINDEX, TROPONINI in the last 72 hours.     Invalid input(s): CKTOTAL;3  FASTING LIPID PANEL:  Lab Results   Component Value Date    CHOL 248 (H) 03/21/2021     03/21/2021    TRIG 47 03/21/2021     LIVER PROFILE:   Recent Labs     08/16/21  1557 08/17/21  0717   AST 90* 62*   ALT 63* 50*   BILITOT 0.52 0.55   ALKPHOS 164* 142*      MICROBIOLOGY:   Lab Results   Component Value Date/Time    CULTURE ESCHERICHIA COLI >182414 CFU/ML (A) 03/30/2021 06:28 AM       Imaging:    CT ABDOMEN PELVIS WO CONTRAST Additional Contrast? None    Result Date: 8/16/2021  No acute CT abnormality within the abdomen or pelvis. Gallbladder is nondistended with layering biliary sludge and/or small gallstones. No pericholecystic inflammatory changes or CT evidence of acute cholecystitis. Hepatic steatosis. XR CHEST PORTABLE    Result Date: 8/16/2021  No acute cardiopulmonary findings       ASSESSMENT & PLAN     Assessment and Plan: Active Problems:    Hypokalemia  Resolved Problems:    * No resolved hospital problems. *      1. Hypokalemia -                Likely due to chronic diarrhea               Continue replacement               Continue fluids     2.  Rhabdomyolysis - non traumatic               likely to hypokalemia               CK around 5900 > 3905               Continue IV fluids                Will trend CK and monitor              3. Chronic diarrhea -                Continue hydration               Follow up on stool studies     4. Elevated troponin -                EKG - sinus, prolonged QTc, T wave inversions               Continue heparin drip               Follow up on echo               Monitor vitals closely                 Diet: regular adult diet  DVT ppx : pt already on heparin  GI ppx: protonix    PT/OT: consulted  Discharge Planning / SW: ongoing      [facility]  PGY-1  [facility] Medicine  [facility]   10:29 AM 8/17/2021

## 2021-08-17 NOTE — CONSULTS
[facility]    GASTROENTEROLOGY CONSULT    Patient:   Vern Fung   :    1965   Facility:   [facility]   Date:    2021  Admission Dx:  Hypocalcemia [E83.51]  Hypokalemia [E87.6]  Hypochloremia [E87.8]  Elevated troponin [R77.8]  ROXANN (acute kidney injury) ([facility] [address] [N17.9]  Requesting physician: Socorro Bhatt MD  Reason for consult:  Severe diarrhea with hypokalemia*    CC : \"diarrhea\"    SUBJECTIVE     HISTORY OF PRESENT ILLNESS  This is a 55 y.o. AA  female who was admitted 2021 with Hypocalcemia [E83.51]  Hypokalemia [E87.6]  Hypochloremia [E87.8]  Elevated troponin [R77.8]  ROXANN (acute kidney injury) ([facility] [address] [N17.9]. We have been asked to see the patient in consultation by Socorro Bhatt MD for diarrhea and hypokalemia. Patient is a pleasant 52-year-old female with past medical history of hypertension, hypertrophic cardiomyopathy, GERD, gout, type 2 diabetes mellitus, obstructive sleep apnea came with chief complaint of diarrhea from 1- to 2 years. Patient was admitted for the same complaint in 2020. Patient states she had 6 episodes of diarrhea yesterday and 4 episodes of diarrhea today. Patient states her diarrhea is associated with dark brown loose stools, with mucus and jellylike material mixed with stool. She also experiences tenesmus and pinkish mucus with stool. she also complains of nausea and vomiting which has been ongoing. She states her nausea and vomiting did not occur if she takes Protonix in the morning. If she forgets to take Protonix she has vomiting in the morning. She also complains of weight loss of 40 to 50 pounds over the last 8 to 9 months. Her weight loss is associated with fatigue, episodes of sweating, and throbbing headaches. She also states she takes naproxen 2 time 3 times per week for her arthritis.    She denies any fever, abdominal pain, constipation, black tarry stools, bright red blood per rectum, or any blood in vomiting. Her last meal was 3 days ago. Her last EGD/colonoscopy was 12/20. Family history is significant for cancer in maternal side of the family, mother has history of ovarian cancer. Social history is significant for occasional beer intake, denies any smoking or illicit drug use. Summary of imaging completed at this time:  CT abdomen pelvis 08/16/2021 -no acute CT abnormality within the abdomen or pelvis. Hepatic steatosis    Summary of labs completed at this time:  Labs are significant for potassium 1.7 bicarb of 32 creatinine 0.85 calcium 7.2 albumin 0.9 total protein 5.9. Cardiac lipid profile shows total CK 3905  LFTs show albumin 2.8  ALT 50 AST 62 total protein 5.9  CBC shows hemoglobin 11.1 platelet 719      Previous GI history:   EGD with biopsy 12/20 -gastritis, hiatal hernia; biopsy showed mild to moderate chronic inactive gastritis, negative for H. pylori  Esophagus: normal.   Stomach: Stomach had medium sized hiatal hernia. Stomach also had moderate gastritis in the antrum which was characterized by his gastric erythema and mucosal edema. This was biopsied with cold biopsy forceps. The rest of the stomach was otherwise normal.  Duodenum: normal    Colonoscopy 12/20 -internal hemorrhoids; biopsy showed normal colonic mucosa  1. Normal terminal ileum  2. Normal entire examined colon. The colon was randomly biopsied with cold biopsy forceps to rule out microscopic colitis.   3. Small grade 1 internal hemorrhoids on retroflexion  OBJECTIVE:     PAST MEDICAL/SURGICAL HISTORY  Past Medical History:   Diagnosis Date    Angioedema 11/17/2017    from lisinopril    Anxiety     Asthma     mild persistent asthma, on home resp medications for control    Bipolar disorder (HCC)     Claustrophobia     Depression     GERD (gastroesophageal reflux disease)     Hypertension     Hypertrophic cardiomyopathy (HCC)     Lung nodules     MRSA (methicillin resistant Staphylococcus aureus)     rt hip    Murmur     see cardiac echo result    OA (osteoarthritis)     JONES (obstructive sleep apnea)     Thyroid nodule     Type 2 diabetes mellitus without complication, without long-term current use of insulin ([facility] [address] 12/7/2018     Past Surgical History:   Procedure Laterality Date    BUNIONECTOMY Bilateral 12/7/2020    [facility]  BUNIONECTOMY, [address] performed by Gaurav Juares DPM at [place] [address] COLONOSCOPY      bx    COLONOSCOPY N/A 12/3/2020    COLONOSCOPY WITH BIOPSY performed by Hong Bains MD at [address] Bilateral 12/07/2020    [facility] bunionectomy, right 2nd hammer toe repair     HAMMER TOE SURGERY Right 12/7/2020    2ND TOE HAMMER REPAIR performed by Gaurav Juares DPM at [facility]    OTHER SURGICAL HISTORY  8/24/2015    MRI under anesthesia    THYROID SURGERY      bilat bx    UPPER GASTROINTESTINAL ENDOSCOPY      UPPER GASTROINTESTINAL ENDOSCOPY N/A 12/3/2020    EGD BIOPSY performed by Hong [facility]ains MD at [facility]       ALLERGIES:  Allergies   Allergen Reactions    Bee Venom Anaphylaxis    Iodine Anaphylaxis and Rash    Lisinopril Swelling and Anaphylaxis     Angioedema    Shellfish-Derived Products Anaphylaxis and Rash     ANGIOEDEMA       HOME MEDICATIONS:  Prior to Admission medications    Medication Sig Start Date End Date Taking?  Authorizing Provider   vitamin D3 (CHOLECALCIFEROL) 25 MCG (1000 UT) TABS tablet TAKE 1 TABLET BY MOUTH DAILY  7/16/21   Patricia Oneill MD   hydroxychloroquine (PLAQUENIL) 200 MG tablet TAKE 1 TABLET BY MOUTH 2 TIMES DAILY  7/15/21   Patricia Oneill MD   meloxicam (MOBIC) 15 MG tablet TAKE 1 TABLET BY MOUTH DAILY  7/15/21   Patricia Oneill MD   thiamine mononitrate 100 MG tablet TAKE 1 TABLET BY MOUTH ONCE DAILY  7/14/21   Patricia Oneill MD   FLUoxetine (PROZAC) 40 MG capsule Take 40 mg by mouth daily    Historical Provider, MD acetaminophen (APAP EXTRA STRENGTH) 500 MG tablet Take 2 tablets by mouth every 6 hours as needed for Pain 6/11/21   Dian Sullivan MD   omeprazole (PRILOSEC) 20 MG delayed release capsule Take 1 capsule by mouth daily 6/11/21   Dian Sullivan MD   hydroCHLOROthiazide (HYDRODIURIL) 25 MG tablet Take 1 tablet by mouth every morning 6/11/21   Dian Sullivan MD   potassium chloride (KLOR-CON M) 20 MEQ extended release tablet Take 1 tablet by mouth daily 6/11/21   Dian Sullivan MD   atorvastatin (LIPITOR) 20 MG tablet TAKE 1 TABLET BY MOUTH DAILY  5/12/21   Allan López MD   folic acid (FOLVITE) 1 MG tablet Take 1 tablet by mouth daily 4/27/21   Dian Sullivan MD   Cholecalciferol (VITAMIN D3) 25 MCG (1000 UT) CAPS Take 1,000 Units by mouth daily    Historical Provider, MD   ferrous sulfate (IRON 325) 325 (65 Fe) MG tablet Take 325 mg by mouth daily (with breakfast)    Historical Provider, MD   citalopram (CELEXA) 20 MG tablet Take 1 tablet by mouth daily 3/24/21   Donnie Cruz MD   Calcium Carbonate-Vitamin D (OYSTER SHELL CALCIUM/D) 500-200 MG-UNIT TABS TAKE 1 TAB BY MOUTH ONCE A DAY  1/22/21   Allan López MD   atenolol (TENORMIN) 50 MG tablet Take 1 tablet by mouth daily 1/22/21   Allan López MD   loratadine (CLARITIN) 10 MG tablet TAKE 1 CAPSULE BY MOUTH DAILY  1/12/21   Allan López MD   amLODIPine (NORVASC) 10 MG tablet TAKE 1 TABLET BY MOUTH DAILY  1/8/21   Allan López MD   diclofenac sodium (VOLTAREN) 1 % GEL Apply 4 g topically 2 times daily 1/6/21   Neftali Gleason DPM   albuterol sulfate (PROAIR RESPICLICK) 627 (90 Base) MCG/ACT aerosol powder inhalation Inhale 2 puffs into the lungs every 6 hours as needed for Wheezing or Shortness of Breath 1/15/20   Carmelo Landa MD   lurasidone (LATUDA) 60 MG TABS tablet Take 60 mg by mouth nightly    Historical Provider, MD       CURRENT MEDICATIONS:  Scheduled Meds:   ipratropium-albuterol  1 ampule Inhalation Q4H WA    multivitamin  1 tablet Oral Daily    potassium chloride  40 mEq Oral BID    potassium chloride  10 mEq Intravenous Q1H    magnesium sulfate  2,000 mg Intravenous Once    calcium gluconate  2,000 mg Intravenous Once    sodium chloride  1,000 mL Intravenous Once    potassium chloride        potassium chloride        sodium chloride flush  5-40 mL Intravenous 2 times per day    thiamine mononitrate  50 mg Oral Daily    Vitamin D  1,000 Units Oral Daily    pantoprazole  40 mg Oral QAM AC    lurasidone  60 mg Oral Nightly    hydroxychloroquine  200 mg Oral BID    FLUoxetine  40 mg Oral Daily    folic acid  1 mg Oral Daily    ferrous sulfate  325 mg Oral Daily with breakfast    Calcium Carb-Cholecalciferol  2 tablet Oral Daily    atorvastatin  20 mg Oral Daily     Continuous Infusions:   heparin (PORCINE) Infusion 16 Units/kg/hr (08/17/21 1114)    sodium chloride      lactated ringers 150 mL/hr at 08/17/21 1034     PRN Meds:heparin (porcine), heparin (porcine), sodium chloride flush, sodium chloride, acetaminophen, ondansetron **OR** ondansetron    SOCIAL HISTORY:     Tobacco:   reports that she quit smoking about 28 years ago. Her smoking use included cigarettes. She has a 10.00 pack-year smoking history. She has never used smokeless tobacco.  Alcohol:   reports previous alcohol use of about 12.0 standard drinks of alcohol per week. Illicit drugs:  reports previous drug use. Drug: Cocaine. FAMILY HISTORY:     Family History   Problem Relation Age of Onset    Stroke Mother     Hypertension Father     Cancer Brother         bone    Lung Cancer Maternal Aunt     Cancer Maternal Grandmother         eye     Other Sister         aneurysm    Heart Disease Sister        REVIEW OF SYSTEMS:    Constitutional: No fever, no chills, no lethargy, no weakness. HEENT:  No headache, otalgia, itchy eyes, nasal discharge or sore throat. Cardiac:  No chest pain, dyspnea, orthopnea or PND.   Chest:   No cough, phlegm or last 72 hours. IMAGING  CT ABDOMEN PELVIS WO CONTRAST Additional Contrast? None    Result Date: 8/16/2021  EXAMINATION: CT OF THE ABDOMEN AND PELVIS WITHOUT CONTRAST 8/16/2021 3:57 pm TECHNIQUE: CT of the abdomen and pelvis was performed without the administration of intravenous contrast. Multiplanar reformatted images are provided for review. Dose modulation, iterative reconstruction, and/or weight based adjustment of the mA/kV was utilized to reduce the radiation dose to as low as reasonably achievable. COMPARISON: CT of the abdomen without contrast of 07/07/2015. CT of the abdomen and pelvis of 05/03/2016. HISTORY: ORDERING SYSTEM PROVIDED HISTORY: Epigastric abdominal pain, diarrhea TECHNOLOGIST PROVIDED HISTORY: Epigastric abdominal pain, diarrhea Decision Support Exception - unselect if not a suspected or confirmed emergency medical condition->Emergency Medical Condition (MA) Is the patient pregnant?->No Reason for Exam: epigastric abdominal pain, diarrhea Acuity: Unknown Type of Exam: Unknown FINDINGS: Lower Chest:  Visualized portion of the lower chest demonstrates no acute abnormality. Lung bases are clear. No pleural effusion. Organs: Diffuse hepatic steatosis. Gallbladder is nondistended with layering biliary sludge and/or small gallstones. No biliary ductal dilation. No pericholecystic inflammatory changes. The spleen, adrenals, and pancreas are unremarkable. Kidneys are symmetric in size. No hydroureteronephrosis. No renal calculi. GI/Bowel: No bowel obstruction. Normal appendix in the right lower quadrant. Pelvis: The bladder is collapsed, limiting detailed evaluation. Status post hysterectomy. No adnexal mass. Numerous pelvic phleboliths noted. Peritoneum/Retroperitoneum: Abdominal aorta is normal in course and caliber with scattered atherosclerotic calcifications. No lymphadenopathy. No large volume ascites or free intraperitoneal air.  Bones/Soft Tissues: No soft tissue abnormality. No acute osseous abnormality. No suspicious osseous lesion. No acute CT abnormality within the abdomen or pelvis. Gallbladder is nondistended with layering biliary sludge and/or small gallstones. No pericholecystic inflammatory changes or CT evidence of acute cholecystitis. Hepatic steatosis. XR CHEST PORTABLE    Result Date: 8/16/2021  EXAMINATION: ONE XRAY VIEW OF THE CHEST 8/16/2021 3:51 pm COMPARISON: March 22, 2021 chest examination HISTORY: ORDERING SYSTEM PROVIDED HISTORY: Epigastric pain TECHNOLOGIST PROVIDED HISTORY: Epigastric pain Reason for Exam: epigastric pain FINDINGS: Stable normal cardiopericardial silhouette/mild tortuosity of the thoracic aorta There are no significant pleural, parenchymal or mediastinal findings     No acute cardiopulmonary findings       IMPRESSION:     1. Chronic diarrhea with hypokalemia, K 1.7 today  2. Unintentional Weight loss of 40-50 lbs over 9 months. 3. Type II MI, troponin 132, 144, cardiology on board  4. Rhabdomyolysis      Old records, labs and imaging reviewed. PLAN     1. Ordered lawrence protectin, fecal elastase, stool electrolytes, celiac panel to rule out inflammatory vs infectious vs malabsorptive diarrhea will follow up on the result. 2. Ordered gastrin, somatostatin, calcitonin, VIP, 5 HIAA, serotonin assay to rule out any neuroendocrine tumor. 3. Medical management as per primary team.          This plan was formulated in collaboration with Dr. Braulio Small . Thank you for allowing us to participate in the care of your patient. Electronically signed by: Cristian Hooks MD IM resident PGY - 1 on 8/17/2021 at 11:44 AM     Please note that this note was generated using a voice recognition dictation software. Although every effort was made to ensure the accuracy of this automated transcription, some errors in transcription may have occurred.

## 2021-08-17 NOTE — CARE COORDINATION
Spoke with Oneil Prince at [facility] and told her patient was looking for medication management per last CM note. She states she is looking at their staffing and will get back with me if they can accept or not.

## 2021-08-17 NOTE — H&P
 COLONOSCOPY      bx    COLONOSCOPY N/A 12/3/2020    COLONOSCOPY WITH BIOPSY performed by Michelle Lora MD at [address] Bilateral 12/07/2020    [facility] bunionectomy, right 2nd hammer toe repair     HAMMER TOE SURGERY Right 12/7/2020    2ND TOE HAMMER REPAIR performed by Bella Lopez DPM at [facility]      partial hyst    OTHER SURGICAL HISTORY  8/24/2015    MRI under anesthesia    THYROID SURGERY      bilat bx    UPPER GASTROINTESTINAL ENDOSCOPY      UPPER GASTROINTESTINAL ENDOSCOPY N/A 12/3/2020    EGD BIOPSY performed by Michelle Lora MD at [facility]       ALLERGIES     Bee venom, Iodine, Lisinopril, and Shellfish-derived products    MEDICATIONS PRIOR TO ADMISSION     Prior to Admission medications    Medication Sig Start Date End Date Taking?  Authorizing Provider   vitamin D3 (CHOLECALCIFEROL) 25 MCG (1000 UT) TABS tablet TAKE 1 TABLET BY MOUTH DAILY  7/16/21   Durga Mak MD   hydroxychloroquine (PLAQUENIL) 200 MG tablet TAKE 1 TABLET BY MOUTH 2 TIMES DAILY  7/15/21   Durga Mak MD   meloxicam (MOBIC) 15 MG tablet TAKE 1 TABLET BY MOUTH DAILY  7/15/21   Durga Mak MD   thiamine mononitrate 100 MG tablet TAKE 1 TABLET BY MOUTH ONCE DAILY  7/14/21   Durga Mak MD   FLUoxetine (PROZAC) 40 MG capsule Take 40 mg by mouth daily    Historical Provider, MD   acetaminophen (APAP EXTRA STRENGTH) 500 MG tablet Take 2 tablets by mouth every 6 hours as needed for Pain 6/11/21   Kenna Foley MD   omeprazole (PRILOSEC) 20 MG delayed release capsule Take 1 capsule by mouth daily 6/11/21   Kenna Foley MD   hydroCHLOROthiazide (HYDRODIURIL) 25 MG tablet Take 1 tablet by mouth every morning 6/11/21   Kenna Foley MD   potassium chloride (KLOR-CON M) 20 MEQ extended release tablet Take 1 tablet by mouth daily 6/11/21   Kenna Foley MD   atorvastatin (LIPITOR) 20 MG tablet TAKE 1 TABLET BY MOUTH DAILY  5/12/21   Sonali Zapata MD   folic acid 06/14/21 170 lb 3.2 oz (77.2 kg)   06/11/21 177 lb (80.3 kg)     Body Mass Index : Body mass index is 29.18 kg/m². PHYSICAL EXAMINATION:  Physical Exam  Constitutional:       Appearance: She is obese. HENT:      Head: Normocephalic and atraumatic. Nose: Nose normal.      Mouth/Throat:      Mouth: Mucous membranes are moist.   Eyes:      Extraocular Movements: Extraocular movements intact. Cardiovascular:      Rate and Rhythm: Normal rate and regular rhythm. Pulses: Normal pulses. Heart sounds: Normal heart sounds. No murmur heard. Pulmonary:      Effort: No respiratory distress. Breath sounds: Normal breath sounds. No wheezing or rales. Abdominal:      General: There is no distension. Palpations: Abdomen is soft. Tenderness: There is no abdominal tenderness. Musculoskeletal:         General: No swelling. Normal range of motion. Cervical back: Normal range of motion. No rigidity. Right lower leg: No edema. Left lower leg: No edema. Skin:     General: Skin is warm. Coloration: Skin is not jaundiced. Findings: No bruising or rash. Neurological:      General: No focal deficit present. Mental Status: She is alert and oriented to person, place, and time. Mental status is at baseline.    Psychiatric:         Mood and Affect: Mood normal.         Behavior: Behavior normal.           INVESTIGATIONS     Laboratory Testing:     Recent Results (from the past 24 hour(s))   CBC Auto Differential    Collection Time: 08/16/21  3:57 PM   Result Value Ref Range    WBC 11.1 3.5 - 11.3 k/uL    RBC 3.51 (L) 3.95 - 5.11 m/uL    Hemoglobin 11.1 (L) 11.9 - 15.1 g/dL    Hematocrit 32.9 (L) 36.3 - 47.1 %    MCV 93.7 82.6 - 102.9 fL    MCH 31.6 25.2 - 33.5 pg    MCHC 33.7 28.4 - 34.8 g/dL    RDW 15.5 (H) 11.8 - 14.4 %    Platelets 752 (H) 191 - 453 k/uL    MPV 8.8 8.1 - 13.5 fL    NRBC Automated 0.0 0.0 per 100 WBC    Differential Type NOT REPORTED     Seg Neutrophils 83 (H) 36 - 65 %    Lymphocytes 9 (L) 24 - 43 %    Monocytes 7 3 - 12 %    Eosinophils % 0 (L) 1 - 4 %    Basophils 0 0 - 2 %    Immature Granulocytes 1 (H) 0 %    Segs Absolute 9.23 (H) 1.50 - 8.10 k/uL    Absolute Lymph # 1.00 (L) 1.10 - 3.70 k/uL    Absolute Mono # 0.76 0.10 - 1.20 k/uL    Absolute Eos # <0.03 0.00 - 0.44 k/uL    Basophils Absolute <0.03 0.00 - 0.20 k/uL    Absolute Immature Granulocyte 0.09 0.00 - 0.30 k/uL    WBC Morphology NOT REPORTED     RBC Morphology ANISOCYTOSIS PRESENT     Platelet Estimate NOT REPORTED    Comprehensive Metabolic Panel w/ Reflex to MG    Collection Time: 08/16/21  3:57 PM   Result Value Ref Range    Glucose 214 (H) 70 - 99 mg/dL    BUN 10 6 - 20 mg/dL    CREATININE 1.14 (H) 0.50 - 0.90 mg/dL    Bun/Cre Ratio NOT REPORTED 9 - 20    Calcium 7.1 (L) 8.6 - 10.4 mg/dL    Sodium 140 135 - 144 mmol/L    Potassium 1.8 (LL) 3.7 - 5.3 mmol/L    Chloride 90 (L) 98 - 107 mmol/L    CO2 33 (H) 20 - 31 mmol/L    Anion Gap 17 9 - 17 mmol/L    Alkaline Phosphatase 164 (H) 35 - 104 U/L    ALT 63 (H) 5 - 33 U/L    AST 90 (H) <32 U/L    Total Bilirubin 0.52 0.3 - 1.2 mg/dL    Total Protein 6.7 6.4 - 8.3 g/dL    Albumin 3.3 (L) 3.5 - 5.2 g/dL    Albumin/Globulin Ratio 1.0 1.0 - 2.5    GFR Non- 49 (L) >60 mL/min    GFR African American 60 (L) >60 mL/min    GFR Comment          GFR Staging NOT REPORTED    Lipase    Collection Time: 08/16/21  3:57 PM   Result Value Ref Range    Lipase 32 13 - 60 U/L   Troponin    Collection Time: 08/16/21  3:57 PM   Result Value Ref Range    Troponin, High Sensitivity 144 (HH) 0 - 14 ng/L    Troponin T NOT REPORTED <0.03 ng/mL    Troponin Interp NOT REPORTED    MAGNESIUM    Collection Time: 08/16/21  3:57 PM   Result Value Ref Range    Magnesium 1.1 (L) 1.6 - 2.6 mg/dL   CK    Collection Time: 08/16/21  3:57 PM   Result Value Ref Range    Total CK 5,944 (H) 26 - 192 U/L   Calcium, Ionized    Collection Time: 08/16/21  4:01 PM Result Value Ref Range    Calcium, Ion 0.81 (L) 1.13 - 1.33 mmol/L   Troponin    Collection Time: 08/16/21  5:26 PM   Result Value Ref Range    Troponin, High Sensitivity 132 (HH) 0 - 14 ng/L    Troponin T NOT REPORTED <0.03 ng/mL    Troponin Interp NOT REPORTED    APTT    Collection Time: 08/16/21  6:02 PM   Result Value Ref Range    PTT 21.5 20.5 - 30.5 sec       Imaging:   CT ABDOMEN PELVIS WO CONTRAST Additional Contrast? None    Result Date: 8/16/2021  No acute CT abnormality within the abdomen or pelvis. Gallbladder is nondistended with layering biliary sludge and/or small gallstones. No pericholecystic inflammatory changes or CT evidence of acute cholecystitis. Hepatic steatosis. XR CHEST PORTABLE    Result Date: 8/16/2021  No acute cardiopulmonary findings       ASSESSMENT & PLAN     ASSESSMENT:     Primary Problem  <principal problem not specified>    Active Hospital Problems    Diagnosis Date Noted    Hypokalemia [E87.6] 08/16/2021       PLAN:     IMPRESSION  This is a 55 y.o. female who presented with above mentioned complaints and was admitted to inpatient service for further management as follows: Active Problems:    Hypokalemia  Resolved Problems:    * No resolved hospital problems. *      1. Hypokalemia -                Likely due to chronic diarrhea               Continue replacement               Continue fluids    2. Rhabdomyolysis - non traumatic               likely to hypokalemia               CK around 5900               Continue IV fluids                Will trend CK and monitor              3. Chronic diarrhea -                Continue hydration               Follow up on stool studies    4.  Elevated troponin -                EKG - sinus, prolonged QTc, T wave inversions               Continue heparin drip                Follow cardiology recommendations      Diet: NPO since midnight  DVT ppx: pt already on heparin  GI ppx: protonix    PT/OT/SW - consulted  Discharge Planning: consulted     Micha Pena  PGY-1  Internal Medicine  [facility]   8:46 [place] 8/16/2021

## 2021-08-17 NOTE — CONSULTS
[facility]    Consult / H&P               Today's Date: 8/17/2021  Patient Name: Dennis Dickerson  Date of admission: 8/16/2021  3:18 PM  Patient's age: 55 y. o., 1965  Admission Dx: Hypocalcemia [E83.51]  Hypokalemia [E87.6]  Hypochloremia [E87.8]  Elevated troponin [R77.8]  ROXANN (acute kidney injury) ([address] [N17.9]    Reason for Consult:  Cardiac evaluation    Requesting Physician: Armida Boeck, MD    CHIEF COMPLAINT:  Abnormal Labs    History Obtained From:  Patient and EMR    HISTORY OF PRESENT ILLNESS:    Patient is a 55Year old F having chronic diarrhea 10+ episodes of watery diarrhea daily for the last 2 months. Patient was told to come to the emergency room for abnormal labs, K+:1.9, Calcium 6.6, Total CK 5944  She complains of generalized abdominal pain. She has a PMHx of HTN, Hypertrophic cardiomyopathy, diabetes mellitus. EKG on arrival showed Sinus tachycardia, prolonged QTc 480, T wave inversion in lateral leads. Hs-Trop is 132,   Consult is taken for raised troponin. Past Medical History:   has a past medical history of Angioedema, Anxiety, Asthma, Bipolar disorder ([address] Claustrophobia, Depression, GERD (gastroesophageal reflux disease), Hypertension, Hypertrophic cardiomyopathy ([address] Lung nodules, MRSA (methicillin resistant Staphylococcus aureus), Murmur, OA (osteoarthritis), JONES (obstructive sleep apnea), Thyroid nodule, and Type 2 diabetes mellitus without complication, without long-term current use of insulin ([address] Past Surgical History:   has a past surgical history that includes Hysterectomy; Upper gastrointestinal endoscopy; Colonoscopy; Thyroid surgery; Cardiac catheterization; other surgical history (8/24/2015); Upper gastrointestinal endoscopy (N/A, 12/3/2020); Colonoscopy (N/A, 12/3/2020); Foot surgery (Bilateral, 12/07/2020); Hammer toe surgery (Right, 12/7/2020); and Bunionectomy (Bilateral, 12/7/2020).      Home Medications:    Prior to Admission medications    Medication Sig Start Date End Date Taking?  Authorizing Provider   vitamin D3 (CHOLECALCIFEROL) 25 MCG (1000 UT) TABS tablet TAKE 1 TABLET BY MOUTH DAILY  7/16/21   Juan Skelton MD   hydroxychloroquine (PLAQUENIL) 200 MG tablet TAKE 1 TABLET BY MOUTH 2 TIMES DAILY  7/15/21   Juan Skelton MD   meloxicam (MOBIC) 15 MG tablet TAKE 1 TABLET BY MOUTH DAILY  7/15/21   Juan Skelton MD   thiamine mononitrate 100 MG tablet TAKE 1 TABLET BY MOUTH ONCE DAILY  7/14/21   Juan Skelton MD   FLUoxetine (PROZAC) 40 MG capsule Take 40 mg by mouth daily    Historical Provider, MD   acetaminophen (APAP EXTRA STRENGTH) 500 MG tablet Take 2 tablets by mouth every 6 hours as needed for Pain 6/11/21   Kaley Gama MD   omeprazole (PRILOSEC) 20 MG delayed release capsule Take 1 capsule by mouth daily 6/11/21   Kaley Gama MD   hydroCHLOROthiazide (HYDRODIURIL) 25 MG tablet Take 1 tablet by mouth every morning 6/11/21   Kaley Gama MD   potassium chloride (KLOR-CON M) 20 MEQ extended release tablet Take 1 tablet by mouth daily 6/11/21   Kaley Gama MD   atorvastatin (LIPITOR) 20 MG tablet TAKE 1 TABLET BY MOUTH DAILY  5/12/21   Bubba Wilkins MD   folic acid (FOLVITE) 1 MG tablet Take 1 tablet by mouth daily 4/27/21   Kaley Gama MD   Cholecalciferol (VITAMIN D3) 25 MCG (1000 UT) CAPS Take 1,000 Units by mouth daily    Historical Provider, MD   ferrous sulfate (IRON 325) 325 (65 Fe) MG tablet Take 325 mg by mouth daily (with breakfast)    Historical Provider, MD   citalopram (CELEXA) 20 MG tablet Take 1 tablet by mouth daily 3/24/21   Nano Yates MD   Calcium Carbonate-Vitamin D (OYSTER SHELL CALCIUM/D) 500-200 MG-UNIT TABS TAKE 1 TAB BY MOUTH ONCE A DAY  1/22/21   Bubba Wilkins MD   atenolol (TENORMIN) 50 MG tablet Take 1 tablet by mouth daily 1/22/21   Bubba Wilkins MD   loratadine (CLARITIN) 10 MG tablet TAKE 1 CAPSULE BY MOUTH DAILY  1/12/21   Jose Zuniga MD   amLODIPine (NORVASC) 10 MG tablet TAKE 1 TABLET BY MOUTH DAILY  1/8/21   Jose Zuniga MD   diclofenac sodium (VOLTAREN) 1 % GEL Apply 4 g topically 2 times daily 1/6/21   Neftali Gleason DPM   albuterol sulfate (PROAIR RESPICLICK) 571 (90 Base) MCG/ACT aerosol powder inhalation Inhale 2 puffs into the lungs every 6 hours as needed for Wheezing or Shortness of Breath 1/15/20   Carmelo Landa MD   lurasidone (LATUDA) 60 MG TABS tablet Take 60 mg by mouth nightly    Historical Provider, MD       Allergies:  Bee venom, Iodine, Lisinopril, and Shellfish-derived products    Social History:   reports that she quit smoking about 28 years ago. Her smoking use included cigarettes. She has a 10.00 pack-year smoking history. She has never used smokeless tobacco. She reports previous alcohol use of about 12.0 standard drinks of alcohol per week. She reports previous drug use. Drug: Cocaine. Family History: family history includes Cancer in her brother and maternal grandmother; Heart Disease in her sister; Hypertension in her father; Jeniffer Schwartzcher in her maternal aunt; Other in her sister; Stroke in her mother. REVIEW OF SYSTEMS:    · Constitutional: there has been no unanticipated weight loss. There's been No change in energy level, No change in activity level. · Eyes: No visual changes or diplopia. No scleral icterus. · ENT: No Headaches  · Cardiovascular: Dizziness and fatigue. Denies chest pain. · Respiratory: No previous pulmonary problems, No cough  · Gastrointestinal: No abdominal pain. No change in bowel or bladder habits. · Genitourinary: No dysuria, trouble voiding, or hematuria. · Musculoskeletal:  No gait disturbance, No weakness or joint complaints. · Integumentary: No rash or pruritis. · Neurological: No headache, diplopia, change in muscle strength, numbness or tingling. No change in gait, balance, coordination, mood, affect, memory, mentation, behavior. · Psychiatric: No anxiety, or depression.   · Endocrine: No temperature intolerance. No excessive thirst, fluid intake, or urination. No tremor. · Hematologic/Lymphatic: No abnormal bruising or bleeding, blood clots or swollen lymph nodes. · Allergic/Immunologic: No nasal congestion or hives. PHYSICAL EXAM:      /82   Pulse 72   Temp 98.4 °F (36.9 °C) (Oral)   Resp 21   Ht 5' 5\" (1.651 m)   Wt 174 lb 2.6 oz (79 kg)   SpO2 96%   BMI 28.98 kg/m²    Constitutional and General Appearance: alert, cooperative, no distress and appears stated age  HEENT: PERRL, no cervical lymphadenopathy. No masses palpable. Normal oral mucosa  Respiratory:  · Normal excursion and expansion without use of accessory muscles  · Resp Auscultation: Good respiratory effort. No for increased work of breathing. On auscultation: clear to auscultation bilaterally  Cardiovascular:  · The apical impulse is not displaced  · Heart tones are crisp and normal. regular S1 and S2.  · Jugular venous pulsation Normal  · The carotid upstroke is normal in amplitude and contour without delay or bruit  · Peripheral pulses are symmetrical and full   Abdomen:   · No masses or tenderness  · Bowel sounds present  Extremities:  ·  No Cyanosis or Clubbing  ·  Lower extremity edema: No  ·  Skin: Warm and dry  Neurological:  · Alert and oriented. · Moves all extremities well  · No abnormalities of mood, affect, memory, mentation, or behavior are noted    DATA:    Diagnostics:      EKG:   Sinus tachycardia, prolonged QTc 480, T wave inversion in lateral leads    ECHO:   not obtained. Stress Test:   not obtained. Cardiac Angiography:   not obtained. Labs:     CBC:   Recent Labs     08/16/21  1557   WBC 11.1   HGB 11.1*   HCT 32.9*   *     BMP:   Recent Labs     08/16/21  1557 08/16/21  2301    140   K 1.8* 1.9*   CO2 33* 33*   BUN 10 8   CREATININE 1.14* 1.03*   LABGLOM 49* 56*   GLUCOSE 214* 116*     BNP: No results for input(s): BNP in the last 72 hours.   PT/INR: No results for those ECG changes. Elevated troponins most likely type II. We will obtain an echo to rule out any wall motion abnormalities. We will follow.

## 2021-08-18 DIAGNOSIS — M06.00 SERONEGATIVE RHEUMATOID ARTHRITIS (HCC): ICD-10-CM

## 2021-08-18 DIAGNOSIS — K21.9 GASTROESOPHAGEAL REFLUX DISEASE WITHOUT ESOPHAGITIS: ICD-10-CM

## 2021-08-18 DIAGNOSIS — T78.40XS ALLERGY, SEQUELA: ICD-10-CM

## 2021-08-18 LAB
ANION GAP SERPL CALCULATED.3IONS-SCNC: 13 MMOL/L (ref 9–17)
BUN BLDV-MCNC: 3 MG/DL (ref 6–20)
BUN/CREAT BLD: ABNORMAL (ref 9–20)
C DIFF AG + TOXIN: NEGATIVE
CALCIUM SERPL-MCNC: 7.7 MG/DL (ref 8.6–10.4)
CAMPYLOBACTER PCR: NORMAL
CHLORIDE BLD-SCNC: 97 MMOL/L (ref 98–107)
CO2: 33 MMOL/L (ref 20–31)
CREAT SERPL-MCNC: 0.89 MG/DL (ref 0.5–0.9)
DIRECT EXAM: NORMAL
DIRECT EXAM: NORMAL
E COLI ENTEROTOXIGENIC PCR: NORMAL
EKG ATRIAL RATE: 77 BPM
EKG P AXIS: 35 DEGREES
EKG P-R INTERVAL: 168 MS
EKG Q-T INTERVAL: 502 MS
EKG QRS DURATION: 94 MS
EKG QTC CALCULATION (BAZETT): 568 MS
EKG R AXIS: -3 DEGREES
EKG T AXIS: -167 DEGREES
EKG VENTRICULAR RATE: 77 BPM
GFR AFRICAN AMERICAN: >60 ML/MIN
GFR NON-AFRICAN AMERICAN: >60 ML/MIN
GFR SERPL CREATININE-BSD FRML MDRD: ABNORMAL ML/MIN/{1.73_M2}
GFR SERPL CREATININE-BSD FRML MDRD: ABNORMAL ML/MIN/{1.73_M2}
GLUCOSE BLD-MCNC: 91 MG/DL (ref 70–99)
Lab: NORMAL
MAGNESIUM: 1.4 MG/DL (ref 1.6–2.6)
OSMOLALITY URINE: 269 MOSM/KG (ref 80–1300)
PARTIAL THROMBOPLASTIN TIME: 49.5 SEC (ref 20.5–30.5)
PARTIAL THROMBOPLASTIN TIME: 51.7 SEC (ref 20.5–30.5)
PARTIAL THROMBOPLASTIN TIME: 54.5 SEC (ref 20.5–30.5)
PARTIAL THROMBOPLASTIN TIME: 57.3 SEC (ref 20.5–30.5)
PLESIOMONAS SHIGELLOIDES PCR: NORMAL
POTASSIUM SERPL-SCNC: 2 MMOL/L (ref 3.7–5.3)
POTASSIUM SERPL-SCNC: 2.1 MMOL/L (ref 3.7–5.3)
POTASSIUM, UR: 6.4 MMOL/L
SALMONELLA PCR: NORMAL
SEDIMENTATION RATE, ERYTHROCYTE: 44 MM (ref 0–30)
SHIGATOXIN GENE PCR: NORMAL
SHIGELLA SP PCR: NORMAL
SODIUM BLD-SCNC: 143 MMOL/L (ref 135–144)
SPECIMEN DESCRIPTION: NORMAL
TOTAL CK: 3142 U/L (ref 26–192)
TROPONIN INTERP: ABNORMAL
TROPONIN T: ABNORMAL NG/ML
TROPONIN, HIGH SENSITIVITY: 149 NG/L (ref 0–14)
VIBRIO PCR: NORMAL
YERSINIA ENTEROCOLITICA PCR: NORMAL

## 2021-08-18 PROCEDURE — 6360000002 HC RX W HCPCS: Performed by: NURSE PRACTITIONER

## 2021-08-18 PROCEDURE — 6370000000 HC RX 637 (ALT 250 FOR IP)

## 2021-08-18 PROCEDURE — 81050 URINALYSIS VOLUME MEASURE: CPT

## 2021-08-18 PROCEDURE — 6370000000 HC RX 637 (ALT 250 FOR IP): Performed by: NURSE PRACTITIONER

## 2021-08-18 PROCEDURE — 85730 THROMBOPLASTIN TIME PARTIAL: CPT

## 2021-08-18 PROCEDURE — 82308 ASSAY OF CALCITONIN: CPT

## 2021-08-18 PROCEDURE — 85652 RBC SED RATE AUTOMATED: CPT

## 2021-08-18 PROCEDURE — 94761 N-INVAS EAR/PLS OXIMETRY MLT: CPT

## 2021-08-18 PROCEDURE — 6360000002 HC RX W HCPCS: Performed by: STUDENT IN AN ORGANIZED HEALTH CARE EDUCATION/TRAINING PROGRAM

## 2021-08-18 PROCEDURE — 6370000000 HC RX 637 (ALT 250 FOR IP): Performed by: STUDENT IN AN ORGANIZED HEALTH CARE EDUCATION/TRAINING PROGRAM

## 2021-08-18 PROCEDURE — 2060000000 HC ICU INTERMEDIATE R&B

## 2021-08-18 PROCEDURE — 99232 SBSQ HOSP IP/OBS MODERATE 35: CPT | Performed by: INTERNAL MEDICINE

## 2021-08-18 PROCEDURE — 93010 ELECTROCARDIOGRAM REPORT: CPT | Performed by: INTERNAL MEDICINE

## 2021-08-18 PROCEDURE — 82941 ASSAY OF GASTRIN: CPT

## 2021-08-18 PROCEDURE — 2580000003 HC RX 258: Performed by: STUDENT IN AN ORGANIZED HEALTH CARE EDUCATION/TRAINING PROGRAM

## 2021-08-18 PROCEDURE — 80048 BASIC METABOLIC PNL TOTAL CA: CPT

## 2021-08-18 PROCEDURE — 84132 ASSAY OF SERUM POTASSIUM: CPT

## 2021-08-18 PROCEDURE — 83735 ASSAY OF MAGNESIUM: CPT

## 2021-08-18 PROCEDURE — 84586 ASSAY OF VIP: CPT

## 2021-08-18 PROCEDURE — 84307 ASSAY OF SOMATOSTATIN: CPT

## 2021-08-18 PROCEDURE — 99233 SBSQ HOSP IP/OBS HIGH 50: CPT | Performed by: INTERNAL MEDICINE

## 2021-08-18 PROCEDURE — 36415 COLL VENOUS BLD VENIPUNCTURE: CPT

## 2021-08-18 PROCEDURE — 82550 ASSAY OF CK (CPK): CPT

## 2021-08-18 PROCEDURE — 94640 AIRWAY INHALATION TREATMENT: CPT

## 2021-08-18 PROCEDURE — 84484 ASSAY OF TROPONIN QUANT: CPT

## 2021-08-18 PROCEDURE — 2580000003 HC RX 258

## 2021-08-18 PROCEDURE — 83497 ASSAY OF 5-HIAA: CPT

## 2021-08-18 RX ORDER — ATENOLOL 50 MG/1
50 TABLET ORAL DAILY
Status: DISCONTINUED | OUTPATIENT
Start: 2021-08-18 | End: 2021-08-21 | Stop reason: HOSPADM

## 2021-08-18 RX ORDER — LORATADINE 10 MG/1
TABLET ORAL
Qty: 30 TABLET | Refills: 3 | Status: SHIPPED | OUTPATIENT
Start: 2021-08-18 | End: 2022-02-24 | Stop reason: SDUPTHER

## 2021-08-18 RX ORDER — MELOXICAM 15 MG/1
15 TABLET ORAL DAILY
Qty: 30 TABLET | Refills: 0 | Status: SHIPPED | OUTPATIENT
Start: 2021-08-18 | End: 2021-09-23

## 2021-08-18 RX ORDER — HYDROXYCHLOROQUINE SULFATE 200 MG/1
200 TABLET, FILM COATED ORAL 2 TIMES DAILY
Qty: 60 TABLET | Refills: 0 | Status: SHIPPED | OUTPATIENT
Start: 2021-08-18 | End: 2021-09-23

## 2021-08-18 RX ORDER — POTASSIUM CHLORIDE 7.45 MG/ML
10 INJECTION INTRAVENOUS
Status: COMPLETED | OUTPATIENT
Start: 2021-08-18 | End: 2021-08-18

## 2021-08-18 RX ORDER — POTASSIUM CHLORIDE 20 MEQ/1
40 TABLET, EXTENDED RELEASE ORAL ONCE
Status: DISCONTINUED | OUTPATIENT
Start: 2021-08-18 | End: 2021-08-18

## 2021-08-18 RX ORDER — OMEPRAZOLE 20 MG/1
20 CAPSULE, DELAYED RELEASE ORAL DAILY
Qty: 30 CAPSULE | Refills: 0 | Status: SHIPPED | OUTPATIENT
Start: 2021-08-18 | End: 2021-09-23

## 2021-08-18 RX ORDER — MAGNESIUM SULFATE IN WATER 40 MG/ML
2000 INJECTION, SOLUTION INTRAVENOUS
Status: COMPLETED | OUTPATIENT
Start: 2021-08-18 | End: 2021-08-18

## 2021-08-18 RX ORDER — AMLODIPINE BESYLATE 5 MG/1
5 TABLET ORAL DAILY
Status: DISCONTINUED | OUTPATIENT
Start: 2021-08-18 | End: 2021-08-19

## 2021-08-18 RX ADMIN — SODIUM CHLORIDE, POTASSIUM CHLORIDE, SODIUM LACTATE AND CALCIUM CHLORIDE: 600; 310; 30; 20 INJECTION, SOLUTION INTRAVENOUS at 21:54

## 2021-08-18 RX ADMIN — AMLODIPINE BESYLATE 5 MG: 5 TABLET ORAL at 09:10

## 2021-08-18 RX ADMIN — ACETAMINOPHEN 650 MG: 325 TABLET ORAL at 00:17

## 2021-08-18 RX ADMIN — POTASSIUM CHLORIDE 10 MEQ: 10 INJECTION, SOLUTION INTRAVENOUS at 12:00

## 2021-08-18 RX ADMIN — POTASSIUM CHLORIDE 40 MEQ: 1500 TABLET, EXTENDED RELEASE ORAL at 08:13

## 2021-08-18 RX ADMIN — ACETAMINOPHEN 650 MG: 325 TABLET ORAL at 22:33

## 2021-08-18 RX ADMIN — FOLIC ACID 1 MG: 1 TABLET ORAL at 08:15

## 2021-08-18 RX ADMIN — Medication 50 MG: at 08:15

## 2021-08-18 RX ADMIN — Medication 1000 UNITS: at 08:15

## 2021-08-18 RX ADMIN — POTASSIUM CHLORIDE 10 MEQ: 10 INJECTION, SOLUTION INTRAVENOUS at 09:19

## 2021-08-18 RX ADMIN — SODIUM CHLORIDE, PRESERVATIVE FREE 10 ML: 5 INJECTION INTRAVENOUS at 20:37

## 2021-08-18 RX ADMIN — SODIUM CHLORIDE, PRESERVATIVE FREE 10 ML: 5 INJECTION INTRAVENOUS at 08:16

## 2021-08-18 RX ADMIN — CALCIUM CARBONATE-CHOLECALCIFEROL TAB 250 MG-125 UNIT 500 MG: 250-125 TAB at 08:15

## 2021-08-18 RX ADMIN — POTASSIUM CHLORIDE 40 MEQ: 1500 TABLET, EXTENDED RELEASE ORAL at 20:31

## 2021-08-18 RX ADMIN — LURASIDONE HYDROCHLORIDE 60 MG: 60 TABLET, FILM COATED ORAL at 20:32

## 2021-08-18 RX ADMIN — ATORVASTATIN CALCIUM 20 MG: 20 TABLET, FILM COATED ORAL at 08:15

## 2021-08-18 RX ADMIN — POTASSIUM CHLORIDE 10 MEQ: 10 INJECTION, SOLUTION INTRAVENOUS at 08:16

## 2021-08-18 RX ADMIN — HYDROXYCHLOROQUINE SULFATE 200 MG: 200 TABLET, FILM COATED ORAL at 08:16

## 2021-08-18 RX ADMIN — MAGNESIUM SULFATE 2000 MG: 2 INJECTION INTRAVENOUS at 11:59

## 2021-08-18 RX ADMIN — ATENOLOL 50 MG: 50 TABLET ORAL at 12:10

## 2021-08-18 RX ADMIN — POTASSIUM CHLORIDE 10 MEQ: 10 INJECTION, SOLUTION INTRAVENOUS at 10:28

## 2021-08-18 RX ADMIN — FERROUS SULFATE TAB EC 325 MG (65 MG FE EQUIVALENT) 325 MG: 325 (65 FE) TABLET DELAYED RESPONSE at 08:15

## 2021-08-18 RX ADMIN — FLUOXETINE HYDROCHLORIDE 40 MG: 20 CAPSULE ORAL at 08:14

## 2021-08-18 RX ADMIN — SODIUM CHLORIDE, POTASSIUM CHLORIDE, SODIUM LACTATE AND CALCIUM CHLORIDE: 600; 310; 30; 20 INJECTION, SOLUTION INTRAVENOUS at 05:00

## 2021-08-18 RX ADMIN — Medication 1 TABLET: at 08:15

## 2021-08-18 RX ADMIN — PANTOPRAZOLE SODIUM 40 MG: 40 TABLET, DELAYED RELEASE ORAL at 07:46

## 2021-08-18 RX ADMIN — MAGNESIUM SULFATE 2000 MG: 2 INJECTION INTRAVENOUS at 15:09

## 2021-08-18 RX ADMIN — HYDROXYCHLOROQUINE SULFATE 200 MG: 200 TABLET, FILM COATED ORAL at 20:32

## 2021-08-18 RX ADMIN — ACETAMINOPHEN 650 MG: 325 TABLET ORAL at 06:07

## 2021-08-18 RX ADMIN — ACETAMINOPHEN 650 MG: 325 TABLET ORAL at 18:20

## 2021-08-18 ASSESSMENT — PAIN DESCRIPTION - LOCATION: LOCATION: HEAD

## 2021-08-18 ASSESSMENT — PAIN DESCRIPTION - DESCRIPTORS: DESCRIPTORS: ACHING

## 2021-08-18 ASSESSMENT — PAIN SCALES - GENERAL
PAINLEVEL_OUTOF10: 3
PAINLEVEL_OUTOF10: 4
PAINLEVEL_OUTOF10: 5
PAINLEVEL_OUTOF10: 8
PAINLEVEL_OUTOF10: 5
PAINLEVEL_OUTOF10: 0

## 2021-08-18 ASSESSMENT — PAIN DESCRIPTION - PAIN TYPE: TYPE: CHRONIC PAIN

## 2021-08-18 NOTE — PROGRESS NOTES
[facility]   Progress Note                   Date:   8/18/2021  Patient name: Skye Nance  Date of admission:  8/16/2021  3:18 PM  MRN:   [id number]  YOB: 1965  PCP: Cain Sánchez MD    Reason for Admission: Hypocalcemia [E83.51]  Hypokalemia [E87.6]  Hypochloremia [E87.8]  Elevated troponin [R77.8]  ROXANN (acute kidney injury) ([facility] [N17.9]    Subjective:       Clinical Changes / Abnormalities:Patient seen and examined at the bed side. No new acute events overnight. She denies acute chest pain or SOB. Patient reports that she has exertional chest pressure that has persisted for several years without change. Reviewed vitals, labs, tele, & previous testing. SR on tele.        Medications:   Scheduled Meds:   amLODIPine  5 mg Oral Daily    potassium chloride  10 mEq Intravenous Q1H    multivitamin  1 tablet Oral Daily    potassium chloride  40 mEq Oral BID    sodium chloride flush  5-40 mL Intravenous 2 times per day    thiamine mononitrate  50 mg Oral Daily    Vitamin D  1,000 Units Oral Daily    pantoprazole  40 mg Oral QAM AC    lurasidone  60 mg Oral Nightly    hydroxychloroquine  200 mg Oral BID    FLUoxetine  40 mg Oral Daily    folic acid  1 mg Oral Daily    ferrous sulfate  325 mg Oral Daily with breakfast    Calcium Carb-Cholecalciferol  2 tablet Oral Daily    atorvastatin  20 mg Oral Daily     Continuous Infusions:   heparin (PORCINE) Infusion 16 Units/kg/hr (08/17/21 1737)    sodium chloride      lactated ringers 100 mL/hr at 08/18/21 0910     CBC:   Recent Labs     08/16/21  1557   WBC 11.1   HGB 11.1*   *     BMP:    Recent Labs     08/17/21  0717 08/17/21  0717 08/17/21  1152 08/18/21  0016 08/18/21  0611     --  139  --  143   K 1.7*   < > 1.9* 2.1* 2.0*   CL 95*  --  93*  --  97*   CO2 32*  --  31  --  33*   BUN 7  --  6  --  3*   CREATININE 0.85  --  0.96*  --  0.89   GLUCOSE 85  --  103*  --  91    < > = values in this interval not displayed. Hepatic:   Recent Labs     08/16/21  1557 08/17/21  0717   AST 90* 62*   ALT 63* 50*   BILITOT 0.52 0.55   ALKPHOS 164* 142*     Troponin:   Recent Labs     08/16/21  1557 08/16/21  1726   TROPHS 144* 132*     BNP: No results for input(s): BNP in the last 72 hours. Lipids: No results for input(s): CHOL, HDL in the last 72 hours. Invalid input(s): LDLCALCU  INR: No results for input(s): INR in the last 72 hours. Objective:   Vitals: BP (!) 151/88   Pulse 95   Temp 98.4 °F (36.9 °C) (Oral)   Resp 17   Ht 5' 5\" (1.651 m)   Wt 174 lb 2.6 oz (79 kg)   SpO2 94%   BMI 28.98 kg/m²   General appearance: alert and cooperative with exam  HEENT: Head: Normocephalic, no lesions, without obvious abnormality. Neck: no JVD, trachea midline, no adenopathy  Lungs: Clear to auscultation. Room air without distress. Heart: Regular rate and rhythm, s1/s2 auscultated, no murmurs. SR  Abdomen: soft, non-tender, bowel sounds active  Extremities: no edema  Neurologic: not done    ECHO 8/17/2021  Summary  Left ventricle is normal in size, global left ventricular systolic function  is hyperdynamic, estimated ejection fraction is > 65%. Moderate left ventricular hypertrophy. Grade I, mild, diastolic dysfunction. There is mild LVOT obstruction. Right atrium is normal in size. Prominent Eustachian valve. Aortic valve is sclerotic but opens well. Trivial mitral regurgitation. Trivial tricuspid regurgitation. PRIOR TESTING    Coronary Cath 3/27/09: No coronary vessel disease     ECHO 12/18/12: EF 60%, moderate LVH, mild DD, mildly dilated LA, mild MR.        PCST 3/28/14: No ischemia or infarct. EF 65%.      TEJAL 3/11/14: EF 65%, mild MR/TR. Aortic root dimension is normal without evidence of aortic dissection. Assessment / Acute Cardiac Problems:     1. Increased Troponin secondary to type II MI  2.  Prolonged QTc secondary to hypokalemia and hypocalcemia  3. Hypokalemai  4. Hypocalcemia  5. Rhabdomyolysis  6. Hypertension    Patient Active Problem List:     Lung nodule     Obesity     Adrenal adenoma     Goiter     Hypertension     Alcohol abuse     Essential hypertension     Enlarged tonsils     ACE inhibitor-aggravated angioedema     JONES (obstructive sleep apnea)     Dyslipidemia     IGT (impaired glucose tolerance)     Acute alcoholic intoxication without complication (HCC)     Acute renal insufficiency     Effusion of bursa of right knee     Acute cystitis without hematuria     Bipolar disorder (HCC)     Mild intermittent asthma without complication     Inflammatory polyarthritis (HCC)     Seronegative rheumatoid arthritis (HCC)     Hypokalemia     Nausea vomiting and diarrhea     Chronic diarrhea      Plan of Treatment:   1. Elevated troponin. NSTEMI likely type II (144, 132) Will recheck troponin today for trending. Continue heparin drip for now. Possible stress test pending troponin trend and EKG. Discussed with patient and will have her be NPO after midnight. 2. 2D Echo as noted above with LVEF >65%. Mild Diastolic dysfunction. 3. HTN. Elevated. Will increase Norvasc to 10 mg daily (Home dose). Patient was taking Atenolol 50 mg PO prior to admit. Will resume today. 4. Keep K+ > 4.0, and Mg+ > 2.0. K remains very low at 2.0. Mad 1.4. Potassium replacement previously ordered. Will replace mag also today. 5. Will repeat EKG when electrolytes stable.    6. Remainder of care management per primary team.      Electronically signed by CECILIA Sumner CNP on 8/18/2021 at 10:13 AM  [facility]      [phone]

## 2021-08-18 NOTE — PROGRESS NOTES
[facility] [address] [employer]  Occupational Therapy Not Seen Note    DATE: 2021  Name: Madiha Simpson  : 1965  MRN: [id number]    Patient not available for Occupational Therapy due to: Other: Elevated troponin with possible stress test and EKG tomorrow per cardiology. Low calcium/ potassium per chart.       Next Scheduled Treatment: Check  or as appropriate      Rickey Cruz S/OT

## 2021-08-18 NOTE — PROGRESS NOTES
[facility]  Internal Medicine Teaching Residency Program  Inpatient Daily Progress Note  ______________________________________________________________________________    Patient: Rosanna Jalloh  YOB: 1965   WAQ:[id number]    Acct: [de-identified]     Room: [address] [place] [address]  Admit date: 8/16/2021  Today's date: 08/18/21  Number of days in the hospital: 2    SUBJECTIVE   Admitting Diagnosis: Chronic diarrhea  CC: chronica diarrhea    Pt examined at bedside. No acute issues overnight. Pt reports having 2 episodes of diarrhea last night and 2 this morning, stools are loose and brown in color. Denies any abdominal pain/ vomiting's. Chart & results reviewed. Pt on heparin drip. Patient is eating ok, sleeping ok, normal bowel/ bladder movements. Patient is able to ambulate. Patient on room air       ROS:  Constitutional:  negative for chills, fevers, sweats  Respiratory:  negative for cough, dyspnea on exertion, hemoptysis, shortness of breath, wheezing  Cardiovascular:  negative for chest pain, chest pressure/discomfort, lower extremity edema, palpitations  Gastrointestinal:  negative for abdominal pain, constipation, diarrhea, nausea, vomiting  Neurological:  negative for dizziness, headache    BRIEF HISTORY     The patient is a pleasant 56 y. o. female presents with a chief complaint of diarrhea since the last 2 months.  Patient reports having 10-15 episodes per day.  Patient saw her rheumatologist last Friday and had some labs done which showed  low potassium of 1.9 that is when her rheumatologist told her to go to the ED. but the patient came today.  Patient reports that the stools are brown, watery, with mucus, jelly like.  Patient also reports episodes of vomiting since last 2 months.  Reports abdominal pain, nausea vomiting.  Change in appetite and weight.  Patient reports having lost 30 to 35 pounds in the span of last 6 months.  Patient follows Normal breath sounds. No wheezing or rales. Abdominal:      General: There is no distension. Palpations: Abdomen is soft. Tenderness: There is no abdominal tenderness. Musculoskeletal:         General: No swelling. Normal range of motion. Cervical back: Normal range of motion. No rigidity. Right lower leg: No edema. Left lower leg: No edema. Skin:     General: Skin is warm. Coloration: Skin is not jaundiced. Findings: No bruising or rash. Neurological:      General: No focal deficit present. Mental Status: She is alert and oriented to person, place, and time. Mental status is at baseline.    Psychiatric:         Mood and Affect: Mood normal.         Behavior: Behavior normal.           Medications:  Scheduled Medications:    amLODIPine  5 mg Oral Daily    potassium chloride  10 mEq Intravenous Q1H    multivitamin  1 tablet Oral Daily    potassium chloride  40 mEq Oral BID    sodium chloride flush  5-40 mL Intravenous 2 times per day    thiamine mononitrate  50 mg Oral Daily    Vitamin D  1,000 Units Oral Daily    pantoprazole  40 mg Oral QAM AC    lurasidone  60 mg Oral Nightly    hydroxychloroquine  200 mg Oral BID    FLUoxetine  40 mg Oral Daily    folic acid  1 mg Oral Daily    ferrous sulfate  325 mg Oral Daily with breakfast    Calcium Carb-Cholecalciferol  2 tablet Oral Daily    atorvastatin  20 mg Oral Daily     Continuous Infusions:    heparin (PORCINE) Infusion 16 Units/kg/hr (08/17/21 1737)    sodium chloride      lactated ringers 100 mL/hr at 08/18/21 0910     PRN Medicationsheparin (porcine), 4,000 Units, PRN  heparin (porcine), 2,000 Units, PRN  sodium chloride flush, 5-40 mL, PRN  sodium chloride, 25 mL, PRN  acetaminophen, 650 mg, Q4H PRN  ondansetron, 4 mg, Q8H PRN   Or  ondansetron, 4 mg, Q6H PRN        Diagnostic Labs:  CBC:   Recent Labs     08/16/21  1557   WBC 11.1   RBC 3.51*   HGB 11.1*   HCT 32.9*   MCV 93.7   RDW 15.5*   PLT 506*     BMP:   Recent Labs     08/17/21  0717 08/17/21  0717 08/17/21  1152 08/18/21  0016 08/18/21  0611     --  139  --  143   K 1.7*   < > 1.9* 2.1* 2.0*   CL 95*  --  93*  --  97*   CO2 32*  --  31  --  33*   PHOS  --   --  2.0*  --   --    BUN 7  --  6  --  3*   CREATININE 0.85  --  0.96*  --  0.89    < > = values in this interval not displayed. BNP: No results for input(s): BNP in the last 72 hours. PT/INR: No results for input(s): PROTIME, INR in the last 72 hours. APTT:   Recent Labs     08/17/21  1405 08/17/21  2259 08/18/21  0611   APTT 45.9* 50.1* 54.5*     CARDIAC ENZYMES: No results for input(s): CKMB, CKMBINDEX, TROPONINI in the last 72 hours. Invalid input(s): CKTOTAL;3  FASTING LIPID PANEL:  Lab Results   Component Value Date    CHOL 248 (H) 03/21/2021     03/21/2021    TRIG 47 03/21/2021     LIVER PROFILE:   Recent Labs     08/16/21  1557 08/17/21  0717   AST 90* 62*   ALT 63* 50*   BILITOT 0.52 0.55   ALKPHOS 164* 142*      MICROBIOLOGY:   Lab Results   Component Value Date/Time    CULTURE ESCHERICHIA COLI >994727 CFU/ML (A) 03/30/2021 06:28 AM       Imaging:    CT ABDOMEN PELVIS WO CONTRAST Additional Contrast? None    Result Date: 8/16/2021  No acute CT abnormality within the abdomen or pelvis. Gallbladder is nondistended with layering biliary sludge and/or small gallstones. No pericholecystic inflammatory changes or CT evidence of acute cholecystitis. Hepatic steatosis. XR CHEST PORTABLE    Result Date: 8/16/2021  No acute cardiopulmonary findings       ASSESSMENT & PLAN     Assessment and Plan:    Principal Problem:    Chronic diarrhea  Active Problems:    Hypokalemia    Nausea vomiting and diarrhea  Resolved Problems:    * No resolved hospital problems. *       1.  Chronic diarrhea -                Continue fluids               GI onboard                follow up on stool studies, to rule out causes of chronic diarrhea                Malabsorptive diarrhea/ neuroendocrine tomours    2. Hypokalemia -                Likely due to chronic diarrhea               Continue replacement               Continue fluids    3. Hypomagnesemia -                likely due to diarrhea               Will monitor and replace accordingly     3. Rhabdomyolysis - non traumatic               likely to hypokalemia               CK trending down               Continue IV fluids                Will trend CK and monitor              4. Elevated troponin -                EKG - sinus, prolonged QTc, T wave inversions               Continue heparin drip               echo - EF ->65%               Troponin 149 today, stress test tomorrow, NPO from midnight               Continue lipitor               Monitor vitals closely    5. Essential hypertension -               Continue Norvasc                Monitor closely     5. Arthritis - ? Rheumatoid arthritis              Continue home medications              Continue hydroxychloroquine    6.  Alcohol abuse -              Continue thiamine, folate, multivitamin              Watch for signs of withdrawl      Diet: regular adult diet  DVT ppx : pt already on heparin  GI ppx: protonix    PT/OT: consulted  Discharge Planning / [place]: ongoing      Micha Pena  PGY-1  Internal Medicine  [facility]   50:88 AM 8/18/2021

## 2021-08-18 NOTE — TELEPHONE ENCOUNTER
Request for Loratadine and Omeprazole. Next Visit Date:  Future Appointments   Date Time Provider Anthony [place]rett   9/20/2021  8:15 AM Rochelle Coyle MD [id number]   9/29/2021  1:30 PM Alvina Dubin, MD [facility]   9/30/2021  9:30 AM Quirino Galindo MD [facility] [address] Maintenance   Topic Date Due    Shingles Vaccine (1 of 2) Never done    Flu vaccine (1) 09/01/2021    Lipid screen  03/21/2022    Potassium monitoring  08/18/2022    Creatinine monitoring  08/18/2022    Breast cancer screen  10/10/2022    Diabetes screen  06/11/2024    DTaP/Tdap/Td vaccine (3 - Td or Tdap) 04/14/2027    Colon cancer screen colonoscopy  12/03/2030    Pneumococcal 0-64 years Vaccine (2 of 2 - PPSV23) 01/22/2031    COVID-19 Vaccine  Completed    Hepatitis C screen  Completed    HIV screen  Completed    Hepatitis A vaccine  Aged Out    Hepatitis B vaccine  Aged Out    Hib vaccine  Aged Out    Meningococcal (ACWY) vaccine  Aged Out       Hemoglobin A1C (%)   Date Value   06/11/2021 5.5   03/21/2021 6.3 (H)   01/20/2021 5.9             ( goal A1C is < 7)   Microalb/Crt.  Ratio (mcg/mg creat)   Date Value   06/12/2019 147 (H)     LDL Cholesterol (mg/dL)   Date Value   03/21/2021 122       (goal LDL is <100)   AST (U/L)   Date Value   08/17/2021 62 (H)     ALT (U/L)   Date Value   08/17/2021 50 (H)     BUN (mg/dL)   Date Value   08/18/2021 3 (L)     BP Readings from Last 3 Encounters:   08/18/21 (!) 131/98   06/14/21 (!) 131/93   06/11/21 (!) 142/88          (goal 120/80)    All Future Testing planned in [facility]  Lab Frequency Next Occurrence   C-Reactive Protein Once 09/29/2020   EGD Once 11/27/2020   Pancreatic elastase, fecal Once 11/13/2020   ECHO Complete 2D W Doppler W Color Once 02/07/2021   CBC With Auto Differential Once 04/22/2021   Miscellaneous Sendout 1 Once 04/16/2021   Comprehensive Metabolic Panel Once 46/58/9999   CBC With Auto Differential Once 06/14/2021   Up as tolerated PRN    BASIC METABOLIC PANEL DAILY    APTT NOW THEN EVERY 6 HOURS    MAGNESIUM TOMORROW AM    EKG 12 Lead TOMORROW AM    Diet NPO DIET EFFECTIVE MIDNIGHT          Patient Active Problem List:     Lung nodule     Obesity     Adrenal adenoma     Goiter     Hypertension     Alcohol abuse     Essential hypertension     Enlarged tonsils     ACE inhibitor-aggravated angioedema     JONES (obstructive sleep apnea)     Dyslipidemia     IGT (impaired glucose tolerance)     Acute alcoholic intoxication without complication (HCC)     Acute renal insufficiency     Effusion of bursa of right knee     Acute cystitis without hematuria     Bipolar disorder (HCC)     Mild intermittent asthma without complication     Inflammatory polyarthritis (HCC)     Seronegative rheumatoid arthritis (HCC)     Hypokalemia     Nausea vomiting and diarrhea     Chronic diarrhea     Hypocalcemia

## 2021-08-18 NOTE — PROGRESS NOTES
[facility]. [facility] Gastroenterology Progress Note    Ondina Minor is a 55 y.o. female patient. Hospitalization Day:2      Chief consult reason: severe diarrhea with hypokalemia      Subjective: Patient examined and seen bedside. No acute events over night. Patient had 2 episodes of diarrhea last night and 2 episodes in the morning, denies dark stools. She c/o feeling tired today. Denies any abdominal pain, nausea, vomiting. Stool negative for giardia, cryptosporidium, GI molecular panel negative, C. Difficile negative. Labs show K - 2. UO yesterday - 4 L, today since 11 - 4 pm: 600 ml    Objective:   VITALS:  BP (!) 131/98   Pulse 93   Temp 99 °F (37.2 °C) (Oral)   Resp 17   Ht 5' 5\" (1.651 m)   Wt 174 lb 2.6 oz (79 kg)   SpO2 96%   BMI 28.98 kg/m²   TEMPERATURE:  Current - Temp: 99 °F (37.2 °C);  Max - Temp  Av.6 °F (37.6 °C)  Min: 98.4 °F (36.9 °C)  Max: 101.3 °F (38.5 °C)    Physical Assessment:  General appearance:  alert, cooperative and no distress  Mental Status:  oriented to person, place and time and normal affect  Lungs:  clear to auscultation bilaterally, normal effort  Heart:  regular rate and rhythm, no murmur  Abdomen:  soft, nontender, nondistended, normal bowel sounds, no masses  Extremities:  no edema, no redness, No clubbing  Skin:  warm, dry, no gross lesions or rashes    CURRENT MEDICATIONS:  Scheduled Meds:   amLODIPine  5 mg Oral Daily    magnesium sulfate  2,000 mg Intravenous Q2H    atenolol  50 mg Oral Daily    multivitamin  1 tablet Oral Daily    potassium chloride  40 mEq Oral BID    sodium chloride flush  5-40 mL Intravenous 2 times per day    thiamine mononitrate  50 mg Oral Daily    Vitamin D  1,000 Units Oral Daily    pantoprazole  40 mg Oral QAM AC    lurasidone  60 mg Oral Nightly    hydroxychloroquine  200 mg Oral BID    FLUoxetine  40 mg Oral Daily    folic acid  1 mg Oral Daily    ferrous sulfate  325 mg Oral Daily with breakfast    Calcium ASMA  Lab Results   Component Value Date    ELVACAB 1:40 02/03/2017       Ceruloplasmin  No results found for: CERULOPLSM    Celiac panel  No results found for: TISSTRNTIIGG, TTGIGA, IGA    IgG  No results found for: IGG    IgM  No results found for: IGM    GGT   No results found for: LABGGT    PT/INR  No results for input(s): PROTIME, INR in the last 72 hours. Cancer Markers:  CEA:  No results for input(s): CEA in the last 72 hours. Ca 125:  No results for input(s):  in the last 72 hours. Ca 19-9:   No results for input(s):  in the last 72 hours. AFP: No results for input(s): AFP in the last 72 hours. Lactic acid:No results for input(s): LACTACIDWB in the last 72 hours. Radiology Review:    ECHO Complete 2D W Doppler W Color    Result Date: 8/18/2021  Transthoracic Echocardiography Report (TTE)  Patient Name Curt Medrano Date of Study               08/17/2021               A   Date of      1965  Gender                      Female  Birth   Age          55 year(s)  Race                        Black   Room Number  0406        Height:                     65 inch, 165.1 cm   Corporate ID [id number]    Weight:                     174 pounds, 78.9 kg  #   Patient Acct [de-identified]   BSA:          1.86 m^2      BMI:     28.96 kg/m^2  #   MR #         [id number]     Sonographer                 Ele Mel   Accession #  [id number]  Interpreting Physician      [address] [place] [address]   Fellow                   Referring Nurse                           Practitioner   Interpreting             Referring Physician         David Patten MD  Fellow  Type of Study   TTE procedure:2D Echocardiogram, M-Mode, Doppler, Color Doppler. Procedure Date Date: 08/17/2021 Start: 03:07 PM Study Location: [facility] Technical Quality: Adequate visualization Indications:LV Function. History / Tech. Comments: Procedure explained to patient. Echo completed in the Echo Lab.  PMHx: Former smoker, Alcohol abuse Hypertension Patient Status: Inpatient Height: 65 inches Weight: 174 pounds BSA: 1.86 m^2 BMI: 28.96 kg/m^2 Allergies   - Other:(Drug Allergies - Hydralazine, \"shellfish\"). CONCLUSIONS Summary Left ventricle is normal in size, global left ventricular systolic function is hyperdynamic, estimated ejection fraction is > 65%. Moderate left ventricular hypertrophy. Grade I, mild, diastolic dysfunction. There is mild LVOT obstruction. Right atrium is normal in size. Prominent Eustachian valve. Aortic valve is sclerotic but opens well. Trivial mitral regurgitation. Trivial tricuspid regurgitation. Signature ----------------------------------------------------------------------------  Electronically signed by Mikaela Roberto(Sonographer) on 08/17/2021 04:08 PM ---------------------------------------------------------------------------- ----------------------------------------------------------------------------  Electronically signed by Reinier Slater(Interpreting physician) on 08/18/2021  09:18 AM ---------------------------------------------------------------------------- FINDINGS Left Atrium Left atrium is normal in size. Inter-atrial septum is intact with no evidence for an atrial septal defect, by color doppler. Left Ventricle Left ventricle is normal in size, global left ventricular systolic function is hyperdynamic, estimated ejection fraction is > 65%. Moderate left ventricular hypertrophy. Grade I, mild, diastolic dysfunction. There is mild LVOT obstruction with Right Atrium Right atrium is normal in size. Prominent Eustachian valve. Right Ventricle Normal right ventricular size and function. Mitral Valve Normal mitral valve structure. No mitral stenosis. Trivial mitral regurgitation. Aortic Valve Aortic valve is trileaflet. Aortic valve is sclerotic but opens well. No aortic insufficiency. Tricuspid Valve Tricuspid valve was not well visualized. Trivial tricuspid regurgitation.  Insignificant tricuspid regurgitation, unable to estimate RVSP. Pulmonic Valve Pulmonic valve not well visualized but Doppler velocities are normal. Pericardial Effusion No significant pericardial effusion is seen. Miscellaneous Normal aortic root dimension. E/E' average = 17.5. IVC normal diameter & inspiratory collapse indicating normal RA filling pressure . M-mode / 2D Measurements & Calculations:   LVIDd:3.9 cm(3.7 - 5.6 cm)      Diastolic IPPXFL:36.6 ml  GLRN:1.5 cm(0.6 - 1.1 cm)       Aortic Root:3.2 cm(2.0 - 3.7 cm)  LVPWd:1.3 cm(0.6 - 1.1 cm)      LA Dimension: 3.7 cm(1.9 - 4.0 cm)                                  LA volume/Index: 49.75 ml /27m^2                                  LVOT:2.1 cm                                  RVDd:3.6 cm   Mitral:                                 Aortic   Valve Area (P1/2-Time): 5.95 cm^2       Peak Velocity: 2.27 m/s  Peak E-Wave: 0.80 m/s                   Mean Velocity: 1.66 m/s  Peak A-Wave: 1.10 m/s                   Peak Gradient: 20.61 mmHg  E/A Ratio: 0.72                         Mean Gradient: 13 mmHg  Peak Gradient: 2.53 mmHg  Mean Gradient: 3 mmHg  Deceleration Time: 137 msec             Area (continuity): 3.25 cm^2  P1/2t: 37 msec                          AV VTI: 41.6 cm   Area (continuity): 6.57 cm^2  Mean Velocity: 0.82 m/s                                           Pulmonic:                                           Peak Velocity: 1.90 m/s                                          Peak Gradient: 14.44 mmHg  Diastology / Tissue Doppler Septal Wall E' velocity:0.03 m/s Septal Wall E/E':23.6 Lateral Wall E' velocity:0.07 m/s Lateral Wall E/E':11.4    CT ABDOMEN PELVIS WO CONTRAST Additional Contrast? None    Result Date: 8/16/2021  EXAMINATION: CT OF THE ABDOMEN AND PELVIS WITHOUT CONTRAST 8/16/2021 3:57 pm TECHNIQUE: CT of the abdomen and pelvis was performed without the administration of intravenous contrast. Multiplanar reformatted images are provided for review.  Dose modulation, iterative reconstruction, and/or weight based adjustment of the mA/kV was utilized to reduce the radiation dose to as low as reasonably achievable. COMPARISON: CT of the abdomen without contrast of 07/07/2015. CT of the abdomen and pelvis of 05/03/2016. HISTORY: ORDERING SYSTEM PROVIDED HISTORY: Epigastric abdominal pain, diarrhea TECHNOLOGIST PROVIDED HISTORY: Epigastric abdominal pain, diarrhea Decision Support Exception - unselect if not a suspected or confirmed emergency medical condition->Emergency Medical Condition (MA) Is the patient pregnant?->No Reason for Exam: epigastric abdominal pain, diarrhea Acuity: Unknown Type of Exam: Unknown FINDINGS: Lower Chest:  Visualized portion of the lower chest demonstrates no acute abnormality. Lung bases are clear. No pleural effusion. Organs: Diffuse hepatic steatosis. Gallbladder is nondistended with layering biliary sludge and/or small gallstones. No biliary ductal dilation. No pericholecystic inflammatory changes. The spleen, adrenals, and pancreas are unremarkable. Kidneys are symmetric in size. No hydroureteronephrosis. No renal calculi. GI/Bowel: No bowel obstruction. Normal appendix in the right lower quadrant. Pelvis: The bladder is collapsed, limiting detailed evaluation. Status post hysterectomy. No adnexal mass. Numerous pelvic phleboliths noted. Peritoneum/Retroperitoneum: Abdominal aorta is normal in course and caliber with scattered atherosclerotic calcifications. No lymphadenopathy. No large volume ascites or free intraperitoneal air. Bones/Soft Tissues: No soft tissue abnormality. No acute osseous abnormality. No suspicious osseous lesion. No acute CT abnormality within the abdomen or pelvis. Gallbladder is nondistended with layering biliary sludge and/or small gallstones. No pericholecystic inflammatory changes or CT evidence of acute cholecystitis. Hepatic steatosis.      XR CHEST PORTABLE    Result Date: 8/16/2021  EXAMINATION: ONE XRAY VIEW OF THE CHEST 8/16/2021 3:51 pm COMPARISON: March 22, 2021 chest examination HISTORY: ORDERING SYSTEM PROVIDED HISTORY: Epigastric pain TECHNOLOGIST PROVIDED HISTORY: Epigastric pain Reason for Exam: epigastric pain FINDINGS: Stable normal cardiopericardial silhouette/mild tortuosity of the thoracic aorta There are no significant pleural, parenchymal or mediastinal findings     No acute cardiopulmonary findings         ENDOSCOPY     Principal Problem:    Chronic diarrhea  Active Problems:    Hypokalemia    Nausea vomiting and diarrhea    Hypocalcemia  Resolved Problems:    * No resolved hospital problems. *       GI Assessment:  1. Chronic diarrhea with hypokalemia, K 2.0 in am, - GI molecular panel, c difficle, giardia/cryptosporidum negative. 2. S/p EGD and colonoscopy 12/2020 - ruled out colitis. 3. Weight loss - 40/50 lbs  4. Type II MI, cardiology on board  5. Rhabdomyolysis          Plan of care:  1. Awaiting results of lawrence protectin, fecal elastase, stool electrolytes. 2. Monitor input and output. 3. Awaiting results of gastrin/somatostatin/calcitonin/vip/5HIAA and serotonin assay to rule out neuroendocrine tumors  4. Medical management as per primary team  5. Okay to discharge home from GI prospective, can follow up outpatient in GI clinic to follow up on ordered tests. Please feel free to contact me with any questions or concerns. Thank you for allowing me to participate in the care of your patient. Rocio Baca MD on 8/18/2021 at 3:07 PM   [facility] Gastroenterology    Please note that this note was generated using a voice recognition dictation software. Although every effort was made to ensure the accuracy of this automated transcription, some errors in transcription may have occurred.

## 2021-08-18 NOTE — TELEPHONE ENCOUNTER
Request for meloxicam and plaquenil. Next Visit Date:  Future Appointments   Date Time Provider Anthony Everett   9/20/2021  8:15 AM Al Lange MD [id number]   9/29/2021  1:30 PM Marlys Yarbrough MD [facility] [address] [facility]   9/30/2021  9:30 AM Kylie Wan MD [facility] IM [address] Maintenance   Topic Date Due    Shingles Vaccine (1 of 2) Never done    Flu vaccine (1) 09/01/2021    Lipid screen  03/21/2022    Potassium monitoring  08/18/2022    Creatinine monitoring  08/18/2022    Breast cancer screen  10/10/2022    Diabetes screen  06/11/2024    DTaP/Tdap/Td vaccine (3 - Td or Tdap) 04/14/2027    Colon cancer screen colonoscopy  12/03/2030    Pneumococcal 0-64 years Vaccine (2 of 2 - PPSV23) 01/22/2031    COVID-19 Vaccine  Completed    Hepatitis C screen  Completed    HIV screen  Completed    Hepatitis A vaccine  Aged Out    Hepatitis B vaccine  Aged Out    Hib vaccine  Aged Out    Meningococcal (ACWY) vaccine  Aged Out       Hemoglobin A1C (%)   Date Value   06/11/2021 5.5   03/21/2021 6.3 (H)   01/20/2021 5.9             ( goal A1C is < 7)   Microalb/Crt.  Ratio (mcg/mg creat)   Date Value   06/12/2019 147 (H)     LDL Cholesterol (mg/dL)   Date Value   03/21/2021 122       (goal LDL is <100)   AST (U/L)   Date Value   08/17/2021 62 (H)     ALT (U/L)   Date Value   08/17/2021 50 (H)     BUN (mg/dL)   Date Value   08/18/2021 3 (L)     BP Readings from Last 3 Encounters:   08/18/21 (!) 131/98   06/14/21 (!) 131/93   06/11/21 (!) 142/88          (goal 120/80)    All Future Testing planned in [facility]  Lab Frequency Next Occurrence   C-Reactive Protein Once 09/29/2020   EGD Once 11/27/2020   Pancreatic elastase, fecal Once 11/13/2020   ECHO Complete 2D W Doppler W Color Once 02/07/2021   CBC With Auto Differential Once 04/22/2021   Miscellaneous Sendout 1 Once 04/16/2021   Comprehensive Metabolic Panel Once 03/81/3304   CBC With Auto Differential Once 06/14/2021   Up as tolerated PRN    BASIC METABOLIC PANEL DAILY    APTT NOW THEN EVERY 6 HOURS    MAGNESIUM TOMORROW AM    EKG 12 Lead TOMORROW AM    Diet NPO DIET EFFECTIVE MIDNIGHT          Patient Active Problem List:     Lung nodule     Obesity     Adrenal adenoma     Goiter     Hypertension     Alcohol abuse     Essential hypertension     Enlarged tonsils     ACE inhibitor-aggravated angioedema     JONES (obstructive sleep apnea)     Dyslipidemia     IGT (impaired glucose tolerance)     Acute alcoholic intoxication without complication (HCC)     Acute renal insufficiency     Effusion of bursa of right knee     Acute cystitis without hematuria     Bipolar disorder (HCC)     Mild intermittent asthma without complication     Inflammatory polyarthritis (HCC)     Seronegative rheumatoid arthritis (HCC)     Hypokalemia     Nausea vomiting and diarrhea     Chronic diarrhea     Hypocalcemia

## 2021-08-19 LAB
ANION GAP SERPL CALCULATED.3IONS-SCNC: 13 MMOL/L (ref 9–17)
ANION GAP SERPL CALCULATED.3IONS-SCNC: 19 MMOL/L (ref 9–17)
BUN BLDV-MCNC: 2 MG/DL (ref 6–20)
BUN BLDV-MCNC: 2 MG/DL (ref 6–20)
BUN/CREAT BLD: ABNORMAL (ref 9–20)
BUN/CREAT BLD: ABNORMAL (ref 9–20)
CALCIUM SERPL-MCNC: 7.3 MG/DL (ref 8.6–10.4)
CALCIUM SERPL-MCNC: 7.6 MG/DL (ref 8.6–10.4)
CALCIUM URINE: 1.2 MG/DL
CALPROTECTIN, FECAL: 34 UG/G
CALPROTECTIN, FECAL: 35 UG/G
CHLORIDE BLD-SCNC: 95 MMOL/L (ref 98–107)
CHLORIDE BLD-SCNC: 97 MMOL/L (ref 98–107)
CHLORIDE, UR: 85 MMOL/L
CO2: 24 MMOL/L (ref 20–31)
CO2: 29 MMOL/L (ref 20–31)
CREAT SERPL-MCNC: 0.85 MG/DL (ref 0.5–0.9)
CREAT SERPL-MCNC: 0.85 MG/DL (ref 0.5–0.9)
CREATININE URINE: 67 MG/DL (ref 28–217)
FECAL PANCREATIC ELASTASE-1: 240 UG/G
GFR AFRICAN AMERICAN: >60 ML/MIN
GFR AFRICAN AMERICAN: >60 ML/MIN
GFR NON-AFRICAN AMERICAN: >60 ML/MIN
GFR NON-AFRICAN AMERICAN: >60 ML/MIN
GFR SERPL CREATININE-BSD FRML MDRD: ABNORMAL ML/MIN/{1.73_M2}
GLUCOSE BLD-MCNC: 106 MG/DL (ref 70–99)
GLUCOSE BLD-MCNC: 73 MG/DL (ref 70–99)
MAGNESIUM URINE: 16.7 MG/DL
MAGNESIUM: 1.5 MG/DL (ref 1.6–2.6)
MAGNESIUM: 2 MG/DL (ref 1.6–2.6)
PARTIAL THROMBOPLASTIN TIME: 49.6 SEC (ref 20.5–30.5)
PARTIAL THROMBOPLASTIN TIME: 54.7 SEC (ref 20.5–30.5)
PARTIAL THROMBOPLASTIN TIME: 58.2 SEC (ref 20.5–30.5)
PHOSPHORUS,UR: 29.6 MG/DL
PHOSPHORUS: 4.5 MG/DL (ref 2.6–4.5)
POTASSIUM SERPL-SCNC: 2.3 MMOL/L (ref 3.7–5.3)
POTASSIUM SERPL-SCNC: 2.5 MMOL/L (ref 3.7–5.3)
POTASSIUM, UR: 17.6 MMOL/L
SODIUM BLD-SCNC: 138 MMOL/L (ref 135–144)
SODIUM BLD-SCNC: 139 MMOL/L (ref 135–144)
TOTAL PROTEIN, URINE: 17 MG/DL

## 2021-08-19 PROCEDURE — 82570 ASSAY OF URINE CREATININE: CPT

## 2021-08-19 PROCEDURE — 84105 ASSAY OF URINE PHOSPHORUS: CPT

## 2021-08-19 PROCEDURE — 6370000000 HC RX 637 (ALT 250 FOR IP): Performed by: STUDENT IN AN ORGANIZED HEALTH CARE EDUCATION/TRAINING PROGRAM

## 2021-08-19 PROCEDURE — 97166 OT EVAL MOD COMPLEX 45 MIN: CPT

## 2021-08-19 PROCEDURE — 82340 ASSAY OF CALCIUM IN URINE: CPT

## 2021-08-19 PROCEDURE — 6360000002 HC RX W HCPCS: Performed by: INTERNAL MEDICINE

## 2021-08-19 PROCEDURE — 36415 COLL VENOUS BLD VENIPUNCTURE: CPT

## 2021-08-19 PROCEDURE — 97162 PT EVAL MOD COMPLEX 30 MIN: CPT

## 2021-08-19 PROCEDURE — 85730 THROMBOPLASTIN TIME PARTIAL: CPT

## 2021-08-19 PROCEDURE — 2500000003 HC RX 250 WO HCPCS: Performed by: STUDENT IN AN ORGANIZED HEALTH CARE EDUCATION/TRAINING PROGRAM

## 2021-08-19 PROCEDURE — 97535 SELF CARE MNGMENT TRAINING: CPT

## 2021-08-19 PROCEDURE — 2580000003 HC RX 258

## 2021-08-19 PROCEDURE — 2060000000 HC ICU INTERMEDIATE R&B

## 2021-08-19 PROCEDURE — 6360000002 HC RX W HCPCS: Performed by: STUDENT IN AN ORGANIZED HEALTH CARE EDUCATION/TRAINING PROGRAM

## 2021-08-19 PROCEDURE — 82436 ASSAY OF URINE CHLORIDE: CPT

## 2021-08-19 PROCEDURE — 97530 THERAPEUTIC ACTIVITIES: CPT

## 2021-08-19 PROCEDURE — 6370000000 HC RX 637 (ALT 250 FOR IP): Performed by: NURSE PRACTITIONER

## 2021-08-19 PROCEDURE — 99233 SBSQ HOSP IP/OBS HIGH 50: CPT | Performed by: INTERNAL MEDICINE

## 2021-08-19 PROCEDURE — 99221 1ST HOSP IP/OBS SF/LOW 40: CPT | Performed by: INTERNAL MEDICINE

## 2021-08-19 PROCEDURE — 2580000003 HC RX 258: Performed by: STUDENT IN AN ORGANIZED HEALTH CARE EDUCATION/TRAINING PROGRAM

## 2021-08-19 PROCEDURE — 84133 ASSAY OF URINE POTASSIUM: CPT

## 2021-08-19 PROCEDURE — 6360000002 HC RX W HCPCS

## 2021-08-19 PROCEDURE — 6370000000 HC RX 637 (ALT 250 FOR IP)

## 2021-08-19 PROCEDURE — 84100 ASSAY OF PHOSPHORUS: CPT

## 2021-08-19 PROCEDURE — 80048 BASIC METABOLIC PNL TOTAL CA: CPT

## 2021-08-19 PROCEDURE — 83735 ASSAY OF MAGNESIUM: CPT

## 2021-08-19 PROCEDURE — 84156 ASSAY OF PROTEIN URINE: CPT

## 2021-08-19 RX ORDER — POTASSIUM CHLORIDE 7.45 MG/ML
40 INJECTION INTRAVENOUS 2 TIMES DAILY
Status: DISCONTINUED | OUTPATIENT
Start: 2021-08-19 | End: 2021-08-19

## 2021-08-19 RX ORDER — AMLODIPINE BESYLATE 10 MG/1
10 TABLET ORAL DAILY
Status: DISCONTINUED | OUTPATIENT
Start: 2021-08-20 | End: 2021-08-21 | Stop reason: HOSPADM

## 2021-08-19 RX ORDER — LOPERAMIDE HYDROCHLORIDE 2 MG/1
2 CAPSULE ORAL 3 TIMES DAILY PRN
Status: DISPENSED | OUTPATIENT
Start: 2021-08-19 | End: 2021-08-21

## 2021-08-19 RX ORDER — POTASSIUM CHLORIDE 20 MEQ/1
40 TABLET, EXTENDED RELEASE ORAL PRN
Status: DISCONTINUED | OUTPATIENT
Start: 2021-08-19 | End: 2021-08-20

## 2021-08-19 RX ORDER — POTASSIUM CHLORIDE 7.45 MG/ML
10 INJECTION INTRAVENOUS PRN
Status: DISCONTINUED | OUTPATIENT
Start: 2021-08-19 | End: 2021-08-21 | Stop reason: HOSPADM

## 2021-08-19 RX ORDER — MAGNESIUM SULFATE 1 G/100ML
1000 INJECTION INTRAVENOUS PRN
Status: DISCONTINUED | OUTPATIENT
Start: 2021-08-19 | End: 2021-08-21 | Stop reason: HOSPADM

## 2021-08-19 RX ORDER — POTASSIUM CHLORIDE 7.45 MG/ML
30 INJECTION INTRAVENOUS ONCE
Status: COMPLETED | OUTPATIENT
Start: 2021-08-19 | End: 2021-08-19

## 2021-08-19 RX ORDER — MAGNESIUM SULFATE IN WATER 40 MG/ML
2000 INJECTION, SOLUTION INTRAVENOUS ONCE
Status: COMPLETED | OUTPATIENT
Start: 2021-08-19 | End: 2021-08-19

## 2021-08-19 RX ADMIN — FOLIC ACID 1 MG: 1 TABLET ORAL at 08:36

## 2021-08-19 RX ADMIN — POTASSIUM CHLORIDE 10 MEQ: 10 INJECTION, SOLUTION INTRAVENOUS at 21:18

## 2021-08-19 RX ADMIN — POTASSIUM CHLORIDE 10 MEQ: 10 INJECTION, SOLUTION INTRAVENOUS at 23:02

## 2021-08-19 RX ADMIN — PANTOPRAZOLE SODIUM 40 MG: 40 TABLET, DELAYED RELEASE ORAL at 07:57

## 2021-08-19 RX ADMIN — Medication 1000 UNITS: at 08:36

## 2021-08-19 RX ADMIN — POTASSIUM CHLORIDE 40 MEQ: 1500 TABLET, EXTENDED RELEASE ORAL at 08:34

## 2021-08-19 RX ADMIN — HYDROXYCHLOROQUINE SULFATE 200 MG: 200 TABLET, FILM COATED ORAL at 20:15

## 2021-08-19 RX ADMIN — POTASSIUM CHLORIDE 30 MEQ: 7.46 INJECTION, SOLUTION INTRAVENOUS at 07:57

## 2021-08-19 RX ADMIN — SODIUM CHLORIDE, PRESERVATIVE FREE 10 ML: 5 INJECTION INTRAVENOUS at 20:16

## 2021-08-19 RX ADMIN — CALCIUM CARBONATE-CHOLECALCIFEROL TAB 250 MG-125 UNIT 500 MG: 250-125 TAB at 08:35

## 2021-08-19 RX ADMIN — POTASSIUM CHLORIDE 10 MEQ: 10 INJECTION, SOLUTION INTRAVENOUS at 15:32

## 2021-08-19 RX ADMIN — POTASSIUM CHLORIDE 10 MEQ: 10 INJECTION, SOLUTION INTRAVENOUS at 20:15

## 2021-08-19 RX ADMIN — FERROUS SULFATE TAB EC 325 MG (65 MG FE EQUIVALENT) 325 MG: 325 (65 FE) TABLET DELAYED RESPONSE at 08:35

## 2021-08-19 RX ADMIN — FLUOXETINE HYDROCHLORIDE 40 MG: 20 CAPSULE ORAL at 08:35

## 2021-08-19 RX ADMIN — SODIUM CHLORIDE, PRESERVATIVE FREE 10 ML: 5 INJECTION INTRAVENOUS at 08:44

## 2021-08-19 RX ADMIN — AMLODIPINE BESYLATE 5 MG: 5 TABLET ORAL at 08:36

## 2021-08-19 RX ADMIN — ACETAMINOPHEN 650 MG: 325 TABLET ORAL at 02:54

## 2021-08-19 RX ADMIN — MAGNESIUM SULFATE 2000 MG: 2 INJECTION INTRAVENOUS at 08:34

## 2021-08-19 RX ADMIN — POTASSIUM CHLORIDE 10 MEQ: 10 INJECTION, SOLUTION INTRAVENOUS at 16:49

## 2021-08-19 RX ADMIN — HEPARIN SODIUM AND DEXTROSE 16 UNITS/KG/HR: 10000; 5 INJECTION INTRAVENOUS at 11:54

## 2021-08-19 RX ADMIN — LURASIDONE HYDROCHLORIDE 60 MG: 60 TABLET, FILM COATED ORAL at 20:16

## 2021-08-19 RX ADMIN — ACETAMINOPHEN 650 MG: 325 TABLET ORAL at 07:00

## 2021-08-19 RX ADMIN — ATENOLOL 50 MG: 50 TABLET ORAL at 08:36

## 2021-08-19 RX ADMIN — SODIUM CHLORIDE, POTASSIUM CHLORIDE, SODIUM LACTATE AND CALCIUM CHLORIDE: 600; 310; 30; 20 INJECTION, SOLUTION INTRAVENOUS at 20:15

## 2021-08-19 RX ADMIN — HYDROXYCHLOROQUINE SULFATE 200 MG: 200 TABLET, FILM COATED ORAL at 08:36

## 2021-08-19 RX ADMIN — Medication 1 TABLET: at 08:34

## 2021-08-19 RX ADMIN — POTASSIUM CHLORIDE 40 MEQ: 1500 TABLET, EXTENDED RELEASE ORAL at 20:16

## 2021-08-19 RX ADMIN — LOPERAMIDE HYDROCHLORIDE 2 MG: 2 CAPSULE ORAL at 15:29

## 2021-08-19 RX ADMIN — Medication 50 MG: at 08:35

## 2021-08-19 RX ADMIN — ACETAMINOPHEN 650 MG: 325 TABLET ORAL at 20:15

## 2021-08-19 RX ADMIN — SODIUM PHOSPHATE, MONOBASIC, MONOHYDRATE 20 MMOL: 276; 142 INJECTION, SOLUTION INTRAVENOUS at 08:31

## 2021-08-19 RX ADMIN — POTASSIUM CHLORIDE 10 MEQ: 10 INJECTION, SOLUTION INTRAVENOUS at 18:47

## 2021-08-19 ASSESSMENT — PAIN SCALES - GENERAL
PAINLEVEL_OUTOF10: 3
PAINLEVEL_OUTOF10: 8
PAINLEVEL_OUTOF10: 7
PAINLEVEL_OUTOF10: 6
PAINLEVEL_OUTOF10: 7
PAINLEVEL_OUTOF10: 3

## 2021-08-19 ASSESSMENT — PAIN DESCRIPTION - LOCATION
LOCATION: HEAD
LOCATION: HEAD

## 2021-08-19 ASSESSMENT — PAIN DESCRIPTION - PAIN TYPE
TYPE: CHRONIC PAIN
TYPE: CHRONIC PAIN

## 2021-08-19 NOTE — CONSULTS
NEPHROLOGY     REASON FOR CONSULT:     #1 hypokalemia #2 hypomagnesemia #3 hypophosphatemia      HISTORY OF PRESENTING ILLNESS                 This is a 55 y.o. female who was recommended to go to the ER by her rheumatologist but noted to have low potassium 1.8. She follows up with her rheumatologist for polyarticular large and small joint inflammatory arthritis suspected gout according to her. On questioning he reports  Diarrhea since last 3 to 4 months. 10-12 episodes per day. Non-foul-smelling and no hematochezia. Has been seen by gastroenterologist in the past with scope be done showing no evidence of inflammatory bowel disease. Intermittent episodes of vomiting and nausea. Denies any fever/abdominal pain/skin rashes or change in medications. She also reports weight loss. She also admitted drinking alcohol last 2 months. She has been drinking 16 ounces cans every other day. Initial assessment showed stable vitals. Investigations demonstrated electrolyte abnormality in the form of hypomagnesemia hypokalemia and hypophosphatemia. Appropriate replacement is being done with some improvement in the parameters. We have been consulted for electrolyte imbalances. Review of medication list indicates she was on HCTZ at home. No reported history of thyroid dysfunction. Has significant GI issues. Denies polyuria/polydipsia  No reported history of malignancies  History of weight loss is present. Renal function at baseline.           PAST MEDICAL HISTORY         Diagnosis Date    Angioedema 11/17/2017    from lisinopril    Anxiety     Asthma     mild persistent asthma, on home resp medications for control    Bipolar disorder (HCC)     Claustrophobia     Depression     GERD (gastroesophageal reflux disease)     Hypertension     Hypertrophic cardiomyopathy (HCC)     Lung nodules     MRSA (methicillin resistant Staphylococcus aureus)     rt hip    Murmur     see cardiac echo result    OA acid (FOLVITE) 1 MG tablet, Take 1 tablet by mouth daily  Cholecalciferol (VITAMIN D3) 25 MCG (1000 UT) CAPS, Take 1,000 Units by mouth daily  ferrous sulfate (IRON 325) 325 (65 Fe) MG tablet, Take 325 mg by mouth daily (with breakfast)  citalopram (CELEXA) 20 MG tablet, Take 1 tablet by mouth daily  Calcium Carbonate-Vitamin D (OYSTER SHELL CALCIUM/D) 500-200 MG-UNIT TABS, TAKE 1 TAB BY MOUTH ONCE A DAY   atenolol (TENORMIN) 50 MG tablet, Take 1 tablet by mouth daily  [DISCONTINUED] loratadine (CLARITIN) 10 MG tablet, TAKE 1 CAPSULE BY MOUTH DAILY   amLODIPine (NORVASC) 10 MG tablet, TAKE 1 TABLET BY MOUTH DAILY   diclofenac sodium (VOLTAREN) 1 % GEL, Apply 4 g topically 2 times daily  albuterol sulfate (PROAIR RESPICLICK) 851 (90 Base) MCG/ACT aerosol powder inhalation, Inhale 2 puffs into the lungs every 6 hours as needed for Wheezing or Shortness of Breath  lurasidone (LATUDA) 60 MG TABS tablet, Take 60 mg by mouth nightly  Scheduled Meds:    [START ON 8/20/2021] amLODIPine  10 mg Oral Daily    atenolol  50 mg Oral Daily    multivitamin  1 tablet Oral Daily    potassium chloride  40 mEq Oral BID    sodium chloride flush  5-40 mL Intravenous 2 times per day    thiamine mononitrate  50 mg Oral Daily    Vitamin D  1,000 Units Oral Daily    pantoprazole  40 mg Oral QAM AC    lurasidone  60 mg Oral Nightly    hydroxychloroquine  200 mg Oral BID    FLUoxetine  40 mg Oral Daily    folic acid  1 mg Oral Daily    ferrous sulfate  325 mg Oral Daily with breakfast    Calcium Carb-Cholecalciferol  2 tablet Oral Daily     Continuous Infusions:    heparin (PORCINE) Infusion 16 Units/kg/hr (08/19/21 1154)    sodium chloride      lactated ringers 100 mL/hr at 08/18/21 2154     PRN Meds:  loperamide, potassium chloride **OR** potassium alternative oral replacement **OR** potassium chloride, magnesium sulfate, sodium phosphate IVPB **OR** sodium phosphate IVPB, heparin (porcine), heparin (porcine), sodium chloride flush, sodium chloride, acetaminophen, ondansetron **OR** ondansetron    ALLERGY     Bee venom, Iodine, Lisinopril, and Shellfish-derived products       SOCIAL HISTORY     Social History     Socioeconomic History    Marital status: Single     Spouse name: Not on file    Number of children: Not on file    Years of education: Not on file    Highest education level: Not on file   Occupational History    Not on file   Tobacco Use    Smoking status: Former Smoker     Packs/day: 0.50     Years: 20.00     Pack years: 10.00     Types: Cigarettes     Quit date: 1992     Years since quittin.6    Smokeless tobacco: Never Used    Tobacco comment: [facility] quiti in the 1980s   Substance and Sexual Activity    Alcohol use: Not Currently     Alcohol/week: 12.0 standard drinks     Types: 12 Cans of beer per week     Comment: Quit about 5 months ago 20    Drug use: Not Currently     Types: Cocaine     Comment: last use approximately 14 cocaine    Sexual activity: Yes     Partners: Male   Other Topics Concern    Not on file   Social History Narrative    Not on file     Social Determinants of Health     Financial Resource Strain: Low Risk     Difficulty of Paying Living Expenses: Not hard at all   Food Insecurity: No Food Insecurity    Worried About Running Out of Food in the Last Year: Never true    [place] of Food in the Last Year: Never true   Transportation Needs: Unmet Transportation Needs    Lack of Transportation (Medical):  Yes    Lack of Transportation (Non-Medical): Yes   Physical Activity:     Days of Exercise per Week:     Minutes of Exercise per Session:    Stress:     Feeling of Stress :    Social Connections:     Frequency of Communication with Friends and Family:     Frequency of Social Gatherings with Friends and Family:     Attends [employer] Services:     Active Member of Clubs or Organizations:     Attends Club or Organization Meetings:     Marital Status: Intimate Partner Violence:     Fear of Current or Ex-Partner:     Emotionally Abused:     Physically Abused:     Sexually Abused:        FAMILY HISTORY     Family History   Problem Relation Age of Onset    Stroke Mother     Hypertension Father     Cancer Brother         bone    Lung Cancer Maternal Aunt     Cancer Maternal Grandmother         eye     Other Sister         aneurysm    Heart Disease Sister           REVIEW OF SYSTEM     Constitutional: No asthenia/weight loss/anorexia    HEENT : No epistaxis/visual blurriness/rhinorrhoea/sorethroat/trauma  Cardiovascular:No chest pain/palpitation/SOB  Respiratory: No cough/fever/SOB/Wheezing    Gastrointestinal: Present diarrhea/nausea/vomiting  Genitourinary: No dysuria/pyuria/hematuria/incomplete emptying of bladder  Musculoskeletal:  No gait disturbance/weakness or joint complaints  Integumentary: No rash or pruritis. Neurological: No headache/diplopia/change in muscle strength/numbness or tingling. No change in gait, balance, coordination, mood, affect, memory, mentation, behavior. Psychiatric: No anxiety/depression. Endocrine: No temperature intolerance. No excessive thirst, fluid intake, or urination. No tremor. Hematologic/Lymphatic: No abnormal bruising or bleeding, blood clots or swolle lymph nodes.   Allergic/Immunologic: No nasal congestion or hives      PHYSICAL EXAM     Vitals:    08/18/21 2300 08/19/21 0345 08/19/21 0748 08/19/21 1113   BP: 119/86 128/81 (!) 130/95 (!) 117/90   Pulse: 77 73 69 73   Resp: 27 24 12 18   Temp: 100.1 °F (37.8 °C) 97.2 °F (36.2 °C) 99 °F (37.2 °C) 98.7 °F (37.1 °C)   TempSrc: Oral Axillary Oral Oral   SpO2: 94% 95% 95% 94%   Weight:       Height:         24HR INTAKE/OUTPUT:      Intake/Output Summary (Last 24 hours) at 8/19/2021 1231  Last data filed at 8/19/2021 1143  Gross per 24 hour   Intake 10 ml   Output 3275 ml   Net -3265 ml       General appearance:Awake, alert, in no acute distress  Skin: warm and dry, no rash or erythema  Eyes: conjunctivae normal and sclera anicteric  ENT: no thrush no pharyngeal congestion  orodental hygiene   Neck: No JVD, Lymphadenopathty or thyromegaly  Respiratory: vesicular breath sounds,no wheeze/crackles  Cardiovascular: S1 S2 normal,no gallop or organic murmur. No carotid bruit  Abdomen:Non tender/non distended. Bowel sounds present  Extremities: No Cyanosis or Clubbing,Lower extremity edema  Neurological:Alert and oriented. No abnormalities of mood, affect, memory, mentation, or behavior are noted    INVESTIGATIONS      CBC:   Recent Labs     08/16/21  1557   WBC 11.1   RBC 3.51*   HGB 11.1*   HCT 32.9*   MCV 93.7   MCH 31.6   MCHC 33.7   RDW 15.5*   *   MPV 8.8      BMP:   Recent Labs     08/17/21  1152 08/17/21  1152 08/18/21  0016 08/18/21  0611 08/19/21  0554     --   --  143 139   K 1.9*   < > 2.1* 2.0* 2.3*   CL 93*  --   --  97* 97*   CO2 31  --   --  33* 29   BUN 6  --   --  3* 2*   CREATININE 0.96*  --   --  0.89 0.85   GLUCOSE 103*  --   --  91 73   CALCIUM 7.1*  --   --  7.7* 7.3*    < > = values in this interval not displayed. Phosphorus:    Recent Labs     08/17/21  1152   PHOS 2.0*     Magnesium:   Recent Labs     08/16/21  1557 08/18/21  0611 08/19/21  0554   MG 1.1* 1.4* 1.5*     Albumin:   Recent Labs     08/16/21  1557 08/17/21  0717   LABALBU 3.3* 2.8*       Radiology:  Reviewed as available. ASSESSMENT     1. Hypokalemia -multifactorial secondary to GI losses/alkalosis/HCTZ use/hypomagnesemia -improving  Doubt renal tubulopathy  #2 hypomagnesium -GI losses/poor p.o. #3 hypophosphatemia -nutritional/alkalosis.   #4 chronic diarrhea rule out inflammatory bowel disease  #5 polyarticular inflammatory arthritis likely gout  #6 coronary artery disease status post stent placement  #7 essential hypertension     PLAN:     #1 continue IV fluids  #2 replace potassium magnesium and phosphorus as per the sliding scale  #3 Will adjust p.o. replacement accordingly tomorrow  #4 check urine studies  #5 we will follow    Thank you for the consultation. Please do not hesitate to call with questions. This note is created with the assistance of a speech-recognition program. While intending to generate a document that actually reflects the content of the visit, no guarantees can be provided that every mistake has been identified and corrected by editing.     Jenny Hay MD MD, MRCP Priya Pantoja, FACP   8/19/2021 12:31 PM    [facility]

## 2021-08-19 NOTE — PROGRESS NOTES
or Orthoses?: No  Vision/Hearing  Vision: Impaired  Vision Exceptions: Wears glasses at all times  Hearing: Within functional limits     Subjective  General  Patient assessed for rehabilitation services?: Yes  Response To Previous Treatment: Not applicable  Family / Caregiver Present: No  Follows Commands: Within Functional Limits  Subjective  Subjective: RN and pt agreeable to PT eval  Pain Screening  Patient Currently in Pain: Yes  Pain Assessment  Pain Assessment: 0-10  Pain Level: 7  Pain Location: Head  Vital Signs  Patient Currently in Pain: Yes     Orientation  Orientation  Overall Orientation Status: Within Normal Limits  Social/Functional History  Social/Functional History  Lives With: Significant other  Type of Home: House  Home Layout: Multi-level  Home Access: Stairs to enter with rails  Entrance Stairs - Number of Steps: 3  Entrance Stairs - Rails: Both  Bathroom Shower/Tub: Tub/Shower unit  Bathroom Toilet: Handicap height  ADL Assistance: Independent  Homemaking Assistance: Independent  Homemaking Responsibilities: Yes  Ambulation Assistance: Independent  Transfer Assistance: Independent  Active : No  Mode of Transportation: Cab  Occupation: On disability  Leisure & Hobbies: likes to read, play games on the phone  Cognition      Objective     AROM RLE (degrees)  RLE AROM: WNL  AROM LLE (degrees)  LLE AROM : WNL  AROM RUE (degrees)  RUE AROM : WNL  AROM LUE (degrees)  LUE AROM : WNL  Strength RLE  Strength RLE: WNL  Strength LLE  Strength LLE: WNL  Strength RUE  Strength RUE: WNL  Strength LUE  Strength LUE: WNL     Sensation  Overall Sensation Status: Impaired (Reports minbness in B feet)  Bed mobility  Supine to Sit: Stand by assistance  Sit to Supine: Stand by assistance  Scooting: Stand by assistance  Transfers  Sit to Stand: Contact guard assistance  Stand to sit: Contact guard assistance  Ambulation  Ambulation?: Yes  Ambulation 1  Surface: level tile  Device: No Device  Assistance: Contact guard assistance  Distance: amb 25 ft without a device x CGA.      Balance  Posture: Good  Sitting - Static: Good  Sitting - Dynamic: Fair  Standing - Static: Fair  Standing - Dynamic: [address]  Times per week: 5-6x wk  Current Treatment Recommendations: Strengthening, Functional Mobility Training, Gait Training, Safety Education & Training, Endurance Training, Stair training  Safety Devices  Type of devices: Nurse notified, Chair alarm in place, Call light within reach    G-Code     OutComes Score     AM-PAC Score  AM-PAC Inpatient Mobility Raw Score : 20 (08/19/21 1127)  AM-PAC Inpatient T-Scale Score : 47.67 (08/19/21 1127)  Mobility Inpatient CMS 0-100% Score: 35.83 (08/19/21 1127)  Mobility Inpatient CMS G-Code Modifier : Joaquin Hawley (08/19/21 1127)        Goals  Short term goals  Time Frame for Short term goals: 10 visits  Short term goal 1: transfers with SBA  Short term goal 2: amb 150 ft without a device x SBA  Short term goal 3: ascend/descend 4 steps with SBA  Short term goal 4: 20 min exercise program x SBA  Patient Goals   Patient goals : Return home     Therapy Time   Individual Concurrent Group Co-treatment   Time In 0845         Time Out 8474         Minutes 23             1 of [facility], PT

## 2021-08-19 NOTE — PROGRESS NOTES
[facility]   Progress Note                   Date:   8/19/2021  Patient name: Jenelle Villanueva  Date of admission:  8/16/2021  3:18 PM  MRN:   [id number]  YOB: 1965  PCP: Jazmyn Mustafa MD    Reason for Admission: Hypocalcemia [E83.51]  Hypokalemia [E87.6]  Hypochloremia [E87.8]  Elevated troponin [R77.8]  ROXANN (acute kidney injury) ([facility] [address] [N17.9]    Subjective:       Clinical Changes / Abnormalities:Patient seen and examined at the bed side. No new acute events overnight. She denies acute chest pain or SOB. States that diarrhea episodes significantly improved. Small BM this morning. Reviewed vitals, labs, tele, & previous testing. SR on tele.        Medications:   Scheduled Meds:   magnesium sulfate  2,000 mg Intravenous Once    sodium phosphate IVPB  20 mmol Intravenous Once    amLODIPine  5 mg Oral Daily    atenolol  50 mg Oral Daily    multivitamin  1 tablet Oral Daily    potassium chloride  40 mEq Oral BID    sodium chloride flush  5-40 mL Intravenous 2 times per day    thiamine mononitrate  50 mg Oral Daily    Vitamin D  1,000 Units Oral Daily    pantoprazole  40 mg Oral QAM AC    lurasidone  60 mg Oral Nightly    hydroxychloroquine  200 mg Oral BID    FLUoxetine  40 mg Oral Daily    folic acid  1 mg Oral Daily    ferrous sulfate  325 mg Oral Daily with breakfast    Calcium Carb-Cholecalciferol  2 tablet Oral Daily     Continuous Infusions:   heparin (PORCINE) Infusion 16 Units/kg/hr (08/17/21 1737)    sodium chloride      lactated ringers 100 mL/hr at 08/18/21 2154     CBC:   Recent Labs     08/16/21  1557   WBC 11.1   HGB 11.1*   *     BMP:    Recent Labs     08/17/21  1152 08/17/21  1152 08/18/21  0016 08/18/21  0611 08/19/21  0554     --   --  143 139   K 1.9*   < > 2.1* 2.0* 2.3*   CL 93*  --   --  97* 97*   CO2 31  --   --  33* 29   BUN 6  --   --  3* 2*   CREATININE 0.96*  --   --  0.89 0.85   GLUCOSE 103*  --   --  91 73    < > = values in this interval not displayed. Hepatic:   Recent Labs     08/16/21  1557 08/17/21  0717   AST 90* 62*   ALT 63* 50*   BILITOT 0.52 0.55   ALKPHOS 164* 142*     Troponin:   Recent Labs     08/16/21  1557 08/16/21  1726 08/18/21  1032   TROPHS 144* 132* 149*     BNP: No results for input(s): BNP in the last 72 hours. Lipids: No results for input(s): CHOL, HDL in the last 72 hours. Invalid input(s): LDLCALCU  INR: No results for input(s): INR in the last 72 hours. Objective:   Vitals: BP (!) 130/95   Pulse 69   Temp 99 °F (37.2 °C) (Oral)   Resp 12   Ht 5' 5\" (1.651 m)   Wt 174 lb 2.6 oz (79 kg)   SpO2 95%   BMI 28.98 kg/m²   General appearance: alert and cooperative with exam  HEENT: Head: Normocephalic, no lesions, without obvious abnormality. Neck: no JVD, trachea midline, no adenopathy  Lungs: Clear to auscultation. Room air without distress. Heart: Regular rate and rhythm, s1/s2 auscultated, no murmurs. SR  Abdomen: soft, non-tender, bowel sounds active  Extremities: no edema  Neurologic: not done    ECHO 8/17/2021  Summary  Left ventricle is normal in size, global left ventricular systolic function  is hyperdynamic, estimated ejection fraction is > 65%. Moderate left ventricular hypertrophy. Grade I, mild, diastolic dysfunction. There is mild LVOT obstruction. Right atrium is normal in size. Prominent Eustachian valve. Aortic valve is sclerotic but opens well. Trivial mitral regurgitation. Trivial tricuspid regurgitation. PRIOR TESTING    Coronary Cath 3/27/09: No coronary vessel disease     ECHO 12/18/12: EF 60%, moderate LVH, mild DD, mildly dilated LA, mild MR.        PCST 3/28/14: No ischemia or infarct. EF 65%.      TEJAL 3/11/14: EF 65%, mild MR/TR. Aortic root dimension is normal without evidence of aortic dissection. Assessment / Acute Cardiac Problems:     1. Increased Troponin secondary to type II MI  2.  Prolonged QTc secondary to hypokalemia and hypocalcemia  3. Hypokalemai  4. Hypocalcemia  5. Rhabdomyolysis  6. Hypertension    Patient Active Problem List:     Lung nodule     Obesity     Adrenal adenoma     Goiter     Hypertension     Alcohol abuse     Essential hypertension     Enlarged tonsils     ACE inhibitor-aggravated angioedema     JONES (obstructive sleep apnea)     Dyslipidemia     IGT (impaired glucose tolerance)     Acute alcoholic intoxication without complication (HCC)     Acute renal insufficiency     Effusion of bursa of right knee     Acute cystitis without hematuria     Bipolar disorder (HCC)     Mild intermittent asthma without complication     Inflammatory polyarthritis (HCC)     Seronegative rheumatoid arthritis (HCC)     Hypokalemia     Nausea vomiting and diarrhea     Chronic diarrhea      Plan of Treatment:   1. Elevated troponin. NSTEMI likely type II (144, 132, 149)  Flat. Continue heparin drip for now. 2. 2D Echo as noted above with LVEF >65%. Mild Diastolic dysfunction. 3. HTN. Improved. Continue Norvasc 10 mg and Atenolol 50 mg   4. Keep K+ > 4.0, and Mg+ > 2.0. K remains very low at 2.3. Mad 1.5. Replacements ordered. 5. Recommend consideration of consult to Nephrology due to hypokalemia and hypomagnesemia for several days despite aggressive replacement therapy. Also hypocalcemic. 6. Will repeat EKG when electrolytes stable. Continue to monitor QT prolongation. 7. Ischemic workup with stress testing when Electrolytes stable.   8. Remainder of care management per primary team.      Electronically signed by CECILIA Mcmanus CNP on 8/19/2021 at 10:15 [facility]      [phone]

## 2021-08-19 NOTE — PROGRESS NOTES
[facility]  Internal Medicine Teaching Residency Program  Inpatient Daily Progress Note  ______________________________________________________________________________    Patient: Ondina Minor  YOB: 1965   EGE:[id number]    Acct: [de-identified]     Room: [address] [place] [address]  Admit date: 8/16/2021  Today's date: 08/19/21  Number of days in the hospital: 3    SUBJECTIVE   Admitting Diagnosis: Chronic diarrhea  CC: chronic diarrhea    Pt examined at bedside. No acute issues overnight. Pt desaturated last night during sleep and needed oxygen. Pt had 2 episodes of diarrhea since last night. Denies any abdominal pain/vomiting. Chart & results reviewed. Patient is eating ok, sleeping ok, normal bowel/ bladder movements. Patient is able to ambulate. Pt on heparin drip. Patient on room air       ROS:  Constitutional:  negative for chills, fevers, sweats  Respiratory:  negative for cough, dyspnea on exertion, hemoptysis, shortness of breath, wheezing  Cardiovascular:  negative for chest pain, chest pressure/discomfort, lower extremity edema, palpitations  Gastrointestinal:  negative for abdominal pain, constipation, diarrhea, nausea, vomiting  Neurological:  negative for dizziness, headache    BRIEF HISTORY     The patient is a pleasant 56 y. o. female presents with a chief complaint of diarrhea since the last 2 months.  Patient reports having 10-15 episodes per day.  Patient saw her rheumatologist last Friday and had some labs done which showed  low potassium of 1.9 that is when her rheumatologist told her to go to the ED. but the patient came today.  Patient reports that the stools are brown, watery, with mucus, jelly like.  Patient also reports episodes of vomiting since last 2 months.  Reports abdominal pain, nausea vomiting.  Change in appetite and weight.  Patient reports having lost 30 to 35 pounds in the span of last 6 months.  Patient follows rheumatologist for joint pains, rheumatoid arthritis versus gout.  Patient reports numbness in her feet.  Patient also reports episodes of flareup when the wrists swell.  Patient also reports an episode of passing out a week ago. Pt feels frustrated that she has to take too many medications, started crying while telling that. Pt says she get frustrated and stop taking them and end up this way. Pt admits drinking alcohol, since the last 2 months pt has been drinking 16 ounce can every other day. Pt has h/o CAD, h/o stents put in the past.  Pt takes Norvasc for hypertension, takes Lipitor and hydrochlorothiazide. Pt takes hydroxychloroquine and steroids for rheumatoid arthritis.     Echo -   Summary  Left ventricle is normal in size, global left ventricular systolic function  is hyperdynamic, estimated ejection fraction is > 65%. Moderate left ventricular hypertrophy. Grade I, mild, diastolic dysfunction. There is mild LVOT obstruction. Right atrium is normal in size. Prominent Eustachian valve. Aortic valve is sclerotic but opens well. Trivial mitral regurgitation. Trivial tricuspid regurgitation    OBJECTIVE     Vital Signs:  BP (!) 117/90   Pulse 73   Temp 98.7 °F (37.1 °C) (Oral)   Resp 18   Ht 5' 5\" (1.651 m)   Wt 174 lb 2.6 oz (79 kg)   SpO2 94%   BMI 28.98 kg/m²     Temp (24hrs), Av.2 °F (37.3 °C), Min:97.2 °F (36.2 °C), Max:100.2 °F (37.9 °C)    In: 10   Out: 3275 [Urine:3275]    Physical Exam:  Physical Exam  Constitutional:       Appearance: Normal appearance. HENT:      Head: Normocephalic and atraumatic. Nose: Nose normal.      Mouth/Throat:      Mouth: Mucous membranes are dry. Eyes:      Extraocular Movements: Extraocular movements intact. Cardiovascular:      Rate and Rhythm: Normal rate and regular rhythm. Pulses: Normal pulses. Heart sounds: Normal heart sounds. No murmur heard. Pulmonary:      Effort: No respiratory distress. Breath sounds: Normal breath sounds. No wheezing or rales. Abdominal:      General: There is no distension. Palpations: Abdomen is soft. Tenderness: There is no abdominal tenderness. Musculoskeletal:         General: No swelling. Normal range of motion. Cervical back: Normal range of motion. No rigidity. Right lower leg: No edema. Left lower leg: No edema. Skin:     General: Skin is warm. Coloration: Skin is not jaundiced. Findings: No bruising or rash. Neurological:      General: No focal deficit present. Mental Status: She is alert and oriented to person, place, and time. Mental status is at baseline.    Psychiatric:         Mood and Affect: Mood normal.         Behavior: Behavior normal.           Medications:  Scheduled Medications:    sodium phosphate IVPB  20 mmol Intravenous Once    [START ON 8/20/2021] amLODIPine  10 mg Oral Daily    potassium chloride  40 mEq Intravenous BID    atenolol  50 mg Oral Daily    multivitamin  1 tablet Oral Daily    potassium chloride  40 mEq Oral BID    sodium chloride flush  5-40 mL Intravenous 2 times per day    thiamine mononitrate  50 mg Oral Daily    Vitamin D  1,000 Units Oral Daily    pantoprazole  40 mg Oral QAM AC    lurasidone  60 mg Oral Nightly    hydroxychloroquine  200 mg Oral BID    FLUoxetine  40 mg Oral Daily    folic acid  1 mg Oral Daily    ferrous sulfate  325 mg Oral Daily with breakfast    Calcium Carb-Cholecalciferol  2 tablet Oral Daily     Continuous Infusions:    heparin (PORCINE) Infusion 16 Units/kg/hr (08/17/21 1737)    sodium chloride      lactated ringers 100 mL/hr at 08/18/21 2154     PRN Medicationsloperamide, 2 mg, TID PRN  heparin (porcine), 4,000 Units, PRN  heparin (porcine), 2,000 Units, PRN  sodium chloride flush, 5-40 mL, PRN  sodium chloride, 25 mL, PRN  acetaminophen, 650 mg, Q4H PRN  ondansetron, 4 mg, Q8H PRN   Or  ondansetron, 4 mg, Q6H PRN        Diagnostic Labs:  CBC:   Recent Labs     08/16/21  1557 WBC 11.1   RBC 3.51*   HGB 11.1*   HCT 32.9*   MCV 93.7   RDW 15.5*   *     BMP:   Recent Labs     08/17/21  1152 08/17/21  1152 08/18/21  0016 08/18/21  0611 08/19/21  0554     --   --  143 139   K 1.9*   < > 2.1* 2.0* 2.3*   CL 93*  --   --  97* 97*   CO2 31  --   --  33* 29   PHOS 2.0*  --   --   --   --    BUN 6  --   --  3* 2*   CREATININE 0.96*  --   --  0.89 0.85    < > = values in this interval not displayed. BNP: No results for input(s): BNP in the last 72 hours. PT/INR: No results for input(s): PROTIME, INR in the last 72 hours. APTT:   Recent Labs     08/18/21  1652 08/18/21  2340 08/19/21  0554   APTT 49.5* 57.3* 58.2*     CARDIAC ENZYMES: No results for input(s): CKMB, CKMBINDEX, TROPONINI in the last 72 hours. Invalid input(s): CKTOTAL;3  FASTING LIPID PANEL:  Lab Results   Component Value Date    CHOL 248 (H) 03/21/2021     03/21/2021    TRIG 47 03/21/2021     LIVER PROFILE:   Recent Labs     08/16/21  1557 08/17/21  0717   AST 90* 62*   ALT 63* 50*   BILITOT 0.52 0.55   ALKPHOS 164* 142*      MICROBIOLOGY:   Lab Results   Component Value Date/Time    CULTURE ESCHERICHIA COLI >064877 CFU/ML (A) 03/30/2021 06:28 AM       Imaging:    CT ABDOMEN PELVIS WO CONTRAST Additional Contrast? None    Result Date: 8/16/2021  No acute CT abnormality within the abdomen or pelvis. Gallbladder is nondistended with layering biliary sludge and/or small gallstones. No pericholecystic inflammatory changes or CT evidence of acute cholecystitis. Hepatic steatosis. XR CHEST PORTABLE    Result Date: 8/16/2021  No acute cardiopulmonary findings       ASSESSMENT & PLAN     Assessment and Plan:    Principal Problem:    Chronic diarrhea  Active Problems:    Hypokalemia    Nausea vomiting and diarrhea    Hypocalcemia  Resolved Problems:    * No resolved hospital problems. *    1.  Chronic diarrhea -                Continue fluids               GI onboard               C.diff negative, fecal lawrence protectin normal,                Giardia and cryptosporidium negative,                Gastrointestinal panel negative                follow up on other stool studies, to rule out causes of chronic diarrhea                ? Malabsorptive diarrhea/ neuroendocrine tomours     2. Hypokalemia -                Likely due to chronic diarrhea               Continue replacement both IV and oral BID               Continue fluids               Follow up on nephrology consult.     3. Hypomagnesemia -                likely due to diarrhea               Will monitor and replace accordingly               Follow up on nephrology consult     3. Rhabdomyolysis - non traumatic               likely to hypokalemia               CK trending down               Continue IV fluids               Will monitor              4. Elevated troponin -                EKG - sinus, prolonged QTc, T wave inversions               Continue heparin drip as per cardio               echo - EF ->32%, grade 1 diastolic dysfunction               Troponin 149 yesterday,               Ischemic work up when electrolytes are stable as per cardio               Continue Lipitor and atenolol               Monitor vitals closely     5. Essential hypertension -               Continue Norvasc and atenolol                Monitor closely      5. Arthritis - ? Rheumatoid arthritis              Continue home medications              Continue hydroxychloroquine     6. Alcohol abuse -               Continue thiamine, folate, multivitamin               Watch for signs of withdrawal    7. JONES - ?  Sleep apnea                Pt desaturating at night, needed oxygen                Needs out pt follow up for sleep study    Diet: regular adult diet  DVT ppx : pt already on heparin  GI ppx: protonix    PT/OT: onboard  Discharge Planning / SW: ongoing    Micha Pena  PGY-1  Internal Medicine  [facility]   11:53 AM 8/19/2021

## 2021-08-19 NOTE — PROGRESS NOTES
Pharmacy reviewed medications for potential causes of diarrhea, as patient experiencing chronic diarrhea:    - Fluoxetine: SE Diarrhea 8-18 %  - Latuda: Diarrhea 3-5 %  - Atenolol: 2-3 %    Thank you  Thank you  Eloy Helm, PharmD, [facility]  Inpatient Clinical Pharmacist  [phone]

## 2021-08-19 NOTE — PROGRESS NOTES
Hypokalemia. Diagnoses of Hypocalcemia, Hypochloremia, Elevated troponin, and ROXANN (acute kidney injury) ([facility] were also pertinent to this visit. has a past medical history of Angioedema, Anxiety, Asthma, Bipolar disorder ([facility] Claustrophobia, Depression, GERD (gastroesophageal reflux disease), Hypertension, Hypertrophic cardiomyopathy ([facility] [address] Lung nodules, MRSA (methicillin resistant Staphylococcus aureus), Murmur, OA (osteoarthritis), JONES (obstructive sleep apnea), Thyroid nodule, and Type 2 diabetes mellitus without complication, without long-term current use of insulin ([facility] [address] has a past surgical history that includes Hysterectomy; Upper gastrointestinal endoscopy; Colonoscopy; Thyroid surgery; Cardiac catheterization; other surgical history (8/24/2015); Upper gastrointestinal endoscopy (N/A, 12/3/2020); Colonoscopy (N/A, 12/3/2020); Foot surgery (Bilateral, 12/07/2020); Hammer toe surgery (Right, 12/7/2020); and Bunionectomy (Bilateral, 12/7/2020). Restrictions  Restrictions/Precautions  Restrictions/Precautions: Up as Tolerated  Required Braces or Orthoses?: No    Subjective   General  Patient assessed for rehabilitation services?: Yes  Family / Caregiver Present: No  General Comment  Comments: RN ok'd pt for OT/PT evaluation. pt pleasant and cooperative throughout.     Social/Functional History  Social/Functional History  Lives With: Significant other  Type of Home: House  Home Layout: Multi-level  Home Access: Stairs to enter with rails  Entrance Stairs - Number of Steps: 3  Entrance Stairs - Rails: Both  Bathroom Shower/Tub: Tub/Shower unit  Bathroom Toilet: Handicap height  ADL Assistance: Independent  Homemaking Assistance: Independent  Homemaking Responsibilities: No (boyfriend completes)  Ambulation Assistance: Independent  Transfer Assistance: Independent  Active : No  Mode of Transportation: Cab  Occupation: On disability  Leisure & Hobbies: likes to read, play games on the phone Objective   Vision: Impaired  Vision Exceptions: Wears glasses at all times  Hearing: Within functional limits          Balance  Sitting Balance: Modified independent   Standing Balance: Contact guard assistance  Standing Balance  Time: ~4-5 min  Activity: sinkside ADLs  Functional Mobility  Functional - Mobility Device: No device  Activity: To/from bathroom  Assist Level: Contact guard assistance  ADL  Feeding: Independent  Grooming: Modified independent  (OT facilitated washing face and completing oral care standing sinkside)  UE Bathing: Stand by assistance  LE Bathing: Contact guard assistance  UE Dressing: Stand by assistance  LE Dressing: Contact guard assistance (OT facilitated donning socks long sitting using figure four method at Mod I)  Toileting: Contact guard assistance  Tone RUE  RUE Tone: Normotonic  Tone LUE  LUE Tone: Normotonic  Coordination  Movements Are Fluid And Coordinated: Yes     Bed mobility  Supine to Sit: Stand by assistance  Scooting: Stand by assistance  Comment: Retired to bedside recliner  Transfers  Sit to stand: Stand by assistance  Stand to sit: Stand by assistance     Cognition  Overall Cognitive Status: WFL        Sensation  Overall Sensation Status: Impaired (Reports minbness in B feet)      LUE AROM : WFL  Left Hand AROM: WFL  RUE AROM : WFL  Right Hand AROM: WFL  LUE Strength  Gross LUE Strength: WFL  L Hand General: 4+/5  RUE Strength  Gross RUE Strength: WFL  R Hand General: 4+/5     Plan   Plan  Times per week: 3x/wk  Current Treatment Recommendations: Strengthening, Endurance Training, Patient/Caregiver Education & Training, Self-Care / ADL, Equipment Evaluation, Education, & procurement, Safety Education & Training, Functional Mobility Training, Balance Training    AM-PAC Score        AM-[facility] Inpatient Daily Activity Raw Score: 20 (08/19/21 1518)  AM-PAC Inpatient ADL T-Scale Score : 42.03 (08/19/21 1518)  ADL Inpatient CMS 0-100% Score: 38.32 (08/19/21 1518)  ADL Inpatient CMS G-Code Modifier : Victor Manuel Vergara (08/19/21 4908)    Goals  Short term goals  Time Frame for Short term goals: Patient will, by discharge  Short term goal 1: demo UB ADLs independently  Short term goal 2: demo LB ADLs at [address]  Short term goal 3: demo functional transfers/mobility using LRD at Mod I to engage in ADLs safely  Short term goal 4: demo use of EC/WS techs independently with x1 demo to promote safety with ADL tasks  Short term goal 5: demo 10+ min of dynamic standing tolerance reaching outside ALTAGRACIA at Mod I to engage in ADLs     Therapy Time   Individual Concurrent Group Co-treatment   Time In 0840         Time Out 0909         Minutes 29         Timed Code Treatment Minutes: [address], OTR/L

## 2021-08-19 NOTE — CARE COORDINATION
Called Carissa at [facility] to see if they can accept patient. She was unavailable so I left a VM and callback number. [phone] Received VM from [facility] stating she's still waiting on final word for acceptance. She states she will call me when she knows for sure. Met with patient at bedside to discuss plan. She still would like to return home with [facility] for medication management at this time. [phone] Received VM from [facility] at [facility] stating they could not accept patient back due to concerns they had during her previous time with them. CM to obtain new choice.

## 2021-08-20 PROBLEM — E83.42 HYPOMAGNESEMIA: Status: ACTIVE | Noted: 2021-08-20

## 2021-08-20 PROBLEM — R79.89 ELEVATED TROPONIN: Status: ACTIVE | Noted: 2021-08-20

## 2021-08-20 PROBLEM — R77.8 ELEVATED TROPONIN: Status: ACTIVE | Noted: 2021-08-20

## 2021-08-20 PROBLEM — M62.82 RHABDOMYOLYSIS: Status: ACTIVE | Noted: 2021-08-20

## 2021-08-20 LAB
ANION GAP SERPL CALCULATED.3IONS-SCNC: 13 MMOL/L (ref 9–17)
BUN BLDV-MCNC: 2 MG/DL (ref 6–20)
BUN/CREAT BLD: ABNORMAL (ref 9–20)
CALCITONIN LEVEL: <2 PG/ML (ref 0–5.1)
CALCIUM SERPL-MCNC: 7.4 MG/DL (ref 8.6–10.4)
CHLORIDE BLD-SCNC: 96 MMOL/L (ref 98–107)
CO2: 26 MMOL/L (ref 20–31)
CREAT SERPL-MCNC: 0.93 MG/DL (ref 0.5–0.9)
EKG ATRIAL RATE: 82 BPM
EKG P AXIS: 53 DEGREES
EKG P-R INTERVAL: 176 MS
EKG Q-T INTERVAL: 440 MS
EKG QRS DURATION: 90 MS
EKG QTC CALCULATION (BAZETT): 514 MS
EKG R AXIS: 15 DEGREES
EKG T AXIS: -164 DEGREES
EKG VENTRICULAR RATE: 82 BPM
GASTRIN: 174 PG/ML (ref 0–100)
GFR AFRICAN AMERICAN: >60 ML/MIN
GFR NON-AFRICAN AMERICAN: >60 ML/MIN
GFR SERPL CREATININE-BSD FRML MDRD: ABNORMAL ML/MIN/{1.73_M2}
GFR SERPL CREATININE-BSD FRML MDRD: ABNORMAL ML/MIN/{1.73_M2}
GLUCOSE BLD-MCNC: 100 MG/DL (ref 70–99)
MAGNESIUM: 1.2 MG/DL (ref 1.6–2.6)
MAGNESIUM: 2.4 MG/DL (ref 1.6–2.6)
OSMOLALITY URINE: 319 MOSM/KG (ref 80–1300)
PARTIAL THROMBOPLASTIN TIME: 46.2 SEC (ref 20.5–30.5)
POTASSIUM SERPL-SCNC: 2.8 MMOL/L (ref 3.7–5.3)
POTASSIUM SERPL-SCNC: 3.3 MMOL/L (ref 3.7–5.3)
POTASSIUM, UR: 32.6 MMOL/L
SERUM OSMOLALITY: 275 MOSM/KG (ref 275–295)
SODIUM BLD-SCNC: 135 MMOL/L (ref 135–144)

## 2021-08-20 PROCEDURE — 6370000000 HC RX 637 (ALT 250 FOR IP): Performed by: STUDENT IN AN ORGANIZED HEALTH CARE EDUCATION/TRAINING PROGRAM

## 2021-08-20 PROCEDURE — 97535 SELF CARE MNGMENT TRAINING: CPT | Performed by: OCCUPATIONAL THERAPIST

## 2021-08-20 PROCEDURE — 83935 ASSAY OF URINE OSMOLALITY: CPT

## 2021-08-20 PROCEDURE — 84132 ASSAY OF SERUM POTASSIUM: CPT

## 2021-08-20 PROCEDURE — 99233 SBSQ HOSP IP/OBS HIGH 50: CPT | Performed by: INTERNAL MEDICINE

## 2021-08-20 PROCEDURE — 83930 ASSAY OF BLOOD OSMOLALITY: CPT

## 2021-08-20 PROCEDURE — 2060000000 HC ICU INTERMEDIATE R&B

## 2021-08-20 PROCEDURE — 85730 THROMBOPLASTIN TIME PARTIAL: CPT

## 2021-08-20 PROCEDURE — 97530 THERAPEUTIC ACTIVITIES: CPT

## 2021-08-20 PROCEDURE — 80048 BASIC METABOLIC PNL TOTAL CA: CPT

## 2021-08-20 PROCEDURE — 6370000000 HC RX 637 (ALT 250 FOR IP): Performed by: NURSE PRACTITIONER

## 2021-08-20 PROCEDURE — 2580000003 HC RX 258: Performed by: STUDENT IN AN ORGANIZED HEALTH CARE EDUCATION/TRAINING PROGRAM

## 2021-08-20 PROCEDURE — 83735 ASSAY OF MAGNESIUM: CPT

## 2021-08-20 PROCEDURE — 97116 GAIT TRAINING THERAPY: CPT

## 2021-08-20 PROCEDURE — 6360000002 HC RX W HCPCS

## 2021-08-20 PROCEDURE — 6360000002 HC RX W HCPCS: Performed by: INTERNAL MEDICINE

## 2021-08-20 PROCEDURE — 93005 ELECTROCARDIOGRAM TRACING: CPT | Performed by: NURSE PRACTITIONER

## 2021-08-20 PROCEDURE — 6370000000 HC RX 637 (ALT 250 FOR IP)

## 2021-08-20 PROCEDURE — 6360000002 HC RX W HCPCS: Performed by: STUDENT IN AN ORGANIZED HEALTH CARE EDUCATION/TRAINING PROGRAM

## 2021-08-20 PROCEDURE — 2580000003 HC RX 258

## 2021-08-20 PROCEDURE — 84133 ASSAY OF URINE POTASSIUM: CPT

## 2021-08-20 PROCEDURE — 36415 COLL VENOUS BLD VENIPUNCTURE: CPT

## 2021-08-20 PROCEDURE — 93010 ELECTROCARDIOGRAM REPORT: CPT | Performed by: INTERNAL MEDICINE

## 2021-08-20 PROCEDURE — 99232 SBSQ HOSP IP/OBS MODERATE 35: CPT | Performed by: INTERNAL MEDICINE

## 2021-08-20 RX ORDER — MAGNESIUM SULFATE 1 G/100ML
1000 INJECTION INTRAVENOUS
Status: DISCONTINUED | OUTPATIENT
Start: 2021-08-20 | End: 2021-08-20

## 2021-08-20 RX ORDER — POTASSIUM CHLORIDE 20 MEQ/1
40 TABLET, EXTENDED RELEASE ORAL 3 TIMES DAILY
Status: DISCONTINUED | OUTPATIENT
Start: 2021-08-20 | End: 2021-08-21 | Stop reason: HOSPADM

## 2021-08-20 RX ORDER — MAGNESIUM SULFATE IN WATER 40 MG/ML
2000 INJECTION, SOLUTION INTRAVENOUS ONCE
Status: DISCONTINUED | OUTPATIENT
Start: 2021-08-20 | End: 2021-08-21 | Stop reason: HOSPADM

## 2021-08-20 RX ADMIN — FLUOXETINE HYDROCHLORIDE 40 MG: 20 CAPSULE ORAL at 09:07

## 2021-08-20 RX ADMIN — HEPARIN SODIUM AND DEXTROSE 18 UNITS/KG/HR: 10000; 5 INJECTION INTRAVENOUS at 06:37

## 2021-08-20 RX ADMIN — Medication 50 MG: at 09:08

## 2021-08-20 RX ADMIN — POTASSIUM CHLORIDE 10 MEQ: 10 INJECTION, SOLUTION INTRAVENOUS at 06:09

## 2021-08-20 RX ADMIN — Medication 1 TABLET: at 09:07

## 2021-08-20 RX ADMIN — HEPARIN SODIUM 2000 UNITS: 1000 INJECTION INTRAVENOUS; SUBCUTANEOUS at 06:38

## 2021-08-20 RX ADMIN — POTASSIUM CHLORIDE 10 MEQ: 10 INJECTION, SOLUTION INTRAVENOUS at 10:11

## 2021-08-20 RX ADMIN — MAGNESIUM SULFATE HEPTAHYDRATE 1000 MG: 1 INJECTION, SOLUTION INTRAVENOUS at 10:11

## 2021-08-20 RX ADMIN — POTASSIUM CHLORIDE 10 MEQ: 10 INJECTION, SOLUTION INTRAVENOUS at 04:43

## 2021-08-20 RX ADMIN — MAGNESIUM SULFATE HEPTAHYDRATE 1000 MG: 1 INJECTION, SOLUTION INTRAVENOUS at 07:04

## 2021-08-20 RX ADMIN — LURASIDONE HYDROCHLORIDE 60 MG: 60 TABLET, FILM COATED ORAL at 20:31

## 2021-08-20 RX ADMIN — Medication 1000 UNITS: at 09:07

## 2021-08-20 RX ADMIN — HYDROXYCHLOROQUINE SULFATE 200 MG: 200 TABLET, FILM COATED ORAL at 20:30

## 2021-08-20 RX ADMIN — MAGNESIUM SULFATE HEPTAHYDRATE 1000 MG: 1 INJECTION, SOLUTION INTRAVENOUS at 11:31

## 2021-08-20 RX ADMIN — MAGNESIUM GLUCONATE 500 MG ORAL TABLET 400 MG: 500 TABLET ORAL at 09:09

## 2021-08-20 RX ADMIN — LOPERAMIDE HYDROCHLORIDE 2 MG: 2 CAPSULE ORAL at 15:16

## 2021-08-20 RX ADMIN — SODIUM CHLORIDE, POTASSIUM CHLORIDE, SODIUM LACTATE AND CALCIUM CHLORIDE: 600; 310; 30; 20 INJECTION, SOLUTION INTRAVENOUS at 06:12

## 2021-08-20 RX ADMIN — POTASSIUM CHLORIDE 40 MEQ: 1500 TABLET, EXTENDED RELEASE ORAL at 09:08

## 2021-08-20 RX ADMIN — POTASSIUM CHLORIDE 10 MEQ: 10 INJECTION, SOLUTION INTRAVENOUS at 03:29

## 2021-08-20 RX ADMIN — POTASSIUM CHLORIDE 40 MEQ: 1500 TABLET, EXTENDED RELEASE ORAL at 15:16

## 2021-08-20 RX ADMIN — POTASSIUM CHLORIDE 10 MEQ: 10 INJECTION, SOLUTION INTRAVENOUS at 07:14

## 2021-08-20 RX ADMIN — MAGNESIUM SULFATE HEPTAHYDRATE 1000 MG: 1 INJECTION, SOLUTION INTRAVENOUS at 08:30

## 2021-08-20 RX ADMIN — CALCIUM CARBONATE-CHOLECALCIFEROL TAB 250 MG-125 UNIT 500 MG: 250-125 TAB at 09:07

## 2021-08-20 RX ADMIN — FOLIC ACID 1 MG: 1 TABLET ORAL at 09:08

## 2021-08-20 RX ADMIN — AMLODIPINE BESYLATE 10 MG: 10 TABLET ORAL at 09:07

## 2021-08-20 RX ADMIN — POTASSIUM CHLORIDE 40 MEQ: 1500 TABLET, EXTENDED RELEASE ORAL at 20:30

## 2021-08-20 RX ADMIN — HYDROXYCHLOROQUINE SULFATE 200 MG: 200 TABLET, FILM COATED ORAL at 09:07

## 2021-08-20 RX ADMIN — POTASSIUM CHLORIDE 10 MEQ: 10 INJECTION, SOLUTION INTRAVENOUS at 08:29

## 2021-08-20 RX ADMIN — ATENOLOL 50 MG: 50 TABLET ORAL at 09:08

## 2021-08-20 RX ADMIN — FERROUS SULFATE TAB EC 325 MG (65 MG FE EQUIVALENT) 325 MG: 325 (65 FE) TABLET DELAYED RESPONSE at 09:06

## 2021-08-20 RX ADMIN — PANTOPRAZOLE SODIUM 40 MG: 40 TABLET, DELAYED RELEASE ORAL at 06:57

## 2021-08-20 RX ADMIN — SODIUM CHLORIDE, PRESERVATIVE FREE 10 ML: 5 INJECTION INTRAVENOUS at 09:09

## 2021-08-20 RX ADMIN — SODIUM CHLORIDE, PRESERVATIVE FREE 10 ML: 5 INJECTION INTRAVENOUS at 20:31

## 2021-08-20 ASSESSMENT — PAIN SCALES - GENERAL
PAINLEVEL_OUTOF10: 8
PAINLEVEL_OUTOF10: 8
PAINLEVEL_OUTOF10: 0

## 2021-08-20 ASSESSMENT — PAIN DESCRIPTION - ORIENTATION
ORIENTATION: RIGHT
ORIENTATION: RIGHT

## 2021-08-20 ASSESSMENT — PAIN DESCRIPTION - LOCATION
LOCATION: KNEE;TOE (COMMENT WHICH ONE)
LOCATION: KNEE

## 2021-08-20 ASSESSMENT — PAIN DESCRIPTION - DESCRIPTORS: DESCRIPTORS: ACHING

## 2021-08-20 ASSESSMENT — PAIN DESCRIPTION - FREQUENCY: FREQUENCY: CONTINUOUS

## 2021-08-20 ASSESSMENT — PAIN DESCRIPTION - PAIN TYPE
TYPE: CHRONIC PAIN
TYPE: CHRONIC PAIN

## 2021-08-20 NOTE — PROGRESS NOTES
Occupational Therapy  Facility/Department: [facility]  Daily Treatment Note  NAME: Naye Darling  : 1965  MRN: [id number]    Date of Service: 2021    Discharge Recommendations:  Patient would benefit from continued therapy after discharge     Assessment   Performance deficits / Impairments: Decreased functional mobility ; Decreased ADL status; Decreased high-level IADLs;Decreased endurance;Decreased safe awareness;Decreased balance  Assessment: Pt will continue to benefit from skilled occupational therapy services to address the above deficits. Prognosis: Good  OT Education: OT Role;Plan of Care;ADL Adaptive Strategies; Energy Conservation;Transfer Training  Patient Education: Pt verbalized understanding of education given  REQUIRES OT FOLLOW UP: Yes  Activity Tolerance  Activity Tolerance: Patient Tolerated treatment well;Patient limited by pain  Safety Devices  Safety Devices in place: Yes  Type of devices: Gait belt;Call light within reach; Left in chair;Nurse notified  Restraints  Initially in place: No         Patient Diagnosis(es): The primary encounter diagnosis was Hypokalemia. Diagnoses of Hypocalcemia, Hypochloremia, Elevated troponin, and ROXANN (acute kidney injury) ([address] were also pertinent to this visit. has a past medical history of Angioedema, Anxiety, Asthma, Bipolar disorder ([address] Claustrophobia, Depression, GERD (gastroesophageal reflux disease), Hypertension, Hypertrophic cardiomyopathy ([address] Lung nodules, MRSA (methicillin resistant Staphylococcus aureus), Murmur, OA (osteoarthritis), JONES (obstructive sleep apnea), Thyroid nodule, and Type 2 diabetes mellitus without complication, without long-term current use of insulin ([address] has a past surgical history that includes Hysterectomy; Upper gastrointestinal endoscopy; Colonoscopy; Thyroid surgery; Cardiac catheterization; other surgical history (2015); Upper gastrointestinal endoscopy (N/A, 12/3/2020);  Colonoscopy (N/A, Cognition  Overall Cognitive Status: WNL         Plan   Plan  Times per week: 3x/wk  Current Treatment Recommendations: Strengthening, Endurance Training, Patient/Caregiver Education & Training, Self-Care / ADL, Equipment Evaluation, Education, & procurement, Safety Education & Training, Functional Mobility Training, Balance Training    Goals  Short term goals  Time Frame for Short term goals: Patient will, by discharge  Short term goal 1: demo UB ADLs independently  Short term goal 2: demo LB ADLs at [address] term goal 3: demo functional transfers/mobility using LRD at Mod I to engage in ADLs safely  Short term goal 4: demo use of EC/WS techs independently with x1 demo to promote safety with ADL tasks  Short term goal 5: demo 10+ min of dynamic standing tolerance reaching outside ALTAGRACIA at Mod I to engage in ADLs       Therapy Time   Individual Concurrent Group Co-treatment   Time In 1433         Time Out 1512         Minutes 39         Timed Code Treatment Minutes: [address], OTR/L

## 2021-08-20 NOTE — PROGRESS NOTES
[facility]  Internal Medicine Teaching Residency Program  Inpatient Daily Progress Note  ______________________________________________________________________________    Patient: David Zhu  YOB: 1965   UMO:[id number]    Acct: [de-identified]     Room: [address] [place] [address]  Admit date: 8/16/2021  Today's date: 08/20/21  Number of days in the hospital: 4    SUBJECTIVE   Admitting Diagnosis: Chronic diarrhea  CC: chronic diarrhea    Pt examined at bedside. No acute issues overnight. Pt reports having 8 episodes of diarrhea in the last 24 hrs, still runny stools. Pt complaining of right knee pain, says its her arthritic flare up, applying lidocaine cream.  Chart & results reviewed. Patient is eating ok, sleeping ok, normal bowel/ bladder movements. Patient is able to ambulate. Patient on IV heparin and magnesium, potassium replacements. Patient on room air      ROS:  Constitutional:  negative for chills, fevers, sweats  Respiratory:  negative for cough, dyspnea on exertion, hemoptysis, shortness of breath, wheezing  Cardiovascular:  negative for chest pain, chest pressure/discomfort, lower extremity edema, palpitations  Gastrointestinal:  negative for abdominal pain, constipation, diarrhea, nausea, vomiting  Neurological:  negative for dizziness, headache    BRIEF HISTORY     The patient is a pleasant 56 y. o. female presents with a chief complaint of diarrhea since the last 2 months.  Patient reports having 10-15 episodes per day.  Patient saw her rheumatologist last Friday and had some labs done which showed  low potassium of 1.9 that is when her rheumatologist told her to go to the ED. but the patient came today.  Patient reports that the stools are brown, watery, with mucus, jelly like.  Patient also reports episodes of vomiting since last 2 months.  Reports abdominal pain, nausea vomiting.  Change in appetite and weight.  Patient reports having lost 30 to 35 pounds in the span of last 6 months.  Patient follows rheumatologist for joint pains, rheumatoid arthritis versus gout.  Patient reports numbness in her feet.  Patient also reports episodes of flareup when the wrists swell.  Patient also reports an episode of passing out a week ago. Pt feels frustrated that she has to take too many medications, started crying while telling that. Pt says she get frustrated and stop taking them and end up this way. Pt admits drinking alcohol, since the last 2 months pt has been drinking 16 ounce can every other day. Pt has h/o CAD, h/o stents put in the past.  Pt takes Norvasc for hypertension, takes Lipitor and hydrochlorothiazide. Pt takes hydroxychloroquine and steroids for rheumatoid arthritis.     Echo - -  estimated ejection fraction is > 65%. Moderate left ventricular hypertrophy. Grade I, mild, diastolic dysfunction. There is mild LVOT obstruction.       OBJECTIVE     Vital Signs:  BP (!) 134/94   Pulse 92   Temp 98.6 °F (37 °C) (Oral)   Resp (!) 32   Ht 5' 5\" (1.651 m)   Wt 174 lb 2.6 oz (79 kg)   SpO2 94%   BMI 28.98 kg/m²     Temp (24hrs), Av.7 °F (37.1 °C), Min:98.6 °F (37 °C), Max:98.7 °F (37.1 °C)    In: -   Out: 800 [Urine:800]    Physical Exam:  Physical Exam  Constitutional:       Appearance: Normal appearance. HENT:      Head: Normocephalic and atraumatic. Nose: Nose normal.      Mouth/Throat:      Mouth: Mucous membranes are dry. Eyes:      Extraocular Movements: Extraocular movements intact. Cardiovascular:      Rate and Rhythm: Normal rate and regular rhythm. Pulses: Normal pulses. Heart sounds: Normal heart sounds. No murmur heard. No friction rub. Pulmonary:      Effort: No respiratory distress. Breath sounds: Normal breath sounds. No wheezing or rales. Abdominal:      General: There is no distension. Palpations: Abdomen is soft. Tenderness: There is no abdominal tenderness.    Musculoskeletal: General: Swelling present. Normal range of motion. Cervical back: Normal range of motion. No rigidity. Right lower leg: No edema. Left lower leg: No edema. Comments: Swelling of the right knee  No redness/warmth/tenderness   Skin:     General: Skin is warm. Coloration: Skin is not jaundiced. Findings: No bruising or rash. Neurological:      General: No focal deficit present. Mental Status: She is alert and oriented to person, place, and time. Mental status is at baseline.    Psychiatric:         Mood and Affect: Mood normal.         Behavior: Behavior normal.           Medications:  Scheduled Medications:    magnesium sulfate  1,000 mg Intravenous Q1H    potassium chloride  40 mEq Oral TID    magnesium oxide  400 mg Oral BID    amLODIPine  10 mg Oral Daily    atenolol  50 mg Oral Daily    multivitamin  1 tablet Oral Daily    sodium chloride flush  5-40 mL Intravenous 2 times per day    thiamine mononitrate  50 mg Oral Daily    Vitamin D  1,000 Units Oral Daily    pantoprazole  40 mg Oral QAM AC    lurasidone  60 mg Oral Nightly    hydroxychloroquine  200 mg Oral BID    FLUoxetine  40 mg Oral Daily    folic acid  1 mg Oral Daily    ferrous sulfate  325 mg Oral Daily with breakfast    Calcium Carb-Cholecalciferol  2 tablet Oral Daily     Continuous Infusions:    sodium chloride      lactated ringers 100 mL/hr at 08/20/21 0612     PRN Medicationsloperamide, 2 mg, TID PRN  potassium chloride, 40 mEq, PRN   Or  potassium alternative oral replacement, 40 mEq, PRN   Or  potassium chloride, 10 mEq, PRN  magnesium sulfate, 1,000 mg, PRN  sodium phosphate IVPB, 0.16 mmol/kg, PRN   Or  sodium phosphate IVPB, 0.32 mmol/kg, PRN  heparin (porcine), 4,000 Units, PRN  heparin (porcine), 2,000 Units, PRN  sodium chloride flush, 5-40 mL, PRN  sodium chloride, 25 mL, PRN  acetaminophen, 650 mg, Q4H PRN  ondansetron, 4 mg, Q8H PRN   Or  ondansetron, 4 mg, Q6H PRN        Diagnostic Labs:  CBC: No results for input(s): WBC, RBC, HGB, HCT, MCV, RDW, PLT in the last 72 hours. BMP:   Recent Labs     08/17/21  1152 08/18/21  0016 08/18/21  0611 08/18/21  0611 08/19/21  0554 08/19/21  1237 08/20/21  0121     --  143  --  139 138  --    K 1.9*   < > 2.0*   < > 2.3* 2.5* 2.8*   CL 93*  --  97*  --  97* 95*  --    CO2 31  --  33*  --  29 24  --    PHOS 2.0*  --   --   --   --  4.5  --    BUN 6  --  3*  --  2* 2*  --    CREATININE 0.96*  --  0.89  --  0.85 0.85  --     < > = values in this interval not displayed. BNP: No results for input(s): BNP in the last 72 hours. PT/INR: No results for input(s): PROTIME, INR in the last 72 hours. APTT:   Recent Labs     08/19/21  1237 08/19/21  1749 08/20/21  0509   APTT 49.6* 54.7* 46.2*     CARDIAC ENZYMES: No results for input(s): CKMB, CKMBINDEX, TROPONINI in the last 72 hours. Invalid input(s): CKTOTAL;3  FASTING LIPID PANEL:  Lab Results   Component Value Date    CHOL 248 (H) 03/21/2021     03/21/2021    TRIG 47 03/21/2021     LIVER PROFILE:   No results for input(s): AST, ALT, ALB, BILIDIR, BILITOT, ALKPHOS in the last 72 hours. MICROBIOLOGY:   Lab Results   Component Value Date/Time    CULTURE ESCHERICHIA COLI >[id number] CFU/ML (A) 03/30/2021 06:28 AM       Imaging:    CT ABDOMEN PELVIS WO CONTRAST Additional Contrast? None    Result Date: 8/16/2021  No acute CT abnormality within the abdomen or pelvis. Gallbladder is nondistended with layering biliary sludge and/or small gallstones. No pericholecystic inflammatory changes or CT evidence of acute cholecystitis. Hepatic steatosis.      XR CHEST PORTABLE    Result Date: 8/16/2021  No acute cardiopulmonary findings       ASSESSMENT & PLAN     Assessment and Plan:    Principal Problem:    Chronic diarrhea  Active Problems:    Alcohol abuse    Essential hypertension    JONES (obstructive sleep apnea)    Seronegative rheumatoid arthritis (HCC)    Hypokalemia    Nausea vomiting and diarrhea    Hypocalcemia    Hypomagnesemia    Rhabdomyolysis    Elevated troponin  Resolved Problems:    * No resolved hospital problems. *       1. Chronic diarrhea -                Continue fluids               GI onboard               C.diff negative, fecal lawrence protectin normal,                Giardia and cryptosporidium negative,                Gastrointestinal panel negative                follow up on other stool studies, to rule out causes of chronic diarrhea                ? Malabsorptive diarrhea/ neuroendocrine tomours     2. Hypokalemia -                Likely due to chronic diarrhea               Continue replacement both IV and oral BID               Continue fluids               nephrology onboard               Urine studies are normal     3. Hypomagnesemia -                likely due to diarrhea               Will monitor and replace accordingly               nephrology onboard     3. Rhabdomyolysis - non traumatic               likely to hypokalemia               CK trending down               Continue IV fluids               Will monitor              4. Elevated troponin -                EKG - sinus, prolonged QTc, T wave inversions               Continue heparin drip as per cardio               echo - EF - >04%, grade 1 diastolic dysfunction               Ischemic work up when electrolytes are stable as per cardio               Continue Lipitor and atenolol               Monitor vitals closely     5. Essential hypertension -               Continue Norvasc and atenolol                Monitor closely      5. Arthritis - ? Rheumatoid arthritis vs gout              Pt complains of right knee pain, using lidocaine cream              Continue home medications              Continue hydroxychloroquine     6. Alcohol abuse -                Continue thiamine, folate, multivitamin                Watch for signs of withdrawal     7. JONES - ?  Sleep apnea                Pt desaturating at night, needing oxygen                Needs out pt follow up for sleep study    Diet: regular adult diet  DVT ppx : pt already on heparin  GI ppx: protonix    PT/OT: onboard  Discharge Planning / Chepe Penny: ongoing    Micha Pena  PGY-1  Internal Medicine  [facility]   5:94 AM 8/20/2021

## 2021-08-20 NOTE — PROGRESS NOTES
[facility]   Progress Note                   Date:   8/20/2021  Patient name: Ashley Odonnell  Date of admission:  8/16/2021  3:18 PM  MRN:   [id number]  YOB: 1965  PCP: Sobeida Fowler MD    Reason for Admission: Hypocalcemia [E83.51]  Hypokalemia [E87.6]  Hypochloremia [E87.8]  Elevated troponin [R77.8]  ROXANN (acute kidney injury) ([address] [N17.9]    Subjective:       Clinical Changes / Abnormalities: Patient seen and examined with RN present in the room. Diarrhea continues to improve with one episode overnight. No acute CV issues overnight. Reviewed vitals, labs, tele, & previous testing. SR on tele. Reviewed EKG this morning which shows continued QT Prolongation 440. Medications:   Scheduled Meds:   magnesium sulfate  1,000 mg Intravenous Q1H    amLODIPine  10 mg Oral Daily    atenolol  50 mg Oral Daily    multivitamin  1 tablet Oral Daily    potassium chloride  40 mEq Oral BID    sodium chloride flush  5-40 mL Intravenous 2 times per day    thiamine mononitrate  50 mg Oral Daily    Vitamin D  1,000 Units Oral Daily    pantoprazole  40 mg Oral QAM AC    lurasidone  60 mg Oral Nightly    hydroxychloroquine  200 mg Oral BID    FLUoxetine  40 mg Oral Daily    folic acid  1 mg Oral Daily    ferrous sulfate  325 mg Oral Daily with breakfast    Calcium Carb-Cholecalciferol  2 tablet Oral Daily     Continuous Infusions:   heparin (PORCINE) Infusion 18 Units/kg/hr (08/20/21 4276)    sodium chloride      lactated ringers 100 mL/hr at 08/20/21 0612     CBC:   No results for input(s): WBC, HGB, PLT in the last 72 hours. BMP:    Recent Labs     08/18/21  0611 08/18/21  0611 08/19/21  0554 08/19/21  1237 08/20/21  0121     --  139 138  --    K 2.0*   < > 2.3* 2.5* 2.8*   CL 97*  --  97* 95*  --    CO2 33*  --  29 24  --    BUN 3*  --  2* 2*  --    CREATININE 0.89  --  0.85 0.85  --    GLUCOSE 91  --  73 106*  --     < > = values in this interval not displayed. Hepatic:   No results for input(s): AST, ALT, ALB, BILITOT, ALKPHOS in the last 72 hours. Troponin:   Recent Labs     08/18/21  1032   TROPHS 149*     BNP: No results for input(s): BNP in the last 72 hours. Lipids: No results for input(s): CHOL, HDL in the last 72 hours. Invalid input(s): LDLCALCU  INR: No results for input(s): INR in the last 72 hours. Objective:   Vitals: BP (!) 134/94   Pulse 92   Temp 98.6 °F (37 °C) (Oral)   Resp (!) 32   Ht 5' 5\" (1.651 m)   Wt 174 lb 2.6 oz (79 kg)   SpO2 94%   BMI 28.98 kg/m²   General appearance: alert and cooperative with exam  HEENT: Head: Normocephalic, no lesions, without obvious abnormality. Neck: no JVD, trachea midline, no adenopathy  Lungs: Clear to auscultation. Heart: Regular rate and rhythm, s1/s2 auscultated, no murmurs. SR  Abdomen: soft, non-tender, bowel sounds active  Extremities: no edema  Neurologic: not done    ECHO 8/17/2021  Summary  Left ventricle is normal in size, global left ventricular systolic function  is hyperdynamic, estimated ejection fraction is > 65%. Moderate left ventricular hypertrophy. Grade I, mild, diastolic dysfunction. There is mild LVOT obstruction. Right atrium is normal in size. Prominent Eustachian valve. Aortic valve is sclerotic but opens well. Trivial mitral regurgitation. Trivial tricuspid regurgitation. PRIOR TESTING    Coronary Cath 3/27/09: No coronary vessel disease     ECHO 12/18/12: EF 60%, moderate LVH, mild DD, mildly dilated LA, mild MR.        PCST 3/28/14: No ischemia or infarct. EF 65%.      TEJAL 3/11/14: EF 65%, mild MR/TR. Aortic root dimension is normal without evidence of aortic dissection. Assessment / Acute Cardiac Problems:     1. Increased Troponin secondary to type II MI  2. Prolonged QTc secondary to hypokalemia and hypocalcemia  3. Hypokalemai  4. Hypocalcemia  5. Rhabdomyolysis  6.  Hypertension    Patient Active Problem List:     Lung nodule     Obesity     Adrenal

## 2021-08-20 NOTE — PROGRESS NOTES
Physical Therapy  Facility/Department: [facility]  Daily Treatment Note  NAME: Katie Dupree  : 1965  MRN: [id number]    Date of Service: 2021    Discharge Recommendations:  Patient would benefit from continued therapy after discharge   PT Equipment Recommendations  Equipment Needed: No    Assessment   Body structures, Functions, Activity limitations: Decreased functional mobility ; Decreased strength;Decreased endurance  Assessment: Pt ambulated with  ft due to antalgia right foot and knee, pt tolerated ambulation despite high pain, CGA than SBA. Pt performed seated ther ex, tolerated , Pt to continue PT to increase strength to progress ind with functional mobility  Prognosis: Good  PT Education: Goals;PT Role;Plan of Care;Home Exercise Program;Transfer Training;General Safety  REQUIRES PT FOLLOW UP: Yes  Activity Tolerance  Activity Tolerance: Patient limited by pain  Activity Tolerance: good     Patient Diagnosis(es): The primary encounter diagnosis was Hypokalemia. Diagnoses of Hypocalcemia, Hypochloremia, Elevated troponin, and ROXANN (acute kidney injury) ([facility] [address] were also pertinent to this visit. has a past medical history of Angioedema, Anxiety, Asthma, Bipolar disorder ([address] Claustrophobia, Depression, GERD (gastroesophageal reflux disease), Hypertension, Hypertrophic cardiomyopathy ([address] Lung nodules, MRSA (methicillin resistant Staphylococcus aureus), Murmur, OA (osteoarthritis), JONES (obstructive sleep apnea), Thyroid nodule, and Type 2 diabetes mellitus without complication, without long-term current use of insulin ([address] has a past surgical history that includes Hysterectomy; Upper gastrointestinal endoscopy; Colonoscopy; Thyroid surgery; Cardiac catheterization; other surgical history (2015); Upper gastrointestinal endoscopy (N/A, 12/3/2020); Colonoscopy (N/A, 12/3/2020); Foot surgery (Bilateral, 2020);  Hammer toe surgery (Right, 2020); and Bunionectomy (Bilateral, 12/7/2020). Restrictions  Restrictions/Precautions  Restrictions/Precautions: Up as Tolerated  Required Braces or Orthoses?: No  Subjective   General  Chart Reviewed: Yes  Response To Previous Treatment: Not applicable  Family / Caregiver Present: No  Subjective  Subjective: RN and pt agreeable to PTtreatment, pt seated in chair upon arrival and departure. General Comment  Comments: Pt retired to [employer]. Pain Screening  Patient Currently in Pain: Yes  Pain Assessment  Pain Assessment: 0-10  Pain Level: 8  Patient's Stated Pain Goal: No pain  Pain Type: Chronic pain  Pain Location: Knee;Toe (Comment which one) (all toes on R foot)  Pain Orientation: Right  Pain Descriptors: Aching  Pain Frequency: Continuous  Vital Signs  Patient Currently in Pain: Yes       Orientation  Orientation  Overall Orientation Status: Within Normal Limits  Cognition   Cognition  Overall Cognitive Status: WNL  Objective   Bed mobility  Supine to Sit: Unable to assess  Sit to Supine: Unable to assess  Scooting: Stand by assistance  Comment: Pt seated in chair,  Transfers  Sit to Stand: Stand by assistance  Stand to sit: Stand by assistance  Comment: Pt utalilzed RW dt high pain R knee and R toes  Ambulation  Ambulation?: Yes  More Ambulation?: Yes  Ambulation 1  Surface: level tile  Device: Rolling Walker  Assistance: Contact guard assistance;Stand by assistance  Quality of Gait: antalgic  Distance: 100 ft  Comments: Pt ambulated 100ft with RW CGA than SBA dt r knee and foot/toe pain        Exercises  Comments: Seated LE exercise program: Long Arc Quads, hip abduction/adduction, heel/toe raises, and marches.  Reps: 15 reps         Goals  Short term goals  Time Frame for Short term goals: 10 visits  Short term goal 1: transfers with SBA  Short term goal 2: amb 150 ft without a device x SBA  Short term goal 3: ascend/descend 4 steps with SBA  Short term goal 4: 20 min exercise program x SBA  Patient Goals   Patient goals : Return home    Plan    Plan  Times per week: 5-6x wk  Current Treatment Recommendations: Strengthening, Functional Mobility Training, Gait Training, Safety Education & Training, Endurance Training, Stair training  Safety Devices  Type of devices: Nurse notified, Chair alarm in place, Call light within reach  Restraints  Initially in place: No     Therapy Time   Individual Concurrent Group Co-treatment   Time In 1205         Time Out 1234         Minutes 29         Timed Code Treatment Minutes: [address], PTA

## 2021-08-21 VITALS
WEIGHT: 174.16 LBS | DIASTOLIC BLOOD PRESSURE: 83 MMHG | SYSTOLIC BLOOD PRESSURE: 117 MMHG | TEMPERATURE: 98 F | RESPIRATION RATE: 19 BRPM | OXYGEN SATURATION: 92 % | HEIGHT: 65 IN | HEART RATE: 90 BPM | BODY MASS INDEX: 29.02 KG/M2

## 2021-08-21 LAB
ABSOLUTE EOS #: 0.18 K/UL (ref 0–0.44)
ABSOLUTE IMMATURE GRANULOCYTE: 0.07 K/UL (ref 0–0.3)
ABSOLUTE LYMPH #: 1.31 K/UL (ref 1.1–3.7)
ABSOLUTE MONO #: 1.35 K/UL (ref 0.1–1.2)
ANION GAP SERPL CALCULATED.3IONS-SCNC: 12 MMOL/L (ref 9–17)
BASOPHILS # BLD: 0 % (ref 0–2)
BASOPHILS ABSOLUTE: 0.04 K/UL (ref 0–0.2)
BUN BLDV-MCNC: 4 MG/DL (ref 6–20)
BUN/CREAT BLD: ABNORMAL (ref 9–20)
CALCIUM SERPL-MCNC: 7.4 MG/DL (ref 8.6–10.4)
CHLORIDE BLD-SCNC: 102 MMOL/L (ref 98–107)
CO2: 24 MMOL/L (ref 20–31)
CREAT SERPL-MCNC: 0.93 MG/DL (ref 0.5–0.9)
DIFFERENTIAL TYPE: ABNORMAL
EOSINOPHILS RELATIVE PERCENT: 2 % (ref 1–4)
GFR AFRICAN AMERICAN: >60 ML/MIN
GFR NON-AFRICAN AMERICAN: >60 ML/MIN
GFR SERPL CREATININE-BSD FRML MDRD: ABNORMAL ML/MIN/{1.73_M2}
GFR SERPL CREATININE-BSD FRML MDRD: ABNORMAL ML/MIN/{1.73_M2}
GLUCOSE BLD-MCNC: 85 MG/DL (ref 70–99)
HCT VFR BLD CALC: 25.6 % (ref 36.3–47.1)
HEMOGLOBIN: 8.6 G/DL (ref 11.9–15.1)
IMMATURE GRANULOCYTES: 1 %
LYMPHOCYTES # BLD: 12 % (ref 24–43)
MAGNESIUM: 1.7 MG/DL (ref 1.6–2.6)
MCH RBC QN AUTO: 33 PG (ref 25.2–33.5)
MCHC RBC AUTO-ENTMCNC: 33.6 G/DL (ref 28.4–34.8)
MCV RBC AUTO: 98.1 FL (ref 82.6–102.9)
MONOCYTES # BLD: 12 % (ref 3–12)
NRBC AUTOMATED: 0 PER 100 WBC
PDW BLD-RTO: 16.5 % (ref 11.8–14.4)
PLATELET # BLD: 317 K/UL (ref 138–453)
PLATELET ESTIMATE: ABNORMAL
PMV BLD AUTO: 9.4 FL (ref 8.1–13.5)
POTASSIUM SERPL-SCNC: 3.3 MMOL/L (ref 3.7–5.3)
RBC # BLD: 2.61 M/UL (ref 3.95–5.11)
RBC # BLD: ABNORMAL 10*6/UL
SEG NEUTROPHILS: 73 % (ref 36–65)
SEGMENTED NEUTROPHILS ABSOLUTE COUNT: 8.03 K/UL (ref 1.5–8.1)
SODIUM BLD-SCNC: 138 MMOL/L (ref 135–144)
WBC # BLD: 11 K/UL (ref 3.5–11.3)
WBC # BLD: ABNORMAL 10*3/UL

## 2021-08-21 PROCEDURE — 2580000003 HC RX 258: Performed by: STUDENT IN AN ORGANIZED HEALTH CARE EDUCATION/TRAINING PROGRAM

## 2021-08-21 PROCEDURE — 80048 BASIC METABOLIC PNL TOTAL CA: CPT

## 2021-08-21 PROCEDURE — 85025 COMPLETE CBC W/AUTO DIFF WBC: CPT

## 2021-08-21 PROCEDURE — 99239 HOSP IP/OBS DSCHRG MGMT >30: CPT | Performed by: INTERNAL MEDICINE

## 2021-08-21 PROCEDURE — 6370000000 HC RX 637 (ALT 250 FOR IP): Performed by: STUDENT IN AN ORGANIZED HEALTH CARE EDUCATION/TRAINING PROGRAM

## 2021-08-21 PROCEDURE — 36415 COLL VENOUS BLD VENIPUNCTURE: CPT

## 2021-08-21 PROCEDURE — 6370000000 HC RX 637 (ALT 250 FOR IP): Performed by: NURSE PRACTITIONER

## 2021-08-21 PROCEDURE — 83735 ASSAY OF MAGNESIUM: CPT

## 2021-08-21 PROCEDURE — 6370000000 HC RX 637 (ALT 250 FOR IP)

## 2021-08-21 RX ORDER — MULTIVITAMIN WITH IRON
1 TABLET ORAL DAILY
Qty: 60 TABLET | Refills: 2 | Status: SHIPPED | OUTPATIENT
Start: 2021-08-22 | End: 2022-04-18 | Stop reason: SDUPTHER

## 2021-08-21 RX ORDER — POTASSIUM CHLORIDE 20 MEQ/1
40 TABLET, EXTENDED RELEASE ORAL 2 TIMES DAILY
Qty: 60 TABLET | Refills: 5 | Status: SHIPPED | OUTPATIENT
Start: 2021-08-21 | End: 2022-02-24

## 2021-08-21 RX ORDER — LOPERAMIDE HYDROCHLORIDE 2 MG/1
2 CAPSULE ORAL 3 TIMES DAILY PRN
Qty: 30 CAPSULE | Refills: 0 | Status: SHIPPED | OUTPATIENT
Start: 2021-08-21 | End: 2021-08-31

## 2021-08-21 RX ADMIN — AMLODIPINE BESYLATE 10 MG: 10 TABLET ORAL at 07:59

## 2021-08-21 RX ADMIN — HYDROXYCHLOROQUINE SULFATE 200 MG: 200 TABLET, FILM COATED ORAL at 10:47

## 2021-08-21 RX ADMIN — FERROUS SULFATE TAB EC 325 MG (65 MG FE EQUIVALENT) 325 MG: 325 (65 FE) TABLET DELAYED RESPONSE at 07:58

## 2021-08-21 RX ADMIN — POTASSIUM CHLORIDE 40 MEQ: 1500 TABLET, EXTENDED RELEASE ORAL at 07:58

## 2021-08-21 RX ADMIN — Medication 1000 UNITS: at 07:58

## 2021-08-21 RX ADMIN — SODIUM CHLORIDE, PRESERVATIVE FREE 10 ML: 5 INJECTION INTRAVENOUS at 08:00

## 2021-08-21 RX ADMIN — ATENOLOL 50 MG: 50 TABLET ORAL at 07:58

## 2021-08-21 RX ADMIN — LOPERAMIDE HYDROCHLORIDE 2 MG: 2 CAPSULE ORAL at 10:46

## 2021-08-21 RX ADMIN — FOLIC ACID 1 MG: 1 TABLET ORAL at 07:59

## 2021-08-21 RX ADMIN — Medication 1 TABLET: at 07:59

## 2021-08-21 RX ADMIN — CALCIUM CARBONATE-CHOLECALCIFEROL TAB 250 MG-125 UNIT 500 MG: 250-125 TAB at 07:59

## 2021-08-21 RX ADMIN — FLUOXETINE HYDROCHLORIDE 40 MG: 20 CAPSULE ORAL at 07:59

## 2021-08-21 RX ADMIN — POTASSIUM CHLORIDE 40 MEQ: 1500 TABLET, EXTENDED RELEASE ORAL at 14:06

## 2021-08-21 RX ADMIN — PANTOPRAZOLE SODIUM 40 MG: 40 TABLET, DELAYED RELEASE ORAL at 06:41

## 2021-08-21 RX ADMIN — Medication 50 MG: at 07:58

## 2021-08-21 ASSESSMENT — PAIN SCALES - GENERAL: PAINLEVEL_OUTOF10: 0

## 2021-08-21 NOTE — PROGRESS NOTES
CLINICAL PHARMACY NOTE: MEDS TO BEDS    Total # of Prescriptions Filled: 2   The following medications were delivered to the patient:  · Potassium Chloride crystal ER 20 Meq  · Loperamide 2mg    Additional Documentation:

## 2021-08-21 NOTE — PROGRESS NOTES
[facility]   Progress Note                   Date:   8/21/2021  Patient name: Jay Coppola  Date of admission:  8/16/2021  3:18 PM  MRN:   [id number]  YOB: 1965  PCP: Ethan Terrazas MD    Reason for Admission: Hypocalcemia [E83.51]  Hypokalemia [E87.6]  Hypochloremia [E87.8]  Elevated troponin [R77.8]  ROXANN (acute kidney injury) ([facility] [address] [N17.9]    Subjective:       Clinical Changes / Abnormalities: Patient seen and examined at bedside. She states that she had 2 loose stools last night and a softly formed stool this morning. No acute CV issues overnight. No chest pain or SOB. Room air without distress. Reviewed vitals, labs, tele, & previous testing. SR on tele. Medications:   Scheduled Meds:   potassium chloride  40 mEq Oral TID    [Held by provider] magnesium oxide  400 mg Oral BID    magnesium sulfate  2,000 mg Intravenous Once    amLODIPine  10 mg Oral Daily    atenolol  50 mg Oral Daily    multivitamin  1 tablet Oral Daily    sodium chloride flush  5-40 mL Intravenous 2 times per day    thiamine mononitrate  50 mg Oral Daily    Vitamin D  1,000 Units Oral Daily    pantoprazole  40 mg Oral QAM AC    lurasidone  60 mg Oral Nightly    hydroxychloroquine  200 mg Oral BID    FLUoxetine  40 mg Oral Daily    folic acid  1 mg Oral Daily    ferrous sulfate  325 mg Oral Daily with breakfast    Calcium Carb-Cholecalciferol  2 tablet Oral Daily     Continuous Infusions:   sodium chloride       CBC:   Recent Labs     08/21/21  0607   WBC 11.0   HGB 8.6*        BMP:    Recent Labs     08/19/21  1237 08/19/21  1237 08/20/21  0121 08/20/21  1356 08/21/21  0607     --   --  135 138   K 2.5*   < > 2.8* 3.3* 3.3*   CL 95*  --   --  96* 102   CO2 24  --   --  26 24   BUN 2*  --   --  2* 4*   CREATININE 0.85  --   --  0.93* 0.93*   GLUCOSE 106*  --   --  100* 85    < > = values in this interval not displayed.      Hepatic:   No results for input(s): AST, ALT, ALB, BILITOT, ALKPHOS in the last 72 hours. Troponin:   Recent Labs     08/18/21  1032   TROPHS 149*     BNP: No results for input(s): BNP in the last 72 hours. Lipids: No results for input(s): CHOL, HDL in the last 72 hours. Invalid input(s): LDLCALCU  INR: No results for input(s): INR in the last 72 hours. Objective:   Vitals: /83   Pulse 90   Temp 97.7 °F (36.5 °C) (Axillary)   Resp 19   Ht 5' 5\" (1.651 m)   Wt 174 lb 2.6 oz (79 kg)   SpO2 92%   BMI 28.98 kg/m²   General appearance: alert and cooperative with exam  HEENT: Head: Normocephalic, no lesions, without obvious abnormality. Neck: no JVD, trachea midline, no adenopathy  Lungs: Clear with diminished breath sounds in bilateral bases to auscultation. Room air  Heart: Regular rate and rhythm, s1/s2 auscultated, no murmurs. SR  Abdomen: soft, non-tender, bowel sounds active  Extremities: no edema  Neurologic: not done    ECHO 8/17/2021  Summary  Left ventricle is normal in size, global left ventricular systolic function  is hyperdynamic, estimated ejection fraction is > 65%. Moderate left ventricular hypertrophy. Grade I, mild, diastolic dysfunction. There is mild LVOT obstruction. Right atrium is normal in size. Prominent Eustachian valve. Aortic valve is sclerotic but opens well. Trivial mitral regurgitation. Trivial tricuspid regurgitation. PRIOR TESTING    Coronary Cath 3/27/09: No coronary vessel disease     ECHO 12/18/12: EF 60%, moderate LVH, mild DD, mildly dilated LA, mild MR.        PCST 3/28/14: No ischemia or infarct. EF 65%.      TEJAL 3/11/14: EF 65%, mild MR/TR. Aortic root dimension is normal without evidence of aortic dissection. Assessment / Acute Cardiac Problems:     1. Increased Troponin secondary to type II MI  2. Prolonged QTc secondary to hypokalemia and hypocalcemia  3. Hypokalemai  4. Hypocalcemia  5. Rhabdomyolysis  6.  Hypertension    Patient Active Problem List:     Lung nodule     Obesity     Adrenal adenoma     Goiter     Hypertension     Alcohol abuse     Essential hypertension     Enlarged tonsils     ACE inhibitor-aggravated angioedema     JONES (obstructive sleep apnea)     Dyslipidemia     IGT (impaired glucose tolerance)     Acute alcoholic intoxication without complication (HCC)     Acute renal insufficiency     Effusion of bursa of right knee     Acute cystitis without hematuria     Bipolar disorder (HCC)     Mild intermittent asthma without complication     Inflammatory polyarthritis (HCC)     Seronegative rheumatoid arthritis (HCC)     Hypokalemia     Nausea vomiting and diarrhea     Chronic diarrhea      Plan of Treatment:   1. Elevated troponin. NSTEMI likely type II (144, 132, 149)  Flat. Likely secondary to sever electrolyte imbalance. No acute chest pain. 2. 2D Echo as noted above with LVEF >65%. Mild Diastolic dysfunction. 3. HTN. Stable. Continue Norvasc 10 mg and Atenolol 50 mg   4. Keep K+ > 4.0, and Mg+ > 2.0. K 3.3 today and Mag was 2.4 yesterday afternoon. Continue SS replacement. 5. Nephrology consulted. Appreciate recommendations. 6. Will repeat EKG when electrolytes stable. Continue to monitor QT for prolongation. 7. Discussed with Dr. Ale Rosales. Will consider Ischemic workup when acute issues resolve and Electrolytes stable as outpatient. 8. Remainder of care management per primary team.  9. Cardiology will sign off. Discussed follow up in clinic in 2 weeks with patient. Please call with questions or concerns.      Electronically signed by CECILIA Sanford CNP on 8/21/2021 at 8:53 [facility]      [phone]

## 2021-08-21 NOTE — DISCHARGE INSTR - COC
Continuity of Care Form    Patient Name: Saintclair Poser   :  1965  MRN:  [id number]    Admit date:  2021  Discharge date:  21    Code Status Order: Full Code   Advance Directives:      Admitting Physician:  Sylvester Bergman MD  PCP: Joelle Braswell MD    Discharging Nurse: [facility] Unit/Room#: 2404/5222-26  Discharging Unit Phone Number: [phone]    Emergency Contact:   Extended Emergency Contact Information  Primary Emergency Contact: PierceSheryl  Address: N/A  Home Phone: [phone]  Relation: Aunt/Uncle   needed?  No    Past Surgical History:  Past Surgical History:   Procedure Laterality Date    BUNIONECTOMY Bilateral 2020    [facility]  BUNIONECTOMY, [address] performed by Shea Gilford, DPM at [facility]      bx    COLONOSCOPY N/A 12/3/2020    COLONOSCOPY WITH BIOPSY performed by Nimo Jones MD at [address] Bilateral 2020    [facility] bunionectomy, right 2nd hammer toe repair     HAMMER TOE SURGERY Right 2020    2ND TOE HAMMER REPAIR performed by Shea Gilford, DPM at [place] [address]      partial hyst    OTHER SURGICAL HISTORY  2015    MRI under anesthesia    THYROID SURGERY      bilat bx    UPPER GASTROINTESTINAL ENDOSCOPY      UPPER GASTROINTESTINAL ENDOSCOPY N/A 12/3/2020    EGD BIOPSY performed by Nimo Jones MD at [facility]       Immunization History:   Immunization History   Administered Date(s) Administered    COVID-19, Moderna, PF, 100mcg/0.5mL 2021, 2021    Influenza Vaccine, unspecified formulation 2017, 10/09/2019    Influenza, Quadv, IM, (6 mo and older Fluzone, Flulaval, Fluarix and 3 yrs and older Afluria) 10/09/2019    Influenza, Quadv, IM, PF (6 mo and older Fluzone, Flulaval, Fluarix, and 3 yrs and older Afluria) 2017, 2020    Pneumococcal Polysaccharide ([id number]) 2017    Tdap (Boostrix, Adacel) 07/03/2014, 04/14/2017       Active Problems:  Patient Active Problem List   Diagnosis Code    Lung nodule R91.1    Obesity E66.9    Adrenal adenoma D35.00    Goiter E04.9    Hypertension I10    Alcohol abuse F10.10    Essential hypertension I10    Enlarged tonsils J35.1    ACE inhibitor-aggravated angioedema T78. 3XXA, T46.4X5A    JONES (obstructive sleep apnea) G47.33    Dyslipidemia E78.5    IGT (impaired glucose tolerance) R73.02    Acute alcoholic intoxication without complication ([facility]) T98.227    Acute renal insufficiency N28.9    Effusion of bursa of right knee M25.461    Acute cystitis without hematuria N30.00    Bipolar disorder ([facility]) F31.9    Mild intermittent asthma without complication T39.83    Inflammatory polyarthritis ([facility]) M06.4    Seronegative rheumatoid arthritis ([facility]) M06.00    Hypokalemia E87.6    Nausea vomiting and diarrhea R11.2, R19.7    Chronic diarrhea K52.9    Hypocalcemia E83.51    Hypomagnesemia E83.42    Rhabdomyolysis M62.82    Elevated troponin R77.8       Isolation/Infection:   Isolation            No Isolation          Patient Infection Status       Infection Onset Added Last Indicated Last Indicated By Review Planned Expiration Resolved Resolved By    None active    Resolved    C-diff Rule Out 08/17/21 08/17/21 08/17/21 C DIFF TOXIN/ANTIGEN (Ordered)   08/18/21 Rule-Out Test Resulted    C-diff Rule Out 08/17/21 08/17/21 08/17/21 Gastrointestinal Panel, Molecular (Ordered)   08/17/21 Yamilex Knott RN    COVID-19 Rule Out 03/25/21 03/25/21 03/25/21 COVID-19 (Ordered)   03/26/21 Rule-Out Test Resulted    COVID-19 Rule Out 03/20/21 03/20/21 03/20/21 COVID-19, Rapid (Ordered)   03/21/21 Rule-Out Test Resulted    COVID-19 Rule Out 12/04/20 12/05/20 12/04/20 Covid-19 Ambulatory (Ordered)   12/06/20 Rule-Out Test Resulted    COVID-19 Rule Out 11/29/20 11/29/20 11/29/20 COVID-19 (Ordered)   11/30/20 Rule-Out Test Resulted    MRSA  07/10/13 07/10/13 Alexx Espinosa RN   10/02/19 Alexx Espinosa RN            Nurse Assessment:  Last Vital Signs: /83   Pulse 90   Temp 97.7 °F (36.5 °C) (Axillary)   Resp 19   Ht 5' 5\" (1.651 m)   Wt 174 lb 2.6 oz (79 kg)   SpO2 92%   BMI 28.98 kg/m²     Last documented pain score (0-10 scale): Pain Level: 0  Last Weight:   Wt Readings from Last 1 Encounters:   08/17/21 174 lb 2.6 oz (79 kg)     Mental Status:  {IP PT MENTAL STATUS:20030:::0}    IV Access:  - None    Nursing Mobility/ADLs:  Walking   Independent  Transfer  Independent  Bathing  Independent  Dressing  Independent  [address]  Independent  Med Delivery   whole    Wound Care Documentation and Therapy:        Elimination:  Continence:   · Bowel: Yes  · Bladder: Yes  Urinary Catheter: None   Colostomy/Ileostomy/Ileal Conduit: No       Date of Last BM: 8-21-21    Intake/Output Summary (Last 24 hours) at 8/21/2021 0859  Last data filed at 8/21/2021 0758  Gross per 24 hour   Intake 882 ml   Output 500 ml   Net 382 ml     I/O last 3 completed shifts: In: 0 [P.O.:872]  Out: 500 [Urine:400; Stool:100]    Safety Concerns: At Risk for Falls    Impairments/Disabilities:      Jim Villagran [facility] Impairments/Disabilities:[id number]:::0}    Nutrition Therapy:  Current Nutrition Therapy:   - Oral Diet:  General    Routes of Feeding: Oral  Liquids: No Restrictions  Daily Fluid Restriction: no  Last Modified Barium Swallow with Video (Video Swallowing Test): not done    Treatments at the Time of Hospital Discharge:   Respiratory Treatments: ***  Oxygen Therapy:  is not on home oxygen therapy.   Ventilator:    - No ventilator support    Rehab Therapies: Physical Therapy and Occupational Therapy  Weight Bearing Status/Restrictions: No weight bearing restirctions  Other Medical Equipment (for information only, NOT a DME order):  cane  Other Treatments: ***    Patient's personal belongings (please select all that are sent with patient):  None    RN SIGNATURE:  Electronically signed by Mali Garcia RN on 8/21/21 at 12:56 PM EDT    CASE MANAGEMENT/SOCIAL WORK SECTION    Inpatient Status Date: ***    Readmission Risk Assessment Score:  Readmission Risk              Risk of Unplanned Readmission:  24           Discharging to Facility/ Agency   · Name:   · Address:  · Phone:  · Fax:    Dialysis Facility (if applicable)   · Name:  · Address:  · Dialysis Schedule:  · Phone:  · Fax:    / signature: {[facility]:[id number]:::0}    PHYSICIAN SECTION    Prognosis: Fair    Condition at Discharge: Stable    Rehab Potential (if transferring to Rehab): Fair    Recommended Labs or Other Treatments After Discharge: Follow up with PCP, After BMP two times, first after 1 week and if potassium is low then continue taking same dose with repeat BMP in a week. and if potassium is normal then take once daily. Do not crush, chew, or suck on tablet. Physician Certification: I certify the above information and transfer of Lucy Borjas  is necessary for the continuing treatment of the diagnosis listed and that she requires [address] for greater 30 days.      Update Admission H&P: No change in H&P    PHYSICIAN SIGNATURE:  Electronically signed by Juli Dumont MD on 8/21/21 at 8:59 AM EDT

## 2021-08-21 NOTE — PROGRESS NOTES
[facility]  Internal Medicine Teaching Residency Program  Inpatient Daily Progress Note  ______________________________________________________________________________    Patient: Otoniel Rico  YOB: 1965   YEK:[id number]    Acct: [de-identified]     Room: [address] [place] [address]  Admit date: 8/16/2021  Today's date: 08/21/21  Number of days in the hospital: 5    SUBJECTIVE   Admitting Diagnosis: Chronic diarrhea  CC: chronic diarrhea    Pt examined at bedside. No acute issues overnight. Episodes of diarrhea - 3 episodes last night, this morning had a formed stool. Pt feeling lot better today. Chart & results reviewed. Potassium 3.3  Patient is eating ok, sleeping ok, normal bowel/ bladder movements. Patient is able to ambulate. Patient on IV potassium and magnesium replacements  Patient on room air       ROS:  Constitutional:  negative for chills, fevers, sweats  Respiratory:  negative for cough, dyspnea on exertion, hemoptysis, shortness of breath, wheezing  Cardiovascular:  negative for chest pain, chest pressure/discomfort, lower extremity edema, palpitations  Gastrointestinal:  negative for abdominal pain, constipation, diarrhea, nausea, vomiting  Neurological:  negative for dizziness, headache    BRIEF HISTORY     The patient is a pleasant 56 y. o. female presents with a chief complaint of diarrhea since the last 2 months.  Patient reports having 10-15 episodes per day.  Patient saw her rheumatologist last Friday and had some labs done which showed  low potassium of 1.9 that is when her rheumatologist told her to go to the ED. but the patient came today.  Patient reports that the stools are brown, watery, with mucus, jelly like.  Patient also reports episodes of vomiting since last 2 months.  Reports abdominal pain, nausea vomiting.  Change in appetite and weight.  Patient reports having lost 30 to 35 pounds in the span of last 6 months.  Patient follows rheumatologist for joint pains, rheumatoid arthritis versus gout.  Patient reports numbness in her feet.  Patient also reports episodes of flareup when the wrists swell.  Patient also reports an episode of passing out a week ago. Pt feels frustrated that she has to take too many medications, started crying while telling that. Pt says she get frustrated and stop taking them and end up this way. Pt admits drinking alcohol, since the last 2 months pt has been drinking 16 ounce can every other day. Pt has h/o CAD, h/o stents put in the past.  Pt takes Norvasc for hypertension, takes Lipitor and hydrochlorothiazide. Pt takes hydroxychloroquine and steroids for rheumatoid arthritis.     Echo - -  estimated ejection fraction is > 65%. Moderate left ventricular hypertrophy. Grade I, mild, diastolic dysfunction. There is mild LVOT obstruction. OBJECTIVE     Vital Signs:  /69   Pulse 81   Temp 97.7 °F (36.5 °C) (Axillary)   Resp 23   Ht 5' 5\" (1.651 m)   Wt 174 lb 2.6 oz (79 kg)   SpO2 92%   BMI 28.98 kg/m²     Temp (24hrs), Av.6 °F (37 °C), Min:97.7 °F (36.5 °C), Max:99.7 °F (37.6 °C)    In: 872   Out: 500 [Urine:400]    Physical Exam:  Physical Exam  Constitutional:       Appearance: Normal appearance. She is obese. HENT:      Head: Normocephalic and atraumatic. Nose: Nose normal.      Mouth/Throat:      Mouth: Mucous membranes are moist.   Cardiovascular:      Rate and Rhythm: Normal rate and regular rhythm. Pulses: Normal pulses. Heart sounds: Normal heart sounds. No murmur heard. Pulmonary:      Effort: No respiratory distress. Breath sounds: Normal breath sounds. No wheezing or rales. Abdominal:      General: There is no distension. Palpations: Abdomen is soft. Tenderness: There is no abdominal tenderness. Musculoskeletal:         General: No swelling. Normal range of motion. Cervical back: Normal range of motion. No rigidity.       Right lower < > = values in this interval not displayed. BNP: No results for input(s): BNP in the last 72 hours. PT/INR: No results for input(s): PROTIME, INR in the last 72 hours. APTT:   Recent Labs     08/19/21  1237 08/19/21  1749 08/20/21  0509   APTT 49.6* 54.7* 46.2*     CARDIAC ENZYMES: No results for input(s): CKMB, CKMBINDEX, TROPONINI in the last 72 hours. Invalid input(s): CKTOTAL;3  FASTING LIPID PANEL:  Lab Results   Component Value Date    CHOL 248 (H) 03/21/2021     03/21/2021    TRIG 47 03/21/2021     LIVER PROFILE: No results for input(s): AST, ALT, ALB, BILIDIR, BILITOT, ALKPHOS in the last 72 hours. MICROBIOLOGY:   Lab Results   Component Value Date/Time    CULTURE ESCHERICHIA COLI >[id number] CFU/ML (A) 03/30/2021 06:28 AM       Imaging:    CT ABDOMEN PELVIS WO CONTRAST Additional Contrast? None    Result Date: 8/16/2021  No acute CT abnormality within the abdomen or pelvis. Gallbladder is nondistended with layering biliary sludge and/or small gallstones. No pericholecystic inflammatory changes or CT evidence of acute cholecystitis. Hepatic steatosis. XR CHEST PORTABLE    Result Date: 8/16/2021  No acute cardiopulmonary findings       ASSESSMENT & PLAN     Assessment and Plan:    Principal Problem:    Chronic diarrhea  Active Problems:    Alcohol abuse    Essential hypertension    JONES (obstructive sleep apnea)    Seronegative rheumatoid arthritis (HCC)    Hypokalemia    Nausea vomiting and diarrhea    Hypocalcemia    Hypomagnesemia    Rhabdomyolysis    Elevated troponin  Resolved Problems:    * No resolved hospital problems. *    1.  Chronic diarrhea -                Continue fluids               GI onboard               C.diff negative, fecal lawrence protectin normal,                Giardia and cryptosporidium negative,                Gastrointestinal panel negative                follow up on other stool studies, to rule out causes of chronic diarrhea                ? Malabsorptive diarrhea/ neuroendocrine tomours     2. Hypokalemia -                Likely due to chronic diarrhea               Continue replacement both IV and oral BID               Continue fluids               nephrology onboard               Urine studies are normal     3. Hypomagnesemia -                likely due to diarrhea               Will monitor and replace accordingly               nephrology onboard     3. Rhabdomyolysis - non traumatic, resolved               likely to hypokalemia               CK trending down               Continue IV fluids               Will monitor              4. Elevated troponin - NSTEMI likely type II               EKG - sinus, prolonged QTc, T wave inversions               echo - EF - >45%, grade 1 diastolic dysfunction               Ischemic work up when electrolytes are stable as per cardio               Continue Lipitor and atenolol and norvasc               Monitor vitals closely               Continue potassium and magnesium replacement     5. Essential hypertension -               Continue Norvasc and atenolol                Monitor closely      5. Arthritis - ? Rheumatoid arthritis vs gout              Pt complains of right knee pain, using lidocaine cream              Continue home medications              Continue hydroxychloroquine     6. Alcohol abuse -                Continue thiamine, folate, multivitamin                Watch for signs of withdrawal     7. JONES - ?  Sleep apnea                Pt desaturating at night, needing oxygen                Needs out pt follow up for sleep study       Diet: regular adult diet  DVT ppx : lovenox  GI ppx: protonix    PT/OT: onboard, recommends continued therapy after discharge  Discharge Planning / Chepe Penny: ongoing, pt wants to go home with home care    Micha Pena  PGY-1  Internal Medicine  [address]   5:19 AM 8/21/2021

## 2021-08-21 NOTE — PROGRESS NOTES
Renal Progress Note    Patient :  Naye Darling; 55 y.o. MRN# [id number]  Location:  [facility]/1677-01  Attending:  Erling Heimlich, MD  Admit Date:  8/16/2021   Hospital Day: 5      Subjective:     Patient seen and examined at bedside. No acute events overnight. States that he is feeling better today. She slept well last night. Diarrhea has improved a lot from a total of 4 bowel movements yesterday. One formed bowel movement today. Hypokalemia improving, potassium 3.3 today. Magnesium after replacement yesterday was 2.4. Creatinine 0.93. TTKG on admission 4.     Outpatient Medications:     Medications Prior to Admission: vitamin D3 (CHOLECALCIFEROL) 25 MCG (1000 UT) TABS tablet, TAKE 1 TABLET BY MOUTH DAILY   [DISCONTINUED] hydroxychloroquine (PLAQUENIL) 200 MG tablet, TAKE 1 TABLET BY MOUTH 2 TIMES DAILY   [DISCONTINUED] meloxicam (MOBIC) 15 MG tablet, TAKE 1 TABLET BY MOUTH DAILY   thiamine mononitrate 100 MG tablet, TAKE 1 TABLET BY MOUTH ONCE DAILY   FLUoxetine (PROZAC) 40 MG capsule, Take 40 mg by mouth daily  acetaminophen (APAP EXTRA STRENGTH) 500 MG tablet, Take 2 tablets by mouth every 6 hours as needed for Pain  [DISCONTINUED] omeprazole (PRILOSEC) 20 MG delayed release capsule, Take 1 capsule by mouth daily  [DISCONTINUED] hydroCHLOROthiazide (HYDRODIURIL) 25 MG tablet, Take 1 tablet by mouth every morning  [DISCONTINUED] potassium chloride (KLOR-CON M) 20 MEQ extended release tablet, Take 1 tablet by mouth daily  atorvastatin (LIPITOR) 20 MG tablet, TAKE 1 TABLET BY MOUTH DAILY   folic acid (FOLVITE) 1 MG tablet, Take 1 tablet by mouth daily  Cholecalciferol (VITAMIN D3) 25 MCG (1000 UT) CAPS, Take 1,000 Units by mouth daily  ferrous sulfate (IRON 325) 325 (65 Fe) MG tablet, Take 325 mg by mouth daily (with breakfast)  [DISCONTINUED] citalopram (CELEXA) 20 MG tablet, Take 1 tablet by mouth daily  Calcium Carbonate-Vitamin D (OYSTER SHELL CALCIUM/D) 500-200 MG-UNIT TABS, TAKE 1 TAB BY MOUTH ONCE A DAY   atenolol (TENORMIN) 50 MG tablet, Take 1 tablet by mouth daily  [DISCONTINUED] loratadine (CLARITIN) 10 MG tablet, TAKE 1 CAPSULE BY MOUTH DAILY   amLODIPine (NORVASC) 10 MG tablet, TAKE 1 TABLET BY MOUTH DAILY   diclofenac sodium (VOLTAREN) 1 % GEL, Apply 4 g topically 2 times daily  albuterol sulfate (PROAIR RESPICLICK) 670 (90 Base) MCG/ACT aerosol powder inhalation, Inhale 2 puffs into the lungs every 6 hours as needed for Wheezing or Shortness of Breath  lurasidone (LATUDA) 60 MG TABS tablet, Take 60 mg by mouth nightly    Current Medications:     Scheduled Meds:    potassium chloride  40 mEq Oral TID    [Held by provider] magnesium oxide  400 mg Oral BID    magnesium sulfate  2,000 mg Intravenous Once    amLODIPine  10 mg Oral Daily    atenolol  50 mg Oral Daily    multivitamin  1 tablet Oral Daily    sodium chloride flush  5-40 mL Intravenous 2 times per day    thiamine mononitrate  50 mg Oral Daily    Vitamin D  1,000 Units Oral Daily    pantoprazole  40 mg Oral QAM AC    lurasidone  60 mg Oral Nightly    hydroxychloroquine  200 mg Oral BID    FLUoxetine  40 mg Oral Daily    folic acid  1 mg Oral Daily    ferrous sulfate  325 mg Oral Daily with breakfast    Calcium Carb-Cholecalciferol  2 tablet Oral Daily     Continuous Infusions:    sodium chloride       PRN Meds:  loperamide, [DISCONTINUED] potassium chloride **OR** [DISCONTINUED] potassium alternative oral replacement **OR** potassium chloride, magnesium sulfate, sodium phosphate IVPB **OR** sodium phosphate IVPB, sodium chloride flush, sodium chloride, acetaminophen, ondansetron **OR** ondansetron    Input/Output:       I/O last 3 completed shifts: In: 0 [P.O.:872]  Out: 500 [Urine:400; Stool:100]. No data found.     Vital Signs:   Temperature:  Temp: 97.7 °F (36.5 °C)  TMax:   Temp (24hrs), Av.7 °F (37.1 °C), Min:97.7 °F (36.5 °C), Max:99.7 °F (37.6 °C)    Respirations:  Resp: 19  Pulse:   Pulse: 90  BP:    BP: 117/83  BP Range: Systolic (93WEC), EQV:602 , Min:102 , AEI:900       Diastolic (09BPN), ZAO:38, Min:69, Max:93      Physical Examination:     General:  AAO x 3, speaking in full sentences, no accessory muscle use. HEENT: Atraumatic, normocephalic, no throat congestion, moist mucosa. Eyes:   Pupils equal, round and reactive to light, EOMI. Neck:   Supple  Chest:   Bilateral vesicular breath sounds, no rales or wheezes. Cardiac:  S1 S2 RR, no murmurs, gallops or rubs. Abdomen: Soft, non-tender, no masses or organomegaly, BS audible. :   No suprapubic or flank tenderness. Neuro:  AAO x 3, No FND. SKIN:  No rashes, good skin turgor. Extremities:  No edema. Labs:       Recent Labs     08/21/21  0607   WBC 11.0   RBC 2.61*   HGB 8.6*   HCT 25.6*   MCV 98.1   MCH 33.0   MCHC 33.6   RDW 16.5*      MPV 9.4      BMP:   Recent Labs     08/19/21  1237 08/19/21  1237 08/20/21  0121 08/20/21  1356 08/21/21  0607     --   --  135 138   K 2.5*   < > 2.8* 3.3* 3.3*   CL 95*  --   --  96* 102   CO2 24  --   --  26 24   BUN 2*  --   --  2* 4*   CREATININE 0.85  --   --  0.93* 0.93*   GLUCOSE 106*  --   --  100* 85   CALCIUM 7.6*  --   --  7.4* 7.4*    < > = values in this interval not displayed. Phosphorus:     Recent Labs     08/19/21  1237   PHOS 4.5     Magnesium:    Recent Labs     08/19/21  1237 08/20/21  0509 08/20/21  1356   MG 2.0 1.2* 2.4     Albumin:  No results for input(s): LABALBU in the last 72 hours.   BNP:      Lab Results   Component Value Date    BNP NOT REPORTED 05/20/2014     JARRELL:      Lab Results   Component Value Date    JARRELL NEGATIVE 03/29/2021     SPEP:  Lab Results   Component Value Date    PROT 5.9 08/17/2021     UPEP:   No results found for: LABPE  C3:   No results found for: C3  C4:   No results found for: C4  MPO ANCA:   No results found for: MPO  PR3 ANCA:   No results found for: PR3  Anti-GBM:   No results found for: GBMABIGG  Hep BsAg:         Lab Results   Component Value Date HEPBSAG NONREACTIVE 12/15/2020     Hep C AB:          Lab Results   Component Value Date    HEPCAB NONREACTIVE 12/15/2020       Urinalysis/Chemistries:      Lab Results   Component Value Date    NITRU NEGATIVE 08/16/2021    COLORU YELLOW 08/16/2021    PHUR 8.0 08/16/2021    WBCUA 2 TO 5 08/16/2021    RBCUA 2 TO 5 08/16/2021    MUCUS NOT REPORTED 08/16/2021    TRICHOMONAS NOT REPORTED 08/16/2021    YEAST NOT REPORTED 08/16/2021    BACTERIA NOT REPORTED 08/16/2021    SPECGRAV 1.006 08/16/2021    LEUKOCYTESUR TRACE 08/16/2021    UROBILINOGEN Normal 08/16/2021    BILIRUBINUR NEGATIVE 08/16/2021    GLUCOSEU NEGATIVE 08/16/2021    KETUA NEGATIVE 08/16/2021    AMORPHOUS NOT REPORTED 08/16/2021     Urine Sodium:     Lab Results   Component Value Date    DEVON 116 09/20/2020     Urine Potassium:    Lab Results   Component Value Date    KUR 32.6 08/20/2021     Urine Chloride:    Lab Results   Component Value Date    CLUR 85 08/19/2021     Urine Osmolarity:   Lab Results   Component Value Date    OSMOU 319 08/20/2021     Urine Protein:   No components found for: TOTALPROTEIN, URINE   Urine Creatinine:     Lab Results   Component Value Date    LABCREA 67.0 08/19/2021     Urine Eosinophils:  No components found for: UEOS    Radiology:     Reviewed. Assessment:     Hypokalemia-multifactorial secondary to GI losses/hydrochlorothiazide use/hypomagnesemia. TTKG on admission 4 - less likely to be renal cause of hypokalemia. Hypomagnesemia secondary to GI losses and poor p.o. intake. Improved  Hypophosphatemia  Chronic diarrhea. Improved  Polyarticular inflammatory arthritis likely gout  History of coronary artery disease s/p PCI in the past  History of essential hypertension  History of diastolic dysfunction  History of sleep apnea    Plan:   Continue to replace potassium. Also check daily magnesium and replace accordingly. Diarrhea work-up per GI.   Need to rule out inflammatory bowel disease namely Crohn's  Recommend using

## 2021-08-22 LAB
5 HIAA URINE (PER GM CREAT): 4 MG/GCR (ref 0–14)
5-HIAA INTERPRETATION: NORMAL
5-HIAA URINE: 0.9 MG/L
5-HIAA, URINE: 3 MG/D (ref 0–15)
CREATININE URINE /24 HR: 668 MG/D (ref 500–1400)
CREATININE URINE /VOLUME: 22 MG/DL
HOURS COLLECTED: NORMAL
URINE VOLUME: 3038

## 2021-08-23 ENCOUNTER — CARE COORDINATION (OUTPATIENT)
Dept: CARE COORDINATION | Age: 56
End: 2021-08-23

## 2021-08-23 ENCOUNTER — TELEPHONE (OUTPATIENT)
Dept: INTERNAL MEDICINE | Age: 56
End: 2021-08-23

## 2021-08-26 NOTE — CARE COORDINATION
Ambulatory Care Coordination Note  8/26/2021  CM Risk Score: 9  Charlson 10 Year Mortality Risk Score: 23%     ACC: Jodi Rodriges RN    Summary Note: Called patient for follow up. She answered the phone on speaker phone. There was a lot of noise and it sounded like the patient was talking from across the room. ACM could not hear her. She said something, then hung up. Will try to reach her tomorrow if she doesn't call back. Need to find out which home care agency she is using. Care Coordination Interventions    Program Enrollment: Complex Care  Referral from Primary Care Provider: No  Suggested Interventions and Community Resources  Behavorial Health: Completed (Comment: Braulio Hatch- already in place)  [address]: Completed (Comment: Trevor- already in place)         Goals Addressed    None         Prior to Admission medications    Medication Sig Start Date End Date Taking? Authorizing Provider   loperamide (IMODIUM) 2 MG capsule Take 1 capsule by mouth 3 times daily as needed for Diarrhea 8/21/21 8/31/21  Ilana Gage MD   potassium chloride (KLOR-CON M) 20 MEQ extended release tablet Take 2 tablets by mouth 2 times daily After BMP two times, first after 1 week and if potassium is low then continue taking same dose with repeat BMP in a week. and if potassium is normal then take once daily. Do not crush, chew, or suck on tablet.  8/21/21   Ilana Gage MD   Multiple Vitamin (MULTIVITAMIN) TABS tablet Take 1 tablet by mouth daily 8/22/21   Ilana Gage MD   loratadine (CLARITIN) 10 MG tablet TAKE 1 tab BY MOUTH DAILY  8/18/21   CECILIA Campos CNP   omeprazole (PRILOSEC) 20 MG delayed release capsule TAKE 1 CAPSULE BY MOUTH DAILY  8/18/21   CECILIA Campos CNP   hydroxychloroquine (PLAQUENIL) 200 MG tablet TAKE 1 TABLET BY MOUTH 2 TIMES DAILY  8/18/21   CECILIA Campos CNP   meloxicam (MOBIC) 15 MG tablet TAKE 1 TABLET BY MOUTH DAILY  8/18/21   CECILIA Campos CNP vitamin D3 (CHOLECALCIFEROL) 25 MCG (1000 UT) TABS tablet TAKE 1 TABLET BY MOUTH DAILY  7/16/21   Ludwig Polanco MD   thiamine mononitrate 100 MG tablet TAKE 1 TABLET BY MOUTH ONCE DAILY  7/14/21   Ludwig Polanco MD   FLUoxetine (PROZAC) 40 MG capsule Take 40 mg by mouth daily    Historical Provider, MD   acetaminophen (APAP EXTRA STRENGTH) 500 MG tablet Take 2 tablets by mouth every 6 hours as needed for Pain 6/11/21   Riccardo Tanner MD   atorvastatin (LIPITOR) 20 MG tablet TAKE 1 TABLET BY MOUTH DAILY  5/12/21   Farhan Aden MD   folic acid (FOLVITE) 1 MG tablet Take 1 tablet by mouth daily 4/27/21   Riccardo Tanner MD   ferrous sulfate (IRON 325) 325 (65 Fe) MG tablet Take 325 mg by mouth daily (with breakfast)    Historical Provider, MD   Calcium Carbonate-Vitamin D (OYSTER SHELL CALCIUM/D) 500-200 MG-UNIT TABS TAKE 1 TAB BY MOUTH ONCE A DAY  1/22/21   Jose Zuniga MD   atenolol (TENORMIN) 50 MG tablet Take 1 tablet by mouth daily 1/22/21   Jose Zuniga MD   amLODIPine (NORVASC) 10 MG tablet TAKE 1 TABLET BY MOUTH DAILY  1/8/21   Jose Valentin MD   diclofenac sodium (VOLTAREN) 1 % GEL Apply 4 g topically 2 times daily 1/6/21   Anitha Mojica DPM   albuterol sulfate (PROAIR RESPICLICK) 116 (90 Base) MCG/ACT aerosol powder inhalation Inhale 2 puffs into the lungs every 6 hours as needed for Wheezing or Shortness of Breath 1/15/20   Alysa Low MD   lurasidone (LATUDA) 60 MG TABS tablet Take 60 mg by mouth nightly    Historical Provider, MD       Future Appointments   Date Time Provider Anthony Everett   9/7/2021 10:30 AM Pily Mckeon MD [facility]   9/20/2021  8:15 AM Jane Hdz  [address] [facility]

## 2021-08-27 LAB — VASOACTIVE INTESTINAL POLYPEPTIDE PLASMA: <13 PG/ML (ref 0–60)

## 2021-09-04 LAB — SOMATOSTATIN PLASMA: 50 PG/ML

## 2021-09-08 NOTE — DISCHARGE SUMMARY
cardiology was consulted echo showed EF of more than 38% grade 1 diastolic dysfunction cardiology advised ischemic work-up and electrolytes are stable. Patient was continued on her home medications Lipitor, atenolol and Norvasc for hypertension. Patient was continued hydroxychloroquine her home medication for arthritis. Was placed on thiamine folate and multivitamin for alcohol abuse. Patient was placed on supplemental oxygen at night due to drop in saturations, advised outpatient sleep study. Lovenox for DVT prophylaxis, Protonix for GI prophylaxis. Patient's electrolytes normalized. Advised outpatient follow-up with GI and discharged      Procedures/ Significant Interventions:      No results found. Consults:     Consults:     Final Specialist Recommendations/Findings:   IP CONSULT TO CARDIOLOGY  IP CONSULT TO INTERNAL MEDICINE  IP CONSULT TO GI  IP CONSULT TO CASE MANAGEMENT  IP CONSULT TO NEPHROLOGY  IP CONSULT TO HOME CARE NEEDS Follow up out pt     Any Hospital Acquired Infections: none    Discharge Functional Status:  stable    DISCHARGE PLAN     Disposition: home    Patient Instructions:   Discharge Medication List as of 8/23/2021  2:46 PM      START taking these medications    Details   loperamide (IMODIUM) 2 MG capsule Take 1 capsule by mouth 3 times daily as needed for Diarrhea, Disp-30 capsule, R-0Normal      Multiple Vitamin (MULTIVITAMIN) TABS tablet Take 1 tablet by mouth daily, Disp-60 tablet, R-2Normal         CONTINUE these medications which have CHANGED    Details   potassium chloride (KLOR-CON M) 20 MEQ extended release tablet Take 2 tablets by mouth 2 times daily After BMP two times, first after 1 week and if potassium is low then continue taking same dose with repeat BMP in a week. and if potassium is normal then take once daily.  Do not crush, chew, or suck on tablet., Disp -60 tablet, R-5Normal         CONTINUE these medications which have NOT CHANGED    Details   loratadine (CLARITIN) 10 MG tablet TAKE 1 tab BY MOUTH DAILY , Disp-30 tablet, R-3Normal      omeprazole (PRILOSEC) 20 MG delayed release capsule TAKE 1 CAPSULE BY MOUTH DAILY , Disp-30 capsule, R-0Normal      hydroxychloroquine (PLAQUENIL) 200 MG tablet TAKE 1 TABLET BY MOUTH 2 TIMES DAILY , Disp-60 tablet, R-0Normal      meloxicam (MOBIC) 15 MG tablet TAKE 1 TABLET BY MOUTH DAILY , Disp-30 tablet, R-0Normal      vitamin D3 (CHOLECALCIFEROL) 25 MCG (1000 UT) TABS tablet TAKE 1 TABLET BY MOUTH DAILY , Disp-30 tablet, R-5Normal      thiamine mononitrate 100 MG tablet TAKE 1 TABLET BY MOUTH ONCE DAILY , Disp-30 tablet, R-2Normal      FLUoxetine (PROZAC) 40 MG capsule Take 40 mg by mouth dailyHistorical Med      acetaminophen (APAP EXTRA STRENGTH) 500 MG tablet Take 2 tablets by mouth every 6 hours as needed for Pain, Disp-60 tablet, R-0Normal      atorvastatin (LIPITOR) 20 MG tablet TAKE 1 TABLET BY MOUTH DAILY , Disp-30 tablet, [id number]      folic acid (FOLVITE) 1 MG tablet Take 1 tablet by mouth daily, Disp-30 tablet, R-3Normal      ferrous sulfate (IRON 325) 325 (65 Fe) MG tablet Take 325 mg by mouth daily (with breakfast)Historical Med      Calcium Carbonate-Vitamin D (OYSTER SHELL CALCIUM/D) 500-200 MG-UNIT TABS TAKE 1 TAB BY MOUTH ONCE A DAY , Disp-30 tablet, R-3Normal      atenolol (TENORMIN) 50 MG tablet Take 1 tablet by mouth daily, Disp-60 tablet, R-3Normal      amLODIPine (NORVASC) 10 MG tablet TAKE 1 TABLET BY MOUTH DAILY , Disp-60 tablet, R-3Normal      diclofenac sodium (VOLTAREN) 1 % GEL Apply 4 g topically 2 times daily, Topical, 2 TIMES DAILY Starting Wed 1/6/2021, Disp-150 g, R-2, Normal      albuterol sulfate (PROAIR RESPICLICK) 450 (90 Base) MCG/ACT aerosol powder inhalation Inhale 2 puffs into the lungs every 6 hours as needed for Wheezing or Shortness of Breath, Disp-5 Inhaler, R-3Normal      lurasidone (LATUDA) 60 MG TABS tablet Take 60 mg by mouth nightlyHistorical Med         STOP taking these medications       hydroCHLOROthiazide (HYDRODIURIL) 25 MG tablet Comments:   Reason for Stopping:         Cholecalciferol (VITAMIN D3) 25 MCG (1000 UT) CAPS Comments:   Reason for Stopping:         citalopram (CELEXA) 20 MG tablet Comments:   Reason for Stopping:               Activity: activity as tolerated    Diet: regular diet    Follow-up:    Stefany Carroll MD  [address] [place] [address]  [phone]    Schedule an appointment as soon as possible for a visit  Severe chronic diarrhea for 2 months     Micha Scott, [address]  [phone]    Schedule an appointment as soon as possible for a visit  as a new PCP    [facility]  [address] [place] [address] [place] [address]  [phone]    Ischemic work up as an outpatient     [place] CECILIA Baptiste [address] [place] [address] [phone]      [facility]. Call for hosptial follow up appointment in 2 weeks. Patient Instructions:   - Follow up with PCP, After BMP two times, first after 1 week and if potassium is low then continue taking same dose with repeat BMP in a week. and if potassium is normal then take once daily. Do not crush, chew, or suck on tablet. - Follow up with gastroenterologist for chronic diarrhea as an outpatient, please call an make an appointment, information provided.   - follow up with pcp    Note that over 30 minutes was spent in preparing discharge papers, discussing discharge with patient, medication review, etc.      Micha Scott  PGY-1  Internal Medicine  [facility]   6:32 AM 9/8/2021

## 2021-09-19 PROBLEM — R77.8 ELEVATED TROPONIN: Status: RESOLVED | Noted: 2021-08-20 | Resolved: 2021-09-19

## 2021-09-19 PROBLEM — R79.89 ELEVATED TROPONIN: Status: RESOLVED | Noted: 2021-08-20 | Resolved: 2021-09-19

## 2021-09-20 DIAGNOSIS — K21.9 GASTROESOPHAGEAL REFLUX DISEASE WITHOUT ESOPHAGITIS: ICD-10-CM

## 2021-09-20 DIAGNOSIS — E78.5 DYSLIPIDEMIA: ICD-10-CM

## 2021-09-22 NOTE — TELEPHONE ENCOUNTER
Request for Calcium and Lipitor. Next Visit Date:  Future Appointments   Date Time Provider Anthony Cutleri   10/4/2021  8:00 AM Wandy Hdz MD [facility]   10/29/2021 11:00 AM [facility] Whittaker [facility]   11/5/2021 10:30 AM Michelle Coy MD [facility] [address] Maintenance   Topic Date Due    Shingles Vaccine (1 of 2) Never done    Flu vaccine (1) 09/01/2021    Lipid screen  03/21/2022    Potassium monitoring  08/21/2022    Creatinine monitoring  08/21/2022    Breast cancer screen  10/10/2022    DTaP/Tdap/Td vaccine (3 - Td or Tdap) 04/14/2027    Colon cancer screen colonoscopy  12/03/2030    Pneumococcal 0-64 years Vaccine (2 of 2 - PPSV23) 01/22/2031    COVID-19 Vaccine  Completed    Hepatitis C screen  Completed    HIV screen  Completed    Hepatitis A vaccine  Aged Out    Hepatitis B vaccine  Aged Out    Hib vaccine  Aged Out    Meningococcal (ACWY) vaccine  Aged Out       Hemoglobin A1C (%)   Date Value   06/11/2021 5.5   03/21/2021 6.3 (H)   01/20/2021 5.9             ( goal A1C is < 7)   Microalb/Crt.  Ratio (mcg/mg creat)   Date Value   06/12/2019 147 (H)     LDL Cholesterol (mg/dL)   Date Value   03/21/2021 122       (goal LDL is <100)   AST (U/L)   Date Value   08/17/2021 62 (H)     ALT (U/L)   Date Value   08/17/2021 50 (H)     BUN (mg/dL)   Date Value   08/21/2021 4 (L)     BP Readings from Last 3 Encounters:   08/21/21 117/83   06/14/21 (!) 131/93   06/11/21 (!) 142/88          (goal 120/80)    All Future Testing planned in [facility]  Lab Frequency Next Occurrence   C-Reactive Protein Once 09/29/2020   EGD Once 11/27/2020   Pancreatic elastase, fecal Once 11/13/2020   ECHO Complete 2D W Doppler W Color Once 02/07/2021   CBC With Auto Differential Once 04/22/2021   Miscellaneous Sendout 1 Once 04/16/2021   Comprehensive Metabolic Panel Once 72/24/7487   Basic Metabolic Panel Once 86/39/7696   Basic Metabolic Panel Once 05/11/6893         Patient Active Problem List:     Lung nodule     Obesity     Adrenal adenoma     Goiter     Hypertension     Alcohol abuse     Essential hypertension     Enlarged tonsils     ACE inhibitor-aggravated angioedema     JONES (obstructive sleep apnea)     Dyslipidemia     IGT (impaired glucose tolerance)     Acute alcoholic intoxication without complication (HCC)     Acute renal insufficiency     Effusion of bursa of right knee     Acute cystitis without hematuria     Bipolar disorder (HCC)     Mild intermittent asthma without complication     Inflammatory polyarthritis (HCC)     Seronegative rheumatoid arthritis (HCC)     Hypokalemia     Nausea vomiting and diarrhea     Chronic diarrhea     Hypocalcemia     Hypomagnesemia     Rhabdomyolysis

## 2021-09-23 RX ORDER — B-COMPLEX WITH VITAMIN C
TABLET ORAL
Qty: 30 TABLET | Refills: 4 | Status: SHIPPED | OUTPATIENT
Start: 2021-09-23 | End: 2022-07-27

## 2021-09-23 RX ORDER — ATORVASTATIN CALCIUM 20 MG/1
TABLET, FILM COATED ORAL
Qty: 30 TABLET | Refills: 4 | Status: SHIPPED | OUTPATIENT
Start: 2021-09-23 | End: 2021-10-12 | Stop reason: ALTCHOICE

## 2021-09-29 ENCOUNTER — APPOINTMENT (OUTPATIENT)
Dept: GENERAL RADIOLOGY | Age: 56
DRG: 422 | End: 2021-09-29
Payer: MEDICAID

## 2021-09-29 ENCOUNTER — HOSPITAL ENCOUNTER (INPATIENT)
Age: 56
LOS: 4 days | Discharge: HOME | DRG: 422 | End: 2021-10-03
Attending: EMERGENCY MEDICINE | Admitting: INTERNAL MEDICINE
Payer: MEDICAID

## 2021-09-29 DIAGNOSIS — K52.9 CHRONIC DIARRHEA: ICD-10-CM

## 2021-09-29 DIAGNOSIS — E44.0 MODERATE MALNUTRITION (HCC): ICD-10-CM

## 2021-09-29 DIAGNOSIS — D75.839 THROMBOCYTOSIS: Primary | ICD-10-CM

## 2021-09-29 DIAGNOSIS — E83.42 HYPOMAGNESEMIA: ICD-10-CM

## 2021-09-29 DIAGNOSIS — E83.51 HYPOCALCEMIA: ICD-10-CM

## 2021-09-29 DIAGNOSIS — E87.6 HYPOKALEMIA: ICD-10-CM

## 2021-09-29 DIAGNOSIS — N17.9 AKI (ACUTE KIDNEY INJURY) (HCC): Primary | ICD-10-CM

## 2021-09-29 PROBLEM — I95.9 HYPOTENSION: Status: ACTIVE | Noted: 2021-09-29

## 2021-09-29 LAB
ABSOLUTE EOS #: 0.06 K/UL (ref 0–0.44)
ABSOLUTE IMMATURE GRANULOCYTE: 0.04 K/UL (ref 0–0.3)
ABSOLUTE LYMPH #: 2 K/UL (ref 1.1–3.7)
ABSOLUTE MONO #: 1.28 K/UL (ref 0.1–1.2)
ACETAMINOPHEN LEVEL: <5 UG/ML (ref 10–30)
ANION GAP SERPL CALCULATED.3IONS-SCNC: 20 MMOL/L (ref 9–17)
BASOPHILS # BLD: 1 % (ref 0–2)
BASOPHILS ABSOLUTE: 0.04 K/UL (ref 0–0.2)
BNP INTERPRETATION: ABNORMAL
BUN BLDV-MCNC: 7 MG/DL (ref 6–20)
BUN/CREAT BLD: ABNORMAL (ref 9–20)
CALCIUM SERPL-MCNC: 6.5 MG/DL (ref 8.6–10.4)
CHLORIDE BLD-SCNC: 92 MMOL/L (ref 98–107)
CO2: 21 MMOL/L (ref 20–31)
CREAT SERPL-MCNC: 1.4 MG/DL (ref 0.5–0.9)
CREATININE URINE: 251.1 MG/DL (ref 28–217)
DIFFERENTIAL TYPE: ABNORMAL
EOSINOPHILS RELATIVE PERCENT: 1 % (ref 1–4)
ETHANOL PERCENT: 0.05 %
ETHANOL: 52 MG/DL
GFR AFRICAN AMERICAN: 47 ML/MIN
GFR NON-AFRICAN AMERICAN: 39 ML/MIN
GFR SERPL CREATININE-BSD FRML MDRD: ABNORMAL ML/MIN/{1.73_M2}
GFR SERPL CREATININE-BSD FRML MDRD: ABNORMAL ML/MIN/{1.73_M2}
GLUCOSE BLD-MCNC: 110 MG/DL (ref 70–99)
HCT VFR BLD CALC: 26.9 % (ref 36.3–47.1)
HEMOGLOBIN: 9.2 G/DL (ref 11.9–15.1)
IMMATURE GRANULOCYTES: 1 %
LYMPHOCYTES # BLD: 26 % (ref 24–43)
MAGNESIUM: 0.7 MG/DL (ref 1.6–2.6)
MAGNESIUM: 1.1 MG/DL (ref 1.6–2.6)
MAGNESIUM: 1.3 MG/DL (ref 1.6–2.6)
MCH RBC QN AUTO: 33.1 PG (ref 25.2–33.5)
MCHC RBC AUTO-ENTMCNC: 34.2 G/DL (ref 28.4–34.8)
MCV RBC AUTO: 96.8 FL (ref 82.6–102.9)
MONOCYTES # BLD: 17 % (ref 3–12)
NRBC AUTOMATED: 0 PER 100 WBC
PDW BLD-RTO: 13.9 % (ref 11.8–14.4)
PLATELET # BLD: 335 K/UL (ref 138–453)
PLATELET ESTIMATE: ABNORMAL
PMV BLD AUTO: 9.4 FL (ref 8.1–13.5)
POTASSIUM SERPL-SCNC: 2.5 MMOL/L (ref 3.7–5.3)
POTASSIUM SERPL-SCNC: 3 MMOL/L (ref 3.7–5.3)
POTASSIUM SERPL-SCNC: 3 MMOL/L (ref 3.7–5.3)
PRO-BNP: 457 PG/ML
RBC # BLD: 2.78 M/UL (ref 3.95–5.11)
RBC # BLD: ABNORMAL 10*6/UL
SALICYLATE LEVEL: 2 MG/DL (ref 3–10)
SARS-COV-2, RAPID: NOT DETECTED
SEG NEUTROPHILS: 54 % (ref 36–65)
SEGMENTED NEUTROPHILS ABSOLUTE COUNT: 4.33 K/UL (ref 1.5–8.1)
SODIUM BLD-SCNC: 133 MMOL/L (ref 135–144)
SODIUM,UR: <20 MMOL/L
SPECIMEN DESCRIPTION: NORMAL
TOTAL PROTEIN, URINE: 22 MG/DL
TOXIC TRICYCLIC SC,BLOOD: NEGATIVE
TROPONIN INTERP: ABNORMAL
TROPONIN INTERP: ABNORMAL
TROPONIN T: ABNORMAL NG/ML
TROPONIN T: ABNORMAL NG/ML
TROPONIN, HIGH SENSITIVITY: 42 NG/L (ref 0–14)
TROPONIN, HIGH SENSITIVITY: 53 NG/L (ref 0–14)
URINE TOTAL PROTEIN CREATININE RATIO: 0.09 (ref 0–0.2)
WBC # BLD: 7.8 K/UL (ref 3.5–11.3)
WBC # BLD: ABNORMAL 10*3/UL

## 2021-09-29 PROCEDURE — 87635 SARS-COV-2 COVID-19 AMP PRB: CPT

## 2021-09-29 PROCEDURE — 83735 ASSAY OF MAGNESIUM: CPT

## 2021-09-29 PROCEDURE — 82570 ASSAY OF URINE CREATININE: CPT

## 2021-09-29 PROCEDURE — 2060000000 HC ICU INTERMEDIATE R&B

## 2021-09-29 PROCEDURE — 83880 ASSAY OF NATRIURETIC PEPTIDE: CPT

## 2021-09-29 PROCEDURE — 80143 DRUG ASSAY ACETAMINOPHEN: CPT

## 2021-09-29 PROCEDURE — 2580000003 HC RX 258: Performed by: STUDENT IN AN ORGANIZED HEALTH CARE EDUCATION/TRAINING PROGRAM

## 2021-09-29 PROCEDURE — 80048 BASIC METABOLIC PNL TOTAL CA: CPT

## 2021-09-29 PROCEDURE — 84156 ASSAY OF PROTEIN URINE: CPT

## 2021-09-29 PROCEDURE — 36415 COLL VENOUS BLD VENIPUNCTURE: CPT

## 2021-09-29 PROCEDURE — 6370000000 HC RX 637 (ALT 250 FOR IP): Performed by: STUDENT IN AN ORGANIZED HEALTH CARE EDUCATION/TRAINING PROGRAM

## 2021-09-29 PROCEDURE — 6360000002 HC RX W HCPCS: Performed by: STUDENT IN AN ORGANIZED HEALTH CARE EDUCATION/TRAINING PROGRAM

## 2021-09-29 PROCEDURE — 84132 ASSAY OF SERUM POTASSIUM: CPT

## 2021-09-29 PROCEDURE — 80179 DRUG ASSAY SALICYLATE: CPT

## 2021-09-29 PROCEDURE — 85025 COMPLETE CBC W/AUTO DIFF WBC: CPT

## 2021-09-29 PROCEDURE — G0480 DRUG TEST DEF 1-7 CLASSES: HCPCS

## 2021-09-29 PROCEDURE — 99223 1ST HOSP IP/OBS HIGH 75: CPT | Performed by: INTERNAL MEDICINE

## 2021-09-29 PROCEDURE — 84484 ASSAY OF TROPONIN QUANT: CPT

## 2021-09-29 PROCEDURE — 99285 EMERGENCY DEPT VISIT HI MDM: CPT

## 2021-09-29 PROCEDURE — 80307 DRUG TEST PRSMV CHEM ANLYZR: CPT

## 2021-09-29 PROCEDURE — 87040 BLOOD CULTURE FOR BACTERIA: CPT

## 2021-09-29 PROCEDURE — 71045 X-RAY EXAM CHEST 1 VIEW: CPT

## 2021-09-29 PROCEDURE — 93005 ELECTROCARDIOGRAM TRACING: CPT | Performed by: STUDENT IN AN ORGANIZED HEALTH CARE EDUCATION/TRAINING PROGRAM

## 2021-09-29 PROCEDURE — 84300 ASSAY OF URINE SODIUM: CPT

## 2021-09-29 RX ORDER — POTASSIUM CHLORIDE 20 MEQ/1
40 TABLET, EXTENDED RELEASE ORAL PRN
Status: DISCONTINUED | OUTPATIENT
Start: 2021-09-29 | End: 2021-10-01 | Stop reason: SDUPTHER

## 2021-09-29 RX ORDER — POLYETHYLENE GLYCOL 3350 17 G/17G
17 POWDER, FOR SOLUTION ORAL DAILY PRN
Status: DISCONTINUED | OUTPATIENT
Start: 2021-09-29 | End: 2021-10-03 | Stop reason: HOSPADM

## 2021-09-29 RX ORDER — MAGNESIUM SULFATE IN WATER 40 MG/ML
2000 INJECTION, SOLUTION INTRAVENOUS ONCE
Status: COMPLETED | OUTPATIENT
Start: 2021-09-29 | End: 2021-09-29

## 2021-09-29 RX ORDER — LANOLIN ALCOHOL/MO/W.PET/CERES
50 CREAM (GRAM) TOPICAL DAILY
Status: DISCONTINUED | OUTPATIENT
Start: 2021-09-29 | End: 2021-10-03 | Stop reason: HOSPADM

## 2021-09-29 RX ORDER — MAGNESIUM SULFATE IN WATER 40 MG/ML
2000 INJECTION, SOLUTION INTRAVENOUS PRN
Status: DISCONTINUED | OUTPATIENT
Start: 2021-09-29 | End: 2021-10-03 | Stop reason: HOSPADM

## 2021-09-29 RX ORDER — SODIUM CHLORIDE 9 MG/ML
25 INJECTION, SOLUTION INTRAVENOUS PRN
Status: DISCONTINUED | OUTPATIENT
Start: 2021-09-29 | End: 2021-10-03 | Stop reason: HOSPADM

## 2021-09-29 RX ORDER — CALCIUM GLUCONATE 20 MG/ML
2000 INJECTION, SOLUTION INTRAVENOUS ONCE
Status: COMPLETED | OUTPATIENT
Start: 2021-09-29 | End: 2021-09-29

## 2021-09-29 RX ORDER — FOLIC ACID 1 MG/1
1 TABLET ORAL DAILY
Status: DISCONTINUED | OUTPATIENT
Start: 2021-09-29 | End: 2021-10-03 | Stop reason: HOSPADM

## 2021-09-29 RX ORDER — POTASSIUM CHLORIDE 7.45 MG/ML
40 INJECTION INTRAVENOUS ONCE
Status: DISCONTINUED | OUTPATIENT
Start: 2021-09-30 | End: 2021-09-30

## 2021-09-29 RX ORDER — ONDANSETRON 2 MG/ML
4 INJECTION INTRAMUSCULAR; INTRAVENOUS EVERY 6 HOURS PRN
Status: DISCONTINUED | OUTPATIENT
Start: 2021-09-29 | End: 2021-10-03 | Stop reason: HOSPADM

## 2021-09-29 RX ORDER — ONDANSETRON 4 MG/1
4 TABLET, ORALLY DISINTEGRATING ORAL EVERY 8 HOURS PRN
Status: DISCONTINUED | OUTPATIENT
Start: 2021-09-29 | End: 2021-10-03 | Stop reason: HOSPADM

## 2021-09-29 RX ORDER — 0.9 % SODIUM CHLORIDE 0.9 %
1000 INTRAVENOUS SOLUTION INTRAVENOUS ONCE
Status: COMPLETED | OUTPATIENT
Start: 2021-09-29 | End: 2021-09-29

## 2021-09-29 RX ORDER — POTASSIUM CHLORIDE 7.45 MG/ML
10 INJECTION INTRAVENOUS PRN
Status: DISCONTINUED | OUTPATIENT
Start: 2021-09-29 | End: 2021-10-03 | Stop reason: HOSPADM

## 2021-09-29 RX ORDER — POTASSIUM CHLORIDE 7.45 MG/ML
20 INJECTION INTRAVENOUS ONCE
Status: COMPLETED | OUTPATIENT
Start: 2021-09-29 | End: 2021-09-29

## 2021-09-29 RX ORDER — HEPARIN SODIUM 5000 [USP'U]/ML
5000 INJECTION, SOLUTION INTRAVENOUS; SUBCUTANEOUS EVERY 8 HOURS SCHEDULED
Status: DISCONTINUED | OUTPATIENT
Start: 2021-09-29 | End: 2021-10-03 | Stop reason: HOSPADM

## 2021-09-29 RX ORDER — POTASSIUM CHLORIDE 7.45 MG/ML
10 INJECTION INTRAVENOUS PRN
Status: DISCONTINUED | OUTPATIENT
Start: 2021-09-29 | End: 2021-10-01 | Stop reason: SDUPTHER

## 2021-09-29 RX ORDER — ACETAMINOPHEN 325 MG/1
650 TABLET ORAL EVERY 6 HOURS PRN
Status: DISCONTINUED | OUTPATIENT
Start: 2021-09-29 | End: 2021-10-03 | Stop reason: HOSPADM

## 2021-09-29 RX ORDER — ACETAMINOPHEN 650 MG/1
650 SUPPOSITORY RECTAL EVERY 6 HOURS PRN
Status: DISCONTINUED | OUTPATIENT
Start: 2021-09-29 | End: 2021-10-03 | Stop reason: HOSPADM

## 2021-09-29 RX ORDER — SODIUM CHLORIDE 9 MG/ML
INJECTION, SOLUTION INTRAVENOUS CONTINUOUS
Status: DISCONTINUED | OUTPATIENT
Start: 2021-09-29 | End: 2021-10-03 | Stop reason: HOSPADM

## 2021-09-29 RX ORDER — MULTIVITAMIN WITH IRON
1 TABLET ORAL DAILY
Status: DISCONTINUED | OUTPATIENT
Start: 2021-09-29 | End: 2021-10-03 | Stop reason: HOSPADM

## 2021-09-29 RX ADMIN — Medication 50 MG: at 20:48

## 2021-09-29 RX ADMIN — SODIUM CHLORIDE: 9 INJECTION, SOLUTION INTRAVENOUS at 20:50

## 2021-09-29 RX ADMIN — HEPARIN SODIUM 5000 UNITS: 5000 INJECTION INTRAVENOUS; SUBCUTANEOUS at 22:48

## 2021-09-29 RX ADMIN — ALCOHOL 1 TABLET: 70.47 GEL TOPICAL at 20:48

## 2021-09-29 RX ADMIN — CALCIUM GLUCONATE 2000 MG: 20 INJECTION, SOLUTION INTRAVENOUS at 14:41

## 2021-09-29 RX ADMIN — FOLIC ACID 1 MG: 1 TABLET ORAL at 20:51

## 2021-09-29 RX ADMIN — SODIUM CHLORIDE 1000 ML: 9 INJECTION, SOLUTION INTRAVENOUS at 13:04

## 2021-09-29 RX ADMIN — POTASSIUM CHLORIDE 20 MEQ: 7.46 INJECTION, SOLUTION INTRAVENOUS at 14:40

## 2021-09-29 RX ADMIN — MAGNESIUM SULFATE 2000 MG: 2 INJECTION INTRAVENOUS at 14:41

## 2021-09-29 ASSESSMENT — ENCOUNTER SYMPTOMS
DIARRHEA: 0
TROUBLE SWALLOWING: 0
SINUS PRESSURE: 0
NAUSEA: 0
CONSTIPATION: 0
CHEST TIGHTNESS: 0
SHORTNESS OF BREATH: 0
SINUS PAIN: 0
BACK PAIN: 0
COLOR CHANGE: 0
COUGH: 0
SORE THROAT: 0
VOMITING: 0
ABDOMINAL PAIN: 0

## 2021-09-29 NOTE — H&P
[address]     Department of Internal Medicine - Staff Internal Medicine Teaching Service          ADMISSION NOTE/HISTORY AND PHYSICAL EXAMINATION   Date: 9/29/2021  Patient Name: Marcelino Mazariegos  Date of admission: 9/29/2021 12:46 PM  YOB: 1965  PCP: Mukund Renee MD  History Obtained From:  patient, electronic medical record    [facility]     Chief complaint: Hypotension, dizziness. HISTORY OF PRESENTING ILLNESS     The patient is a pleasant 55 y.o. female with a past medical history of hypertension, chronic alcoholism admitted with hypotension. Patient states that she was feeling dizzy but did not lose consciousness. she has longstanding history of electrolyte abnormalities and had a recent admission in August due to hypokalemia. Patient states she has not eaten in the past 3 days due to lack of appetite. She also reports to have nonbloody diarrhea and has been taking Imodium since her previous admission. She is having 4-6 episodes of loose stool and is taking Imodium which helped decrease the number of bowel movements. She denies any  nausea or vomiting. Patient denies any complaints, does states that she is very tired. Patient was hypotensive per EMS with initial systolic in the 08D. Repeat was in the 90s. On evaluation in the ED her blood pressure was 100/72, afebrile, saturating on room air. Labs reviewed and evaluated, sodium 133 potassium 3 chloride 92 BUN 7 creatinine 1.40 EtOH level 0.052. Hemoglobin 9.2, WBC 7.8. Magnesium is 0.7. Calcium 6.5  EKG showed prolonged QTC 5.4. NSR, No acute st t wave changes.   Troponin 53>42    Review of Systems:  General ROS: Completed and except as mentioned above were negative   HEENT ROS: Completed and except as mentioned above were negative   Allergy and Immunology ROS:  Completed and except as mentioned above were negative  Hematological and Lymphatic ROS:  Completed and except as mentioned above were negative  Respiratory ROS:  Completed and except as mentioned above were negative  Cardiovascular ROS:  Completed and except as mentioned above were negative  Gastrointestinal ROS: Completed and except as mentioned above were negative  Genito-Urinary ROS:  Completed and except as mentioned above were negative  Musculoskeletal ROS:  Completed and except as mentioned above were negative  Neurological ROS:  Completed and except as mentioned above were negative  Skin & Dermatological ROS:  Completed and except as mentioned above were negative  Psychological ROS:  Completed and except as mentioned above were negative    PAST MEDICAL HISTORY     Past Medical History:   Diagnosis Date    Angioedema 11/17/2017    from lisinopril    Anxiety     Asthma     mild persistent asthma, on home resp medications for control    Bipolar disorder ([facility]     Claustrophobia     Depression     GERD (gastroesophageal reflux disease)     Hypertension     Hypertrophic cardiomyopathy ([facility]     Lung nodules     MRSA (methicillin resistant Staphylococcus aureus)     rt hip    Murmur     see cardiac echo result    OA (osteoarthritis)     JONES (obstructive sleep apnea)     Thyroid nodule     Type 2 diabetes mellitus without complication, without long-term current use of insulin ([facility] 12/7/2018       PAST SURGICAL HISTORY     Past Surgical History:   Procedure Laterality Date    BUNIONECTOMY Bilateral 12/7/2020    [facility]  BUNIONECTOMY, ARTHREX performed by Petr Adan DPM at [facility]      bx    COLONOSCOPY N/A 12/3/2020    COLONOSCOPY WITH BIOPSY performed by Layton Fletcher MD at [address] Bilateral 12/07/2020    [place] bunionectomy, right 2nd hammer toe repair     HAMMER TOE SURGERY Right 12/7/2020    2ND TOE HAMMER REPAIR performed by Petr Adan DPM at [address]    OTHER SURGICAL HISTORY  8/24/2015    MRI under MD Atiya   atorvastatin (LIPITOR) 20 MG tablet TAKE 1 TABLET BY MOUTH DAILY  21   Renee Gil MD   folic acid (FOLVITE) 1 MG tablet Take 1 tablet by mouth daily 21   Oliver Gaona MD   ferrous sulfate (IRON 325) 325 (65 Fe) MG tablet Take 325 mg by mouth daily (with breakfast)    Historical Provider, MD   atenolol (TENORMIN) 50 MG tablet Take 1 tablet by mouth daily 21   Jose Zuniga MD   Calcium Carbonate-Vitamin D (OYSTER SHELL CALCIUM/D) 500-200 MG-UNIT TABS TAKE 1 TAB BY MOUTH ONCE A DAY  21   Jose Zuniga MD   amLODIPine (NORVASC) 10 MG tablet TAKE 1 TABLET BY MOUTH DAILY  21   Jose Zuniga MD   diclofenac sodium (VOLTAREN) 1 % GEL Apply 4 g topically 2 times daily 21   Austin Douglass DPM   albuterol sulfate (PROAIR RESPICLICK) 402 (90 Base) MCG/ACT aerosol powder inhalation Inhale 2 puffs into the lungs every 6 hours as needed for Wheezing or Shortness of Breath 1/15/20   Nessa Mendez MD   lurasidone (LATUDA) 60 MG TABS tablet Take 60 mg by mouth nightly    Historical Provider, MD       SOCIAL HISTORY     Tobacco: Denies smoking. Alcohol: 2 cans of beer per day for the last 30 years. Illicits: Denies illicit drug use. FAMILY HISTORY     Family History   Problem Relation Age of Onset    Stroke Mother     Hypertension Father     Cancer Brother         bone    Lung Cancer Maternal Aunt     Cancer Maternal Grandmother         eye     Other Sister         aneurysm    Heart Disease Sister        PHYSICAL EXAM     Vitals: /81   Pulse 82   Temp 99.1 °F (37.3 °C)   Resp 16   Wt 175 lb (79.4 kg)   SpO2 95%   BMI 29.12 kg/m²   Tmax: Temp (24hrs), Av.1 °F (37.3 °C), Min:99.1 °F (37.3 °C), Max:99.1 °F (37.3 °C)    Last Body weight:   Wt Readings from Last 3 Encounters:   21 175 lb (79.4 kg)   21 174 lb 2.6 oz (79 kg)   21 170 lb 3.2 oz (77.2 kg)     Body Mass Index : Body mass index is 29.12 kg/m².       PHYSICAL EXAMINATION:  Constitutional: This is a well developed, well nourished, 25-29.9 - Overweight 55y.o. year old female who is alert, oriented, cooperative and in no apparent distress. Head:normocephalic and atraumatic. EENT:  PERRLA. No conjunctival injections. Septum was midline, mucosa was without erythema, exudates or cobblestoning. No thrush was noted. Neck: Supple without thyromegaly. No elevated JVP. Trachea was midline. Respiratory: Chest was symmetrical without dullness to percussion. Breath sounds bilaterally were clear to auscultation. There were no wheezes, rhonchi or rales. There is no intercostal retraction or use of accessory muscles. No egophony noted. Cardiovascular: Regular without murmur, clicks, gallops or rubs. Abdomen: Slightly rounded and soft without organomegaly. No rebound, rigidity or guarding was appreciated. Lymphatic: No lymphadenopathy. Musculoskeletal: Normal curvature of the spine. No gross muscle weakness. Extremities:  No lower extremity edema, ulcerations, tenderness, varicosities or erythema. Muscle size, tone and strength are normal.  No involuntary movements are noted. Skin:  Warm and dry. Good color, turgor and pigmentation. No lesions or scars.   No cyanosis or clubbing  Neurological/Psychiatric: The patient's general behavior, level of consciousness, thought content and emotional status is normal.          INVESTIGATIONS     Laboratory Testing:     Recent Results (from the past 24 hour(s))   CBC Auto Differential    Collection Time: 09/29/21 12:57 PM   Result Value Ref Range    WBC 7.8 3.5 - 11.3 k/uL    RBC 2.78 (L) 3.95 - 5.11 m/uL    Hemoglobin 9.2 (L) 11.9 - 15.1 g/dL    Hematocrit 26.9 (L) 36.3 - 47.1 %    MCV 96.8 82.6 - 102.9 fL    MCH 33.1 25.2 - 33.5 pg    MCHC 34.2 28.4 - 34.8 g/dL    RDW 13.9 11.8 - 14.4 %    Platelets 506 949 - 586 k/uL    MPV 9.4 8.1 - 13.5 fL    NRBC Automated 0.0 0.0 per 100 WBC    Differential Type NOT REPORTED Seg Neutrophils 54 36 - 65 %    Lymphocytes 26 24 - 43 %    Monocytes 17 (H) 3 - 12 %    Eosinophils % 1 1 - 4 %    Basophils 1 0 - 2 %    Immature Granulocytes 1 (H) 0 %    Segs Absolute 4.33 1.50 - 8.10 k/uL    Absolute Lymph # 2.00 1. 10 - 3.70 k/uL    Absolute Mono # 1.28 (H) 0.10 - 1.20 k/uL    Absolute Eos # 0.06 0.00 - 0.44 k/uL    Basophils Absolute 0.04 0.00 - 0.20 k/uL    Absolute Immature Granulocyte 0.04 0.00 - 0.30 k/uL    WBC Morphology NOT REPORTED     RBC Morphology NOT REPORTED     Platelet Estimate NOT REPORTED    Basic Metabolic Panel w/ Reflex to MG    Collection Time: 09/29/21 12:57 PM   Result Value Ref Range    Glucose 110 (H) 70 - 99 mg/dL    BUN 7 6 - 20 mg/dL    CREATININE 1.40 (H) 0.50 - 0.90 mg/dL    Bun/Cre Ratio NOT REPORTED 9 - 20    Calcium 6.5 (L) 8.6 - 10.4 mg/dL    Sodium 133 (L) 135 - 144 mmol/L    Potassium 3.0 (L) 3.7 - 5.3 mmol/L    Chloride 92 (L) 98 - 107 mmol/L    CO2 21 20 - 31 mmol/L    Anion Gap 20 (H) 9 - 17 mmol/L    GFR Non-African American 39 (L) >60 mL/min    GFR  47 (L) >60 mL/min    GFR Comment          GFR Staging NOT REPORTED    Troponin    Collection Time: 09/29/21 12:57 PM   Result Value Ref Range    Troponin, High Sensitivity 53 (HH) 0 - 14 ng/L    Troponin T NOT REPORTED <0.03 ng/mL    Troponin Interp NOT REPORTED    Brain Natriuretic Peptide    Collection Time: 09/29/21 12:57 PM   Result Value Ref Range    Pro- (H) <300 pg/mL    BNP Interpretation Pro-BNP Reference Range:    TOX SCR, BLD, ED    Collection Time: 09/29/21 12:57 PM   Result Value Ref Range    Acetaminophen Level <5 (L) 10 - 30 ug/mL    Ethanol 52 (H) <10 mg/dL    Ethanol percent 0.052 (H) <4.702 %    Salicylate Lvl 2 (L) 3 - 10 mg/dL    Toxic Tricyclic Sc,Blood NEGATIVE NEGATIVE   Magnesium    Collection Time: 09/29/21 12:57 PM   Result Value Ref Range    Magnesium 0.7 (LL) 1.6 - 2.6 mg/dL   COVID-19, Rapid    Collection Time: 09/29/21  1:09 PM    Specimen: Nasopharyngeal Swab   Result Value Ref Range    Specimen Description . NASOPHARYNGEAL SWAB     SARS-CoV-2, Rapid Not Detected Not Detected   Troponin    Collection Time: 09/29/21  1:58 PM   Result Value Ref Range    Troponin, High Sensitivity 42 (H) 0 - 14 ng/L    Troponin T NOT REPORTED <0.03 ng/mL    Troponin Interp NOT REPORTED    POTASSIUM    Collection Time: 09/29/21  7:03 PM   Result Value Ref Range    Potassium 3.0 (L) 3.7 - 5.3 mmol/L   MAGNESIUM    Collection Time: 09/29/21  7:03 PM   Result Value Ref Range    Magnesium 1.3 (L) 1.6 - 2.6 mg/dL       Imaging:   XR CHEST PORTABLE    Result Date: 9/29/2021  Low volume lungs with mild streaky bibasilar atelectasis       ASSESSMENT & PLAN     ASSESSMENT / PLAN:     IMPRESSION  This is a 55 y.o. female with a past medical history of hypertension and chronic electrolyte abnormalities admitted with concerns of hypotension and dizziness. Patient was hypotensive per EMS with initial systolic in the 97K. Repeat was in the 90s. She also has significant electrolyte abnormalities, hypokalemia, hypomagnesemia, hypocalcemia. Electrolytes are replaced per ss. Hypotension  Plan:  -IV fluid bolus 1000 mL given. -Continue IV normal saline at the rate of 125 mL/h. Hypokalemia:  -20 mEq of potassium given. -Follow-up on potassium, will repeat BMP on the morning.  -May order urine electrolytes to look for the cause of chronic hypokalemia. Hypomagnesemia:  -2 g of magnesium given. -current Mg level is 0.7  -We will repeat BMP on the morning. Hypocalcemia:  -2000 mg of calcium gluconate given.  -calcium level is 6.5  -Continue to monitor serum calcium, ionized calcium. Dizziness:  -We will order orthostatic vitals.  -Continue IV fluid.  -Encourage p.o. intake. DVT ppx: EPC cuffs  GI ppx: None    PT/OT/SW: Consulted  Discharge Planning: Ongoing    Stephan Mario MD  Internal Medicine Resident, PGY-1  [address] [place], [place]  9/29/2021, 7:47 PM Attending Physician Statement  I have discussed the care of [address] [place] [address], including pertinent history and exam findings,  with the resident. I have seen and examined the patient and the key elements of all parts of the encounter have been performed by me. I agree with the assessment, plan and orders as documented by the resident with additions . Treatment plan Discussed with nursing staff in detail , all questions answered . Electronically signed by Nola Miller MD on   9/30/21 at 12:50 PM EDT    Please note that this chart was generated using voice recognition Dragon dictation software. Although every effort was made to ensure the accuracy of this automated transcription, some errors in transcription may have occurred.

## 2021-09-29 NOTE — ED NOTES
Pt to ED s/p syncopal event. Per patient, she just needed to \"take a nap\". Pt had a loss of consciousness at home on porch. Pt was lowered to ground by . Did not hit head. Pt states she has no pain right now. Pt initially hypotensive for squad in the 76s. Pt was just d/c d/t hypokalemia. Pt placed on full cardiac monitor. EKG obtained. IV established. Dr. Savanah Pacheco at bedside.      Raven Horowitz RN  09/29/21 [id number]

## 2021-09-29 NOTE — ED PROVIDER NOTES
[address] [facility]     Emergency Department     Faculty Note/ Attestation      Pt Name: Mariya Keller                                       MRN: [id number]  [place] 1965  Date of evaluation: 9/29/2021    Patients PCP:    Ariana Cruz MD      Attestation  I performed a history and physical examination of the patient and discussed management with the resident. I reviewed the residents note and agree with the documented findings and plan of care. Any areas of disagreement are noted on the chart. I was personally present for the key portions of any procedures. I have documented in the chart those procedures where I was not present during the key portions. I have reviewed the emergency nurses triage note. I agree with the chief complaint, past medical history, past surgical history, allergies, medications, social and family history as documented unless otherwise noted below. For Physician Assistant/ Nurse Practitioner cases/documentation I have personally evaluated this patient and have completed at least one if not all key elements of the E/M (history, physical exam, and MDM). Additional findings are as noted.       Initial Screens:             Vitals:    Vitals:    09/29/21 1248 09/29/21 1249   BP:  100/72   Pulse: 85    Resp: 20    Temp: 99.1 °F (37.3 °C)    SpO2: 100%    Weight: 175 lb (79.4 kg)        CHIEF COMPLAINT       Chief Complaint   Patient presents with    Loss of Consciousness     Pt states she was just \"trying to take a nap\"             DIAGNOSTIC RESULTS             RADIOLOGY:   XR CHEST PORTABLE    (Results Pending)         LABS:  Labs Reviewed   CBC WITH AUTO DIFFERENTIAL   BASIC METABOLIC PANEL W/ REFLEX TO MG FOR LOW K   TROPONIN   TROPONIN   BRAIN NATRIURETIC PEPTIDE         EMERGENCY DEPARTMENT COURSE:     -------------------------  BP: 100/72, Temp: 99.1 °F (37.3 °C), Pulse: 85, Resp: 20      Comments    Patient has multiple antihypertensives, has had hypotension in the past but still taking her hypertensive medication. She had a loss of consciousness on the front porch today, denies hitting her head.  walked her in the house and EMS was called. Initial blood pressure was 70/50. Blood pressure is now 100/70, patient states she just wants to take a nap. We will get a cardiac and syncope work-up, she has no focal neuro deficits and is at baseline at this time.     Will need to be admitted for hypotension and syncope    1:57 PM EDT  BNP elevated to 400, up from baseline of double digits  Electrolytes borderline low  Will need repeat trop, fluids, E-lyte repletion, and admission    (Please note that portions of this note were completed with a voice recognition program.  Efforts were made to edit the dictations but occasionally words are mis-transcribed.)      Ivana Terrazas MD,, MD  Attending Emergency Physician         Ivana Terrazas MD  09/29/21 Conrado Richards MD  09/29/21 [id number]

## 2021-09-29 NOTE — ED PROVIDER NOTES
[facility] ED  Emergency Department Encounter  Emergency Medicine Resident     Pt Name: Aixa Lee  MRN: [id number]  Mistygfkedar 1965  Date of evaluation: 9/29/21  PCP:  Jay Benjamin MD    [address] [place] [address]       Chief Complaint   Patient presents with    Loss of Consciousness     Pt states she was just \"trying to take a nap\"       HISTORY OFPRESENT ILLNESS  (Location/Symptom, Timing/Onset, Context/Setting, Quality, Duration, Modifying Factors,Severity.)      Aixa Lee is a 55 y. o.yo female who presents with EMS due to concerns of syncope earlier today. Patient is denying any concerns of syncope stating that she just wants to take a nap and her  will not let her. Per EMS report patient was on the porch when she had a near syncopal episode, her  helped her to the ground, she not hit her head she not lose consciousness. Patient has longstanding history of electrolyte abnormalities and had a recent admission in August due to hypokalemia. Patient states she has not eaten in the past 3 days due to lack of appetite. She denies any diarrhea nausea or vomiting. Patient denies any complaints, does states that she is very tired. Patient was hypotensive per EMS with initial systolic in the 97S. Repeat was in the 90s. PAST MEDICAL / SURGICAL / SOCIAL / FAMILY HISTORY      has a past medical history of Angioedema, Anxiety, Asthma, Bipolar disorder ([address] Claustrophobia, Depression, GERD (gastroesophageal reflux disease), Hypertension, Hypertrophic cardiomyopathy ([address] Lung nodules, MRSA (methicillin resistant Staphylococcus aureus), Murmur, OA (osteoarthritis), JONES (obstructive sleep apnea), Thyroid nodule, and Type 2 diabetes mellitus without complication, without long-term current use of insulin ([address] has a past surgical history that includes Hysterectomy; Upper gastrointestinal endoscopy; Colonoscopy;  Thyroid surgery; Cardiac catheterization; other surgical history (2015); Upper gastrointestinal endoscopy (N/A, 12/3/2020); Colonoscopy (N/A, 12/3/2020); Foot surgery (Bilateral, 2020); Hammer toe surgery (Right, 2020); and Bunionectomy (Bilateral, 2020). Social History     Socioeconomic History    Marital status: Single     Spouse name: Not on file    Number of children: Not on file    Years of education: Not on file    Highest education level: Not on file   Occupational History    Not on file   Tobacco Use    Smoking status: Former Smoker     Packs/day: 0.50     Years: 20.00     Pack years: 10.00     Types: Cigarettes     Quit date: 1992     Years since quittin.7    Smokeless tobacco: Never Used    Tobacco comment: states quiti in the 1980s   Substance and Sexual Activity    Alcohol use: Not Currently     Alcohol/week: 12.0 standard drinks     Types: 12 Cans of beer per week     Comment: Quit about 5 months ago 20    Drug use: Not Currently     Types: Cocaine     Comment: last use approximately 14 cocaine    Sexual activity: Yes     Partners: Male   Other Topics Concern    Not on file   Social History Narrative    Not on file     Social Determinants of Health     Financial Resource Strain: Low Risk     Difficulty of Paying Living Expenses: Not hard at all   Food Insecurity: No Food Insecurity    Worried About Running Out of Food in the Last Year: Never true    [place] of Food in the Last Year: Never true   Transportation Needs: Unmet Transportation Needs    Lack of Transportation (Medical):  Yes    Lack of Transportation (Non-Medical): Yes   Physical Activity:     Days of Exercise per Week:     Minutes of Exercise per Session:    Stress:     Feeling of Stress :    Social Connections:     Frequency of Communication with Friends and Family:     Frequency of Social Gatherings with Friends and Family:     Attends [employer] Services:     Active Member of Clubs or Organizations:     Attends Club or Organization Meetings:     Marital Status:    Intimate Partner Violence:     Fear of Current or Ex-Partner:     Emotionally Abused:     Physically Abused:     Sexually Abused:        Family History   Problem Relation Age of Onset    Stroke Mother     Hypertension Father     Cancer Brother         bone    Lung Cancer Maternal Aunt     Cancer Maternal Grandmother         eye     Other Sister         aneurysm    Heart Disease Sister         Allergies:  Bee venom, Iodine, Lisinopril, and Shellfish-derived products    Home Medications:  Prior to Admission medications    Medication Sig Start Date End Date Taking? Authorizing Provider   meloxicam (MOBIC) 15 MG tablet TAKE 1 TABLET BY MOUTH DAILY  9/23/21   Catherine Linares MD   hydroxychloroquine (PLAQUENIL) 200 MG tablet TAKE 1 TABLET BY MOUTH 2 TIMES DAILY  9/23/21   Catherine Linares MD   omeprazole (PRILOSEC) 20 MG delayed release capsule TAKE 1 CAPSULE BY MOUTH DAILY  9/23/21   Catherine Linares MD   atorvastatin (LIPITOR) 20 MG tablet TAKE 1 TABLET BY MOUTH DAILY  9/23/21   Catherine Linares MD   Calcium Carbonate-Vitamin D (OYSTER SHELL CALCIUM/D) 500-200 MG-UNIT TABS TAKE 1 TAB BY MOUTH ONCE A DAY  9/23/21   Catherine Linares MD   potassium chloride (KLOR-CON M) 20 MEQ extended release tablet Take 2 tablets by mouth 2 times daily After BMP two times, first after 1 week and if potassium is low then continue taking same dose with repeat BMP in a week. and if potassium is normal then take once daily. Do not crush, chew, or suck on tablet.  8/21/21   Viet Kunz MD   Multiple Vitamin (MULTIVITAMIN) TABS tablet Take 1 tablet by mouth daily 8/22/21   Viet Kunz MD   loratadine (CLARITIN) 10 MG tablet TAKE 1 tab BY MOUTH DAILY  8/18/21   CECILIA Cheek - CNP   vitamin D3 (CHOLECALCIFEROL) 25 MCG (1000 UT) TABS tablet TAKE 1 TABLET BY MOUTH DAILY  7/16/21   Catherine Linraes MD   thiamine mononitrate 100 MG tablet TAKE 1 TABLET BY MOUTH ONCE DAILY  7/14/21   Catherine Linares MD FLUoxetine (PROZAC) 40 MG capsule Take 40 mg by mouth daily    Historical Provider, MD   acetaminophen (APAP EXTRA STRENGTH) 500 MG tablet Take 2 tablets by mouth every 6 hours as needed for Pain 6/11/21   Mathieu Elliott MD   atorvastatin (LIPITOR) 20 MG tablet TAKE 1 TABLET BY MOUTH DAILY  5/12/21   Raven Cohen MD   folic acid (FOLVITE) 1 MG tablet Take 1 tablet by mouth daily 4/27/21   Mathieu Elliott MD   ferrous sulfate (IRON 325) 325 (65 Fe) MG tablet Take 325 mg by mouth daily (with breakfast)    Historical Provider, MD   atenolol (TENORMIN) 50 MG tablet Take 1 tablet by mouth daily 1/22/21   Jose Zuniga MD   Calcium Carbonate-Vitamin D (OYSTER SHELL CALCIUM/D) 500-200 MG-UNIT TABS TAKE 1 TAB BY MOUTH ONCE A DAY  1/22/21   Jose Zuniga MD   amLODIPine (NORVASC) 10 MG tablet TAKE 1 TABLET BY MOUTH DAILY  1/8/21   Raven Cohen MD   diclofenac sodium (VOLTAREN) 1 % GEL Apply 4 g topically 2 times daily 1/6/21   Erika Tubbs DPM   albuterol sulfate (PROAIR RESPICLICK) 920 (90 Base) MCG/ACT aerosol powder inhalation Inhale 2 puffs into the lungs every 6 hours as needed for Wheezing or Shortness of Breath 1/15/20   Cornell Carmona MD   lurasidone (LATUDA) 60 MG TABS tablet Take 60 mg by mouth nightly    Historical Provider, MD       REVIEW OFSYSTEMS    (2-9 systems for level 4, 10 or more for level 5)      Review of Systems   Constitutional: Negative for chills, diaphoresis, fatigue and fever. HENT: Negative for congestion, sinus pressure, sinus pain, sore throat and trouble swallowing. Respiratory: Negative for cough, chest tightness and shortness of breath. Cardiovascular: Negative for chest pain, palpitations and leg swelling. Gastrointestinal: Negative for abdominal pain, constipation, diarrhea, nausea and vomiting. Genitourinary: Negative for difficulty urinating, dysuria, flank pain and urgency. Musculoskeletal: Negative for back pain, neck pain and neck stiffness.    Skin: Negative for color change, pallor and rash. Neurological: Positive for syncope, weakness and light-headedness. Negative for dizziness, tremors, speech difficulty and headaches. PHYSICAL EXAM   (up to 7 for level 4, 8 or more forlevel 5)      INITIAL VITALS:   ED Triage Vitals   BP Temp Temp src Pulse Resp SpO2 Height Weight   09/29/21 1249 09/29/21 1248 -- 09/29/21 1248 09/29/21 1248 09/29/21 1248 -- 09/29/21 1248   100/72 99.1 °F (37.3 °C)  85 20 100 %  175 lb (79.4 kg)       Physical Exam  Constitutional:       General: She is not in acute distress. Appearance: She is obese. Comments: Drowsy but does open eyes to verbal command, dry mucous membranes   HENT:      Head: Normocephalic and atraumatic. Right Ear: External ear normal.      Left Ear: External ear normal.      Nose: Nose normal. No rhinorrhea. Eyes:      Extraocular Movements: Extraocular movements intact. Pupils: Pupils are equal, round, and reactive to light. Cardiovascular:      Rate and Rhythm: Normal rate and regular rhythm. Pulses: Normal pulses. Heart sounds: No murmur heard. Pulmonary:      Effort: Pulmonary effort is normal. No respiratory distress. Breath sounds: Normal breath sounds. No wheezing. Abdominal:      General: There is no distension. Palpations: Abdomen is soft. Tenderness: There is no abdominal tenderness. Musculoskeletal:         General: No swelling. Normal range of motion. Cervical back: Normal range of motion and neck supple. No rigidity. Skin:     General: Skin is warm and dry. Neurological:      General: No focal deficit present. Mental Status: She is alert and oriented to person, place, and time.          DIFFERENTIAL  DIAGNOSIS     PLAN (LABS / IMAGING / EKG):  Orders Placed This Encounter   Procedures    COVID-19, Rapid    XR CHEST PORTABLE    CBC Auto Differential    Basic Metabolic Panel w/ Reflex to MG    Troponin    Brain Natriuretic Peptide    TOX SCR, BLD, ED    Magnesium    Inpatient consult to Internal Medicine    EKG 12 Lead    PATIENT STATUS (FROM ED OR OR/PROCEDURAL) Inpatient       MEDICATIONS ORDERED:  Orders Placed This Encounter   Medications    0.9 % sodium chloride bolus    potassium chloride 10 mEq/100 mL IVPB (Peripheral Line)    DISCONTD: calcium gluconate 2,000 mg in dextrose 5 % 100 mL IVPB    calcium gluconate 2000 mg in sodium chloride 100 mL    magnesium sulfate 2000 mg in 50 mL IVPB premix       DDX: Polypharmacy, electrolyte abnormality, dehydration, accidental ingestion of home medications, ACS, atypical chest pain, NSTEMI    Initial MDM/Plan: 55 y.o. female who presents with complaints of syncope. Patient is denying any concerns  called EMS after she had a near syncopal episode on the porch, did not strike her head not lose consciousness. Patient has longstanding history of hypokalemia, she is also hypotensive on arrival.  Patient does state that she has been taking her antihypertensives despite having low blood pressure. Patient denies eating for past 3 days.   Will obtain cardiac work-up patient will likely need admission to the hospital    DIAGNOSTIC RESULTS / EMERGENCYDEPARTMENT COURSE / MDM     LABS:  Labs Reviewed   CBC WITH AUTO DIFFERENTIAL - Abnormal; Notable for the following components:       Result Value    RBC 2.78 (*)     Hemoglobin 9.2 (*)     Hematocrit 26.9 (*)     Monocytes 17 (*)     Immature Granulocytes 1 (*)     Absolute Mono # 1.28 (*)     All other components within normal limits   BASIC METABOLIC PANEL W/ REFLEX TO MG FOR LOW K - Abnormal; Notable for the following components:    Glucose 110 (*)     CREATININE 1.40 (*)     Calcium 6.5 (*)     Sodium 133 (*)     Potassium 3.0 (*)     Chloride 92 (*)     Anion Gap 20 (*)     GFR Non- 39 (*)     GFR  47 (*)     All other components within normal limits   TROPONIN - Abnormal; Notable for the following components:    Troponin, High Sensitivity 53 (*)     All other components within normal limits   TROPONIN - Abnormal; Notable for the following components:    Troponin, High Sensitivity 42 (*)     All other components within normal limits   BRAIN NATRIURETIC PEPTIDE - Abnormal; Notable for the following components:    Pro- (*)     All other components within normal limits   TOX SCR, BLD, ED - Abnormal; Notable for the following components:    Acetaminophen Level <5 (*)     Ethanol 52 (*)     Ethanol percent 0.411 (*)     Salicylate Lvl 2 (*)     All other components within normal limits   MAGNESIUM - Abnormal; Notable for the following components:    Magnesium 0.7 (*)     All other components within normal limits   COVID-19, RAPID         RADIOLOGY:  XR CHEST PORTABLE    Result Date: 9/29/2021  EXAMINATION: ONE XRAY VIEW OF THE CHEST 9/29/2021 12:54 pm COMPARISON: August 16, 2021 chest exam HISTORY: ORDERING SYSTEM PROVIDED HISTORY: cp TECHNOLOGIST PROVIDED HISTORY: cp Reason for Exam: upr Acuity: Unknown Type of Exam: Unknown FINDINGS: Stable borderline cardiomegaly/moderate tortuosity of the thoracic aorta Low volume lungs with mild streaky bibasilar density. Otherwise clear lungs. No significant pleural process Degenerative changes of the thoracic spine/shoulders     Low volume lungs with mild streaky bibasilar atelectasis         EKG      All EKG's are interpreted by the Emergency Department Physicianwho either signs or Co-signs this chart in the absence of a cardiologist.    EMERGENCY DEPARTMENT COURSE:  ED Course as of Sep 29 1532   Wed Sep 29, 2021   1308 Patient seen immediately on arrival, she is drowsy but able to answer questions appropriately. States she has a history of hypertension but still continues to take her antihypertensives every morning.    [CD]   1312 Per chart review patient has history of chronic alcohol abuse with complications. Will add on any tox.     [CD]   6918 Significantly lower than last lab value which was 149       Troponin, High Sensitivity(!!): 53 [CD]   1331 Chest x-ray unremarkable aside from mild atelectasis. [CD]   1332 Pro-BNP(!): 457 [CD]   1340 SARS-CoV-2, Rapid: Not Detected [CD]   1404 Creatinine(!): 1.40 [CD]   1404 Potassium(!): 3.0 [CD]   1406 Patient is hypokalemic, hyponatremic also has low calcium and an ROXANN.    [CD]   1429 Magnesium(!!): 0.7 [CD]   1429 Ethanol(!): 52 [CD]   1429 Will consult medicine for step down admission     [CD]   1456 Troponin, High Sensitivity(!): 42 [CD]   1502 Patient to be admitted to internal medicine.    [CD]      ED Course User Index  [CD] Ry Juarez DO          PROCEDURES:  None    CONSULTS:  IP CONSULT TO INTERNAL MEDICINE  IP CONSULT TO CASE MANAGEMENT    CRITICAL CARE:  Please see attending documentation    FINAL IMPRESSION      1. ROXANN (acute kidney injury) ([facility] [facility]    2. Hypocalcemia          DISPOSITION / PLAN     DISPOSITION Admitted 09/29/2021 02:36:55 PM      PATIENT REFERRED TO:  No follow-up provider specified.     DISCHARGE MEDICATIONS:  New Prescriptions    No medications on file       Ry Juarez DO  Emergency Medicine Resident    (Please note that portions of this note were completed with a voice recognition program.Efforts were made to edit the dictations but occasionally words are mis-transcribed.)        Ry Juarez DO  Resident  09/29/21 2286

## 2021-09-29 NOTE — ED NOTES
Bed: 25  Expected date:   Expected time:   Means of arrival:   Comments:  KELLI 613 Lanie Villagran RN  09/29/21 1245

## 2021-09-29 NOTE — ED PROVIDER NOTES
[facility] [facility]  Emergency Department  Faculty Sign-Out Addendum     Care of Mariya Keller was assumed from previous attending and is being seen for Loss of Consciousness (Pt states she was just \"trying to take a nap\")  . The patient's initial evaluation and plan have been discussed with the prior provider who initially evaluated the patient. Handoff taken on this patient patient from prior Attending Physician: Dr. Godfrey Talbert    I was available and discussed any additional care issues that arose and coordinated the management plans with the resident(s) caring for the patient during my duty period. Any areas of disagreement with residents documentation of care or procedures are noted on the chart. I was personally present for the key portions of any/all procedures performed during my duty period.     EMERGENCY DEPARTMENT COURSE / MEDICAL DECISION MAKING:       MEDICATIONS GIVEN:  Orders Placed This Encounter   Medications    0.9 % sodium chloride bolus    potassium chloride 10 mEq/100 mL IVPB (Peripheral Line)    DISCONTD: calcium gluconate 2,000 mg in dextrose 5 % 100 mL IVPB    calcium gluconate 2000 mg in sodium chloride 100 mL    magnesium sulfate 2000 mg in 50 mL IVPB premix       LABS / RADIOLOGY:     Labs Reviewed   CBC WITH AUTO DIFFERENTIAL - Abnormal; Notable for the following components:       Result Value    RBC 2.78 (*)     Hemoglobin 9.2 (*)     Hematocrit 26.9 (*)     Monocytes 17 (*)     Immature Granulocytes 1 (*)     Absolute Mono # 1.28 (*)     All other components within normal limits   BASIC METABOLIC PANEL W/ REFLEX TO MG FOR LOW K - Abnormal; Notable for the following components:    Glucose 110 (*)     CREATININE 1.40 (*)     Calcium 6.5 (*)     Sodium 133 (*)     Potassium 3.0 (*)     Chloride 92 (*)     Anion Gap 20 (*)     GFR Non- 39 (*)     GFR  47 (*)     All other components within normal limits   TROPONIN - Abnormal; Notable for the following components:    Troponin, High Sensitivity 53 (*)     All other components within normal limits   TROPONIN - Abnormal; Notable for the following components:    Troponin, High Sensitivity 42 (*)     All other components within normal limits   BRAIN NATRIURETIC PEPTIDE - Abnormal; Notable for the following components:    Pro- (*)     All other components within normal limits   TOX SCR, BLD, ED - Abnormal; Notable for the following components:    Acetaminophen Level <5 (*)     Ethanol 52 (*)     Ethanol percent 7.881 (*)     Salicylate Lvl 2 (*)     All other components within normal limits   MAGNESIUM - Abnormal; Notable for the following components:    Magnesium 0.7 (*)     All other components within normal limits   COVID-19, RAPID       XR CHEST PORTABLE    Result Date: 9/29/2021  EXAMINATION: ONE XRAY VIEW OF THE CHEST 9/29/2021 12:54 pm COMPARISON: August 16, 2021 chest exam HISTORY: ORDERING SYSTEM PROVIDED HISTORY: cp TECHNOLOGIST PROVIDED HISTORY: cp Reason for Exam: upr Acuity: Unknown Type of Exam: Unknown FINDINGS: Stable borderline cardiomegaly/moderate tortuosity of the thoracic aorta Low volume lungs with mild streaky bibasilar density. Otherwise clear lungs. No significant pleural process Degenerative changes of the thoracic spine/shoulders     Low volume lungs with mild streaky bibasilar atelectasis       RECENT VITALS:     Temp: 99.1 °F (37.3 °C),  Pulse: 78, Resp: 17, BP: 121/80, SpO2: 100 %    This patient is a 55 y.o. Female with a syncopal event. Neuro intact here with stable vitals. Labs and imaging reviewed. Patient admitted. OUTSTANDING TASKS / RECOMMENDATIONS:    1.  Awaiting bed placement    Minus DO Janine  Attending Emergency Physician  [facility] ED        [address] Fernandez DO  09/29/21 2227

## 2021-09-30 LAB
ABSOLUTE EOS #: 0.06 K/UL (ref 0–0.44)
ABSOLUTE IMMATURE GRANULOCYTE: <0.03 K/UL (ref 0–0.3)
ABSOLUTE LYMPH #: 1.52 K/UL (ref 1.1–3.7)
ABSOLUTE MONO #: 1.25 K/UL (ref 0.1–1.2)
ANION GAP SERPL CALCULATED.3IONS-SCNC: 13 MMOL/L (ref 9–17)
BASOPHILS # BLD: 1 % (ref 0–2)
BASOPHILS ABSOLUTE: 0.04 K/UL (ref 0–0.2)
BUN BLDV-MCNC: 6 MG/DL (ref 6–20)
BUN/CREAT BLD: ABNORMAL (ref 9–20)
CALCIUM IONIZED: 0.86 MMOL/L (ref 1.13–1.33)
CALCIUM SERPL-MCNC: 6.3 MG/DL (ref 8.6–10.4)
CHLORIDE BLD-SCNC: 103 MMOL/L (ref 98–107)
CO2: 21 MMOL/L (ref 20–31)
CREAT SERPL-MCNC: 0.99 MG/DL (ref 0.5–0.9)
DIFFERENTIAL TYPE: ABNORMAL
EKG ATRIAL RATE: 82 BPM
EKG P AXIS: 19 DEGREES
EKG P-R INTERVAL: 180 MS
EKG Q-T INTERVAL: 440 MS
EKG QRS DURATION: 92 MS
EKG QTC CALCULATION (BAZETT): 514 MS
EKG R AXIS: -11 DEGREES
EKG T AXIS: 143 DEGREES
EKG VENTRICULAR RATE: 82 BPM
EOSINOPHILS RELATIVE PERCENT: 1 % (ref 1–4)
GFR AFRICAN AMERICAN: >60 ML/MIN
GFR NON-AFRICAN AMERICAN: 58 ML/MIN
GFR SERPL CREATININE-BSD FRML MDRD: ABNORMAL ML/MIN/{1.73_M2}
GFR SERPL CREATININE-BSD FRML MDRD: ABNORMAL ML/MIN/{1.73_M2}
GLUCOSE BLD-MCNC: 104 MG/DL (ref 70–99)
HCT VFR BLD CALC: 22.2 % (ref 36.3–47.1)
HEMOGLOBIN: 7.6 G/DL (ref 11.9–15.1)
IMMATURE GRANULOCYTES: 0 %
LYMPHOCYTES # BLD: 24 % (ref 24–43)
MAGNESIUM: 1.1 MG/DL (ref 1.6–2.6)
MAGNESIUM: 1.1 MG/DL (ref 1.6–2.6)
MAGNESIUM: 1.6 MG/DL (ref 1.6–2.6)
MAGNESIUM: 1.8 MG/DL (ref 1.6–2.6)
MAGNESIUM: 2.1 MG/DL (ref 1.6–2.6)
MAGNESIUM: 2.4 MG/DL (ref 1.6–2.6)
MCH RBC QN AUTO: 32.3 PG (ref 25.2–33.5)
MCHC RBC AUTO-ENTMCNC: 34.2 G/DL (ref 28.4–34.8)
MCV RBC AUTO: 94.5 FL (ref 82.6–102.9)
MONOCYTES # BLD: 20 % (ref 3–12)
NRBC AUTOMATED: 0 PER 100 WBC
PDW BLD-RTO: 14 % (ref 11.8–14.4)
PLATELET # BLD: 254 K/UL (ref 138–453)
PLATELET ESTIMATE: ABNORMAL
PMV BLD AUTO: 9.1 FL (ref 8.1–13.5)
POTASSIUM SERPL-SCNC: 2.9 MMOL/L (ref 3.7–5.3)
POTASSIUM SERPL-SCNC: 2.9 MMOL/L (ref 3.7–5.3)
POTASSIUM SERPL-SCNC: 3 MMOL/L (ref 3.7–5.3)
POTASSIUM SERPL-SCNC: 3 MMOL/L (ref 3.7–5.3)
POTASSIUM SERPL-SCNC: 3.3 MMOL/L (ref 3.7–5.3)
POTASSIUM SERPL-SCNC: 3.4 MMOL/L (ref 3.7–5.3)
RBC # BLD: 2.35 M/UL (ref 3.95–5.11)
RBC # BLD: ABNORMAL 10*6/UL
SEG NEUTROPHILS: 54 % (ref 36–65)
SEGMENTED NEUTROPHILS ABSOLUTE COUNT: 3.34 K/UL (ref 1.5–8.1)
SODIUM BLD-SCNC: 137 MMOL/L (ref 135–144)
WBC # BLD: 6.2 K/UL (ref 3.5–11.3)
WBC # BLD: ABNORMAL 10*3/UL

## 2021-09-30 PROCEDURE — 84132 ASSAY OF SERUM POTASSIUM: CPT

## 2021-09-30 PROCEDURE — 6360000002 HC RX W HCPCS: Performed by: STUDENT IN AN ORGANIZED HEALTH CARE EDUCATION/TRAINING PROGRAM

## 2021-09-30 PROCEDURE — 36415 COLL VENOUS BLD VENIPUNCTURE: CPT

## 2021-09-30 PROCEDURE — 6370000000 HC RX 637 (ALT 250 FOR IP): Performed by: STUDENT IN AN ORGANIZED HEALTH CARE EDUCATION/TRAINING PROGRAM

## 2021-09-30 PROCEDURE — 2580000003 HC RX 258: Performed by: STUDENT IN AN ORGANIZED HEALTH CARE EDUCATION/TRAINING PROGRAM

## 2021-09-30 PROCEDURE — 2060000000 HC ICU INTERMEDIATE R&B

## 2021-09-30 PROCEDURE — 93010 ELECTROCARDIOGRAM REPORT: CPT | Performed by: INTERNAL MEDICINE

## 2021-09-30 PROCEDURE — 99232 SBSQ HOSP IP/OBS MODERATE 35: CPT | Performed by: INTERNAL MEDICINE

## 2021-09-30 PROCEDURE — 83735 ASSAY OF MAGNESIUM: CPT

## 2021-09-30 PROCEDURE — 93005 ELECTROCARDIOGRAM TRACING: CPT

## 2021-09-30 PROCEDURE — 85025 COMPLETE CBC W/AUTO DIFF WBC: CPT

## 2021-09-30 PROCEDURE — 80048 BASIC METABOLIC PNL TOTAL CA: CPT

## 2021-09-30 PROCEDURE — 82330 ASSAY OF CALCIUM: CPT

## 2021-09-30 RX ORDER — SODIUM CHLORIDE 0.9 % (FLUSH) 0.9 %
5-40 SYRINGE (ML) INJECTION PRN
Status: DISCONTINUED | OUTPATIENT
Start: 2021-09-30 | End: 2021-10-03 | Stop reason: HOSPADM

## 2021-09-30 RX ORDER — POTASSIUM CHLORIDE 20 MEQ/1
40 TABLET, EXTENDED RELEASE ORAL ONCE
Status: DISCONTINUED | OUTPATIENT
Start: 2021-09-30 | End: 2021-09-30

## 2021-09-30 RX ORDER — MAGNESIUM SULFATE IN WATER 40 MG/ML
2000 INJECTION, SOLUTION INTRAVENOUS ONCE
Status: COMPLETED | OUTPATIENT
Start: 2021-09-30 | End: 2021-09-30

## 2021-09-30 RX ORDER — MELOXICAM 7.5 MG/1
15 TABLET ORAL DAILY
Status: DISCONTINUED | OUTPATIENT
Start: 2021-09-30 | End: 2021-10-03 | Stop reason: HOSPADM

## 2021-09-30 RX ORDER — SODIUM CHLORIDE 0.9 % (FLUSH) 0.9 %
5-40 SYRINGE (ML) INJECTION EVERY 12 HOURS SCHEDULED
Status: DISCONTINUED | OUTPATIENT
Start: 2021-09-30 | End: 2021-10-03 | Stop reason: HOSPADM

## 2021-09-30 RX ORDER — ALBUTEROL SULFATE 2.5 MG/3ML
2.5 SOLUTION RESPIRATORY (INHALATION) EVERY 6 HOURS PRN
Status: DISCONTINUED | OUTPATIENT
Start: 2021-09-30 | End: 2021-10-03 | Stop reason: HOSPADM

## 2021-09-30 RX ADMIN — POTASSIUM BICARBONATE 40 MEQ: 391 TABLET, EFFERVESCENT ORAL at 15:59

## 2021-09-30 RX ADMIN — ACETAMINOPHEN 650 MG: 325 TABLET ORAL at 16:50

## 2021-09-30 RX ADMIN — MELOXICAM 15 MG: 7.5 TABLET ORAL at 16:49

## 2021-09-30 RX ADMIN — SODIUM CHLORIDE, PRESERVATIVE FREE 10 ML: 5 INJECTION INTRAVENOUS at 11:24

## 2021-09-30 RX ADMIN — POTASSIUM CHLORIDE 10 MEQ: 7.46 INJECTION, SOLUTION INTRAVENOUS at 00:02

## 2021-09-30 RX ADMIN — POTASSIUM CHLORIDE 10 MEQ: 7.46 INJECTION, SOLUTION INTRAVENOUS at 21:48

## 2021-09-30 RX ADMIN — MAGNESIUM SULFATE HEPTAHYDRATE 2000 MG: 40 INJECTION, SOLUTION INTRAVENOUS at 16:58

## 2021-09-30 RX ADMIN — MAGNESIUM SULFATE 2000 MG: 2 INJECTION INTRAVENOUS at 07:41

## 2021-09-30 RX ADMIN — SODIUM CHLORIDE: 9 INJECTION, SOLUTION INTRAVENOUS at 12:49

## 2021-09-30 RX ADMIN — HEPARIN SODIUM 5000 UNITS: 5000 INJECTION INTRAVENOUS; SUBCUTANEOUS at 21:47

## 2021-09-30 RX ADMIN — SODIUM CHLORIDE, PRESERVATIVE FREE 10 ML: 5 INJECTION INTRAVENOUS at 21:48

## 2021-09-30 RX ADMIN — HEPARIN SODIUM 5000 UNITS: 5000 INJECTION INTRAVENOUS; SUBCUTANEOUS at 15:59

## 2021-09-30 RX ADMIN — POTASSIUM CHLORIDE: 2 INJECTION, SOLUTION, CONCENTRATE INTRAVENOUS at 01:23

## 2021-09-30 ASSESSMENT — PAIN DESCRIPTION - FREQUENCY: FREQUENCY: CONTINUOUS

## 2021-09-30 ASSESSMENT — PAIN SCALES - GENERAL
PAINLEVEL_OUTOF10: 10
PAINLEVEL_OUTOF10: 9
PAINLEVEL_OUTOF10: 10
PAINLEVEL_OUTOF10: 0

## 2021-09-30 ASSESSMENT — PAIN DESCRIPTION - PAIN TYPE: TYPE: CHRONIC PAIN

## 2021-09-30 ASSESSMENT — PAIN DESCRIPTION - ONSET: ONSET: ON-GOING

## 2021-09-30 ASSESSMENT — PAIN DESCRIPTION - ORIENTATION: ORIENTATION: LEFT;POSTERIOR

## 2021-09-30 ASSESSMENT — PAIN DESCRIPTION - PROGRESSION: CLINICAL_PROGRESSION: NOT CHANGED

## 2021-09-30 ASSESSMENT — PAIN DESCRIPTION - LOCATION: LOCATION: RIB CAGE

## 2021-09-30 ASSESSMENT — PAIN DESCRIPTION - DESCRIPTORS: DESCRIPTORS: ACHING;DISCOMFORT

## 2021-09-30 NOTE — PROGRESS NOTES
[facility]  Internal Medicine Teaching Residency Program  Inpatient Daily Progress Note  ______________________________________________________________________________    Patient: Shakir Herrera  YOB: 1965   RMN:[id number]    Acct: [de-identified]     Room: [address] [place] [address]  Admit date: 9/29/2021  Today's date: 09/30/21  Number of days in the hospital: 1    SUBJECTIVE   Admitting Diagnosis: Hypotension  CC: Hypotension, dizziness. Patient examined at bedside. No acute event in the last 24 hours. Denies any active symptoms. Improved significantly anton admission. On evaluation her blood pressure normalized, 1156/93 93 bpm, spo2 98%  Labs reviewed and evaluated: Creatinine back to normal, from 1.40 to 0.90.  k is still low 3.0, Mg 1.8    ROS:  Constitutional:  negative for chills, fevers, sweats  Respiratory:  negative for cough, dyspnea on exertion, hemoptysis, shortness of breath, wheezing  Cardiovascular:  negative for chest pain, chest pressure/discomfort, lower extremity edema, palpitations  Gastrointestinal:  negative for abdominal pain, constipation, diarrhea, nausea, vomiting  Neurological:  negative for dizziness, headache  BRIEF HISTORY     The patient is a pleasant 55 y.o. female with a past medical history of hypertension, chronic alcoholism admitted with hypotension. Patient states that she was feeling dizzy but did not lose consciousness. she has longstanding history of electrolyte abnormalities and had a recent admission in August due to hypokalemia.  Patient states she has not eaten in the past 3 days due to lack of appetite. She also reports to have nonbloody diarrhea and has been taking Imodium since her previous admission. She is having 4-6 episodes of loose stool and is taking Imodium which helped decrease the number of bowel movements. She denies any  nausea or vomiting.  Patient denies any complaints, does states that she is very tired.  Patient was hypotensive per EMS with initial systolic in the 23N.  Repeat was in the 90s. On evaluation in the ED her blood pressure was 100/72, afebrile, saturating on room air. Labs reviewed and evaluated, sodium 133 potassium 3 chloride 92 BUN 7 creatinine 1.40 EtOH level 0.052. Hemoglobin 9.2, WBC 7.8. Magnesium is 0.7. Calcium 6.5  EKG showed prolonged QTC 5.4. NSR, No acute st t wave changes. Troponin 53>42    OBJECTIVE     Vital Signs:  BP (!) 115/93   Pulse 93   Temp 98.8 °F (37.1 °C) (Oral)   Resp 20   Ht 5' 5\" (1.651 m)   Wt 164 lb (74.4 kg)   SpO2 98%   BMI 27.29 kg/m²     Temp (24hrs), Av.8 °F (37.1 °C), Min:98.8 °F (37.1 °C), Max:98.8 °F (37.1 °C)    No intake/output data recorded. Physical Exam:  Constitutional: This is a well developed, well nourished, 25-29.9 - Overweight 55y.o. year old female who is alert, oriented, cooperative and in no apparent distress. Head:normocephalic and atraumatic. EENT:  PERRLA. No conjunctival injections. Septum was midline, mucosa was without erythema, exudates or cobblestoning. No thrush was noted. Neck: Supple without thyromegaly. No elevated JVP. Trachea was midline. Respiratory: Chest was symmetrical without dullness to percussion. Breath sounds bilaterally were clear to auscultation. There were no wheezes, rhonchi or rales. There is no intercostal retraction or use of accessory muscles. No egophony noted. Cardiovascular: Regular without murmur, clicks, gallops or rubs. Abdomen: Slightly rounded and soft without organomegaly. No rebound, rigidity or guarding was appreciated. Lymphatic: No lymphadenopathy. Musculoskeletal: Normal curvature of the spine. No gross muscle weakness. Extremities:  No lower extremity edema, ulcerations, tenderness, varicosities or erythema. Muscle size, tone and strength are normal.  No involuntary movements are noted. Skin:  Warm and dry. Good color, turgor and pigmentation.  No lesions or scars. No cyanosis or clubbing  Neurological/Psychiatric: The patient's general behavior, level of consciousness, thought content and emotional status is normal.        Medications:  Scheduled Medications:    sodium chloride flush  5-40 mL IntraVENous 2 times per day    potassium bicarb-citric acid  40 mEq Oral Daily    heparin (porcine)  5,000 Units SubCUTAneous 3 times per day    folic acid  1 mg Oral Daily    thiamine  50 mg Oral Daily    multivitamin  1 tablet Oral Daily     Continuous Infusions:    sodium chloride      sodium chloride 175 mL/hr at 09/30/21 1249     PRN Medicationssodium chloride flush, 5-40 mL, PRN  potassium bicarb-citric acid, 40 mEq, PRN  sodium chloride, 25 mL, PRN  ondansetron, 4 mg, Q8H PRN   Or  ondansetron, 4 mg, Q6H PRN  polyethylene glycol, 17 g, Daily PRN  acetaminophen, 650 mg, Q6H PRN   Or  acetaminophen, 650 mg, Q6H PRN  potassium chloride, 40 mEq, PRN   Or  potassium alternative oral replacement, 40 mEq, PRN   Or  potassium chloride, 10 mEq, PRN  potassium chloride, 10 mEq, PRN  magnesium sulfate, 2,000 mg, PRN        Diagnostic Labs:  CBC:   Recent Labs     09/29/21  1257 09/30/21  0617   WBC 7.8 6.2   RBC 2.78* 2.35*   HGB 9.2* 7.6*   HCT 26.9* 22.2*   MCV 96.8 94.5   RDW 13.9 14.0    254     BMP:   Recent Labs     09/29/21  1257 09/29/21  1903 09/30/21  0317 09/30/21  0617 09/30/21  1133   *  --   --  137  --    K 3.0*   < > 2.9* 3.4* 3.0*   CL 92*  --   --  103  --    CO2 21  --   --  21  --    BUN 7  --   --  6  --    CREATININE 1.40*  --   --  0.99*  --     < > = values in this interval not displayed. BNP: No results for input(s): BNP in the last 72 hours. PT/INR: No results for input(s): PROTIME, INR in the last 72 hours. APTT: No results for input(s): APTT in the last 72 hours. CARDIAC ENZYMES: No results for input(s): CKMB, CKMBINDEX, TROPONINI in the last 72 hours.     Invalid input(s): CKTOTAL;3  FASTING LIPID PANEL:  Lab Results   Component Value Date    CHOL 248 (H) 03/21/2021     03/21/2021    TRIG 47 03/21/2021     LIVER PROFILE: No results for input(s): AST, ALT, ALB, BILIDIR, BILITOT, ALKPHOS in the last 72 hours. MICROBIOLOGY:   Lab Results   Component Value Date/Time    CULTURE NO GROWTH 16 HOURS 09/29/2021 06:55 PM       Imaging:    XR CHEST PORTABLE    Result Date: 9/29/2021  Low volume lungs with mild streaky bibasilar atelectasis       ASSESSMENT & PLAN     IMPRESSION  This is a 55 y.o. female with a past medical history of hypertension and chronic electrolyte abnormalities admitted with concerns of hypotension and dizziness. Patient was hypotensive per EMS with initial systolic in the 85W.  Repeat was in the 90s. She also has significant electrolyte abnormalities, hypokalemia, hypomagnesemia, hypocalcemia. Electrolytes are replaced per ss.       Hypotension  Plan:  -IV fluid bolus 1000 mL given. -Continue IV normal saline at the rate of 125 mL/h.     Hypokalemia:  -20 mEq of potassium given. -Follow-up on potassium, current value is 3.0  -May order urine electrolytes to look for the cause of chronic hypokalemia. -May need work up for Cons syndrome given HTN and persistent hypokalemia.     Hypomagnesemia:  -2 g of magnesium given. -current Mg level is 1.8  -F/u mg level.     Hypocalcemia:  -2000 mg of calcium gluconate given.  -calcium level is 6.5  -Continue to monitor serum calcium, ionized calcium.     Dizziness:  -We will order orthostatic vitals.  -Continue IV fluid.  -Encourage p.o. intake.     DVT ppx: EPC cuffs  GI ppx: None     PT/OT/SW: Consulted  Discharge Planning: Ongoing     Slade Gruber MD  Internal Medicine Resident, PGY-1  [facility]; [place], [place]  9/30/2021, 1:04 PM   Attending Physician Statement  I have discussed the care of [address] [place] [address], including pertinent history and exam findings,  with the resident.  I have seen and examined the patient and the key elements of all parts of the encounter have been performed by me. I agree with the assessment, plan and orders as documented by the resident with additions . Treatment plan Discussed with nursing staff in detail , all questions answered . Electronically signed by Nahomi Marquez MD on   9/30/21 at 4:56 PM EDT    Please note that this chart was generated using voice recognition Dragon dictation software. Although every effort was made to ensure the accuracy of this automated transcription, some errors in transcription may have occurred.

## 2021-09-30 NOTE — PROGRESS NOTES
SENIOR NOTE: New Admission. PMH: Hypertension, dyslipidemia, impaired glucose tolerance, alcohol abuse. Mrs. Genevia Spurling is a 43-year-old female with a past medical history as above who presented to the emergency department with the chief complaint of concerns of syncope earlier today. EMS was called per patient's  as patient was laying on the porch when she had a near syncopal event. Her  helped her to the ground so she did not hit her head and she did not lose consciousness completely. Per chart review patient states that she did not lose consciousness and did not have a near syncope episode rather she wanted to sleep and her  would not let her sleep. On EMS arrival patient systolic blood pressure was in the 70s and was repeated to be 90s. Patient admits to not eating or drinking for the past 3 days due to lack of appetite and admits to diarrhea. Per chart review patient was admitted in August due to hypokalemia. In the emergency department patient was afebrile T-max 99.1 and hemodynamically stable saturating 100% on room air. Labs demonstrated significant electrolyte abnormalities with hyponatremia 133, hypokalemia 3.0, elevated creatinine 1.40, GFR 47, hypomagnesemia 0.7, hypocalcemia 6.5, elevated proBNP 475, elevated high-sensitivity troponin 57 repeated to be 42, ethanol level 0.052, CBC demonstrated mild anemia of 9.2 normocytic. Chest x-ray demonstrated low lung volumes with atelectasis. Blood cultures were obtained due to patient's hypotension. Assessment and Plan    1. Hypotension.  -Likely secondary to hypovolemic shock.  -Patient started on 175 mL's per hour of normal saline. 2. Hyponatremia.  -Currently on normal saline at 175 mL's per hour.  -We will monitor with daily BMP. 3. Hypokalemia.  -Potassium initially 3.0 in the emergency department. -Replaced with 60 mEq of IV. -We will monitor with daily BMP.     4. Acute kidney injury.  -Likely secondary to dehydration.  -Currently on normal saline at 175 mL's per hour.  -We will monitor with daily BMP. 5. Hypomagnesemia. -Magnesium initially 0.7 on admission.  -Replaced with 2 g of magnesium.  -We will monitor with daily BMP. 6. Hypocalcemia.  -Initially 6.5 on admission.  -Treated with 2 g of calcium gluconate.  -We will monitor with daily BMP. -We will also follow-up with an ionized calcium in the morning. 7. Elevated proBNP. -Echocardiogram from 8/17/2021 demonstrated ejection fraction greater than 65% and grade 1 diastolic dysfunction. 8. Elevated troponin.  -57 repeated to be 42. DVT Prophylaxis: Heparin. GI Prophylaxis: None indicated. PT/OT/SW: Ongoing. Discharge Planning: Case management to assist with discharge planning.      Haseeb Britt DO  PGY-3, Internal medicine resident  [facility] [place], [place]  9/29/2021 8:43 PM

## 2021-09-30 NOTE — FLOWSHEET NOTE
SPIRITUAL CARE DEPARTMENT - [address]  PROGRESS NOTE    Shift date: 9/30/21  Shift day: Thursday   Shift # 2    Room # 4712/5224-86   Name: Ramirez Guan            Age: 55 y.o. Gender: female          Jain: Unknown   Place of Anabaptist: [facility]    Referral: Routine Visit    Admit Date & Time: 9/29/2021 12:46 PM    PATIENT/EVENT DESCRIPTION:  Ramirez Guan is a 55 y.o. female in room 36. SPIRITUAL ASSESSMENT/INTERVENTION:   met and conversed with Pt. Pt explained how she had hypertension. Her magnesium was low as well. Pt explained how she would like to be discharged because she has been at the hospital stay for a while now.  provided active listening to Pt.  also offered to read from prayer cards on sickness and for the peace of the Pt.  also read from [address] [place] [address] [address] to Pt and provided prayers and support as needed for the Pt to feel better. SPIRITUAL CARE FOLLOW-UP PLAN:  Chaplains will remain available to offer spiritual and emotional support as needed. Electronically signed by Delisa Hurt, on 9/30/2021 at 7:14 PM.  [facility]  [phone]       09/30/21 1912   Encounter Summary   Services provided to: Patient   Referral/Consult From: [facility]   Support System Unknown   Place of Orthodox   [facility])   Continue Visiting   (9/30/21)   Complexity of Encounter Low   Length of Encounter 30 minutes   Spiritual Assessment Completed Yes   Routine   Type Initial   Assessment Calm; Approachable;Coping; Hopeful  (Honest)   Intervention Active listening;Explored feelings, thoughts, concerns;Nurtured hope;Prayer;Sustaining presence/ Ministry of presence;Provided reading materials/devotional materials   Outcome Acceptance;Expressed gratitude;Expressed feelings of kulwinder, peace, and/or awe;Engaged in conversation;Venting emotion;Receptive; Less anxious, less agitated;Encouraged; Hopeful

## 2021-09-30 NOTE — CARE COORDINATION
Case Management Initial Discharge Plan  Delphine Flanagan,             Met with:patient to discuss discharge plans. Information verified: address, contacts, phone number, , insurance Yes  Insurance Provider: [facility]    Emergency Contact/Next of Kin name & number: Jacki Bernal on face sheet   Who are involved in patient's support system? Boyfriend lives with her when she is sick     [facility]    Date of last visit:       Discharge Planning    Living Arrangements:   alone and sometimes bf there      Home has 1 stories  3 stairs to climb to get into front door,  0 stairs to climb to reach second floor  Location of bedroom/bathroom in home main    Patient able to perform ADL's:Assisted    Current Services (outpatient & in home)   DME equipment: walker cane   DME provider:     Is patient receiving oral anticoagulation therapy? No    If indicated:   Physician managing anticoagulation treatment:   Where does patient obtain lab work for ATC treatment? Potential Assistance Needed:       Patient agreeable to home care: No  Waddy of choice provided:  no    Prior SNF/Rehab Placement and Facility:   Agreeable to SNF/Rehab: No  Waddy of choice provided: no     Evaluation: no    Expected Discharge date:       Patient expects to be discharged to: If home: is the family and/or caregiver wiling & able to provide support at home? Who will be providing this support? bf    Follow Up Appointment: Best Day/ Time:      Transportation provider:   Transportation arrangements needed for discharge: yes    Readmission Risk              Risk of Unplanned Readmission:  22             Does patient have a readmission risk score greater than 14?: Yes  If yes, follow-up appointment must be made within 7 days of discharge.      Goals of Care:       Educated pt on transitional options, provided freedom of choice and are agreeable with plan      Discharge Plan: needs appt with PCP - does not want HC at

## 2021-09-30 NOTE — ED NOTES
Patient reports new onset of sharp chest pain under left breast that radiates to back and left shoulder pain. Patient reports pain is worse when inhaling.      [facility]  09/30/21 [id number]

## 2021-10-01 PROBLEM — E44.0 MODERATE MALNUTRITION (HCC): Status: ACTIVE | Noted: 2021-10-01

## 2021-10-01 LAB
ABSOLUTE EOS #: 0.27 K/UL (ref 0–0.4)
ABSOLUTE IMMATURE GRANULOCYTE: 0 K/UL (ref 0–0.3)
ABSOLUTE LYMPH #: 1.98 K/UL (ref 1–4.8)
ABSOLUTE MONO #: 1.44 K/UL (ref 0.1–0.8)
ALBUMIN SERPL-MCNC: 3 G/DL (ref 3.5–5.2)
ALBUMIN/GLOBULIN RATIO: 1 (ref 1–2.5)
ALP BLD-CCNC: 204 U/L (ref 35–104)
ALT SERPL-CCNC: 9 U/L (ref 5–33)
ANION GAP SERPL CALCULATED.3IONS-SCNC: 13 MMOL/L (ref 9–17)
AST SERPL-CCNC: 31 U/L
BASOPHILS # BLD: 1 % (ref 0–2)
BASOPHILS ABSOLUTE: 0.09 K/UL (ref 0–0.2)
BILIRUB SERPL-MCNC: 0.53 MG/DL (ref 0.3–1.2)
BILIRUBIN DIRECT: 0.21 MG/DL
BILIRUBIN, INDIRECT: 0.32 MG/DL (ref 0–1)
BUN BLDV-MCNC: 4 MG/DL (ref 6–20)
BUN/CREAT BLD: ABNORMAL (ref 9–20)
CALCIUM SERPL-MCNC: 6.9 MG/DL (ref 8.6–10.4)
CHLORIDE BLD-SCNC: 99 MMOL/L (ref 98–107)
CO2: 20 MMOL/L (ref 20–31)
CREAT SERPL-MCNC: 0.74 MG/DL (ref 0.5–0.9)
DIFFERENTIAL TYPE: ABNORMAL
EKG ATRIAL RATE: 80 BPM
EKG P AXIS: 93 DEGREES
EKG P-R INTERVAL: 198 MS
EKG Q-T INTERVAL: 448 MS
EKG QRS DURATION: 90 MS
EKG QTC CALCULATION (BAZETT): 516 MS
EKG R AXIS: -159 DEGREES
EKG T AXIS: 19 DEGREES
EKG VENTRICULAR RATE: 80 BPM
EOSINOPHILS RELATIVE PERCENT: 3 % (ref 1–4)
GFR AFRICAN AMERICAN: >60 ML/MIN
GFR NON-AFRICAN AMERICAN: >60 ML/MIN
GFR SERPL CREATININE-BSD FRML MDRD: ABNORMAL ML/MIN/{1.73_M2}
GFR SERPL CREATININE-BSD FRML MDRD: ABNORMAL ML/MIN/{1.73_M2}
GLOBULIN: ABNORMAL G/DL (ref 1.5–3.8)
GLUCOSE BLD-MCNC: 111 MG/DL (ref 70–99)
HCT VFR BLD CALC: 23 % (ref 36.3–47.1)
HEMOGLOBIN: 7.6 G/DL (ref 11.9–15.1)
IMMATURE GRANULOCYTES: 0 %
LYMPHOCYTES # BLD: 22 % (ref 24–44)
MAGNESIUM: 1.7 MG/DL (ref 1.6–2.6)
MAGNESIUM: 1.7 MG/DL (ref 1.6–2.6)
MAGNESIUM: 2 MG/DL (ref 1.6–2.6)
MAGNESIUM: 2.1 MG/DL (ref 1.6–2.6)
MCH RBC QN AUTO: 32.2 PG (ref 25.2–33.5)
MCHC RBC AUTO-ENTMCNC: 33 G/DL (ref 28.4–34.8)
MCV RBC AUTO: 97.5 FL (ref 82.6–102.9)
MONOCYTES # BLD: 16 % (ref 1–7)
MORPHOLOGY: NORMAL
NRBC AUTOMATED: 0 PER 100 WBC
PDW BLD-RTO: 14.1 % (ref 11.8–14.4)
PLATELET # BLD: 335 K/UL (ref 138–453)
PLATELET ESTIMATE: ABNORMAL
PMV BLD AUTO: 8.7 FL (ref 8.1–13.5)
POTASSIUM SERPL-SCNC: 3.2 MMOL/L (ref 3.7–5.3)
POTASSIUM SERPL-SCNC: 3.5 MMOL/L (ref 3.7–5.3)
POTASSIUM SERPL-SCNC: 3.7 MMOL/L (ref 3.7–5.3)
POTASSIUM SERPL-SCNC: 4.1 MMOL/L (ref 3.7–5.3)
RBC # BLD: 2.36 M/UL (ref 3.95–5.11)
RBC # BLD: ABNORMAL 10*6/UL
SEG NEUTROPHILS: 58 % (ref 36–66)
SEGMENTED NEUTROPHILS ABSOLUTE COUNT: 5.22 K/UL (ref 1.8–7.7)
SODIUM BLD-SCNC: 132 MMOL/L (ref 135–144)
TOTAL PROTEIN: 6.1 G/DL (ref 6.4–8.3)
WBC # BLD: 9 K/UL (ref 3.5–11.3)
WBC # BLD: ABNORMAL 10*3/UL

## 2021-10-01 PROCEDURE — 97162 PT EVAL MOD COMPLEX 30 MIN: CPT

## 2021-10-01 PROCEDURE — 6370000000 HC RX 637 (ALT 250 FOR IP): Performed by: STUDENT IN AN ORGANIZED HEALTH CARE EDUCATION/TRAINING PROGRAM

## 2021-10-01 PROCEDURE — 97530 THERAPEUTIC ACTIVITIES: CPT

## 2021-10-01 PROCEDURE — 85025 COMPLETE CBC W/AUTO DIFF WBC: CPT

## 2021-10-01 PROCEDURE — 80076 HEPATIC FUNCTION PANEL: CPT

## 2021-10-01 PROCEDURE — 83735 ASSAY OF MAGNESIUM: CPT

## 2021-10-01 PROCEDURE — 36415 COLL VENOUS BLD VENIPUNCTURE: CPT

## 2021-10-01 PROCEDURE — 84132 ASSAY OF SERUM POTASSIUM: CPT

## 2021-10-01 PROCEDURE — 99232 SBSQ HOSP IP/OBS MODERATE 35: CPT | Performed by: INTERNAL MEDICINE

## 2021-10-01 PROCEDURE — 94760 N-INVAS EAR/PLS OXIMETRY 1: CPT

## 2021-10-01 PROCEDURE — 97166 OT EVAL MOD COMPLEX 45 MIN: CPT

## 2021-10-01 PROCEDURE — 2580000003 HC RX 258: Performed by: STUDENT IN AN ORGANIZED HEALTH CARE EDUCATION/TRAINING PROGRAM

## 2021-10-01 PROCEDURE — 6360000002 HC RX W HCPCS: Performed by: STUDENT IN AN ORGANIZED HEALTH CARE EDUCATION/TRAINING PROGRAM

## 2021-10-01 PROCEDURE — 82088 ASSAY OF ALDOSTERONE: CPT

## 2021-10-01 PROCEDURE — 80048 BASIC METABOLIC PNL TOTAL CA: CPT

## 2021-10-01 PROCEDURE — 97535 SELF CARE MNGMENT TRAINING: CPT

## 2021-10-01 PROCEDURE — 2060000000 HC ICU INTERMEDIATE R&B

## 2021-10-01 PROCEDURE — 84244 ASSAY OF RENIN: CPT

## 2021-10-01 RX ORDER — PREDNISONE 10 MG/1
10 TABLET ORAL DAILY
Status: DISCONTINUED | OUTPATIENT
Start: 2021-10-01 | End: 2021-10-03 | Stop reason: HOSPADM

## 2021-10-01 RX ORDER — POTASSIUM CHLORIDE 7.45 MG/ML
10 INJECTION INTRAVENOUS PRN
Status: DISCONTINUED | OUTPATIENT
Start: 2021-10-01 | End: 2021-10-03 | Stop reason: HOSPADM

## 2021-10-01 RX ORDER — POTASSIUM CHLORIDE 20 MEQ/1
40 TABLET, EXTENDED RELEASE ORAL PRN
Status: DISCONTINUED | OUTPATIENT
Start: 2021-10-01 | End: 2021-10-03 | Stop reason: HOSPADM

## 2021-10-01 RX ADMIN — SODIUM CHLORIDE: 9 INJECTION, SOLUTION INTRAVENOUS at 02:08

## 2021-10-01 RX ADMIN — MELOXICAM 15 MG: 7.5 TABLET ORAL at 09:52

## 2021-10-01 RX ADMIN — PREDNISONE 10 MG: 10 TABLET ORAL at 11:04

## 2021-10-01 RX ADMIN — FOLIC ACID 1 MG: 1 TABLET ORAL at 09:09

## 2021-10-01 RX ADMIN — HEPARIN SODIUM 5000 UNITS: 5000 INJECTION INTRAVENOUS; SUBCUTANEOUS at 20:16

## 2021-10-01 RX ADMIN — POTASSIUM CHLORIDE 10 MEQ: 7.46 INJECTION, SOLUTION INTRAVENOUS at 00:12

## 2021-10-01 RX ADMIN — POTASSIUM BICARBONATE 40 MEQ: 391 TABLET, EFFERVESCENT ORAL at 09:52

## 2021-10-01 RX ADMIN — POTASSIUM CHLORIDE 10 MEQ: 7.46 INJECTION, SOLUTION INTRAVENOUS at 03:28

## 2021-10-01 RX ADMIN — POTASSIUM CHLORIDE 10 MEQ: 7.46 INJECTION, SOLUTION INTRAVENOUS at 02:08

## 2021-10-01 RX ADMIN — SODIUM CHLORIDE: 9 INJECTION, SOLUTION INTRAVENOUS at 20:21

## 2021-10-01 RX ADMIN — ACETAMINOPHEN 650 MG: 325 TABLET ORAL at 09:09

## 2021-10-01 RX ADMIN — POTASSIUM CHLORIDE 10 MEQ: 7.46 INJECTION, SOLUTION INTRAVENOUS at 07:10

## 2021-10-01 RX ADMIN — ACETAMINOPHEN 650 MG: 325 TABLET ORAL at 20:15

## 2021-10-01 RX ADMIN — ALCOHOL 1 TABLET: 70.47 GEL TOPICAL at 09:09

## 2021-10-01 RX ADMIN — POTASSIUM CHLORIDE 10 MEQ: 7.46 INJECTION, SOLUTION INTRAVENOUS at 05:37

## 2021-10-01 RX ADMIN — SODIUM CHLORIDE: 9 INJECTION, SOLUTION INTRAVENOUS at 13:19

## 2021-10-01 RX ADMIN — Medication 50 MG: at 09:09

## 2021-10-01 RX ADMIN — ACETAMINOPHEN 650 MG: 325 TABLET ORAL at 01:06

## 2021-10-01 ASSESSMENT — PAIN DESCRIPTION - PAIN TYPE
TYPE: CHRONIC PAIN

## 2021-10-01 ASSESSMENT — PAIN SCALES - GENERAL
PAINLEVEL_OUTOF10: 0
PAINLEVEL_OUTOF10: 10
PAINLEVEL_OUTOF10: 3
PAINLEVEL_OUTOF10: 10
PAINLEVEL_OUTOF10: 5
PAINLEVEL_OUTOF10: 0
PAINLEVEL_OUTOF10: 3
PAINLEVEL_OUTOF10: 0
PAINLEVEL_OUTOF10: 4
PAINLEVEL_OUTOF10: 9

## 2021-10-01 ASSESSMENT — PAIN DESCRIPTION - ONSET
ONSET: ON-GOING
ONSET: ON-GOING

## 2021-10-01 ASSESSMENT — PAIN DESCRIPTION - LOCATION
LOCATION: GENERALIZED

## 2021-10-01 ASSESSMENT — PAIN DESCRIPTION - DESCRIPTORS
DESCRIPTORS: DISCOMFORT;ACHING
DESCRIPTORS: ACHING;DISCOMFORT

## 2021-10-01 ASSESSMENT — PAIN DESCRIPTION - FREQUENCY
FREQUENCY: CONTINUOUS
FREQUENCY: CONTINUOUS

## 2021-10-01 ASSESSMENT — PAIN - FUNCTIONAL ASSESSMENT: PAIN_FUNCTIONAL_ASSESSMENT: PREVENTS OR INTERFERES SOME ACTIVE ACTIVITIES AND ADLS

## 2021-10-01 NOTE — PROGRESS NOTES
Physician Progress Note      Isabella Lua  CSN #:                  [id number]  :                       1965  ADMIT DATE:       2021 12:46 PM  [address] [place] DATE:  RESPONDING  PROVIDER #:        Jeet Garcia          QUERY TEXT:    Pt admitted with hypotension. Pt noted to have documentation of dry mucous   membranes in the ED with magnesium, .7 k 3.0 and creat 1.40. per note in ed   has not eaten in past 3 days due to poor appetite. . If possible, please   document in progress notes and discharge summary the relationship, if any,   between hypotension and dehydration. The medical record reflects the following:  Risk Factors: age, htn, bipolar disorder, chronic alcohol abuse  Clinical Indicators: per EMS systolic BP in 88W with dizziness, dry mucous   membranes. has not eaten in 3 days due to poor appetite, creatine 1.40, k 3.0,   na 133. documentation of hypotension, in H&P and progress note on  with   significant electrolyte abnormalities  Treatment: IV NS bolus 1000ml, /hr    thank you Call if questions Nleli GAMBOAN CCDS                                                                                                                                                                                                                                                                                                                                 [phone]  Options provided:  -- hypotension  due to dehydration  -- hypotension unrelated to dehydration  -- Other - I will add my own diagnosis  -- Disagree - Not applicable / Not valid  -- Disagree - Clinically unable to determine / Unknown  -- Refer to Clinical Documentation Reviewer    PROVIDER RESPONSE TEXT:    This patient has hypotension due to dehydration . Query created by:  Emigdio Valenzuela on 10/1/2021 9:37 AM      Electronically signed by:  Jeet Garcia 10/1/2021 1:48 PM

## 2021-10-01 NOTE — PROGRESS NOTES
Physical Therapy    Facility/Department: [address] ORTHO/MED SURG  Initial Assessment    NAME: Graeme Fabian  : 1965  MRN: [id number]    Date of Service: 10/1/2021  Chief Complaint   Patient presents with    Loss of Consciousness     Pt states she was just \"trying to take a nap\"     Discharge Recommendations:    Further therapy recommended at discharge. PT Equipment Recommendations  Equipment Needed: No (Pt reports having DME present at home.)    Assessment   Body structures, Functions, Activity limitations: Decreased functional mobility ; Decreased posture;Decreased endurance;Decreased ROM; Decreased strength;Decreased balance; Increased pain  Assessment: Pt required min Ax1 for sup>sit transfer. Once established, pt tolerated ~5 minutes seated EOB. Functional mobility progression limited at this date due to significant pt pain and fatigue. Pt is a fall risk at this time and would require 24hr assistance upon discharge. Pt will benefit from continued skilled physical therapy services to address functional deficits and maximize pt independence. Prognosis: Fair  Decision Making: Medium Complexity  PT Education: Goals;PT Role;Plan of Care;Transfer Training;General Safety; Functional Mobility Training  REQUIRES PT FOLLOW UP: Yes  Activity Tolerance  Activity Tolerance: Patient limited by pain       Patient Diagnosis(es): The primary encounter diagnosis was ROXANN (acute kidney injury) ([address] A diagnosis of Hypocalcemia was also pertinent to this visit. has a past medical history of Angioedema, Anxiety, Asthma, Bipolar disorder ([address] Claustrophobia, Depression, GERD (gastroesophageal reflux disease), Hypertension, Hypertrophic cardiomyopathy ([address] Lung nodules, MRSA (methicillin resistant Staphylococcus aureus), Murmur, OA (osteoarthritis), JONES (obstructive sleep apnea), Thyroid nodule, and Type 2 diabetes mellitus without complication, without long-term current use of insulin ([address]    has a past surgical history that includes Hysterectomy; Upper gastrointestinal endoscopy; Colonoscopy; Thyroid surgery; Cardiac catheterization; other surgical history (8/24/2015); Upper gastrointestinal endoscopy (N/A, 12/3/2020); Colonoscopy (N/A, 12/3/2020); Foot surgery (Bilateral, 12/07/2020); Hammer toe surgery (Right, 12/7/2020); and Bunionectomy (Bilateral, 12/7/2020). Restrictions  Restrictions/Precautions  Restrictions/Precautions: Fall Risk  Required Braces or Orthoses?: No  Position Activity Restriction  Other position/activity restrictions: Up w/ assistance. Vision/Hearing  Vision: Impaired  Vision Exceptions: Wears glasses at all times  Hearing: Within functional limits     Subjective  General  Patient assessed for rehabilitation services?: Yes  Response To Previous Treatment: Not applicable  Family / Caregiver Present: No  Follows Commands: Within Functional Limits  Subjective  Subjective: RN and pt in agreement for PT eval. Pt supine in bed upon writer's arrival. Pt required min verbal cueing for participation. Pain Screening  Patient Currently in Pain: Yes  Pain Assessment  Pain Assessment: 0-10  Pain Level: 10 (Simultaneous filing. User may not have seen previous data.)  Pain Type: Chronic pain  Pain Location: Generalized  Non-Pharmaceutical Pain Intervention(s): Ambulation/Increased Activity; Distraction  Response to Pain Intervention: Patient Satisfied  Vital Signs  Patient Currently in Pain: Yes       Orientation  Orientation  Overall Orientation Status: Within Functional Limits  Social/Functional History  Social/Functional History  Lives With: Significant other  Type of Home: House  Home Layout: Multi-level, Able to Live on Main level with bedroom/bathroom, Laundry in basement  Home Access: Stairs to enter with rails  Entrance Stairs - Number of Steps: 3  Entrance Stairs - Rails: Both  Bathroom Shower/Tub: Tub/Shower unit, Curtain, Shower chair with back  [employer]: Handicap height  Bathroom Equipment: Toilet raiser  Home Equipment: Rolling walker  Receives Help From: Family (boyfriend)  ADL Assistance: Needs assistance  Homemaking Assistance: Needs assistance (boyfriend completes all homemaking tasks)  Meal Prep: Maximal  Laundry: Maximal  Vacuuming: Maximal  Cleaning: Maximal  Gardening: Maximal  Yard Work: Maximal  Driving: Maximal  Shopping: Maximal  : Maximal  Homemaking Responsibilities: No  Ambulation Assistance: Needs assistance  Transfer Assistance: Needs assistance  Active : No  Patient's  Info: Cab/uber  Mode of Transportation: Cab  Occupation: Unemployed, On disability  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   Cognition   Cognition  Overall Cognitive Status: WFL    Objective     Observation/Palpation  Posture: Fair (Pt demonstrated mild forward head posture in sitting.)    AROM RLE (degrees)  RLE General AROM: Hip: ~95 degree flexion, Knee: ~5-90 degrees, Ankle ~0-40 degrees  AROM LLE (degrees)  LLE General AROM: Hip: ~95 degree flexion, Knee: ~5-90 degrees, Ankle ~0-40 degrees  AROM RUE (degrees)  RUE General AROM: Shoulder: ~30 degrees flexion, Elbow: WFL, wrist/hand: WFL  AROM LUE (degrees)  LUE General AROM: Shoulder: ~80 degrees flexion, Elbow: WFL, wrist/hand: WFL  Strength RLE  Strength RLE: Exception  R Hip Flexion: 2+/5  R Knee Flexion: 3+/5  R Knee Extension: 3+/5  R Ankle Dorsiflexion: 4/5  R Ankle Plantar flexion: 4/5  Strength LLE  L Hip Flexion: 2+/5  L Knee Flexion: 3+/5  L Knee Extension: 3+/5  L Ankle Dorsiflexion: 4/5  L Ankle Plantar Flexion: 4/5  Strength RUE  R Shoulder Flexion: 2+/5  R Shoulder Extension: 3+/5  R Elbow Flexion: 3+/5  R Elbow Extension: 3+/5  Strength LUE  L Shoulder Flexion: 2+/5  L Shoulder Extension: 3+/5  L Elbow Flexion: 3+/5  L Elbow Extension: 3+/5  Motor Control  Gross Motor?: [facility]  Rapid Alternating Movements: Normal  Sensation  Overall Sensation Status: WFL (Pt denies numbnes/tingling)  Bed mobility  Supine to Sit: Minimal assistance (For trunk progression.)  Sit to Supine: Moderate assistance (For BLE progression.)  Comment: HOB elevated ~30 degrees. Transfers  Comment: Functional transfers not formally assessed due to significant pain reported by the pt. Ambulation  Ambulation?: No  Stairs/Curb  Stairs?: No     Balance  Sitting - Static: Fair;+  Sitting - Dynamic: Fair  Standing - Static:  (Not formally assessed, pt declined standing due to pain.)  Standing - Dynamic:  (Not formally assessed, pt declined standing due to pain.)        Plan   Plan  Times per week: 5-6x/week  Current Treatment Recommendations: Strengthening, ROM, Balance Training, Functional Mobility Training, Endurance Training, Transfer Training, Gait Training, Patient/Caregiver Education & Training, Equipment Evaluation, Education, & procurement, Modalities  Safety Devices  Type of devices: Bed alarm in place, Patient at risk for falls, Call light within reach, Left in bed  Restraints  Initially in place: No           AM-PAC Score  AM-PAC Inpatient Mobility Raw Score : 14 (10/01/21 1012)  AM-PAC Inpatient T-Scale Score : 38.1 (10/01/21 1012)  Mobility Inpatient CMS 0-100% Score: 61.29 (10/01/21 1012)  Mobility Inpatient CMS G-Code Modifier : CL (10/01/21 1012)          Goals  Short term goals  Time Frame for Short term goals: 14 visits  Short term goal 1: Pt will perform bed mobility w/ modified independence. Short term goal 2: Pt will peform functional transfers w/ min Ax1 using a RW. Short term goal 3: Pt will ambulate 75ft w/ min Ax1 using a RW to allow for safe in home ambulation. Short term goal 4: Pt will ascend/descend 3 stairs w/ Mod Ax1 w/ no handrailing to allow for safe entry into pt's home. Patient Goals   Patient goals : To return home.        Therapy Time   Individual Concurrent Group Co-treatment   Time In 3255         Time Out 0920         Minutes 24         Timed Code Treatment Minutes: 9 Minutes       Evan Hanson    Evaluation/treatment performed by Student PT under the supervision of co-signing PT who agrees with all evaluation/treatment and documentation.

## 2021-10-01 NOTE — ADT AUTH CERT
Utilization Reviews       Dehydration - Care Day 2 (2021) by Ailyn Judd RN       Review Status Review Entered   Completed 10/1/2021 08:19      Criteria Review      Care Day: 2 Care Date: 2021 Level of Care: Intermediate Care    Guideline Day 2    Clinical Status    ( ) * Hemodynamic stability    (X) * Mental status at baseline    ( ) * Vomiting absent or controlled    ( ) * Diarrhea absent or controlled    ( ) * Electrolyte abnormalities absent or acceptable for next level of care    ( ) * Cause of dehydration requiring inpatient treatment absent    ( ) * Renal function at baseline or acceptable for next level of care    ( ) * Discharge plans and education understood    Activity    ( ) * Ambulatory or acceptable for next level of care    Routes    (X) * Oral hydration    ( ) * Oral medications or regimen acceptable for next level of care    ( ) * Oral diet or acceptable for next level of care    Interventions    (X) Electrolytes    * Milestone   Additional Notes   21      Patient examined at bedside. No acute event in the last 24 hours. Denies any active symptoms. Improved significantly anton admission.     On evaluation her blood pressure normalized, 1156/93 93 bpm, spo2 98%   Labs reviewed and evaluated: Creatinine back to normal, from 1.40 to 0.90.   k is still low 3.0, Mg 1.8      Vital Signs:   BP (!) 115/93   Pulse 93   Temp 98.8 °F (37.1 °C) (Oral)   Resp 20   Ht 5' 5\" (1.651 m)   Wt 164 lb (74.4 kg)   SpO2 98%   BMI 27.29 kg/m²      Temp (24hrs), Av.8 °F (37.1 °C), Min:98.8 °F (37.1 °C), Max:98.8 °F (37.1 °C)          Physical Exam:   Constitutional: This is a well developed, well nourished, 25-29.9 - Overweight 55y.o. year old female who is alert, oriented, cooperative and in no apparent distress.  Head:normocephalic and atraumatic.     EENT:  PERRLA.  No conjunctival injections.   Septum was midline, mucosa was without erythema, exudates or cobblestoning.  No thrush was noted.    Neck: Supple without thyromegaly. No elevated JVP. Trachea was midline. Respiratory: Chest was symmetrical without dullness to percussion.  Breath sounds bilaterally were clear to auscultation. There were no wheezes, rhonchi or rales. There is no intercostal retraction or use of accessory muscles. No egophony noted. Cardiovascular: Regular without murmur, clicks, gallops or rubs. Abdomen: Slightly rounded and soft without organomegaly. No rebound, rigidity or guarding was appreciated.     Lymphatic: No lymphadenopathy. Musculoskeletal: Normal curvature of the spine.  No gross muscle weakness.     Extremities:  No lower extremity edema, ulcerations, tenderness, varicosities or erythema.  Muscle size, tone and strength are normal.  No involuntary movements are noted.     Skin:  Warm and dry.  Good color, turgor and pigmentation.  No lesions or scars.  No cyanosis or clubbing   Neurological/Psychiatric: The patient's general behavior, level of consciousness, thought content and emotional status is normal.             9/30/2021 03:17   Potassium: 2.9 (LL)   Magnesium: 1.1 (L)      9/30/2021 04:31   EKG 12-LEAD: Rpt   Atrial Rate: 80   P Axis: 93   P-R Interval: 198   Q-T Interval: 448   QRS Duration: 90   QTc Calculation (Bazett): 516   R Axis: -159   T Axis: 19   Ventricular Rate: 80      9/30/2021 06:17   Potassium: 3.4 (L)   Creatinine: 0.99 (H)   Calcium, Ion: 0.86 (L)   GFR Non-African American: 58 (L)   Magnesium: 1.1 (L)   Glucose: 104 (H)   Calcium: 6.3 (L)   RBC: 2.35 (L)   Hemoglobin Quant: 7.6 (L)   Hematocrit: 22.2 (L)   Absolute Mono #: 1.25 (H)   Monocytes: 20 (H)         9/30/2021 11:33   Potassium: 3.0 (L)         9/30/2021 14:32   Potassium: 3.3 (L)         9/30/2021 20:39   Potassium: 2.9 (LL)         9/30/2021 22:31   Potassium: 3.0 (L)      IVs/Meds/Infusions/Blood:   Scheduled Meds:   · sodium chloride flush 5-40 mL IntraVENous 2 times per day   · potassium bicarb-citric acid 40 mEq Oral Daily   · meloxicam 15 mg Oral Daily   · heparin (porcine) 5,000 Units SubCUTAneous 3 times per day   · folic acid 1 mg Oral Daily   · thiamine 50 mg Oral Daily   · multivitamin 1 tablet Oral Daily      MAG 2 G IV X2   KCL 50 MEQ IV X1      Continuous Infusions:    · sodium chloride 175 mL/hr   PRN   TYLENOL 650 MG PO X1    KCL 10 MEQ IV X2      IMPRESSION   This is a 48 y. o. female with a past medical history of hypertension and chronic electrolyte abnormalities admitted with concerns of hypotension and dizziness.  Patient was hypotensive per EMS with initial systolic in the 11E.  Repeat was in the 90s.   She also has significant electrolyte abnormalities, hypokalemia, hypomagnesemia, hypocalcemia.  Electrolytes are replaced per ss.         Hypotension   Plan:   -IV fluid bolus 1000 mL given. -Continue IV normal saline at the rate of 125 mL/h.       Hypokalemia:   -20 mEq of potassium given. -Follow-up on potassium, current value is 3.0   -May order urine electrolytes to look for the cause of chronic hypokalemia. -May need work up for Cons syndrome given HTN and persistent hypokalemia.       Hypomagnesemia:   -2 g of magnesium given.    -current Mg level is 1.8   -F/u mg level.       Hypocalcemia:   -2000 mg of calcium gluconate given.   -calcium level is 6.5   -Continue to monitor serum calcium, ionized calcium.       Dizziness:   -We will order orthostatic vitals.   -Continue IV fluid.   -Encourage p.o. intake.       DVT ppx: EPC cuffs   GI ppx: None       PT/OT/SW: Consulted   Discharge Planning: Ongoing

## 2021-10-01 NOTE — PROGRESS NOTES
Occupational Therapy   Occupational Therapy Initial Assessment  Date: 10/1/2021   Patient Name: Rohan Alva  MRN: [id number]     : 1965     Chief Complaint   Patient presents with    Loss of Consciousness     Pt states she was just \"trying to take a nap\"       Date of Service: 10/1/2021    Discharge Recommendations: Further therapy recommended at discharge. OT Equipment Recommendations  Equipment Needed: Yes  Mobility Devices: ADL Assistive Devices  ADL Assistive Devices: Hand-held Shower;Sock-Aid Hard;Long-handled Shoe Horn;Long-handled Sponge    Assessment   Performance deficits / Impairments: Decreased functional mobility ; Decreased endurance;Decreased ADL status; Decreased ROM; Decreased strength;Decreased safe awareness;Decreased high-level IADLs;Decreased balance  Assessment: Pt. agreeable to therapy this date and required encouragement to participate d/t increased generalized pain. Pt. lying supine with HOB elevated. Pt. participated in bed mobility task requiring min A for trunk progression. Pt. sitting EOB with CGA to take medications and participate in self-care task to wipe tears off face with set up assistance. Pt. declined attempt at further mobility d/t pain and weakness. Pt. would benefit from continued OT services to address deficits in endurance, ROM, strength, and safety awareness to promote functional independence in ADLs/IADLs. Prognosis: Fair  Decision Making: Medium Complexity  Patient Education: OT Role, OT POC, safety awareness, and DME. Pt. demonstrated good understanding. REQUIRES OT FOLLOW UP: Yes  Activity Tolerance  Activity Tolerance: Patient limited by pain  Activity Tolerance: Pt. reports 10/10 pain this date. Safety Devices  Safety Devices in place: Yes  Type of devices: Bed alarm in place;Call light within reach;Nurse notified; Left in bed  Restraints  Initially in place: No           Patient Diagnosis(es): The primary encounter diagnosis was ROXANN (acute kidney injury) [facility]). A diagnosis of Hypocalcemia was also pertinent to this visit. has a past medical history of Angioedema, Anxiety, Asthma, Bipolar disorder ([facility] Claustrophobia, Depression, GERD (gastroesophageal reflux disease), Hypertension, Hypertrophic cardiomyopathy ([facility] Lung nodules, MRSA (methicillin resistant Staphylococcus aureus), Murmur, OA (osteoarthritis), JONES (obstructive sleep apnea), Thyroid nodule, and Type 2 diabetes mellitus without complication, without long-term current use of insulin ([facility] has a past surgical history that includes Hysterectomy; Upper gastrointestinal endoscopy; Colonoscopy; Thyroid surgery; Cardiac catheterization; other surgical history (8/24/2015); Upper gastrointestinal endoscopy (N/A, 12/3/2020); Colonoscopy (N/A, 12/3/2020); Foot surgery (Bilateral, 12/07/2020); Hammer toe surgery (Right, 12/7/2020); and Bunionectomy (Bilateral, 12/7/2020). Restrictions  Restrictions/Precautions  Restrictions/Precautions: Fall Risk  Required Braces or Orthoses?: No  Position Activity Restriction  Other position/activity restrictions: Up w/ assistance. Subjective   General  Patient assessed for rehabilitation services?: Yes  Family / Caregiver Present: No  General Comment  Comments: Nurse ok'd pt for OT Eval. Pt. agreeable to therapy but required encouragement. Patient Currently in Pain: Yes  Pain Assessment  Pain Assessment: 0-10  Pain Level: 10  Pain Type: Chronic pain  Pain Location: Generalized  Pain Descriptors: Aching;Discomfort  Pain Frequency: Continuous  Pain Onset: On-going  Functional Pain Assessment: Prevents or interferes some active activities and ADLs  Non-Pharmaceutical Pain Intervention(s): Distraction;Repositioned; Therapeutic presence  Response to Pain Intervention: Patient Satisfied  Pre Treatment Pain Screening  Intervention List: Nurse called to administer meds    Social/Functional History  Social/Functional History  Lives With: Significant other  Type of Home: House  Home Layout: Multi-level, Able to Live on Main level with bedroom/bathroom, Laundry in basement  Home Access: Stairs to enter with rails  Entrance Stairs - Number of Steps: 3  Entrance Stairs - Rails: Both  Bathroom Shower/Tub: Tub/Shower unit, Curtain, Shower chair with back  [employer]: Handicap height  Bathroom Equipment: Toilet raiser  Home Equipment: Rolling walker  Receives Help From: Family (boyfriend)  ADL Assistance: Needs assistance  Homemaking Assistance: Needs assistance (boyfriend completes all homemaking tasks)  Meal Prep: Maximal  Laundry: Maximal  Vacuuming: Maximal  Cleaning: Maximal  Gardening: Maximal  Yard Work: Maximal  Driving: Maximal  Shopping: Maximal  : Maximal  Homemaking Responsibilities: No  Ambulation Assistance: Needs assistance  Transfer Assistance: Needs assistance  Active : No  Patient's  Info: Cab/uber  Mode of Transportation: Cab  Occupation: Unemployed, On disability  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 Objective   Vision: Impaired  Vision Exceptions: Wears glasses at all times  Hearing: Within functional limits    Orientation  Overall Orientation Status: Within Functional Limits  Balance  Sitting Balance: Contact guard assistance (sitting EOB ~12 min)  Standing Balance  Comment: Pt. sat EOB with CGA but denied further activity d/t pain. ADL  Feeding: Modified independent ;Setup; Increased time to complete (pt. able to bring cup of water to mouth with set-up)  Grooming: Setup; Increased time to complete;Modified independent  (pt able to blow nose with set-up)  UE Bathing: Setup; Increased time to complete;Minimal assistance  LE Bathing: Moderate assistance; Increased time to complete;Setup  UE Dressing: Increased time to complete;Setup;Minimal assistance  LE Dressing: Moderate assistance; Increased time to complete;Setup  Toileting: Moderate assistance; Increased time to complete;Setup  Additional Comments: Pt. participate in limited ADLs d/t excessive pain this date. Tone RUE  RUE Tone: Normotonic  Tone LUE  LUE Tone: Normotonic  Coordination  Movements Are Fluid And Coordinated: Yes     Bed mobility  Bridging: Contact guard assistance  Supine to Sit: Minimal assistance (handheld trunk progression)  Sit to Supine: Moderate assistance (BLE progression)  Scooting: Contact guard assistance  Comment: HOB elevated ~30 degrees  Transfers  Sit to stand: Unable to assess  Stand to sit: Unable to assess  Transfer Comments: Pt declined attempt to transfer d/t significant pain. Cognition  Overall Cognitive Status: WFL        Sensation  Overall Sensation Status: WFL        LUE PROM (degrees)  LUE PROM: Exceptions  LUE General PROM: limited d/t pain  L Shoulder Flex  0-180: 0-100; all other joints WFL  LUE AROM (degrees)  LUE AROM : Exceptions  L Shoulder Flexion 0-180: 0-40; all other joints WFL  Left Hand AROM (degrees)  Left Hand AROM: WFL  RUE PROM (degrees)  RUE PROM: Exceptions  R Shoulder Flex  0-180: 0-100; all other joints WFL  RUE AROM (degrees)  RUE AROM : Exceptions  R Shoulder Flexion 0-180: 0-20; all other joints WFL  Right Hand AROM (degrees)  Right Hand AROM: WFL  LUE Strength  Gross LUE Strength: Exceptions to [facility]  L Shoulder Flex: 3+/5  L Shoulder Ext: 3+/5  L Elbow Flex: 4-/5  L Elbow Ext: 4-/5  L Wrist Flex: 4-/5  L Wrist Ext: 4-/5  L Hand General: 4-/5  LUE Strength Comment: Limited strength d/t pain this date. RUE Strength  Gross RUE Strength: Exceptions to [facility]  R Shoulder Flex: 3/5  R Shoulder Ext: 3/5  R Elbow Flex: 4-/5  R Elbow Ext: 4-/5  R Wrist Flex: 4-/5  R Wrist Ext: 4-/5  R Hand General: 4-/5  RUE Strength Comment: Limited strength d/t pain this date.                    Plan   Plan  Times per week: 3-4x/wk  Current Treatment Recommendations: Strengthening, ROM, Patient/Caregiver

## 2021-10-01 NOTE — PLAN OF CARE
Nutrition Problem #1: Moderate malnutrition, In context of chronic illness  Intervention: Food and/or Nutrient Delivery: Start Oral Nutrition Supplement, Modify Current Diet  Nutritional Goals: Pt to meet >50% of est'd needs via PO daily

## 2021-10-01 NOTE — PROGRESS NOTES
[facility]  Internal Medicine Teaching Residency Program  Inpatient Daily Progress Note  ______________________________________________________________________________    Patient: Marcelino Mazariegos  YOB: 1965   [facility]:[id number]    Acct: [de-identified]     Room: [address] [place] [address]  Admit date: 9/29/2021  Today's date: 10/01/21  Number of days in the hospital: 2    SUBJECTIVE   Admitting Diagnosis: Hypotension  CC: Hypotension, dizziness. Patient examined at bedside. Labs and chart reviewed  Complains of generalized bodyaches  Prednisone restarted, taking at home for possible fibromyalgia  K 3.5  Continue  ml/hr    ROS:  Constitutional:  negative for chills, fevers, sweats  Respiratory:  negative for cough, dyspnea on exertion, hemoptysis, shortness of breath, wheezing  Cardiovascular:  negative for chest pain, chest pressure/discomfort, lower extremity edema, palpitations  Gastrointestinal:  negative for abdominal pain, constipation, diarrhea, nausea, vomiting  Neurological:  negative for dizziness, headache  BRIEF HISTORY     The patient is a pleasant 55 y.o. female with a past medical history of hypertension, chronic alcoholism admitted with hypotension. Patient states that she was feeling dizzy but did not lose consciousness. she has longstanding history of electrolyte abnormalities and had a recent admission in August due to hypokalemia.  Patient states she has not eaten in the past 3 days due to lack of appetite. She also reports to have nonbloody diarrhea and has been taking Imodium since her previous admission. She is having 4-6 episodes of loose stool and is taking Imodium which helped decrease the number of bowel movements. She denies any  nausea or vomiting.  Patient denies any complaints, does states that she is very tired.  Patient was hypotensive per EMS with initial systolic in the 04V.  Repeat was in the 90s.   On evaluation in the ED her blood pressure was 100/72, afebrile, saturating on room air. Labs reviewed and evaluated, sodium 133 potassium 3 chloride 92 BUN 7 creatinine 1.40 EtOH level 0.052. Hemoglobin 9.2, WBC 7.8. Magnesium is 0.7. Calcium 6.5  EKG showed prolonged QTC 5.4. NSR, No acute st t wave changes. Troponin 53>42    OBJECTIVE     Vital Signs:  /78   Pulse 89   Temp 99 °F (37.2 °C) (Oral)   Resp 18   Ht 5' 5\" (1.651 m)   Wt 164 lb (74.4 kg)   SpO2 96%   BMI 27.29 kg/m²     Temp (24hrs), Av.1 °F (37.3 °C), Min:98.8 °F (37.1 °C), Max:99.6 °F (37.6 °C)    No intake/output data recorded. Physical Exam:  Constitutional: This is a well developed, well nourished, 25-29.9 - Overweight 55y.o. year old female who is alert, oriented, cooperative and in no apparent distress. Head:normocephalic and atraumatic. EENT:  PERRLA. No conjunctival injections. Septum was midline, mucosa was without erythema, exudates or cobblestoning. No thrush was noted. Neck: Supple without thyromegaly. No elevated JVP. Trachea was midline. Respiratory: Chest was symmetrical without dullness to percussion. Breath sounds bilaterally were clear to auscultation. There were no wheezes, rhonchi or rales. There is no intercostal retraction or use of accessory muscles. No egophony noted. Cardiovascular: Regular without murmur, clicks, gallops or rubs. Abdomen: Slightly distended, non tender, no organomegaly  Lymphatic: No lymphadenopathy. Musculoskeletal: Normal curvature of the spine. No gross muscle weakness. Extremities:  No lower extremity edema, ulcerations, tenderness, varicosities or erythema. Muscle size, tone and strength are normal.  No involuntary movements are noted. Skin:  Warm and dry. Good color, turgor and pigmentation. No lesions or scars.   No cyanosis or clubbing  Neurological/Psychiatric: The patient's general behavior, level of consciousness, thought content and emotional status is normal. Medications:  Scheduled Medications:    sodium chloride flush  5-40 mL IntraVENous 2 times per day    potassium bicarb-citric acid  40 mEq Oral Daily    meloxicam  15 mg Oral Daily    heparin (porcine)  5,000 Units SubCUTAneous 3 times per day    folic acid  1 mg Oral Daily    thiamine  50 mg Oral Daily    multivitamin  1 tablet Oral Daily     Continuous Infusions:    sodium chloride      sodium chloride 175 mL/hr at 10/01/21 0208     PRN Medicationssodium chloride flush, 5-40 mL, PRN  potassium bicarb-citric acid, 40 mEq, PRN  albuterol, 2.5 mg, Q6H PRN  sodium chloride, 25 mL, PRN  ondansetron, 4 mg, Q8H PRN   Or  ondansetron, 4 mg, Q6H PRN  polyethylene glycol, 17 g, Daily PRN  acetaminophen, 650 mg, Q6H PRN   Or  acetaminophen, 650 mg, Q6H PRN  potassium chloride, 40 mEq, PRN   Or  potassium alternative oral replacement, 40 mEq, PRN   Or  potassium chloride, 10 mEq, PRN  potassium chloride, 10 mEq, PRN  magnesium sulfate, 2,000 mg, PRN        Diagnostic Labs:  CBC:   Recent Labs     09/29/21  1257 09/30/21  0617 10/01/21  0321   WBC 7.8 6.2 9.0   RBC 2.78* 2.35* 2.36*   HGB 9.2* 7.6* 7.6*   HCT 26.9* 22.2* 23.0*   MCV 96.8 94.5 97.5   RDW 13.9 14.0 14.1    254 335     BMP:   Recent Labs     09/29/21  1257 09/29/21  1903 09/30/21  0617 09/30/21  1133 09/30/21  2039 09/30/21  2231 10/01/21  0321   *  --  137  --   --   --  132*   K 3.0*   < > 3.4*   < > 2.9* 3.0* 3.2*   CL 92*  --  103  --   --   --  99   CO2 21 --  21  --   --   --  20   BUN 7  --  6  --   --   --  4*   CREATININE 1.40*  --  0.99*  --   --   --  0.74    < > = values in this interval not displayed. BNP: No results for input(s): BNP in the last 72 hours. PT/INR: No results for input(s): PROTIME, INR in the last 72 hours. APTT: No results for input(s): APTT in the last 72 hours. CARDIAC ENZYMES: No results for input(s): CKMB, CKMBINDEX, TROPONINI in the last 72 hours.     Invalid input(s): CKTOTAL;3  FASTING LIPID chart was generated using voice recognition Dragon dictation software. Although every effort was made to ensure the accuracy of this automated transcription, some errors in transcription may have occurred.

## 2021-10-01 NOTE — PROGRESS NOTES
Comprehensive Nutrition Assessment    Type and Reason for Visit:  Initial (Consult - poor appetite x 5 days; PNS: Wt loss, poor appetite)    Nutrition Recommendations/Plan:   -Suggest liberalizing diabetic diet to regular diet   -Suggest ensure enlive supplements TID   -Will monitor po intake and weights     Nutrition Assessment:  Pt admitted d/t hypotension. Pt stated UBW of 255 lbs x 1 yr ago and attributes her wt loss to diarrhea, nausea, emesis, poor appetite and not feeling well. Per EMR, no significant wt loss noted x 1 yr. Pt meets the criteria for moderate malnutrition. Pt stated that she did not eat any of her b-fast as she is afraid of having emesis/diarrhea. Pt agreed to nutritional supplements on her meal trays d/t poor po intake and wt loss. Will contact Carlos from [facility] regarding home nutritional supplements - pt would like to receive ONS at home. Will monitor. Malnutrition Assessment:  Malnutrition Status:   Moderate malnutrition    Context:  Chronic Illness     Findings of the 6 clinical characteristics of malnutrition:  Energy Intake:  7 - 75% or less estimated energy requirements for 1 month or longer  Weight Loss:   (no significnat wt loss x 1 yr per EMR; 35.7% wt loss x 1 yr per pt)     Body Fat Loss:  1 - Mild body fat loss Orbital, Triceps   Muscle Mass Loss:  1 - Mild muscle mass loss Clavicles (pectoralis & deltoids), Temples (temporalis), Scapula (trapezius)  Fluid Accumulation:  No significant fluid accumulation     Strength:  Not Performed    Estimated Daily Nutrient Needs:  Energy (kcal):  1.3-1.4 ~> 0993-2801 kcals/d; Weight Used for Energy Requirements:  Current     Protein (g):  1.5-1.7 gm/kg ~> 86-97 gms/d; Weight Used for Protein Requirements:  Ideal        Fluid (ml/day):  30 ~> 1700 mLs/d OR per MD discretion; Method Used for Fluid Requirements:  ml/Kg      Nutrition Related Findings:  Na 132; K 3.5; meds reviewed      Wounds:  None       Current Nutrition Therapies:

## 2021-10-02 LAB
ABSOLUTE EOS #: 0.04 K/UL (ref 0–0.44)
ABSOLUTE IMMATURE GRANULOCYTE: 0.07 K/UL (ref 0–0.3)
ABSOLUTE LYMPH #: 1.63 K/UL (ref 1.1–3.7)
ABSOLUTE MONO #: 1.48 K/UL (ref 0.1–1.2)
ANION GAP SERPL CALCULATED.3IONS-SCNC: 12 MMOL/L (ref 9–17)
ANION GAP SERPL CALCULATED.3IONS-SCNC: 12 MMOL/L (ref 9–17)
BASOPHILS # BLD: 0 % (ref 0–2)
BASOPHILS ABSOLUTE: 0.03 K/UL (ref 0–0.2)
BUN BLDV-MCNC: 3 MG/DL (ref 6–20)
BUN BLDV-MCNC: 3 MG/DL (ref 6–20)
BUN/CREAT BLD: ABNORMAL (ref 9–20)
BUN/CREAT BLD: ABNORMAL (ref 9–20)
CALCIUM SERPL-MCNC: 7.3 MG/DL (ref 8.6–10.4)
CALCIUM SERPL-MCNC: 7.5 MG/DL (ref 8.6–10.4)
CHLORIDE BLD-SCNC: 104 MMOL/L (ref 98–107)
CHLORIDE BLD-SCNC: 107 MMOL/L (ref 98–107)
CO2: 17 MMOL/L (ref 20–31)
CO2: 20 MMOL/L (ref 20–31)
CREAT SERPL-MCNC: 0.52 MG/DL (ref 0.5–0.9)
CREAT SERPL-MCNC: 0.56 MG/DL (ref 0.5–0.9)
DATE, STOOL #1: NORMAL
DATE, STOOL #2: NORMAL
DATE, STOOL #3: NORMAL
DIFFERENTIAL TYPE: ABNORMAL
EOSINOPHILS RELATIVE PERCENT: 0 % (ref 1–4)
FERRITIN: 572 UG/L (ref 13–150)
FOLATE: >20 NG/ML
GFR AFRICAN AMERICAN: >60 ML/MIN
GFR AFRICAN AMERICAN: >60 ML/MIN
GFR NON-AFRICAN AMERICAN: >60 ML/MIN
GFR NON-AFRICAN AMERICAN: >60 ML/MIN
GFR SERPL CREATININE-BSD FRML MDRD: ABNORMAL ML/MIN/{1.73_M2}
GLUCOSE BLD-MCNC: 105 MG/DL (ref 70–99)
GLUCOSE BLD-MCNC: 88 MG/DL (ref 70–99)
HCT VFR BLD CALC: 23.6 % (ref 36.3–47.1)
HEMOCCULT SP1 STL QL: NEGATIVE
HEMOCCULT SP2 STL QL: NORMAL
HEMOCCULT SP3 STL QL: NORMAL
HEMOGLOBIN: 7.6 G/DL (ref 11.9–15.1)
IMMATURE GRANULOCYTES: 1 %
IRON SATURATION: 20 % (ref 20–55)
IRON: 21 UG/DL (ref 37–145)
LACTIC ACID, WHOLE BLOOD: 1.3 MMOL/L (ref 0.7–2.1)
LACTIC ACID: NORMAL MMOL/L
LYMPHOCYTES # BLD: 15 % (ref 24–43)
MAGNESIUM: 1.5 MG/DL (ref 1.6–2.6)
MAGNESIUM: 1.6 MG/DL (ref 1.6–2.6)
MCH RBC QN AUTO: 32.3 PG (ref 25.2–33.5)
MCHC RBC AUTO-ENTMCNC: 32.2 G/DL (ref 28.4–34.8)
MCV RBC AUTO: 100.4 FL (ref 82.6–102.9)
MONOCYTES # BLD: 14 % (ref 3–12)
MYOGLOBIN: <21 NG/ML (ref 25–58)
NRBC AUTOMATED: 0 PER 100 WBC
PDW BLD-RTO: 13.9 % (ref 11.8–14.4)
PLATELET # BLD: 400 K/UL (ref 138–453)
PLATELET ESTIMATE: ABNORMAL
PMV BLD AUTO: 8.8 FL (ref 8.1–13.5)
POTASSIUM SERPL-SCNC: 3.1 MMOL/L (ref 3.7–5.3)
POTASSIUM SERPL-SCNC: 3.3 MMOL/L (ref 3.7–5.3)
POTASSIUM SERPL-SCNC: 4.6 MMOL/L (ref 3.7–5.3)
RBC # BLD: 2.35 M/UL (ref 3.95–5.11)
RBC # BLD: ABNORMAL 10*6/UL
SEG NEUTROPHILS: 69 % (ref 36–65)
SEGMENTED NEUTROPHILS ABSOLUTE COUNT: 7.38 K/UL (ref 1.5–8.1)
SODIUM BLD-SCNC: 136 MMOL/L (ref 135–144)
SODIUM BLD-SCNC: 136 MMOL/L (ref 135–144)
TIME, STOOL #1: NORMAL
TIME, STOOL #2: NORMAL
TIME, STOOL #3: NORMAL
TOTAL CK: 86 U/L (ref 26–192)
TOTAL IRON BINDING CAPACITY: 103 UG/DL (ref 250–450)
UNSATURATED IRON BINDING CAPACITY: 82 UG/DL (ref 112–347)
VITAMIN B-12: 1009 PG/ML (ref 232–1245)
VITAMIN D 25-HYDROXY: 41.1 NG/ML (ref 30–100)
WBC # BLD: 10.6 K/UL (ref 3.5–11.3)
WBC # BLD: ABNORMAL 10*3/UL

## 2021-10-02 PROCEDURE — 80048 BASIC METABOLIC PNL TOTAL CA: CPT

## 2021-10-02 PROCEDURE — 36415 COLL VENOUS BLD VENIPUNCTURE: CPT

## 2021-10-02 PROCEDURE — 2580000003 HC RX 258: Performed by: STUDENT IN AN ORGANIZED HEALTH CARE EDUCATION/TRAINING PROGRAM

## 2021-10-02 PROCEDURE — 2500000003 HC RX 250 WO HCPCS: Performed by: STUDENT IN AN ORGANIZED HEALTH CARE EDUCATION/TRAINING PROGRAM

## 2021-10-02 PROCEDURE — 83516 IMMUNOASSAY NONANTIBODY: CPT

## 2021-10-02 PROCEDURE — 82270 OCCULT BLOOD FECES: CPT

## 2021-10-02 PROCEDURE — 83735 ASSAY OF MAGNESIUM: CPT

## 2021-10-02 PROCEDURE — 6370000000 HC RX 637 (ALT 250 FOR IP): Performed by: STUDENT IN AN ORGANIZED HEALTH CARE EDUCATION/TRAINING PROGRAM

## 2021-10-02 PROCEDURE — 82705 FATS/LIPIDS FECES QUAL: CPT

## 2021-10-02 PROCEDURE — 83550 IRON BINDING TEST: CPT

## 2021-10-02 PROCEDURE — 82746 ASSAY OF FOLIC ACID SERUM: CPT

## 2021-10-02 PROCEDURE — 6360000002 HC RX W HCPCS: Performed by: STUDENT IN AN ORGANIZED HEALTH CARE EDUCATION/TRAINING PROGRAM

## 2021-10-02 PROCEDURE — 87506 IADNA-DNA/RNA PROBE TQ 6-11: CPT

## 2021-10-02 PROCEDURE — 84132 ASSAY OF SERUM POTASSIUM: CPT

## 2021-10-02 PROCEDURE — 82306 VITAMIN D 25 HYDROXY: CPT

## 2021-10-02 PROCEDURE — 85025 COMPLETE CBC W/AUTO DIFF WBC: CPT

## 2021-10-02 PROCEDURE — 83993 ASSAY FOR CALPROTECTIN FECAL: CPT

## 2021-10-02 PROCEDURE — 99232 SBSQ HOSP IP/OBS MODERATE 35: CPT | Performed by: INTERNAL MEDICINE

## 2021-10-02 PROCEDURE — 83540 ASSAY OF IRON: CPT

## 2021-10-02 PROCEDURE — 83605 ASSAY OF LACTIC ACID: CPT

## 2021-10-02 PROCEDURE — 82607 VITAMIN B-12: CPT

## 2021-10-02 PROCEDURE — 83520 IMMUNOASSAY QUANT NOS NONAB: CPT

## 2021-10-02 PROCEDURE — 83874 ASSAY OF MYOGLOBIN: CPT

## 2021-10-02 PROCEDURE — 82728 ASSAY OF FERRITIN: CPT

## 2021-10-02 PROCEDURE — 82550 ASSAY OF CK (CPK): CPT

## 2021-10-02 PROCEDURE — 6370000000 HC RX 637 (ALT 250 FOR IP)

## 2021-10-02 PROCEDURE — 99254 IP/OBS CNSLTJ NEW/EST MOD 60: CPT | Performed by: INTERNAL MEDICINE

## 2021-10-02 PROCEDURE — 2060000000 HC ICU INTERMEDIATE R&B

## 2021-10-02 RX ORDER — LOPERAMIDE HYDROCHLORIDE 2 MG/1
2 CAPSULE ORAL 3 TIMES DAILY PRN
Status: DISCONTINUED | OUTPATIENT
Start: 2021-10-02 | End: 2021-10-03 | Stop reason: HOSPADM

## 2021-10-02 RX ORDER — CALCIUM GLUCONATE 20 MG/ML
1000 INJECTION, SOLUTION INTRAVENOUS ONCE
Status: COMPLETED | OUTPATIENT
Start: 2021-10-02 | End: 2021-10-02

## 2021-10-02 RX ORDER — MAGNESIUM SULFATE 1 G/100ML
1000 INJECTION INTRAVENOUS ONCE
Status: COMPLETED | OUTPATIENT
Start: 2021-10-02 | End: 2021-10-02

## 2021-10-02 RX ORDER — POTASSIUM CHLORIDE 7.45 MG/ML
40 INJECTION INTRAVENOUS ONCE
Status: DISCONTINUED | OUTPATIENT
Start: 2021-10-02 | End: 2021-10-02

## 2021-10-02 RX ADMIN — MAGNESIUM SULFATE HEPTAHYDRATE 1000 MG: 1 INJECTION, SOLUTION INTRAVENOUS at 08:29

## 2021-10-02 RX ADMIN — LOPERAMIDE HYDROCHLORIDE 2 MG: 2 CAPSULE ORAL at 14:47

## 2021-10-02 RX ADMIN — HEPARIN SODIUM 5000 UNITS: 5000 INJECTION INTRAVENOUS; SUBCUTANEOUS at 22:29

## 2021-10-02 RX ADMIN — IRON SUCROSE 200 MG: 20 INJECTION, SOLUTION INTRAVENOUS at 10:02

## 2021-10-02 RX ADMIN — Medication 50 MG: at 08:29

## 2021-10-02 RX ADMIN — ALCOHOL 1 TABLET: 70.47 GEL TOPICAL at 08:30

## 2021-10-02 RX ADMIN — HEPARIN SODIUM 5000 UNITS: 5000 INJECTION INTRAVENOUS; SUBCUTANEOUS at 06:45

## 2021-10-02 RX ADMIN — CALCIUM GLUCONATE 1000 MG: 20 INJECTION, SOLUTION INTRAVENOUS at 09:14

## 2021-10-02 RX ADMIN — POTASSIUM CHLORIDE 40 MEQ: 1500 TABLET, EXTENDED RELEASE ORAL at 08:29

## 2021-10-02 RX ADMIN — ACETAMINOPHEN 650 MG: 325 TABLET ORAL at 05:00

## 2021-10-02 RX ADMIN — POTASSIUM BICARBONATE 40 MEQ: 391 TABLET, EFFERVESCENT ORAL at 08:29

## 2021-10-02 RX ADMIN — MELOXICAM 15 MG: 7.5 TABLET ORAL at 08:30

## 2021-10-02 RX ADMIN — ACETAMINOPHEN 650 MG: 325 TABLET ORAL at 14:47

## 2021-10-02 RX ADMIN — HEPARIN SODIUM 5000 UNITS: 5000 INJECTION INTRAVENOUS; SUBCUTANEOUS at 14:47

## 2021-10-02 RX ADMIN — SODIUM CHLORIDE, PRESERVATIVE FREE 10 ML: 5 INJECTION INTRAVENOUS at 08:28

## 2021-10-02 RX ADMIN — PREDNISONE 10 MG: 10 TABLET ORAL at 08:30

## 2021-10-02 RX ADMIN — SODIUM CHLORIDE: 9 INJECTION, SOLUTION INTRAVENOUS at 16:40

## 2021-10-02 RX ADMIN — ACETAMINOPHEN 650 MG: 325 TABLET ORAL at 21:14

## 2021-10-02 RX ADMIN — FOLIC ACID 1 MG: 1 TABLET ORAL at 08:30

## 2021-10-02 RX ADMIN — LOPERAMIDE HYDROCHLORIDE 2 MG: 2 CAPSULE ORAL at 16:40

## 2021-10-02 ASSESSMENT — PAIN DESCRIPTION - LOCATION
LOCATION: BACK
LOCATION: BACK

## 2021-10-02 ASSESSMENT — PAIN DESCRIPTION - PROGRESSION
CLINICAL_PROGRESSION: NOT CHANGED

## 2021-10-02 ASSESSMENT — ENCOUNTER SYMPTOMS
DIARRHEA: 1
SORE THROAT: 0
BACK PAIN: 0
RHINORRHEA: 0
VOMITING: 0
SINUS PRESSURE: 0
PHOTOPHOBIA: 0
CHEST TIGHTNESS: 0
ABDOMINAL PAIN: 0
SHORTNESS OF BREATH: 0
COUGH: 0
NAUSEA: 0

## 2021-10-02 ASSESSMENT — PAIN DESCRIPTION - ONSET: ONSET: ON-GOING

## 2021-10-02 ASSESSMENT — PAIN SCALES - GENERAL
PAINLEVEL_OUTOF10: 2
PAINLEVEL_OUTOF10: 9
PAINLEVEL_OUTOF10: 3
PAINLEVEL_OUTOF10: 3
PAINLEVEL_OUTOF10: 6
PAINLEVEL_OUTOF10: 0
PAINLEVEL_OUTOF10: 7

## 2021-10-02 ASSESSMENT — PAIN DESCRIPTION - ORIENTATION: ORIENTATION: LEFT

## 2021-10-02 ASSESSMENT — PAIN DESCRIPTION - DESCRIPTORS: DESCRIPTORS: ACHING;DISCOMFORT

## 2021-10-02 ASSESSMENT — PAIN DESCRIPTION - FREQUENCY: FREQUENCY: CONTINUOUS

## 2021-10-02 ASSESSMENT — PAIN DESCRIPTION - PAIN TYPE
TYPE: CHRONIC PAIN
TYPE: CHRONIC PAIN

## 2021-10-02 NOTE — CONSULTS
[facility]    GASTROENTEROLOGY CONSULT    Patient:   Shakir Herrera   :    1965   Facility:   [address]   Date:    10/2/2021  Admission Dx:  Hypocalcemia [E83.51]  Hypotension [I95.9]  ROXANN (acute kidney injury) ([facility] [address] [N17.9]  Requesting physician: Roro Dimas MD  Reason for consult:  Chronic diarrhea, suspicion for malabsorptive diarrhea  Chief Complaint : Hypotension, dizziness    SUBJECTIVE     HISTORY OF PRESENT ILLNESS  Shakir Herrera is a 55 y.o. female with PMH of HTN, chronic diarrhea, Chronic alcohol use who was brought to the ER by EMS due to concerns of syncope. Patient was reportedly on the porch when she had a syncopal event and her  helped her to the ground and called EMS. On initial presentation to the ER patient was found to be hypotensive and have multiple electrolyte abnormalities such as hypokalemia, hyponatremia, ROXANN, hypomagnesemia. She was admitted for workup of syncope. GI was consulted as patient has history of clear watery diarrhea about 7 episodes a day for the last one year. Patient was evaluated in 2020 where she underwent EGD and Colonoscopy with biopsies to evaluate this diarrhea. Biopsies were unremarkable. On my evaluation today, patient reports chronic ongoing diarrhea that is improved with imodium. She reports 50lb weight loss though her weight in 2021 was 170lb and her weight on this visit is 164lb. She states that the diarrhea is not effected by any dietary changes but states that she drinks a lot of milk. She reports feeling better today than on admission.     OBJECTIVE:     PAST MEDICAL/SURGICAL HISTORY  Past Medical History:   Diagnosis Date    Angioedema 2017    from lisinopril    Anxiety     Asthma     mild persistent asthma, on home resp medications for control    Bipolar disorder (HCC)     Claustrophobia     Depression     GERD (gastroesophageal reflux disease)  Hypertension     Hypertrophic cardiomyopathy ([facility] [address]     Lung nodules     MRSA (methicillin resistant Staphylococcus aureus)     rt hip    Murmur     see cardiac echo result    OA (osteoarthritis)     JONES (obstructive sleep apnea)     Thyroid nodule     Type 2 diabetes mellitus without complication, without long-term current use of insulin ([facility] [address] 12/7/2018     Past Surgical History:   Procedure Laterality Date    BUNIONECTOMY Bilateral 12/7/2020    [facility]  BUNIONECTOMY, [address] performed by Loc Ponce DPM at [facility]      bx    COLONOSCOPY N/A 12/3/2020    COLONOSCOPY WITH BIOPSY performed by Lee Goodpasture, MD at [address] Bilateral 12/07/2020    [facility] bunionectomy, right 2nd hammer toe repair     HAMMER TOE SURGERY Right 12/7/2020    2ND TOE HAMMER REPAIR performed by Loc Ponce DPM at [facility]      partial hyst    OTHER SURGICAL HISTORY  8/24/2015    MRI under anesthesia    THYROID SURGERY      bilat bx    UPPER GASTROINTESTINAL ENDOSCOPY      UPPER GASTROINTESTINAL ENDOSCOPY N/A 12/3/2020    EGD BIOPSY performed by Lee Goodpasture, MD at [facility]       ALLERGIES:  Allergies   Allergen Reactions    Bee Venom Anaphylaxis    Iodine Anaphylaxis and Rash    Lisinopril Swelling and Anaphylaxis     Angioedema    Shellfish-Derived Products Anaphylaxis and Rash     ANGIOEDEMA       HOME MEDICATIONS:  Prior to Admission medications    Medication Sig Start Date End Date Taking?  Authorizing Provider   meloxicam (MOBIC) 15 MG tablet TAKE 1 TABLET BY MOUTH DAILY  9/23/21  Yes Ayana Shaffer MD   hydroxychloroquine (PLAQUENIL) 200 MG tablet TAKE 1 TABLET BY MOUTH 2 TIMES DAILY  9/23/21  Yes Ayana Shaffer MD   omeprazole (PRILOSEC) 20 MG delayed release capsule TAKE 1 CAPSULE BY MOUTH DAILY  9/23/21  Yes Ayana Shaffer MD   atorvastatin (LIPITOR) 20 MG tablet TAKE 1 TABLET BY MOUTH DAILY  9/23/21  Yes Bobo Carmen Solum, MD   Calcium Carbonate-Vitamin D (OYSTER SHELL CALCIUM/D) 500-200 MG-UNIT TABS TAKE 1 TAB BY MOUTH ONCE A DAY  9/23/21  Yes Catherine Linares MD   potassium chloride (KLOR-CON M) 20 MEQ extended release tablet Take 2 tablets by mouth 2 times daily After BMP two times, first after 1 week and if potassium is low then continue taking same dose with repeat BMP in a week. and if potassium is normal then take once daily. Do not crush, chew, or suck on tablet.  8/21/21  Yes Viet Kunz MD   Multiple Vitamin (MULTIVITAMIN) TABS tablet Take 1 tablet by mouth daily 8/22/21  Yes Viet Kunz MD   loratadine (CLARITIN) 10 MG tablet TAKE 1 tab BY MOUTH DAILY  8/18/21  Yes CECILIA Cheek - CNP   vitamin D3 (CHOLECALCIFEROL) 25 MCG (1000 UT) TABS tablet TAKE 1 TABLET BY MOUTH DAILY  7/16/21  Yes Catherine Linares MD   thiamine mononitrate 100 MG tablet TAKE 1 TABLET BY MOUTH ONCE DAILY  7/14/21  Yes Catherine Linares MD   FLUoxetine (PROZAC) 40 MG capsule Take 40 mg by mouth daily   Yes Historical Provider, MD   acetaminophen (APAP EXTRA STRENGTH) 500 MG tablet Take 2 tablets by mouth every 6 hours as needed for Pain 6/11/21  Yes Maximo Leach MD   folic acid (FOLVITE) 1 MG tablet Take 1 tablet by mouth daily 4/27/21  Yes Maximo Leach MD   ferrous sulfate (IRON 325) 325 (65 Fe) MG tablet Take 325 mg by mouth daily (with breakfast)   Yes Historical Provider, MD   atenolol (TENORMIN) 50 MG tablet Take 1 tablet by mouth daily 1/22/21  Yes Wily Weldon MD   amLODIPine (NORVASC) 10 MG tablet TAKE 1 TABLET BY MOUTH DAILY  1/8/21  Yes Wily Weldon MD   diclofenac sodium (VOLTAREN) 1 % GEL Apply 4 g topically 2 times daily 1/6/21  Yes Robin Tubbs DPM   albuterol sulfate (PROAIR RESPICLICK) 730 (90 Base) MCG/ACT aerosol powder inhalation Inhale 2 puffs into the lungs every 6 hours as needed for Wheezing or Shortness of Breath 1/15/20  Yes Omega Dowling MD   lurasidone (LATUDA) 60 MG TABS tablet Take 60 mg by mouth nightly   Yes Historical Provider, MD   atorvastatin (LIPITOR) 20 MG tablet TAKE 1 TABLET BY MOUTH DAILY  5/12/21   Jose Zuniga MD   Calcium Carbonate-Vitamin D (OYSTER SHELL CALCIUM/D) 500-200 MG-UNIT TABS TAKE 1 TAB BY MOUTH ONCE A DAY  1/22/21   Jose Zuniga MD       CURRENT MEDICATIONS:  Scheduled Meds:   iron sucrose  200 mg IntraVENous Q24H    predniSONE  10 mg Oral Daily    sodium chloride flush  5-40 mL IntraVENous 2 times per day    potassium bicarb-citric acid  40 mEq Oral Daily    meloxicam  15 mg Oral Daily    heparin (porcine)  5,000 Units SubCUTAneous 3 times per day    folic acid  1 mg Oral Daily    thiamine  50 mg Oral Daily    multivitamin  1 tablet Oral Daily     Continuous Infusions:   sodium chloride      sodium chloride 75 mL/hr at 10/02/21 0824     PRN Meds:loperamide, potassium chloride **OR** potassium alternative oral replacement **OR** potassium chloride, sodium chloride flush, potassium bicarb-citric acid, albuterol, sodium chloride, ondansetron **OR** ondansetron, polyethylene glycol, acetaminophen **OR** acetaminophen, potassium chloride, magnesium sulfate    SOCIAL HISTORY:     Tobacco:   reports that she quit smoking about 28 years ago. Her smoking use included cigarettes. She has a 10.00 pack-year smoking history. She has never used smokeless tobacco.  Alcohol:   reports previous alcohol use of about 12.0 standard drinks of alcohol per week. Illicit drugs:  reports previous drug use. Drug: Cocaine. FAMILY HISTORY:     Family History   Problem Relation Age of Onset    Stroke Mother     Hypertension Father     Cancer Brother         bone    Lung Cancer Maternal Aunt     Cancer Maternal Grandmother         eye     Other Sister         aneurysm    Heart Disease Sister        REVIEW OF SYSTEMS:    Review of Systems   Constitutional: Positive for fatigue. Negative for chills, diaphoresis and fever.    HENT: Negative for rhinorrhea, sinus pressure and sore throat. Eyes: Negative for photophobia. Respiratory: Negative for cough, chest tightness and shortness of breath. Cardiovascular: Negative for chest pain. Gastrointestinal: Positive for diarrhea. Negative for abdominal pain, nausea and vomiting. Genitourinary: Negative for dysuria, frequency and urgency. Musculoskeletal: Negative for back pain and neck stiffness. Skin: Negative for rash. Neurological: Positive for light-headedness. Negative for seizures and speech difficulty. Psychiatric/Behavioral: Negative for agitation, confusion and decreased concentration. PHYSICAL EXAM:    /89   Pulse 88   Temp 98.6 °F (37 °C) (Oral)   Resp 18   Ht 5' 5\" (1.651 m)   Wt 164 lb (74.4 kg)   SpO2 97%   BMI 27.29 kg/m²     Physical Exam -  Constitutional:  Alert, cooperative and no distress. Mental Status:  Oriented to person, place and time and normal affect. Lungs:  Bilateral air entry present, lung fields clear. Normal effort. Heart:  Regular rate and rhythm, no murmur. Abdomen:  Soft, nontender, nondistended, normal bowel sounds. Extremities:  No edema, redness, tenderness in the calves. Skin:  Warm, dry, no gross lesions or rashes. LABS AND IMAGING:     CBC  Recent Labs     09/30/21  0617 10/01/21  0321 10/02/21  0526   WBC 6.2 9.0 10.6   HGB 7.6* 7.6* 7.6*   HCT 22.2* 23.0* 23.6*   MCV 94.5 97.5 100.4   MCH 32.3 32.2 32.3   MCHC 34.2 33.0 32.2    335 400       BMP  Recent Labs     10/01/21  0321 10/01/21  0659 10/01/21  1419 10/02/21  0526 10/02/21  0737   *  --   --  136 136   K 3.2*   < > 3.7 3.1* 3.3*   CL 99  --   --  107 104   CO2 20  --   --  17* 20   BUN 4*  --   --  3* 3*   CREATININE 0.74  --   --  0.56 0.52   GLUCOSE 111*  --   --  105* 88   CALCIUM 6.9*  --   --  7.3* 7.5*    < > = values in this interval not displayed.        LFTS  Recent Labs     10/01/21  1113   ALKPHOS 204*   ALT 9   AST 31   PROT 6.1*   BILITOT 0.53   BILIDIR 0.21   LABALBU 3.0* AMYLASE/LIPASE/AMMONIA  No results for input(s): AMYLASE, LIPASE, AMMONIA in the last 72 hours. PT/INR  No results for input(s): PROTIME, INR in the last 72 hours. ANEMIA STUDIES  Recent Labs     10/02/21  0737   IRON 21*   LABIRON 20   TIBC 103*   UIBC 82*   FERRITIN 572*       IMAGING  XR CHEST PORTABLE  Result Date: 9/29/2021  Low volume lungs with mild streaky bibasilar atelectasis       IMPRESSION:     Chronic Diarrhea for about 1 year  Chronic Alcohol use  Dizziness and Hypotension  ROXANN - resolved  Anemia    Old records, labs and imaging reviewed. PLAN   - Recommend alcohol cessation and avoid NSAID use. - Recommend lactose free diet and/or lactase supplementation.   - Will follow up on stool studies to rule out infective causes. - She seems to respond well to imodium. Agree with continuation of imodium.  - We will continue to follow. This plan was formulated in collaboration with Dr. Cesar Preston .  Thank you for allowing us to participate in the care of your patient.     Shade Velásquez MD  PGY-3, Internal Medicine Resident  [facility]         10/2/2021, 1:11 PM

## 2021-10-02 NOTE — PROGRESS NOTES
Patient had a 15 beat of VTACH, asymptomatic to the run, vitals back stable, will continue to monitor.

## 2021-10-02 NOTE — PLAN OF CARE
Problem: Pain:  Goal: Pain level will decrease  Description: Pain level will decrease  Outcome: Ongoing  Goal: Control of acute pain  Description: Control of acute pain  Outcome: Ongoing  Goal: Control of chronic pain  Description: Control of chronic pain  Outcome: Ongoing     Problem: Falls - Risk of:  Goal: Will remain free from falls  Description: Will remain free from falls  Outcome: Ongoing  Goal: Absence of physical injury  Description: Absence of physical injury  Outcome: Ongoing     Problem: Nutrition  Goal: Optimal nutrition therapy  Outcome: Ongoing     Problem: Musculor/Skeletal Functional Status  Goal: Highest potential functional level  Outcome: Ongoing

## 2021-10-02 NOTE — PROGRESS NOTES
Patient alert orient x 3, SR on the monitor, room air lungs clear, abd dist +bsx4, voids per BSC, skin warm dry intact, pos pulses, no edema, medicated with prn pain meds will continue to monitor.

## 2021-10-02 NOTE — PROGRESS NOTES
[facility]  Internal Medicine Teaching Residency Program  Inpatient Daily Progress Note  ______________________________________________________________________________    Patient: Victorino Leyden  YOB: 1965   TBB:[id number]    Acct: [de-identified]     Room: [address] [place] [address]  Admit date: 9/29/2021  Today's date: 10/02/21  Number of days in the hospital: 3    SUBJECTIVE   Admitting Diagnosis: Hypotension  CC:   Pt examined at bedside. Chart & results reviewed. Myalgias improved  Vitals stable  6 episodes of large volume watery diarrhea during last 24 hrs      ROS:  Constitutional:  negative for chills, fevers, sweats  Respiratory:  negative for cough, dyspnea on exertion, hemoptysis, shortness of breath, wheezing  Cardiovascular:  negative for chest pain, chest pressure/discomfort, lower extremity edema, palpitations  Gastrointestinal:  negative for abdominal pain, constipation, diarrhea, nausea, vomiting  Neurological:  Positive for diziness  BRIEF HISTORY     The patient is a pleasant 56 y. o. female with a past medical history of hypertension, chronic alcoholism admitted with hypotension.  Patient states that she was feeling dizzy but did not lose consciousness. she has longstanding history of electrolyte abnormalities and had a recent admission in August due to hypokalemia.  Patient states she has not eaten in the past 3 days due to lack of appetite.  She also reports to have nonbloody diarrhea and has been taking Imodium since her previous admission. Batool Harris is having 4-6 episodes of loose stool and is taking Imodium which helped decrease the number of bowel movements. She denies any  nausea or vomiting.  Patient denies any complaints, does states that she is very tired.  Patient was hypotensive per EMS with initial systolic in the 04E.  Repeat was in the 90s. On evaluation in the ED her blood pressure was 100/72, afebrile, saturating on room air.   Labs reviewed and evaluated, sodium 133 potassium 3 chloride 92 BUN 7 creatinine 1.40 EtOH level 0.052. Hemoglobin 9.2, WBC 7.8. Magnesium is 0.7. Calcium 6.5  EKG showed prolonged QTC 5.4. NSR, No acute st t wave changes. Troponin 53>42      OBJECTIVE     Vital Signs:  /88   Pulse 95   Temp 99.1 °F (37.3 °C) (Oral)   Resp 14   Ht 5' 5\" (1.651 m)   Wt 164 lb (74.4 kg)   SpO2 96%   BMI 27.29 kg/m²     Temp (24hrs), Av.3 °F (37.4 °C), Min:98.9 °F (37.2 °C), Max:99.9 °F (37.7 °C)    In: 100   Out: 2300 [Urine:2000]    Physical Exam:  Constitutional: This is a well developed, well nourished, who is alert, oriented, cooperative and in no apparent distress. Head:normocephalic and atraumatic. EENT:  PERRLA. No conjunctival injections. Septum was midline, mucosa was without erythema, exudates or cobblestoning. No thrush was noted. Neck: Supple without thyromegaly. No elevated JVP. Trachea was midline. Respiratory: Chest was symmetrical without dullness to percussion. Breath sounds bilaterally were clear to auscultation. There were no wheezes, rhonchi or rales. There is no intercostal retraction or use of accessory muscles. No egophony noted. Cardiovascular: Regular without murmur, clicks, gallops or rubs. Abdomen: Slightly rounded and soft without organomegaly. No rebound, rigidity or guarding was appreciated. Lymphatic: No lymphadenopathy. Musculoskeletal: Normal curvature of the spine. No gross muscle weakness. Extremities:  No lower extremity edema, ulcerations, tenderness, varicosities or erythema. Muscle size, tone and strength are normal.  No involuntary movements are noted. Skin:  Warm and dry. Good color, turgor and pigmentation. No lesions or scars.   No cyanosis or clubbing  Neurological/Psychiatric: The patient's general behavior, level of consciousness, thought content and emotional status is normal.        Medications:  Scheduled Medications:    predniSONE  10 mg Oral Daily  sodium chloride flush  5-40 mL IntraVENous 2 times per day    potassium bicarb-citric acid  40 mEq Oral Daily    meloxicam  15 mg Oral Daily    heparin (porcine)  5,000 Units SubCUTAneous 3 times per day    folic acid  1 mg Oral Daily    thiamine  50 mg Oral Daily    multivitamin  1 tablet Oral Daily     Continuous Infusions:    sodium chloride      sodium chloride 175 mL/hr at 10/01/21 2021     PRN Medicationspotassium chloride, 40 mEq, PRN   Or  potassium alternative oral replacement, 40 mEq, PRN   Or  potassium chloride, 10 mEq, PRN  sodium chloride flush, 5-40 mL, PRN  potassium bicarb-citric acid, 40 mEq, PRN  albuterol, 2.5 mg, Q6H PRN  sodium chloride, 25 mL, PRN  ondansetron, 4 mg, Q8H PRN   Or  ondansetron, 4 mg, Q6H PRN  polyethylene glycol, 17 g, Daily PRN  acetaminophen, 650 mg, Q6H PRN   Or  acetaminophen, 650 mg, Q6H PRN  potassium chloride, 10 mEq, PRN  magnesium sulfate, 2,000 mg, PRN        Diagnostic Labs:  CBC:   Recent Labs     09/30/21  0617 10/01/21  0321 10/02/21  0526   WBC 6.2 9.0 10.6   RBC 2.35* 2.36* 2.35*   HGB 7.6* 7.6* 7.6*   HCT 22.2* 23.0* 23.6*   MCV 94.5 97.5 100.4   RDW 14.0 14.1 13.9    335 400     BMP:   Recent Labs     09/30/21  0617 09/30/21  1133 10/01/21  0321 10/01/21  0659 10/01/21  1113 10/01/21  1419 10/02/21  0526     --  132*  --   --   --  136   K 3.4*   < > 3.2*   < > 4.1 3.7 3.1*     --  99  --   --   --  107   CO2 21  --  20  --   --   --  17*   BUN 6  --  4*  --   --   --  3*   CREATININE 0.99*  --  0.74  --   --   --  0.56    < > = values in this interval not displayed. BNP: No results for input(s): BNP in the last 72 hours. PT/INR: No results for input(s): PROTIME, INR in the last 72 hours. APTT: No results for input(s): APTT in the last 72 hours. CARDIAC ENZYMES: No results for input(s): CKMB, CKMBINDEX, TROPONINI in the last 72 hours.     Invalid input(s): CKTOTAL;3  FASTING LIPID PANEL:  Lab Results   Component Value Date CHOL 248 (H) 03/21/2021     03/21/2021    TRIG 47 03/21/2021     LIVER PROFILE:   Recent Labs     10/01/21  1113   AST 31   ALT 9   BILIDIR 0.21   BILITOT 0.53   ALKPHOS 204*      MICROBIOLOGY:   Lab Results   Component Value Date/Time    CULTURE NO GROWTH 3 DAYS 09/29/2021 06:55 PM       Imaging:    XR CHEST PORTABLE    Result Date: 9/29/2021  Low volume lungs with mild streaky bibasilar atelectasis       ASSESSMENT & PLAN     ASSESSMENT / PLAN:   This is a 48 y. o. female with a past medical history of hypertension and chronic electrolyte abnormalities admitted with concerns of hypotension and dizziness.  Patient was hypotensive per EMS with initial systolic in the 02H.  Repeat was in the 90s.   She also has significant electrolyte abnormalities, hypokalemia, hypomagnesemia, hypocalcemia.  Electrolytes are replaced per ss. Chronic diarrhea:likely malabsorption, upper and lower GI endoscopy done 6 months ago for STAS, Will consult GI, start on imodium tid     Dizziness:likely due dehydration secondary to diarrhea, poor oral intake,alcohol abuse in the setting of chronic anemia. Resolved with fluid and electrolyte correction. Work up for anemia and possible Conn's syndrome     Hypotension-likely due to diarrhea and decreased oral intake, Still having multiple episodes of watery diarrhea,Continue IV normal saline at the rate of 75 ml/hr, BP and HR: WNL,  still anorexic     Hypokalemia-likely secondary to decreased oral intake, diarrhea, renal loss or hypomagnesemia. Potassium 3.1, on potassium 40 meq PO daily, unable to take orally, add 10 mEq IV PRN. Work up for [employer]     Hypomagnesemia: 1.5, replace     Hypocalcemia: current calcium 7.3, monitor    Anemia: chronic normocytic hypochromic, Hb 7.6, do iron studies and hemoccult blood testing.  Iron replacement x 3 days    ROXANN: resolved        DVT ppx: EPC cuffs  GI ppx: None     PT/OT/SW: Consulted  Discharge Jaycee Puldio MD  Internal Medicine Resident, PGY-1  [facility]; [place], [place]  10/2/2021, 7:15 AM   Attending Physician Statement  I have discussed the care of [address], including pertinent history and exam findings,  with the resident. I have seen and examined the patient and the key elements of all parts of the encounter have been performed by me. I agree with the assessment, plan and orders as documented by the resident with additions . Treatment plan Discussed with nursing staff in detail , all questions answered . Electronically signed by Alicia Weber MD on   10/2/21 at 4:12 PM EDT    Please note that this chart was generated using voice recognition Dragon dictation software. Although every effort was made to ensure the accuracy of this automated transcription, some errors in transcription may have occurred.

## 2021-10-03 VITALS
HEART RATE: 90 BPM | SYSTOLIC BLOOD PRESSURE: 145 MMHG | OXYGEN SATURATION: 98 % | BODY MASS INDEX: 27.32 KG/M2 | RESPIRATION RATE: 16 BRPM | WEIGHT: 164 LBS | DIASTOLIC BLOOD PRESSURE: 100 MMHG | HEIGHT: 65 IN | TEMPERATURE: 98.3 F

## 2021-10-03 LAB
ABSOLUTE EOS #: 0.1 K/UL (ref 0–0.44)
ABSOLUTE IMMATURE GRANULOCYTE: 0.07 K/UL (ref 0–0.3)
ABSOLUTE LYMPH #: 2.36 K/UL (ref 1.1–3.7)
ABSOLUTE MONO #: 1.06 K/UL (ref 0.1–1.2)
ALDOSTERONE COMMENT: NORMAL
ALDOSTERONE: 4.6 NG/DL
ANION GAP SERPL CALCULATED.3IONS-SCNC: 12 MMOL/L (ref 9–17)
BASOPHILS # BLD: 1 % (ref 0–2)
BASOPHILS ABSOLUTE: 0.06 K/UL (ref 0–0.2)
BUN BLDV-MCNC: 3 MG/DL (ref 6–20)
BUN/CREAT BLD: ABNORMAL (ref 9–20)
CALCIUM SERPL-MCNC: 8.4 MG/DL (ref 8.6–10.4)
CAMPYLOBACTER PCR: NORMAL
CHLORIDE BLD-SCNC: 103 MMOL/L (ref 98–107)
CO2: 21 MMOL/L (ref 20–31)
CREAT SERPL-MCNC: 0.51 MG/DL (ref 0.5–0.9)
DIFFERENTIAL TYPE: ABNORMAL
E COLI ENTEROTOXIGENIC PCR: NORMAL
EOSINOPHILS RELATIVE PERCENT: 1 % (ref 1–4)
GFR AFRICAN AMERICAN: >60 ML/MIN
GFR NON-AFRICAN AMERICAN: >60 ML/MIN
GFR SERPL CREATININE-BSD FRML MDRD: ABNORMAL ML/MIN/{1.73_M2}
GFR SERPL CREATININE-BSD FRML MDRD: ABNORMAL ML/MIN/{1.73_M2}
GLUCOSE BLD-MCNC: 85 MG/DL (ref 70–99)
HCT VFR BLD CALC: 24.9 % (ref 36.3–47.1)
HEMOGLOBIN: 7.9 G/DL (ref 11.9–15.1)
IMMATURE GRANULOCYTES: 1 %
LYMPHOCYTES # BLD: 24 % (ref 24–43)
MAGNESIUM: 1.3 MG/DL (ref 1.6–2.6)
MCH RBC QN AUTO: 32.2 PG (ref 25.2–33.5)
MCHC RBC AUTO-ENTMCNC: 31.7 G/DL (ref 28.4–34.8)
MCV RBC AUTO: 101.6 FL (ref 82.6–102.9)
MONOCYTES # BLD: 11 % (ref 3–12)
NRBC AUTOMATED: 0 PER 100 WBC
PDW BLD-RTO: 14.1 % (ref 11.8–14.4)
PLATELET # BLD: 537 K/UL (ref 138–453)
PLATELET ESTIMATE: ABNORMAL
PLESIOMONAS SHIGELLOIDES PCR: NORMAL
PMV BLD AUTO: 9.1 FL (ref 8.1–13.5)
POTASSIUM SERPL-SCNC: 3.5 MMOL/L (ref 3.7–5.3)
RBC # BLD: 2.45 M/UL (ref 3.95–5.11)
RBC # BLD: ABNORMAL 10*6/UL
SALMONELLA PCR: NORMAL
SEG NEUTROPHILS: 64 % (ref 36–65)
SEGMENTED NEUTROPHILS ABSOLUTE COUNT: 6.4 K/UL (ref 1.5–8.1)
SHIGATOXIN GENE PCR: NORMAL
SHIGELLA SP PCR: NORMAL
SODIUM BLD-SCNC: 136 MMOL/L (ref 135–144)
SPECIMEN DESCRIPTION: NORMAL
VIBRIO PCR: NORMAL
WBC # BLD: 10.1 K/UL (ref 3.5–11.3)
WBC # BLD: ABNORMAL 10*3/UL
YERSINIA ENTEROCOLITICA PCR: NORMAL

## 2021-10-03 PROCEDURE — 6370000000 HC RX 637 (ALT 250 FOR IP): Performed by: STUDENT IN AN ORGANIZED HEALTH CARE EDUCATION/TRAINING PROGRAM

## 2021-10-03 PROCEDURE — 80048 BASIC METABOLIC PNL TOTAL CA: CPT

## 2021-10-03 PROCEDURE — 2580000003 HC RX 258: Performed by: STUDENT IN AN ORGANIZED HEALTH CARE EDUCATION/TRAINING PROGRAM

## 2021-10-03 PROCEDURE — 85025 COMPLETE CBC W/AUTO DIFF WBC: CPT

## 2021-10-03 PROCEDURE — 83735 ASSAY OF MAGNESIUM: CPT

## 2021-10-03 PROCEDURE — 36415 COLL VENOUS BLD VENIPUNCTURE: CPT

## 2021-10-03 PROCEDURE — 6360000002 HC RX W HCPCS: Performed by: STUDENT IN AN ORGANIZED HEALTH CARE EDUCATION/TRAINING PROGRAM

## 2021-10-03 PROCEDURE — 99232 SBSQ HOSP IP/OBS MODERATE 35: CPT | Performed by: INTERNAL MEDICINE

## 2021-10-03 RX ORDER — LOPERAMIDE HYDROCHLORIDE 2 MG/1
2 CAPSULE ORAL 3 TIMES DAILY PRN
Qty: 30 CAPSULE | Refills: 0 | Status: SHIPPED | OUTPATIENT
Start: 2021-10-03 | End: 2021-10-13

## 2021-10-03 RX ORDER — PREDNISONE 10 MG/1
10 TABLET ORAL DAILY
Qty: 10 TABLET | Refills: 0 | Status: SHIPPED | OUTPATIENT
Start: 2021-10-03 | End: 2021-10-12 | Stop reason: ALTCHOICE

## 2021-10-03 RX ORDER — LANOLIN ALCOHOL/MO/W.PET/CERES
400 CREAM (GRAM) TOPICAL DAILY
Qty: 30 TABLET | Refills: 0 | Status: ON HOLD | OUTPATIENT
Start: 2021-10-03 | End: 2022-02-13 | Stop reason: HOSPADM

## 2021-10-03 RX ADMIN — MELOXICAM 15 MG: 7.5 TABLET ORAL at 08:18

## 2021-10-03 RX ADMIN — PREDNISONE 10 MG: 10 TABLET ORAL at 08:18

## 2021-10-03 RX ADMIN — IRON SUCROSE 200 MG: 20 INJECTION, SOLUTION INTRAVENOUS at 08:17

## 2021-10-03 RX ADMIN — FOLIC ACID 1 MG: 1 TABLET ORAL at 08:18

## 2021-10-03 RX ADMIN — POTASSIUM BICARBONATE 40 MEQ: 391 TABLET, EFFERVESCENT ORAL at 08:17

## 2021-10-03 RX ADMIN — HEPARIN SODIUM 5000 UNITS: 5000 INJECTION INTRAVENOUS; SUBCUTANEOUS at 05:51

## 2021-10-03 RX ADMIN — Medication 50 MG: at 08:18

## 2021-10-03 RX ADMIN — ALCOHOL 1 TABLET: 70.47 GEL TOPICAL at 08:18

## 2021-10-03 RX ADMIN — MAGNESIUM SULFATE 2000 MG: 2 INJECTION INTRAVENOUS at 10:16

## 2021-10-03 ASSESSMENT — PAIN DESCRIPTION - FREQUENCY: FREQUENCY: CONTINUOUS

## 2021-10-03 ASSESSMENT — PAIN DESCRIPTION - ONSET: ONSET: ON-GOING

## 2021-10-03 ASSESSMENT — PAIN DESCRIPTION - DESCRIPTORS: DESCRIPTORS: ACHING

## 2021-10-03 ASSESSMENT — PAIN SCALES - GENERAL
PAINLEVEL_OUTOF10: 4
PAINLEVEL_OUTOF10: 4
PAINLEVEL_OUTOF10: 5

## 2021-10-03 ASSESSMENT — PAIN DESCRIPTION - LOCATION: LOCATION: GENERALIZED

## 2021-10-03 ASSESSMENT — PAIN DESCRIPTION - PAIN TYPE: TYPE: ACUTE PAIN

## 2021-10-03 NOTE — PROGRESS NOTES
THE [facility] AT [place] [facility] Progress Note    Delmy Ledesma is a 55 y.o. female patient. Hospitalization Day:4      Consult reason:   Chronic diarrhea, suspicion for malabsorptive diarrhea. Subjective:  Pt was seen and examined at bedside. Resting comfortably in the bed. Vitals stable. Labs reviewed. K-3.5, alkaline phosphatase 204, hemoglobin 7.9. No acute events overnight. Patient had no episodes of diarrhea after taking 2 tablets of Imodium. VITALS:  BP (!) 159/101   Pulse 72   Temp 98.3 °F (36.8 °C) (Oral)   Resp 16   Ht 5' 5\" (1.651 m)   Wt 164 lb (74.4 kg)   SpO2 98%   BMI 27.29 kg/m²   TEMPERATURE:  Current - Temp: 98.3 °F (36.8 °C);  Max - Temp  Av.5 °F (36.9 °C)  Min: 98.3 °F (36.8 °C)  Max: 98.9 °F (37.2 °C)    Physical Assessment:  General appearance:  alert, cooperative and no distress  Mental Status:  oriented to person, place and time and normal affect  Lungs:  clear to auscultation bilaterally, normal effort  Heart:  regular rate and rhythm, no murmur  Abdomen:  soft, nontender, nondistended, normal bowel sounds, no masses, hepatomegaly, splenomegaly  Extremities:  no edema, redness, tenderness in the calves  Skin:  warm, dry, no gross lesions or rashes    Data Review:  LABS and IMAGING:     CBC  Recent Labs     10/01/21  0321 10/02/21  0526   WBC 9.0 10.6   HGB 7.6* 7.6*   HCT 23.0* 23.6*   MCV 97.5 100.4   MCHC 33.0 32.2   RDW 14.1 13.9    400       Immature PLTs   Lab Results   Component Value Date    PLTFLUORE 1,103 2021       ANEMIA STUDIES  Recent Labs     10/02/21  0737 10/02/21  1224   LABIRON 20  --    TIBC 103*  --    IRON 21*  --    FERRITIN 572*  --    [id number]  --  1009   FOLATE  --  >20.0       BMP  Recent Labs     10/01/21  0321 10/01/21  0659 10/02/21  0526 10/02/21  0737 10/02/21  1519   *  --  136 136  --    K 3.2*   < > 3.1* 3.3* 4.6   CL 99  --  107 104  --    CO2 20  --  17* 20  --    BUN 4*  --  3* 3*  --    CREATININE 0.74  --  0.56 0.52  --    GLUCOSE 111*  --  105* 88  --    CALCIUM 6.9*  --  7.3* 7.5*  --     < > = values in this interval not displayed. LFTS  Recent Labs     10/01/21  1113   ALKPHOS 204*   ALT 9   AST 31   BILITOT 0.53   BILIDIR 0.21   LABALBU 3.0*       AMYLASE/LIPASE/AMMONIA  No results for input(s): AMYLASE, LIPASE, AMMONIA in the last 72 hours. Acute Hepatitis Panel   Lab Results   Component Value Date    HEPBSAG NONREACTIVE 12/15/2020    HEPCAB NONREACTIVE 12/15/2020    HEPBIGM NONREACTIVE 12/15/2020    HEPAIGM NONREACTIVE 12/15/2020       HCV Genotype   No results found for: HEPATITISCGENOTYPE    HCV Quantitative   No results found for: HCVQNT    LIVER WORK UP:    AFP  No results found for: AFP    Alpha 1 antitrypsin   No results found for: A1A    Anti - Liver/Kidney Ab  No results found for: LIVER-KIDNEYMICROSOMALAB    JARRELL  Lab Results   Component Value Date    JARRELL NEGATIVE 03/29/2021       AMA  Lab Results   Component Value Date    MITOAB 3.2 02/03/2017       ASMA  Lab Results   Component Value Date    SMOOTHMUSCAB 1:40 02/03/2017       Ceruloplasmin  No results found for: CERULOPLSM    Celiac panel  No results found for: TISSTRNTIIGG, TTGIGA, IGA    IgG  No results found for: IGG    IgM  No results found for: IGM    GGT   No results found for: LABGGT    PT/INR  No results for input(s): PROTIME, INR in the last 72 hours. Cancer Markers:  CEA:  No results for input(s): CEA in the last 72 hours. Ca 125:  No results for input(s):  in the last 72 hours. Ca 19-9:   No results for input(s):  in the last 72 hours. AFP: No results for input(s): AFP in the last 72 hours.     Lactic acid:  Recent Labs     10/02/21  0737   LACTACIDWB 1.3         Radiology Review:    XR CHEST PORTABLE    Result Date: 9/29/2021  Low volume lungs with mild streaky bibasilar atelectasis           Principal Problem:    Hypotension  Active Problems:    Essential hypertension    Acute renal insufficiency    Hypokalemia Hypocalcemia    Hypomagnesemia    Elevated troponin    Moderate malnutrition (HCC)    ROXANN (acute kidney injury) ([address]  Resolved Problems:    * No resolved hospital problems. *       GI Assessment:    Chronic diarrhea for about 1 year. Chronic alcohol use. Dizziness and hypertension  ROXANN-resolved  Anemia    Old records, lab and imaging reviewed. Plan of care:  -Recommend alcohol cessation avoid NSAID use. Recommend lactose-free diet and/or lactase supplementation.  -Waiting for stool studies to rule out infective causes. -Patient respond well to Imodium. Agree with continuation of Imodium.  -Patient cleared for discharge from GI standpoint. This plan was formulated in collaboration with Dr. Deric Campbell.  Please feel free to contact me with any questions or concerns. Thank you for allowing me to participate in the care of your patient.       Wolf Valdovinos MD  PGY-1, Internal Medicine Resident  [facility]  10/3/2021 7:40 AM

## 2021-10-03 NOTE — PROGRESS NOTES
[facility]  Internal Medicine Teaching Residency Program  Inpatient Daily Progress Note  ______________________________________________________________________________    Patient: Vivienne Graf  YOB: 1965   [facility]:[id number]    Acct: [de-identified]     Room: [address] [place] [address]  Admit date: 9/29/2021  Today's date: 10/03/21  Number of days in the hospital: 4    SUBJECTIVE   Admitting Diagnosis: Hypotension  CC:   Pt examined at bedside. Chart & results reviewed. No acute issues overnight   vitals stable  No more loose stools during last 24 hours   anorexia improved   Okay to discharge from GI standpoint        ROS:  Constitutional:  negative for chills, fevers, sweats  Respiratory:  negative for cough, dyspnea on exertion, hemoptysis, shortness of breath, wheezing  Cardiovascular:  negative for chest pain, chest pressure/discomfort, lower extremity edema, palpitations  Gastrointestinal:  negative for abdominal pain, constipation,nausea, vomiting  Neurological:  negative for dizziness, headache  BRIEF HISTORY     The patient is a pleasant 56 y. o. female with a past medical history of hypertension, chronic alcoholism admitted with hypotension.  Patient states that she was feeling dizzy but did not lose consciousness. she has longstanding history of electrolyte abnormalities and had a recent admission in August due to hypokalemia.  Patient states she has not eaten in the past 3 days due to lack of appetite.  She also reports to have nonbloody diarrhea and has been taking Imodium since her previous admission. Jamilah Brooks is having 4-6 episodes of loose stool and is taking Imodium which helped decrease the number of bowel movements. She denies any  nausea or vomiting.  Patient denies any complaints, does states that she is very tired.  Patient was hypotensive per EMS with initial systolic in the 11H.  Repeat was in the 90s.   On evaluation in the ED her blood pressure was 100/72, afebrile, saturating on room air. Labs reviewed and evaluated, sodium 133 potassium 3 chloride 92 BUN 7 creatinine 1.40 EtOH level 0.052. Hemoglobin 9.2, WBC 7.8. Magnesium is 0.7. Calcium 6.5  EKG showed prolonged QTC 5.4. NSR, No acute st t wave changes. Troponin 53>42    OBJECTIVE     Vital Signs:  BP (!) 145/100   Pulse 90   Temp 98.3 °F (36.8 °C) (Temporal)   Resp 16   Ht 5' 5\" (1.651 m)   Wt 164 lb (74.4 kg)   SpO2 98%   BMI 27.29 kg/m²     Temp (24hrs), Av.5 °F (36.9 °C), Min:98.3 °F (36.8 °C), Max:98.9 °F (37.2 °C)    In: 2783   Out: -     Physical Exam:  Constitutional: This is a well developed, well nourished,  55y.o. year old female who is alert, oriented, cooperative and in no apparent distress. Head:normocephalic and atraumatic. EENT:  PERRLA. No conjunctival injections. Septum was midline, mucosa was without erythema, exudates or cobblestoning. No thrush was noted. Neck: Supple without thyromegaly. No elevated JVP. Trachea was midline. Respiratory: Chest was symmetrical without dullness to percussion. Breath sounds bilaterally were clear to auscultation. There were no wheezes, rhonchi or rales. There is no intercostal retraction or use of accessory muscles. No egophony noted. Cardiovascular: Regular without murmur, clicks, gallops or rubs. Abdomen: Slightly rounded and soft without organomegaly. No rebound, rigidity or guarding was appreciated. Lymphatic: No lymphadenopathy. Musculoskeletal: Normal curvature of the spine. No gross muscle weakness. Extremities:  No lower extremity edema, ulcerations, tenderness, varicosities or erythema. Muscle size, tone and strength are normal.  No involuntary movements are noted. Skin:  Warm and dry. Good color, turgor and pigmentation. No lesions or scars.   No cyanosis or clubbing  Neurological/Psychiatric: The patient's general behavior, level of consciousness, thought content and emotional status is normal. Medications:  Scheduled Medications:    iron sucrose  200 mg IntraVENous Q24H    predniSONE  10 mg Oral Daily    sodium chloride flush  5-40 mL IntraVENous 2 times per day    potassium bicarb-citric acid  40 mEq Oral Daily    meloxicam  15 mg Oral Daily    heparin (porcine)  5,000 Units SubCUTAneous 3 times per day    folic acid  1 mg Oral Daily    thiamine  50 mg Oral Daily    multivitamin  1 tablet Oral Daily     Continuous Infusions:    sodium chloride      sodium chloride 75 mL/hr at 10/02/21 1640     PRN Medicationsloperamide, 2 mg, TID PRN  potassium chloride, 40 mEq, PRN   Or  potassium alternative oral replacement, 40 mEq, PRN   Or  potassium chloride, 10 mEq, PRN  sodium chloride flush, 5-40 mL, PRN  potassium bicarb-citric acid, 40 mEq, PRN  albuterol, 2.5 mg, Q6H PRN  sodium chloride, 25 mL, PRN  ondansetron, 4 mg, Q8H PRN   Or  ondansetron, 4 mg, Q6H PRN  polyethylene glycol, 17 g, Daily PRN  acetaminophen, 650 mg, Q6H PRN   Or  acetaminophen, 650 mg, Q6H PRN  potassium chloride, 10 mEq, PRN  magnesium sulfate, 2,000 mg, PRN        Diagnostic Labs:  CBC:   Recent Labs     10/01/21  0321 10/02/21  0526 10/03/21  0723   WBC 9.0 10.6 10.1   RBC 2.36* 2.35* 2.45*   HGB 7.6* 7.6* 7.9*   HCT 23.0* 23.6* 24.9*   MCV 97.5 100.4 101.6   RDW 14.1 13.9 14.1    400 537*     BMP:   Recent Labs     10/01/21  0321 10/01/21  0659 10/02/21  0526 10/02/21  0737 10/02/21  1519   *  --  136 136  --    K 3.2*   < > 3.1* 3.3* 4.6   CL 99  --  107 104  --    CO2 20  --  17* 20  --    BUN 4*  --  3* 3*  --    CREATININE 0.74  --  0.56 0.52  --     < > = values in this interval not displayed. BNP: No results for input(s): BNP in the last 72 hours. PT/INR: No results for input(s): PROTIME, INR in the last 72 hours. APTT: No results for input(s): APTT in the last 72 hours. CARDIAC ENZYMES: No results for input(s): CKMB, CKMBINDEX, TROPONINI in the last 72 hours.     Invalid input(s): CKTOTAL;3  FASTING LIPID PANEL:  Lab Results   Component Value Date    CHOL 248 (H) 03/21/2021     03/21/2021    TRIG 47 03/21/2021     LIVER PROFILE:   Recent Labs     10/01/21  1113   AST 31   ALT 9   BILIDIR 0.21   BILITOT 0.53   ALKPHOS 204*      MICROBIOLOGY:   Lab Results   Component Value Date/Time    CULTURE NO GROWTH 3 DAYS 09/29/2021 06:55 PM       Imaging:    XR CHEST PORTABLE    Result Date: 9/29/2021  Low volume lungs with mild streaky bibasilar atelectasis       ASSESSMENT & PLAN     Chronic diarrhea:likely malabsorption, upper and lower GI endoscopy done 6 months ago for STAS, Follow GI recommendations, start on imodium tid      Dizziness:resolved     Hypotension-likely due to diarrhea and decreased oral intake, Still having multiple episodes of watery diarrhea,BP and HR: WNL,  anorexia improved     Hypokalemia-likely secondary to decreased oral intake, diarrhea, renal loss or hypomagnesemia. Potassium 3.5, on potassium 40 meq PO daily. Hypomagnesemia: 1.3, replace     Hypocalcemia:resolved     Anemia: chronic normocytic hypochromic, Hb 7.9, do iron studies and hemoccult blood testing. Iron replacement x 3 days     ROXANN: resolved        DVT ppx: EPC cuffs  GI ppx: None     PT/OT/SW: Consulted  Discharge Planning: discharge today      Kel Santillan MD  Internal Medicine Resident, PGY-1  [facility];  [place], [place]  10/3/2021, 8:00 AM

## 2021-10-03 NOTE — PROGRESS NOTES
CLINICAL PHARMACY NOTE: MEDS TO BEDS    Total # of Prescriptions Filled: 3   The following medications were delivered to the patient:  · Loperamide  · Prednisone  · magnesium    Additional Documentation: Refill request received for Blanco. Last refilled 12/9/19 for 24 tablets. Out of protocol, routed to PCP.   10/29/2019 Last office visit  Wt Readings from Last 1 Encounters:   11/26/19 72.6 kg        BP Readings from Last 2 Encounters:   11/26/19 132/70   11/18/19 128/61   ]    Lab Results   Component Value Date    SODIUM 138 02/28/2019    POTASSIUM 3.6 02/28/2019    CHLORIDE 102 02/28/2019    CO2 28 02/28/2019    BUN 12 02/28/2019    CREATININE 0.95 02/28/2019    GLUCOSE 99 02/28/2019     No results found for: HGBA1C  No results found for: TSH  No results found for: CHOLESTEROL, HDL, CALCLDL, TRIGLYCERIDE  Lab Results   Component Value Date    AST 25 05/22/2018    GPT 28 05/22/2018    ALKPT 57 05/22/2018    BILIRUBIN 0.3 05/22/2018

## 2021-10-04 ENCOUNTER — HOSPITAL ENCOUNTER (OUTPATIENT)
Age: 56
Setting detail: SPECIMEN
Discharge: HOME | End: 2021-10-04
Payer: MEDICAID

## 2021-10-04 ENCOUNTER — TELEPHONE (OUTPATIENT)
Dept: ONCOLOGY | Age: 56
End: 2021-10-04

## 2021-10-04 ENCOUNTER — OFFICE VISIT (OUTPATIENT)
Dept: ONCOLOGY | Age: 56
End: 2021-10-04
Payer: MEDICAID

## 2021-10-04 ENCOUNTER — CARE COORDINATION (OUTPATIENT)
Dept: CARE COORDINATION | Age: 56
End: 2021-10-04

## 2021-10-04 ENCOUNTER — HOSPITAL ENCOUNTER (OUTPATIENT)
Facility: MEDICAL CENTER | Age: 56
Discharge: HOME | End: 2021-10-04
Payer: MEDICAID

## 2021-10-04 VITALS
HEART RATE: 69 BPM | RESPIRATION RATE: 16 BRPM | WEIGHT: 175.4 LBS | DIASTOLIC BLOOD PRESSURE: 84 MMHG | TEMPERATURE: 97.6 F | SYSTOLIC BLOOD PRESSURE: 121 MMHG | BODY MASS INDEX: 29.19 KG/M2

## 2021-10-04 DIAGNOSIS — D64.9 NORMOCYTIC NORMOCHROMIC ANEMIA: ICD-10-CM

## 2021-10-04 DIAGNOSIS — M06.00 SERONEGATIVE RHEUMATOID ARTHRITIS (HCC): ICD-10-CM

## 2021-10-04 DIAGNOSIS — E44.0 MODERATE MALNUTRITION (HCC): ICD-10-CM

## 2021-10-04 DIAGNOSIS — D75.839 THROMBOCYTOSIS: ICD-10-CM

## 2021-10-04 DIAGNOSIS — E87.6 HYPOKALEMIA: ICD-10-CM

## 2021-10-04 DIAGNOSIS — D75.839 THROMBOCYTOSIS: Primary | ICD-10-CM

## 2021-10-04 DIAGNOSIS — I10 ESSENTIAL HYPERTENSION: ICD-10-CM

## 2021-10-04 DIAGNOSIS — E83.42 HYPOMAGNESEMIA: ICD-10-CM

## 2021-10-04 DIAGNOSIS — K52.9 CHRONIC DIARRHEA: ICD-10-CM

## 2021-10-04 LAB
ABSOLUTE EOS #: 0.12 K/UL (ref 0–0.44)
ABSOLUTE IMMATURE GRANULOCYTE: 0.09 K/UL (ref 0–0.3)
ABSOLUTE LYMPH #: 2.3 K/UL (ref 1.1–3.7)
ABSOLUTE MONO #: 1.11 K/UL (ref 0.1–1.2)
ALBUMIN SERPL-MCNC: 3.4 G/DL (ref 3.5–5.2)
ALBUMIN/GLOBULIN RATIO: 1.1 (ref 1–2.5)
ALP BLD-CCNC: 185 U/L (ref 35–104)
ALT SERPL-CCNC: 11 U/L (ref 5–33)
ANION GAP SERPL CALCULATED.3IONS-SCNC: 10 MMOL/L (ref 9–17)
ANION GAP SERPL CALCULATED.3IONS-SCNC: 9 MMOL/L (ref 9–17)
AST SERPL-CCNC: 27 U/L
BASOPHILS # BLD: 1 % (ref 0–2)
BASOPHILS ABSOLUTE: 0.09 K/UL (ref 0–0.2)
BILIRUB SERPL-MCNC: 0.25 MG/DL (ref 0.3–1.2)
BUN BLDV-MCNC: 8 MG/DL (ref 6–20)
BUN BLDV-MCNC: 8 MG/DL (ref 6–20)
BUN/CREAT BLD: ABNORMAL (ref 9–20)
BUN/CREAT BLD: NORMAL (ref 9–20)
CALCIUM SERPL-MCNC: 9.3 MG/DL (ref 8.6–10.4)
CALCIUM SERPL-MCNC: 9.6 MG/DL (ref 8.6–10.4)
CHLORIDE BLD-SCNC: 101 MMOL/L (ref 98–107)
CHLORIDE BLD-SCNC: 101 MMOL/L (ref 98–107)
CO2: 25 MMOL/L (ref 20–31)
CO2: 26 MMOL/L (ref 20–31)
CREAT SERPL-MCNC: 0.75 MG/DL (ref 0.5–0.9)
CREAT SERPL-MCNC: 0.78 MG/DL (ref 0.5–0.9)
DIFFERENTIAL TYPE: ABNORMAL
EOSINOPHILS RELATIVE PERCENT: 1 % (ref 1–4)
GFR AFRICAN AMERICAN: >60 ML/MIN
GFR AFRICAN AMERICAN: >60 ML/MIN
GFR NON-AFRICAN AMERICAN: >60 ML/MIN
GFR NON-AFRICAN AMERICAN: >60 ML/MIN
GFR SERPL CREATININE-BSD FRML MDRD: ABNORMAL ML/MIN/{1.73_M2}
GFR SERPL CREATININE-BSD FRML MDRD: ABNORMAL ML/MIN/{1.73_M2}
GFR SERPL CREATININE-BSD FRML MDRD: NORMAL ML/MIN/{1.73_M2}
GFR SERPL CREATININE-BSD FRML MDRD: NORMAL ML/MIN/{1.73_M2}
GLUCOSE BLD-MCNC: 77 MG/DL (ref 70–99)
GLUCOSE BLD-MCNC: 78 MG/DL (ref 70–99)
HCT VFR BLD CALC: 24.5 % (ref 36.3–47.1)
HEMOGLOBIN: 7.8 G/DL (ref 11.9–15.1)
IMMATURE GRANULOCYTES: 1 %
LYMPHOCYTES # BLD: 24 % (ref 24–43)
MCH RBC QN AUTO: 31.2 PG (ref 25.2–33.5)
MCHC RBC AUTO-ENTMCNC: 31.8 G/DL (ref 28.4–34.8)
MCV RBC AUTO: 98 FL (ref 82.6–102.9)
MONOCYTES # BLD: 11 % (ref 3–12)
NRBC AUTOMATED: 0.3 PER 100 WBC
PDW BLD-RTO: 14.5 % (ref 11.8–14.4)
PLATELET # BLD: 774 K/UL (ref 138–453)
PLATELET ESTIMATE: ABNORMAL
PMV BLD AUTO: 8.5 FL (ref 8.1–13.5)
POTASSIUM SERPL-SCNC: 4.1 MMOL/L (ref 3.7–5.3)
POTASSIUM SERPL-SCNC: 4.3 MMOL/L (ref 3.7–5.3)
RBC # BLD: 2.5 M/UL (ref 3.95–5.11)
RBC # BLD: ABNORMAL 10*6/UL
RENIN ACTIVITY: 0.3 NG/ML/HR
RENIN COMMENT: NORMAL
SEG NEUTROPHILS: 62 % (ref 36–65)
SEGMENTED NEUTROPHILS ABSOLUTE COUNT: 6.06 K/UL (ref 1.5–8.1)
SODIUM BLD-SCNC: 136 MMOL/L (ref 135–144)
SODIUM BLD-SCNC: 136 MMOL/L (ref 135–144)
TISSUE TRANSGLUTAMINASE IGA: 0.4 U/ML
TOTAL PROTEIN: 6.4 G/DL (ref 6.4–8.3)
WBC # BLD: 9.8 K/UL (ref 3.5–11.3)
WBC # BLD: ABNORMAL 10*3/UL

## 2021-10-04 PROCEDURE — 36415 COLL VENOUS BLD VENIPUNCTURE: CPT

## 2021-10-04 PROCEDURE — 80053 COMPREHEN METABOLIC PANEL: CPT

## 2021-10-04 PROCEDURE — 85025 COMPLETE CBC W/AUTO DIFF WBC: CPT

## 2021-10-04 PROCEDURE — 99211 OFF/OP EST MAY X REQ PHY/QHP: CPT | Performed by: INTERNAL MEDICINE

## 2021-10-04 PROCEDURE — G8427 DOCREV CUR MEDS BY ELIG CLIN: HCPCS | Performed by: INTERNAL MEDICINE

## 2021-10-04 PROCEDURE — G8417 CALC BMI ABV UP PARAM F/U: HCPCS | Performed by: INTERNAL MEDICINE

## 2021-10-04 PROCEDURE — 80048 BASIC METABOLIC PNL TOTAL CA: CPT

## 2021-10-04 PROCEDURE — 1036F TOBACCO NON-USER: CPT | Performed by: INTERNAL MEDICINE

## 2021-10-04 PROCEDURE — 1111F DSCHRG MED/CURRENT MED MERGE: CPT | Performed by: INTERNAL MEDICINE

## 2021-10-04 PROCEDURE — 99214 OFFICE O/P EST MOD 30 MIN: CPT | Performed by: INTERNAL MEDICINE

## 2021-10-04 PROCEDURE — G8484 FLU IMMUNIZE NO ADMIN: HCPCS | Performed by: INTERNAL MEDICINE

## 2021-10-04 PROCEDURE — 3017F COLORECTAL CA SCREEN DOC REV: CPT | Performed by: INTERNAL MEDICINE

## 2021-10-04 NOTE — PROGRESS NOTES
DIAGNOSIS:   1. Thrombocytosis   2. Normocytic anemia  3. Diffuse arthralgia/. possible RA     CURRENT THERAPY:  NGS testing is negative for  mutation    BRIEF CASE HISTORY:   Blaine Marie is a very pleasant 55 y.o. female who is referred to us for thrombocytosis. She was recently in house with uncontrolled hypertension and lab work showed elevated platelets. She was also found to have possible RA or gout and needs to consult with rheumatology for joint pain and swelling. She was given Prednisone in house to manage swelling. She reports she feels improved since admission. She has history of bursa and has had knees drained by ortho surg. We saw the patient and decided to proceed with NGS testing. That was negative for JAK2 mutation, DAGOBERTO R mutation and MPL mutation. Platelets are above 1 million. INTERIM HISTORY  The patient comes in today for follow-up, she continues to complain of severe pain and swelling in her elbows and other joints of the body especially small joints of the hand. She was seen by rheumatology and was told that this is gout   She was recently in the hospital because of severe diarrhea. She was diagnosed with acute gastroenteritis and was also diagnosed with acute kidney injury. She received a lot of fluids and supportive care with improvement in her kidney function. She is finally deciding to quit drinking. PAST MEDICAL HISTORY: has a past medical history of Angioedema, Anxiety, Asthma, Bipolar disorder ([address] Claustrophobia, Depression, GERD (gastroesophageal reflux disease), Hypertension, Hypertrophic cardiomyopathy ([address] Lung nodules, MRSA (methicillin resistant Staphylococcus aureus), Murmur, OA (osteoarthritis), JONES (obstructive sleep apnea), Thyroid nodule, and Type 2 diabetes mellitus without complication, without long-term current use of insulin ([address] PAST SURGICAL HISTORY: has a past surgical history that includes Hysterectomy;  Upper gastrointestinal endoscopy; Colonoscopy; Thyroid surgery; Cardiac catheterization; other surgical history (8/24/2015); Upper gastrointestinal endoscopy (N/A, 12/3/2020); Colonoscopy (N/A, 12/3/2020); Foot surgery (Bilateral, 12/07/2020); Hammer toe surgery (Right, 12/7/2020); and Bunionectomy (Bilateral, 12/7/2020). CURRENT MEDICATIONS:  has a current medication list which includes the following prescription(s): atorvastatin, oyster shell calcium/d, acetaminophen, atenolol, amlodipine, albuterol sulfate, prednisone, loperamide, magnesium oxide, meloxicam, hydroxychloroquine, omeprazole, potassium chloride, multivitamin, loratadine, vitamin d3, thiamine mononitrate, fluoxetine, [DISCONTINUED] atorvastatin, folic acid, ferrous sulfate, [DISCONTINUED] oyster shell calcium/d, diclofenac sodium, and lurasidone. ALLERGIES:  is allergic to bee venom, iodine, lisinopril, and shellfish-derived products. FAMILY HISTORY: Negative for any hematological or oncological conditions. SOCIAL HISTORY:  reports that she quit smoking about 28 years ago. Her smoking use included cigarettes. She has a 10.00 pack-year smoking history. She has never used smokeless tobacco. She reports previous alcohol use of about 12.0 standard drinks of alcohol per week. She reports previous drug use. Drug: Cocaine. REVIEW OF SYSTEMS:   General: No fever or night sweats. Weight is stable. ENT: No double or blurred vision, no tinnitus or hearing problem, no dysphagia or sore throat   Respiratory: No chest pain, no shortness of breath, no cough or hemoptysis. Cardiovascular: Denies chest pain, PND or orthopnea. No L E swelling or palpitations. Gastrointestinal: No nausea or vomiting, abdominal pain, d diarrhea has subsided with the help of Imodium  Genitourinary: Denies dysuria, hematuria, frequency, urgency or incontinence. Neurological: Denies headaches, decreased LOC, no sensory or motor focal deficits.   Musculoskeletal: No back pain; +joint pain and swelling as discussed  Skin: There are no rashes or bleeding. Psychiatric: No anxiety, no depression. Endocrine: No diabetes or thyroid disease. Hematologic: No bleeding, no adenopathy. PHYSICAL EXAM: Shows a well appearing 55y.o.-year-old female who is not in pain or distress. Vital Signs: Blood pressure 121/84, pulse 69, temperature 97.6 °F (36.4 °C), temperature source Temporal, resp. rate 16, weight 175 lb 6.4 oz (79.6 kg), not currently breastfeeding. HEENT: Normocephalic and atraumatic. Pupils are equal, round, reactive to light and accommodation. Extraocular muscles are intact. Neck: Showed no JVD, no carotid bruit . Lungs: Clear to auscultation bilaterally. Heart: systolic murmur. Abdomen: Soft, nontender. No hepatosplenomegaly. Extremities: Lower extremities show no edema, clubbing, or cyanosis. Evidence of inflammatory arthritis and small fingers of the hand and wrist as well as the elbows. She has olecranon bursa with significant pain and tenderness. Breasts: Examination not done today. Neuro exam: intact cranial nerves bilaterally no motor or sensory deficit, gait is normal. Lymphatic: no adenopathy appreciated in the supraclavicular, axillary, cervical or inguinal area    REVIEW OF LABORATORY DATA:   Lab Results   Component Value Date    WBC 10.1 10/03/2021    HGB 7.9 (L) 10/03/2021    HCT 24.9 (L) 10/03/2021    .6 10/03/2021     (H) 10/03/2021     plt 1103  Lab Results   Component Value Date    IRON 21 (L) 10/02/2021    TIBC 103 (L) 10/02/2021    FERRITIN 572 (H) 10/02/2021       REVIEW OF RADIOLOGICAL RESULTS:     IMPRESSION:   1. Thrombocytosis  2. Normocytic anemia   3. Evidence of inflammatory arthritis/bursitis  4. NGS testing is negative for  mutation   5.  Recent admission to the hospital with ROXANN and diarrhea    PLAN:   The patient is recovering from recent hospitalization  Her kidney function is improving  The patient hemoglobin dropped from a baseline of about 11 down to about 7.8, likely reflecting change in kidney function. However her kidney is recovering and she has no diarrhea. After consideration, I think the acute drop in hemoglobin is related to acute illness. I decided to wait about 4 weeks and repeat her numbers. We will also complete the work-up to exclude other possibility. My diagnosis so far is anemia of chronic illness complicated by anemia of renal disease. No evidence of  mutation on NGS testing but we have not done a bone marrow aspiration biopsy which would be the next step if she continues to have severe anemia  I do not see any evidence of nutrient deficiencies so far  Hemolysis is not completely excluded but it is unlikely based on her symptoms.

## 2021-10-04 NOTE — CARE COORDINATION
Ambulatory Care Coordination Note  10/4/2021  CM Risk Score: 9  Charlson 10 Year Mortality Risk Score: 23%     ACC: Rebecca Keene RN    Summary Note: Attempted to reach patient for follow up. Message left on voicemail with call back number. Will attempt to reach her on Wednesday if no return call. Care Coordination Interventions    Program Enrollment: Complex Care  Referral from Primary Care Provider: No  Suggested Interventions and Community Resources  [facility] (Comment: [facility]- [facility] in place)  [address]: Completed (Comment: [facility]- already in place)         Goals Addressed    None         Prior to Admission medications    Medication Sig Start Date End Date Taking? Authorizing Provider   predniSONE (DELTASONE) 10 MG tablet Take 1 tablet by mouth daily for 10 days 10/3/21 10/13/21  Tacho Fitzgerald MD   loperamide (IMODIUM) 2 MG capsule Take 1 capsule by mouth 3 times daily as needed for Diarrhea 10/3/21 10/13/21  Tacho Fitzgerald MD   magnesium oxide (MAG-OX) 400 (240 Mg) MG tablet Take 1 tablet by mouth daily 10/3/21   Tacho Fitzgerald MD   meloxicam (MOBIC) 15 MG tablet TAKE 1 TABLET BY MOUTH DAILY  9/23/21   Inocencia Cole MD   hydroxychloroquine (PLAQUENIL) 200 MG tablet TAKE 1 TABLET BY MOUTH 2 TIMES DAILY  9/23/21   Inocencia Cole MD   omeprazole (PRILOSEC) 20 MG delayed release capsule TAKE 1 CAPSULE BY MOUTH DAILY  9/23/21   Inocencia Cole MD   atorvastatin (LIPITOR) 20 MG tablet TAKE 1 TABLET BY MOUTH DAILY  9/23/21   Inocencia Cole MD   Calcium Carbonate-Vitamin D (OYSTER SHELL CALCIUM/D) 500-200 MG-UNIT TABS TAKE 1 TAB BY MOUTH ONCE A DAY  9/23/21   Inocencia Cole MD   potassium chloride (KLOR-CON M) 20 MEQ extended release tablet Take 2 tablets by mouth 2 times daily After BMP two times, first after 1 week and if potassium is low then continue taking same dose with repeat BMP in a week. and if potassium is normal then take once daily.  Do not crush, chew, or suck on tablet.  8/21/21   Jamie Bazan MD   Multiple Vitamin (MULTIVITAMIN) TABS tablet Take 1 tablet by mouth daily 8/22/21   Jamie Bazan MD   loratadine (CLARITIN) 10 MG tablet TAKE 1 tab BY MOUTH DAILY  8/18/21   CECILIA Cornelius - CNP   vitamin D3 (CHOLECALCIFEROL) 25 MCG (1000 UT) TABS tablet TAKE 1 TABLET BY MOUTH DAILY  7/16/21   Darshan Fong MD   thiamine mononitrate 100 MG tablet TAKE 1 TABLET BY MOUTH ONCE DAILY  7/14/21   Darshan Fong MD   FLUoxetine (PROZAC) 40 MG capsule Take 40 mg by mouth daily    Historical Provider, MD   acetaminophen (APAP EXTRA STRENGTH) 500 MG tablet Take 2 tablets by mouth every 6 hours as needed for Pain 6/11/21   Dorene Jones MD   atorvastatin (LIPITOR) 20 MG tablet TAKE 1 TABLET BY MOUTH DAILY  5/12/21   Merline Pizarro MD   folic acid (FOLVITE) 1 MG tablet Take 1 tablet by mouth daily 4/27/21   Dorene Jones MD   ferrous sulfate (IRON 325) 325 (65 Fe) MG tablet Take 325 mg by mouth daily (with breakfast)    Historical Provider, MD   atenolol (TENORMIN) 50 MG tablet Take 1 tablet by mouth daily 1/22/21   Jose Zuniga MD   Calcium Carbonate-Vitamin D (OYSTER SHELL CALCIUM/D) 500-200 MG-UNIT TABS TAKE 1 TAB BY MOUTH ONCE A DAY  1/22/21   Jose Zuniga MD   amLODIPine (NORVASC) 10 MG tablet TAKE 1 TABLET BY MOUTH DAILY  1/8/21   Jose Powell MD   diclofenac sodium (VOLTAREN) 1 % GEL Apply 4 g topically 2 times daily 1/6/21   Lashaun Lindsey DPM   albuterol sulfate (PROAIR RESPICLICK) 944 (90 Base) MCG/ACT aerosol powder inhalation Inhale 2 puffs into the lungs every 6 hours as needed for Wheezing or Shortness of Breath 1/15/20   Krystal Lay MD   lurasidone (LATUDA) 60 MG TABS tablet Take 60 mg by mouth nightly    Historical Provider, MD       Future Appointments   Date Time Provider Anthony Everett   10/29/2021 11:00 AM Ye Britt MD [facility]   11/1/2021  8:15 AM Terry Hdz MD [facility]   11/5/2021 10:30 AM Giovanna Jyoti Javier MD [address] [place] [address] [address] [facility]

## 2021-10-04 NOTE — TELEPHONE ENCOUNTER
[facility] ARRIVES AMBULATORY FOR MD VISIT  DR Marcelo Messer IN TO SEE PATIENT  ORDERS RECEIVED  RV 4 WEEKS WITH CBC CMP PRIOR  LABS CDP CMP TO BE DONE 10/29/21, ORDERS MAILED TO PATIENT  MD VISIT 11/1/21 @8:15AM  AVS PRINTED AND GIVEN TO PATIENT WITH INSTRUCTIONS  PATIENT DISCHARGED AMBULATORY

## 2021-10-05 ENCOUNTER — TELEPHONE (OUTPATIENT)
Dept: GASTROENTEROLOGY | Age: 56
End: 2021-10-05

## 2021-10-05 ENCOUNTER — CARE COORDINATION (OUTPATIENT)
Dept: CARE COORDINATION | Age: 56
End: 2021-10-05

## 2021-10-05 LAB
CALPROTECTIN, FECAL: 6 UG/G
CULTURE: NORMAL
FAT QUALITATIVE SPLIT STOOL: NORMAL
FECAL NEUTRAL FAT: NORMAL
FECAL PANCREATIC ELASTASE-1: 70 UG/G
Lab: NORMAL
SPECIMEN DESCRIPTION: NORMAL

## 2021-10-05 NOTE — TELEPHONE ENCOUNTER
Returned call and advised that her next appointment is at [facility] on 10/29/21 @ 11 am.  Writer thanked and call ended.

## 2021-10-05 NOTE — CARE COORDINATION
Ambulatory Care Coordination Note  10/5/2021  CM Risk Score: 9  Charlson 10 Year Mortality Risk Score: 23%     ACC: Oleg Curran RN    Summary Note: Received call back from TRAY, from yesterday. She had a follow up with hem/onc yesterday. She will have labs in 4 weeks to check her anemia. Reviewed medications. She read off the medications in her bubble packs. She had discrepancies from her current medication list. She had Imdur 60 mg in her bubble pack. After reviewing the chart, noted that the Imdur had been discontinued on 1/29/2021. [address] and reviewed medications with them. Also informed them of discontinuation of HCTZ and celexa on 8/16/2021. They asked that a current medication list be faxed to them. Sent medication list via [employer]. TRAY states her BP is doing good. She got 120/83. She states compliance with taking her medications. She has decided to quit drinking. She has support through the [facility]; and has a few good friends that are checking on her and available if she is having a hard day and needs to talk to someone. She has f/u scheduled with GI, hem/onc, and PCP. Hospital follow up scheduled for 10/12. Will follow up next week to see how things are going. Care Coordination Interventions    Program Enrollment: Complex Care  Referral from Primary Care Provider: No  Suggested Interventions and Community Resources  [facility]: Completed (Comment: [facility]- already in place)  [address]: Completed (Comment: [facility]- already in place)         Goals Addressed    None         Prior to Admission medications    Medication Sig Start Date End Date Taking?  Authorizing Provider   predniSONE (DELTASONE) 10 MG tablet Take 1 tablet by mouth daily for 10 days 10/3/21 10/13/21  Ana Powers MD   loperamide (IMODIUM) 2 MG capsule Take 1 capsule by mouth 3 times daily as needed for Diarrhea 10/3/21 10/13/21  Ana Powers MD   magnesium oxide (MAG-OX) 400 (240 Mg) MG tablet Take 1 tablet by mouth daily 10/3/21   Alida Bland MD   meloxicam (MOBIC) 15 MG tablet TAKE 1 TABLET BY MOUTH DAILY  9/23/21   Kristyn Caceres MD   hydroxychloroquine (PLAQUENIL) 200 MG tablet TAKE 1 TABLET BY MOUTH 2 TIMES DAILY  9/23/21   Kristyn Caceres MD   omeprazole (PRILOSEC) 20 MG delayed release capsule TAKE 1 CAPSULE BY MOUTH DAILY  9/23/21   Kristyn Caceres MD   atorvastatin (LIPITOR) 20 MG tablet TAKE 1 TABLET BY MOUTH DAILY  9/23/21   Kristyn Caceres MD   Calcium Carbonate-Vitamin D (OYSTER SHELL CALCIUM/D) 500-200 MG-UNIT TABS TAKE 1 TAB BY MOUTH ONCE A DAY  9/23/21   Kristyn Caceres MD   potassium chloride (KLOR-CON M) 20 MEQ extended release tablet Take 2 tablets by mouth 2 times daily After BMP two times, first after 1 week and if potassium is low then continue taking same dose with repeat BMP in a week. and if potassium is normal then take once daily. Do not crush, chew, or suck on tablet.  8/21/21   Kimberly Davis MD   Multiple Vitamin (MULTIVITAMIN) TABS tablet Take 1 tablet by mouth daily 8/22/21   Kimberly Davis MD   loratadine (CLARITIN) 10 MG tablet TAKE 1 tab BY MOUTH DAILY  8/18/21   CECILIA Hung - CNP   vitamin D3 (CHOLECALCIFEROL) 25 MCG (1000 UT) TABS tablet TAKE 1 TABLET BY MOUTH DAILY  7/16/21   Kristyn Caceres MD   thiamine mononitrate 100 MG tablet TAKE 1 TABLET BY MOUTH ONCE DAILY  7/14/21   Kristyn Caceres MD   FLUoxetine (PROZAC) 40 MG capsule Take 40 mg by mouth daily    Historical Provider, MD   acetaminophen (APAP EXTRA STRENGTH) 500 MG tablet Take 2 tablets by mouth every 6 hours as needed for Pain 6/11/21   Vida Celaya MD   atorvastatin (LIPITOR) 20 MG tablet TAKE 1 TABLET BY MOUTH DAILY  5/12/21   Jose Fuentes MD   folic acid (FOLVITE) 1 MG tablet Take 1 tablet by mouth daily 4/27/21   Vida Celaya MD   ferrous sulfate (IRON 325) 325 (65 Fe) MG tablet Take 325 mg by mouth daily (with breakfast)    Historical Provider, MD   atenolol (TENORMIN) 50 MG tablet Take 1 tablet by mouth daily 1/22/21   Jose Zuniga MD   Calcium Carbonate-Vitamin D (OYSTER SHELL CALCIUM/D) 500-200 MG-UNIT TABS TAKE 1 TAB BY MOUTH ONCE A DAY  1/22/21   Jose Zuniga MD   amLODIPine (NORVASC) 10 MG tablet TAKE 1 TABLET BY MOUTH DAILY  1/8/21   Jose Zuniga MD   diclofenac sodium (VOLTAREN) 1 % GEL Apply 4 g topically 2 times daily 1/6/21   Alexi Braswell DPM   albuterol sulfate (PROAIR RESPICLICK) 370 (90 Base) MCG/ACT aerosol powder inhalation Inhale 2 puffs into the lungs every 6 hours as needed for Wheezing or Shortness of Breath 1/15/20   Blake Dumont MD   lurasidone (LATUDA) 60 MG TABS tablet Take 60 mg by mouth nightly    Historical Provider, MD       Future Appointments   Date Time Provider Anthony Everett   10/29/2021 11:00 AM Gonzalez Best MD [facility]   11/1/2021  8:15 AM Sky Hdz MD [facility]   11/5/2021 10:30 AM Hailey Wakefield MD [facility]

## 2021-10-06 RX ORDER — LANOLIN ALCOHOL/MO/W.PET/CERES
400 CREAM (GRAM) TOPICAL DAILY
Qty: 84 TABLET | Refills: 3 | OUTPATIENT
Start: 2021-10-06

## 2021-10-06 RX ORDER — MULTIVITAMIN WITH IRON
1 TABLET ORAL DAILY
Qty: 84 TABLET | Refills: 3 | OUTPATIENT
Start: 2021-10-06

## 2021-10-06 NOTE — DISCHARGE SUMMARY
[address]     Department of Internal Medicine - Staff Internal Medicine Teaching Service    INPATIENT DISCHARGE SUMMARY      Patient Identification:  Annalisa Almeida is a 55 y.o. female. :  1965  MRN: [id number]     Acct: [de-identified]   PCP: No primary care provider on file. Admit Date:  2021  Discharge date and time: 10/3/2021  3:14 PM   Attending Provider: No att. providers found                                     [facility] Problem Lists:  Principal Problem:    Hypotension  Active Problems:    Essential hypertension    Acute renal insufficiency    Hypokalemia    Hypocalcemia    Hypomagnesemia    Elevated troponin    Moderate malnutrition (HCC)    ROXANN (acute kidney injury) ([address]  Resolved Problems:    * No resolved hospital problems. *      HOSPITAL STAY     Brief Inpatient course:   Annalisa Almeida is a 55 y.o. female who was admitted for the management of Hypotension, presented to the emergency department with chief complaint of dizziness. She has past medical history of hypertension and chronic alcoholism. Reports having longstanding history of electrolyte abnormalities and chronic diarrhea which is worsened for the last 3 days. Recently admitted in August due to hypokalemia. States 4-6 episodes of loose stool mainly watery and improves with Imodium. Patient denies any vomiting fever but reports anorexia. After admission patient was given IV fluids, electrolytes were replaced, GI was consulted for work-up of chronic diarrhea. Chronic normocytic hyperchromic anemia is present, taking iron supplements, GI work-up for anemia done in the past reveals no endoscopic abnormalities. ElectrolyteS replaced, ROXANN has been resolved, GI recommends follow-up for diarrhea on outpatient basis with stool studies results and restriction of all the lactose-containing diet.       Procedures/ Significant Interventions:      Consults:     Consults:     Final Specialist Recommendations/Findings:   IP CONSULT TO INTERNAL MEDICINE  IP CONSULT TO CASE MANAGEMENT  IP CONSULT TO DIETITIAN  IP CONSULT TO GI      Any Hospital Acquired Infections: none    Discharge Functional Status:  stable    DISCHARGE PLAN     Disposition: home    Patient Instructions:   Discharge Medication List as of 10/3/2021  2:36 PM      START taking these medications    Details   predniSONE (DELTASONE) 10 MG tablet Take 1 tablet by mouth daily for 10 days, Disp-10 tablet, R-0Normal      loperamide (IMODIUM) 2 MG capsule Take 1 capsule by mouth 3 times daily as needed for Diarrhea, Disp-30 capsule, R-0Normal      magnesium oxide (MAG-OX) 400 (240 Mg) MG tablet Take 1 tablet by mouth daily, Disp-30 tablet, R-0Normal         CONTINUE these medications which have NOT CHANGED    Details   meloxicam (MOBIC) 15 MG tablet TAKE 1 TABLET BY MOUTH DAILY , Disp-30 tablet, R-0Normal      hydroxychloroquine (PLAQUENIL) 200 MG tablet TAKE 1 TABLET BY MOUTH 2 TIMES DAILY , Disp-60 tablet, R-0Normal      omeprazole (PRILOSEC) 20 MG delayed release capsule TAKE 1 CAPSULE BY MOUTH DAILY , Disp-60 capsule, R-3Normal      atorvastatin (LIPITOR) 20 MG tablet TAKE 1 TABLET BY MOUTH DAILY , Disp-30 tablet, R-4Normal      Calcium Carbonate-Vitamin D (OYSTER SHELL CALCIUM/D) 500-200 MG-UNIT TABS TAKE 1 TAB BY MOUTH ONCE A DAY , Disp-30 tablet, R-4Normal      potassium chloride (KLOR-CON M) 20 MEQ extended release tablet Take 2 tablets by mouth 2 times daily After BMP two times, first after 1 week and if potassium is low then continue taking same dose with repeat BMP in a week. and if potassium is normal then take once daily.  Do not crush, chew, or suck on tablet., Disp -60 tablet, R-5Normal      Multiple Vitamin (MULTIVITAMIN) TABS tablet Take 1 tablet by mouth daily, Disp-60 tablet, R-2Normal      loratadine (CLARITIN) 10 MG tablet TAKE 1 tab BY MOUTH DAILY , Disp-30 tablet, R-3Normal      vitamin D3 (CHOLECALCIFEROL) 25 MCG (1000 UT) TABS tablet TAKE 1 TABLET BY MOUTH DAILY , Disp-30 tablet, R-5Normal      thiamine mononitrate 100 MG tablet TAKE 1 TABLET BY MOUTH ONCE DAILY , Disp-30 tablet, R-2Normal      FLUoxetine (PROZAC) 40 MG capsule Take 40 mg by mouth dailyHistorical Med      acetaminophen (APAP EXTRA STRENGTH) 500 MG tablet Take 2 tablets by mouth every 6 hours as needed for Pain, Disp-60 tablet, [id number]      folic acid (FOLVITE) 1 MG tablet Take 1 tablet by mouth daily, Disp-30 tablet, R-3Normal      ferrous sulfate (IRON 325) 325 (65 Fe) MG tablet Take 325 mg by mouth daily (with breakfast)Historical Med      atenolol (TENORMIN) 50 MG tablet Take 1 tablet by mouth daily, Disp-60 tablet, R-3Normal      amLODIPine (NORVASC) 10 MG tablet TAKE 1 TABLET BY MOUTH DAILY , Disp-60 tablet, R-3Normal      diclofenac sodium (VOLTAREN) 1 % GEL Apply 4 g topically 2 times daily, Topical, 2 TIMES DAILY Starting Wed 1/6/2021, Disp-150 g, R-2, Normal      albuterol sulfate (PROAIR RESPICLICK) 842 (90 Base) MCG/ACT aerosol powder inhalation Inhale 2 puffs into the lungs every 6 hours as needed for Wheezing or Shortness of Breath, Disp-5 Inhaler, R-3Normal      lurasidone (LATUDA) 60 MG TABS tablet Take 60 mg by mouth nightlyHistorical Med             Activity: activity as tolerated    Diet: regular diet    Follow-up:    SUMIT Pina [address]  [address] [place] [id number] [phone]  Schedule an appointment as soon as possible for a visit  hospital follow up, admitted for syncopal episode secondary to diarrhea and poor oral intake      Patient Instructions: Avoid alcohol abuse, follow-up with GI and PCP  Follow up labs: Stool studies and pH  Follow up imaging: None    Note that over 30 minutes was spent in preparing discharge papers, discussing discharge with patient, medication review, etc.      Ethel Foster MD, MD  Internal Medicine Resident, PGY-1  [facility];  [place], [place]  10/6/2021, 11:30 AM

## 2021-10-06 NOTE — CARE COORDINATION
Received a call from Es Ross, at [facility]. They need orders for the multivitamin and magnesium. The original prescriptions had been sent to [facility]. Refill request sent to provider.

## 2021-10-06 NOTE — CARE COORDINATION
[facility] Transitions Initial Follow Up Call    Call within 2 business days of discharge: Yes     Patient: Alessandra Hopkins Patient : 1965 MRN: [id number]    Last Discharge [address] [address]       Complaint Diagnosis Description Type Department Provider    21 Loss of Consciousness ROXANN (acute kidney injury) ([facility] . .. ED to Hosp-Admission (Discharged) (ADMITTED) ORLANDO Mcmillan MD; Gillian Pretty. ..           RARS: Readmission Risk Score: 23       Spoke with: patient; [address] [facility]    Discharge department/facility: [address] [place] [address]    Non-face-to-face services provided:  Scheduled appointment with PCP-scheduled hospital f/u for 10/12  Obtained and reviewed discharge summary and/or continuity of care documents  Education of patient/family/caregiver/guardian to support self-management-Reviewed follow up appts; discussed alcohol cessation and resources  Assessment and support for treatment adherence and medication management-reviewed medications; pt had discrepency in bubble pack and med list; talked to her pharmacist and clarified changes    Follow Up  Future Appointments   Date Time Provider Anthony Everett   10/12/2021  9:30 AM [facility]   10/29/2021 11:00 AM [facility]ria [facility]   2021  8:15 AM Wellington [facility]   2021 10:30 AM Marck Pepper MD [facility] Gisselle Machado RN

## 2021-10-06 NOTE — TELEPHONE ENCOUNTER
Received a phone call from [facility] [id number] requesting new prescriptions for multivitamin and Mag-Ox. Pended medications for signature. Patient has a new provider appt in November. Health Maintenance   Topic Date Due    Shingles Vaccine (1 of 2) Never done    Flu vaccine (1) 09/01/2021    Lipid screen  03/21/2022    Potassium monitoring  10/04/2022    Creatinine monitoring  10/04/2022    Breast cancer screen  10/10/2022    DTaP/Tdap/Td vaccine (3 - Td or Tdap) 04/14/2027    Colon cancer screen colonoscopy  12/03/2030    Pneumococcal 0-64 years Vaccine (2 of 2 - PPSV23) 01/22/2031    COVID-19 Vaccine  Completed    Hepatitis C screen  Completed    HIV screen  Completed    Hepatitis A vaccine  Aged Out    Hepatitis B vaccine  Aged Out    Hib vaccine  Aged Out    Meningococcal (ACWY) vaccine  Aged Out             (applicable per patient's age: Cancer Screenings, Depression Screening, Fall Risk Screening, Immunizations)    Hemoglobin A1C (%)   Date Value   06/11/2021 5.5   03/21/2021 6.3 (H)   01/20/2021 5.9     Microalb/Crt.  Ratio (mcg/mg creat)   Date Value   06/12/2019 147 (H)     LDL Cholesterol (mg/dL)   Date Value   03/21/2021 122     AST (U/L)   Date Value   10/04/2021 27     ALT (U/L)   Date Value   10/04/2021 11     BUN (mg/dL)   Date Value   10/04/2021 8      (goal A1C is < 7)   (goal LDL is <100) need 30-50% reduction from baseline     BP Readings from Last 3 Encounters:   10/04/21 121/84   10/03/21 (!) 145/100   08/21/21 117/83    (goal /80)      All Future Testing planned in [facility]:  Lab Frequency Next Occurrence   EGD Once 11/27/2020   Pancreatic elastase, fecal Once 11/13/2020   ECHO Complete 2D W Doppler W Color Once 02/07/2021   CBC With Auto Differential Once 04/22/2021   Miscellaneous Sendout 1 Once 39/92/1536   Basic Metabolic Panel Once 13/67/5103   Basic Metabolic Panel Once 23/88/6869   Comprehensive Metabolic Panel Once 87/38/2688   CBC With Auto Differential Once 11/04/2021       Next Visit Date:  Future Appointments   Date Time Provider Anthony Cutleri   10/12/2021  9:30 AM Golden Keating MD [facility]   10/29/2021 11:00 AM Callie Joyce MD [facility] [[email or web address]   11/1/2021  8:15 AM [facility]   11/5/2021 10:30 AM Serina Mendez MD [facility]            Patient Active Problem List:     Lung nodule     Obesity     Adrenal adenoma     Goiter     Hypertension     Alcohol abuse     Essential hypertension     Enlarged tonsils     ACE inhibitor-aggravated angioedema     JONES (obstructive sleep apnea)     Dyslipidemia     IGT (impaired glucose tolerance)     Acute alcoholic intoxication without complication (HCC)     Acute renal insufficiency     Effusion of bursa of right knee     Acute cystitis without hematuria     Bipolar disorder (HCC)     Mild intermittent asthma without complication     Inflammatory polyarthritis (HCC)     Seronegative rheumatoid arthritis (HCC)     Hypokalemia     Nausea vomiting and diarrhea     Chronic diarrhea     Hypocalcemia     Hypomagnesemia     Rhabdomyolysis     Elevated troponin     Hypotension     Moderate malnutrition (HCC)     ROXANN (acute kidney injury) ([address]

## 2021-10-12 ENCOUNTER — CARE COORDINATION (OUTPATIENT)
Dept: CARE COORDINATION | Age: 56
End: 2021-10-12

## 2021-10-12 ENCOUNTER — OFFICE VISIT (OUTPATIENT)
Dept: INTERNAL MEDICINE | Age: 56
End: 2021-10-12
Payer: MEDICAID

## 2021-10-12 ENCOUNTER — HOSPITAL ENCOUNTER (OUTPATIENT)
Age: 56
Setting detail: SPECIMEN
Discharge: HOME | End: 2021-10-12
Payer: MEDICAID

## 2021-10-12 VITALS
TEMPERATURE: 96.6 F | HEART RATE: 64 BPM | SYSTOLIC BLOOD PRESSURE: 140 MMHG | HEIGHT: 64 IN | DIASTOLIC BLOOD PRESSURE: 95 MMHG | WEIGHT: 172.2 LBS | BODY MASS INDEX: 29.4 KG/M2

## 2021-10-12 DIAGNOSIS — R74.8 ELEVATED CK: ICD-10-CM

## 2021-10-12 DIAGNOSIS — Z23 NEED FOR PROPHYLACTIC VACCINATION AND INOCULATION AGAINST VARICELLA: ICD-10-CM

## 2021-10-12 DIAGNOSIS — G47.33 OSA (OBSTRUCTIVE SLEEP APNEA): ICD-10-CM

## 2021-10-12 DIAGNOSIS — D63.8 ANEMIA OF CHRONIC DISEASE: ICD-10-CM

## 2021-10-12 DIAGNOSIS — F10.10 ALCOHOL ABUSE: Primary | ICD-10-CM

## 2021-10-12 DIAGNOSIS — I10 PRIMARY HYPERTENSION: ICD-10-CM

## 2021-10-12 DIAGNOSIS — E83.42 HYPOMAGNESEMIA: ICD-10-CM

## 2021-10-12 DIAGNOSIS — M10.00 IDIOPATHIC GOUT, UNSPECIFIED CHRONICITY, UNSPECIFIED SITE: ICD-10-CM

## 2021-10-12 DIAGNOSIS — K86.81 EXOCRINE PANCREATIC INSUFFICIENCY: ICD-10-CM

## 2021-10-12 DIAGNOSIS — D75.839 THROMBOCYTOSIS: ICD-10-CM

## 2021-10-12 DIAGNOSIS — K21.9 GASTROESOPHAGEAL REFLUX DISEASE WITHOUT ESOPHAGITIS: ICD-10-CM

## 2021-10-12 DIAGNOSIS — F10.10 ALCOHOL ABUSE: ICD-10-CM

## 2021-10-12 DIAGNOSIS — K52.9 CHRONIC DIARRHEA: ICD-10-CM

## 2021-10-12 DIAGNOSIS — E87.6 HYPOKALEMIA: ICD-10-CM

## 2021-10-12 LAB
ABSOLUTE EOS #: 0.08 K/UL (ref 0–0.44)
ABSOLUTE EOS #: 0.08 K/UL (ref 0–0.44)
ABSOLUTE IMMATURE GRANULOCYTE: 0.07 K/UL (ref 0–0.3)
ABSOLUTE IMMATURE GRANULOCYTE: 0.07 K/UL (ref 0–0.3)
ABSOLUTE LYMPH #: 2.79 K/UL (ref 1.1–3.7)
ABSOLUTE LYMPH #: 2.79 K/UL (ref 1.1–3.7)
ABSOLUTE MONO #: 0.87 K/UL (ref 0.1–1.2)
ABSOLUTE MONO #: 0.87 K/UL (ref 0.1–1.2)
ALBUMIN SERPL-MCNC: 3.8 G/DL (ref 3.5–5.2)
ALBUMIN SERPL-MCNC: 3.8 G/DL (ref 3.5–5.2)
ALBUMIN/GLOBULIN RATIO: 1.3 (ref 1–2.5)
ALBUMIN/GLOBULIN RATIO: 1.3 (ref 1–2.5)
ALP BLD-CCNC: 157 U/L (ref 35–104)
ALP BLD-CCNC: 157 U/L (ref 35–104)
ALT SERPL-CCNC: 17 U/L (ref 5–33)
ALT SERPL-CCNC: 17 U/L (ref 5–33)
ANION GAP SERPL CALCULATED.3IONS-SCNC: 12 MMOL/L (ref 9–17)
ANION GAP SERPL CALCULATED.3IONS-SCNC: 12 MMOL/L (ref 9–17)
AST SERPL-CCNC: 34 U/L
AST SERPL-CCNC: 34 U/L
BASOPHILS # BLD: 1 % (ref 0–2)
BASOPHILS # BLD: 1 % (ref 0–2)
BASOPHILS ABSOLUTE: 0.13 K/UL (ref 0–0.2)
BASOPHILS ABSOLUTE: 0.13 K/UL (ref 0–0.2)
BILIRUB SERPL-MCNC: 0.21 MG/DL (ref 0.3–1.2)
BILIRUB SERPL-MCNC: 0.21 MG/DL (ref 0.3–1.2)
BUN BLDV-MCNC: 10 MG/DL (ref 6–20)
BUN BLDV-MCNC: 10 MG/DL (ref 6–20)
BUN/CREAT BLD: ABNORMAL (ref 9–20)
BUN/CREAT BLD: ABNORMAL (ref 9–20)
C-REACTIVE PROTEIN: <3 MG/L (ref 0–5)
C-REACTIVE PROTEIN: <3 MG/L (ref 0–5)
CALCIUM SERPL-MCNC: 9.6 MG/DL (ref 8.6–10.4)
CALCIUM SERPL-MCNC: 9.6 MG/DL (ref 8.6–10.4)
CHLORIDE BLD-SCNC: 103 MMOL/L (ref 98–107)
CHLORIDE BLD-SCNC: 103 MMOL/L (ref 98–107)
CO2: 24 MMOL/L (ref 20–31)
CO2: 24 MMOL/L (ref 20–31)
CREAT SERPL-MCNC: 0.8 MG/DL (ref 0.5–0.9)
CREAT SERPL-MCNC: 0.8 MG/DL (ref 0.5–0.9)
DIFFERENTIAL TYPE: ABNORMAL
DIFFERENTIAL TYPE: ABNORMAL
EOSINOPHILS RELATIVE PERCENT: 1 % (ref 1–4)
EOSINOPHILS RELATIVE PERCENT: 1 % (ref 1–4)
GFR AFRICAN AMERICAN: >60 ML/MIN
GFR AFRICAN AMERICAN: >60 ML/MIN
GFR NON-AFRICAN AMERICAN: >60 ML/MIN
GFR NON-AFRICAN AMERICAN: >60 ML/MIN
GFR SERPL CREATININE-BSD FRML MDRD: ABNORMAL ML/MIN/{1.73_M2}
GLUCOSE BLD-MCNC: 82 MG/DL (ref 70–99)
GLUCOSE BLD-MCNC: 82 MG/DL (ref 70–99)
HCT VFR BLD CALC: 27.7 % (ref 36.3–47.1)
HCT VFR BLD CALC: 27.7 % (ref 36.3–47.1)
HEMOGLOBIN: 8.6 G/DL (ref 11.9–15.1)
HEMOGLOBIN: 8.6 G/DL (ref 11.9–15.1)
IMMATURE GRANULOCYTES: 1 %
IMMATURE GRANULOCYTES: 1 %
LACTATE DEHYDROGENASE: 334 U/L (ref 135–214)
LACTATE DEHYDROGENASE: 334 U/L (ref 135–214)
LYMPHOCYTES # BLD: 31 % (ref 24–43)
LYMPHOCYTES # BLD: 31 % (ref 24–43)
MCH RBC QN AUTO: 31.7 PG (ref 25.2–33.5)
MCH RBC QN AUTO: 31.7 PG (ref 25.2–33.5)
MCHC RBC AUTO-ENTMCNC: 31 G/DL (ref 28.4–34.8)
MCHC RBC AUTO-ENTMCNC: 31 G/DL (ref 28.4–34.8)
MCV RBC AUTO: 102.2 FL (ref 82.6–102.9)
MCV RBC AUTO: 102.2 FL (ref 82.6–102.9)
MONOCYTES # BLD: 10 % (ref 3–12)
MONOCYTES # BLD: 10 % (ref 3–12)
MYOGLOBIN: 24 NG/ML (ref 25–58)
NRBC AUTOMATED: 0 PER 100 WBC
NRBC AUTOMATED: 0 PER 100 WBC
PDW BLD-RTO: 15.4 % (ref 11.8–14.4)
PDW BLD-RTO: 15.4 % (ref 11.8–14.4)
PLATELET # BLD: 756 K/UL (ref 138–453)
PLATELET # BLD: 756 K/UL (ref 138–453)
PLATELET ESTIMATE: ABNORMAL
PLATELET ESTIMATE: ABNORMAL
PMV BLD AUTO: 8.8 FL (ref 8.1–13.5)
PMV BLD AUTO: 8.8 FL (ref 8.1–13.5)
POTASSIUM SERPL-SCNC: 3.9 MMOL/L (ref 3.7–5.3)
POTASSIUM SERPL-SCNC: 3.9 MMOL/L (ref 3.7–5.3)
RBC # BLD: 2.71 M/UL (ref 3.95–5.11)
RBC # BLD: 2.71 M/UL (ref 3.95–5.11)
RBC # BLD: ABNORMAL 10*6/UL
RBC # BLD: ABNORMAL 10*6/UL
SEG NEUTROPHILS: 56 % (ref 36–65)
SEG NEUTROPHILS: 56 % (ref 36–65)
SEGMENTED NEUTROPHILS ABSOLUTE COUNT: 5.03 K/UL (ref 1.5–8.1)
SEGMENTED NEUTROPHILS ABSOLUTE COUNT: 5.03 K/UL (ref 1.5–8.1)
SODIUM BLD-SCNC: 139 MMOL/L (ref 135–144)
SODIUM BLD-SCNC: 139 MMOL/L (ref 135–144)
TOTAL CK: 82 U/L (ref 26–192)
TOTAL CK: 82 U/L (ref 26–192)
TOTAL PROTEIN: 6.7 G/DL (ref 6.4–8.3)
TOTAL PROTEIN: 6.7 G/DL (ref 6.4–8.3)
URIC ACID: 8.7 MG/DL (ref 2.4–5.7)
URIC ACID: 8.7 MG/DL (ref 2.4–5.7)
WBC # BLD: 9 K/UL (ref 3.5–11.3)
WBC # BLD: 9 K/UL (ref 3.5–11.3)
WBC # BLD: ABNORMAL 10*3/UL
WBC # BLD: ABNORMAL 10*3/UL

## 2021-10-12 PROCEDURE — 1111F DSCHRG MED/CURRENT MED MERGE: CPT | Performed by: STUDENT IN AN ORGANIZED HEALTH CARE EDUCATION/TRAINING PROGRAM

## 2021-10-12 PROCEDURE — G8417 CALC BMI ABV UP PARAM F/U: HCPCS | Performed by: STUDENT IN AN ORGANIZED HEALTH CARE EDUCATION/TRAINING PROGRAM

## 2021-10-12 PROCEDURE — 3017F COLORECTAL CA SCREEN DOC REV: CPT | Performed by: STUDENT IN AN ORGANIZED HEALTH CARE EDUCATION/TRAINING PROGRAM

## 2021-10-12 PROCEDURE — 99211 OFF/OP EST MAY X REQ PHY/QHP: CPT | Performed by: INTERNAL MEDICINE

## 2021-10-12 PROCEDURE — 90688 IIV4 VACCINE SPLT 0.5 ML IM: CPT | Performed by: STUDENT IN AN ORGANIZED HEALTH CARE EDUCATION/TRAINING PROGRAM

## 2021-10-12 PROCEDURE — 1036F TOBACCO NON-USER: CPT | Performed by: STUDENT IN AN ORGANIZED HEALTH CARE EDUCATION/TRAINING PROGRAM

## 2021-10-12 PROCEDURE — G8427 DOCREV CUR MEDS BY ELIG CLIN: HCPCS | Performed by: STUDENT IN AN ORGANIZED HEALTH CARE EDUCATION/TRAINING PROGRAM

## 2021-10-12 PROCEDURE — G8482 FLU IMMUNIZE ORDER/ADMIN: HCPCS | Performed by: STUDENT IN AN ORGANIZED HEALTH CARE EDUCATION/TRAINING PROGRAM

## 2021-10-12 PROCEDURE — 99214 OFFICE O/P EST MOD 30 MIN: CPT | Performed by: STUDENT IN AN ORGANIZED HEALTH CARE EDUCATION/TRAINING PROGRAM

## 2021-10-12 RX ORDER — PANCRELIPASE 30000; 6000; 19000 [USP'U]/1; [USP'U]/1; [USP'U]/1
CAPSULE, DELAYED RELEASE PELLETS ORAL
Qty: 450 CAPSULE | Refills: 3 | Status: SHIPPED | OUTPATIENT
Start: 2021-10-12 | End: 2021-10-13 | Stop reason: SDUPTHER

## 2021-10-12 RX ORDER — ALLOPURINOL 100 MG/1
100 TABLET ORAL DAILY
Qty: 90 TABLET | Refills: 0 | Status: SHIPPED | OUTPATIENT
Start: 2021-10-12 | End: 2022-03-15

## 2021-10-12 RX ORDER — COLCHICINE 0.6 MG/1
0.6 TABLET ORAL DAILY
Qty: 30 TABLET | Refills: 1 | Status: SHIPPED | OUTPATIENT
Start: 2021-10-12 | End: 2021-10-18

## 2021-10-12 NOTE — PATIENT INSTRUCTIONS
Medications e-scribe to pharmacy of pt's choice. Laboratory Instructions: Your doctor has ordered blood or urine testing. You can get this testing done at the Lab located on the first floor of the [facility], or at any other [facility]. Please stop at Main Registration, before going to the lab, as you must be registered first.   Please get this lab done before your next visit. You may eat or drink before this test.  Labs given to patient    Printed script for Shingles Vaccines signed and given to the patient    Return To Clinic 11/2/2021. After Visit Summary  given and reviewed. --    It is very important for your care that you keep your appointment. If for some reason you are unable to keep your appointment it is equally important that you call our office at [phone] to cancel your appointment and reschedule. Failure to do so may result in your termination from our practice.     Informed pharmacy of medications that was stopped and medications that was started.

## 2021-10-12 NOTE — PROGRESS NOTES
Patient is here for follow-up after hospitalization. She has chronic diarrhea from alcohol abuse. Elastase was slightly low. No other cause was found. She has seen GI in the past and had EGD and colonoscopies with biopsies which were negative and stool studies negative. Since last hospitalization she states she has not been drinking. She has a history of rhabdomyolysis, hypokalemia and hypomagnesemia. Has history of major depression. For now we will stop statin. She also has a history of polyarthritis. She is seeing rheumatologist.  There was questionable history of seronegative rheumatoid arthritis. Work-up including rheumatoid factor and CCP antibodies are negative. She does have very elevated uric acid level and start her arthritis is from gout. She is not currently on medications. She is supposed to get her labs and follow-up with rheumatology. She has been getting hydroxychloroquine and prednisone from here. Last prednisone use was a few months ago but she got another 10 doses from the hospital on discharge. Will advise her to stop these medications after she has completed her course of prednisone. Check all her labs. Start allopurinol. She does have chronic diarrhea but will start her on low-dose colchicine to avoid acute gout flareup as she has very elevated uric acid levels. Advised to get all her labs and follow-up with rheumatology as well. Stop statins. Check CK. Colchicine and Lipitor will also cause worsening rhabdomyolysis. Discussed all these medication changes with her and need to avoid alcohol. Will also start Creon for her diarrhea. Follow-up with GI as she has an appointment in 2 weeks time. We will see her back in 1 month's time. Attending Physician Statement  I have discussed the care of [address] [place] [address], including pertinent history and exam findings,  with the resident.  I have seen and examined the patient and the key elements of all parts of the encounter have been performed by me. I agree with the assessment, plan and orders as documented by the resident.   (GC Modifier)

## 2021-10-12 NOTE — CARE COORDINATION
Ambulatory Care Coordination Note  10/12/2021  CM Risk Score: 9  Charlson 10 Year Mortality Risk Score: 23%     ACC: Rhonda Reina RN    Summary Note: Received a call from [facility] letting ACM know that she had her f/u with new PCP this morning. She stated that they changed some of her medications around, and added some new ones. She said they told her she had gouty arthritis; and that a lot of her problems are coming from her pancreas. She sees GI on 10/29. She said she got all of her lab work done also. Results still pending. She said her blood pressure was high and she told them it always goes up with any activity. She said they rechecked it at the end of the appt and it was still high, but better. She is going to go to the pharmacy to review the medication changes to make sure her bubble packs are right. She stated that she was feeling good and  \"I have so much energy now\". She continues to abstain from alcohol. Will follow up on lab results. Reviewed provider's note from today. They started allopurinol and colchicine for the gout. Started Creon for her diarrhea. Stopped her statin and told her to stop the hydroxychloroquine and prednisone after she completes the current course. Will follow up next week. Care Coordination Interventions    Program Enrollment: Complex Care  Referral from Primary Care Provider: No  Suggested Interventions and Community Resources  Behavorial Health: Completed (Comment: Rebekah Carrizales- already in place)  [address]: Completed (Comment: Trevor- already in place)  Medi Set or Pill Pack: Completed (Comment: Heena)         Goals Addressed                 This Visit's Progress     Medication Management   On track     I will take my medication as directed. I will notify my provider of any problems with medications, like adverse effects or side effects. I will notify my provider/Care Coordinator if I am unable to afford my medications.   I will notify my provider for advice before I stop taking any of my medication. Barriers: lack of education  Plan for overcoming my barriers: Care Management   Confidence: 7/10  Anticipated Goal Completion Date: 8/30/2021            Prior to Admission medications    Medication Sig Start Date End Date Taking? Authorizing Provider   zoster recombinant adjuvanted vaccine [facility]) 50 MCG/0.5ML SUSR injection Inject 0.5 mLs into the muscle once for 1 dose 50 MCG IM then repeat 2-6 months. 10/12/21 10/12/21  Marian Ramirez MD   allopurinol (ZYLOPRIM) 100 MG tablet Take 1 tablet by mouth daily 10/12/21   Marian Ramirez MD   colchicine (COLCRYS) 0.6 MG tablet Take 1 tablet by mouth daily 10/12/21   Marian Ramirez MD   lipase-protease-amylase (CREON) 6000-24204 units delayed release capsule Take by mouth 3 times daily (with meals) 10/12/21   Golden Meredith MD   loperamide (IMODIUM) 2 MG capsule Take 1 capsule by mouth 3 times daily as needed for Diarrhea 10/3/21 10/13/21  Kourtney Alaniz MD   magnesium oxide (MAG-OX) 400 (240 Mg) MG tablet Take 1 tablet by mouth daily 10/3/21   Kourtney Alaniz MD   omeprazole (PRILOSEC) 20 MG delayed release capsule TAKE 1 CAPSULE BY MOUTH DAILY  9/23/21   Chanel Walton MD   Calcium Carbonate-Vitamin D (OYSTER SHELL CALCIUM/D) 500-200 MG-UNIT TABS TAKE 1 TAB BY MOUTH ONCE A DAY  9/23/21   Chanel Walton MD   potassium chloride (KLOR-CON M) 20 MEQ extended release tablet Take 2 tablets by mouth 2 times daily After BMP two times, first after 1 week and if potassium is low then continue taking same dose with repeat BMP in a week. and if potassium is normal then take once daily. Do not crush, chew, or suck on tablet.  8/21/21   Radha Vasques MD   Multiple Vitamin (MULTIVITAMIN) TABS tablet Take 1 tablet by mouth daily 8/22/21   Radha Vasques MD   loratadine (CLARITIN) 10 MG tablet TAKE 1 tab BY MOUTH DAILY  8/18/21   CECILIA Shelton - LISANDRO   thiamine mononitrate 100 MG tablet TAKE 1 TABLET BY MOUTH ONCE DAILY  7/14/21   Pratibha Whelan MD   FLUoxetine (PROZAC) 40 MG capsule Take 40 mg by mouth daily    Historical Provider, MD   acetaminophen (APAP EXTRA STRENGTH) 500 MG tablet Take 2 tablets by mouth every 6 hours as needed for Pain 6/11/21   Batool Garrison MD   atorvastatin (LIPITOR) 20 MG tablet TAKE 1 TABLET BY MOUTH DAILY  5/12/21   Ramón Beckwith MD   folic acid (FOLVITE) 1 MG tablet Take 1 tablet by mouth daily 4/27/21   Batool Garrison MD   ferrous sulfate (IRON 325) 325 (65 Fe) MG tablet Take 325 mg by mouth daily (with breakfast)    Historical Provider, MD   atenolol (TENORMIN) 50 MG tablet Take 1 tablet by mouth daily 1/22/21   Jose Zuniga MD   Calcium Carbonate-Vitamin D (OYSTER SHELL CALCIUM/D) 500-200 MG-UNIT TABS TAKE 1 TAB BY MOUTH ONCE A DAY  1/22/21   Jose Zuniga MD   amLODIPine (NORVASC) 10 MG tablet TAKE 1 TABLET BY MOUTH DAILY  1/8/21   Jose Arizmendi Nurse, MD   diclofenac sodium (VOLTAREN) 1 % GEL Apply 4 g topically 2 times daily 1/6/21   Magno Goode DPM   albuterol sulfate (PROAIR RESPICLICK) 714 (90 Base) MCG/ACT aerosol powder inhalation Inhale 2 puffs into the lungs every 6 hours as needed for Wheezing or Shortness of Breath 1/15/20   Silvester Lundborg, MD       Future Appointments   Date Time Provider Anthony Cutleri   10/29/2021 11:00 AM Norberto Severs, MD [facility]   11/1/2021  8:15 AM Douglas Hdz MD [facility]   11/2/2021  3:00 PM Marcus Mendoza MD [address] Brissa Priest

## 2021-10-12 NOTE — PROGRESS NOTES
[facility] [address] [address]  Dept: [phone]  Dept Fax: [phone]    Office Progress/Follow Up Note  Date of patient's visit: 10/12/2021  Patient's Name:  Tamara Gibbs YOB: 1965            Patient Care Team:  Nuvia Umanzor MD as [facility]  Je Zurita MD as Consulting Physician (Gastroenterology)  Reid Lobo RN as [address] [place] [address]  ________________________________________________________________________      Reason for Visit: Routine outpatient follow up/Same day visit/Post hospital/ED visit: Hospital follow-up  ________________________________________________________________________  Chief Complaint:  Follow-Up from Hospital ( Joellen Bird 09/29/21 HTN) and Health Maintenance (vaccines pended )    ________________________________________________________________________  History of Presenting Illness:  History was obtained from: patient. Tamara Gibbs is a 55 y.o. is here for hospital follow-up. Patient was admitted to hospital for chronic diarrhea, electrolyte abnormalities. Work-up was negative for chronic diarrhea except low elastase levels. Patient was treated symptomatically in the hospitals with IV fluids and electrolyte placement. Patient has history of heavy alcohol abuse but states that she stopped drinking after getting out of hospital. Her chronic diarrhea might be related to heavy alcohol abuse causing elastase deficiency. Her diarrhea has significantly improved now. Currently she is having 2 bowel movements per day which are formed. She has past medical history of chronic diarrhea, essential hypertension, gout, anemia of chronic disease, JONES. She had EGD and colonoscopy in December 2020 for chronic diarrhea. EGD and colonoscopy were unremarkable except moderate inactive gastritis. Biopsies were negative.  Her risk factors and for chronic diarrhea included heavy alcohol abuse and NSAID use. Patient has    Patient has asymmetrical joint pains likely related to gout. Patient follows with Dr. Carlene Anderson and currently taking hydroxychloroquine and prednisone. She is not on any urate suppressing medications. Last uric acid check in August 2021 was 13.4. Uric acid has been elevated in the past as well. At this point she does have pain in the right middle finger PIP and DIP.        Patient Active Problem List   Diagnosis    Lung nodule    Obesity    Adrenal adenoma    Goiter    Hypertension    Alcohol abuse    Essential hypertension    Enlarged tonsils    ACE inhibitor-aggravated angioedema    JONES (obstructive sleep apnea)    Dyslipidemia    IGT (impaired glucose tolerance)    Acute alcoholic intoxication without complication (HCC)    Acute renal insufficiency    Effusion of bursa of right knee    Acute cystitis without hematuria    Bipolar disorder (HCC)    Mild intermittent asthma without complication    Inflammatory polyarthritis (HCC)    Seronegative rheumatoid arthritis (HCC)    Hypokalemia    Nausea vomiting and diarrhea    Chronic diarrhea    Hypocalcemia    Hypomagnesemia    Rhabdomyolysis    Elevated troponin    Hypotension    Moderate malnutrition (HCC)    ROXANN (acute kidney injury) ([facility] [address]       Health Maintenance Due   Topic Date Due    Shingles Vaccine (1 of 2) Never done    Flu vaccine (1) 09/01/2021       Allergies   Allergen Reactions    Bee Venom Anaphylaxis    Iodine Anaphylaxis and Rash    Lisinopril Swelling and Anaphylaxis     Angioedema    Shellfish-Derived Products Anaphylaxis and Rash     ANGIOEDEMA         Current Outpatient Medications   Medication Sig Dispense Refill    predniSONE (DELTASONE) 10 MG tablet Take 1 tablet by mouth daily for 10 days 10 tablet 0    meloxicam (MOBIC) 15 MG tablet TAKE 1 TABLET BY MOUTH DAILY  30 tablet 0    hydroxychloroquine (PLAQUENIL) 200 MG tablet TAKE 1 TABLET BY MOUTH 2 TIMES DAILY  60 tablet 0  omeprazole (PRILOSEC) 20 MG delayed release capsule TAKE 1 CAPSULE BY MOUTH DAILY  60 capsule 3    atorvastatin (LIPITOR) 20 MG tablet TAKE 1 TABLET BY MOUTH DAILY  30 tablet 4    Calcium Carbonate-Vitamin D (OYSTER SHELL CALCIUM/D) 500-200 MG-UNIT TABS TAKE 1 TAB BY MOUTH ONCE A DAY  30 tablet 4    potassium chloride (KLOR-CON M) 20 MEQ extended release tablet Take 2 tablets by mouth 2 times daily After BMP two times, first after 1 week and if potassium is low then continue taking same dose with repeat BMP in a week. and if potassium is normal then take once daily. Do not crush, chew, or suck on tablet.  60 tablet 5    Multiple Vitamin (MULTIVITAMIN) TABS tablet Take 1 tablet by mouth daily 60 tablet 2    loratadine (CLARITIN) 10 MG tablet TAKE 1 tab BY MOUTH DAILY  30 tablet 3    thiamine mononitrate 100 MG tablet TAKE 1 TABLET BY MOUTH ONCE DAILY  30 tablet 2    FLUoxetine (PROZAC) 40 MG capsule Take 40 mg by mouth daily      acetaminophen (APAP EXTRA STRENGTH) 500 MG tablet Take 2 tablets by mouth every 6 hours as needed for Pain 60 tablet 0    folic acid (FOLVITE) 1 MG tablet Take 1 tablet by mouth daily 30 tablet 3    ferrous sulfate (IRON 325) 325 (65 Fe) MG tablet Take 325 mg by mouth daily (with breakfast)      atenolol (TENORMIN) 50 MG tablet Take 1 tablet by mouth daily 60 tablet 3    amLODIPine (NORVASC) 10 MG tablet TAKE 1 TABLET BY MOUTH DAILY  60 tablet 3    diclofenac sodium (VOLTAREN) 1 % GEL Apply 4 g topically 2 times daily 150 g 2    albuterol sulfate (PROAIR RESPICLICK) 720 (90 Base) MCG/ACT aerosol powder inhalation Inhale 2 puffs into the lungs every 6 hours as needed for Wheezing or Shortness of Breath 5 Inhaler 3    loperamide (IMODIUM) 2 MG capsule Take 1 capsule by mouth 3 times daily as needed for Diarrhea (Patient not taking: Reported on 10/12/2021) 30 capsule 0    magnesium oxide (MAG-OX) 400 (240 Mg) MG tablet Take 1 tablet by mouth daily (Patient not taking: Reported on 10/12/2021) 30 tablet 0     No current facility-administered medications for this visit.        Social History     Tobacco Use    Smoking status: Former Smoker     Packs/day: 0.50     Years: 20.00     Pack years: 10.00     Types: Cigarettes     Quit date: 1992     Years since quittin.8    Smokeless tobacco: Never Used    Tobacco comment: states quiti in the 1980s   Substance Use Topics    Alcohol use: Not Currently     Alcohol/week: 12.0 standard drinks     Types: 12 Cans of beer per week     Comment: Quit about 5 months ago 20    Drug use: Not Currently     Types: Cocaine     Comment: last use approximately 14 cocaine       Family History   Problem Relation Age of Onset    Stroke Mother     Hypertension Father     Cancer Brother         bone    Lung Cancer Maternal Aunt     Cancer Maternal Grandmother         eye     Other Sister         aneurysm    Heart Disease Sister       ________________________________________________________________________  Review of Systems:  CONSTITUTIONAL: Denies: fever, chills  PSYCH: Denies: anxiety, depression  ALLERGIES: Denies: urticaria  EYES: Denies: blurry vision, decreased vision, photophobia  ENT: Denies: sore throat, nasal congestion  CARDIOVASCULAR: Denies: chest pain, dyspnea on exertion  RESPIRATORY: Denies: cough, hemoptysis, shortness of breath  GI: Diarrhea, improving  : Denies: Denies: dysuria, frequency/urgency  NEURO: Denies: dizzy/vertigo, headache  MUSCULOSKELETAL: Asymmetrical joint pains  SKIN: Denies: rash, itching  ________________________________________________________________________  Physical Exam:  Vitals:    10/12/21 0940   BP: (!) 146/106   Site: Left Upper Arm   Position: Sitting   Cuff Size: Medium Adult   Pulse: 73   Temp: 96.6 °F (35.9 °C)   TempSrc: Infrared   Weight: 172 lb 3.2 oz (78.1 kg)   Height: 5' 4\" (1.626 m)     BP Readings from Last 3 Encounters:   10/12/21 (!) 146/106   10/04/21 121/84 10/03/21 (!) 145/100      alert and oriented x3  chest clear bilaterally without any wheezing or crackles  regular rate and rhythm, no murmurs appreciated  soft, nontender and nondistended abdomen. No organomegaly appreciated. No focal neurological deficit  no pedal edema  tenderness in the right middle finger proximal and distal interphalangeal joint. Rest of the musculoskeletal examination is unremarkable at this point.    ________________________________________________________________________  Diagnostic findings:  CBC:  Lab Results   Component Value Date    WBC 9.8 10/04/2021    HGB 7.8 10/04/2021     10/04/2021       BMP:    Lab Results   Component Value Date     10/04/2021    K 4.1 10/04/2021     10/04/2021    CO2 26 10/04/2021    BUN 8 10/04/2021    CREATININE 0.75 10/04/2021    GLUCOSE 77 10/04/2021    GLUCOSE 88 02/24/2012       HEMOGLOBIN A1C:   Lab Results   Component Value Date    LABA1C 5.5 06/11/2021       FASTING LIPID PANEL:  Lab Results   Component Value Date    CHOL 248 (H) 03/21/2021     03/21/2021    TRIG 47 03/21/2021     ________________________________________________________________________  Assessment and Plan:    Chronic diarrhea-EGD and colonoscopy December 2020 was normal. Elastase deficiency. Heavy alcohol abuse. Patient strictly advised to stop drinking. we will start on Creon to replace pancreatic enzymes and see if that helps with diarrhea. Continue as needed loperamide at this point. Electrolyte abnormality-hypokalemia and hypomagnesemia related to diarrhea and heavy alcohol abuse. Repeat BMP. Asymmetrical joint arthropathy-gout. Elevated uric acid levels to 13 in August 2021. Uric acid levels has been elevated in the past as well. Patient is not on any urate suppressing medications. She currently taking hydroxychloroquine and prednisone considering seronegative rheumatoid arthritis.   We will start patient on allopurinol along with short course of colchicine to prevent acute flare from urate lowering therapy. We will stop hydroxychloroquine and prednisone as there is no indication for that. Check uric acid, CRP, CK, myoglobin and LDH. We will stop atorvastatin at this point considering elevated CPK in the recent past, and also because patient is going to be on colchicine as well. Essential hypertension: Plan to continue amlodipine and Tenormin at this point. JONES-continue CPAP    Anemia of chronic disease. Iron studies consistent with anemia of chronic disease.  ________________________________________________________________________  Follow up and instructions:  · No follow-ups on file. · Delphine received counseling on the following healthy behaviors: Alcohol cessation    · Discussed use, benefit, and side effects of prescribed medications. Barriers to medication compliance addressed. All patient questions answered. Pt voiced understanding. · Patient given educational materials - see patient instructions    Faye Stallworth MD      Department of Internal Medicine  [address] [facility]         10/12/2021, 9:48 AM      This note is created with the assistance of a speech-recognition program. While intending to generate a document that actually reflects the content of the visit, the document can still have some mistakes which may not have been identified and corrected by editing.

## 2021-10-13 ENCOUNTER — TELEPHONE (OUTPATIENT)
Dept: INTERNAL MEDICINE | Age: 56
End: 2021-10-13

## 2021-10-13 DIAGNOSIS — K86.81 EXOCRINE PANCREATIC INSUFFICIENCY: ICD-10-CM

## 2021-10-13 DIAGNOSIS — Z00.00 HEALTH CARE MAINTENANCE: Primary | ICD-10-CM

## 2021-10-13 RX ORDER — PANCRELIPASE 30000; 6000; 19000 [USP'U]/1; [USP'U]/1; [USP'U]/1
CAPSULE, DELAYED RELEASE PELLETS ORAL
Qty: 450 CAPSULE | Refills: 3 | Status: SHIPPED | OUTPATIENT
Start: 2021-10-13 | End: 2021-10-21

## 2021-10-13 NOTE — TELEPHONE ENCOUNTER
Patient is unable to manage her medications on her own due to low socioeconomic status. She is unable to understand which medications to take at what time or how many times a day even after explaining multiple times. Patient would benefit from visiting nurse helping with medication compliance and further education about new medications.  Placed order for home health

## 2021-10-13 NOTE — TELEPHONE ENCOUNTER
Please put correct directions  Pharmacy called  Needing to know how many capsules does the pt. Need to take with each meal . Pharmacy just called back today 10/19/21  after getting the VM regarding the instruction on how to take the Medication. The pharna would like to have a new RX sent over .

## 2021-10-14 ENCOUNTER — TELEPHONE (OUTPATIENT)
Dept: INTERNAL MEDICINE | Age: 56
End: 2021-10-14

## 2021-10-14 DIAGNOSIS — M10.00 IDIOPATHIC GOUT, UNSPECIFIED CHRONICITY, UNSPECIFIED SITE: Primary | ICD-10-CM

## 2021-10-14 NOTE — TELEPHONE ENCOUNTER
''The quantity prescribed does not exceed six units of colchicine per prescription claim'' Does it mean 6 tablets or something else? Can you please clarify this with insurance company. Thanks!

## 2021-10-14 NOTE — TELEPHONE ENCOUNTER
I clarified yesterday as well that she will take 1 capule (6000 units) three times a day with meals.

## 2021-10-14 NOTE — TELEPHONE ENCOUNTER
Prior Authorization request received for Colchicine 0.6MG tablets    Prior Authorization processed and submitted to patient's insurance, waiting for response in regards to medication coverage

## 2021-10-18 ENCOUNTER — HOSPITAL ENCOUNTER (OUTPATIENT)
Age: 56
Discharge: HOME | End: 2021-10-20
Payer: MEDICAID

## 2021-10-18 ENCOUNTER — OFFICE VISIT (OUTPATIENT)
Dept: PRIMARY CARE CLINIC | Age: 56
End: 2021-10-18
Payer: MEDICAID

## 2021-10-18 ENCOUNTER — HOSPITAL ENCOUNTER (OUTPATIENT)
Dept: GENERAL RADIOLOGY | Age: 56
Discharge: HOME | End: 2021-10-20
Payer: MEDICAID

## 2021-10-18 VITALS — OXYGEN SATURATION: 99 % | DIASTOLIC BLOOD PRESSURE: 83 MMHG | HEART RATE: 83 BPM | SYSTOLIC BLOOD PRESSURE: 134 MMHG

## 2021-10-18 DIAGNOSIS — S93.411A SPRAIN OF CALCANEOFIBULAR LIGAMENT OF RIGHT ANKLE, INITIAL ENCOUNTER: ICD-10-CM

## 2021-10-18 DIAGNOSIS — M25.511 ACUTE PAIN OF RIGHT SHOULDER: Primary | ICD-10-CM

## 2021-10-18 DIAGNOSIS — M25.521 RIGHT ELBOW PAIN: ICD-10-CM

## 2021-10-18 DIAGNOSIS — M25.511 ACUTE PAIN OF RIGHT SHOULDER: ICD-10-CM

## 2021-10-18 PROCEDURE — 3017F COLORECTAL CA SCREEN DOC REV: CPT | Performed by: INTERNAL MEDICINE

## 2021-10-18 PROCEDURE — 73030 X-RAY EXAM OF SHOULDER: CPT

## 2021-10-18 PROCEDURE — G8417 CALC BMI ABV UP PARAM F/U: HCPCS | Performed by: INTERNAL MEDICINE

## 2021-10-18 PROCEDURE — 1111F DSCHRG MED/CURRENT MED MERGE: CPT | Performed by: INTERNAL MEDICINE

## 2021-10-18 PROCEDURE — 1036F TOBACCO NON-USER: CPT | Performed by: INTERNAL MEDICINE

## 2021-10-18 PROCEDURE — 73600 X-RAY EXAM OF ANKLE: CPT

## 2021-10-18 PROCEDURE — G8427 DOCREV CUR MEDS BY ELIG CLIN: HCPCS | Performed by: INTERNAL MEDICINE

## 2021-10-18 PROCEDURE — 99213 OFFICE O/P EST LOW 20 MIN: CPT | Performed by: INTERNAL MEDICINE

## 2021-10-18 PROCEDURE — G8482 FLU IMMUNIZE ORDER/ADMIN: HCPCS | Performed by: INTERNAL MEDICINE

## 2021-10-18 PROCEDURE — 73070 X-RAY EXAM OF ELBOW: CPT

## 2021-10-18 RX ORDER — COLCHICINE 0.6 MG/1
0.6 TABLET ORAL DAILY
Qty: 6 TABLET | Refills: 0 | Status: SHIPPED | OUTPATIENT
Start: 2021-10-18 | End: 2021-11-01 | Stop reason: ALTCHOICE

## 2021-10-18 NOTE — TELEPHONE ENCOUNTER
Colchicine is not being prescribed as an alternative for allopurinol. Patient will need allopurinol for a long time along with the short course of colchicine to prevent episodes of gout flare up with allopurinol. I will at least prescribe six tablets of colchicine at this time as that's the only option available.

## 2021-10-18 NOTE — TELEPHONE ENCOUNTER
PC to [address] to Marcela Calvo - requesting clarification on the response. Marcela Calvo states that there can not be more than 6 tablets prescribed per prescription claim. Patient does also need to trial and fail alternatives listed before coverage will be authorized, unless a medical statement is written and submitted for redetermination.

## 2021-10-21 RX ORDER — PANCRELIPASE 30000; 6000; 19000 [USP'U]/1; [USP'U]/1; [USP'U]/1
CAPSULE, DELAYED RELEASE PELLETS ORAL
Qty: 450 CAPSULE | Refills: 3 | Status: SHIPPED | OUTPATIENT
Start: 2021-10-21 | End: 2021-10-29 | Stop reason: ALTCHOICE

## 2021-10-27 ENCOUNTER — HOSPITAL ENCOUNTER (OUTPATIENT)
Facility: MEDICAL CENTER | Age: 56
End: 2021-10-27
Payer: MEDICAID

## 2021-10-29 ENCOUNTER — OFFICE VISIT (OUTPATIENT)
Dept: GASTROENTEROLOGY | Age: 56
End: 2021-10-29
Payer: MEDICAID

## 2021-10-29 VITALS — DIASTOLIC BLOOD PRESSURE: 84 MMHG | BODY MASS INDEX: 29.18 KG/M2 | SYSTOLIC BLOOD PRESSURE: 121 MMHG | WEIGHT: 170 LBS

## 2021-10-29 DIAGNOSIS — K52.9 CHRONIC DIARRHEA: Primary | ICD-10-CM

## 2021-10-29 DIAGNOSIS — K86.81 EXOCRINE PANCREATIC INSUFFICIENCY: ICD-10-CM

## 2021-10-29 DIAGNOSIS — K21.9 GASTROESOPHAGEAL REFLUX DISEASE, UNSPECIFIED WHETHER ESOPHAGITIS PRESENT: ICD-10-CM

## 2021-10-29 PROBLEM — R77.8 ELEVATED TROPONIN: Status: RESOLVED | Noted: 2021-08-20 | Resolved: 2021-10-29

## 2021-10-29 PROBLEM — R79.89 ELEVATED TROPONIN: Status: RESOLVED | Noted: 2021-08-20 | Resolved: 2021-10-29

## 2021-10-29 PROCEDURE — 1036F TOBACCO NON-USER: CPT | Performed by: INTERNAL MEDICINE

## 2021-10-29 PROCEDURE — G8427 DOCREV CUR MEDS BY ELIG CLIN: HCPCS | Performed by: INTERNAL MEDICINE

## 2021-10-29 PROCEDURE — G8482 FLU IMMUNIZE ORDER/ADMIN: HCPCS | Performed by: INTERNAL MEDICINE

## 2021-10-29 PROCEDURE — 99214 OFFICE O/P EST MOD 30 MIN: CPT | Performed by: INTERNAL MEDICINE

## 2021-10-29 PROCEDURE — 1111F DSCHRG MED/CURRENT MED MERGE: CPT | Performed by: INTERNAL MEDICINE

## 2021-10-29 PROCEDURE — G8417 CALC BMI ABV UP PARAM F/U: HCPCS | Performed by: INTERNAL MEDICINE

## 2021-10-29 PROCEDURE — 3017F COLORECTAL CA SCREEN DOC REV: CPT | Performed by: INTERNAL MEDICINE

## 2021-10-29 ASSESSMENT — ENCOUNTER SYMPTOMS
DIARRHEA: 1
RESPIRATORY NEGATIVE: 1
EYES NEGATIVE: 1

## 2021-10-29 NOTE — PROGRESS NOTES
GI FOLLOW UP    INTERVAL HISTORY:       No referring provider defined for this encounter. Chief Complaint   Patient presents with    Follow-up     hospital follow up - loose BM    Diarrhea     Patient is taking creon. Not as directed. Educated patient to take it 3 times a day with meals. States she is taking imodium PRN.  Gastroesophageal Reflux     Patient states taking prilosec 20mg daily. States it controls her symptoms.  Drug / Alcohol Assessment     Patient states she has not drank alcohol in 5 weeks. 1. Chronic diarrhea    2. Gastroesophageal reflux disease, unspecified whether esophagitis present          HISTORY OF PRESENT ILLNESS: Ms.Sonja CHEPE Pablo is a 55 y.o. female with a past history remarkable for alcoholism, anxiety, asthma, bipolar disorder, claustrophobia, depression, GERD, hypertension, hypertrophic cardiomyopathy, type 2 diabetes, prior history of peptic ulcer disease, chronic iron deficiency anemia, referred for evaluation of GERD. Patient had an upper endoscopy and colonoscopy in December 2020 where no obvious source of anemia was identified. Strongly advised alcohol cessation. Last alcohol drink approximately 6 weeks ago per patient. Reports diarrhea symptoms which may be related to pancreatic insufficiency. Fecal elastase was low. Past Medical,Family, and Social History reviewed and does contribute to the patient presenting condition. Patient's PMH/PSH,SH,PSYCH Hx, MEDs, ALLERGIES, and ROS were all reviewed and updated in the appropriate sections.     PAST MEDICAL HISTORY:  Past Medical History:   Diagnosis Date    Angioedema 11/17/2017    from lisinopril    Anxiety     Asthma     mild persistent asthma, on home resp medications for control    Bipolar disorder (HCC)     Claustrophobia     Depression     GERD (gastroesophageal reflux disease)     Hypertension     Hypertrophic cardiomyopathy ([facility]     Lung nodules     MRSA (methicillin resistant Staphylococcus aureus)     rt hip    Murmur     see cardiac echo result    OA (osteoarthritis)     JONES (obstructive sleep apnea)     Thyroid nodule     Type 2 diabetes mellitus without complication, without long-term current use of insulin ([facility] 12/7/2018       Past Surgical History:   Procedure Laterality Date    BUNIONECTOMY Bilateral 12/7/2020    [facility]  BUNIONECTOMY, [address] performed by Loc Ponce DPM at [facility]      bx    COLONOSCOPY N/A 12/3/2020    COLONOSCOPY WITH BIOPSY performed by Lee Goodpasture, MD at [address] Bilateral 12/07/2020    [place] bunionectomy, right 2nd hammer toe repair     HAMMER TOE SURGERY Right 12/7/2020    2ND TOE HAMMER REPAIR performed by Loc Ponce DPM at [place] [address]      partial hyst    OTHER SURGICAL HISTORY  8/24/2015    MRI under anesthesia    THYROID SURGERY      bilat bx    UPPER GASTROINTESTINAL ENDOSCOPY      UPPER GASTROINTESTINAL ENDOSCOPY N/A 12/3/2020    EGD BIOPSY performed by Lee Goodpasture, MD at [facility]       CURRENT MEDICATIONS:    Current Outpatient Medications:     lipase-protease-amylase (CREON) 6000-22409 units delayed release capsule, Take by mouth 3 times daily (with meals), Disp: 450 capsule, Rfl: 3    allopurinol (ZYLOPRIM) 100 MG tablet, Take 1 tablet by mouth daily, Disp: 90 tablet, Rfl: 0    magnesium oxide (MAG-OX) 400 (240 Mg) MG tablet, Take 1 tablet by mouth daily, Disp: 30 tablet, Rfl: 0    omeprazole (PRILOSEC) 20 MG delayed release capsule, TAKE 1 CAPSULE BY MOUTH DAILY , Disp: 60 capsule, Rfl: 3    Calcium Carbonate-Vitamin D (OYSTER SHELL CALCIUM/D) 500-200 MG-UNIT TABS, TAKE 1 TAB BY MOUTH ONCE A DAY , Disp: 30 tablet, Rfl: 4    potassium chloride (KLOR-CON M) 20 MEQ extended release tablet, Take 2 tablets by mouth 2 times daily After BMP two times, first after 1 week and if potassium is low then continue taking same dose with repeat BMP in a week. and if potassium is normal then take once daily.  Do not crush, chew, or suck on tablet., Disp: 60 tablet, Rfl: 5    Multiple Vitamin (MULTIVITAMIN) TABS tablet, Take 1 tablet by mouth daily, Disp: 60 tablet, Rfl: 2    loratadine (CLARITIN) 10 MG tablet, TAKE 1 tab BY MOUTH DAILY , Disp: 30 tablet, Rfl: 3    thiamine mononitrate 100 MG tablet, TAKE 1 TABLET BY MOUTH ONCE DAILY , Disp: 30 tablet, Rfl: 2    FLUoxetine (PROZAC) 40 MG capsule, Take 40 mg by mouth daily, Disp: , Rfl:     acetaminophen (APAP EXTRA STRENGTH) 500 MG tablet, Take 2 tablets by mouth every 6 hours as needed for Pain, Disp: 60 tablet, Rfl: 0    folic acid (FOLVITE) 1 MG tablet, Take 1 tablet by mouth daily, Disp: 30 tablet, Rfl: 3    ferrous sulfate (IRON 325) 325 (65 Fe) MG tablet, Take 325 mg by mouth daily (with breakfast), Disp: , Rfl:     atenolol (TENORMIN) 50 MG tablet, Take 1 tablet by mouth daily, Disp: 60 tablet, Rfl: 3    amLODIPine (NORVASC) 10 MG tablet, TAKE 1 TABLET BY MOUTH DAILY , Disp: 60 tablet, Rfl: 3    diclofenac sodium (VOLTAREN) 1 % GEL, Apply 4 g topically 2 times daily, Disp: 150 g, Rfl: 2    albuterol sulfate (PROAIR RESPICLICK) 418 (90 Base) MCG/ACT aerosol powder inhalation, Inhale 2 puffs into the lungs every 6 hours as needed for Wheezing or Shortness of Breath, Disp: 5 Inhaler, Rfl: 3    colchicine (COLCRYS) 0.6 MG tablet, Take 1 tablet by mouth daily for 6 days, Disp: 6 tablet, Rfl: 0    ALLERGIES:   Allergies   Allergen Reactions    Bee Venom Anaphylaxis    Iodine Anaphylaxis and Rash    Lisinopril Swelling and Anaphylaxis     Angioedema    Shellfish-Derived Products Anaphylaxis and Rash     ANGIOEDEMA       FAMILY HISTORY:       Problem Relation Age of Onset    Stroke Mother     Hypertension Father     Cancer Brother         bone    Lung Cancer Maternal Aunt     Cancer Maternal Grandmother         eye     Other Sister         aneurysm    Heart Disease Sister          SOCIAL HISTORY:   Social History     Socioeconomic History    Marital status: Single     Spouse name: Not on file    Number of children: Not on file    Years of education: Not on file    Highest education level: Not on file   Occupational History    Not on file   Tobacco Use    Smoking status: Former Smoker     Packs/day: 0.50     Years: 20.00     Pack years: 10.00     Types: Cigarettes     Quit date: 1992     Years since quittin.8    Smokeless tobacco: Never Used    Tobacco comment: states quiti in the 1980s   Substance and Sexual Activity    Alcohol use: Not Currently     Alcohol/week: 12.0 standard drinks     Types: 12 Cans of beer per week     Comment: Quit about 5 months ago 20    Drug use: Not Currently     Types: Cocaine     Comment: last use approximately 14 cocaine    Sexual activity: Yes     Partners: Male   Other Topics Concern    Not on file   Social History Narrative    Not on file     Social Determinants of Health     Financial Resource Strain: Low Risk     Difficulty of Paying Living Expenses: Not hard at all   Food Insecurity: No Food Insecurity    Worried About Running Out of Food in the Last Year: Never true    [place] of Food in the Last Year: Never true   Transportation Needs: Unmet Transportation Needs    Lack of Transportation (Medical):  Yes    Lack of Transportation (Non-Medical): Yes   Physical Activity:     Days of Exercise per Week:     Minutes of Exercise per Session:    Stress:     Feeling of Stress :    Social Connections:     Frequency of Communication with Friends and Family:     Frequency of Social Gatherings with Friends and Family:     Attends Jew Services:     Active Member of Clubs or Organizations:     Attends Club or Organization Meetings:     Marital Status:    Intimate Partner Violence:     Fear of Current or Ex-Partner:  Emotionally Abused:     Physically Abused:     Sexually Abused:        REVIEW OF SYSTEMS: A 12-point review of systems was obtained and pertinent positives and negatives were listed below. REVIEW OF SYSTEMS:     Constitutional: No fever, no chills, no lethargy, no weakness. HEENT:  No headache, otalgia, itchy eyes, nasal discharge or sore throat. Cardiac:  No chest pain, dyspnea, orthopnea or PND. Chest:   No cough, phlegm or wheezing. Abdomen:      Detailed by MA   Neuro:  No focal weakness, abnormal movements or seizure like activity. Skin:   No rashes, no itching. :   No hematuria, no pyuria, no dysuria, no flank pain. Extremities:  No swelling or joint pains. ROS was otherwise negative    Review of Systems   Constitutional: Negative. HENT: Negative. Eyes: Negative. Respiratory: Negative. Cardiovascular: Negative. Gastrointestinal: Positive for diarrhea. Endocrine: Negative. Genitourinary: Negative. Musculoskeletal: Positive for arthralgias. Skin: Negative. Allergic/Immunologic: Positive for food allergies. Neurological: Positive for light-headedness. Hematological: Negative. Psychiatric/Behavioral: Positive for sleep disturbance. The patient is nervous/anxious. All other systems reviewed and are negative. PHYSICAL EXAMINATION: Vital signs reviewed per the nursing documentation. BP (!) 149/90   Wt 170 lb (77.1 kg)   BMI 29.18 kg/m²   Body mass index is 29.18 kg/m². Physical Exam    Physical Exam   Constitutional: Patient is oriented to person, place, and time. Patient appears well-developed and well-nourished. HENT:   Head: Normocephalic and atraumatic. Eyes: Pupils are equal, round, and reactive to light. EOM are normal.   Neck: Normal range of motion. Neck supple. No JVD present. No tracheal deviation present. No thyromegaly present. Cardiovascular: Normal rate, regular rhythm, normal heart sounds and intact distal pulses. Pulmonary/Chest: Effort normal and breath sounds normal. No stridor. No respiratory distress. He has no wheezes. He has no rales. He exhibits no tenderness. Abdominal: Soft. Bowel sounds are normal. He exhibits no distension and no mass. There is no tenderness. There is no rebound and no guarding. No hernia. Musculoskeletal: Normal range of motion. Lymphadenopathy:    Patient has no cervical adenopathy. Neurological: Patient is alert and oriented to person, place, and time. Psychiatric: Patient has a normal mood and affect. Patient behavior is normal.       LABORATORY DATA: Reviewed  Lab Results   Component Value Date    WBC 9.0 10/12/2021    HGB 8.6 (L) 10/12/2021    HCT 27.7 (L) 10/12/2021    .2 10/12/2021     (H) 10/12/2021     10/12/2021    K 3.9 10/12/2021     10/12/2021    CO2 24 10/12/2021    BUN 10 10/12/2021    CREATININE 0.80 10/12/2021    LABPROT 7.5 02/24/2012    LABALBU 3.8 10/12/2021    BILITOT 0.21 (L) 10/12/2021    ALKPHOS 157 (H) 10/12/2021    AST 34 (H) 10/12/2021    ALT 17 10/12/2021    INR 1.0 10/02/2019         Lab Results   Component Value Date    RBC 2.71 (L) 10/12/2021    HGB 8.6 (L) 10/12/2021    .2 10/12/2021    MCH 31.7 10/12/2021    MCHC 31.0 10/12/2021    RDW 15.4 (H) 10/12/2021    MPV 8.8 10/12/2021    BASOPCT 1 10/12/2021    LYMPHSABS 2.79 10/12/2021    MONOSABS 0.87 10/12/2021    NEUTROABS 5.03 10/12/2021    EOSABS 0.08 10/12/2021    BASOSABS 0.13 10/12/2021         DIAGNOSTIC TESTING:     XR SHOULDER RIGHT (MIN 2 VIEWS)    Result Date: 10/18/2021  EXAMINATION: THREE XRAY VIEWS OF THE RIGHT SHOULDER 10/18/2021 12:12 pm COMPARISON: None.  HISTORY: ORDERING SYSTEM PROVIDED HISTORY: Acute pain of right shoulder FINDINGS: There are moderate degenerative changes of the right acromioclavicular joint with joint space compromise/spurring The bones and joints are otherwise unremarkable without definite fracture, dislocation, erosive change, abnormal soft tissue calcification or bony destructive lesion     Moderate degenerative changes of the right acromioclavicular joint     XR ELBOW RIGHT (2 VIEWS)    Result Date: 10/18/2021  EXAMINATION: TWO XRAY VIEWS OF THE RIGHT ELBOW 10/18/2021 12:12 pm COMPARISON: None. HISTORY: ORDERING SYSTEM PROVIDED HISTORY: Right elbow pain FINDINGS: There is marked fullness of the soft tissues in the region of the olecranon bursa There are mild degenerative changes at the radiocapitellar and ulnar trochlear articulations with joint space compromise/spurring There is no definite elbow effusion, acute fracture, dislocation or bony destructive lesion     Marked fullness of the soft tissues in the region of the olecranon bursa consistent with olecranon bursitis which may be on a posttraumatic, inflammatory or metabolic basis Degenerative changes No acute fracture or dislocation     XR ANKLE RIGHT (2 VIEWS)    Result Date: 10/18/2021  EXAMINATION: XRAY VIEWS OF THE RIGHT ANKLE 10/18/2021 12:12 pm COMPARISON: None. HISTORY: ORDERING SYSTEM PROVIDED HISTORY: Sprain of calcaneofibular ligament of right ankle, initial encounter FINDINGS: The bones and joints are unremarkable without definite effusion, fracture, dislocation, significant degenerative/erosive change, radiopaque foreign body or bony destructive lesion     Unremarkable two-view right ankle series     XR CHEST PORTABLE    Result Date: 9/29/2021  EXAMINATION: ONE XRAY VIEW OF THE CHEST 9/29/2021 12:54 pm COMPARISON: August 16, 2021 chest exam HISTORY: ORDERING SYSTEM PROVIDED HISTORY: cp TECHNOLOGIST PROVIDED HISTORY: cp Reason for Exam: upr Acuity: Unknown Type of Exam: Unknown FINDINGS: Stable borderline cardiomegaly/moderate tortuosity of the thoracic aorta Low volume lungs with mild streaky bibasilar density. Otherwise clear lungs.  No significant pleural process Degenerative changes of the thoracic spine/shoulders     Low volume lungs with mild streaky bibasilar atelectasis IMPRESSION:  Yamile Main is a 55 y.o. female with a past history remarkable for alcoholism, anxiety, asthma, bipolar disorder, claustrophobia, depression, GERD, hypertension, hypertrophic cardiomyopathy, type 2 diabetes, prior history of peptic ulcer disease, chronic iron deficiency anemia, referred for evaluation of GERD. Patient had an upper endoscopy and colonoscopy in December 2020 where no obvious source of anemia was identified. Strongly advised alcohol cessation. Last alcohol drink approximately 6 weeks ago per patient. Reports diarrhea symptoms which may be related to pancreatic insufficiency. Fecal elastase was low. Assessment  1. Chronic diarrhea    2. Gastroesophageal reflux disease, unspecified whether esophagitis present        PLAN:    1) exocrine pancreatic insufficiency likely due to type 2 diabetes and alcoholismwe will increase CREON to 2476K units before every meal. Patient advised to avoid alcohol completely. Optimize diabetes. Avoid dietary triggers. 2) Chronic obscure anemia without any overt signs of GI bleedingwe will repeat blood work with anemia work-up. May consider repeat EGD and colonoscopy with capsule endoscopy in the future. Likely related to patient's prior alcoholism versus malnourishment/malabsorption in the setting of pancreatic insufficiency. 3) chronic GERDpatient continue with Prilosec 20 mg daily. Avoid dietary triggers. RTC in 3 months     Thank you for allowing me to participate in the care of Ms. Amy Pablo. For any further questions please do not hesitate to contact me. I have reviewed and agree with the ROS entered by the MA/LPN from today's encounter documented in a separate note.         Ciaran Estes MD, MPH   [facility]  Office #: [phone]    this note is created with the assistance of a speech recognition program.  While intending to generate a document that actually reflects the content of the visit, the document can still have some errors including those of syntax and sound a like substitutions which may escape proof reading. It such instances, actual meaning can be extrapolated by contextual diversion.

## 2021-11-01 ENCOUNTER — OFFICE VISIT (OUTPATIENT)
Dept: ONCOLOGY | Age: 56
End: 2021-11-01
Payer: MEDICAID

## 2021-11-01 ENCOUNTER — TELEPHONE (OUTPATIENT)
Dept: ONCOLOGY | Age: 56
End: 2021-11-01

## 2021-11-01 VITALS
HEART RATE: 67 BPM | RESPIRATION RATE: 16 BRPM | BODY MASS INDEX: 28.87 KG/M2 | SYSTOLIC BLOOD PRESSURE: 157 MMHG | WEIGHT: 168.2 LBS | DIASTOLIC BLOOD PRESSURE: 113 MMHG | TEMPERATURE: 97.7 F

## 2021-11-01 DIAGNOSIS — D75.839 THROMBOCYTOSIS: Primary | ICD-10-CM

## 2021-11-01 DIAGNOSIS — D59.4 HEMOLYTIC ANEMIA ASSOCIATED WITH CHRONIC INFLAMMATORY DISEASE (HCC): ICD-10-CM

## 2021-11-01 DIAGNOSIS — M06.4 INFLAMMATORY POLYARTHRITIS (HCC): ICD-10-CM

## 2021-11-01 PROCEDURE — 3017F COLORECTAL CA SCREEN DOC REV: CPT | Performed by: INTERNAL MEDICINE

## 2021-11-01 PROCEDURE — G8482 FLU IMMUNIZE ORDER/ADMIN: HCPCS | Performed by: INTERNAL MEDICINE

## 2021-11-01 PROCEDURE — G8427 DOCREV CUR MEDS BY ELIG CLIN: HCPCS | Performed by: INTERNAL MEDICINE

## 2021-11-01 PROCEDURE — 1111F DSCHRG MED/CURRENT MED MERGE: CPT | Performed by: INTERNAL MEDICINE

## 2021-11-01 PROCEDURE — 1036F TOBACCO NON-USER: CPT | Performed by: INTERNAL MEDICINE

## 2021-11-01 PROCEDURE — 99214 OFFICE O/P EST MOD 30 MIN: CPT | Performed by: INTERNAL MEDICINE

## 2021-11-01 PROCEDURE — G8417 CALC BMI ABV UP PARAM F/U: HCPCS | Performed by: INTERNAL MEDICINE

## 2021-11-01 PROCEDURE — 99211 OFF/OP EST MAY X REQ PHY/QHP: CPT | Performed by: INTERNAL MEDICINE

## 2021-11-01 NOTE — TELEPHONE ENCOUNTER
[address] MD VISIT  RV IN 3/2022 W/CBC  LABS CDP 3/7/22  MD VISIT 3/7/22 @ 8AM  AVS PRINTED W/ INSTRUCTIONS AND GIVEN TO PT ON EXIT

## 2021-11-01 NOTE — PROGRESS NOTES
DIAGNOSIS:   1. Thrombocytosis   2. Normocytic anemia  3. Diffuse arthralgia/. possible RA     CURRENT THERAPY:  NGS testing is negative for  mutation    BRIEF CASE HISTORY:   Yaritza Sandoval is a very pleasant 55 y.o. female who is referred to us for thrombocytosis. She was recently in house with uncontrolled hypertension and lab work showed elevated platelets. She was also found to have possible RA or gout and needs to consult with rheumatology for joint pain and swelling. She was given Prednisone in house to manage swelling. She reports she feels improved since admission. She has history of bursa and has had knees drained by ortho surg. We saw the patient and decided to proceed with NGS testing. That was negative for JAK2 mutation, DAGOBERTO R mutation and MPL mutation. Platelets are above 1 million. INTERIM HISTORY  The patient comes in today for follow-up,   she is feeling better. Diarrhea is improving. She is recovered from her recent hospitalization. [place] her elbow and hip area. She is recovering slowly. PAST MEDICAL HISTORY: has a past medical history of Angioedema, Anxiety, Asthma, Bipolar disorder ([address] Claustrophobia, Depression, GERD (gastroesophageal reflux disease), Hypertension, Hypertrophic cardiomyopathy ([address] Lung nodules, MRSA (methicillin resistant Staphylococcus aureus), Murmur, OA (osteoarthritis), JONES (obstructive sleep apnea), Thyroid nodule, and Type 2 diabetes mellitus without complication, without long-term current use of insulin ([address] PAST SURGICAL HISTORY: has a past surgical history that includes Hysterectomy; Upper gastrointestinal endoscopy; Colonoscopy; Thyroid surgery; Cardiac catheterization; other surgical history (8/24/2015); Upper gastrointestinal endoscopy (N/A, 12/3/2020); Colonoscopy (N/A, 12/3/2020); Foot surgery (Bilateral, 12/07/2020); Hammer toe surgery (Right, 12/7/2020); and Bunionectomy (Bilateral, 12/7/2020). CURRENT MEDICATIONS:  has a current medication list which includes the following prescription(s): lipase-protease-amylase, colchicine, allopurinol, magnesium oxide, omeprazole, oyster shell calcium/d, potassium chloride, multivitamin, loratadine, thiamine mononitrate, fluoxetine, acetaminophen, [DISCONTINUED] atorvastatin, folic acid, ferrous sulfate, atenolol, [DISCONTINUED] oyster shell calcium/d, amlodipine, diclofenac sodium, and albuterol sulfate. ALLERGIES:  is allergic to bee venom, iodine, lisinopril, and shellfish-derived products. FAMILY HISTORY: Negative for any hematological or oncological conditions. SOCIAL HISTORY:  reports that she quit smoking about 28 years ago. Her smoking use included cigarettes. She has a 10.00 pack-year smoking history. She has never used smokeless tobacco. She reports previous alcohol use of about 12.0 standard drinks of alcohol per week. She reports previous drug use. Drug: Cocaine. REVIEW OF SYSTEMS:   General: No fever or night sweats. Weight is stable. ENT: No double or blurred vision, no tinnitus or hearing problem, no dysphagia or sore throat   Respiratory: No chest pain, no shortness of breath, no cough or hemoptysis. Cardiovascular: Denies chest pain, PND or orthopnea. No L E swelling or palpitations. Gastrointestinal: No nausea or vomiting, abdominal pain, d diarrhea has subsided with the help of Imodium  Genitourinary: Denies dysuria, hematuria, frequency, urgency or incontinence. Neurological: Denies headaches, decreased LOC, no sensory or motor focal deficits. Musculoskeletal: No back pain; +joint pain and swelling as discussed previously, unchanged but she is having more pain in her right elbow and right hip after the fall  Skin: There are no rashes or bleeding. Psychiatric: No anxiety, no depression. Endocrine: No diabetes or thyroid disease. Hematologic: No bleeding, no adenopathy.     PHYSICAL EXAM: Shows a well appearing 55 y.o.-year-old female who is not in pain or distress. Vital Signs: not currently breastfeeding. HEENT: Normocephalic and atraumatic. Pupils are equal, round, reactive to light and accommodation. Extraocular muscles are intact. Neck: Showed no JVD, no carotid bruit . Lungs: Clear to auscultation bilaterally. Heart: systolic murmur. Abdomen: Soft, nontender. No hepatosplenomegaly. Extremities: Lower extremities show no edema, clubbing, or cyanosis. Evidence of inflammatory arthritis and small fingers of the hand and wrist as well as the elbows. She has olecranon bursa with significant pain and tenderness. Breasts: Examination not done today. Neuro exam: intact cranial nerves bilaterally no motor or sensory deficit, gait is normal. Lymphatic: no adenopathy appreciated in the supraclavicular, axillary, cervical or inguinal area    REVIEW OF LABORATORY DATA:   Lab Results   Component Value Date    WBC 9.0 10/12/2021    HGB 8.6 (L) 10/12/2021    HCT 27.7 (L) 10/12/2021    .2 10/12/2021     (H) 10/12/2021     plt [id number]  Lab Results   Component Value Date    IRON 21 (L) 10/02/2021    TIBC 103 (L) 10/02/2021    FERRITIN 572 (H) 10/02/2021       REVIEW OF RADIOLOGICAL RESULTS:     IMPRESSION:   1. Thrombocytosis  2. Normocytic anemia   3. Evidence of inflammatory arthritis/bursitis  4. NGS testing is negative for  mutation   5. Recent admission to the hospital with ROXANN and diarrhea    PLAN:   The patient continues to recover slowly from hospitalization and recent fall  She continues to have thrombocytosis, anemia and macrocytosis. LDH is slightly elevated  To complete the work-up, I suggested to do a bone marrow aspiration biopsy which is the next step in her work-up. However, the patient states that she is starting to recover and she is seeing a new primary care physician who is adjusting some of her medication. She is having more energy and she is hoping the adjustment will improve things.   I

## 2021-11-26 ENCOUNTER — CARE COORDINATION (OUTPATIENT)
Dept: CARE COORDINATION | Age: 56
End: 2021-11-26

## 2021-11-26 NOTE — CARE COORDINATION
Ambulatory Care Coordination Note  11/26/2021  CM Risk Score: 5  Charlson 10 Year Mortality Risk Score: 23%     ACC: Verna Herron RN    Summary Note: Attempted to reach patient for follow up. Message left on her voicemail with call back number. Will try to reach her next week if no return call. Care Coordination Interventions    Program Enrollment: Complex Care  Referral from Primary Care Provider: No  Suggested Interventions and Community Resources  Behavorial Health: Completed (Comment: Jon Pang- already in place)  [address]: Completed (Comment: Trevor- already in place)  Medi Set or Pill Pack: Completed (Comment: [address])         Goals Addressed    None         Prior to Admission medications    Medication Sig Start Date End Date Taking? Authorizing Provider   lipase-protease-amylase (CREON) 75138-72151 units delayed release capsule Take by mouth 3 times daily (with meals) 10/29/21   Burak Mauro MD   allopurinol (ZYLOPRIM) 100 MG tablet Take 1 tablet by mouth daily 10/12/21   Buck Guerra MD   magnesium oxide (MAG-OX) 400 (240 Mg) MG tablet Take 1 tablet by mouth daily 10/3/21   Biju Zhou MD   omeprazole (PRILOSEC) 20 MG delayed release capsule TAKE 1 CAPSULE BY MOUTH DAILY  9/23/21   Reynaldo Cason MD   Calcium Carbonate-Vitamin D (OYSTER SHELL CALCIUM/D) 500-200 MG-UNIT TABS TAKE 1 TAB BY MOUTH ONCE A DAY   Patient taking differently: 2 times daily TAKE 1 TAB BY MOUTH ONCE A DAY 9/23/21   Reynaldo Cason MD   potassium chloride (KLOR-CON M) 20 MEQ extended release tablet Take 2 tablets by mouth 2 times daily After BMP two times, first after 1 week and if potassium is low then continue taking same dose with repeat BMP in a week. and if potassium is normal then take once daily. Do not crush, chew, or suck on tablet.  8/21/21   Petar Lane MD   Multiple Vitamin (MULTIVITAMIN) TABS tablet Take 1 tablet by mouth daily 8/22/21   Petar Lane MD   loratadine (CLARITIN) 10 MG tablet TAKE 1 tab BY MOUTH DAILY  8/18/21   CECILIA Rodriguez - CNP   thiamine mononitrate 100 MG tablet TAKE 1 TABLET BY MOUTH ONCE DAILY  7/14/21   Sarika Nolan MD   FLUoxetine (PROZAC) 40 MG capsule Take 40 mg by mouth daily    Historical Provider, MD   acetaminophen (APAP EXTRA STRENGTH) 500 MG tablet Take 2 tablets by mouth every 6 hours as needed for Pain 6/11/21   Geno Preciado MD   atorvastatin (LIPITOR) 20 MG tablet TAKE 1 TABLET BY MOUTH DAILY  5/12/21   Vin Calvillo MD   folic acid (FOLVITE) 1 MG tablet Take 1 tablet by mouth daily 4/27/21   Geno Preciado MD   ferrous sulfate (IRON 325) 325 (65 Fe) MG tablet Take 325 mg by mouth daily (with breakfast)    Historical Provider, MD   atenolol (TENORMIN) 50 MG tablet Take 1 tablet by mouth daily 1/22/21   Jose Zuniga MD   Calcium Carbonate-Vitamin D (OYSTER SHELL CALCIUM/D) 500-200 MG-UNIT TABS TAKE 1 TAB BY MOUTH ONCE A DAY  1/22/21   Jose Zuniga MD   amLODIPine (NORVASC) 10 MG tablet TAKE 1 TABLET BY MOUTH DAILY  1/8/21   Jose Flores MD   albuterol sulfate (PROAIR RESPICLICK) 071 (90 Base) MCG/ACT aerosol powder inhalation Inhale 2 puffs into the lungs every 6 hours as needed for Wheezing or Shortness of Breath 1/15/20   Pepper Fleming MD       Future Appointments   Date Time Provider Anthony Marguerite   1/28/2022 10:00 AM Seferino Brewster MD [facility]   3/7/2022  8:00 AM Jeremy Hdz  [address] [facility] [place] Jay Pagan

## 2022-01-14 ENCOUNTER — CARE COORDINATION (OUTPATIENT)
Dept: CARE COORDINATION | Age: 57
End: 2022-01-14

## 2022-01-14 NOTE — CARE COORDINATION
Ambulatory Care Coordination Note  1/14/2022  CM Risk Score: 5  Charlson 10 Year Mortality Risk Score: 23%     ACC: Kaiden Durant RN    Summary Note: Attempted to reach patient for follow up. Left a message on her voicemail with call back number. Needs follow up appt with PCP. Will try to reach here next week, if no return call. Care Coordination Interventions    Program Enrollment: Complex Care  Referral from Primary Care Provider: No  Suggested Interventions and Community Resources  Behavorial Health: Completed (Comment: Matthew Casanova- already in place)  [address]: Completed (Comment: Trevor- already in place)  Medi Set or Pill Pack: Completed (Comment: [address])         Goals Addressed    None         Prior to Admission medications    Medication Sig Start Date End Date Taking? Authorizing Provider   magnesium oxide (MAG-OX) 400 MG tablet TAKE 1 TABLET BY MOUTH 2 TIMES DAILY 12/21/21   Gloden Bhardwaj MD   lipase-protease-amylase (CREON) 54893-28760 units delayed release capsule Take by mouth 3 times daily (with meals) 10/29/21   Meli Scott MD   allopurinol (ZYLOPRIM) 100 MG tablet Take 1 tablet by mouth daily 10/12/21   Lamar Mccall MD   magnesium oxide (MAG-OX) 400 (240 Mg) MG tablet Take 1 tablet by mouth daily 10/3/21   Arik Jones MD   omeprazole (PRILOSEC) 20 MG delayed release capsule TAKE 1 CAPSULE BY MOUTH DAILY  9/23/21   Al Shah MD   Calcium Carbonate-Vitamin D (OYSTER SHELL CALCIUM/D) 500-200 MG-UNIT TABS TAKE 1 TAB BY MOUTH ONCE A DAY   Patient taking differently: 2 times daily TAKE 1 TAB BY MOUTH ONCE A DAY 9/23/21   Al Shah MD   potassium chloride (KLOR-CON M) 20 MEQ extended release tablet Take 2 tablets by mouth 2 times daily After BMP two times, first after 1 week and if potassium is low then continue taking same dose with repeat BMP in a week. and if potassium is normal then take once daily. Do not crush, chew, or suck on tablet.  8/21/21 Gonzalez Wynne MD   Multiple Vitamin (MULTIVITAMIN) TABS tablet Take 1 tablet by mouth daily 8/22/21   Gonzalez Wynne MD   loratadine (CLARITIN) 10 MG tablet TAKE 1 tab BY MOUTH DAILY  8/18/21   Esternehemiah Marcchloe, APRN - CNP   thiamine mononitrate 100 MG tablet TAKE 1 TABLET BY MOUTH ONCE DAILY  7/14/21   Urmila Rodrigues MD   FLUoxetine (PROZAC) 40 MG capsule Take 40 mg by mouth daily    Historical Provider, MD   acetaminophen (APAP EXTRA STRENGTH) 500 MG tablet Take 2 tablets by mouth every 6 hours as needed for Pain 6/11/21   Shayla Guerra MD   atorvastatin (LIPITOR) 20 MG tablet TAKE 1 TABLET BY MOUTH DAILY  5/12/21   Dandre Lima MD   folic acid (FOLVITE) 1 MG tablet Take 1 tablet by mouth daily 4/27/21   Shayla Guerra MD   ferrous sulfate (IRON 325) 325 (65 Fe) MG tablet Take 325 mg by mouth daily (with breakfast)    Historical Provider, MD   atenolol (TENORMIN) 50 MG tablet Take 1 tablet by mouth daily 1/22/21   Jose Zuniga MD   Calcium Carbonate-Vitamin D (OYSTER SHELL CALCIUM/D) 500-200 MG-UNIT TABS TAKE 1 TAB BY MOUTH ONCE A DAY  1/22/21   Dandre Lima MD   amLODIPine (NORVASC) 10 MG tablet TAKE 1 TABLET BY MOUTH DAILY  1/8/21   Jose Brantley MD   albuterol sulfate (PROAIR RESPICLICK) 705 (90 Base) MCG/ACT aerosol powder inhalation Inhale 2 puffs into the lungs every 6 hours as needed for Wheezing or Shortness of Breath 1/15/20   Genita Pallas, MD       Future Appointments   Date Time Provider Anthony Everett   1/28/2022 10:00 AM Penny Merritt MD [facility]   3/7/2022  8:00 AM Vincenzo Hdz  [facility] [facility] Kimani Brito

## 2022-01-15 NOTE — CARE COORDINATION
Eric Labor returned call. She states she is doing ok. She had lost her prednisone and finally found it. She was having a flare up but is doing a little better now. [place] still [place] a little slow. She said she is taking Tylenol ES for the pain. She hasn't been following at the [facility]. She has been going to a women's group that meets at the [facility]. She says she has a get together tomorrow. She follows up with GI next week. She denies any needs at this time. Will follow up in 2 weeks.  If no further needs, will graduate from [address] program.

## 2022-02-03 ENCOUNTER — CARE COORDINATION (OUTPATIENT)
Dept: CARE COORDINATION | Age: 57
End: 2022-02-03

## 2022-02-03 NOTE — CARE COORDINATION
Ambulatory Care Coordination Note  2/3/2022   Risk Score: 5  Charlson 10 Year Mortality Risk Score: 23%     ACC: Billy Chisholm RN    Summary Note: Anna Navarrete for follow up. She stated that she was doing fair. She started with nausea and diarrhea again. Encouraged plenty of liquids. She has follow up with GI later this month. She was agreeable to scheduling a follow up with PCP. She thanked [facility] for calling. She denies any other needs. She has contact number for [facility] for any future needs/concerns. No ED visits in the last 6 months. She has f/u appts scheduled and  is up to date. Will graduate from  at this time. Care Coordination Interventions    Program Enrollment: Complex Care  Referral from Primary Care Provider: No  Suggested Interventions and Community Resources  Behavorial Health: Completed (Comment: Sal Farmer- already in place)  [address]: Completed (Comment: Trevor- already in place)  Medi Set or Pill Pack: Completed (Comment: [address])         Goals Addressed    None         Prior to Admission medications    Medication Sig Start Date End Date Taking?  Authorizing Provider   magnesium oxide (MAG-OX) 400 MG tablet TAKE 1 TABLET BY MOUTH 2 TIMES DAILY 12/21/21   Golden Rai MD   lipase-protease-amylase (CREON) 54207-18136 units delayed release capsule Take by mouth 3 times daily (with meals) 10/29/21   Татьяна Medley MD   allopurinol (ZYLOPRIM) 100 MG tablet Take 1 tablet by mouth daily 10/12/21   Irene Stratton MD   magnesium oxide (MAG-OX) 400 (240 Mg) MG tablet Take 1 tablet by mouth daily 10/3/21   Johanna Gale MD   omeprazole (PRILOSEC) 20 MG delayed release capsule TAKE 1 CAPSULE BY MOUTH DAILY  9/23/21   Alexis Ochoa MD   Calcium Carbonate-Vitamin D (OYSTER SHELL CALCIUM/D) 500-200 MG-UNIT TABS TAKE 1 TAB BY MOUTH ONCE A DAY   Patient taking differently: 2 times daily TAKE 1 TAB BY MOUTH ONCE A DAY 9/23/21   Alexis Ochoa MD   potassium chloride (KLOR-CON M) 20 MEQ extended release tablet Take 2 tablets by mouth 2 times daily After BMP two times, first after 1 week and if potassium is low then continue taking same dose with repeat BMP in a week. and if potassium is normal then take once daily. Do not crush, chew, or suck on tablet.  8/21/21   Celsa Godwin MD   Multiple Vitamin (MULTIVITAMIN) TABS tablet Take 1 tablet by mouth daily 8/22/21   Celsa Godwin MD   loratadine (CLARITIN) 10 MG tablet TAKE 1 tab BY MOUTH DAILY  8/18/21   CECILIA Helms - CNP   thiamine mononitrate 100 MG tablet TAKE 1 TABLET BY MOUTH ONCE DAILY  7/14/21   Amparo Lam MD   FLUoxetine (PROZAC) 40 MG capsule Take 40 mg by mouth daily    Historical Provider, MD   acetaminophen (APAP EXTRA STRENGTH) 500 MG tablet Take 2 tablets by mouth every 6 hours as needed for Pain 6/11/21   Vinny Monte MD   atorvastatin (LIPITOR) 20 MG tablet TAKE 1 TABLET BY MOUTH DAILY  5/12/21   Adan Benedict MD   folic acid (FOLVITE) 1 MG tablet Take 1 tablet by mouth daily 4/27/21   Vinny Monte MD   ferrous sulfate (IRON 325) 325 (65 Fe) MG tablet Take 325 mg by mouth daily (with breakfast)    Historical Provider, MD   atenolol (TENORMIN) 50 MG tablet Take 1 tablet by mouth daily 1/22/21   Jose Zuniga MD   Calcium Carbonate-Vitamin D (OYSTER SHELL CALCIUM/D) 500-200 MG-UNIT TABS TAKE 1 TAB BY MOUTH ONCE A DAY  1/22/21   Jose Zuniga MD   amLODIPine (NORVASC) 10 MG tablet TAKE 1 TABLET BY MOUTH DAILY  1/8/21   Jose Hobson MD   albuterol sulfate (PROAIR RESPICLICK) 866 (90 Base) MCG/ACT aerosol powder inhalation Inhale 2 puffs into the lungs every 6 hours as needed for Wheezing or Shortness of Breath 1/15/20   Tate Pond MD       Future Appointments   Date Time Provider Anthony Everett   2/25/2022 11:45 Gee Adame MD [facility]   3/7/2022  8:00 AM Victor Manuel Brennan MD [facility]   4/12/2022  2:00 PM Golden Patel MD [facility] Levi Aguayo

## 2022-02-10 ENCOUNTER — HOSPITAL ENCOUNTER (INPATIENT)
Age: 57
LOS: 3 days | Discharge: HOME | DRG: 425 | End: 2022-02-13
Attending: EMERGENCY MEDICINE | Admitting: INTERNAL MEDICINE
Payer: MEDICAID

## 2022-02-10 ENCOUNTER — APPOINTMENT (OUTPATIENT)
Dept: CT IMAGING | Age: 57
DRG: 425 | End: 2022-02-10
Payer: MEDICAID

## 2022-02-10 ENCOUNTER — APPOINTMENT (OUTPATIENT)
Dept: GENERAL RADIOLOGY | Age: 57
DRG: 425 | End: 2022-02-10
Payer: MEDICAID

## 2022-02-10 DIAGNOSIS — E87.6 HYPOKALEMIA: Primary | ICD-10-CM

## 2022-02-10 DIAGNOSIS — R19.7 DIARRHEA, UNSPECIFIED TYPE: ICD-10-CM

## 2022-02-10 DIAGNOSIS — R07.89 ATYPICAL CHEST PAIN: ICD-10-CM

## 2022-02-10 DIAGNOSIS — R07.9 CHEST PAIN, UNSPECIFIED TYPE: ICD-10-CM

## 2022-02-10 PROBLEM — K62.89 PROCTITIS: Status: ACTIVE | Noted: 2022-02-10

## 2022-02-10 PROBLEM — E11.9 TYPE 2 DIABETES MELLITUS WITHOUT COMPLICATION, WITHOUT LONG-TERM CURRENT USE OF INSULIN (HCC): Status: ACTIVE | Noted: 2018-12-07

## 2022-02-10 PROBLEM — K22.89 ESOPHAGEAL THICKENING: Status: ACTIVE | Noted: 2022-02-10

## 2022-02-10 LAB
ALBUMIN SERPL-MCNC: 4.2 G/DL (ref 3.5–5.2)
ALBUMIN/GLOBULIN RATIO: 1 (ref 1–2.5)
ALP BLD-CCNC: 288 U/L (ref 35–104)
ALT SERPL-CCNC: 20 U/L (ref 5–33)
ANION GAP SERPL CALCULATED.3IONS-SCNC: 26 MMOL/L (ref 9–17)
AST SERPL-CCNC: 55 U/L
BILIRUB SERPL-MCNC: 0.38 MG/DL (ref 0.3–1.2)
BILIRUBIN URINE: NEGATIVE
BUN BLDV-MCNC: 3 MG/DL (ref 6–20)
BUN/CREAT BLD: ABNORMAL (ref 9–20)
C-REACTIVE PROTEIN: <3 MG/L (ref 0–5)
CALCIUM SERPL-MCNC: 9 MG/DL (ref 8.6–10.4)
CHLORIDE BLD-SCNC: 91 MMOL/L (ref 98–107)
CO2: 20 MMOL/L (ref 20–31)
COLOR: YELLOW
COMMENT UA: NORMAL
CREAT SERPL-MCNC: 0.68 MG/DL (ref 0.5–0.9)
GFR AFRICAN AMERICAN: >60 ML/MIN
GFR NON-AFRICAN AMERICAN: >60 ML/MIN
GFR SERPL CREATININE-BSD FRML MDRD: ABNORMAL ML/MIN/{1.73_M2}
GFR SERPL CREATININE-BSD FRML MDRD: ABNORMAL ML/MIN/{1.73_M2}
GLUCOSE BLD-MCNC: 102 MG/DL (ref 70–99)
GLUCOSE URINE: NEGATIVE
HCT VFR BLD CALC: 39.9 % (ref 36.3–47.1)
HEMOGLOBIN: 13.7 G/DL (ref 11.9–15.1)
KETONES, URINE: NEGATIVE
LEUKOCYTE ESTERASE, URINE: NEGATIVE
LIPASE: 17 U/L (ref 13–60)
MCH RBC QN AUTO: 33.4 PG (ref 25.2–33.5)
MCHC RBC AUTO-ENTMCNC: 34.3 G/DL (ref 28.4–34.8)
MCV RBC AUTO: 97.3 FL (ref 82.6–102.9)
NITRITE, URINE: NEGATIVE
NRBC AUTOMATED: 0 PER 100 WBC
PDW BLD-RTO: 18.9 % (ref 11.8–14.4)
PH UA: 8 (ref 5–8)
PLATELET # BLD: 487 K/UL (ref 138–453)
PMV BLD AUTO: 8.7 FL (ref 8.1–13.5)
POTASSIUM SERPL-SCNC: 2.3 MMOL/L (ref 3.7–5.3)
PROTEIN UA: NEGATIVE
RBC # BLD: 4.1 M/UL (ref 3.95–5.11)
SARS-COV-2, RAPID: NOT DETECTED
SEDIMENTATION RATE, ERYTHROCYTE: 34 MM (ref 0–30)
SODIUM BLD-SCNC: 137 MMOL/L (ref 135–144)
SPECIFIC GRAVITY UA: 1.01 (ref 1–1.03)
SPECIMEN DESCRIPTION: NORMAL
TOTAL PROTEIN: 8.6 G/DL (ref 6.4–8.3)
TROPONIN INTERP: ABNORMAL
TROPONIN T: ABNORMAL NG/ML
TROPONIN, HIGH SENSITIVITY: 30 NG/L (ref 0–14)
TROPONIN, HIGH SENSITIVITY: 35 NG/L (ref 0–14)
TROPONIN, HIGH SENSITIVITY: 37 NG/L (ref 0–14)
TURBIDITY: CLEAR
URINE HGB: NEGATIVE
UROBILINOGEN, URINE: NORMAL
WBC # BLD: 10.4 K/UL (ref 3.5–11.3)

## 2022-02-10 PROCEDURE — 71045 X-RAY EXAM CHEST 1 VIEW: CPT

## 2022-02-10 PROCEDURE — 96372 THER/PROPH/DIAG INJ SC/IM: CPT

## 2022-02-10 PROCEDURE — 80048 BASIC METABOLIC PNL TOTAL CA: CPT

## 2022-02-10 PROCEDURE — 2580000003 HC RX 258: Performed by: GENERAL PRACTICE

## 2022-02-10 PROCEDURE — 85027 COMPLETE CBC AUTOMATED: CPT

## 2022-02-10 PROCEDURE — 96374 THER/PROPH/DIAG INJ IV PUSH: CPT

## 2022-02-10 PROCEDURE — 84484 ASSAY OF TROPONIN QUANT: CPT

## 2022-02-10 PROCEDURE — 87635 SARS-COV-2 COVID-19 AMP PRB: CPT

## 2022-02-10 PROCEDURE — 83735 ASSAY OF MAGNESIUM: CPT

## 2022-02-10 PROCEDURE — 2060000000 HC ICU INTERMEDIATE R&B

## 2022-02-10 PROCEDURE — 96375 TX/PRO/DX INJ NEW DRUG ADDON: CPT

## 2022-02-10 PROCEDURE — 85652 RBC SED RATE AUTOMATED: CPT

## 2022-02-10 PROCEDURE — 6370000000 HC RX 637 (ALT 250 FOR IP): Performed by: NURSE PRACTITIONER

## 2022-02-10 PROCEDURE — 6360000002 HC RX W HCPCS: Performed by: GENERAL PRACTICE

## 2022-02-10 PROCEDURE — 96376 TX/PRO/DX INJ SAME DRUG ADON: CPT

## 2022-02-10 PROCEDURE — 99222 1ST HOSP IP/OBS MODERATE 55: CPT | Performed by: NURSE PRACTITIONER

## 2022-02-10 PROCEDURE — 87506 IADNA-DNA/RNA PROBE TQ 6-11: CPT

## 2022-02-10 PROCEDURE — 36415 COLL VENOUS BLD VENIPUNCTURE: CPT

## 2022-02-10 PROCEDURE — 99283 EMERGENCY DEPT VISIT LOW MDM: CPT

## 2022-02-10 PROCEDURE — 6360000002 HC RX W HCPCS: Performed by: NURSE PRACTITIONER

## 2022-02-10 PROCEDURE — 93005 ELECTROCARDIOGRAM TRACING: CPT | Performed by: GENERAL PRACTICE

## 2022-02-10 PROCEDURE — 94761 N-INVAS EAR/PLS OXIMETRY MLT: CPT

## 2022-02-10 PROCEDURE — 83690 ASSAY OF LIPASE: CPT

## 2022-02-10 PROCEDURE — 86140 C-REACTIVE PROTEIN: CPT

## 2022-02-10 PROCEDURE — 2700000000 HC OXYGEN THERAPY PER DAY

## 2022-02-10 PROCEDURE — 2500000003 HC RX 250 WO HCPCS: Performed by: NURSE PRACTITIONER

## 2022-02-10 PROCEDURE — 81003 URINALYSIS AUTO W/O SCOPE: CPT

## 2022-02-10 PROCEDURE — 80053 COMPREHEN METABOLIC PANEL: CPT

## 2022-02-10 PROCEDURE — 87425 ROTAVIRUS AG IA: CPT

## 2022-02-10 PROCEDURE — 74176 CT ABD & PELVIS W/O CONTRAST: CPT

## 2022-02-10 RX ORDER — ACETAMINOPHEN 650 MG/1
650 SUPPOSITORY RECTAL EVERY 6 HOURS PRN
Status: DISCONTINUED | OUTPATIENT
Start: 2022-02-10 | End: 2022-02-13 | Stop reason: HOSPADM

## 2022-02-10 RX ORDER — POTASSIUM CHLORIDE 7.45 MG/ML
10 INJECTION INTRAVENOUS PRN
Status: DISCONTINUED | OUTPATIENT
Start: 2022-02-10 | End: 2022-02-13 | Stop reason: HOSPADM

## 2022-02-10 RX ORDER — DEXTROSE, SODIUM CHLORIDE, AND POTASSIUM CHLORIDE 5; .45; .15 G/100ML; G/100ML; G/100ML
INJECTION INTRAVENOUS CONTINUOUS
Status: DISCONTINUED | OUTPATIENT
Start: 2022-02-10 | End: 2022-02-13 | Stop reason: HOSPADM

## 2022-02-10 RX ORDER — FLUOXETINE HYDROCHLORIDE 20 MG/1
40 CAPSULE ORAL DAILY
Status: DISCONTINUED | OUTPATIENT
Start: 2022-02-11 | End: 2022-02-13 | Stop reason: HOSPADM

## 2022-02-10 RX ORDER — ATORVASTATIN CALCIUM 40 MG/1
40 TABLET, FILM COATED ORAL NIGHTLY
Status: DISCONTINUED | OUTPATIENT
Start: 2022-02-10 | End: 2022-02-13 | Stop reason: HOSPADM

## 2022-02-10 RX ORDER — DIPHENHYDRAMINE HYDROCHLORIDE 50 MG/ML
25 INJECTION INTRAMUSCULAR; INTRAVENOUS EVERY 6 HOURS PRN
Status: DISCONTINUED | OUTPATIENT
Start: 2022-02-10 | End: 2022-02-13 | Stop reason: HOSPADM

## 2022-02-10 RX ORDER — FOLIC ACID 1 MG/1
1 TABLET ORAL DAILY
Status: DISCONTINUED | OUTPATIENT
Start: 2022-02-11 | End: 2022-02-13 | Stop reason: HOSPADM

## 2022-02-10 RX ORDER — POTASSIUM CHLORIDE 7.45 MG/ML
10 INJECTION INTRAVENOUS
Status: COMPLETED | OUTPATIENT
Start: 2022-02-10 | End: 2022-02-10

## 2022-02-10 RX ORDER — ALLOPURINOL 100 MG/1
100 TABLET ORAL DAILY
Status: DISCONTINUED | OUTPATIENT
Start: 2022-02-11 | End: 2022-02-13 | Stop reason: HOSPADM

## 2022-02-10 RX ORDER — PANTOPRAZOLE SODIUM 40 MG/10ML
40 INJECTION, POWDER, LYOPHILIZED, FOR SOLUTION INTRAVENOUS DAILY
Status: DISCONTINUED | OUTPATIENT
Start: 2022-02-11 | End: 2022-02-12

## 2022-02-10 RX ORDER — SODIUM CHLORIDE 9 MG/ML
10 INJECTION INTRAVENOUS DAILY
Status: DISCONTINUED | OUTPATIENT
Start: 2022-02-11 | End: 2022-02-12

## 2022-02-10 RX ORDER — ONDANSETRON 2 MG/ML
4 INJECTION INTRAMUSCULAR; INTRAVENOUS EVERY 6 HOURS PRN
Status: DISCONTINUED | OUTPATIENT
Start: 2022-02-10 | End: 2022-02-13 | Stop reason: HOSPADM

## 2022-02-10 RX ORDER — POTASSIUM CHLORIDE 20 MEQ/1
40 TABLET, EXTENDED RELEASE ORAL 2 TIMES DAILY
Status: DISCONTINUED | OUTPATIENT
Start: 2022-02-10 | End: 2022-02-11

## 2022-02-10 RX ORDER — PROMETHAZINE HYDROCHLORIDE 25 MG/ML
6.25 INJECTION, SOLUTION INTRAMUSCULAR; INTRAVENOUS ONCE
Status: COMPLETED | OUTPATIENT
Start: 2022-02-10 | End: 2022-02-10

## 2022-02-10 RX ORDER — LANOLIN ALCOHOL/MO/W.PET/CERES
100 CREAM (GRAM) TOPICAL DAILY
Status: DISCONTINUED | OUTPATIENT
Start: 2022-02-11 | End: 2022-02-13 | Stop reason: HOSPADM

## 2022-02-10 RX ORDER — ATENOLOL 50 MG/1
50 TABLET ORAL DAILY
Status: DISCONTINUED | OUTPATIENT
Start: 2022-02-11 | End: 2022-02-13 | Stop reason: HOSPADM

## 2022-02-10 RX ORDER — ACETAMINOPHEN 325 MG/1
650 TABLET ORAL EVERY 6 HOURS PRN
Status: DISCONTINUED | OUTPATIENT
Start: 2022-02-10 | End: 2022-02-13 | Stop reason: HOSPADM

## 2022-02-10 RX ORDER — ONDANSETRON 4 MG/1
4 TABLET, ORALLY DISINTEGRATING ORAL EVERY 8 HOURS PRN
Status: DISCONTINUED | OUTPATIENT
Start: 2022-02-10 | End: 2022-02-13 | Stop reason: HOSPADM

## 2022-02-10 RX ORDER — PROMETHAZINE HYDROCHLORIDE 25 MG/ML
12.5 INJECTION, SOLUTION INTRAMUSCULAR; INTRAVENOUS EVERY 4 HOURS PRN
Status: DISCONTINUED | OUTPATIENT
Start: 2022-02-10 | End: 2022-02-13 | Stop reason: HOSPADM

## 2022-02-10 RX ORDER — SODIUM CHLORIDE 0.9 % (FLUSH) 0.9 %
5-40 SYRINGE (ML) INJECTION EVERY 12 HOURS SCHEDULED
Status: DISCONTINUED | OUTPATIENT
Start: 2022-02-10 | End: 2022-02-13 | Stop reason: HOSPADM

## 2022-02-10 RX ORDER — SODIUM CHLORIDE 0.9 % (FLUSH) 0.9 %
10 SYRINGE (ML) INJECTION PRN
Status: DISCONTINUED | OUTPATIENT
Start: 2022-02-10 | End: 2022-02-13 | Stop reason: HOSPADM

## 2022-02-10 RX ORDER — POTASSIUM CHLORIDE 20 MEQ/1
40 TABLET, EXTENDED RELEASE ORAL PRN
Status: DISCONTINUED | OUTPATIENT
Start: 2022-02-10 | End: 2022-02-13 | Stop reason: HOSPADM

## 2022-02-10 RX ORDER — ONDANSETRON 2 MG/ML
4 INJECTION INTRAMUSCULAR; INTRAVENOUS ONCE
Status: COMPLETED | OUTPATIENT
Start: 2022-02-10 | End: 2022-02-10

## 2022-02-10 RX ORDER — MAGNESIUM SULFATE 1 G/100ML
1000 INJECTION INTRAVENOUS PRN
Status: DISCONTINUED | OUTPATIENT
Start: 2022-02-10 | End: 2022-02-13 | Stop reason: HOSPADM

## 2022-02-10 RX ORDER — NITROGLYCERIN 0.4 MG/1
0.4 TABLET SUBLINGUAL EVERY 5 MIN PRN
Status: DISCONTINUED | OUTPATIENT
Start: 2022-02-10 | End: 2022-02-13 | Stop reason: HOSPADM

## 2022-02-10 RX ORDER — SODIUM CHLORIDE 9 MG/ML
25 INJECTION, SOLUTION INTRAVENOUS PRN
Status: DISCONTINUED | OUTPATIENT
Start: 2022-02-10 | End: 2022-02-13 | Stop reason: HOSPADM

## 2022-02-10 RX ORDER — SODIUM CHLORIDE 9 MG/ML
1000 INJECTION, SOLUTION INTRAVENOUS CONTINUOUS
Status: ACTIVE | OUTPATIENT
Start: 2022-02-10 | End: 2022-02-10

## 2022-02-10 RX ORDER — CETIRIZINE HYDROCHLORIDE 10 MG/1
10 TABLET ORAL DAILY
Status: DISCONTINUED | OUTPATIENT
Start: 2022-02-11 | End: 2022-02-13 | Stop reason: HOSPADM

## 2022-02-10 RX ORDER — PANTOPRAZOLE SODIUM 40 MG/1
40 TABLET, DELAYED RELEASE ORAL
Status: DISCONTINUED | OUTPATIENT
Start: 2022-02-11 | End: 2022-02-13 | Stop reason: HOSPADM

## 2022-02-10 RX ORDER — HYDRALAZINE HYDROCHLORIDE 20 MG/ML
10 INJECTION INTRAMUSCULAR; INTRAVENOUS EVERY 6 HOURS PRN
Status: DISCONTINUED | OUTPATIENT
Start: 2022-02-10 | End: 2022-02-13 | Stop reason: HOSPADM

## 2022-02-10 RX ORDER — MULTIVITAMIN WITH IRON
1 TABLET ORAL DAILY
Status: DISCONTINUED | OUTPATIENT
Start: 2022-02-11 | End: 2022-02-13 | Stop reason: HOSPADM

## 2022-02-10 RX ORDER — AMLODIPINE BESYLATE 10 MG/1
10 TABLET ORAL DAILY
Status: DISCONTINUED | OUTPATIENT
Start: 2022-02-11 | End: 2022-02-13 | Stop reason: HOSPADM

## 2022-02-10 RX ORDER — LANOLIN ALCOHOL/MO/W.PET/CERES
325 CREAM (GRAM) TOPICAL
Status: DISCONTINUED | OUTPATIENT
Start: 2022-02-11 | End: 2022-02-13 | Stop reason: HOSPADM

## 2022-02-10 RX ADMIN — ONDANSETRON 4 MG: 2 INJECTION INTRAMUSCULAR; INTRAVENOUS at 17:19

## 2022-02-10 RX ADMIN — ACETAMINOPHEN 650 MG: 325 TABLET ORAL at 22:50

## 2022-02-10 RX ADMIN — POTASSIUM CHLORIDE 10 MEQ: 10 INJECTION, SOLUTION INTRAVENOUS at 17:42

## 2022-02-10 RX ADMIN — DIPHENHYDRAMINE HYDROCHLORIDE 25 MG: 50 INJECTION, SOLUTION INTRAMUSCULAR; INTRAVENOUS at 17:50

## 2022-02-10 RX ADMIN — SODIUM CHLORIDE 1000 ML: 9 INJECTION, SOLUTION INTRAVENOUS at 17:19

## 2022-02-10 RX ADMIN — POTASSIUM CHLORIDE 10 MEQ: 10 INJECTION, SOLUTION INTRAVENOUS at 19:08

## 2022-02-10 RX ADMIN — ONDANSETRON 4 MG: 2 INJECTION INTRAMUSCULAR; INTRAVENOUS at 23:24

## 2022-02-10 RX ADMIN — POTASSIUM CHLORIDE, DEXTROSE MONOHYDRATE AND SODIUM CHLORIDE: 150; 5; 450 INJECTION, SOLUTION INTRAVENOUS at 23:21

## 2022-02-10 RX ADMIN — POTASSIUM CHLORIDE 10 MEQ: 10 INJECTION, SOLUTION INTRAVENOUS at 20:07

## 2022-02-10 RX ADMIN — POTASSIUM CHLORIDE 10 MEQ: 10 INJECTION, SOLUTION INTRAVENOUS at 21:15

## 2022-02-10 RX ADMIN — PROMETHAZINE HYDROCHLORIDE 6.25 MG: 25 INJECTION INTRAMUSCULAR; INTRAVENOUS at 20:02

## 2022-02-10 ASSESSMENT — ENCOUNTER SYMPTOMS
SHORTNESS OF BREATH: 1
ABDOMINAL PAIN: 0
ABDOMINAL PAIN: 1
NAUSEA: 1
COUGH: 0
CONSTIPATION: 0
WHEEZING: 0
BACK PAIN: 0
VOMITING: 1
STRIDOR: 0
BLOOD IN STOOL: 0
DIARRHEA: 1

## 2022-02-10 ASSESSMENT — PAIN DESCRIPTION - FREQUENCY: FREQUENCY: CONTINUOUS

## 2022-02-10 ASSESSMENT — PAIN SCALES - GENERAL
PAINLEVEL_OUTOF10: 10
PAINLEVEL_OUTOF10: 10

## 2022-02-10 ASSESSMENT — PAIN DESCRIPTION - DESCRIPTORS: DESCRIPTORS: ACHING;CRAMPING;DISCOMFORT

## 2022-02-10 ASSESSMENT — PAIN DESCRIPTION - PAIN TYPE: TYPE: ACUTE PAIN

## 2022-02-10 ASSESSMENT — PAIN DESCRIPTION - ONSET: ONSET: ON-GOING

## 2022-02-10 ASSESSMENT — PAIN - FUNCTIONAL ASSESSMENT: PAIN_FUNCTIONAL_ASSESSMENT: PREVENTS OR INTERFERES SOME ACTIVE ACTIVITIES AND ADLS

## 2022-02-10 ASSESSMENT — PAIN DESCRIPTION - PROGRESSION: CLINICAL_PROGRESSION: GRADUALLY WORSENING

## 2022-02-10 NOTE — ED PROVIDER NOTES
[facility] ED     Emergency Department     Faculty Attestation    I performed a history and physical examination of the patient and discussed management with the resident. I reviewed the residents note and agree with the documented findings and plan of care. Any areas of disagreement are noted on the chart. I was personally present for the key portions of any procedures. I have documented in the chart those procedures where I was not present during the key portions. I have reviewed the emergency nurses triage note. I agree with the chief complaint, past medical history, past surgical history, allergies, medications, social and family history as documented unless otherwise noted below. For Physician Assistant/ Nurse Practitioner cases/documentation I have personally evaluated this patient and have completed at least one if not all key elements of the E/M (history, physical exam, and MDM). Additional findings are as noted. This patient was evaluated in the Emergency Department for symptoms described in the history of present illness. He/she was evaluated in the context of the global COVID-19 pandemic, which necessitated consideration that the patient might be at risk for infection with the SARS-CoV-2 virus that causes COVID-19. Institutional protocols and algorithms that pertain to the evaluation of patients at risk for COVID-19 are in a state of rapid change based on information released by regulatory bodies including the CDC and federal and state organizations. These policies and algorithms were followed during the patient's care in the ED. No chest pain left-sided radiating to left shoulder down the left arm associated with shortness of breath but no lightheadedness no diaphoresis. On exam patient peers uncomfortable but nontoxic. Lungs clear and equal heart regular equal radial pedal pulses abdomen soft nontender.   Will check labs EKG chest x-ray, probable admission    EKG interpretation, 1626: Narrow complex tachycardia rhythm at 136. There is a left axis deviation. There are no definite P waves however there may be buried under the T waves giving a falsely prolonged QTC. However the second half of the rhythm strips do show alternating QRS amplitudes concern for PACs versus electrical alternans. Prior EKG on 9/29/2021 shows similar morphology to the majority of QRS complexes. EKG interpretation, 1712: Sinus rhythm 83 normal intervals normal to left axis with LVH. No acute ST or T changes. Similar to prior EKG on 9/29/2021. Abnormality seen on initial EKG have resolved, likely artifact, nurses state patient was \"bouncing a lot. \"    Critical Care     none    Shira Bravo MD, Gypsy Galdamez  Attending Emergency  Physician             Shira Bravo MD  02/10/22 0921

## 2022-02-10 NOTE — ED PROVIDER NOTES
[facility] ED  Emergency Department Encounter  EmergencyMedicine Resident     Pt Name:Sonja Helga Angelucci  MRN: [id number]  [place] 1965  Date of evaluation: 2/10/22  PCP:  Jennifer Hurtado MD    CHIEF COMPLAINT       Chief Complaint   Patient presents with    Shortness of Breath     S       HISTORY OF PRESENT ILLNESS  (Location/Symptom, Timing/Onset, Context/Setting, Quality, Duration, Modifying Factors, Severity.)      Yelena Dacosta is a 65 y.o. female who presents with history of hypertension hypertrophic cardiomyopathy, presenting with chest pain for the last couple of weeks, worsening just prior to arrival, described as a stabbing pain with radiation down left side of arm, numbness and tingling, 9/10, pain is similar to what she had when she was here several months ago. Patient does have associated nausea vomiting sweating and diarrhea. Patient also complained of headache and shortness of breath, denies any hemoptysis. Headache is not the worst in her life. No sudden onset. No change in vision. Denies any history of DVT or PE. No recent surgery or travel. Patient states that she is not taking any of her medication the last week secondary to the pain. PAST MEDICAL / SURGICAL / SOCIAL / FAMILY HISTORY      has a past medical history of Angioedema, Anxiety, Asthma, Bipolar disorder ([address] Claustrophobia, Depression, GERD (gastroesophageal reflux disease), Hypertension, Hypertrophic cardiomyopathy ([address] Lung nodules, MRSA (methicillin resistant Staphylococcus aureus), Murmur, OA (osteoarthritis), JONES (obstructive sleep apnea), Thyroid nodule, and Type 2 diabetes mellitus without complication, without long-term current use of insulin ([address] has a past surgical history that includes Hysterectomy; Upper gastrointestinal endoscopy; Colonoscopy; Thyroid surgery; Cardiac catheterization; other surgical history (8/24/2015);  Upper gastrointestinal endoscopy (N/A, 12/3/2020); Colonoscopy (N/A, 12/3/2020); Foot surgery (Bilateral, 2020); Hammer toe surgery (Right, 2020); and Bunionectomy (Bilateral, 2020). Social History     Socioeconomic History    Marital status: Single     Spouse name: Not on file    Number of children: Not on file    Years of education: Not on file    Highest education level: Not on file   Occupational History    Not on file   Tobacco Use    Smoking status: Former Smoker     Packs/day: 0.50     Years: 20.00     Pack years: 10.00     Types: Cigarettes     Quit date: 1992     Years since quittin.1    Smokeless tobacco: Never Used    Tobacco comment: states quiti in the 1980s   Substance and Sexual Activity    Alcohol use: Not Currently     Alcohol/week: 12.0 standard drinks     Types: 12 Cans of beer per week     Comment: Quit about 5 months ago 20    Drug use: Not Currently     Types: Cocaine     Comment: last use approximately 14 cocaine    Sexual activity: Yes     Partners: Male   Other Topics Concern    Not on file   Social History Narrative    Not on file     Social Determinants of Health     Financial Resource Strain:     Difficulty of Paying Living Expenses: Not on file   Food Insecurity:     Worried About Running Out of Food in the Last Year: Not on file    [place] of Food in the Last Year: Not on file   Transportation Needs:     Lack of Transportation (Medical): Not on file    Lack of Transportation (Non-Medical):  Not on file   Physical Activity:     Days of Exercise per Week: Not on file    Minutes of Exercise per Session: Not on file   Stress:     Feeling of Stress : Not on file   Social Connections:     Frequency of Communication with Friends and Family: Not on file    Frequency of Social Gatherings with Friends and Family: Not on file    Attends [employer] Services: Not on file    Active Member of Clubs or Organizations: Not on file    Attends Club or Organization Meetings: Not on file   Sarah Marital Status: Not on file   Intimate Partner Violence:     Fear of Current or Ex-Partner: Not on file    Emotionally Abused: Not on file    Physically Abused: Not on file    Sexually Abused: Not on file   Housing Stability:     Unable to Pay for Housing in the Last Year: Not on file    Number of Places Lived in the Last Year: Not on file    Unstable Housing in the Last Year: Not on file       Family History   Problem Relation Age of Onset    Stroke Mother     Hypertension Father     Cancer Brother         bone    Lung Cancer Maternal Aunt     Cancer Maternal Grandmother         eye     Other Sister         aneurysm    Heart Disease Sister        Allergies:  Bee venom, Iodine, Lisinopril, and Shellfish-derived products    Home Medications:  Prior to Admission medications    Medication Sig Start Date End Date Taking? Authorizing Provider   magnesium oxide (MAG-OX) 400 MG tablet TAKE 1 TABLET BY MOUTH 2 TIMES DAILY 12/21/21   Golden Ohara MD   lipase-protease-amylase (CREON) 14479-27483 units delayed release capsule Take by mouth 3 times daily (with meals) 10/29/21   More Forde MD   allopurinol (ZYLOPRIM) 100 MG tablet Take 1 tablet by mouth daily 10/12/21   Aurelio Ho MD   magnesium oxide (MAG-OX) 400 (240 Mg) MG tablet Take 1 tablet by mouth daily 10/3/21   Brian Roman MD   omeprazole (PRILOSEC) 20 MG delayed release capsule TAKE 1 CAPSULE BY MOUTH DAILY  9/23/21   Symone Mcclellan MD   Calcium Carbonate-Vitamin D (OYSTER SHELL CALCIUM/D) 500-200 MG-UNIT TABS TAKE 1 TAB BY MOUTH ONCE A DAY   Patient taking differently: 2 times daily TAKE 1 TAB BY MOUTH ONCE A DAY 9/23/21   Symone Mcclellan MD   potassium chloride (KLOR-CON M) 20 MEQ extended release tablet Take 2 tablets by mouth 2 times daily After BMP two times, first after 1 week and if potassium is low then continue taking same dose with repeat BMP in a week. and if potassium is normal then take once daily.  Do not crush, chew, or suck on tablet. 8/21/21   Jordy Celaya MD   Multiple Vitamin (MULTIVITAMIN) TABS tablet Take 1 tablet by mouth daily 8/22/21   Jordy Celaya MD   loratadine (CLARITIN) 10 MG tablet TAKE 1 tab BY MOUTH DAILY  8/18/21   CECILIA Chase - CNP   thiamine mononitrate 100 MG tablet TAKE 1 TABLET BY MOUTH ONCE DAILY  7/14/21   Arturo Kelsey MD   FLUoxetine (PROZAC) 40 MG capsule Take 40 mg by mouth daily    Historical Provider, MD   acetaminophen (APAP EXTRA STRENGTH) 500 MG tablet Take 2 tablets by mouth every 6 hours as needed for Pain 6/11/21   Lee Love MD   atorvastatin (LIPITOR) 20 MG tablet TAKE 1 TABLET BY MOUTH DAILY  5/12/21   Sobeida Fowler MD   folic acid (FOLVITE) 1 MG tablet Take 1 tablet by mouth daily 4/27/21   Lee Love MD   ferrous sulfate (IRON 325) 325 (65 Fe) MG tablet Take 325 mg by mouth daily (with breakfast)    Historical Provider, MD   atenolol (TENORMIN) 50 MG tablet Take 1 tablet by mouth daily 1/22/21   Jose Zuniga MD   Calcium Carbonate-Vitamin D (OYSTER SHELL CALCIUM/D) 500-200 MG-UNIT TABS TAKE 1 TAB BY MOUTH ONCE A DAY  1/22/21   Jose Zuniga MD   amLODIPine (NORVASC) 10 MG tablet TAKE 1 TABLET BY MOUTH DAILY  1/8/21   Jose Roper MD   albuterol sulfate (PROAIR RESPICLICK) 397 (90 Base) MCG/ACT aerosol powder inhalation Inhale 2 puffs into the lungs every 6 hours as needed for Wheezing or Shortness of Breath 1/15/20   Gogo Alvarez MD       REVIEW OF SYSTEMS    (2-9 systems for level 4, 10 or more for level 5)      Review of Systems   Constitutional: Positive for activity change, appetite change and fatigue. Negative for chills and fever. Respiratory: Positive for shortness of breath. Negative for cough. Cardiovascular: Positive for chest pain. Negative for palpitations and leg swelling. Gastrointestinal: Positive for abdominal pain, diarrhea, nausea and vomiting. Negative for blood in stool. Genitourinary: Positive for dysuria.  Negative for Medications    ondansetron (ZOFRAN) injection 4 mg    0.9 % sodium chloride infusion    potassium chloride 10 mEq/100 mL IVPB (Peripheral Line)    diphenhydrAMINE (BENADRYL) injection 25 mg       DDX: ACS/MI, gastritis, pancreatitis, low suspicion for C. difficile, electrolyte abnormality, pneumonia, cystitis    DIAGNOSTIC RESULTS / EMERGENCY DEPARTMENT COURSE / MDM   :  Results for orders placed or performed during the hospital encounter of 02/10/22   Troponin   Result Value Ref Range    Troponin, High Sensitivity 35 (H) 0 - 14 ng/L    Troponin T NOT REPORTED <0.03 ng/mL    Troponin Interp NOT REPORTED    Troponin   Result Value Ref Range    Troponin, High Sensitivity 30 (H) 0 - 14 ng/L    Troponin T NOT REPORTED <0.03 ng/mL    Troponin Interp NOT REPORTED    CBC   Result Value Ref Range    WBC 10.4 3.5 - 11.3 k/uL    RBC 4.10 3.95 - 5.11 m/uL    Hemoglobin 13.7 11.9 - 15.1 g/dL    Hematocrit 39.9 36.3 - 47.1 %    MCV 97.3 82.6 - 102.9 fL    MCH 33.4 25.2 - 33.5 pg    MCHC 34.3 28.4 - 34.8 g/dL    RDW 18.9 (H) 11.8 - 14.4 %    Platelets 640 (H) 448 - 453 k/uL    MPV 8.7 8.1 - 13.5 fL    NRBC Automated 0.0 0.0 per 100 WBC   Comprehensive Metabolic Panel   Result Value Ref Range    Glucose 102 (H) 70 - 99 mg/dL    BUN 3 (L) 6 - 20 mg/dL    CREATININE 0.68 0.50 - 0.90 mg/dL    Bun/Cre Ratio NOT REPORTED 9 - 20    Calcium 9.0 8.6 - 10.4 mg/dL    Sodium 137 135 - 144 mmol/L    Potassium 2.3 (LL) 3.7 - 5.3 mmol/L    Chloride 91 (L) 98 - 107 mmol/L    CO2 20 20 - 31 mmol/L    Anion Gap 26 (H) 9 - 17 mmol/L    Alkaline Phosphatase 288 (H) 35 - 104 U/L    ALT 20 5 - 33 U/L    AST 55 (H) <32 U/L    Total Bilirubin 0.38 0.3 - 1.2 mg/dL    Total Protein 8.6 (H) 6.4 - 8.3 g/dL    Albumin 4.2 3.5 - 5.2 g/dL    Albumin/Globulin Ratio 1.0 1.0 - 2.5    GFR Non-African American >60 >60 mL/min    GFR African American >60 >60 mL/min    GFR Comment          GFR Staging NOT REPORTED    LIPASE   Result Value Ref Range    Lipase 17 13 - 60 U/L           RADIOLOGY:  Narrative & Impression  EXAMINATION:  CT OF THE CHEST, ABDOMEN, AND PELVIS WITHOUT CONTRAST 2/10/2022 3:33 pm     TECHNIQUE:  CT of the chest, abdomen and pelvis was performed without the administration  of intravenous contrast. Multiplanar reformatted images are provided for  review. Dose modulation, iterative reconstruction, and/or weight based  adjustment of the mA/kV was utilized to reduce the radiation dose to as low  as reasonably achievable.     COMPARISON:  Abdomen pelvis CT, 08/16/2021     Chest CT, 07/28/2016     HISTORY:  ORDERING SYSTEM PROVIDED HISTORY: chest abd pain  TECHNOLOGIST PROVIDED HISTORY:  chest abd pain     Decision Support Exception - unselect if not a suspected or confirmed  emergency medical condition->Emergency Medical Condition (MA)  Reason for Exam: chest and abdomen pain     FINDINGS:     Chest:     Mediastinum: Visualized thyroid is unremarkable. No mediastinal  lymphadenopathy. Varices within the mediastinum similar in change. Esophagus is patulous. There is suggestion of mild mural thickening, which  is new when compared to the previous exam.     Limited noncontrast imaging the cardiac chambers, thoracic aorta, and  pulmonary arteries is unremarkable.     Lungs/pleura: No acute or suspicious lung lesion is identified. No  pneumothorax. No pleural effusion.     Soft Tissues/Bones: Visualized extra thoracic soft tissues are unremarkable. No acute or suspicious bony abnormalities identified.        Abdomen/Pelvis:     Lack of intravenous contrast limits evaluation of the solid abdominal  viscera, the hollow abdominal viscera, and the vascular structures.     Organs: Diffuse hepatic steatosis is noted. No acute or suspicious hepatic  abnormality identified. Gallbladder is unremarkable.     Noncontrast imaging of the spleen is unremarkable. Adrenal nodularity is  unchanged, and considered benign.   Pancreas unremarkable.     No acute or suspicious renal abnormalities are identified. No hydronephrosis  or hydroureter.     GI/Bowel: There is possible mild mural thickening involving the distal  rectum. Mild surrounding fat stranding noted. Large bowel is otherwise  unremarkable.     The appendix is not well visualized. No asymmetric pericecal inflammation is  seen to suggest acute appendicitis.     Moderate-sized hiatal hernia. Otherwise, the distal esophagus, stomach, and  duodenal sweep is unremarkable.     Pelvis: Uterus is surgically absent. Urinary bladder unremarkable. No free  pelvic fluid.     Peritoneum/Retroperitoneum: Abdominal aorta normal in caliber. No  retroperitoneal lymphadenopathy.     Bones/Soft Tissues: Age-indeterminate but acute to subacute appearing  compression fracture seen involving the superior endplate of L2 with loss of  approximately 30% of the vertebral body height maximally. No significant  dorsal retropulsion. That is new when compared to the previous abdomen  pelvis CT from August 9225.     Small umbilical hernia contains fat only. The extra-abdominal and extra  pelvic soft tissues are otherwise unremarkable.     IMPRESSION:  Chest CT: Suggestion of esophageal mural thickening. Correlate with clinical  evidence of esophagitis. Otherwise no acute abnormality detected.     Abdomen pelvis CT: Possible mild mural thickening involving the distal  rectum. Correlate with clinical evidence of proctitis.     Acute to subacute appearing compression fracture involving the superior  endplate of L2 with loss of approximately 30% of vertebral body height. No  dorsal retropulsion.     Moderate-sized hiatal hernia.     EKG  Initial EKG completed at 1626 showed narrow complex tachycardia rate 136, left axis deviation, difficult to discern if this was a sinus rhythm as P waves appear to be buried, there was a change in the rhythm at the second half which almost appears alternating QRS with possible electrical alternans. Repeat EKG at 1712 showed sinus rhythm at 83 beats normal intervals, left word deviation, no acute ST or T wave abnormalities prior EKG changes were no longer appreciated. All EKG's are interpreted by the Emergency Department Physician who either signs or Co-signs this chart in the absence of a cardiologist.    EMERGENCY DEPARTMENT COURSE/IMPRESSION: 72-year-old female per the emerge department with chest pain nausea vomiting diarrhea, cardiac work-up showed troponins 35 lower than baseline decrease to 30 on repeat, patient did have episode of vomiting and diarrhea here in the emerge department nonbloody, was given Zofran, a liter of fluids, Benadryl IV. Patient has severe anaphylaxis to IV contrast according to patient and medical chart, unable to perform CT with contrast to perform dry CT which does show some esophageal thickening and distal rectal thickening which may be reactive secondary to nausea vomiting diarrhea. No clinical Acacian for antibiotics at this time. Patient's potassium was found to be 2.3, did replace IV. We will plan to admit to the hospital as patient is unable to tolerate oral intake as well as hypokalemia. PROCEDURES:  None    CONSULTS:  IP CONSULT TO HOSPITALIST    CRITICAL CARE:  None    FINAL IMPRESSION      1. Hypokalemia    2. Diarrhea, unspecified type    3. Chest pain, unspecified type          DISPOSITION / PLAN     DISPOSITION Decision To Admit 02/10/2022 07:18:53 PM      PATIENT REFERRED TO:  No follow-up provider specified.     DISCHARGE MEDICATIONS:  New Prescriptions    No medications on file       Sara Edward DO  Emergency Medicine Resident    (Please note that portions of thisnote were completed with a voice recognition program.  Efforts were made to edit the dictations but occasionally words are mis-transcribed.)     Sara Edward DO  Resident  02/10/22 [id number] DO Licha Virk  02/10/22 2021

## 2022-02-10 NOTE — CARE COORDINATION
Case Management Initial Discharge Plan  Delphine Flanagan,             Met with:patient to discuss discharge plans. Information verified: address, contacts, phone number, , insurance Yes  Insurance Provider: [facility]    Emergency Contact/Next of Kin name & number: aunt as per face sheet verified  Who are involved in patient's support system? family    PCP: Shelbie Swenson MD  Date of last visit: states less than a year (the residents change)      Discharge Planning    Living Arrangements:    lives alone    Home has 2 stories  3 stairs to climb to get into front door, flight of stairs to climb to reach second floor  Location of bedroom/bathroom in home stays on main floor    Patient able to perform ADL's:Independent    Current Services (outpatient & in home) DME  DME equipment: cane, walker, wheelchair, shower chair  DME provider:     Is patient receiving oral anticoagulation therapy? No    If indicated:   Physician managing anticoagulation treatment: na  Where does patient obtain lab work for ATC treatment? na      Potential Assistance Needed:       Patient agreeable to home care: No  [place] of choice provided:  has used [facility] in the past    Prior SNF/Rehab Placement and Facility: none  Agreeable to SNF/Rehab: No  [place] of choice provided: n/a     Evaluation: n/a    Expected Discharge date:       Patient expects to be discharged to:   home    If home: is the family and/or caregiver wiling & able to provide support at home? yes  Who will be providing this support? family*    Follow Up Appointment: Best Day/ Time:      Transportation provider: medical cab  Transportation arrangements needed for discharge: Yes    Readmission Risk              Risk of Unplanned Readmission:  0             Does patient have a readmission risk score greater than 14?: No  If yes, follow-up appointment must be made within 7 days of discharge.      Goals of Care: self care      Educated pt on transitional options, provided freedom of choice and are agreeable with plan      Discharge Plan: home independent,, will need medical cab          Electronically signed by Hortencia Aragon RN on 2/10/22 at 5:36 PM EST

## 2022-02-10 NOTE — ED TRIAGE NOTES
Pt states her shortness of breath just today and was told by her therapist to go to the Dr.Pt is having thick clear sputum. Pt states she has had emesis after any allergy, prilosec and \"blood pressure medication\" medication in over a week. She states she hasn't kept anything down for 6-7 days. She states she can only keep down a 4 loco. Pt on 2L NC at this time.

## 2022-02-10 NOTE — ED NOTES
Pt transported to [address] [place] [address], [address] [place] [address]  02/10/22 [id number]

## 2022-02-11 PROBLEM — S32.020A COMPRESSION FRACTURE OF L2 (HCC): Status: ACTIVE | Noted: 2022-02-11

## 2022-02-11 PROBLEM — D64.9 ANEMIA, NORMOCYTIC NORMOCHROMIC: Status: ACTIVE | Noted: 2022-02-11

## 2022-02-11 LAB
ALBUMIN SERPL-MCNC: 3.4 G/DL (ref 3.5–5.2)
ALBUMIN/GLOBULIN RATIO: 1.1 (ref 1–2.5)
ALP BLD-CCNC: 213 U/L (ref 35–104)
ALT SERPL-CCNC: 15 U/L (ref 5–33)
ANION GAP SERPL CALCULATED.3IONS-SCNC: 11 MMOL/L (ref 9–17)
ANION GAP SERPL CALCULATED.3IONS-SCNC: 12 MMOL/L (ref 9–17)
ANION GAP SERPL CALCULATED.3IONS-SCNC: 15 MMOL/L (ref 9–17)
ANION GAP SERPL CALCULATED.3IONS-SCNC: 19 MMOL/L (ref 9–17)
ANION GAP SERPL CALCULATED.3IONS-SCNC: 21 MMOL/L (ref 9–17)
AST SERPL-CCNC: 39 U/L
BILIRUB SERPL-MCNC: 0.54 MG/DL (ref 0.3–1.2)
BUN BLDV-MCNC: 2 MG/DL (ref 6–20)
BUN BLDV-MCNC: <2 MG/DL (ref 6–20)
BUN BLDV-MCNC: <2 MG/DL (ref 6–20)
BUN/CREAT BLD: ABNORMAL (ref 9–20)
CALCIUM SERPL-MCNC: 7.1 MG/DL (ref 8.6–10.4)
CALCIUM SERPL-MCNC: 7.2 MG/DL (ref 8.6–10.4)
CALCIUM SERPL-MCNC: 7.3 MG/DL (ref 8.6–10.4)
CALCIUM SERPL-MCNC: 7.3 MG/DL (ref 8.6–10.4)
CALCIUM SERPL-MCNC: 7.5 MG/DL (ref 8.6–10.4)
CAMPYLOBACTER PCR: NORMAL
CHLORIDE BLD-SCNC: 100 MMOL/L (ref 98–107)
CHLORIDE BLD-SCNC: 100 MMOL/L (ref 98–107)
CHLORIDE BLD-SCNC: 102 MMOL/L (ref 98–107)
CHLORIDE BLD-SCNC: 103 MMOL/L (ref 98–107)
CHLORIDE BLD-SCNC: 98 MMOL/L (ref 98–107)
CHOLESTEROL/HDL RATIO: 3.1
CHOLESTEROL: 139 MG/DL
CO2: 18 MMOL/L (ref 20–31)
CO2: 23 MMOL/L (ref 20–31)
CO2: 26 MMOL/L (ref 20–31)
CREAT SERPL-MCNC: 0.57 MG/DL (ref 0.5–0.9)
CREAT SERPL-MCNC: 0.62 MG/DL (ref 0.5–0.9)
CREAT SERPL-MCNC: 0.65 MG/DL (ref 0.5–0.9)
CREAT SERPL-MCNC: 0.69 MG/DL (ref 0.5–0.9)
CREAT SERPL-MCNC: 0.7 MG/DL (ref 0.5–0.9)
DIRECT EXAM: NORMAL
E COLI ENTEROTOXIGENIC PCR: NORMAL
EKG ATRIAL RATE: 166 BPM
EKG ATRIAL RATE: 74 BPM
EKG P AXIS: 45 DEGREES
EKG P-R INTERVAL: 174 MS
EKG Q-T INTERVAL: 380 MS
EKG Q-T INTERVAL: 532 MS
EKG QRS DURATION: 86 MS
EKG QRS DURATION: 88 MS
EKG QTC CALCULATION (BAZETT): 571 MS
EKG QTC CALCULATION (BAZETT): 590 MS
EKG R AXIS: -17 DEGREES
EKG R AXIS: 4 DEGREES
EKG T AXIS: -108 DEGREES
EKG T AXIS: 45 DEGREES
EKG VENTRICULAR RATE: 136 BPM
EKG VENTRICULAR RATE: 74 BPM
GFR AFRICAN AMERICAN: >60 ML/MIN
GFR NON-AFRICAN AMERICAN: >60 ML/MIN
GFR SERPL CREATININE-BSD FRML MDRD: ABNORMAL ML/MIN/{1.73_M2}
GLUCOSE BLD-MCNC: 123 MG/DL (ref 70–99)
GLUCOSE BLD-MCNC: 125 MG/DL (ref 70–99)
GLUCOSE BLD-MCNC: 88 MG/DL (ref 70–99)
GLUCOSE BLD-MCNC: 97 MG/DL (ref 70–99)
GLUCOSE BLD-MCNC: 99 MG/DL (ref 70–99)
HCT VFR BLD CALC: 30.6 % (ref 36.3–47.1)
HDLC SERPL-MCNC: 45 MG/DL
HEMOGLOBIN: 10.7 G/DL (ref 11.9–15.1)
LDL CHOLESTEROL: 71 MG/DL (ref 0–130)
Lab: NORMAL
Lab: NORMAL
MAGNESIUM: 0.9 MG/DL (ref 1.6–2.6)
MAGNESIUM: 2.2 MG/DL (ref 1.6–2.6)
MCH RBC QN AUTO: 33.2 PG (ref 25.2–33.5)
MCHC RBC AUTO-ENTMCNC: 35 G/DL (ref 28.4–34.8)
MCV RBC AUTO: 95 FL (ref 82.6–102.9)
MYOGLOBIN: 249 NG/ML (ref 25–58)
MYOGLOBIN: 259 NG/ML (ref 25–58)
NRBC AUTOMATED: 0 PER 100 WBC
PDW BLD-RTO: 18.8 % (ref 11.8–14.4)
PLATELET # BLD: 355 K/UL (ref 138–453)
PLESIOMONAS SHIGELLOIDES PCR: NORMAL
PMV BLD AUTO: 8.5 FL (ref 8.1–13.5)
POTASSIUM SERPL-SCNC: 2.2 MMOL/L (ref 3.7–5.3)
POTASSIUM SERPL-SCNC: 2.5 MMOL/L (ref 3.7–5.3)
POTASSIUM SERPL-SCNC: 2.5 MMOL/L (ref 3.7–5.3)
POTASSIUM SERPL-SCNC: 2.9 MMOL/L (ref 3.7–5.3)
POTASSIUM SERPL-SCNC: 3.1 MMOL/L (ref 3.7–5.3)
RBC # BLD: 3.22 M/UL (ref 3.95–5.11)
SALMONELLA PCR: NORMAL
SHIGATOXIN GENE PCR: NORMAL
SHIGELLA SP PCR: NORMAL
SODIUM BLD-SCNC: 137 MMOL/L (ref 135–144)
SODIUM BLD-SCNC: 140 MMOL/L (ref 135–144)
SODIUM BLD-SCNC: 141 MMOL/L (ref 135–144)
SPECIMEN DESCRIPTION: NORMAL
TOTAL PROTEIN: 6.4 G/DL (ref 6.4–8.3)
TRIGL SERPL-MCNC: 114 MG/DL
TROPONIN INTERP: ABNORMAL
TROPONIN T: ABNORMAL NG/ML
TROPONIN, HIGH SENSITIVITY: 27 NG/L (ref 0–14)
TROPONIN, HIGH SENSITIVITY: 30 NG/L (ref 0–14)
TROPONIN, HIGH SENSITIVITY: 33 NG/L (ref 0–14)
VIBRIO PCR: NORMAL
VLDLC SERPL CALC-MCNC: NORMAL MG/DL (ref 1–30)
WBC # BLD: 5.8 K/UL (ref 3.5–11.3)
YERSINIA ENTEROCOLITICA PCR: NORMAL

## 2022-02-11 PROCEDURE — 2060000000 HC ICU INTERMEDIATE R&B

## 2022-02-11 PROCEDURE — 93005 ELECTROCARDIOGRAM TRACING: CPT | Performed by: NURSE PRACTITIONER

## 2022-02-11 PROCEDURE — C9113 INJ PANTOPRAZOLE SODIUM, VIA: HCPCS | Performed by: NURSE PRACTITIONER

## 2022-02-11 PROCEDURE — 80053 COMPREHEN METABOLIC PANEL: CPT

## 2022-02-11 PROCEDURE — A4216 STERILE WATER/SALINE, 10 ML: HCPCS | Performed by: NURSE PRACTITIONER

## 2022-02-11 PROCEDURE — 80061 LIPID PANEL: CPT

## 2022-02-11 PROCEDURE — 87328 CRYPTOSPORIDIUM AG IA: CPT

## 2022-02-11 PROCEDURE — 6370000000 HC RX 637 (ALT 250 FOR IP): Performed by: NURSE PRACTITIONER

## 2022-02-11 PROCEDURE — 2580000003 HC RX 258: Performed by: NURSE PRACTITIONER

## 2022-02-11 PROCEDURE — 94761 N-INVAS EAR/PLS OXIMETRY MLT: CPT

## 2022-02-11 PROCEDURE — 84484 ASSAY OF TROPONIN QUANT: CPT

## 2022-02-11 PROCEDURE — 83874 ASSAY OF MYOGLOBIN: CPT

## 2022-02-11 PROCEDURE — 6360000002 HC RX W HCPCS: Performed by: NURSE PRACTITIONER

## 2022-02-11 PROCEDURE — 87329 GIARDIA AG IA: CPT

## 2022-02-11 PROCEDURE — 85027 COMPLETE CBC AUTOMATED: CPT

## 2022-02-11 PROCEDURE — 80048 BASIC METABOLIC PNL TOTAL CA: CPT

## 2022-02-11 PROCEDURE — 2500000003 HC RX 250 WO HCPCS: Performed by: NURSE PRACTITIONER

## 2022-02-11 PROCEDURE — 83735 ASSAY OF MAGNESIUM: CPT

## 2022-02-11 PROCEDURE — 99232 SBSQ HOSP IP/OBS MODERATE 35: CPT | Performed by: INTERNAL MEDICINE

## 2022-02-11 PROCEDURE — 36415 COLL VENOUS BLD VENIPUNCTURE: CPT

## 2022-02-11 RX ORDER — MORPHINE SULFATE 4 MG/ML
2 INJECTION, SOLUTION INTRAMUSCULAR; INTRAVENOUS EVERY 4 HOURS PRN
Status: DISCONTINUED | OUTPATIENT
Start: 2022-02-11 | End: 2022-02-13 | Stop reason: HOSPADM

## 2022-02-11 RX ORDER — HYDROCODONE BITARTRATE AND ACETAMINOPHEN 5; 325 MG/1; MG/1
1 TABLET ORAL EVERY 4 HOURS PRN
Status: DISCONTINUED | OUTPATIENT
Start: 2022-02-11 | End: 2022-02-13 | Stop reason: HOSPADM

## 2022-02-11 RX ORDER — MORPHINE SULFATE 4 MG/ML
4 INJECTION, SOLUTION INTRAMUSCULAR; INTRAVENOUS EVERY 4 HOURS PRN
Status: DISCONTINUED | OUTPATIENT
Start: 2022-02-11 | End: 2022-02-13 | Stop reason: HOSPADM

## 2022-02-11 RX ORDER — POTASSIUM CHLORIDE 20 MEQ/1
40 TABLET, EXTENDED RELEASE ORAL
Status: DISCONTINUED | OUTPATIENT
Start: 2022-02-11 | End: 2022-02-12

## 2022-02-11 RX ADMIN — POTASSIUM CHLORIDE 10 MEQ: 7.46 INJECTION, SOLUTION INTRAVENOUS at 05:35

## 2022-02-11 RX ADMIN — FLUOXETINE HYDROCHLORIDE 40 MG: 20 CAPSULE ORAL at 09:15

## 2022-02-11 RX ADMIN — PROMETHAZINE HYDROCHLORIDE 12.5 MG: 25 INJECTION INTRAMUSCULAR; INTRAVENOUS at 06:44

## 2022-02-11 RX ADMIN — MORPHINE SULFATE 4 MG: 4 INJECTION INTRAVENOUS at 03:01

## 2022-02-11 RX ADMIN — PANCRELIPASE 24000 UNITS: 24000; 76000; 120000 CAPSULE, DELAYED RELEASE PELLETS ORAL at 17:26

## 2022-02-11 RX ADMIN — POTASSIUM CHLORIDE 40 MEQ: 1500 TABLET, EXTENDED RELEASE ORAL at 17:26

## 2022-02-11 RX ADMIN — ONDANSETRON 4 MG: 4 TABLET, ORALLY DISINTEGRATING ORAL at 09:12

## 2022-02-11 RX ADMIN — FERROUS SULFATE TAB EC 325 MG (65 MG FE EQUIVALENT) 325 MG: 325 (65 FE) TABLET DELAYED RESPONSE at 08:59

## 2022-02-11 RX ADMIN — POTASSIUM CHLORIDE 10 MEQ: 7.46 INJECTION, SOLUTION INTRAVENOUS at 08:33

## 2022-02-11 RX ADMIN — POTASSIUM CHLORIDE 40 MEQ: 1500 TABLET, EXTENDED RELEASE ORAL at 12:40

## 2022-02-11 RX ADMIN — HYDROCODONE BITARTRATE AND ACETAMINOPHEN 1 TABLET: 5; 325 TABLET ORAL at 09:12

## 2022-02-11 RX ADMIN — POTASSIUM CHLORIDE 10 MEQ: 7.46 INJECTION, SOLUTION INTRAVENOUS at 06:44

## 2022-02-11 RX ADMIN — SODIUM CHLORIDE, PRESERVATIVE FREE 10 ML: 5 INJECTION INTRAVENOUS at 19:54

## 2022-02-11 RX ADMIN — SODIUM CHLORIDE, PRESERVATIVE FREE 10 ML: 5 INJECTION INTRAVENOUS at 09:12

## 2022-02-11 RX ADMIN — MAGNESIUM SULFATE HEPTAHYDRATE 1000 MG: 1 INJECTION, SOLUTION INTRAVENOUS at 02:41

## 2022-02-11 RX ADMIN — PROMETHAZINE HYDROCHLORIDE 12.5 MG: 25 INJECTION INTRAMUSCULAR; INTRAVENOUS at 19:13

## 2022-02-11 RX ADMIN — ASPIRIN 325 MG: 325 TABLET, COATED ORAL at 09:14

## 2022-02-11 RX ADMIN — Medication 100 MG: at 09:15

## 2022-02-11 RX ADMIN — SODIUM CHLORIDE 10 ML: 9 INJECTION INTRAMUSCULAR; INTRAVENOUS; SUBCUTANEOUS at 11:20

## 2022-02-11 RX ADMIN — PROMETHAZINE HYDROCHLORIDE 12.5 MG: 25 INJECTION INTRAMUSCULAR; INTRAVENOUS at 11:58

## 2022-02-11 RX ADMIN — MAGNESIUM SULFATE HEPTAHYDRATE 1000 MG: 1 INJECTION, SOLUTION INTRAVENOUS at 04:40

## 2022-02-11 RX ADMIN — Medication 1 TABLET: at 09:15

## 2022-02-11 RX ADMIN — PANTOPRAZOLE SODIUM 40 MG: 40 INJECTION, POWDER, FOR SOLUTION INTRAVENOUS at 11:21

## 2022-02-11 RX ADMIN — PROMETHAZINE HYDROCHLORIDE 12.5 MG: 25 INJECTION INTRAMUSCULAR; INTRAVENOUS at 02:53

## 2022-02-11 RX ADMIN — ALCOHOL 1 TABLET: 70.47 GEL TOPICAL at 09:15

## 2022-02-11 RX ADMIN — MAGNESIUM SULFATE HEPTAHYDRATE 1000 MG: 1 INJECTION, SOLUTION INTRAVENOUS at 03:40

## 2022-02-11 RX ADMIN — DESMOPRESSIN ACETATE 40 MG: 0.2 TABLET ORAL at 19:53

## 2022-02-11 RX ADMIN — ALLOPURINOL 100 MG: 100 TABLET ORAL at 09:14

## 2022-02-11 RX ADMIN — POTASSIUM CHLORIDE 10 MEQ: 7.46 INJECTION, SOLUTION INTRAVENOUS at 01:40

## 2022-02-11 RX ADMIN — MAGNESIUM SULFATE HEPTAHYDRATE 1000 MG: 1 INJECTION, SOLUTION INTRAVENOUS at 01:40

## 2022-02-11 RX ADMIN — Medication 1 TABLET: at 19:54

## 2022-02-11 RX ADMIN — PANCRELIPASE 24000 UNITS: 24000; 76000; 120000 CAPSULE, DELAYED RELEASE PELLETS ORAL at 12:40

## 2022-02-11 RX ADMIN — MAGNESIUM GLUCONATE 500 MG ORAL TABLET 400 MG: 500 TABLET ORAL at 09:15

## 2022-02-11 RX ADMIN — ONDANSETRON 4 MG: 2 INJECTION INTRAMUSCULAR; INTRAVENOUS at 05:35

## 2022-02-11 RX ADMIN — CETIRIZINE HYDROCHLORIDE 10 MG: 10 TABLET ORAL at 09:15

## 2022-02-11 RX ADMIN — ENOXAPARIN SODIUM 40 MG: 100 INJECTION SUBCUTANEOUS at 09:16

## 2022-02-11 RX ADMIN — POTASSIUM CHLORIDE, DEXTROSE MONOHYDRATE AND SODIUM CHLORIDE: 150; 5; 450 INJECTION, SOLUTION INTRAVENOUS at 13:40

## 2022-02-11 RX ADMIN — ACETAMINOPHEN 650 MG: 325 TABLET ORAL at 19:56

## 2022-02-11 RX ADMIN — MORPHINE SULFATE 4 MG: 4 INJECTION INTRAVENOUS at 06:45

## 2022-02-11 RX ADMIN — PANCRELIPASE 24000 UNITS: 24000; 76000; 120000 CAPSULE, DELAYED RELEASE PELLETS ORAL at 08:59

## 2022-02-11 RX ADMIN — FOLIC ACID 1 MG: 1 TABLET ORAL at 09:14

## 2022-02-11 RX ADMIN — POTASSIUM CHLORIDE 10 MEQ: 7.46 INJECTION, SOLUTION INTRAVENOUS at 04:19

## 2022-02-11 RX ADMIN — MORPHINE SULFATE 4 MG: 4 INJECTION INTRAVENOUS at 11:20

## 2022-02-11 RX ADMIN — ATENOLOL 50 MG: 50 TABLET ORAL at 09:14

## 2022-02-11 RX ADMIN — AMLODIPINE BESYLATE 10 MG: 10 TABLET ORAL at 09:15

## 2022-02-11 RX ADMIN — POTASSIUM CHLORIDE 40 MEQ: 1500 TABLET, EXTENDED RELEASE ORAL at 09:12

## 2022-02-11 ASSESSMENT — PAIN SCALES - GENERAL
PAINLEVEL_OUTOF10: 10
PAINLEVEL_OUTOF10: 7
PAINLEVEL_OUTOF10: 5
PAINLEVEL_OUTOF10: 10
PAINLEVEL_OUTOF10: 7
PAINLEVEL_OUTOF10: 9

## 2022-02-11 ASSESSMENT — ENCOUNTER SYMPTOMS
DIARRHEA: 1
NAUSEA: 1
ABDOMINAL PAIN: 0
BACK PAIN: 1
SHORTNESS OF BREATH: 0
COUGH: 0
VOMITING: 1

## 2022-02-11 ASSESSMENT — PAIN DESCRIPTION - PAIN TYPE: TYPE: ACUTE PAIN

## 2022-02-11 NOTE — PROGRESS NOTES
[facility]  Office: [address], DO, Bandar Smoker, DO, Mariel[facility] Centers, DO, Smiley Chew Blood, DO, Colonel Racheal MD, Donovan Carranza MD, Martina Brush MD, Sylvia Frost MD, Ladan Rojas MD, Alejo Campos MD, Ayala Bee MD, Mariela Castañeda, DO, Denis Do, DO, Laureen Cason MD,  Fran Ott, DO, Gonzales Crane MD, Brianna Walker MD, Kim Walls MD, Nancy Bartlett MD, Mayco Kaufman MD, Jani Rose, [facility], [facility]ANLEY Razo, CNP, Hailey Gupta, CNP, Vivian Mann, CNS, Jessica Gonzalez, CNP, Xavi Diaz, CNP, Mohit Velez, CNP, Dale Rowley, CNP, Jayson Perez, CNP, LEONARDO HauserC, Romy Todd, [facility], Milli Davis, [facility], Shree Dye, CNP, Lesli Lewis, CNP, Michi Guerra, CNP, Sonia Munoz, CNP, Bella Garcia, CNP, Charanjit Lapidus, [facility]    Progress Note    2/11/2022    5:47 PM    Name:   Shravan iKrby  MRN:     [id number]     Acct:      [de-identified]   Room:   [facility] Day:  1  Admit Date:  2/10/2022  4:13 PM    PCP:   Laura Magaña MD  Code Status:  Full Code    Subjective:     C/C:   Chief Complaint   Patient presents with    Shortness of Breath     S     Interval History Status:   Feeling a little better reports  Appetite increased (patient states that she has not eaten in a week)  No further emesis  Not sleeping well    Data Base Updates:  Yvvsztd15xy/dL     BUN<2 Low mg/dL   CREATININE0.70mg/dL   Bun/Cre RatioNOT REPORTED     [address] Low mg/dL   Aolruw878bnph/L   Magnesium 0.9 Low Panic    Potassium3. 1 Low        Brief History:     As documented in the medical record:  Shraddha Parsons is a 65 y.o. Non- / non  female who presents with Shortness of Breath (S)      Patient presents to the emergency room with the concern of chest pain. Patient states that she has been having chest pain for a few weeks lately which worsened today and prompted the ER evaluation.   Patient describes associated nausea, vomiting, diarrhea and diaphoresis, shortness of breath left arm pain with a stabing sensation and n/t. Patient admits to similar pain  A few months prior. Patient states that given the severity of the pain and associated symptomology she was unable to take her medications over the past week. Work up in the emergency room    Laboratories:   Metabolic panel. - Potassium 2.3, chloride 97. GI/Liver Panel- alk phos 288, ast 54  Hematology. -no leukocytosis or acute anemia  Coagulation- not done  Cardiac Markers- Hs troponin  35, with repeat of 30  EKG- Initial EKG completed at 1626 showed narrow complex tachycardia rate 136, left axis     deviation, difficult to discern if this was a sinus rhythm as P waves appear to be    buried, there was a change in the rhythm at the second half which almost appears           alternating QRS with possible electrical alternans. CT CHEST ABDOMEN PELVIS WO CONTRAST   Result Date: 2/10/2022  Chest CT: Suggestion of esophageal mural thickening. Correlate with clinical evidence of esophagitis. Otherwise no acute abnormality detected. Abdomen pelvis CT: Possible mild mural thickening involving the distal rectum. Correlate with clinical evidence of proctitis. Acute to subacute appearing compression fracture involving the superior endplate of L2 with loss of approximately 30% of vertebral body height. No dorsal retropulsion.  Moderate-sized hiatal hernia\"    The intitial assessment and plan included:  \"   * (Principal) Hypokalemia 2/10/2022 Yes     Hypertension 2/10/2022 Yes     JONES (obstructive sleep apnea) 2/10/2022 Yes     Bipolar disorder ([address] 2/10/2022 Yes     Intractable nausea and vomiting 2/10/2022 Yes     Esophageal thickening 2/10/2022 Yes     Proctitis 2/10/2022 Yes     Type 2 diabetes mellitus without complication, without long-term current use of insulin ([address] 2/10/2022 Yes     Compression fracture of L2 ([address] 2/11/2022 Yes        Plan  Patient status inpatient in the Progressive Unit/Step down   Replacement of electrolytes. Check magnesium and potassium after replacement ( 0001)   Unable to keep po down will need IV prn blood pressure medications  Npo,  If no source of n/v/d identified, consult GI  Continue to trend troponin  Check GI panel for etiology of diarrhea   Monitor glucose levels  Consultation to N/S in am for evaluation of the L2 compression fracture  GI proph  DVT proph\"     Specimen Source: Stool Specimen Description. FECES Special RequestsNOT REPORTED Direct ExamNegative for Rotavirus     Specimen Source: Stool Specimen Description. FECES Special RequestsNOT REPORTED Direct ExamGiardia Antigen Assay Negative Cryptosporidium Antigen Assay Negative     Gastrointestinal Panel, Molecular [[id number]]    Collected: 02/10/22 2345    Updated: 02/11/22 1311    Specimen Source: Stool     Specimen Description . FECES    Campylobacter PCR NEGATIVE: No Campylobacter spp. (jejuni or coli) DNA Detected    Salmonella PCR NEGATIVE: No Salmonella spp. DNA Detected    Shigatoxin Gene PCR NEGATIVE: No Shiga toxin-producing gene(s) Detected    Shigella Sp PCR NEGATIVE: No Shigella spp. / EIEC DNA Detected    Plesiomonas Shigelloides PCR NEGATIVE: No Plesionomas shigelloides DNA Detected    Vibrio PCR NEGATIVE: No Vibrio (V. vulnificus, V, parahaemolyticus and V. cholerae) DNA Detected    E Coli Enterotoxigenic PCR NEGATIVE: No Enterotoxigenic E. coli (ETEC) Heat-labile and heat-stable (LT/ST) DNA Detected    Yersinia Enterocolitica PCR NEGATIVE: No Yersinia enterocolitica DNA Detected     Cardiology evaluation 8/17/2021:   IMPRESSION:    Increased Troponin secondary to type II MI  Prolonged QTc secondary to hypokalemia and hypocalcemia  Hypokalemai  Hypocalcemia  Rhabdomyolysis  Hypertension   RECOMMENDATIONS:  Trend Hs-troponins. Echo to be ordered. If n regional wall motion abnormalities, out patient stress test will be ordered.    Correct electrolytes. With held Hydrochlorothiazide for time being as it can further worsen electrolytes disturbances. Iv fluids  Keep an eye on vitals, if blood pressure is high we can start Norvasc 5mg. Past Medical History:   has a past medical history of Angioedema, Anxiety, Asthma, Bipolar disorder ([address] Claustrophobia, Depression, GERD (gastroesophageal reflux disease), Hypertension, Hypertrophic cardiomyopathy ([address] Lung nodules, MRSA (methicillin resistant Staphylococcus aureus), Murmur, OA (osteoarthritis), JONES (obstructive sleep apnea), Thyroid nodule, and Type 2 diabetes mellitus without complication, without long-term current use of insulin ([address] Social History:   reports that she quit smoking about 29 years ago. Her smoking use included cigarettes. She has a 10.00 pack-year smoking history. She has never used smokeless tobacco. She reports previous alcohol use of about 12.0 standard drinks of alcohol per week. She reports previous drug use. Drug: Cocaine. Family History:   Family History   Problem Relation Age of Onset   Smith Stroke Mother     Hypertension Father     Cancer Brother         bone    Lung Cancer Maternal Aunt     Cancer Maternal Grandmother         eye     Other Sister         aneurysm    Heart Disease Sister        Review of Systems:     Review of Systems   Constitutional: Positive for appetite change and unexpected weight change (weight has dropped from 240 to 167 # over the last year ). Respiratory: Negative for cough and shortness of breath. Cardiovascular: Positive for chest pain (The patient reports chronic nonexertional chest ache in the left lateral inframammary region with radiation to the left scapula). Negative for palpitations. Gastrointestinal: Positive for diarrhea (Stools have been loose), nausea and vomiting (Reporting emesis of clear foamy fluid). Negative for abdominal pain.         The patient reports chronic GI problems  The patient has seen Dr. Ever Clemens Genitourinary: Negative for flank pain and hematuria. Musculoskeletal: Positive for back pain (chronic back pain). Psychiatric/Behavioral: Positive for dysphoric mood. The patient is nervous/anxious. The patient reports her mom  in November  Her father  several days later  She just recently buried a cousin  She has been feeling depressed and anxious  The patient acknowledges she has been drinking 2 beers a day \"self-medicating\"  She is working with a therapist at the [facility]         Physical Examination:        Vitals  BP (!) 118/91   Pulse 73   Temp 97.7 °F (36.5 °C) (Oral)   Resp 14   Ht 5' 4\" (1.626 m)   Wt 161 lb 14.4 oz (73.4 kg)   SpO2 98%   BMI 27.79 kg/m²   Temp (24hrs), Av.2 °F (36.8 °C), Min:97.7 °F (36.5 °C), Max:98.7 °F (37.1 °C)    No results for input(s): POCGLU in the last 72 hours. Physical Exam  Vitals reviewed. Constitutional:       General: She is not in acute distress. Appearance: She is not diaphoretic. HENT:      Head: Normocephalic. Nose: Nose normal.   Eyes:      General: No scleral icterus. Conjunctiva/sclera: Conjunctivae normal.   Neck:      Trachea: No tracheal deviation. Cardiovascular:      Rate and Rhythm: Normal rate and regular rhythm. Pulmonary:      Effort: Pulmonary effort is normal. No respiratory distress. Breath sounds: Normal breath sounds. No wheezing or rales. Chest:      Chest wall: No tenderness. Abdominal:      General: There is no distension. Palpations: Abdomen is soft. Tenderness: There is no abdominal tenderness. There is no guarding or rebound. Musculoskeletal:         General: No tenderness. Cervical back: Neck supple. Skin:     General: Skin is warm and dry. Neurological:      Mental Status: She is alert. Medications: Allergies:     Allergies   Allergen Reactions    Bee Venom Anaphylaxis    Iodine Anaphylaxis and Rash    Lisinopril Swelling and Anaphylaxis Angioedema    Shellfish-Derived Products Anaphylaxis and Rash     ANGIOEDEMA       Current Meds:   Scheduled Meds:    potassium chloride  40 mEq Oral TID WC    allopurinol  100 mg Oral Daily    amLODIPine  10 mg Oral Daily    atenolol  50 mg Oral Daily    calcium carbonate w/vitamin D  1 tablet Oral BID    ferrous sulfate  325 mg Oral Daily with breakfast    FLUoxetine  40 mg Oral Daily    folic acid  1 mg Oral Daily    lipase-protease-amylase  24,000 Units Oral TID WC    cetirizine  10 mg Oral Daily    magnesium oxide  400 mg Oral Daily    multivitamin  1 tablet Oral Daily    [Held by provider] pantoprazole  40 mg Oral QAM AC    thiamine  100 mg Oral Daily    sodium chloride flush  5-40 mL IntraVENous 2 times per day    aspirin  325 mg Oral Daily    enoxaparin  40 mg SubCUTAneous Daily    atorvastatin  40 mg Oral Nightly    pantoprazole  40 mg IntraVENous Daily    And    sodium chloride (PF)  10 mL IntraVENous Daily     Continuous Infusions:    sodium chloride      dextrose 5% and 0.45% NaCl with KCl 20 mEq 75 mL/hr at 02/11/22 1340     PRN Meds: HYDROcodone 5 mg - acetaminophen, morphine **OR** morphine, diphenhydrAMINE, sodium chloride flush, sodium chloride, ondansetron **OR** ondansetron, acetaminophen **OR** acetaminophen, magnesium hydroxide, potassium chloride **OR** potassium alternative oral replacement **OR** potassium chloride, potassium chloride, magnesium sulfate, nitroGLYCERIN, hydrALAZINE, promethazine    Data:     I/O (24Hr):   No intake or output data in the 24 hours ending 02/11/22 1747    Labs:  Hematology:  Recent Labs     02/10/22  1640 02/11/22  0536   WBC 10.4 5.8   RBC 4.10 3.22*   HGB 13.7 10.7*   HCT 39.9 30.6*   MCV 97.3 95.0   MCH 33.4 33.2   MCHC 34.3 35.0*   RDW 18.9* 18.8*   * 355   MPV 8.7 8.5   SEDRATE 34*  --    CRP <3.0  --      Chemistry:  Recent Labs     02/10/22  2250 02/10/22  2250 02/11/22  0325 02/11/22  0325 02/11/22  0536 02/11/22  1118 02/11/22  1123 02/11/22  1403      < > 140   < > 141 141  --  137   K 2.5*   < > 2.2*   < > 2.5* 2.9*  --  3.1*      < > 98   < > 100 103  --  100   CO2 18*   < > 23   < > 26 26  --  26   GLUCOSE 88   < > 125*   < > 123* 99  --  97   BUN 2*   < > 2*   < > 2* <2*  --  <2*   CREATININE 0.69   < > 0.62   < > 0.65 0.57  --  0.70   MG 0.9*  --   --   --  2.2  --   --   --    ANIONGAP 21*   < > 19*   < > 15 12  --  11   LABGLOM >60   < > >60   < > >60 >60  --  >60   GFRAA >60   < > >60   < > >60 >60  --  >60   CALCIUM 7.3*   < > 7.3*   < > 7.2* 7.1*  --  7.5*   TROPHS 37*   < > 33*  --  30*  --  27*  --    MYOGLOBIN  --   --   --   --  259*  --  249*  --     < > = values in this interval not displayed. Recent Labs     02/10/22  1640 02/11/22  0536   PROT 8.6* 6.4   LABALBU 4.2 3.4*   AST 55* 39*   ALT 20 15   ALKPHOS 288* 213*   BILITOT 0.38 0.54   LIPASE 17  --    CHOL  --  139   HDL  --  45   LDLCHOLESTEROL  --  71   CHOLHDLRATIO  --  3.1   TRIG  --  114   VLDL  --  NOT REPORTED     ABG:  Lab Results   Component Value Date    FIO2 INFORMATION NOT PROVIDED 09/20/2020     Lab Results   Component Value Date/Time    SPECIAL NOT REPORTED 02/11/2022 12:47 AM     Lab Results   Component Value Date/Time    CULTURE NO GROWTH 6 DAYS 09/29/2021 06:55 PM       Radiology:  XR CHEST PORTABLE    Result Date: 2/10/2022  1. No acute pulmonary disease. 2. Cardiomegaly. 3. Calcific atherosclerotic disease aorta. CT CHEST ABDOMEN PELVIS WO CONTRAST    Result Date: 2/10/2022  Chest CT: Suggestion of esophageal mural thickening. Correlate with clinical evidence of esophagitis. Otherwise no acute abnormality detected. Abdomen pelvis CT: Possible mild mural thickening involving the distal rectum. Correlate with clinical evidence of proctitis. Acute to subacute appearing compression fracture involving the superior endplate of L2 with loss of approximately 30% of vertebral body height. No dorsal retropulsion. Moderate-sized hiatal hernia.        Assessment:        Primary Problem  Hypokalemia    Active Hospital Problems    Diagnosis Date Noted    [facility] [S32.020A] 02/11/2022    Anemia, normocytic normochromic [D64.9] 02/11/2022    Esophageal thickening [K22.89] 02/10/2022    Proctitis [K62.89] 02/10/2022    Troponin level elevated [R77.8] 08/20/2021    Intractable nausea and vomiting [R11.2]     Chronic diarrhea [K52.9]     Hypokalemia [E87.6] 08/16/2021    Bipolar disorder ([facility] [F31.9] 03/21/2021    Type 2 diabetes mellitus without complication, without long-term current use of insulin ([facility] [E11.9] 12/07/2018    JONES (obstructive sleep apnea) [G47.33] 11/18/2017    Essential hypertension [I10] 03/17/2016       Plan:        Hydration  Electrolyte replacement  Antiemetics  GI evaluation (she has seen Dr. Lee Charlton)  Reflux precautions  Glycemic contol - Monitor and control blood sugars  CPAP  Blood Pressure - Monitor and control  [facility] follow-up at the [facility]  Pain management  Hematology follow-up Dr. Victor Manuel Chinchilla troponin and EKGs  Pain management  Will need further evaluation of L2 fracture when GI status stable   Cardiology follow-up as needed TCC  DVT prophylaxis    IP CONSULT TO HOSPITALIST  IP CONSULT TO BRANDI Dumont DO  2/11/2022  5:47 PM

## 2022-02-11 NOTE — PLAN OF CARE
Problem: Pain:  Goal: Pain level will decrease  Description: Pain level will decrease  2/11/2022 1101 by Jennifer Patricia RN  Outcome: Ongoing  2/11/2022 0327 by Porsha Mcmullen RN  Outcome: Ongoing  Goal: Control of acute pain  Description: Control of acute pain  2/11/2022 1101 by Jennifer Patricia RN  Outcome: Ongoing  2/11/2022 0327 by Porsha Mcmullen RN  Outcome: Ongoing  Goal: Control of chronic pain  Description: Control of chronic pain  2/11/2022 1101 by Jennifer Patricia RN  Outcome: Ongoing  2/11/2022 0327 by Porsha Mcmullen RN  Outcome: Ongoing     Problem: Skin Integrity:  Goal: Will show no infection signs and symptoms  Description: Will show no infection signs and symptoms  2/11/2022 1101 by Jennifer Patricia RN  Outcome: Ongoing  2/11/2022 0327 by Porsha Mcmullen RN  Outcome: Ongoing  Goal: Absence of new skin breakdown  Description: Absence of new skin breakdown  2/11/2022 0327 by Porsha Mcmullen RN  Outcome: Ongoing     Problem: Falls - Risk of:  Goal: Will remain free from falls  Description: Will remain free from falls  2/11/2022 1101 by Jennifer Patricia RN  Outcome: Ongoing  2/11/2022 0327 by Porsha Mcmullen RN  Outcome: Ongoing  Goal: Absence of physical injury  Description: Absence of physical injury  2/11/2022 1101 by Jennifer Patricia RN  Outcome: Ongoing  2/11/2022 0327 by Porsha Mcmullen RN  Outcome: Ongoing

## 2022-02-11 NOTE — H&P
[facility]  Office: [address], DO, Ace Cordovagilda, DO, Porsha Scott, DO, Jayleen Vizcarra Blood, DO, Barry Acosta MD, John Yuan MD, Emil Grant MD, Vipul Rome MD, Marleny Marks MD, Chandni Kim MD, Dolores Valenzuela MD, Audi Gallego, DO, Wilmar Mims, DO, Mandeep Cardoso MD,  Becki Subramanian, DO, Loli Fischer MD, Genie Dakin, MD, Corina Jones MD, Caroline Frank MD, Reyna Moncada MD, Phoenix Vogt, CNP, [facility], CNP, Kelin Martinez, CNP, Rainer Fragoso, CNS, Shelley Crawley, CNP, Shari Piper, CNP, Fabricio Azar, CNP, Jordi Jeffery, CNP, Rosanna Fang, CNP, Matt Maravilla PA-C, Heron Parr, DNP, Kaleigh Araujo, [facility], Wenceslao Andre, CNP, Elizabeth Chapin, CNP, Joe Liagn, CNP, Patrick Guzman, CNP, Lester Barrera, CNP, Elas Saeed, CNP         [address]    HISTORY AND PHYSICAL EXAMINATION            Date:   2/10/2022  Patient name:  Ondina Minor  Date of admission:  2/10/2022  4:13 PM  MRN:   [id number]  Account:  [de-identified]  YOB: 1965  PCP:    Zee Adan MD  Room:   15/15  Code Status:    Prior    Chief Complaint:     Chief Complaint   Patient presents with    Shortness of Breath     S       History Obtained From:     patient, electronic medical record    History of Present Illness:     Ondina Minor is a 65 y.o. Non- / non  female who presents with Shortness of Breath (S)      Patient presents to the emergency room with the concern of chest pain. Patient states that she has been having chest pain for a few weeks lately which worsened today and prompted the ER evaluation. Patient describes associated nausea, vomiting, diarrhea and diaphoresis, shortness of breath left arm pain with a stabing sensation and n/t. Patient admits to similar pain  A few months prior.   Patient states that given the severity of the pain and associated symptomology she was unable to take her medications over the past week. Work up in the emergency room          Laboratories:   Metabolic panel. - Potassium 2.3, chloride 97. GI/Liver Panel- alk phos 288, ast 54  Hematology. -no leukocytosis or acute anemia  Coagulation- not done  Cardiac Markers- Hs troponin  35, with repeat of 30  EKG- Initial EKG completed at 1626 showed narrow complex tachycardia rate 136, left axis  deviation, difficult to discern if this was a sinus rhythm as P waves appear to be  buried, there was a change in the rhythm at the second half which almost appears  alternating QRS with possible electrical alternans.      Repeat EKG at 1712 showed sinus rhythm at 83 beats normal intervals, left word  deviation, no acute ST or T wave abnormalities prior EKG changes were no longer  appreciated  Urine- not done       Imaging and Diagnostics:   XR CHEST PORTABLE    1. No acute pulmonary disease. 2. Cardiomegaly. 3. Calcific atherosclerotic disease aorta. CT CHEST ABDOMEN PELVIS WO CONTRAST    Result Date: 2/10/2022  Chest CT: Suggestion of esophageal mural thickening. Correlate with clinical evidence of esophagitis. Otherwise no acute abnormality detected. Abdomen pelvis CT: Possible mild mural thickening involving the distal rectum. Correlate with clinical evidence of proctitis. Acute to subacute appearing compression fracture involving the superior endplate of L2 with loss of approximately 30% of vertebral body height. No dorsal retropulsion. Moderate-sized hiatal hernia.        Past Medical History:     Past Medical History:   Diagnosis Date    Angioedema 11/17/2017    from lisinopril    Anxiety     Asthma     mild persistent asthma, on home resp medications for control    Bipolar disorder (HCC)     Claustrophobia     Depression     GERD (gastroesophageal reflux disease)     Hypertension     Hypertrophic cardiomyopathy (HCC)     Lung nodules     MRSA (methicillin resistant Staphylococcus aureus)     rt hip  Murmur     see cardiac echo result    OA (osteoarthritis)     JONES (obstructive sleep apnea)     Thyroid nodule     Type 2 diabetes mellitus without complication, without long-term current use of insulin ([address] 12/7/2018        Past Surgical History:     Past Surgical History:   Procedure Laterality Date    BUNIONECTOMY Bilateral 12/7/2020    [facility]  BUNIONECTOMY, ARTHREX performed by Francoise Whelan DPM at [facility] COLONOSCOPY      bx    COLONOSCOPY N/A 12/3/2020    COLONOSCOPY WITH BIOPSY performed by Shikha Wilkins MD at [address] Bilateral 12/07/2020    [place] bunionectomy, right 2nd hammer toe repair     HAMMER TOE SURGERY Right 12/7/2020    2ND TOE HAMMER REPAIR performed by Francoise Whelan DPM at [facility]      partial hyst    OTHER SURGICAL HISTORY  8/24/2015    MRI under anesthesia    THYROID SURGERY      bilat bx    UPPER GASTROINTESTINAL ENDOSCOPY      UPPER GASTROINTESTINAL ENDOSCOPY N/A 12/3/2020    EGD BIOPSY performed by Shikha Wilkins MD at [facility]        Medications Prior to Admission:     Prior to Admission medications    Medication Sig Start Date End Date Taking?  Authorizing Provider   magnesium oxide (MAG-OX) 400 MG tablet TAKE 1 TABLET BY MOUTH 2 TIMES DAILY 12/21/21   Golden Denney MD   lipase-protease-amylase (CREON) 92505-51237 units delayed release capsule Take by mouth 3 times daily (with meals) 10/29/21   Shikha Wilkins MD   allopurinol (ZYLOPRIM) 100 MG tablet Take 1 tablet by mouth daily 10/12/21   Amaris Mas MD   magnesium oxide (MAG-OX) 400 (240 Mg) MG tablet Take 1 tablet by mouth daily 10/3/21   Attila Jackson MD   omeprazole (PRILOSEC) 20 MG delayed release capsule TAKE 1 CAPSULE BY MOUTH DAILY  9/23/21   Partha Cisneros MD   Calcium Carbonate-Vitamin D (OYSTER SHELL CALCIUM/D) 500-200 MG-UNIT TABS TAKE 1 TAB BY MOUTH ONCE A DAY   Patient taking differently: 2 times daily TAKE 1 TAB BY MOUTH ONCE A DAY 9/23/21   Chloe White MD   potassium chloride (KLOR-CON M) 20 MEQ extended release tablet Take 2 tablets by mouth 2 times daily After BMP two times, first after 1 week and if potassium is low then continue taking same dose with repeat BMP in a week. and if potassium is normal then take once daily. Do not crush, chew, or suck on tablet. 8/21/21   Padilla Brennan MD   Multiple Vitamin (MULTIVITAMIN) TABS tablet Take 1 tablet by mouth daily 8/22/21   Padilla Brennan MD   loratadine (CLARITIN) 10 MG tablet TAKE 1 tab BY MOUTH DAILY  8/18/21   Ulysses Stade, APRN - LISANDRO   thiamine mononitrate 100 MG tablet TAKE 1 TABLET BY MOUTH ONCE DAILY  7/14/21   Chloe White MD   FLUoxetine (PROZAC) 40 MG capsule Take 40 mg by mouth daily    Historical Provider, MD   acetaminophen (APAP EXTRA STRENGTH) 500 MG tablet Take 2 tablets by mouth every 6 hours as needed for Pain 6/11/21   Chelsea Mishra MD   atorvastatin (LIPITOR) 20 MG tablet TAKE 1 TABLET BY MOUTH DAILY  5/12/21   Cynthia Rayo MD   folic acid (FOLVITE) 1 MG tablet Take 1 tablet by mouth daily 4/27/21   Chelsea Mishra MD   ferrous sulfate (IRON 325) 325 (65 Fe) MG tablet Take 325 mg by mouth daily (with breakfast)    Historical Provider, MD   atenolol (TENORMIN) 50 MG tablet Take 1 tablet by mouth daily 1/22/21   Jose Zuniga MD   Calcium Carbonate-Vitamin D (OYSTER SHELL CALCIUM/D) 500-200 MG-UNIT TABS TAKE 1 TAB BY MOUTH ONCE A DAY  1/22/21   Jose Zuniga MD   amLODIPine (NORVASC) 10 MG tablet TAKE 1 TABLET BY MOUTH DAILY  1/8/21   Jose Aldana MD   albuterol sulfate (PROAIR RESPICLICK) 875 (90 Base) MCG/ACT aerosol powder inhalation Inhale 2 puffs into the lungs every 6 hours as needed for Wheezing or Shortness of Breath 1/15/20   Elsy Quispe MD        Allergies:     Bee venom, Iodine, Lisinopril, and Shellfish-derived products    Social History:     Tobacco:    reports that she quit smoking about 29 years ago.  Her smoking use included cigarettes. She has a 10.00 pack-year smoking history. She has never used smokeless tobacco.  Alcohol:      reports previous alcohol use of about 12.0 standard drinks of alcohol per week. Drug Use:  reports previous drug use. Drug: Cocaine. Family History:     Family History   Problem Relation Age of Onset   [facility] Stroke Mother     Hypertension Father     Cancer Brother         bone    Lung Cancer Maternal Aunt     Cancer Maternal Grandmother         eye     Other Sister         aneurysm    Heart Disease Sister        Review of Systems:     Positive and Negative as described in HPI. Review of Systems   Constitutional: Positive for appetite change and fatigue. Negative for chills, diaphoresis and fever. HENT: Negative for congestion and hearing loss. Respiratory: Positive for shortness of breath. Negative for cough, wheezing and stridor. Cardiovascular: Positive for chest pain. Negative for palpitations and leg swelling. Gastrointestinal: Positive for diarrhea, nausea and vomiting. Negative for abdominal pain, blood in stool and constipation. Genitourinary: Negative for dysuria and frequency. Musculoskeletal: Negative for myalgias. Skin: Negative for rash. Neurological: Negative for dizziness, seizures and headaches. Psychiatric/Behavioral: The patient is not nervous/anxious. Physical Exam:   BP (!) 178/112   Pulse 124   Temp 98.2 °F (36.8 °C) (Oral)   Resp 16   Ht 5' 4\" (1.626 m)   Wt 160 lb (72.6 kg)   SpO2 98%   BMI 27.46 kg/m²   Temp (24hrs), Av.2 °F (36.8 °C), Min:98.2 °F (36.8 °C), Max:98.2 °F (36.8 °C)    No results for input(s): POCGLU in the last 72 hours. No intake or output data in the 24 hours ending 02/10/22 2102    Physical Exam  Vitals and nursing note reviewed. Constitutional:       General: She is not in acute distress. Appearance: She is well-developed. She is not diaphoretic. HENT:      Head: Normocephalic and atraumatic. mural thickening involving the distal rectum. Correlate with clinical evidence of proctitis. Acute to subacute appearing compression fracture involving the superior endplate of L2 with loss of approximately 30% of vertebral body height. No dorsal retropulsion. Moderate-sized hiatal hernia. Assessment :      Hospital Problems           Last Modified POA    * (Principal) Hypokalemia 2/10/2022 Yes    Hypertension 2/10/2022 Yes    JONES (obstructive sleep apnea) 2/10/2022 Yes    Bipolar disorder ([address] 2/10/2022 Yes    Intractable nausea and vomiting 2/10/2022 Yes    Esophageal thickening 2/10/2022 Yes    Proctitis 2/10/2022 Yes    Type 2 diabetes mellitus without complication, without long-term current use of insulin ([address] 2/10/2022 Yes    Compression fracture of L2 ([address] 2/11/2022 Yes          Plan:     Patient status inpatient in the Progressive Unit/Step down    Replacement of electrolytes. Check magnesium and potassium after replacement ( 0001)   Unable to keep po down will need IV prn blood pressure medications  Npo,  If no source of n/v/d identified, consult GI  Continue to trend troponin  Check GI panel for etiology of diarrhea   Monitor glucose levels  Consultation to N/S in am for evaluation of the L2 compression fracture  GI proph  DVT proph     Consultations:   IP CONSULT TO HOSPITALIST     Patient is admitted as inpatient status because of co-morbidities listed above, severity of signs and symptoms as outlined, requirement for current medical therapies and most importantly because of direct risk to patient if care not provided in a hospital setting. Expected length of stay > 48 hours.     CECILIA Nick - CNP  2/10/2022  9:02 PM    Copy sent to Dr. Serina Simon MD

## 2022-02-12 LAB
ANION GAP SERPL CALCULATED.3IONS-SCNC: 11 MMOL/L (ref 9–17)
ANION GAP SERPL CALCULATED.3IONS-SCNC: 11 MMOL/L (ref 9–17)
ANION GAP SERPL CALCULATED.3IONS-SCNC: 16 MMOL/L (ref 9–17)
ANION GAP SERPL CALCULATED.3IONS-SCNC: 16 MMOL/L (ref 9–17)
BUN BLDV-MCNC: 2 MG/DL (ref 6–20)
BUN BLDV-MCNC: 3 MG/DL (ref 6–20)
BUN BLDV-MCNC: 5 MG/DL (ref 6–20)
BUN BLDV-MCNC: 5 MG/DL (ref 6–20)
BUN/CREAT BLD: ABNORMAL (ref 9–20)
CALCIUM IONIZED: 1.03 MMOL/L (ref 1.13–1.33)
CALCIUM SERPL-MCNC: 7.5 MG/DL (ref 8.6–10.4)
CALCIUM SERPL-MCNC: 8.1 MG/DL (ref 8.6–10.4)
CALCIUM SERPL-MCNC: 8.2 MG/DL (ref 8.6–10.4)
CALCIUM SERPL-MCNC: 8.3 MG/DL (ref 8.6–10.4)
CHLORIDE BLD-SCNC: 102 MMOL/L (ref 98–107)
CHLORIDE BLD-SCNC: 103 MMOL/L (ref 98–107)
CHLORIDE BLD-SCNC: 105 MMOL/L (ref 98–107)
CHLORIDE BLD-SCNC: 112 MMOL/L (ref 98–107)
CO2: 20 MMOL/L (ref 20–31)
CO2: 21 MMOL/L (ref 20–31)
CO2: 21 MMOL/L (ref 20–31)
CO2: 22 MMOL/L (ref 20–31)
CREAT SERPL-MCNC: 0.65 MG/DL (ref 0.5–0.9)
CREAT SERPL-MCNC: 0.66 MG/DL (ref 0.5–0.9)
CREAT SERPL-MCNC: 0.68 MG/DL (ref 0.5–0.9)
CREAT SERPL-MCNC: 0.74 MG/DL (ref 0.5–0.9)
EKG ATRIAL RATE: 80 BPM
EKG ATRIAL RATE: 83 BPM
EKG P AXIS: 26 DEGREES
EKG P AXIS: 30 DEGREES
EKG P-R INTERVAL: 168 MS
EKG P-R INTERVAL: 176 MS
EKG Q-T INTERVAL: 440 MS
EKG Q-T INTERVAL: 454 MS
EKG QRS DURATION: 76 MS
EKG QRS DURATION: 90 MS
EKG QTC CALCULATION (BAZETT): 517 MS
EKG QTC CALCULATION (BAZETT): 523 MS
EKG R AXIS: -5 DEGREES
EKG R AXIS: -7 DEGREES
EKG T AXIS: -35 DEGREES
EKG T AXIS: 46 DEGREES
EKG VENTRICULAR RATE: 80 BPM
EKG VENTRICULAR RATE: 83 BPM
GFR AFRICAN AMERICAN: >60 ML/MIN
GFR NON-AFRICAN AMERICAN: >60 ML/MIN
GFR SERPL CREATININE-BSD FRML MDRD: ABNORMAL ML/MIN/{1.73_M2}
GLUCOSE BLD-MCNC: 108 MG/DL (ref 70–99)
GLUCOSE BLD-MCNC: 87 MG/DL (ref 70–99)
GLUCOSE BLD-MCNC: 95 MG/DL (ref 70–99)
GLUCOSE BLD-MCNC: 95 MG/DL (ref 70–99)
MAGNESIUM: 1.4 MG/DL (ref 1.6–2.6)
MAGNESIUM: 1.5 MG/DL (ref 1.6–2.6)
MAGNESIUM: 1.6 MG/DL (ref 1.6–2.6)
MAGNESIUM: 1.7 MG/DL (ref 1.6–2.6)
MAGNESIUM: 1.7 MG/DL (ref 1.6–2.6)
POTASSIUM SERPL-SCNC: 3.9 MMOL/L (ref 3.7–5.3)
POTASSIUM SERPL-SCNC: 3.9 MMOL/L (ref 3.7–5.3)
POTASSIUM SERPL-SCNC: 4.4 MMOL/L (ref 3.7–5.3)
POTASSIUM SERPL-SCNC: 4.5 MMOL/L (ref 3.7–5.3)
SODIUM BLD-SCNC: 134 MMOL/L (ref 135–144)
SODIUM BLD-SCNC: 137 MMOL/L (ref 135–144)
SODIUM BLD-SCNC: 139 MMOL/L (ref 135–144)
SODIUM BLD-SCNC: 150 MMOL/L (ref 135–144)
TROPONIN INTERP: ABNORMAL
TROPONIN T: ABNORMAL NG/ML
TROPONIN, HIGH SENSITIVITY: 25 NG/L (ref 0–14)

## 2022-02-12 PROCEDURE — A4216 STERILE WATER/SALINE, 10 ML: HCPCS | Performed by: NURSE PRACTITIONER

## 2022-02-12 PROCEDURE — 6370000000 HC RX 637 (ALT 250 FOR IP): Performed by: NURSE PRACTITIONER

## 2022-02-12 PROCEDURE — 6360000002 HC RX W HCPCS: Performed by: NURSE PRACTITIONER

## 2022-02-12 PROCEDURE — 2500000003 HC RX 250 WO HCPCS: Performed by: NURSE PRACTITIONER

## 2022-02-12 PROCEDURE — 36415 COLL VENOUS BLD VENIPUNCTURE: CPT

## 2022-02-12 PROCEDURE — 80048 BASIC METABOLIC PNL TOTAL CA: CPT

## 2022-02-12 PROCEDURE — 82330 ASSAY OF CALCIUM: CPT

## 2022-02-12 PROCEDURE — 2060000000 HC ICU INTERMEDIATE R&B

## 2022-02-12 PROCEDURE — 6370000000 HC RX 637 (ALT 250 FOR IP): Performed by: INTERNAL MEDICINE

## 2022-02-12 PROCEDURE — 2580000003 HC RX 258: Performed by: NURSE PRACTITIONER

## 2022-02-12 PROCEDURE — 94664 DEMO&/EVAL PT USE INHALER: CPT

## 2022-02-12 PROCEDURE — 99232 SBSQ HOSP IP/OBS MODERATE 35: CPT | Performed by: INTERNAL MEDICINE

## 2022-02-12 PROCEDURE — 83735 ASSAY OF MAGNESIUM: CPT

## 2022-02-12 PROCEDURE — 84484 ASSAY OF TROPONIN QUANT: CPT

## 2022-02-12 PROCEDURE — C9113 INJ PANTOPRAZOLE SODIUM, VIA: HCPCS | Performed by: NURSE PRACTITIONER

## 2022-02-12 PROCEDURE — 93005 ELECTROCARDIOGRAM TRACING: CPT | Performed by: INTERNAL MEDICINE

## 2022-02-12 PROCEDURE — 99222 1ST HOSP IP/OBS MODERATE 55: CPT | Performed by: INTERNAL MEDICINE

## 2022-02-12 PROCEDURE — 94761 N-INVAS EAR/PLS OXIMETRY MLT: CPT

## 2022-02-12 RX ORDER — LOPERAMIDE HYDROCHLORIDE 2 MG/1
2 CAPSULE ORAL 4 TIMES DAILY PRN
Status: DISCONTINUED | OUTPATIENT
Start: 2022-02-12 | End: 2022-02-13 | Stop reason: HOSPADM

## 2022-02-12 RX ADMIN — LOPERAMIDE HYDROCHLORIDE 2 MG: 2 CAPSULE ORAL at 11:51

## 2022-02-12 RX ADMIN — POTASSIUM CHLORIDE 40 MEQ: 1500 TABLET, EXTENDED RELEASE ORAL at 07:55

## 2022-02-12 RX ADMIN — CETIRIZINE HYDROCHLORIDE 10 MG: 10 TABLET ORAL at 09:13

## 2022-02-12 RX ADMIN — ASPIRIN 325 MG: 325 TABLET, COATED ORAL at 09:13

## 2022-02-12 RX ADMIN — POTASSIUM CHLORIDE 40 MEQ: 1500 TABLET, EXTENDED RELEASE ORAL at 12:33

## 2022-02-12 RX ADMIN — POTASSIUM CHLORIDE, DEXTROSE MONOHYDRATE AND SODIUM CHLORIDE: 150; 5; 450 INJECTION, SOLUTION INTRAVENOUS at 01:47

## 2022-02-12 RX ADMIN — DESMOPRESSIN ACETATE 40 MG: 0.2 TABLET ORAL at 20:15

## 2022-02-12 RX ADMIN — HYDROCODONE BITARTRATE AND ACETAMINOPHEN 1 TABLET: 5; 325 TABLET ORAL at 09:25

## 2022-02-12 RX ADMIN — MAGNESIUM GLUCONATE 500 MG ORAL TABLET 400 MG: 500 TABLET ORAL at 09:12

## 2022-02-12 RX ADMIN — POTASSIUM CHLORIDE, DEXTROSE MONOHYDRATE AND SODIUM CHLORIDE: 150; 5; 450 INJECTION, SOLUTION INTRAVENOUS at 14:45

## 2022-02-12 RX ADMIN — ALCOHOL 1 TABLET: 70.47 GEL TOPICAL at 09:13

## 2022-02-12 RX ADMIN — FOLIC ACID 1 MG: 1 TABLET ORAL at 09:12

## 2022-02-12 RX ADMIN — Medication 100 MG: at 09:13

## 2022-02-12 RX ADMIN — ACETAMINOPHEN 650 MG: 325 TABLET ORAL at 09:25

## 2022-02-12 RX ADMIN — FLUOXETINE HYDROCHLORIDE 40 MG: 20 CAPSULE ORAL at 09:13

## 2022-02-12 RX ADMIN — HYDROCODONE BITARTRATE AND ACETAMINOPHEN 1 TABLET: 5; 325 TABLET ORAL at 01:46

## 2022-02-12 RX ADMIN — ALLOPURINOL 100 MG: 100 TABLET ORAL at 09:13

## 2022-02-12 RX ADMIN — FERROUS SULFATE TAB EC 325 MG (65 MG FE EQUIVALENT) 325 MG: 325 (65 FE) TABLET DELAYED RESPONSE at 07:56

## 2022-02-12 RX ADMIN — AMLODIPINE BESYLATE 10 MG: 10 TABLET ORAL at 09:13

## 2022-02-12 RX ADMIN — ENOXAPARIN SODIUM 40 MG: 100 INJECTION SUBCUTANEOUS at 09:13

## 2022-02-12 RX ADMIN — SODIUM CHLORIDE 10 ML: 9 INJECTION INTRAMUSCULAR; INTRAVENOUS; SUBCUTANEOUS at 09:15

## 2022-02-12 RX ADMIN — HYDROCODONE BITARTRATE AND ACETAMINOPHEN 1 TABLET: 5; 325 TABLET ORAL at 20:15

## 2022-02-12 RX ADMIN — PANCRELIPASE 24000 UNITS: 24000; 76000; 120000 CAPSULE, DELAYED RELEASE PELLETS ORAL at 12:33

## 2022-02-12 RX ADMIN — PANCRELIPASE 24000 UNITS: 24000; 76000; 120000 CAPSULE, DELAYED RELEASE PELLETS ORAL at 07:55

## 2022-02-12 RX ADMIN — MORPHINE SULFATE 4 MG: 4 INJECTION INTRAVENOUS at 05:02

## 2022-02-12 RX ADMIN — PANCRELIPASE 24000 UNITS: 24000; 76000; 120000 CAPSULE, DELAYED RELEASE PELLETS ORAL at 16:56

## 2022-02-12 RX ADMIN — LOPERAMIDE HYDROCHLORIDE 2 MG: 2 CAPSULE ORAL at 20:15

## 2022-02-12 RX ADMIN — PANTOPRAZOLE SODIUM 40 MG: 40 INJECTION, POWDER, FOR SOLUTION INTRAVENOUS at 07:56

## 2022-02-12 RX ADMIN — Medication 1 TABLET: at 09:14

## 2022-02-12 RX ADMIN — SODIUM CHLORIDE, PRESERVATIVE FREE 10 ML: 5 INJECTION INTRAVENOUS at 07:56

## 2022-02-12 RX ADMIN — ATENOLOL 50 MG: 50 TABLET ORAL at 09:13

## 2022-02-12 ASSESSMENT — PAIN DESCRIPTION - LOCATION: LOCATION: BACK;GENERALIZED

## 2022-02-12 ASSESSMENT — PAIN SCALES - GENERAL
PAINLEVEL_OUTOF10: 8
PAINLEVEL_OUTOF10: 6
PAINLEVEL_OUTOF10: 10
PAINLEVEL_OUTOF10: 10
PAINLEVEL_OUTOF10: 0
PAINLEVEL_OUTOF10: 3
PAINLEVEL_OUTOF10: 3
PAINLEVEL_OUTOF10: 5
PAINLEVEL_OUTOF10: 0

## 2022-02-12 ASSESSMENT — ENCOUNTER SYMPTOMS
NAUSEA: 0
BACK PAIN: 1
COUGH: 0
ABDOMINAL PAIN: 0
DIARRHEA: 1
SHORTNESS OF BREATH: 0
VOMITING: 0

## 2022-02-12 ASSESSMENT — PAIN DESCRIPTION - PAIN TYPE
TYPE: ACUTE PAIN;CHRONIC PAIN
TYPE: ACUTE PAIN;CHRONIC PAIN

## 2022-02-12 NOTE — CONSULTS
THE [facility] AT [facility]  Consultation Note     . REASON FOR CONSULTATION:  Chronic diarrhea. HISTORY OF PRESENT ILLNESS:     This is a 65 y.o. female with PMH of alcoholism, anxiety, asthma, bipolar disorder, claustrophobia, depression, GERD, hypertension, hypertrophic cardiomyopathy, type 2 diabetes, prior history of peptic ulcer disease, chronic iron deficiency anemiawho presents with atypical chest pain. patient also reported nausea, vomiting, diarrhea since last 1 week. Patient has history of chronic diarrhea since over 4 months. Previously evaluated by GI and found to have exocrine pancreatic insufficiency and treated with Creon. Patient was reportedly not taking Creon as recommended. Today, patient reports nausea, dry heaving since the last 1 week. As result patient does not eating as much, not taking her medications. Patient reports at least 5 episodes of loose watery stool that does not float. Denies any melena, hematochezia. Denies any abdominal pain. Denies any fever, denies any sick contacts, recent travel. Patient reports having a loss in the family and having to resort back to alcohol again in the last couple of weeks. Drinks 2 beers daily after a period of abstinence for 2 months. CT CHEST ABDOMEN PELVIS WO CONTRAST     Result Date: 2/10/2022  Chest CT: Suggestion of esophageal mural thickening. Correlate with clinical evidence of esophagitis. Otherwise no acute abnormality detected. Abdomen pelvis CT: Possible mild mural thickening involving the distal rectum. Correlate with clinical evidence of proctitis. Acute to subacute appearing compression fracture involving the superior endplate of L2 with loss of approximately 30% of vertebral body height. No dorsal retropulsion. Moderate-sized hiatal hernia. Previous GI history: Last office visit with Dr. Ricky Hernandez in 10/2021.   PLAN:     1) exocrine pancreatic insufficiency likely due to type 2 diabetes and alcoholismwe will increase CREON to 2476K units before every meal. Patient advised to avoid alcohol completely. Optimize diabetes. Avoid dietary triggers.    2) Chronic obscure anemia without any overt signs of GI bleedingwe will repeat blood work with anemia work-up. May consider repeat EGD and colonoscopy with capsule endoscopy in the future. Likely related to patient's prior alcoholism versus malnourishment/malabsorption in the setting of pancreatic insufficiency.    3) chronic GERDpatient continue with Prilosec 20 mg daily. Avoid dietary triggers.      RTC in 3 months . Last EGD and colonoscopy in 10/2020:  Findings[de-identified]   Esophagus: normal.   Stomach: Stomach had medium sized hiatal hernia. Stomach also had moderate gastritis in the antrum which was characterized by his gastric erythema and mucosal edema. This was biopsied with cold biopsy forceps. The rest of the stomach was otherwise normal.  Duodenum: normal  Findings:   1. Normal terminal ileum  2. Normal entire examined colon. The colon was randomly biopsied with cold biopsy forceps to rule out microscopic colitis. 3. Small grade 1 internal hemorrhoids on retroflexion  Recommendations:  -Acid suppression with a proton pump inhibitor daily.  -Await pathology.  -Stop alcohol use. -Follow-up in GI office for alcoholic hepatitis and symptomatic management of her symptoms    -- Diagnosis --   1.  GASTRIC BIOPSIES:  MILD TO MODERATE CHRONIC INACTIVE GASTRITIS. NEGATIVE FOR HELICOBACTER PYLORI ON IMMUNOSTAIN. 2.  RANDOM COLON BIOPSIES:  NORMAL COLONIC MUCOSA    Gastric emptying study 2/2017:  Slightly rapid early gastric emptying but otherwise no delayed gastric   emptying or other abnormality.          PAST MEDICAL HISTORY:  Past Medical History:   Diagnosis Date    Angioedema 11/17/2017    from lisinopril    Anxiety     Asthma     mild persistent asthma, on home resp medications for control    Bipolar disorder (HCC)     Claustrophobia     Depression     GERD (gastroesophageal reflux disease)     Hypertension     Hypertrophic cardiomyopathy (HCC)     Lung nodules     MRSA (methicillin resistant Staphylococcus aureus)     rt hip    Murmur     see cardiac echo result    OA (osteoarthritis)     JONES (obstructive sleep apnea)     Thyroid nodule     Type 2 diabetes mellitus without complication, without long-term current use of insulin ([facility] [address] 12/7/2018     Past Surgical History:   Procedure Laterality Date    BUNIONECTOMY Bilateral 12/7/2020    [facility]  BUNIONECTOMY, [address] performed by Anitha Mojica DPM at [facility]      bx    COLONOSCOPY N/A 12/3/2020    COLONOSCOPY WITH BIOPSY performed by Gianna Henry MD at [address] Bilateral 12/07/2020    [place] bunionectomy, right 2nd hammer toe repair     HAMMER TOE SURGERY Right 12/7/2020    2ND TOE HAMMER REPAIR performed by Anitha Mojica DPM at [facility]      partial hyst    OTHER SURGICAL HISTORY  8/24/2015    MRI under anesthesia    THYROID SURGERY      bilat bx    UPPER GASTROINTESTINAL ENDOSCOPY      UPPER GASTROINTESTINAL ENDOSCOPY N/A 12/3/2020    EGD BIOPSY performed by Gianna Henry MD at [facility]       ALLERGIES:  Allergies   Allergen Reactions    Bee Venom Anaphylaxis    Iodine Anaphylaxis and Rash    Lisinopril Swelling and Anaphylaxis     Angioedema    Shellfish-Derived Products Anaphylaxis and Rash     ANGIOEDEMA       HOME MEDICATIONS:  Prior to Admission medications    Medication Sig Start Date End Date Taking?  Authorizing Provider   magnesium oxide (MAG-OX) 400 MG tablet TAKE 1 TABLET BY MOUTH 2 TIMES DAILY 12/21/21   Golden Canseco MD   lipase-protease-amylase (CREON) 64114-35379 units delayed release capsule Take by mouth 3 times daily (with meals) 10/29/21   Gianna Henry MD   allopurinol (ZYLOPRIM) 100 MG tablet Take 1 tablet by mouth daily 10/12/21   [place] Berrios MD   magnesium oxide (MAG-OX) 400 (240 Mg) MG tablet Take 1 tablet by mouth daily 10/3/21   Bonnielee Sandifer, MD   omeprazole (PRILOSEC) 20 MG delayed release capsule TAKE 1 CAPSULE BY MOUTH DAILY  9/23/21   Juan Skelton MD   Calcium Carbonate-Vitamin D (OYSTER SHELL CALCIUM/D) 500-200 MG-UNIT TABS TAKE 1 TAB BY MOUTH ONCE A DAY   Patient taking differently: 2 times daily TAKE 1 TAB BY MOUTH ONCE A DAY 9/23/21   Juan Skelton MD   potassium chloride (KLOR-CON M) 20 MEQ extended release tablet Take 2 tablets by mouth 2 times daily After BMP two times, first after 1 week and if potassium is low then continue taking same dose with repeat BMP in a week. and if potassium is normal then take once daily. Do not crush, chew, or suck on tablet.  8/21/21   Catie Reaves MD   Multiple Vitamin (MULTIVITAMIN) TABS tablet Take 1 tablet by mouth daily 8/22/21   Catie Reaves MD   loratadine (CLARITIN) 10 MG tablet TAKE 1 tab BY MOUTH DAILY  8/18/21   Maya School, APRN - CNP   thiamine mononitrate 100 MG tablet TAKE 1 TABLET BY MOUTH ONCE DAILY  7/14/21   Juan Skelton MD   FLUoxetine (PROZAC) 40 MG capsule Take 40 mg by mouth daily    Historical Provider, MD   acetaminophen (APAP EXTRA STRENGTH) 500 MG tablet Take 2 tablets by mouth every 6 hours as needed for Pain 6/11/21   Kaley Gama MD   atorvastatin (LIPITOR) 20 MG tablet TAKE 1 TABLET BY MOUTH DAILY  5/12/21   Bubba Wilkins MD   folic acid (FOLVITE) 1 MG tablet Take 1 tablet by mouth daily 4/27/21   Kaley Gama MD   ferrous sulfate (IRON 325) 325 (65 Fe) MG tablet Take 325 mg by mouth daily (with breakfast)    Historical Provider, MD   atenolol (TENORMIN) 50 MG tablet Take 1 tablet by mouth daily 1/22/21   Jose Zuniga MD   Calcium Carbonate-Vitamin D (OYSTER SHELL CALCIUM/D) 500-200 MG-UNIT TABS TAKE 1 TAB BY MOUTH ONCE A DAY  1/22/21   Bubba Wilkins MD   amLODIPine (NORVASC) 10 MG tablet TAKE 1 TABLET BY MOUTH DAILY  1/8/21   Jose Mckee MD   albuterol sulfate (PROAIR RESPICLICK) 787 (90 Base) MCG/ACT aerosol powder inhalation Inhale 2 puffs into the lungs every 6 hours as needed for Wheezing or Shortness of Breath 1/15/20   Kg Subramanian MD     .  CURRENT MEDICATIONS:  Scheduled Meds:   potassium chloride  40 mEq Oral TID WC    allopurinol  100 mg Oral Daily    amLODIPine  10 mg Oral Daily    atenolol  50 mg Oral Daily    calcium carbonate w/vitamin D  1 tablet Oral BID    ferrous sulfate  325 mg Oral Daily with breakfast    FLUoxetine  40 mg Oral Daily    folic acid  1 mg Oral Daily    lipase-protease-amylase  24,000 Units Oral TID WC    cetirizine  10 mg Oral Daily    magnesium oxide  400 mg Oral Daily    multivitamin  1 tablet Oral Daily    pantoprazole  40 mg Oral QAM AC    thiamine  100 mg Oral Daily    sodium chloride flush  5-40 mL IntraVENous 2 times per day    aspirin  325 mg Oral Daily    enoxaparin  40 mg SubCUTAneous Daily    atorvastatin  40 mg Oral Nightly     . Continuous Infusions:   sodium chloride      dextrose 5% and 0.45% NaCl with KCl 20 mEq 75 mL/hr at 22 0147     . PRN Meds:HYDROcodone 5 mg - acetaminophen, morphine **OR** morphine, diphenhydrAMINE, sodium chloride flush, sodium chloride, ondansetron **OR** ondansetron, acetaminophen **OR** acetaminophen, magnesium hydroxide, potassium chloride **OR** potassium alternative oral replacement **OR** potassium chloride, potassium chloride, magnesium sulfate, nitroGLYCERIN, hydrALAZINE, promethazine  .   SOCIAL HISTORY:     Social History     Socioeconomic History    Marital status: Single     Spouse name: Not on file    Number of children: Not on file    Years of education: Not on file    Highest education level: Not on file   Occupational History    Not on file   Tobacco Use    Smoking status: Former Smoker     Packs/day: 0.50     Years: 20.00     Pack years: 10.00     Types: Cigarettes     Quit date: 1992     Years since quittin.1    Smokeless tobacco: Never Used    Tobacco comment: states quiti in the 1980s   Substance and Sexual Activity    Alcohol use: Not Currently     Alcohol/week: 12.0 standard drinks     Types: 12 Cans of beer per week     Comment: Quit about 5 months ago 11/13/20    Drug use: Not Currently     Types: Cocaine     Comment: last use approximately 1/20/14 cocaine    Sexual activity: Yes     Partners: Male   Other Topics Concern    Not on file   Social History Narrative    Not on file     Social Determinants of Health     Financial Resource Strain:     Difficulty of Paying Living Expenses: Not on file   Food Insecurity:     Worried About Running Out of Food in the Last Year: Not on file    [place] of Food in the Last Year: Not on file   Transportation Needs:     Lack of Transportation (Medical): Not on file    Lack of Transportation (Non-Medical):  Not on file   Physical Activity:     Days of Exercise per Week: Not on file    Minutes of Exercise per Session: Not on file   Stress:     Feeling of Stress : Not on file   Social Connections:     Frequency of Communication with Friends and Family: Not on file    Frequency of Social Gatherings with Friends and Family: Not on file    Attends Yazdanism Services: Not on file    Active Member of [address] [place] [address] [employer] or Organizations: Not on file    Attends Club or Organization Meetings: Not on file    Marital Status: Not on file   Intimate Partner Violence:     Fear of Current or Ex-Partner: Not on file    Emotionally Abused: Not on file    Physically Abused: Not on file    Sexually Abused: Not on file   Housing Stability:     Unable to Pay for Housing in the Last Year: Not on file    Number of [place] in the Last Year: Not on file    Unstable Housing in the Last Year: Not on file       FAMILY HISTORY:   Family History   Problem Relation Age of Onset    Stroke Mother     Hypertension Father     Cancer Brother         bone    Lung Cancer Maternal Aunt     Cancer Maternal Grandmother 95 87   CALCIUM 7.5* 7.5* 8.1*       LIVER WORK UP  Hepatitis Functional Panel:  Recent Labs     02/11/22  0536   ALKPHOS 213*   ALT 15   AST 39*   PROT 6.4   BILITOT 0.54   LABALBU 3.4*       AMYLASE/LIPASE/AMMONIA  Recent Labs     02/10/22  1640   LIPASE 17       Acute Hepatitis Panel   Lab Results   Component Value Date    HEPBSAG NONREACTIVE 12/15/2020    HEPCAB NONREACTIVE 12/15/2020    HEPBIGM NONREACTIVE 12/15/2020    HEPAIGM NONREACTIVE 12/15/2020       HCV Genotype   No results found for: HEPATITISCGENOTYPE    HCV Quantitative   No results found for: HCVQNT    Metabolic work up:  Ceruloplasmin  AA work up:  Alpha 1 antitrypsin   No results found for: A1A  AMA:  Lab Results   Component Value Date    MITOAB 3.2 02/03/2017       ASMA:  Lab Results   Component Value Date    SMOOTHMUSCAB 1:40 02/03/2017       ANCA:    JARRELL:  Lab Results   Component Value Date    JARRELL NEGATIVE 03/29/2021       Anti - Liver/Kidney Ab:  No results found for: LIVER-KIDNEYMICROSOMALAB    Ceruloplasmin:  No results found for: CERULOPLSM    Celiac panel:  Lab Results   Component Value Date    TTGIGA 0.4 10/02/2021       Cancer Markers:  CEA:    No results for input(s): CEA in the last 72 hours. Ca 125:   No results for input(s):  in the last 72 hours. Ca 19-9:     Invalid input(s):   AFP: No results for input(s): AFP in the last 72 hours. ASSESSMENT and PLAN:     1. Chronic diarrhea secondary to exocrine pancreatic insufficiency secondary to chronic alcoholism.    -Strongly recommend alcohol cessation  -Resume home dose Creon  -Follow-up with GI as scheduled.  Patient has appointment on the 24th of this month.  -Take PPI as recommended for GERD  -Replace electrolytes  -Okay to initiate diet as tolerated    Plan had to be discussed with Dr. Christoph Tejada     Electronically signed by:  Nathalia Jeffrey MD,   Internal medicine, PGY2  GI Service, THE [facility] AT [facility]  [phone]  2/12/2022    10:53 AM

## 2022-02-12 NOTE — PROGRESS NOTES
[facility]  Office: [address], DO, Douglas Cease, DO, Yevgeniy Bailey, DO, Valerie Amos Blood, DO, Sepideh Fragoso MD, Sy Galvan MD, Lux Hollins MD, Salazar Carranza MD, Lynnette Martinez MD, Rimma Grimes MD, Kal Crocker MD, Padilla Wilkins, DO, Nicole Sr, DO, Garrett Javier MD,  Valdez Durbin, DO, Nicolás Rodriguez MD, Daksha Cavanaugh MD, Gonzalez Zimmerman MD, Citlaly Cortes MD, Rylee Bustamante MD, Gibson Lmaar, CNP, [facility] R[facility], CNP, Cody Amin, CNP, Pepper Schulz, CNS, Maci Elmore, CNP, Berkley Blue, CNP, Gabbie Machado, CNP, Chrystal Alvarado, CNP, Silvina Cabral, CNP, Eunice Timmons PA-C, Mallika Aldana, DNP, Cheng Qiu, DNP, Buster Hall, CNP, Jed Yu, CNP, Randa Sanchez, CNP, Zoraida Cowan, CNP, Trent Cabrales, [facility], Sandy Loo, [facility]    Progress Note    2/12/2022    11:01 AM    Name:   Will Hernandez  MRN:     [id number]     Acct:      [de-identified]   Room:   [facility] Day:  2  Admit Date:  2/10/2022  4:13 PM    PCP:   Augusto Barcenas MD  Code Status:  Full Code    Subjective:     C/C:   Chief Complaint   Patient presents with    Shortness of Breath     S     Interval History Status:   Much improved  Sitting bedside  Doing well with diet  No chest pain  Hoping to go home    Data Base Updates:  [id number]/dL     BUN2 Low mg/dL   CREATININE0.74mg/dL   Bun/Cre [facility]. 5 Low      Troponin, High [id number] High    Sinus rhythm with Premature supraventricular complexes   Possible Left atrial enlargement   Nonspecific T wave abnormality   Prolonged QT   Abnormal ECG   When compared with ECG of 11-FEB-2022 06:00,   Premature supraventricular complexes are now Present   Nonspecific T wave abnormality has replaced inverted T waves in Inferior leads   T wave inversion no longer evident in Anterior leads   QT has shortened         Brief History:     As documented in the medical record:  Vida Aparicio is a 65 y.o. Non- / non  female who presents with Shortness of Breath (S)      Patient presents to the emergency room with the concern of chest pain. Patient states that she has been having chest pain for a few weeks lately which worsened today and prompted the ER evaluation. Patient describes associated nausea, vomiting, diarrhea and diaphoresis, shortness of breath left arm pain with a stabing sensation and n/t. Patient admits to similar pain  A few months prior. Patient states that given the severity of the pain and associated symptomology she was unable to take her medications over the past week. Work up in the emergency room    Laboratories:   Metabolic panel. - Potassium 2.3, chloride 97. GI/Liver Panel- alk phos 288, ast 54  Hematology. -no leukocytosis or acute anemia  Coagulation- not done  Cardiac Markers- Hs troponin  35, with repeat of 30  EKG- Initial EKG completed at 1626 showed narrow complex tachycardia rate 136, left axis     deviation, difficult to discern if this was a sinus rhythm as P waves appear to be    buried, there was a change in the rhythm at the second half which almost appears           alternating QRS with possible electrical alternans. CT CHEST ABDOMEN PELVIS WO CONTRAST   Result Date: 2/10/2022  Chest CT: Suggestion of esophageal mural thickening. Correlate with clinical evidence of esophagitis. Otherwise no acute abnormality detected. Abdomen pelvis CT: Possible mild mural thickening involving the distal rectum. Correlate with clinical evidence of proctitis. Acute to subacute appearing compression fracture involving the superior endplate of L2 with loss of approximately 30% of vertebral body height. No dorsal retropulsion.  Moderate-sized hiatal hernia\"    The intitial assessment and plan included:  \"   * (Principal) Hypokalemia 2/10/2022 Yes     Hypertension 2/10/2022 Yes     JONES (obstructive sleep apnea) 2/10/2022 Yes     Bipolar disorder ([facility] [address] 2/10/2022 Yes     Intractable nausea and vomiting 2/10/2022 Yes     Esophageal thickening 2/10/2022 Yes     Proctitis 2/10/2022 Yes     Type 2 diabetes mellitus without complication, without long-term current use of insulin ([facility] [address] 2/10/2022 Yes     Compression fracture of L2 ([facility] [address] 2/11/2022 Yes          Plan  Patient status inpatient in the Progressive Unit/Step down   Replacement of electrolytes. Check magnesium and potassium after replacement ( 0001)   Unable to keep po down will need IV prn blood pressure medications  Npo,  If no source of n/v/d identified, consult GI  Continue to trend troponin  Check GI panel for etiology of diarrhea   Monitor glucose levels  Consultation to N/S in am for evaluation of the L2 compression fracture  GI proph  DVT proph\"     Specimen Source: Stool Specimen Description. FECES Special RequestsNOT REPORTED Direct ExamNegative for Rotavirus     Specimen Source: Stool Specimen Description. FECES Special RequestsNOT REPORTED Direct ExamGiardia Antigen Assay Negative Cryptosporidium Antigen Assay Negative     Gastrointestinal Panel, Molecular [[id number]]    Collected: 02/10/22 2345    Updated: 02/11/22 1311    Specimen Source: Stool     Specimen Description . FECES    Campylobacter PCR NEGATIVE: No Campylobacter spp. (jejuni or coli) DNA Detected    Salmonella PCR NEGATIVE: No Salmonella spp.  DNA Detected    Shigatoxin Gene PCR NEGATIVE: No Shiga toxin-producing gene(s) Detected    Shigella Sp PCR NEGATIVE: No Shigella spp. / EIEC DNA Detected    Plesiomonas Shigelloides PCR NEGATIVE: No Plesionomas shigelloides DNA Detected    Vibrio PCR NEGATIVE: No Vibrio (V. vulnificus, V, parahaemolyticus and V. cholerae) DNA Detected    E Coli Enterotoxigenic PCR NEGATIVE: No Enterotoxigenic E. coli (ETEC) Heat-labile and heat-stable (LT/ST) DNA Detected    Yersinia Enterocolitica PCR NEGATIVE: No Yersinia enterocolitica DNA Detected     Cardiology evaluation 8/17/2021:   IMPRESSION:    Increased Troponin secondary to type II MI  Prolonged QTc secondary to hypokalemia and hypocalcemia  Hypokalemai  Hypocalcemia  Rhabdomyolysis  Hypertension   RECOMMENDATIONS:  Trend Hs-troponins. Echo to be ordered. If n regional wall motion abnormalities, out patient stress test will be ordered. Correct electrolytes. With held Hydrochlorothiazide for time being as it can further worsen electrolytes disturbances. Iv fluids  Keep an eye on vitals, if blood pressure is high we can start Norvasc 5mg. Subsequent database has included:  Results for Jamil Villarreal (MRN [id number]) as of 2/12/2022 10:32   Ref. Range 2/10/2022 22:50 2/11/2022 03:25 2/11/2022 05:36 2/11/2022 11:23 2/12/2022 05:28   Troponin, High Sensitivity Latest Ref Range: 0 - 14 ng/L 37 (H) 33 (H) 30 (H) 27 (H) 25 (H)     Echocardiogram 8/2021:  Summary  Left ventricle is normal in size, global left ventricular systolic function  is hyperdynamic, estimated ejection fraction is > 65%. Moderate left ventricular hypertrophy. Grade I, mild, diastolic dysfunction. There is mild LVOT obstruction. Right atrium is normal in size. Prominent Eustachian valve. Aortic valve is sclerotic but opens well. Trivial mitral regurgitation. Trivial tricuspid regurgitation. Gastric emptying study 2/2017:  Slightly rapid early gastric emptying but otherwise no delayed gastric   emptying or other abnormality. Past Medical History:   has a past medical history of Angioedema, Anxiety, Asthma, Bipolar disorder ([address] Claustrophobia, Depression, GERD (gastroesophageal reflux disease), Hypertension, Hypertrophic cardiomyopathy ([address] Lung nodules, MRSA (methicillin resistant Staphylococcus aureus), Murmur, OA (osteoarthritis), JONES (obstructive sleep apnea), Thyroid nodule, and Type 2 diabetes mellitus without complication, without long-term current use of insulin ([address]     Social History:   reports that she quit smoking about 29 years ago. Her smoking use included cigarettes. She has a 10.00 pack-year smoking history. She has never used smokeless tobacco. She reports previous alcohol use of about 12.0 standard drinks of alcohol per week. She reports previous drug use. Drug: Cocaine. Family History:   Family History   Problem Relation Age of Onset   Tavo Loser Stroke Mother     Hypertension Father     Cancer Brother         bone    Lung Cancer Maternal Aunt     Cancer Maternal Grandmother         eye     Other Sister         aneurysm    Heart Disease Sister        Review of Systems:     Review of Systems   Constitutional: Positive for appetite change (Improved) and unexpected weight change (weight has dropped from 240 to 167 # over the last year ). Respiratory: Negative for cough and shortness of breath. Cardiovascular: Positive for chest pain (The patient reports chronic nonexertional chest ache in the left lateral inframammary region with radiation to the left scapula). Negative for palpitations. Gastrointestinal: Positive for diarrhea (The patient reports chronic diarrhea). Negative for abdominal pain, nausea and vomiting. The patient reports chronic GI problems  The patient has seen Dr. Agustin Wills   Genitourinary: Negative for flank pain and hematuria. Musculoskeletal: Positive for back pain (chronic back pain). Psychiatric/Behavioral: Positive for dysphoric mood. The patient is nervous/anxious.          The patient reports her mom  in November  Her father  several days later  She just recently buried a cousin  She has been feeling depressed and anxious  The patient has undergone a recent separation  The patient acknowledges she has been drinking 2 beers a day \"self-medicating\"  She is working with a therapist at the [facility]         Physical Examination:        Vitals  BP (!) 115/92   Pulse 74   Temp 98.4 °F (36.9 °C) (Oral)   Resp 17   Ht 5' 4\" (1.626 m)   Wt 162 lb 12.8 oz (73.8 kg)   SpO2 93%   BMI 27.94 kg/m²   Temp (24hrs), Av.4 °F (36.9 °C), Min:97.7 °F (36.5 °C), Max:99.3 °F (37.4 °C)    No results for input(s): POCGLU in the last 72 hours. Physical Exam  Vitals reviewed. Constitutional:       General: She is not in acute distress. Appearance: She is not diaphoretic. HENT:      Head: Normocephalic. Nose: Nose normal.   Eyes:      General: No scleral icterus. Conjunctiva/sclera: Conjunctivae normal.   Neck:      Trachea: No tracheal deviation. Cardiovascular:      Rate and Rhythm: Normal rate and regular rhythm. Pulmonary:      Effort: Pulmonary effort is normal. No respiratory distress. Breath sounds: Normal breath sounds. No wheezing or rales. Chest:      Chest wall: No tenderness. Abdominal:      General: There is no distension. Palpations: Abdomen is soft. Tenderness: There is no abdominal tenderness. There is no guarding or rebound. Musculoskeletal:         General: No tenderness. Cervical back: Neck supple. Skin:     General: Skin is warm and dry. Neurological:      Mental Status: She is alert. Medications: Allergies:     Allergies   Allergen Reactions    Bee Venom Anaphylaxis    Iodine Anaphylaxis and Rash    Lisinopril Swelling and Anaphylaxis     Angioedema    Shellfish-Derived Products Anaphylaxis and Rash     ANGIOEDEMA       Current Meds:   Scheduled Meds:    potassium chloride  40 mEq Oral TID WC    allopurinol  100 mg Oral Daily    amLODIPine  10 mg Oral Daily    atenolol  50 mg Oral Daily    calcium carbonate w/vitamin D  1 tablet Oral BID    ferrous sulfate  325 mg Oral Daily with breakfast    FLUoxetine  40 mg Oral Daily    folic acid  1 mg Oral Daily    lipase-protease-amylase  24,000 Units Oral TID WC    cetirizine  10 mg Oral Daily    magnesium oxide  400 mg Oral Daily    multivitamin  1 tablet Oral Daily    pantoprazole  40 mg Oral QAM AC    thiamine  100 mg Oral Daily    sodium chloride flush  5-40 mL IntraVENous 2 times per day    aspirin  325 mg Oral Daily    enoxaparin  40 mg SubCUTAneous Daily    atorvastatin  40 mg Oral Nightly     Continuous Infusions:    sodium chloride      dextrose 5% and 0.45% NaCl with KCl 20 mEq 75 mL/hr at 02/12/22 0147     PRN Meds: loperamide, HYDROcodone 5 mg - acetaminophen, morphine **OR** morphine, diphenhydrAMINE, sodium chloride flush, sodium chloride, ondansetron **OR** ondansetron, acetaminophen **OR** acetaminophen, magnesium hydroxide, potassium chloride **OR** potassium alternative oral replacement **OR** potassium chloride, potassium chloride, magnesium sulfate, nitroGLYCERIN, hydrALAZINE, promethazine    Data:     I/O (24Hr): Intake/Output Summary (Last 24 hours) at 2/12/2022 1101  Last data filed at 2/12/2022 0830  Gross per 24 hour   Intake 2650.09 ml   Output    Net 2650.09 ml       Labs:  Hematology:  Recent Labs     02/10/22  1640 02/11/22  0536   WBC 10.4 5.8   RBC 4.10 3.22*   HGB 13.7 10.7*   HCT 39.9 30.6*   MCV 97.3 95.0   MCH 33.4 33.2   MCHC 34.3 35.0*   RDW 18.9* 18.8*   * 355   MPV 8.7 8.5   SEDRATE 34*  --    CRP <3.0  --      Chemistry:  Recent Labs     02/11/22  0536 02/11/22  0857 02/11/22  1123 02/11/22  1403 02/11/22  2303 02/12/22  0528      < >  --  137 150* 139   K 2.5*   < >  --  3.1* 3.9 3.9      < >  --  100 112* 103   CO2 26   < >  --  26 22 20   GLUCOSE 123*   < >  --  97 95 87   BUN 2*   < >  --  <2* 2* 3*   CREATININE 0.65   < >  --  0.70 0.74 0.65   MG 2.2  --   --   --  1.7 1.6   ANIONGAP 15   < >  --  11 16 16   LABGLOM >60   < >  --  >60 >60 >60   GFRAA >60   < >  --  >60 >60 >60   CALCIUM 7.2*   < >  --  7.5* 7.5* 8.1*   TROPHS 30*  --  27*  --   --  25*   MYOGLOBIN 259*  --  249*  --   --   --     < > = values in this interval not displayed.      Recent Labs     02/10/22  1640 02/11/22  0536   PROT 8.6* 6.4   LABALBU 4.2 3.4*   AST 55* 39*   ALT 20 15   ALKPHOS 288* 213*   BILITOT 0.38 0.54   LIPASE 17 --    CHOL  --  139   HDL  --  45   LDLCHOLESTEROL  --  71   CHOLHDLRATIO  --  3.1   TRIG  --  114   VLDL  --  NOT REPORTED     ABG:  Lab Results   Component Value Date    FIO2 INFORMATION NOT PROVIDED 09/20/2020     Lab Results   Component Value Date/Time    SPECIAL NOT REPORTED 02/11/2022 12:47 AM     Lab Results   Component Value Date/Time    CULTURE NO GROWTH 6 DAYS 09/29/2021 06:55 PM       Radiology:  XR CHEST PORTABLE    Result Date: 2/10/2022  1. No acute pulmonary disease. 2. Cardiomegaly. 3. Calcific atherosclerotic disease aorta. CT CHEST ABDOMEN PELVIS WO CONTRAST    Result Date: 2/10/2022  Chest CT: Suggestion of esophageal mural thickening. Correlate with clinical evidence of esophagitis. Otherwise no acute abnormality detected. Abdomen pelvis CT: Possible mild mural thickening involving the distal rectum. Correlate with clinical evidence of proctitis. Acute to subacute appearing compression fracture involving the superior endplate of L2 with loss of approximately 30% of vertebral body height. No dorsal retropulsion. Moderate-sized hiatal hernia.        Assessment:        Primary Problem  Hypokalemia    Active Hospital Problems    Diagnosis Date Noted    Compression fracture of L2 ([facility] [address] [S32.020A] 02/11/2022    Anemia, normocytic normochromic [D64.9] 02/11/2022    Esophageal thickening [K22.89] 02/10/2022    Proctitis [K62.89] 02/10/2022    Troponin level elevated [R77.8] 08/20/2021    Intractable nausea and vomiting [R11.2]     Chronic diarrhea [K52.9]     Hypokalemia [E87.6] 08/16/2021    Bipolar disorder ([facility] [address] [F31.9] 03/21/2021    Type 2 diabetes mellitus without complication, without long-term current use of insulin ([facility] [address] [E11.9] 12/07/2018    JONES (obstructive sleep apnea) [G47.33] 11/18/2017    Essential hypertension [I10] 03/17/2016       Plan:        Hydration  Electrolyte replacement  Diet as tolerated  Antiemetics  GI evaluation (she has seen Dr. Pedro Sal)  Reflux precautions  Glycemic contol - Monitor and control blood sugars  CPAP  Blood Pressure - Monitor and control  [facility] follow-up at the [facility] center  Pain management, outpatient evaluation for chronic back pain, L2 Fx  Hematology follow-up Dr. Mook Ortega  Monitor H&H, hemoglobin has dropped with hydration  Trend troponin and EKGs, enzymes  Update stress test, Cardiology follow-up [facility]  Alcohol abstinence  Thiamine  DVT prophylaxis  DC planning if stable this afternoon  The patient was instructed to follow up with their PCP, Lenard Bah MD in one week     IP CONSULT TO HOSPITALIST  IP CONSULT TO [address] [place] [address],   2/12/2022  11:01 AM

## 2022-02-13 VITALS
SYSTOLIC BLOOD PRESSURE: 120 MMHG | HEIGHT: 64 IN | BODY MASS INDEX: 27.79 KG/M2 | RESPIRATION RATE: 18 BRPM | HEART RATE: 76 BPM | WEIGHT: 162.8 LBS | DIASTOLIC BLOOD PRESSURE: 93 MMHG | OXYGEN SATURATION: 98 % | TEMPERATURE: 98.6 F

## 2022-02-13 LAB
ANION GAP SERPL CALCULATED.3IONS-SCNC: 10 MMOL/L (ref 9–17)
BUN BLDV-MCNC: 3 MG/DL (ref 6–20)
BUN/CREAT BLD: ABNORMAL (ref 9–20)
CALCIUM SERPL-MCNC: 8.1 MG/DL (ref 8.6–10.4)
CHLORIDE BLD-SCNC: 103 MMOL/L (ref 98–107)
CO2: 21 MMOL/L (ref 20–31)
CREAT SERPL-MCNC: 0.67 MG/DL (ref 0.5–0.9)
GFR AFRICAN AMERICAN: >60 ML/MIN
GFR NON-AFRICAN AMERICAN: >60 ML/MIN
GFR SERPL CREATININE-BSD FRML MDRD: ABNORMAL ML/MIN/{1.73_M2}
GFR SERPL CREATININE-BSD FRML MDRD: ABNORMAL ML/MIN/{1.73_M2}
GLUCOSE BLD-MCNC: 91 MG/DL (ref 70–99)
MAGNESIUM: 1.9 MG/DL (ref 1.6–2.6)
POTASSIUM SERPL-SCNC: 3.9 MMOL/L (ref 3.7–5.3)
SODIUM BLD-SCNC: 134 MMOL/L (ref 135–144)

## 2022-02-13 PROCEDURE — 99232 SBSQ HOSP IP/OBS MODERATE 35: CPT | Performed by: INTERNAL MEDICINE

## 2022-02-13 PROCEDURE — 6360000002 HC RX W HCPCS: Performed by: NURSE PRACTITIONER

## 2022-02-13 PROCEDURE — 80048 BASIC METABOLIC PNL TOTAL CA: CPT

## 2022-02-13 PROCEDURE — 6370000000 HC RX 637 (ALT 250 FOR IP): Performed by: NURSE PRACTITIONER

## 2022-02-13 PROCEDURE — 94761 N-INVAS EAR/PLS OXIMETRY MLT: CPT

## 2022-02-13 PROCEDURE — 6370000000 HC RX 637 (ALT 250 FOR IP): Performed by: INTERNAL MEDICINE

## 2022-02-13 PROCEDURE — 83735 ASSAY OF MAGNESIUM: CPT

## 2022-02-13 PROCEDURE — 36415 COLL VENOUS BLD VENIPUNCTURE: CPT

## 2022-02-13 PROCEDURE — 2500000003 HC RX 250 WO HCPCS: Performed by: NURSE PRACTITIONER

## 2022-02-13 RX ORDER — NITROGLYCERIN 0.4 MG/1
TABLET SUBLINGUAL
Qty: 25 TABLET | Refills: 3 | Status: SHIPPED | OUTPATIENT
Start: 2022-02-13 | End: 2022-04-18 | Stop reason: SDUPTHER

## 2022-02-13 RX ORDER — LOPERAMIDE HYDROCHLORIDE 2 MG/1
2 CAPSULE ORAL 4 TIMES DAILY PRN
Qty: 30 CAPSULE | Refills: 1 | Status: SHIPPED | OUTPATIENT
Start: 2022-02-13 | End: 2022-02-23

## 2022-02-13 RX ORDER — ATORVASTATIN CALCIUM 40 MG/1
40 TABLET, FILM COATED ORAL NIGHTLY
Qty: 30 TABLET | Refills: 3 | Status: SHIPPED | OUTPATIENT
Start: 2022-02-13

## 2022-02-13 RX ORDER — ASPIRIN 81 MG/1
81 TABLET ORAL DAILY
Qty: 30 TABLET | Refills: 0 | Status: SHIPPED | OUTPATIENT
Start: 2022-02-13 | End: 2022-03-15

## 2022-02-13 RX ADMIN — PANTOPRAZOLE SODIUM 40 MG: 40 TABLET, DELAYED RELEASE ORAL at 09:38

## 2022-02-13 RX ADMIN — MAGNESIUM SULFATE HEPTAHYDRATE 1000 MG: 1 INJECTION, SOLUTION INTRAVENOUS at 01:22

## 2022-02-13 RX ADMIN — POTASSIUM CHLORIDE, DEXTROSE MONOHYDRATE AND SODIUM CHLORIDE: 150; 5; 450 INJECTION, SOLUTION INTRAVENOUS at 04:41

## 2022-02-13 RX ADMIN — Medication 100 MG: at 09:38

## 2022-02-13 RX ADMIN — ATENOLOL 50 MG: 50 TABLET ORAL at 09:38

## 2022-02-13 RX ADMIN — Medication 1 TABLET: at 09:39

## 2022-02-13 RX ADMIN — FERROUS SULFATE TAB EC 325 MG (65 MG FE EQUIVALENT) 325 MG: 325 (65 FE) TABLET DELAYED RESPONSE at 09:38

## 2022-02-13 RX ADMIN — HYDROCODONE BITARTRATE AND ACETAMINOPHEN 1 TABLET: 5; 325 TABLET ORAL at 03:14

## 2022-02-13 RX ADMIN — FLUOXETINE HYDROCHLORIDE 40 MG: 20 CAPSULE ORAL at 09:38

## 2022-02-13 RX ADMIN — ALLOPURINOL 100 MG: 100 TABLET ORAL at 09:39

## 2022-02-13 RX ADMIN — ASPIRIN 325 MG: 325 TABLET, COATED ORAL at 09:39

## 2022-02-13 RX ADMIN — AMLODIPINE BESYLATE 10 MG: 10 TABLET ORAL at 09:39

## 2022-02-13 RX ADMIN — FOLIC ACID 1 MG: 1 TABLET ORAL at 09:39

## 2022-02-13 RX ADMIN — MAGNESIUM SULFATE HEPTAHYDRATE 1000 MG: 1 INJECTION, SOLUTION INTRAVENOUS at 03:11

## 2022-02-13 RX ADMIN — MORPHINE SULFATE 4 MG: 4 INJECTION INTRAVENOUS at 01:18

## 2022-02-13 RX ADMIN — Medication 1 TABLET: at 03:14

## 2022-02-13 RX ADMIN — HYDROCODONE BITARTRATE AND ACETAMINOPHEN 1 TABLET: 5; 325 TABLET ORAL at 09:39

## 2022-02-13 RX ADMIN — CETIRIZINE HYDROCHLORIDE 10 MG: 10 TABLET ORAL at 09:38

## 2022-02-13 RX ADMIN — ALCOHOL 1 TABLET: 70.47 GEL TOPICAL at 09:38

## 2022-02-13 RX ADMIN — ENOXAPARIN SODIUM 40 MG: 100 INJECTION SUBCUTANEOUS at 09:39

## 2022-02-13 RX ADMIN — MAGNESIUM GLUCONATE 500 MG ORAL TABLET 400 MG: 500 TABLET ORAL at 09:38

## 2022-02-13 ASSESSMENT — PAIN SCALES - GENERAL
PAINLEVEL_OUTOF10: 7
PAINLEVEL_OUTOF10: 2
PAINLEVEL_OUTOF10: 8
PAINLEVEL_OUTOF10: 8

## 2022-02-13 ASSESSMENT — ENCOUNTER SYMPTOMS
NAUSEA: 0
BACK PAIN: 1
ABDOMINAL PAIN: 0
VOMITING: 0
COUGH: 0
DIARRHEA: 1
SHORTNESS OF BREATH: 0

## 2022-02-13 NOTE — PROGRESS NOTES
[facility]   Progress Note    Shai Chavez is a 65 y.o. female patient. Hospitalization Day:3      Chief consult reason:   Chronic diarrhea    Subjective:  Patient reports improvement in her diarrhea. Is tolerating diet. VITALS:  BP (!) 120/93   Pulse 76   Temp 98.6 °F (37 °C) (Temporal)   Resp 18   Ht 5' 4\" (1.626 m)   Wt 162 lb 12.8 oz (73.8 kg)   SpO2 98%   BMI 27.94 kg/m²   TEMPERATURE:  Current - Temp: 98.6 °F (37 °C); Max - Temp  Av.6 °F (37 °C)  Min: 98.2 °F (36.8 °C)  Max: 99 °F (37.2 °C)    Physical Assessment:  General appearance:  alert, cooperative and no distress  Mental Status:  oriented to person, place and time and normal affect  Lungs:  clear to auscultation bilaterally, normal effort  Heart:  regular rate and rhythm, no murmur  Abdomen:  soft, nontender, nondistended, normal bowel sounds, no masses, hepatomegaly, splenomegaly  Extremities:  no edema, redness, tenderness in the calves  Skin:  no gross lesions, rashes, induration    Data Review:    Labs and Imaging:     CBC:  Recent Labs     02/10/22  1640 22  0536   WBC 10.4 5.8   HGB 13.7 10.7*   MCV 97.3 95.0   RDW 18.9* 18.8*   * 355       ANEMIA STUDIES:  No results for input(s): LABIRON, TIBC, FERRITIN, [id number], FOLATE, OCCULTBLD in the last 72 hours.     BMP:  Recent Labs     22  1429 22  2110 22  0248   * 137 134*   K 4.5 4.4 3.9    105 103   CO2 21 21 21   BUN 5* 5* 3*   CREATININE 0.66 0.68 0.67   GLUCOSE 108* 95 91   CALCIUM 8.2* 8.3* 8.1*       LFTS:  Recent Labs     02/10/22  1640 22  0536   ALKPHOS 288* 213*   ALT 20 15   AST 55* 39*   BILITOT 0.38 0.54   LABALBU 4.2 3.4*       Amylase/Lipase and Ammonia:  Recent Labs     02/10/22  1640   LIPASE 17       Acute Hepatitis Panel:  Lab Results   Component Value Date    HEPBSAG NONREACTIVE 12/15/2020    HEPCAB NONREACTIVE 12/15/2020    HEPBIGM NONREACTIVE 12/15/2020    HEPAIGM NONREACTIVE 12/15/2020 HCV Genotype:  No results found for: HEPATITISCGENOTYPE    HCV Quantitative:  No results found for: HCVQNT    LIVER WORK UP:    AFP  No results found for: AFP    Alpha 1 antitrypsin   No results found for: A1A    Anti - Liver/Kidney Ab  No results found for: LIVER-KIDNEYMICROSOMALAB    JARRELL  Lab Results   Component Value Date    JARRELL NEGATIVE 03/29/2021       AMA  Lab Results   Component Value Date    MITOAB 3.2 02/03/2017       ASMA  Lab Results   Component Value Date    SMOOTHMUSCAB 1:40 02/03/2017       Ceruloplasmin  No results found for: CERULOPLSM    Celiac panel  Lab Results   Component Value Date    TTGIGA 0.4 10/02/2021       PT/INR  No results for input(s): PROTIME, INR in the last 72 hours. Cancer Markers:  CEA:  No results for input(s): CEA in the last 72 hours. Ca 125:  No results for input(s):  in the last 72 hours. Ca 19-9:   Invalid input(s):   AFP: No results for input(s): AFP in the last 72 hours. Lactic acid:Invalid input(s): LACTIC ACID    Radiology Review:    No results found. Principal Problem:    Intractable nausea and vomiting  Active Problems:    Alcohol abuse    Atypical chest pain    Essential hypertension    JONES (obstructive sleep apnea)    Bipolar disorder (HCC)    Hypokalemia    Chronic diarrhea    Troponin level elevated    Esophageal thickening    Proctitis    Type 2 diabetes mellitus without complication, without long-term current use of insulin (HCC)    Compression fracture of L2 (HCC)    Anemia, normocytic normochromic    Diarrhea    Exocrine pancreatic insufficiency  Resolved Problems:    * No resolved hospital problems. *       GI Assessment:  1. Chronic diarrhea secondary to exocrine pancreatic insufficiency secondary to chronic alcoholism    Plan and Recommendations:    -Strongly recommend alcohol cessation.  -Increase creon to 58896 units po three times daily with meals.  -Follow-up with GI as scheduled.  Patient has appointment on the 24th of this month.  -Take PPI as recommended for GERD  -Okay to discharge from GI Standpoint    Thank you for allowing me to participate in the care of your patient. Please feel free to contact me with any questions or concerns.      Mac Lujan MD  PGY-2, IM  GI Service, [facility],  [facility]

## 2022-02-13 NOTE — DISCHARGE SUMMARY
[facility]  Office: [address], DO, Anibal Calles, DO, Nia Villalobos, DO, Glendy Chu Blood, DO, Ministerio Amos MD, Maryana Celestin MD, Moe Issa MD, Sonia Cruz MD, Loi Castro MD, Cora Gautam MD, Inderjit Harrell MD, Heriberto Bo, DO, Yady Tomas, DO, Mario Jang MD,  Viktoria Joyce, DO, Rosie Arguello MD, Marshall Snow MD, Iftikhar Newman MD, Rose Tao MD, Judith Hanson MD, Ava Vila, CNP, [facility], CNP, Jhon Dyson, CNP, Terrance Bravo, CNS, Noah Barthel, CNP, Kayleigh Gonzalez, CNP, Alex Ramos, CNP, Kaye Barlow, CNP, Carissa Vargas, CNP, Tay Bender PA-C, [facility] [place], DNP, Antonia Castañeda, DNP, Caitlyn Dennis, CNP, Maninder Bonilla, CNP, Kuldeep Loza, CNP, Petar Baker, CNP, Amber Alves, CNP, Constantino Chinchilla, [facility]    Discharge Summary    Patient ID: Vern Fung  :  1965   MRN: [id number]     ACCOUNT:  [de-identified]   Patient's PCP: Katie Delacruz MD  Admit Date: 2/10/2022   Discharge Date: 2022    Discharge Physician: Aicha Bonilla DO     The patient was seen and examined on day of discharge and this discharge summary is in conjunction with any daily progress note from day of discharge.     Active Discharge Diagnoses:     Primary Problem  Intractable nausea and vomiting      Hospital Problems  Active Hospital Problems    Diagnosis Date Noted    Alcohol abuse [F10.10] 2014     Priority: Medium    Diarrhea [R19.7]     Exocrine pancreatic insufficiency [K86.81]     Compression fracture of L2 ([facility] [address] [S32.020A] 2022    Anemia, normocytic normochromic [D64.9] 2022    Esophageal thickening [K22.89] 02/10/2022    Proctitis [K62.89] 02/10/2022    Troponin level elevated [R77.8] 08/20/2021    Intractable nausea and vomiting [R11.2]     Chronic diarrhea [K52.9]     Hypokalemia [E87.6] 08/16/2021    Bipolar disorder ([facility] [address] [F31.9] 03/21/2021    Type 2 diabetes mellitus without complication, without long-term current use of insulin ([facility] [address] [E11.9] 12/07/2018    JONES (obstructive sleep apnea) [G47.33] 11/18/2017    Essential hypertension [I10] 03/17/2016    Atypical chest pain [R07.89] 07/10/2013         Hospital Course:     Brief History   NEGATIVE: No Enterotoxigenic E. coli (ETEC) Heat-labile and heat-stable (LT/ST) DNA Detected      Yersinia Enterocolitica PCR NEGATIVE: No Yersinia enterocolitica DNA Detected      Cardiology evaluation 8/17/2021:   IMPRESSION:    1. Increased Troponin secondary to type II MI  2. Prolonged QTc secondary to hypokalemia and hypocalcemia  3. Hypokalemai  4. Hypocalcemia  5. Rhabdomyolysis  6. Hypertension   RECOMMENDATIONS:  1. Trend Hs-troponins. Echo to be ordered. If n regional wall motion abnormalities, out patient stress test will be ordered.   2. Correct electrolytes. With held Hydrochlorothiazide for time being as it can further worsen electrolytes disturbances. 3. Iv fluids  4. Keep an eye on vitals, if blood pressure is high we can start Norvasc 5mg.     Subsequent database has included:  Results for Mosie Snellen (MRN [id number]) as of 2/12/2022 10:32    Ref. Range 2/10/2022 22:50 2/11/2022 03:25 2/11/2022 05:36 2/11/2022 11:23 2/12/2022 05:28   Troponin, High Sensitivity Latest Ref Range: 0 - 14 ng/L 37 (H) 33 (H) 30 (H) 27 (H) 25 (H)      Echocardiogram 8/2021:  Summary  Left ventricle is normal in size, global left ventricular systolic function  is hyperdynamic, estimated ejection fraction is > 65%. Moderate left ventricular hypertrophy. Grade I, mild, diastolic dysfunction. There is mild LVOT obstruction. Right atrium is normal in size. Prominent Eustachian valve. Aortic valve is sclerotic but opens well.   Trivial mitral regurgitation. Trivial tricuspid regurgitation.     Gastric emptying study 2/2017:  Slightly rapid early gastric emptying but otherwise no delayed gastric   emptying or other abnormality.      The patient's condition was stabilized  Discharge planning initiated        Discharge plan:     Maintain hydration  Electrolyte replacement prn   Diet as tolerated  Antiemetics  GI evaluation (she has seen Dr. Queta Pfeiffer)  Reflux precautions  Glycemic contol - Monitor and control blood sugars  CPAP  Blood Pressure - Monitor and control  [facility]  Pain management, outpatient evaluation for chronic back pain, L2 Fx  Hematology follow-up Dr. Richard Adams  Monitor H&H, hemoglobin has dropped with hydration  Trend troponin and EKGs, enzymes  Outpatient stress test ordered  Cardiology follow-up [facility] prn   Alcohol abstinence  Thiamine  DVT prophylaxis  Discharge planning for this afternoon  The patient was instructed to follow up with their PCP, Kell Ott MD in one week     Discharge Procedure Orders   Cardiac Stress Test - w/Pharm   Standing Status: Future Standing Exp. Date: 03/13/22     Order Specific Question Answer Comments   Reason for Exam? Chest pain    Does patient have left bundle branch block (LBBB) or left ventricular hypertrophy (LVH) based on resting ECG, a ventricular pacemaker, or is unable to exercise on a treadmill based on physical or mental limitations?  Yes        Significant Diagnostic Studies:     Labs / Micro:  Labs:  Hematology:  Recent Labs     02/11/22  0536   WBC 5.8   RBC 3.22*   HGB 10.7*   HCT 30.6*   MCV 95.0   MCH 33.2   MCHC 35.0*   RDW 18.8*      MPV 8.5     Chemistry:  Recent Labs     02/11/22  0536 02/11/22  0857 02/11/22  1123 02/11/22  1403 02/12/22  0528 02/12/22  0528 02/12/22  1144 02/12/22  1429 02/12/22  2110 02/13/22  0248      < >  --    < > 139   < >  --  134* 137 134*   K 2.5*   < >  --    < > 3.9   < >  --  4.5 4.4 3.9      < > --    < > 103   < >  --  102 105 103   CO2 26   < >  --    < > 20   < >  --  21 21 21   GLUCOSE 123*   < >  --    < > 87   < >  --  108* 95 91   BUN 2*   < >  --    < > 3*   < >  --  5* 5* 3*   CREATININE 0.65   < >  --    < > 0.65   < >  --  0.66 0.68 0.67   MG 2.2  --   --    < > 1.6   < > 1.7 1.4* 1.5* 1.9   ANIONGAP 15   < >  --    < > 16   < >  --  11 11 10   LABGLOM >60   < >  --    < > >60   < >  --  >60 >60 >60   GFRAA >60   < >  --    < > >60   < >  --  >60 >60 >60   CALCIUM 7.2*   < >  --    < > 8.1*   < >  --  8.2* 8.3* 8.1*   CAION  --   --   --   --   --   --  1.03*  --   --   --    TROPHS 30*  --  27*  --  25*  --   --   --   --   --    MYOGLOBIN 259*  --  249*  --   --   --   --   --   --   --     < > = values in this interval not displayed. Recent Labs     02/11/22  0536   PROT 6.4   LABALBU 3.4*   AST 39*   ALT 15   ALKPHOS 213*   BILITOT 0.54   CHOL 139   HDL 45   LDLCHOLESTEROL 71   CHOLHDLRATIO 3.1   TRIG 114   VLDL NOT REPORTED         Radiology:    XR CHEST PORTABLE    Result Date: 2/10/2022  EXAMINATION: ONE XRAY VIEW OF THE CHEST 2/10/2022 4:41 pm COMPARISON: Chest 09/29/2021 HISTORY: Chest pain the last couple of weeks, smoking history, hypertension FINDINGS: Calcifications involving the aorta reflect atherosclerosis. Cardiomegaly. The mediastinal and hilar silhouettes appear otherwise unremarkable. The lungs appear clear. No pleural fluid evident. No pneumothorax is seen. No acute osseous abnormality is identified. 1. No acute pulmonary disease. 2. Cardiomegaly. 3. Calcific atherosclerotic disease aorta. CT CHEST ABDOMEN PELVIS WO CONTRAST    Result Date: 2/10/2022  EXAMINATION: CT OF THE CHEST, ABDOMEN, AND PELVIS WITHOUT CONTRAST 2/10/2022 3:33 pm TECHNIQUE: CT of the chest, abdomen and pelvis was performed without the administration of intravenous contrast. Multiplanar reformatted images are provided for review.  Dose modulation, iterative reconstruction, and/or weight based adjustment of the mA/kV was utilized to reduce the radiation dose to as low as reasonably achievable. COMPARISON: Abdomen pelvis CT, 08/16/2021 Chest CT, 07/28/2016 HISTORY: ORDERING SYSTEM PROVIDED HISTORY: chest abd pain TECHNOLOGIST PROVIDED HISTORY: chest abd pain Decision Support Exception - unselect if not a suspected or confirmed emergency medical condition->Emergency Medical Condition (MA) Reason for Exam: chest and abdomen pain FINDINGS: Chest: Mediastinum: Visualized thyroid is unremarkable. No mediastinal lymphadenopathy. Varices within the mediastinum similar in change. Esophagus is patulous. There is suggestion of mild mural thickening, which is new when compared to the previous exam. Limited noncontrast imaging the cardiac chambers, thoracic aorta, and pulmonary arteries is unremarkable. Lungs/pleura: No acute or suspicious lung lesion is identified. No pneumothorax. No pleural effusion. Soft Tissues/Bones: Visualized extra thoracic soft tissues are unremarkable. No acute or suspicious bony abnormalities identified. Abdomen/Pelvis: Lack of intravenous contrast limits evaluation of the solid abdominal viscera, the hollow abdominal viscera, and the vascular structures. Organs: Diffuse hepatic steatosis is noted. No acute or suspicious hepatic abnormality identified. Gallbladder is unremarkable. Noncontrast imaging of the spleen is unremarkable. Adrenal nodularity is unchanged, and considered benign. Pancreas unremarkable. No acute or suspicious renal abnormalities are identified. No hydronephrosis or hydroureter. GI/Bowel: There is possible mild mural thickening involving the distal rectum. Mild surrounding fat stranding noted. Large bowel is otherwise unremarkable. The appendix is not well visualized. No asymmetric pericecal inflammation is seen to suggest acute appendicitis. Moderate-sized hiatal hernia. Otherwise, the distal esophagus, stomach, and duodenal sweep is unremarkable. Pelvis: Uterus is surgically absent. Urinary bladder unremarkable. No free pelvic fluid. Peritoneum/Retroperitoneum: Abdominal aorta normal in caliber. No retroperitoneal lymphadenopathy. Bones/Soft Tissues: Age-indeterminate but acute to subacute appearing compression fracture seen involving the superior endplate of L2 with loss of approximately 30% of the vertebral body height maximally. No significant dorsal retropulsion. That is new when compared to the previous abdomen pelvis CT from August 2021. Small umbilical hernia contains fat only. The extra-abdominal and extra pelvic soft tissues are otherwise unremarkable. Chest CT: Suggestion of esophageal mural thickening. Correlate with clinical evidence of esophagitis. Otherwise no acute abnormality detected. Abdomen pelvis CT: Possible mild mural thickening involving the distal rectum. Correlate with clinical evidence of proctitis. Acute to subacute appearing compression fracture involving the superior endplate of L2 with loss of approximately 30% of vertebral body height. No dorsal retropulsion. Moderate-sized hiatal hernia.          Consultations:    Consults:     Final Specialist Recommendations/Findings:   IP CONSULT TO HOSPITALIST  IP CONSULT TO GI        Discharged Condition:    Stable     Disposition: Home    Physician Follow Up:   Mabel Whittaker MD  [address] [place] [phone]    Schedule an appointment as soon as possible for a visit in 2 weeks         Activity:  activity as tolerated    Diet:  cardiac diet and diabetic diet   No alcohol    Discharge Medications:      Medication List      START taking these medications    aspirin EC 81 MG EC tablet  Take 1 tablet by mouth daily     atorvastatin 40 MG tablet  Commonly known as: LIPITOR  Take 1 tablet by mouth nightly     loperamide 2 MG capsule  Commonly known as: IMODIUM  Take 1 capsule by mouth 4 times daily as needed for Diarrhea     nitroGLYCERIN 0.4 MG SL tablet  Commonly known as: NITROSTAT  up to max of 3 total doses. If no relief after 1 dose, call 911. CHANGE how you take these medications    Oyster Shell Calcium/D 500-200 MG-UNIT Tabs  TAKE 1 TAB BY MOUTH ONCE A DAY  What changed: See the new instructions. CONTINUE taking these medications    acetaminophen 500 MG tablet  Commonly known as: APAP Extra Strength  Take 2 tablets by mouth every 6 hours as needed for Pain     albuterol sulfate 108 (90 Base) MCG/ACT aerosol powder inhalation  Commonly known as: ProAir RespiClick  Inhale 2 puffs into the lungs every 6 hours as needed for Wheezing or Shortness of Breath     allopurinol 100 MG tablet  Commonly known as: ZYLOPRIM  Take 1 tablet by mouth daily     amLODIPine 10 MG tablet  Commonly known as: NORVASC  TAKE 1 TABLET BY MOUTH DAILY     atenolol 50 MG tablet  Commonly known as: TENORMIN  Take 1 tablet by mouth daily     ferrous sulfate 325 (65 Fe) MG tablet  Commonly known as: IRON 325     FLUoxetine 40 MG capsule  Commonly known as: PROZAC     folic acid 1 MG tablet  Commonly known as: FOLVITE  Take 1 tablet by mouth daily     lipase-protease-amylase 70681-12941 units delayed release capsule  Commonly known as: CREON  Take by mouth 3 times daily (with meals)     loratadine 10 MG tablet  Commonly known as: CLARITIN  TAKE 1 tab BY MOUTH DAILY     magnesium oxide 400 MG tablet  Commonly known as: MAG-OX  TAKE 1 TABLET BY MOUTH 2 TIMES DAILY     multivitamin Tabs tablet  Take 1 tablet by mouth daily     omeprazole 20 MG delayed release capsule  Commonly known as: PRILOSEC  TAKE 1 CAPSULE BY MOUTH DAILY     potassium chloride 20 MEQ extended release tablet  Commonly known as: KLOR-CON M  Take 2 tablets by mouth 2 times daily After BMP two times, first after 1 week and if potassium is low then continue taking same dose with repeat BMP in a week. and if potassium is normal then take once daily. Do not crush, chew, or suck on tablet.      thiamine mononitrate 100 MG tablet  TAKE 1 TABLET BY MOUTH ONCE DAILY        STOP taking these medications    magnesium oxide 400 (240 Mg) MG tablet  Commonly known as: MAG-OX           Where to Get Your Medications      These medications were sent to [facility] [address] [facility], [address] [facility]  [address], [address] [place] [address]    Phone: [phone]   · aspirin EC 81 MG EC tablet  · atorvastatin 40 MG tablet  · loperamide 2 MG capsule  · nitroGLYCERIN 0.4 MG SL tablet         Time Spent on discharge is  20 mins in patient examination, evaluation, counseling, medication reconciliation, discharge plan and follow up. Electronically signed by   Aron Campa DO  2/13/2022  5:18 PM      Thank you Dr. Tyrone Guzman MD for the opportunity to be involved in this patient's care.

## 2022-02-13 NOTE — PROGRESS NOTES
[facility]  Office: [address], DO, Bandar Smoker, DO, [employer], DO, Smiley [place] Blood, DO, Colonel Racheal MD, Donovan Carranza MD, Martina Brush MD, Sylvia Frost MD, Ladan Rojas MD, Alejo Campos MD, Ayala Bee MD, Mariela Castañeda, DO, Denis Do, DO, Laureen Cason MD,  Fran Ott, DO, Gonzales Crane MD, Brianna Walker MD, Kim Walls MD, Nancy Bartlett MD, Mayco Kaufman MD, Jani Rose, [facility], [facility] Razo, CNP, Hailey Gupta, CNP, Vivian Mann, CNS, Jessica Gonzalez, CNP, Xavi Diaz, CNP, Mohit Velez, CNP, Dale Rowley, CNP, Jayson Perez, CNP, Latosha Connell PA-C, Romy Todd, [facility], Milli Davis, [facility], Shree Dye, CNP, Lesli Lewis, CNP, Michi Guerra, CNP, Sonia Munoz, CNP, Bella Garcia, CNP, Charanjit Kovacsidus, [facility]    Progress Note    2/13/2022    10:00 AM    Name:   Shravan Kirby  MRN:     [id number]     Acct:      [de-identified]   Room:   [facility] Day:  3  Admit Date:  2/10/2022  4:13 PM    PCP:   Laura Magaña MD  Code Status:  Full Code    Subjective:     C/C:   Chief Complaint   Patient presents with    Shortness of Breath     S     Interval History Status:   Much improved  No shortness of breath  No chest pain  Eating breakfast  \"Ready to go home\"    Data Base Updates:  [id number]/dL     BUN3 Low mg/dL   CREATININE0.67mg/dL   Bun/Cre RatioNOT REPORTED     Calcium8. 1 Low mg/dL   Magnesium1.9   [id number] Low    Potassium 3.9      Brief History:     As documented in the medical record:  Shraddha Parsons is a 65 y.o. Non- / non  female who presents with Shortness of Breath (S)      Patient presents to the emergency room with the concern of chest pain. Patient states that she has been having chest pain for a few weeks lately which worsened today and prompted the ER evaluation.   Patient describes associated nausea, vomiting, diarrhea and diaphoresis, shortness of breath left arm pain with a stabing sensation and n/t. Patient admits to similar pain  A few months prior. Patient states that given the severity of the pain and associated symptomology she was unable to take her medications over the past week. Work up in the emergency room    Laboratories:   Metabolic panel. - Potassium 2.3, chloride 97. GI/Liver Panel- alk phos 288, ast 54  Hematology. -no leukocytosis or acute anemia  Coagulation- not done  Cardiac Markers- Hs troponin  35, with repeat of 30  EKG- Initial EKG completed at 1626 showed narrow complex tachycardia rate 136, left axis     deviation, difficult to discern if this was a sinus rhythm as P waves appear to be    buried, there was a change in the rhythm at the second half which almost appears           alternating QRS with possible electrical alternans. CT CHEST ABDOMEN PELVIS WO CONTRAST   Result Date: 2/10/2022  Chest CT: Suggestion of esophageal mural thickening. Correlate with clinical evidence of esophagitis. Otherwise no acute abnormality detected. Abdomen pelvis CT: Possible mild mural thickening involving the distal rectum. Correlate with clinical evidence of proctitis. Acute to subacute appearing compression fracture involving the superior endplate of L2 with loss of approximately 30% of vertebral body height. No dorsal retropulsion.  Moderate-sized hiatal hernia\"    The intitial assessment and plan included:  \"   * (Principal) Hypokalemia 2/10/2022 Yes     Hypertension 2/10/2022 Yes     JONES (obstructive sleep apnea) 2/10/2022 Yes     Bipolar disorder ([address] 2/10/2022 Yes     Intractable nausea and vomiting 2/10/2022 Yes     Esophageal thickening 2/10/2022 Yes     Proctitis 2/10/2022 Yes     Type 2 diabetes mellitus without complication, without long-term current use of insulin ([address] 2/10/2022 Yes     Compression fracture of L2 ([address] 2/11/2022 Yes          Plan  Patient status inpatient in the Progressive Unit/Step down   Replacement of electrolytes. Check magnesium and potassium after replacement ( 0001)   Unable to keep po down will need IV prn blood pressure medications  Npo,  If no source of n/v/d identified, consult GI  Continue to trend troponin  Check GI panel for etiology of diarrhea   Monitor glucose levels  Consultation to N/S in am for evaluation of the L2 compression fracture  GI proph  DVT proph\"     Specimen Source: Stool Specimen Description. FECES Special RequestsNOT REPORTED Direct ExamNegative for Rotavirus     Specimen Source: Stool Specimen Description. FECES Special RequestsNOT REPORTED Direct ExamGiardia Antigen Assay Negative Cryptosporidium Antigen Assay Negative     Gastrointestinal Panel, Molecular [[id number]]    Collected: 02/10/22 2345    Updated: 02/11/22 1311    Specimen Source: Stool     Specimen Description . FECES    Campylobacter PCR NEGATIVE: No Campylobacter spp. (jejuni or coli) DNA Detected    Salmonella PCR NEGATIVE: No Salmonella spp. DNA Detected    Shigatoxin Gene PCR NEGATIVE: No Shiga toxin-producing gene(s) Detected    Shigella Sp PCR NEGATIVE: No Shigella spp. / EIEC DNA Detected    Plesiomonas Shigelloides PCR NEGATIVE: No Plesionomas shigelloides DNA Detected    Vibrio PCR NEGATIVE: No Vibrio (V. vulnificus, V, parahaemolyticus and V. cholerae) DNA Detected    E Coli Enterotoxigenic PCR NEGATIVE: No Enterotoxigenic E. coli (ETEC) Heat-labile and heat-stable (LT/ST) DNA Detected    Yersinia Enterocolitica PCR NEGATIVE: No Yersinia enterocolitica DNA Detected     Cardiology evaluation 8/17/2021:   IMPRESSION:    Increased Troponin secondary to type II MI  Prolonged QTc secondary to hypokalemia and hypocalcemia  Hypokalemai  Hypocalcemia  Rhabdomyolysis  Hypertension   RECOMMENDATIONS:  Trend Hs-troponins. Echo to be ordered. If n regional wall motion abnormalities, out patient stress test will be ordered. Correct electrolytes. With held Hydrochlorothiazide for time being as it can further worsen electrolytes disturbances. Iv fluids  Keep an eye on vitals, if blood pressure is high we can start Norvasc 5mg. Subsequent database has included:  Results for Zaria Bedoya (MRN [id number]) as of 2/12/2022 10:32   Ref. Range 2/10/2022 22:50 2/11/2022 03:25 2/11/2022 05:36 2/11/2022 11:23 2/12/2022 05:28   Troponin, High Sensitivity Latest Ref Range: 0 - 14 ng/L 37 (H) 33 (H) 30 (H) 27 (H) 25 (H)     Echocardiogram 8/2021:  Summary  Left ventricle is normal in size, global left ventricular systolic function  is hyperdynamic, estimated ejection fraction is > 65%. Moderate left ventricular hypertrophy. Grade I, mild, diastolic dysfunction. There is mild LVOT obstruction. Right atrium is normal in size. Prominent Eustachian valve. Aortic valve is sclerotic but opens well. Trivial mitral regurgitation. Trivial tricuspid regurgitation. Gastric emptying study 2/2017:  Slightly rapid early gastric emptying but otherwise no delayed gastric   emptying or other abnormality. The patient's condition was stabilized  Discharge planning initiated    Past Medical History:   has a past medical history of Angioedema, Anxiety, Asthma, Bipolar disorder ([address] Claustrophobia, Depression, GERD (gastroesophageal reflux disease), Hypertension, Hypertrophic cardiomyopathy ([address] Lung nodules, MRSA (methicillin resistant Staphylococcus aureus), Murmur, OA (osteoarthritis), JONES (obstructive sleep apnea), Thyroid nodule, and Type 2 diabetes mellitus without complication, without long-term current use of insulin ([address] Social History:   reports that she quit smoking about 29 years ago. Her smoking use included cigarettes. She has a 10.00 pack-year smoking history. She has never used smokeless tobacco. She reports previous alcohol use of about 12.0 standard drinks of alcohol per week. She reports previous drug use. Drug: Cocaine.      Family History:   Family History   Problem Relation Age of Onset    Stroke Mother     Hypertension Father     Cancer Brother         bone    Lung Cancer Maternal Aunt     Cancer Maternal Grandmother         eye     Other Sister         aneurysm    Heart Disease Sister        Review of Systems:     Review of Systems   Constitutional: Positive for unexpected weight change (weight has dropped from 240 to 167 # over the last year ). Respiratory: Negative for cough and shortness of breath. Cardiovascular: Negative for chest pain (No further rib or scapular pain) and palpitations. Gastrointestinal: Positive for diarrhea (The patient reports chronic diarrhea, patient reports 2 stools since my last visit). Negative for abdominal pain, nausea and vomiting. The patient reports chronic GI problems  The patient has seen Dr. Vinny Plata   Genitourinary: Negative for flank pain and hematuria. Musculoskeletal: Positive for back pain (chronic back pain). Psychiatric/Behavioral: Positive for dysphoric mood. The patient is nervous/anxious. The patient reported her mom  in November  Her father  several days later  She just recently buried a cousin  She has been feeling depressed and anxious  The patient has undergone a recent separation  The patient acknowledges she has been drinking 2 beers a day \"self-medicating\"  She is working with a therapist at the [facility]         Physical Examination:        Vitals  BP (!) 120/93   Pulse 76   Temp 98.6 °F (37 °C) (Temporal)   Resp 18   Ht 5' 4\" (1.626 m)   Wt 162 lb 12.8 oz (73.8 kg)   SpO2 98%   BMI 27.94 kg/m²   Temp (24hrs), Av.5 °F (36.9 °C), Min:98.2 °F (36.8 °C), Max:99 °F (37.2 °C)    No results for input(s): POCGLU in the last 72 hours. Physical Exam  Vitals reviewed. Constitutional:       General: She is not in acute distress. Appearance: She is not diaphoretic. HENT:      Head: Normocephalic. Nose: Nose normal.   Eyes:      General: No scleral icterus.      Conjunctiva/sclera: Conjunctivae normal.   Neck:      Trachea: No tracheal deviation. Cardiovascular:      Rate and Rhythm: Normal rate and regular rhythm. Pulmonary:      Effort: Pulmonary effort is normal. No respiratory distress. Breath sounds: Normal breath sounds. No wheezing or rales. Chest:      Chest wall: No tenderness. Abdominal:      General: There is no distension. Palpations: Abdomen is soft. Tenderness: There is no abdominal tenderness. There is no guarding or rebound. Musculoskeletal:         General: No tenderness. Cervical back: Neck supple. Skin:     General: Skin is warm and dry. Neurological:      Mental Status: She is alert. Medications: Allergies:     Allergies   Allergen Reactions    Bee Venom Anaphylaxis    Iodine Anaphylaxis and Rash    Lisinopril Swelling and Anaphylaxis     Angioedema    Shellfish-Derived Products Anaphylaxis and Rash     ANGIOEDEMA       Current Meds:   Scheduled Meds:    magnesium oxide  400 mg Oral BID    [START ON 2/14/2022] lipase-protease-amylase  24,000 Units Oral TID WC    lipase-protease-amylase  24,000 Units Oral TID WC    calcium carbonate-vitamin D3  1 tablet Oral BID    allopurinol  100 mg Oral Daily    amLODIPine  10 mg Oral Daily    atenolol  50 mg Oral Daily    ferrous sulfate  325 mg Oral Daily with breakfast    FLUoxetine  40 mg Oral Daily    folic acid  1 mg Oral Daily    cetirizine  10 mg Oral Daily    multivitamin  1 tablet Oral Daily    pantoprazole  40 mg Oral QAM AC    thiamine  100 mg Oral Daily    sodium chloride flush  5-40 mL IntraVENous 2 times per day    aspirin  325 mg Oral Daily    enoxaparin  40 mg SubCUTAneous Daily    atorvastatin  40 mg Oral Nightly     Continuous Infusions:    sodium chloride      dextrose 5% and 0.45% NaCl with KCl 20 mEq 75 mL/hr at 02/13/22 0441     PRN Meds: loperamide, HYDROcodone 5 mg - acetaminophen, morphine **OR** morphine, diphenhydrAMINE, sodium chloride flush, sodium chloride, ondansetron **OR** ondansetron, acetaminophen **OR** acetaminophen, magnesium hydroxide, potassium chloride **OR** potassium alternative oral replacement **OR** potassium chloride, potassium chloride, magnesium sulfate, nitroGLYCERIN, hydrALAZINE, promethazine    Data:     I/O (24Hr): Intake/Output Summary (Last 24 hours) at 2/13/2022 1000  Last data filed at 2/12/2022 1800  Gross per 24 hour   Intake 1860 ml   Output 45 ml   Net 1815 ml       Labs:  Hematology:  Recent Labs     02/10/22  1640 02/11/22  0536   WBC 10.4 5.8   RBC 4.10 3.22*   HGB 13.7 10.7*   HCT 39.9 30.6*   MCV 97.3 95.0   MCH 33.4 33.2   MCHC 34.3 35.0*   RDW 18.9* 18.8*   * 355   MPV 8.7 8.5   SEDRATE 34*  --    CRP <3.0  --      Chemistry:  Recent Labs     02/11/22  0536 02/11/22  0857 02/11/22  1123 02/11/22  1403 02/12/22  0528 02/12/22  0528 02/12/22  1144 02/12/22  1429 02/12/22  2110 02/13/22  0248      < >  --    < > 139   < >  --  134* 137 134*   K 2.5*   < >  --    < > 3.9   < >  --  4.5 4.4 3.9      < >  --    < > 103   < >  --  102 105 103   CO2 26   < >  --    < > 20   < >  --  21 21 21   GLUCOSE 123*   < >  --    < > 87   < >  --  108* 95 91   BUN 2*   < >  --    < > 3*   < >  --  5* 5* 3*   CREATININE 0.65   < >  --    < > 0.65   < >  --  0.66 0.68 0.67   MG 2.2  --   --    < > 1.6   < > 1.7 1.4* 1.5* 1.9   ANIONGAP 15   < >  --    < > 16   < >  --  11 11 10   LABGLOM >60   < >  --    < > >60   < >  --  >60 >60 >60   GFRAA >60   < >  --    < > >60   < >  --  >60 >60 >60   CALCIUM 7.2*   < >  --    < > 8.1*   < >  --  8.2* 8.3* 8.1*   CAION  --   --   --   --   --   --  1.03*  --   --   --    TROPHS 30*  --  27*  --  25*  --   --   --   --   --    MYOGLOBIN 259*  --  249*  --   --   --   --   --   --   --     < > = values in this interval not displayed.      Recent Labs     02/10/22  1640 02/11/22  0536   PROT 8.6* 6.4   LABALBU 4.2 3.4*   AST 55* 39*   ALT 20 15   ALKPHOS 288* 213*   BILITOT 0.38 0.54 LIPASE 17  --    CHOL  --  139   HDL  --  45   LDLCHOLESTEROL  --  71   CHOLHDLRATIO  --  3.1   TRIG  --  114   VLDL  --  NOT REPORTED     ABG:  Lab Results   Component Value Date    FIO2 INFORMATION NOT PROVIDED 09/20/2020     Lab Results   Component Value Date/Time    SPECIAL NOT REPORTED 02/11/2022 12:47 AM     Lab Results   Component Value Date/Time    CULTURE NO GROWTH 6 DAYS 09/29/2021 06:55 PM       Radiology:  XR CHEST PORTABLE    Result Date: 2/10/2022  1. No acute pulmonary disease. 2. Cardiomegaly. 3. Calcific atherosclerotic disease aorta. CT CHEST ABDOMEN PELVIS WO CONTRAST    Result Date: 2/10/2022  Chest CT: Suggestion of esophageal mural thickening. Correlate with clinical evidence of esophagitis. Otherwise no acute abnormality detected. Abdomen pelvis CT: Possible mild mural thickening involving the distal rectum. Correlate with clinical evidence of proctitis. Acute to subacute appearing compression fracture involving the superior endplate of L2 with loss of approximately 30% of vertebral body height. No dorsal retropulsion. Moderate-sized hiatal hernia.        Assessment:        Primary Problem  Intractable nausea and vomiting    Active Hospital Problems    Diagnosis Date Noted    Alcohol abuse [F10.10] 05/22/2014     Priority: Medium    Diarrhea [R19.7]     Exocrine pancreatic insufficiency [K86.81]     Compression fracture of L2 ([facility] [S32.020A] 02/11/2022    Anemia, normocytic normochromic [D64.9] 02/11/2022    Esophageal thickening [K22.89] 02/10/2022    Proctitis [K62.89] 02/10/2022    Troponin level elevated [R77.8] 08/20/2021    Intractable nausea and vomiting [R11.2]     Chronic diarrhea [K52.9]     Hypokalemia [E87.6] 08/16/2021    Bipolar disorder ([facility] [F31.9] 03/21/2021    Type 2 diabetes mellitus without complication, without long-term current use of insulin ([facility] [E11.9] 12/07/2018    JONES (obstructive sleep apnea) [G47.33] 11/18/2017    Essential hypertension [I10] 03/17/2016    Atypical chest pain [R07.89] 07/10/2013       Plan:        Maintain hydration  Electrolyte replacement prn   Diet as tolerated  Antiemetics  GI evaluation (she has seen Dr. Latrell Patel)  Reflux precautions  Glycemic contol - Monitor and control blood sugars  CPAP  Blood Pressure - Monitor and control  [facility] follow[facility] at the [facility]  Pain management, outpatient evaluation for chronic back pain, L2 Fx  Hematology follow-up Dr. Nydia Ulloa  Monitor H&H, hemoglobin has dropped with hydration  Trend troponin and EKGs, enzymes  Update stress test, Cardiology follow-up [facility] prn   Alcohol abstinence  Thiamine  DVT prophylaxis  Discharge planning for this afternoon  The patient was instructed to follow up with their PCP, Filomena Greer MD in one week         IP CONSULT TO HOSPITALIST  IP CONSULT TO [address] [place] [address],   2/13/2022  10:00 AM

## 2022-02-13 NOTE — PROGRESS NOTES
CLINICAL PHARMACY NOTE: MEDS TO BEDS    Total # of Prescriptions Filled: 4   The following medications were delivered to the patient:  · Loperamide 2mg  · Atorvastatin 40 mg  · Aspirin 81 mg  · Nitroglycerin 0.4 mg    Additional Documentation:

## 2022-02-14 ENCOUNTER — TELEPHONE (OUTPATIENT)
Dept: INTERNAL MEDICINE | Age: 57
End: 2022-02-14

## 2022-02-14 NOTE — TELEPHONE ENCOUNTER
[facility] Transitions Initial Follow Up Call    Outreach made within 2 business days of discharge: Yes    Patient: Kena Campo   Patient : 1965   MRN: [id number]    Reason for Admission: intractable n/v, hypokalemia  Discharge Date: 2022       Spoke with: Jeremy Morrow    Discharge department/facility: [address] Stepdown    TCM Interactive Patient Contact:  Was patient able to fill all prescriptions: Yes  Was patient instructed to bring all medications to the follow-up visit: Yes  Is patient taking all medications as directed in the discharge summary? Yes  Does patient understand their discharge instructions: Yes  Does patient have questions or concerns that need addressed prior to 7-14 day follow up office visit: no    Scheduled appointment with PCP within 7-14 days    Pt has f/u appt with PCP scheduled for April, pt states she is f/u with [facility] this Friday as well. PCP/PCR not available within 7-14 days for appt, able to schedule hosp f/u appt with another provider. Pt asked to bring medications to appt, it appears many of the meds that are listed in chart have not been refilled in quite some time. Pt states her medications are bubble packed already, states she gets tired of taking so many medications. Writer stated that unfortunately when she stops taking the medications she ends up in the hospital, so taking medications as prescribed is necessary. Pt agreed and states she will bring bubble pack to appt on .     Follow Up  Future Appointments   Date Time Provider Anthony Everett   2022  1:40 PM Abrahan Ornelas MD [facility] IM Nasim Guevara   2022 11:45 AM [facility]   3/7/2022  8:00 AM [facility]   2022  2:00 PM Rance Cabot, MD [facility] IM Sadia Diehl RN

## 2022-02-16 RX ORDER — QUETIAPINE FUMARATE 50 MG/1
TABLET, FILM COATED ORAL
COMMUNITY
Start: 2021-11-22 | End: 2022-07-27

## 2022-02-17 ENCOUNTER — OFFICE VISIT (OUTPATIENT)
Dept: INTERNAL MEDICINE | Age: 57
End: 2022-02-17
Payer: MEDICAID

## 2022-02-17 VITALS
BODY MASS INDEX: 29.02 KG/M2 | DIASTOLIC BLOOD PRESSURE: 84 MMHG | SYSTOLIC BLOOD PRESSURE: 123 MMHG | HEART RATE: 80 BPM | HEIGHT: 64 IN | WEIGHT: 170 LBS | TEMPERATURE: 97.2 F

## 2022-02-17 DIAGNOSIS — E83.42 HYPOMAGNESEMIA: ICD-10-CM

## 2022-02-17 DIAGNOSIS — E87.6 HYPOKALEMIA: ICD-10-CM

## 2022-02-17 DIAGNOSIS — Z23 NEED FOR PROPHYLACTIC VACCINATION AND INOCULATION AGAINST VARICELLA: ICD-10-CM

## 2022-02-17 DIAGNOSIS — K85.20 ALCOHOL-INDUCED ACUTE PANCREATITIS WITHOUT INFECTION OR NECROSIS: Primary | ICD-10-CM

## 2022-02-17 PROCEDURE — 1111F DSCHRG MED/CURRENT MED MERGE: CPT | Performed by: STUDENT IN AN ORGANIZED HEALTH CARE EDUCATION/TRAINING PROGRAM

## 2022-02-17 PROCEDURE — 99214 OFFICE O/P EST MOD 30 MIN: CPT | Performed by: STUDENT IN AN ORGANIZED HEALTH CARE EDUCATION/TRAINING PROGRAM

## 2022-02-17 RX ORDER — ALBUTEROL SULFATE 90 UG/1
2 AEROSOL, METERED RESPIRATORY (INHALATION) EVERY 6 HOURS PRN
COMMUNITY
End: 2022-04-18 | Stop reason: SDUPTHER

## 2022-02-17 SDOH — ECONOMIC STABILITY: FOOD INSECURITY: WITHIN THE PAST 12 MONTHS, YOU WORRIED THAT YOUR FOOD WOULD RUN OUT BEFORE YOU GOT MONEY TO BUY MORE.: NEVER TRUE

## 2022-02-17 SDOH — ECONOMIC STABILITY: FOOD INSECURITY: WITHIN THE PAST 12 MONTHS, THE FOOD YOU BOUGHT JUST DIDN'T LAST AND YOU DIDN'T HAVE MONEY TO GET MORE.: NEVER TRUE

## 2022-02-17 ASSESSMENT — PATIENT HEALTH QUESTIONNAIRE - PHQ9
2. FEELING DOWN, DEPRESSED OR HOPELESS: 0
1. LITTLE INTEREST OR PLEASURE IN DOING THINGS: 0
3. TROUBLE FALLING OR STAYING ASLEEP: 0
SUM OF ALL RESPONSES TO PHQ QUESTIONS 1-9: 0
9. THOUGHTS THAT YOU WOULD BE BETTER OFF DEAD, OR OF HURTING YOURSELF: 0
SUM OF ALL RESPONSES TO PHQ QUESTIONS 1-9: 0
6. FEELING BAD ABOUT YOURSELF - OR THAT YOU ARE A FAILURE OR HAVE LET YOURSELF OR YOUR FAMILY DOWN: 0
10. IF YOU CHECKED OFF ANY PROBLEMS, HOW DIFFICULT HAVE THESE PROBLEMS MADE IT FOR YOU TO DO YOUR WORK, TAKE CARE OF THINGS AT HOME, OR GET ALONG WITH OTHER PEOPLE: 0
4. FEELING TIRED OR HAVING LITTLE ENERGY: 0
8. MOVING OR SPEAKING SO SLOWLY THAT OTHER PEOPLE COULD HAVE NOTICED. OR THE OPPOSITE, BEING SO FIGETY OR RESTLESS THAT YOU HAVE BEEN MOVING AROUND A LOT MORE THAN USUAL: 0
7. TROUBLE CONCENTRATING ON THINGS, SUCH AS READING THE NEWSPAPER OR WATCHING TELEVISION: 0
SUM OF ALL RESPONSES TO PHQ9 QUESTIONS 1 & 2: 0
5. POOR APPETITE OR OVEREATING: 0
SUM OF ALL RESPONSES TO PHQ QUESTIONS 1-9: 0
SUM OF ALL RESPONSES TO PHQ QUESTIONS 1-9: 0

## 2022-02-17 ASSESSMENT — ENCOUNTER SYMPTOMS
CHEST TIGHTNESS: 0
BACK PAIN: 0
COUGH: 0
SORE THROAT: 0
NAUSEA: 0
PHOTOPHOBIA: 0
VOMITING: 0
RHINORRHEA: 0
ABDOMINAL PAIN: 0
SINUS PRESSURE: 0
SHORTNESS OF BREATH: 0
DIARRHEA: 0

## 2022-02-17 ASSESSMENT — SOCIAL DETERMINANTS OF HEALTH (SDOH): HOW HARD IS IT FOR YOU TO PAY FOR THE VERY BASICS LIKE FOOD, HOUSING, MEDICAL CARE, AND HEATING?: NOT HARD AT ALL

## 2022-02-17 NOTE — PATIENT INSTRUCTIONS
Patient Education        Pancreatitis: Care Instructions  Overview     The pancreas is an organ behind the stomach. It makes hormones and enzymes to help your body digest food. But if these enzymes attack the pancreas, it can get inflamed. This is called pancreatitis. Most cases are caused by gallstones or by heavy alcohol use. If you take care of yourself at home, it will help you get better. It will also help you avoid more problems with your pancreas. How can you care for yourself at home? · Drink clear liquids and eat bland foods until you feel better. [place] foods include rice, dry toast, and crackers. They also include bananas and applesauce. · Eat a low-fat diet until your doctor says your pancreas is healed. · Do not drink alcohol. Tell your doctor if you need help to quit. Counseling, support groups, and sometimes medicines can help you stay sober. · Be safe with medicines. Read and follow all instructions on the label. ? If the doctor gave you a prescription medicine for pain, take it as prescribed. ? If you are not taking a prescription pain medicine, ask your doctor if you can take an over-the-counter medicine. · If your doctor prescribed antibiotics, take them as directed. Do not stop taking them just because you feel better. You need to take the full course of antibiotics. · Get extra rest until you feel better. To prevent future problems with your pancreas  · Do not drink alcohol. · Tell your doctors and pharmacist that you've had pancreatitis. They can help you avoid medicines that may cause this problem again. Where can you learn more? Go to https://[employer])juliet.LucidEra. org and sign in to your [employer] account. Enter P175 in the [employer] box to learn more about \"Pancreatitis: Care Instructions. \"     If you do not have an account, please click on the \"Sign Up Now\" link.   Current as of: September 8, 2021               Content Version: 13.1  © 4874-8279 Healthwise, Incorporated. Care instructions adapted under license by [facility] ([facility]). If you have questions about a medical condition or this instruction, always ask your healthcare professional. [address] any warranty or liability for your use of this information. Patient Education        Diet for Chronic Pancreatitis: Care Instructions  Overview     The pancreas is an organ behind the stomach that makes hormones and enzymes to help your body digest food. Sometimes the enzymes attack another part of the pancreas, which can cause pain and swelling. This is called pancreatitis. Chronic pancreatitis may cause you to be in pain much of the time. You may be able to help the pain by avoiding alcohol and eating a low-fat diet. Your doctor and dietitian can help you make an eating plan that does not irritate your digestive system. Always talk with your doctor or dietitian before you make changes in your diet. Follow-up care is a key part of your treatment and safety. Be sure to make and go to all appointments, and call your doctor if you are having problems. It's also a good idea to know your test results and keep a list of the medicines you take. How can you care for yourself at home? · Do not drink alcohol. It may make your pain worse and cause other problems. Tell your doctor if you need help to quit. Counseling, support groups, and sometimes medicines can help you stay sober. · Ask your doctor if you need to take pancreatic enzyme pills to help your body digest fat and protein. · Drink plenty of fluids. If you have kidney, heart, or liver disease and have to limit fluids, talk with your doctor before you increase the amount of fluids you drink. Eat a low-fat diet   · Eat many small meals and snacks each day instead of three large meals. · Choose lean meats. ? Eat no more than 5 to 6½ ounces of meat a day. ? Cut off all fat you can see. ?  Eat chicken and turkey without the skin.  ? Many types of fish, such as salmon, lake trout, tuna, and herring, provide healthy omega-3 fat. But avoid fish canned in oil, such as sardines in olive oil. ? Bake, broil, or grill meats, poultry, or fish instead of frying them in butter or fat. · Drink or eat nonfat or low-fat milk, yogurt, cheese, or other milk products each day. ? Read the labels on cheeses, and choose those with less than 5 grams of fat an ounce. ? Try fat-free sour cream, cream cheese, or yogurt. ? Avoid cream soups and cream sauces on pasta. ? Eat low-fat ice cream, frozen yogurt, or sorbet. Avoid regular ice cream.  · Eat whole-grain cereals, breads, crackers, rice, or pasta. Avoid high-fat foods such as croissants, scones, biscuits, waffles, doughnuts, muffins, granola, and high-fat breads. · Flavor your foods with herbs and spices (such as basil, tarragon, or mint), fat-free sauces, or lemon juice instead of butter. You can also use butter substitutes, fat-free mayonnaise, or fat-free dressing. · Try applesauce, prune puree, or mashed bananas to replace some or all of the fat when you bake. · Limit fats and oils, such as butter, margarine, mayonnaise, and salad dressing, to no more than 1 tablespoon a meal.  · Avoid high-fat foods, such as:  ? Chocolate, whole milk, ice cream, processed cheese, and egg yolks. ? Fried or buttered foods. ? Sausage, salami, and conklin. ? Cinnamon rolls, cakes, pies, cookies, and other pastries. ? Prepared snack foods, such as potato chips, nut and granola bars, and mixed nuts. ? Coconut and avocado. · Learn how to read food labels for serving sizes and ingredients. Fast-food and convenience-food meals often have lots of fat. Where can you learn more? Go to https://mitali.health-partners. org and sign in to your [employer] account. Enter M806 in the [employer] [facility] box to learn more about \"Diet for Chronic Pancreatitis: Care Instructions. \"     If you do not have an account, please click on the \"Sign Up Now\" link. Current as of: September 8, 2021               Content Version: 13.1  © 2006-2021 [employer]. Care instructions adapted under license by [email or web address] ([facility]). If you have questions about a medical condition or this instruction, always ask your healthcare professional. [address] any warranty or liability for your use of this information. Patient Education        Learning About Acute Pancreatitis  What is acute pancreatitis? The pancreas is an organ behind the stomach. It makes hormones like insulin that help control how your body uses sugar. It also makes enzymes that help you digest food. Usually these enzymes flow from the pancreas to the intestines. But if they leak into the pancreas, they can irritate it and cause pain and swelling. When this happens suddenly, it's called acute pancreatitis. What causes it? Most of the time, acute pancreatitis is caused by gallstones or by heavy alcohol use. But several other things can cause it, such as:  · High levels of fats in the blood. · An injury. · A problem after a surgery or a procedure. · Certain medicines. What are the symptoms? The main symptom is pain in the upper belly. The pain can be severe. You also may have a fever, nausea, or vomiting. Some people get so sick that they have problems breathing. They also may have low blood pressure. How is it diagnosed? Your doctor will diagnose pancreatitis with an exam and by looking at your lab tests. Your doctor may think that you have this problem based on your symptoms and where you have pain in your belly. You may have blood tests of enzymes called amylase and lipase. In pancreatitis, the level of these enzymes is usually much higher than normal.  You also may have imaging tests of the belly. These may include an ultrasound, a CT scan, or an MRI. A special MRI called MRCP can show images of the bile ducts.  This test can be very helpful when gallstones are causing the problem. How is it treated? Treatment of acute pancreatitis usually takes place in the hospital. It focuses on taking care of pain and supporting your body while your pancreas heals. In severe cases, treatment may occur in an intensive care unit to support breathing, treat serious infections, or help raise very low blood pressure. If a gallstone is causing the problem, the gallstone may need to be removed. This is done during a procedure called ERCP. The doctor puts a scope in your mouth and moves it gently through the stomach and into the ducts from the pancreas and gallbladder. The doctor is then able to see a stone and remove it. Sometimes the gallbladder, which makes gallstones, needs to be removed by surgery. People with pancreatitis often need a lot of fluid to help support their other organs and their blood pressure. They get fluids through a vein (intravenous, or IV). Instead of food by mouth, nutrition is sometimes given through a vein while the pancreas heals. Where can you learn more? Go to https://Talkdesk.GeneriMed. org and sign in to your [employer] account. Enter V679 in the [employer] box to learn more about \"Learning About Acute Pancreatitis. \"     If you do not have an account, please click on the \"Sign Up Now\" link. Current as of: September 8, 2021               Content Version: 13.1  © 1652-5800 Healthwise, Incorporated. Care instructions adapted under license by [facility] ([facility]). If you have questions about a medical condition or this instruction, always ask your healthcare professional. [place] [address] any warranty or liability for your use of this information. Return To Clinic 2/24/2022. After Visit Summary  given and reviewed. --    It is very important for your care that you keep your appointment.  If for some reason you are unable to keep your appointment it is equally important that you call our office at [phone] to cancel your appointment and reschedule. Failure to do so may result in your termination from our practice.

## 2022-02-17 NOTE — PROGRESS NOTES
Attending Physician Statement  I have discussed the care of [address] [place] [address], including pertinent history and exam findings,  with the resident. I have reviewed the key elements of all parts of the encounter with the resident. I agree with the assessment, plan and orders as documented by the resident.   (GE Modifier)    Hospitalized recently for acute on chronic alcoholic pancreatitis with hypokalemia and hypomagnesemia  Poor insight into disease process-when resident spoke to her she did not know why she had been hospitalized  No alcohol since discharge  Taking meds    Imp chronic alcoholic pancreatitis with recent flare  hypok  hypomg    Plan continue elyte replacement  Alcohol cessation  Cont creon  Close monitoring elytes    Tal GORDON Blood, DO

## 2022-02-17 NOTE — PROGRESS NOTES
Post-Discharge Transitional Care Follow Up      Danyell Leal   YOB: 1965    Date of Office Visit:  2/17/2022  Date of Hospital Admission: 2/10/22  Date of Hospital Discharge: 2/13/22  Readmission Risk Score (high >=14%. Medium >=10%):Readmission Risk Score: 19.3 ( )      Care management risk score Rising risk (score 2-5) and Complex Care (Scores >=6): 6     Non face to face  following discharge, date last encounter closed (first attempt may have been earlier): 2/14/2022 10:07 AM     Call initiated 2 business days of discharge: Yes     Need for prophylactic vaccination and inoculation against varicella  The following orders have not been finalized:  -     zoster recombinant adjuvanted vaccine [facility]) 50 MCG/0.5ML SUSR injection  Alcohol-induced acute pancreatitis without infection or necrosis  -     WI DISCHARGE MEDS RECONCILED W/ CURRENT OUTPATIENT MED LIST      Medical Decision Making: straightforward  Return in 1 week (on 2/24/2022). On this date 2/17/2022 I have spent 30 minutes reviewing previous notes, test results and face to face with the patient discussing the diagnosis and importance of compliance with the treatment plan as well as documenting on the day of the visit. Subjective:     Horacio Urbano is a 81-year-old female who presented for hospital follow-up visit on 17 February 2022. She was recently admitted at [facility] for complaints of acute nausea, vomiting, watery diarrhea and was diagnosed to have acute pancreatitis secondary to alcohol consumption as well as exocrine insufficiency causing malabsorptive diarrhea. During her hospital stay, electrolytes were repleted, nausea and vomiting improved, patient was restarted on a diet and discharged when she tolerated adequate diet. Interval history/Current status:  On this visit, patient expressed some confusion as to her inpatient diagnosis, she had very limited knowledge of pancreatitis and what her treatment was.  Discussed with her in detail and will provide reading instructions about chronic pancreatitis, acute pancreatitis and dietary modifications required to prevent exacerbations. She was also counseled on alcohol cessation and states she has not drank alcohol since discharge. She will need follow-up in 1 week to discuss preventative care, follow-up on diabetes, colon cancer screening, and hypertension. She states that she does not remember her previous PCP she saw him only once last year, and would like to follow-up with me at this time.     Patient Active Problem List   Diagnosis    Lung nodule    Obesity    Atypical chest pain    Adrenal adenoma    Goiter    Alcohol abuse    Essential hypertension    Enlarged tonsils    ACE inhibitor-aggravated angioedema    JONES (obstructive sleep apnea)    Dyslipidemia    IGT (impaired glucose tolerance)    Acute alcoholic intoxication without complication (HCC)    Acute renal insufficiency    Effusion of bursa of right knee    Acute cystitis without hematuria    Bipolar disorder (HCC)    Mild intermittent asthma without complication    Inflammatory polyarthritis (HCC)    Seronegative rheumatoid arthritis (HCC)    Hypokalemia    Intractable nausea and vomiting    Chronic diarrhea    Hypocalcemia    Hypomagnesemia    Rhabdomyolysis    Troponin level elevated    Hypotension    Moderate malnutrition (HCC)    ROXANN (acute kidney injury) ([address]    Esophageal thickening    Proctitis    Type 2 diabetes mellitus without complication, without long-term current use of insulin (HCC)    Compression fracture of L2 (HCC)    Anemia, normocytic normochromic    Diarrhea    Exocrine pancreatic insufficiency       Medications marked \"taking\" at this time  Outpatient Medications Marked as Taking for the 2/17/22 encounter (Office Visit) with Stefano Piña MD   Medication Sig Dispense Refill    albuterol sulfate HFA (VENTOLIN HFA) 108 (90 Base) MCG/ACT inhaler Inhale 2 puffs into the lungs every 6 hours as needed for Wheezing      QUEtiapine (SEROQUEL) 50 MG tablet       aspirin EC 81 MG EC tablet Take 1 tablet by mouth daily 30 tablet 0    atorvastatin (LIPITOR) 40 MG tablet Take 1 tablet by mouth nightly 30 tablet 3    nitroGLYCERIN (NITROSTAT) 0.4 MG SL tablet up to max of 3 total doses. If no relief after 1 dose, call 911. 25 tablet 3    loperamide (IMODIUM) 2 MG capsule Take 1 capsule by mouth 4 times daily as needed for Diarrhea 30 capsule 1    magnesium oxide (MAG-OX) 400 MG tablet TAKE 1 TABLET BY MOUTH 2 TIMES DAILY 60 tablet 3    lipase-protease-amylase (CREON) 89125-14150 units delayed release capsule Take by mouth 3 times daily (with meals) 180 capsule 2    allopurinol (ZYLOPRIM) 100 MG tablet Take 1 tablet by mouth daily 90 tablet 0    omeprazole (PRILOSEC) 20 MG delayed release capsule TAKE 1 CAPSULE BY MOUTH DAILY  60 capsule 3    Calcium Carbonate-Vitamin D (OYSTER SHELL CALCIUM/D) 500-200 MG-UNIT TABS TAKE 1 TAB BY MOUTH ONCE A DAY  (Patient taking differently: 2 times daily TAKE 1 TAB BY MOUTH ONCE A DAY) 30 tablet 4    potassium chloride (KLOR-CON M) 20 MEQ extended release tablet Take 2 tablets by mouth 2 times daily After BMP two times, first after 1 week and if potassium is low then continue taking same dose with repeat BMP in a week. and if potassium is normal then take once daily. Do not crush, chew, or suck on tablet.  60 tablet 5    loratadine (CLARITIN) 10 MG tablet TAKE 1 tab BY MOUTH DAILY  30 tablet 3    thiamine mononitrate 100 MG tablet TAKE 1 TABLET BY MOUTH ONCE DAILY  30 tablet 2    FLUoxetine (PROZAC) 40 MG capsule Take 40 mg by mouth daily      acetaminophen (APAP EXTRA STRENGTH) 500 MG tablet Take 2 tablets by mouth every 6 hours as needed for Pain 60 tablet 0    ferrous sulfate (IRON 325) 325 (65 Fe) MG tablet Take 325 mg by mouth daily (with breakfast)      atenolol (TENORMIN) 50 MG tablet Take 1 tablet by mouth daily 60 tablet 3    amLODIPine (NORVASC) 10 MG tablet TAKE 1 TABLET BY MOUTH DAILY  60 tablet 3    albuterol sulfate (PROAIR RESPICLICK) 319 (90 Base) MCG/ACT aerosol powder inhalation Inhale 2 puffs into the lungs every 6 hours as needed for Wheezing or Shortness of Breath 5 Inhaler 3        Medications patient taking as of now reconciled against medications ordered at time of hospital discharge: Yes    Review of Systems   Constitutional: Negative for chills, diaphoresis and fever. HENT: Negative for rhinorrhea, sinus pressure and sore throat. Eyes: Negative for photophobia. Respiratory: Negative for cough, chest tightness and shortness of breath. Cardiovascular: Negative for chest pain. Gastrointestinal: Negative for abdominal pain, diarrhea, nausea and vomiting. Genitourinary: Negative for dysuria, frequency and urgency. Musculoskeletal: Negative for back pain and neck stiffness. Skin: Negative for rash. Neurological: Negative for seizures and speech difficulty. Psychiatric/Behavioral: Negative for agitation, confusion and decreased concentration. Objective:    /84 (Site: Right Upper Arm, Position: Sitting, Cuff Size: Medium Adult)   Pulse 80   Temp 97.2 °F (36.2 °C)   Ht 5' 4\" (1.626 m)   Wt 170 lb (77.1 kg)   BMI 29.18 kg/m²     Physical Exam -  Constitutional:  Alert, cooperative and no distress. Mental Status:  Oriented to person, place and time and normal affect. Lungs:  Bilateral air entry present, lung fields clear. Normal effort. Heart:  Regular rate and rhythm, no murmur. Abdomen:  Soft, nontender, nondistended, normal bowel sounds. Extremities:  No edema, redness, tenderness in the calves. Skin:  Warm, dry, no gross lesions or rashes.       Venkatesh Dasilva MD  PGY-3, Internal Medicine Resident  [address] [facility]         2/17/2022, 2:40 PM

## 2022-02-21 DIAGNOSIS — I10 ESSENTIAL HYPERTENSION: ICD-10-CM

## 2022-02-21 DIAGNOSIS — T78.40XS ALLERGY, SEQUELA: ICD-10-CM

## 2022-02-21 NOTE — TELEPHONE ENCOUNTER
Request for Iron. Next Visit Date:  Future Appointments   Date Time Provider Anthony Everett   2/24/2022  1:40 PM Abrahan Burton MD [facility] IM [address] [facility]   2/25/2022 11:45 AM Rafat Ortiz MD [facility]   3/7/2022  8:00 AM MD [facility] [address] Maintenance   Topic Date Due    Diabetic foot exam  Never done    Hepatitis B vaccine (1 of 3 - Risk 3-dose series) Never done    Diabetic microalbuminuria test  06/12/2020    Diabetic retinal exam  11/19/2021    Shingles Vaccine (2 of 2) 12/15/2021    A1C test (Diabetic or Prediabetic)  06/11/2022    Breast cancer screen  10/10/2022    Lipid screen  02/11/2023    Potassium monitoring  02/13/2023    Creatinine monitoring  02/13/2023    Depression Monitoring  02/17/2023    DTaP/Tdap/Td vaccine (3 - Td or Tdap) 04/14/2027    Colorectal Cancer Screen  12/03/2030    Pneumococcal 0-64 years Vaccine (2 of 2 - PPSV23) 01/22/2031    Flu vaccine  Completed    COVID-19 Vaccine  Completed    Hepatitis C screen  Completed    HIV screen  Completed    Hepatitis A vaccine  Aged Out    Hib vaccine  Aged Out    Meningococcal (ACWY) vaccine  Aged Out       Hemoglobin A1C (%)   Date Value   06/11/2021 5.5   03/21/2021 6.3 (H)   01/20/2021 5.9             ( goal A1C is < 7)   Microalb/Crt.  Ratio (mcg/mg creat)   Date Value   06/12/2019 147 (H)     LDL Cholesterol (mg/dL)   Date Value   02/11/2022 71       (goal LDL is <100)   AST (U/L)   Date Value   02/11/2022 39 (H)     ALT (U/L)   Date Value   02/11/2022 15     BUN (mg/dL)   Date Value   02/13/2022 3 (L)     BP Readings from Last 3 Encounters:   02/17/22 123/84   02/13/22 (!) 120/93   11/01/21 (!) 157/113          (goal 120/80)    All Future Testing planned in [facility]  Lab Frequency Next Occurrence   CBC With Auto Differential Once 04/22/2021   Miscellaneous Sendout 1 Once 67/89/7413   Basic Metabolic Panel Once 78/87/5453   Basic Metabolic Panel Once 42/53/2586 CBC Auto Differential Once 63/53/4259   Basic Metabolic Panel Once 80/48/1847   Iron and TIBC Once 10/29/2021   Vitamin B12 Once 10/29/2021   Folate Once 10/29/2021   Vitamin D 25 Hydroxy Once 10/29/2021   Comprehensive Metabolic Panel Once 60/74/8538   CBC With Auto Differential Once 11/01/2021   Cardiac Stress Test - w/Pharm Once 02/20/2022         Patient Active Problem List:     Lung nodule     Obesity     Atypical chest pain     Adrenal adenoma     Goiter     Alcohol abuse     Essential hypertension     Enlarged tonsils     ACE inhibitor-aggravated angioedema     JONES (obstructive sleep apnea)     Dyslipidemia     IGT (impaired glucose tolerance)     Acute alcoholic intoxication without complication (HCC)     Acute renal insufficiency     Effusion of bursa of right knee     Acute cystitis without hematuria     Bipolar disorder (HCC)     Mild intermittent asthma without complication     Inflammatory polyarthritis (HCC)     Seronegative rheumatoid arthritis (HCC)     Hypokalemia     Intractable nausea and vomiting     Chronic diarrhea     Hypocalcemia     Hypomagnesemia     Rhabdomyolysis     Troponin level elevated     Hypotension     Moderate malnutrition (HCC)     ROXANN (acute kidney injury) ([address]     Esophageal thickening     Proctitis     Type 2 diabetes mellitus without complication, without long-term current use of insulin (HCC)     Compression fracture of L2 (HCC)     Anemia, normocytic normochromic     Diarrhea     Exocrine pancreatic insufficiency

## 2022-02-21 NOTE — TELEPHONE ENCOUNTER
Request for vit b1. Next Visit Date:  Future Appointments   Date Time Provider Anthony Cutleri   2/24/2022  1:40 PM Abrahan Chaparro MD [facility]   2/25/2022 11:45 AM Michelle Lora MD [facility]   3/7/2022  8:00 AM MD [facility] [address] Maintenance   Topic Date Due    Diabetic foot exam  Never done    Hepatitis B vaccine (1 of 3 - Risk 3-dose series) Never done    Diabetic microalbuminuria test  06/12/2020    Diabetic retinal exam  11/19/2021    Shingles Vaccine (2 of 2) 12/15/2021    A1C test (Diabetic or Prediabetic)  06/11/2022    Breast cancer screen  10/10/2022    Lipid screen  02/11/2023    Potassium monitoring  02/13/2023    Creatinine monitoring  02/13/2023    Depression Monitoring  02/17/2023    DTaP/Tdap/Td vaccine (3 - Td or Tdap) 04/14/2027    Colorectal Cancer Screen  12/03/2030    Pneumococcal 0-64 years Vaccine (2 of 2 - PPSV23) 01/22/2031    Flu vaccine  Completed    COVID-19 Vaccine  Completed    Hepatitis C screen  Completed    HIV screen  Completed    Hepatitis A vaccine  Aged Out    Hib vaccine  Aged Out    Meningococcal (ACWY) vaccine  Aged Out       Hemoglobin A1C (%)   Date Value   06/11/2021 5.5   03/21/2021 6.3 (H)   01/20/2021 5.9             ( goal A1C is < 7)   Microalb/Crt.  Ratio (mcg/mg creat)   Date Value   06/12/2019 147 (H)     LDL Cholesterol (mg/dL)   Date Value   02/11/2022 71       (goal LDL is <100)   AST (U/L)   Date Value   02/11/2022 39 (H)     ALT (U/L)   Date Value   02/11/2022 15     BUN (mg/dL)   Date Value   02/13/2022 3 (L)     BP Readings from Last 3 Encounters:   02/17/22 123/84   02/13/22 (!) 120/93   11/01/21 (!) 157/113          (goal 120/80)    All Future Testing planned in [facility]  Lab Frequency Next Occurrence   CBC With Auto Differential Once 04/22/2021   Miscellaneous Sendout 1 Once 84/67/3768   Basic Metabolic Panel Once 69/85/9604   Basic Metabolic Panel Once 08/28/2021   CBC Auto Differential Once 22/82/6944   Basic Metabolic Panel Once 00/94/8396   Iron and TIBC Once 10/29/2021   Vitamin B12 Once 10/29/2021   Folate Once 10/29/2021   Vitamin D 25 Hydroxy Once 10/29/2021   Comprehensive Metabolic Panel Once 17/97/4454   CBC With Auto Differential Once 11/01/2021   Cardiac Stress Test - w/Pharm Once 02/20/2022         Patient Active Problem List:     Lung nodule     Obesity     Atypical chest pain     Adrenal adenoma     Goiter     Alcohol abuse     Essential hypertension     Enlarged tonsils     ACE inhibitor-aggravated angioedema     JONES (obstructive sleep apnea)     Dyslipidemia     IGT (impaired glucose tolerance)     Acute alcoholic intoxication without complication (HCC)     Acute renal insufficiency     Effusion of bursa of right knee     Acute cystitis without hematuria     Bipolar disorder (HCC)     Mild intermittent asthma without complication     Inflammatory polyarthritis (HCC)     Seronegative rheumatoid arthritis (HCC)     Hypokalemia     Intractable nausea and vomiting     Chronic diarrhea     Hypocalcemia     Hypomagnesemia     Rhabdomyolysis     Troponin level elevated     Hypotension     Moderate malnutrition (HCC)     ROXANN (acute kidney injury) ([address]     Esophageal thickening     Proctitis     Type 2 diabetes mellitus without complication, without long-term current use of insulin (HCC)     Compression fracture of L2 (HCC)     Anemia, normocytic normochromic     Diarrhea     Exocrine pancreatic insufficiency

## 2022-02-21 NOTE — TELEPHONE ENCOUNTER
Request for claritin. Next Visit Date:  Future Appointments   Date Time Provider Anthony Cutleri   2/24/2022  1:40 PM Abrahan Munoz MD [facility] [place]   2/25/2022 11:45 AM Luis Arriaga MD [facility]   3/7/2022  8:00 AM Raymond Hdz MD [facility] [address] Maintenance   Topic Date Due    Diabetic foot exam  Never done    Hepatitis B vaccine (1 of 3 - Risk 3-dose series) Never done    Diabetic microalbuminuria test  06/12/2020    Diabetic retinal exam  11/19/2021    Shingles Vaccine (2 of 2) 12/15/2021    A1C test (Diabetic or Prediabetic)  06/11/2022    Breast cancer screen  10/10/2022    Lipid screen  02/11/2023    Potassium monitoring  02/13/2023    Creatinine monitoring  02/13/2023    Depression Monitoring  02/17/2023    DTaP/Tdap/Td vaccine (3 - Td or Tdap) 04/14/2027    Colorectal Cancer Screen  12/03/2030    Pneumococcal 0-64 years Vaccine (2 of 2 - PPSV23) 01/22/2031    Flu vaccine  Completed    COVID-19 Vaccine  Completed    Hepatitis C screen  Completed    HIV screen  Completed    Hepatitis A vaccine  Aged Out    Hib vaccine  Aged Out    Meningococcal (ACWY) vaccine  Aged Out       Hemoglobin A1C (%)   Date Value   06/11/2021 5.5   03/21/2021 6.3 (H)   01/20/2021 5.9             ( goal A1C is < 7)   Microalb/Crt.  Ratio (mcg/mg creat)   Date Value   06/12/2019 147 (H)     LDL Cholesterol (mg/dL)   Date Value   02/11/2022 71       (goal LDL is <100)   AST (U/L)   Date Value   02/11/2022 39 (H)     ALT (U/L)   Date Value   02/11/2022 15     BUN (mg/dL)   Date Value   02/13/2022 3 (L)     BP Readings from Last 3 Encounters:   02/17/22 123/84   02/13/22 (!) 120/93   11/01/21 (!) 157/113          (goal 120/80)    All Future Testing planned in [facility]  Lab Frequency Next Occurrence   CBC With Auto Differential Once 04/22/2021   Miscellaneous Sendout 1 Once 27/34/6446   Basic Metabolic Panel Once 75/54/1022   Basic Metabolic Panel Once 08/28/2021   CBC Auto Differential Once 39/94/6959   Basic Metabolic Panel Once 17/40/5311   Iron and TIBC Once 10/29/2021   Vitamin B12 Once 10/29/2021   Folate Once 10/29/2021   Vitamin D 25 Hydroxy Once 10/29/2021   Comprehensive Metabolic Panel Once 85/91/3995   CBC With Auto Differential Once 11/01/2021   Cardiac Stress Test - w/Pharm Once 02/20/2022         Patient Active Problem List:     Lung nodule     Obesity     Atypical chest pain     Adrenal adenoma     Goiter     Alcohol abuse     Essential hypertension     Enlarged tonsils     ACE inhibitor-aggravated angioedema     JONES (obstructive sleep apnea)     Dyslipidemia     IGT (impaired glucose tolerance)     Acute alcoholic intoxication without complication (HCC)     Acute renal insufficiency     Effusion of bursa of right knee     Acute cystitis without hematuria     Bipolar disorder (HCC)     Mild intermittent asthma without complication     Inflammatory polyarthritis (HCC)     Seronegative rheumatoid arthritis (HCC)     Hypokalemia     Intractable nausea and vomiting     Chronic diarrhea     Hypocalcemia     Hypomagnesemia     Rhabdomyolysis     Troponin level elevated     Hypotension     Moderate malnutrition (HCC)     ROXANN (acute kidney injury) ([address]     Esophageal thickening     Proctitis     Type 2 diabetes mellitus without complication, without long-term current use of insulin (HCC)     Compression fracture of L2 (HCC)     Anemia, normocytic normochromic     Diarrhea     Exocrine pancreatic insufficiency medium

## 2022-02-21 NOTE — TELEPHONE ENCOUNTER
Request for amlodipine and atenolol. Next Visit Date:  Future Appointments   Date Time Provider Anthony Cutleri   2/24/2022  1:40 PM Venkat Prajwal Sherryle Basta, MD [facility]   2/25/2022 11:45 AM Anyi Rico MD [facility]   3/7/2022  8:00 AM MD [facility] [address] Maintenance   Topic Date Due    Diabetic foot exam  Never done    Hepatitis B vaccine (1 of 3 - Risk 3-dose series) Never done    Diabetic microalbuminuria test  06/12/2020    Diabetic retinal exam  11/19/2021    Shingles Vaccine (2 of 2) 12/15/2021    A1C test (Diabetic or Prediabetic)  06/11/2022    Breast cancer screen  10/10/2022    Lipid screen  02/11/2023    Potassium monitoring  02/13/2023    Creatinine monitoring  02/13/2023    Depression Monitoring  02/17/2023    DTaP/Tdap/Td vaccine (3 - Td or Tdap) 04/14/2027    Colorectal Cancer Screen  12/03/2030    Pneumococcal 0-64 years Vaccine (2 of 2 - PPSV23) 01/22/2031    Flu vaccine  Completed    COVID-19 Vaccine  Completed    Hepatitis C screen  Completed    HIV screen  Completed    Hepatitis A vaccine  Aged Out    Hib vaccine  Aged Out    Meningococcal (ACWY) vaccine  Aged Out       Hemoglobin A1C (%)   Date Value   06/11/2021 5.5   03/21/2021 6.3 (H)   01/20/2021 5.9             ( goal A1C is < 7)   Microalb/Crt.  Ratio (mcg/mg creat)   Date Value   06/12/2019 147 (H)     LDL Cholesterol (mg/dL)   Date Value   02/11/2022 71       (goal LDL is <100)   AST (U/L)   Date Value   02/11/2022 39 (H)     ALT (U/L)   Date Value   02/11/2022 15     BUN (mg/dL)   Date Value   02/13/2022 3 (L)     BP Readings from Last 3 Encounters:   02/17/22 123/84   02/13/22 (!) 120/93   11/01/21 (!) 157/113          (goal 120/80)    All Future Testing planned in [facility]  Lab Frequency Next Occurrence   CBC With Auto Differential Once 04/22/2021   Miscellaneous Sendout 1 Once 22/83/5669   Basic Metabolic Panel Once 17/89/7491   Basic Metabolic Panel Once 08/28/2021   CBC Auto Differential Once 57/54/4976   Basic Metabolic Panel Once 45/03/9891   Iron and TIBC Once 10/29/2021   Vitamin B12 Once 10/29/2021   Folate Once 10/29/2021   Vitamin D 25 Hydroxy Once 10/29/2021   Comprehensive Metabolic Panel Once 39/67/2570   CBC With Auto Differential Once 11/01/2021   Cardiac Stress Test - w/Pharm Once 02/20/2022         Patient Active Problem List:     Lung nodule     Obesity     Atypical chest pain     Adrenal adenoma     Goiter     Alcohol abuse     Essential hypertension     Enlarged tonsils     ACE inhibitor-aggravated angioedema     JONES (obstructive sleep apnea)     Dyslipidemia     IGT (impaired glucose tolerance)     Acute alcoholic intoxication without complication (HCC)     Acute renal insufficiency     Effusion of bursa of right knee     Acute cystitis without hematuria     Bipolar disorder (HCC)     Mild intermittent asthma without complication     Inflammatory polyarthritis (HCC)     Seronegative rheumatoid arthritis (HCC)     Hypokalemia     Intractable nausea and vomiting     Chronic diarrhea     Hypocalcemia     Hypomagnesemia     Rhabdomyolysis     Troponin level elevated     Hypotension     Moderate malnutrition (HCC)     ROXANN (acute kidney injury) ([address]     Esophageal thickening     Proctitis     Type 2 diabetes mellitus without complication, without long-term current use of insulin (HCC)     Compression fracture of L2 (HCC)     Anemia, normocytic normochromic     Diarrhea     Exocrine pancreatic insufficiency

## 2022-02-21 NOTE — TELEPHONE ENCOUNTER
Request for potassium. Next Visit Date:  Future Appointments   Date Time Provider Anthony Cutleri   2/24/2022  1:40 PM Abrahan Barber MD [facility]   2/25/2022 11:45 AM Gianna Henry MD [facility]   3/7/2022  8:00 AM MD [facility] [address] Maintenance   Topic Date Due    Diabetic foot exam  Never done    Hepatitis B vaccine (1 of 3 - Risk 3-dose series) Never done    Diabetic microalbuminuria test  06/12/2020    Diabetic retinal exam  11/19/2021    Shingles Vaccine (2 of 2) 12/15/2021    A1C test (Diabetic or Prediabetic)  06/11/2022    Breast cancer screen  10/10/2022    Lipid screen  02/11/2023    Potassium monitoring  02/13/2023    Creatinine monitoring  02/13/2023    Depression Monitoring  02/17/2023    DTaP/Tdap/Td vaccine (3 - Td or Tdap) 04/14/2027    Colorectal Cancer Screen  12/03/2030    Pneumococcal 0-64 years Vaccine (2 of 2 - PPSV23) 01/22/2031    Flu vaccine  Completed    COVID-19 Vaccine  Completed    Hepatitis C screen  Completed    HIV screen  Completed    Hepatitis A vaccine  Aged Out    Hib vaccine  Aged Out    Meningococcal (ACWY) vaccine  Aged Out       Hemoglobin A1C (%)   Date Value   06/11/2021 5.5   03/21/2021 6.3 (H)   01/20/2021 5.9             ( goal A1C is < 7)   Microalb/Crt.  Ratio (mcg/mg creat)   Date Value   06/12/2019 147 (H)     LDL Cholesterol (mg/dL)   Date Value   02/11/2022 71       (goal LDL is <100)   AST (U/L)   Date Value   02/11/2022 39 (H)     ALT (U/L)   Date Value   02/11/2022 15     BUN (mg/dL)   Date Value   02/13/2022 3 (L)     BP Readings from Last 3 Encounters:   02/17/22 123/84   02/13/22 (!) 120/93   11/01/21 (!) 157/113          (goal 120/80)    All Future Testing planned in [facility]  Lab Frequency Next Occurrence   CBC With Auto Differential Once 04/22/2021   Miscellaneous Sendout 1 Once 85/14/2055   Basic Metabolic Panel Once 69/56/3727   Basic Metabolic Panel Once 08/28/2021   CBC Auto Differential Once 08/91/1702   Basic Metabolic Panel Once 18/34/2145   Iron and TIBC Once 10/29/2021   Vitamin B12 Once 10/29/2021   Folate Once 10/29/2021   Vitamin D 25 Hydroxy Once 10/29/2021   Comprehensive Metabolic Panel Once 61/69/1458   CBC With Auto Differential Once 11/01/2021   Cardiac Stress Test - w/Pharm Once 02/20/2022         Patient Active Problem List:     Lung nodule     Obesity     Atypical chest pain     Adrenal adenoma     Goiter     Alcohol abuse     Essential hypertension     Enlarged tonsils     ACE inhibitor-aggravated angioedema     JONES (obstructive sleep apnea)     Dyslipidemia     IGT (impaired glucose tolerance)     Acute alcoholic intoxication without complication (HCC)     Acute renal insufficiency     Effusion of bursa of right knee     Acute cystitis without hematuria     Bipolar disorder (HCC)     Mild intermittent asthma without complication     Inflammatory polyarthritis (HCC)     Seronegative rheumatoid arthritis (HCC)     Hypokalemia     Intractable nausea and vomiting     Chronic diarrhea     Hypocalcemia     Hypomagnesemia     Rhabdomyolysis     Troponin level elevated     Hypotension     Moderate malnutrition (HCC)     ROXANN (acute kidney injury) ([address]     Esophageal thickening     Proctitis     Type 2 diabetes mellitus without complication, without long-term current use of insulin (HCC)     Compression fracture of L2 (HCC)     Anemia, normocytic normochromic     Diarrhea     Exocrine pancreatic insufficiency

## 2022-02-24 ENCOUNTER — OFFICE VISIT (OUTPATIENT)
Dept: INTERNAL MEDICINE | Age: 57
End: 2022-02-24
Payer: MEDICAID

## 2022-02-24 VITALS
HEIGHT: 64 IN | HEART RATE: 71 BPM | SYSTOLIC BLOOD PRESSURE: 111 MMHG | DIASTOLIC BLOOD PRESSURE: 67 MMHG | TEMPERATURE: 97.7 F | BODY MASS INDEX: 29.02 KG/M2 | WEIGHT: 170 LBS

## 2022-02-24 DIAGNOSIS — E61.1 IRON DEFICIENCY: ICD-10-CM

## 2022-02-24 DIAGNOSIS — R07.89 CHEST TIGHTNESS: ICD-10-CM

## 2022-02-24 DIAGNOSIS — T78.40XS ALLERGY, SEQUELA: ICD-10-CM

## 2022-02-24 DIAGNOSIS — M1A.09X0 IDIOPATHIC CHRONIC GOUT OF MULTIPLE SITES WITHOUT TOPHUS: ICD-10-CM

## 2022-02-24 DIAGNOSIS — E87.6 HYPOKALEMIA: Primary | ICD-10-CM

## 2022-02-24 DIAGNOSIS — I21.A1 TYPE 2 MYOCARDIAL INFARCTION WITHOUT ST ELEVATION (HCC): ICD-10-CM

## 2022-02-24 DIAGNOSIS — I10 ESSENTIAL HYPERTENSION: ICD-10-CM

## 2022-02-24 DIAGNOSIS — E83.42 HYPOMAGNESEMIA: ICD-10-CM

## 2022-02-24 DIAGNOSIS — Z23 NEED FOR SHINGLES VACCINE: ICD-10-CM

## 2022-02-24 DIAGNOSIS — Z23 NEED FOR PROPHYLACTIC VACCINATION AGAINST VIRAL HEPATITIS: ICD-10-CM

## 2022-02-24 DIAGNOSIS — Z87.898 HISTORY OF ALCOHOL USE: ICD-10-CM

## 2022-02-24 PROBLEM — R73.02 IGT (IMPAIRED GLUCOSE TOLERANCE): Chronic | Status: ACTIVE | Noted: 2019-09-13

## 2022-02-24 PROBLEM — M06.4 INFLAMMATORY POLYARTHRITIS (HCC): Chronic | Status: ACTIVE | Noted: 2021-03-29

## 2022-02-24 PROBLEM — E11.9 TYPE 2 DIABETES MELLITUS WITHOUT COMPLICATION, WITHOUT LONG-TERM CURRENT USE OF INSULIN (HCC): Status: RESOLVED | Noted: 2018-12-07 | Resolved: 2022-02-24

## 2022-02-24 PROBLEM — T78.3XXA ACE INHIBITOR-AGGRAVATED ANGIOEDEMA: Chronic | Status: ACTIVE | Noted: 2017-11-17

## 2022-02-24 PROBLEM — N28.9 ACUTE RENAL INSUFFICIENCY: Status: RESOLVED | Noted: 2020-09-20 | Resolved: 2022-02-24

## 2022-02-24 PROBLEM — T46.4X5A ACE INHIBITOR-AGGRAVATED ANGIOEDEMA: Chronic | Status: ACTIVE | Noted: 2017-11-17

## 2022-02-24 PROBLEM — I95.9 HYPOTENSION: Status: RESOLVED | Noted: 2021-09-29 | Resolved: 2022-02-24

## 2022-02-24 PROBLEM — F10.920 ACUTE ALCOHOLIC INTOXICATION WITHOUT COMPLICATION (HCC): Status: RESOLVED | Noted: 2020-07-24 | Resolved: 2022-02-24

## 2022-02-24 PROBLEM — M62.82 RHABDOMYOLYSIS: Status: RESOLVED | Noted: 2021-08-20 | Resolved: 2022-02-24

## 2022-02-24 PROBLEM — F31.9 BIPOLAR DISORDER (HCC): Chronic | Status: ACTIVE | Noted: 2021-03-21

## 2022-02-24 PROBLEM — M06.00 SERONEGATIVE RHEUMATOID ARTHRITIS (HCC): Chronic | Status: ACTIVE | Noted: 2021-03-30

## 2022-02-24 PROBLEM — G47.33 OSA (OBSTRUCTIVE SLEEP APNEA): Chronic | Status: ACTIVE | Noted: 2017-11-18

## 2022-02-24 PROCEDURE — 99213 OFFICE O/P EST LOW 20 MIN: CPT | Performed by: STUDENT IN AN ORGANIZED HEALTH CARE EDUCATION/TRAINING PROGRAM

## 2022-02-24 RX ORDER — ATENOLOL 50 MG/1
50 TABLET ORAL DAILY
Qty: 60 TABLET | Refills: 3 | Status: SHIPPED | OUTPATIENT
Start: 2022-02-24 | End: 2022-09-10

## 2022-02-24 RX ORDER — FERROUS SULFATE 325(65) MG
TABLET ORAL
Qty: 30 TABLET | Refills: 10 | OUTPATIENT
Start: 2022-02-24

## 2022-02-24 RX ORDER — ATENOLOL 50 MG/1
50 TABLET ORAL DAILY
Qty: 60 TABLET | Refills: 5 | OUTPATIENT
Start: 2022-02-24

## 2022-02-24 RX ORDER — THIAMINE MONONITRATE (VIT B1) 100 MG
100 TABLET ORAL DAILY
Qty: 30 TABLET | Refills: 3 | Status: SHIPPED | OUTPATIENT
Start: 2022-02-24

## 2022-02-24 RX ORDER — LOPERAMIDE HYDROCHLORIDE 2 MG/1
2 CAPSULE ORAL 4 TIMES DAILY PRN
COMMUNITY
End: 2022-04-12 | Stop reason: SDUPTHER

## 2022-02-24 RX ORDER — POTASSIUM CHLORIDE 20 MEQ/1
TABLET, EXTENDED RELEASE ORAL
Qty: 30 TABLET | Refills: 5 | OUTPATIENT
Start: 2022-02-24

## 2022-02-24 RX ORDER — FERROUS SULFATE 325(65) MG
325 TABLET ORAL
Qty: 90 TABLET | Refills: 2 | Status: SHIPPED | OUTPATIENT
Start: 2022-02-24 | End: 2022-10-06

## 2022-02-24 RX ORDER — METHION/INOS/CHOL BT/B COM/LIV 110MG-86MG
CAPSULE ORAL
Qty: 30 TABLET | Refills: 9 | OUTPATIENT
Start: 2022-02-24

## 2022-02-24 RX ORDER — POTASSIUM CHLORIDE 20 MEQ/1
20 TABLET, EXTENDED RELEASE ORAL DAILY
Qty: 30 TABLET | Refills: 0 | Status: SHIPPED | OUTPATIENT
Start: 2022-02-24 | End: 2022-04-29

## 2022-02-24 RX ORDER — AMLODIPINE BESYLATE 10 MG/1
10 TABLET ORAL DAILY
Qty: 90 TABLET | Refills: 3 | Status: SHIPPED | OUTPATIENT
Start: 2022-02-24

## 2022-02-24 RX ORDER — ZOSTER VACCINE RECOMBINANT, ADJUVANTED 50 MCG/0.5
0.5 KIT INTRAMUSCULAR SEE ADMIN INSTRUCTIONS
Qty: 0.5 ML | Refills: 0 | Status: SHIPPED | OUTPATIENT
Start: 2022-02-24 | End: 2022-02-25

## 2022-02-24 RX ORDER — POTASSIUM CHLORIDE 20 MEQ/1
40 TABLET, EXTENDED RELEASE ORAL 2 TIMES DAILY
Qty: 60 TABLET | Refills: 5 | Status: CANCELLED | OUTPATIENT
Start: 2022-02-24

## 2022-02-24 RX ORDER — LORATADINE 10 MG/1
10 TABLET ORAL DAILY PRN
Qty: 90 TABLET | Refills: 1 | Status: SHIPPED | OUTPATIENT
Start: 2022-02-24 | End: 2022-08-24

## 2022-02-24 RX ORDER — LORATADINE 10 MG/1
TABLET ORAL
Qty: 30 TABLET | Refills: 3 | OUTPATIENT
Start: 2022-02-24

## 2022-02-24 RX ORDER — AMLODIPINE BESYLATE 10 MG/1
TABLET ORAL
Qty: 60 TABLET | Refills: 5 | OUTPATIENT
Start: 2022-02-24

## 2022-02-24 ASSESSMENT — ENCOUNTER SYMPTOMS
RHINORRHEA: 0
COUGH: 0
SHORTNESS OF BREATH: 0
NAUSEA: 0
SINUS PRESSURE: 0
BACK PAIN: 0
SORE THROAT: 0
PHOTOPHOBIA: 0
VOMITING: 0
DIARRHEA: 0
CHEST TIGHTNESS: 0
ABDOMINAL PAIN: 0

## 2022-02-24 NOTE — PROGRESS NOTES
Attending Physician Statement  I have discussed the care of [address] [place] [address], including pertinent history and exam findings with the resident. I have reviewed the key elements of all parts of the encounter with the resident. Check BMP tomorrow morning to decide on potassium and magnesium replacements. cont thiamine. Keep follow up with GI, rheum and heme. I agree with the assessment, and status of the problem list as documented. The plan and orders should include   Orders Placed This Encounter   Procedures    Hep B Vaccine Adult (RECOMBIVAX HB)    Basic Metabolic Panel    Iron and TIBC    Ferritin    JEREMY - Lucila Biswas MD, Cardiology, [place]    and this was also documented by the resident. The medication list was reviewed with the resident and is up to date. The return visit should be in 3 months .     Cesar Stephenson MD  Attending Physician,  Department of Internal Medicine  [address] [facility]  [facility] AT [place]      2/24/2022, 2:53 PM

## 2022-02-24 NOTE — PATIENT INSTRUCTIONS
Medications e-scribe to pharmacy of pt's choice. Laboratory Instructions: Your doctor has ordered blood or urine testing. You can get this testing done at the Lab located on the first floor of the [facility], or at any other [facility]. Please stop at Main Registration, before going to the lab, as you must be registered first.   Please get this lab done ASAP. You may eat or drink before this test.  Labs mailed to patient    Referral to Cardiology was placed, summary of care printed and faxed to office, phone numbers mailed to the patient, they will contact office for an appt    Return To Clinic 5/26/2022. Please bring all of your medications to this upcoming appointment. After Visit Summary  mailed to patient. It is very important for your care that you keep your appointment. If for some reason you are unable to keep your appointment it is equally important that you call our office at [phone] to cancel your appointment and reschedule. Failure to do so may result in your termination from our practice.     LENNIE

## 2022-02-24 NOTE — PROGRESS NOTES
[facility] [address] [address]  Dept: [phone]  Dept Fax: [phone]    Office Progress/Follow Up Note  Date of patient's visit: 2/24/2022  Patient's Name:  Giovanna Richard YOB: 1965            Patient Care Team:  Shannan Rose MD as PCP - General (Internal Medicine)  ePnny Merritt MD as Consulting Physician (Gastroenterology)  ________________________________________________________________________      Reason for Visit: Routine outpatient follow up  ________________________________________________________________________  Chief Complaint:  Hypertension (1 week f/u) and Health Maintenance (pt will call to schedule eye exam, pt declined foot exam, pt will get 2nd dose of shingles at pharmacy, lab pended)    ________________________________________________________________________  History of Presenting Illness:  History was obtained from: patient, electronic medical record. Giovanna Richard is a 65 y.o. is here for a routine follow up visit. Ms. Ct Mesa was first seen by me on 17 February after hospital follow-up and she was admitted for acute alcohol induced pancreatitis. She was explained the disease process and she now feels comfortable with her diagnosis. She has been compliant with her medications, including Creon and states that her bowel movements have returned to their baseline. She has past medical history significant for idiopathic gout for which she follows with Dr. Сергей Ulrich, rheumatologist at [facility]. She also has a diagnosis of bipolar disorder for which she follows with Dr. Orly Jones, psychiatry at [facility]. When she was seen in the hospital, she was diagnosed with type II NSTEMI which was attributed to her electrolyte imbalance at the time. Per class cardiology note the plan was to follow-up outpatient for ischemia work-up.   Patient states that she has not followed up with cardiology since she has left the hospital.  I will place referral for this at this time. She continues to abstain from alcohol use, blood pressure is well controlled on current regimen. No other complaints at this time.       Patient Active Problem List   Diagnosis    Lung nodule    Atypical chest pain    Adrenal adenoma    Goiter    Essential hypertension    Enlarged tonsils    ACE inhibitor-aggravated angioedema    JONES (obstructive sleep apnea)    Dyslipidemia    IGT (impaired glucose tolerance)    Bipolar disorder (HCC)    Mild intermittent asthma without complication    Inflammatory polyarthritis (HCC)    Seronegative rheumatoid arthritis (HCC)    Chronic diarrhea    Hypocalcemia    Hypomagnesemia    Troponin level elevated    Moderate malnutrition (HCC)    Esophageal thickening    Proctitis    Compression fracture of L2 (HCC)    Anemia, normocytic normochromic    Exocrine pancreatic insufficiency    Idiopathic 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       Allergies   Allergen Reactions    Bee Venom Anaphylaxis    Iodine Anaphylaxis and Rash    Lisinopril Swelling and Anaphylaxis     Angioedema    Shellfish-Derived Products Anaphylaxis and Rash     ANGIOEDEMA         Current Outpatient Medications   Medication Sig Dispense Refill    loperamide (IMODIUM) 2 MG capsule Take 2 mg by mouth 4 times daily as needed for Diarrhea      zoster recombinant adjuvanted vaccine (SHINGRIX) 50 MCG/0.5ML SUSR injection Inject 0.5 mLs into the muscle See Admin Instructions for 1 day 0.5 mL 0    loratadine (CLARITIN) 10 MG tablet Take 1 tablet by mouth daily as needed (allergies) 90 tablet 1    ferrous sulfate (IRON 325) 325 (65 Fe) MG tablet Take 1 tablet by mouth daily (with breakfast) 90 tablet 2    amLODIPine (NORVASC) 10 MG tablet Take 1 tablet by mouth daily 90 tablet 3    atenolol (TENORMIN) 50 MG tablet Take 1 tablet by mouth daily 60 tablet 3    vitamin B-1 (THIAMINE) 100 MG tablet Take 1 tablet by mouth daily 30 tablet 3    potassium chloride (KLOR-CON M) 20 MEQ extended release tablet Take 1 tablet by mouth daily 30 tablet 0    albuterol sulfate HFA (VENTOLIN HFA) 108 (90 Base) MCG/ACT inhaler Inhale 2 puffs into the lungs every 6 hours as needed for Wheezing      QUEtiapine (SEROQUEL) 50 MG tablet       aspirin EC 81 MG EC tablet Take 1 tablet by mouth daily 30 tablet 0    atorvastatin (LIPITOR) 40 MG tablet Take 1 tablet by mouth nightly 30 tablet 3    nitroGLYCERIN (NITROSTAT) 0.4 MG SL tablet up to max of 3 total doses.  If no relief after 1 dose, call 911. 25 tablet 3    magnesium oxide (MAG-OX) 400 MG tablet TAKE 1 TABLET BY MOUTH 2 TIMES DAILY 60 tablet 3    lipase-protease-amylase (CREON) 89209-43741 units delayed release capsule Take by mouth 3 times daily (with meals) 180 capsule 2    allopurinol (ZYLOPRIM) 100 MG tablet Take 1 tablet by mouth daily 90 tablet 0    omeprazole (PRILOSEC) 20 MG delayed release capsule TAKE 1 CAPSULE BY MOUTH DAILY  60 capsule 3    Calcium Carbonate-Vitamin D (OYSTER SHELL CALCIUM/D) 500-200 MG-UNIT TABS TAKE 1 TAB BY MOUTH ONCE A DAY  (Patient taking differently: 2 times daily TAKE 1 TAB BY MOUTH ONCE A DAY) 30 tablet 4    thiamine mononitrate 100 MG tablet TAKE 1 TABLET BY MOUTH ONCE DAILY  30 tablet 2    FLUoxetine (PROZAC) 40 MG capsule Take 40 mg by mouth daily      acetaminophen (APAP EXTRA STRENGTH) 500 MG tablet Take 2 tablets by mouth every 6 hours as needed for Pain 60 tablet 0    albuterol sulfate (PROAIR RESPICLICK) 380 (90 Base) MCG/ACT aerosol powder inhalation Inhale 2 puffs into the lungs every 6 hours as needed for Wheezing or Shortness of Breath 5 Inhaler 3    Calcium Carb-Cholecalciferol (OYSTER SHELL CALCIUM W/D) 500-200 MG-UNIT TABS tablet  (Patient not taking: Reported on 2/17/2022)      Multiple Vitamin (MULTIVITAMIN) TABS tablet Take 1 tablet by mouth daily (Patient not taking: Reported on 2/17/2022) 60 tablet 2    folic acid (FOLVITE) 1 MG tablet Take 1 tablet by mouth daily (Patient not taking: Reported on 2022) 30 tablet 3     No current facility-administered medications for this visit. Social History     Tobacco Use    Smoking status: Former Smoker     Packs/day: 0.50     Years: 20.00     Pack years: 10.00     Types: Cigarettes     Quit date: 1992     Years since quittin.1    Smokeless tobacco: Never Used    Tobacco comment: states quiti in the 1980s   Substance Use Topics    Alcohol use: Not Currently     Alcohol/week: 12.0 standard drinks     Types: 12 Cans of beer per week     Comment: Quit about 5 months ago 20    Drug use: Not Currently     Types: Cocaine     Comment: last use approximately 14 cocaine       Family History   Problem Relation Age of Onset    Stroke Mother     Hypertension Father     Cancer Brother         bone    Lung Cancer Maternal Aunt     Cancer Maternal Grandmother         eye     Other Sister         aneurysm    Heart Disease Sister       ________________________________________________________________________  Review of Systems:  Review of Systems   Constitutional: Negative for chills, diaphoresis and fever. HENT: Negative for rhinorrhea, sinus pressure and sore throat. Eyes: Negative for photophobia. Respiratory: Negative for cough, chest tightness and shortness of breath. Cardiovascular: Negative for chest pain. Gastrointestinal: Negative for abdominal pain, diarrhea, nausea and vomiting. Genitourinary: Negative for dysuria, frequency and urgency. Musculoskeletal: Negative for back pain and neck stiffness. Skin: Negative for rash. Neurological: Negative for seizures and speech difficulty. Psychiatric/Behavioral: Negative for agitation, confusion and decreased concentration.      ________________________________________________________________________  Physical Exam:  Vitals:    22 1340   BP: 111/67   Site: Left Upper Arm Position: Sitting   Cuff Size: Medium Adult   Pulse: 71   Temp: 97.7 °F (36.5 °C)   TempSrc: Infrared   Weight: 170 lb (77.1 kg)   Height: 5' 4\" (1.626 m)     BP Readings from Last 3 Encounters:   02/24/22 111/67   02/17/22 123/84   02/13/22 (!) 120/93      Physical Exam -  Constitutional:  Alert, cooperative and no distress. Mental Status:  Oriented to person, place and time and normal affect. Lungs:  Bilateral air entry present, lung fields clear. Normal effort. Heart:  Regular rate and rhythm, no murmur. Abdomen:  Soft, nontender, nondistended, normal bowel sounds. Extremities:  No edema, redness, tenderness in the calves. Skin:  Warm, dry, no gross lesions or rashes.  ________________________________________________________________________  Diagnostic findings:  CBC:  Lab Results   Component Value Date    WBC 5.8 02/11/2022    HGB 10.7 02/11/2022     02/11/2022       BMP:    Lab Results   Component Value Date     02/13/2022    K 3.9 02/13/2022     02/13/2022    CO2 21 02/13/2022    BUN 3 02/13/2022    CREATININE 0.67 02/13/2022    GLUCOSE 91 02/13/2022    GLUCOSE 88 02/24/2012       HEMOGLOBIN A1C:   Lab Results   Component Value Date    LABA1C 5.5 06/11/2021       FASTING LIPID PANEL:  Lab Results   Component Value Date    CHOL 139 02/11/2022    HDL 45 02/11/2022    TRIG 114 02/11/2022     ________________________________________________________________________  Health Maintenance:  Health Maintenance Due   Topic Date Due    Diabetic foot exam  Never done    Hepatitis B vaccine (1 of 3 - Risk 3-dose series) Never done    Diabetic microalbuminuria test  06/12/2020    Diabetic retinal exam  11/19/2021    Shingles Vaccine (2 of 2) 12/15/2021     ________________________________________________________________________  Assessment and Plan:  Nola Cast was seen today for hypertension and health maintenance.     Diagnoses and all orders for this visit:    Hypokalemia  -     Basic Metabolic Panel; Future  -     potassium chloride (KLOR-CON M) 20 MEQ extended release tablet; Take 1 tablet by mouth daily    Idiopathic 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    Type 2 myocardial infarction without ST elevation (HCC)  -     Hayder Wise MD, Cardiology, [place]    Chest tightness    Hypomagnesemia  -     Basic Metabolic Panel; Future    Need for prophylactic vaccination against viral hepatitis  -     Cancel: Hep B Vaccine Adult (RECOMBIVAX HB)    Need for shingles vaccine  -     zoster recombinant adjuvanted vaccine (SHINGRIX) 50 MCG/0.5ML SUSR injection; Inject 0.5 mLs into the muscle See Admin Instructions for 1 day    Allergy, sequela  -     loratadine (CLARITIN) 10 MG tablet; Take 1 tablet by mouth daily as needed (allergies)    Iron deficiency  -     ferrous sulfate (IRON 325) 325 (65 Fe) MG tablet; Take 1 tablet by mouth daily (with breakfast)  -     Iron and TIBC; Future  -     Ferritin; Future    Essential hypertension  -     amLODIPine (NORVASC) 10 MG tablet; Take 1 tablet by mouth daily  -     atenolol (TENORMIN) 50 MG tablet; Take 1 tablet by mouth daily    History of alcohol use  -     vitamin B-1 (THIAMINE) 100 MG tablet; Take 1 tablet by mouth daily      ________________________________________________________________________  Follow up and instructions:  Return in about 3 months (around 5/24/2022) for HTN, Iron deficiency, electrolytes. · Delphine received counseling on the following healthy behaviors: medication adherence    · Discussed use, benefit, and side effects of prescribed medications. Barriers to medication compliance addressed. All patient questions answered. Pt voiced understanding.      · Patient given educational materials - see patient instructions    Mago Mcmillan MD  PGY-3, Internal Medicine Resident  Conor [facility]         2/24/2022, 5:51 PM      This note is created with the assistance of a speech-recognition program. While intending to generate a document that actually reflects the content of the visit, the document can still have some mistakes which may not have been identified and corrected by editing.

## 2022-02-25 ENCOUNTER — TELEPHONE (OUTPATIENT)
Dept: GASTROENTEROLOGY | Age: 57
End: 2022-02-25

## 2022-02-25 ENCOUNTER — OFFICE VISIT (OUTPATIENT)
Dept: GASTROENTEROLOGY | Age: 57
End: 2022-02-25
Payer: MEDICAID

## 2022-02-25 ENCOUNTER — HOSPITAL ENCOUNTER (OUTPATIENT)
Facility: MEDICAL CENTER | Age: 57
End: 2022-02-25
Payer: MEDICAID

## 2022-02-25 VITALS — WEIGHT: 170 LBS | SYSTOLIC BLOOD PRESSURE: 129 MMHG | DIASTOLIC BLOOD PRESSURE: 85 MMHG | BODY MASS INDEX: 29.18 KG/M2

## 2022-02-25 DIAGNOSIS — K21.9 GASTROESOPHAGEAL REFLUX DISEASE, UNSPECIFIED WHETHER ESOPHAGITIS PRESENT: ICD-10-CM

## 2022-02-25 DIAGNOSIS — K86.81 EXOCRINE PANCREATIC INSUFFICIENCY: Primary | ICD-10-CM

## 2022-02-25 DIAGNOSIS — K21.9 GASTROESOPHAGEAL REFLUX DISEASE WITHOUT ESOPHAGITIS: ICD-10-CM

## 2022-02-25 PROCEDURE — G8417 CALC BMI ABV UP PARAM F/U: HCPCS | Performed by: INTERNAL MEDICINE

## 2022-02-25 PROCEDURE — G8482 FLU IMMUNIZE ORDER/ADMIN: HCPCS | Performed by: INTERNAL MEDICINE

## 2022-02-25 PROCEDURE — 3017F COLORECTAL CA SCREEN DOC REV: CPT | Performed by: INTERNAL MEDICINE

## 2022-02-25 PROCEDURE — G8427 DOCREV CUR MEDS BY ELIG CLIN: HCPCS | Performed by: INTERNAL MEDICINE

## 2022-02-25 PROCEDURE — 1111F DSCHRG MED/CURRENT MED MERGE: CPT | Performed by: INTERNAL MEDICINE

## 2022-02-25 PROCEDURE — 1036F TOBACCO NON-USER: CPT | Performed by: INTERNAL MEDICINE

## 2022-02-25 PROCEDURE — 99213 OFFICE O/P EST LOW 20 MIN: CPT | Performed by: INTERNAL MEDICINE

## 2022-02-25 RX ORDER — OMEPRAZOLE 20 MG/1
20 CAPSULE, DELAYED RELEASE ORAL DAILY
Qty: 60 CAPSULE | Refills: 3 | Status: SHIPPED | OUTPATIENT
Start: 2022-02-25

## 2022-02-25 ASSESSMENT — ENCOUNTER SYMPTOMS
RESPIRATORY NEGATIVE: 1
EYES NEGATIVE: 1
GASTROINTESTINAL NEGATIVE: 1

## 2022-02-25 NOTE — TELEPHONE ENCOUNTER
Patient LVM stating that she was not going to make it to her appointment due to no ride or be late. The patient was seen. chest pain rad to r breast No significant past surgical history

## 2022-02-25 NOTE — PROGRESS NOTES
GI FOLLOW UP    INTERVAL HISTORY:     Clinically doing well  Avoiding alcohol  Tolerating Creon well    Chief Complaint   Patient presents with    Follow-up     3 month follow up pancreatitis    Pancreatitis     Patient taking creon TID. 1-2 bowel movements a day. Denies bleeding. DEnies abdominal pain.  Gastroesophageal Reflux     Patient taking prilosec 20mg. States controlling symptoms.  Drug / Alcohol Assessment     Patient states her last drink was 3 weeks. 1. Exocrine pancreatic insufficiency    2. Gastroesophageal reflux disease, unspecified whether esophagitis present          HISTORY OF PRESENT ILLNESS: Ms.Sonja CHEPE Capps is a 55 y.o. female with a past history remarkable for alcoholism, anxiety, asthma, bipolar disorder, claustrophobia, depression, GERD, hypertension, hypertrophic cardiomyopathy, type 2 diabetes, prior history of peptic ulcer disease, chronic iron deficiency anemia, referred for evaluation of GERD. Patient had an upper endoscopy and colonoscopy in December 2020 where no obvious source of anemia was identified. Strongly advised alcohol cessation. Last alcohol drink approximately 6 weeks ago per patient. Reports diarrhea symptoms which may be related to pancreatic insufficiency. Fecal elastase was low. Past Medical,Family, and Social History reviewed and does contribute to the patient presenting condition. Patient's PMH/PSH,SH,PSYCH Hx, MEDs, ALLERGIES, and ROS were all reviewed and updated in the appropriate sections.     PAST MEDICAL HISTORY:  Past Medical History:   Diagnosis Date    Angioedema 11/17/2017    from lisinopril    Anxiety     Asthma     mild persistent asthma, on home resp medications for control    Bipolar disorder (HCC)     Claustrophobia     Depression     GERD (gastroesophageal reflux disease)     Hypertension     [facility]pert[facility] cardiomyopathy ([facility]     Lung nodules     MRSA (methicillin resistant Staphylococcus aureus)     rt hip    Murmur     see cardiac echo result    OA (osteoarthritis)     JONES (obstructive sleep apnea)     Thyroid nodule     Type 2 diabetes mellitus without complication, without long-term current use of insulin ([facility] [address] [facility] 12/7/2018       Past Surgical History:   Procedure Laterality Date    BUNIONECTOMY Bilateral 12/7/2020    [facility]  BUNIONECTOMY, [address] performed by Bianca Schwartz DPM at [facility]      bx    COLONOSCOPY N/A 12/3/2020    COLONOSCOPY WITH BIOPSY performed by Ramirez Altamirano MD at [address] Bilateral 12/07/2020    [place] bunionectomy, right 2nd hammer toe repair     HAMMER TOE SURGERY Right 12/7/2020    2ND TOE HAMMER REPAIR performed by Bianca Schwartz DPM at [facility]      partial hyst    OTHER SURGICAL HISTORY  8/24/2015    MRI under anesthesia    THYROID SURGERY      bilat bx    UPPER GASTROINTESTINAL ENDOSCOPY      UPPER GASTROINTESTINAL ENDOSCOPY N/A 12/3/2020    EGD BIOPSY performed by Ramirez Altamirano MD at [facility]       CURRENT MEDICATIONS:    Current Outpatient Medications:     loperamide (IMODIUM) 2 MG capsule, Take 2 mg by mouth 4 times daily as needed for Diarrhea, Disp: , Rfl:     zoster recombinant adjuvanted vaccine (SHINGRIX) 50 MCG/0.5ML SUSR injection, Inject 0.5 mLs into the muscle See Admin Instructions for 1 day, Disp: 0.5 mL, Rfl: 0    loratadine (CLARITIN) 10 MG tablet, Take 1 tablet by mouth daily as needed (allergies), Disp: 90 tablet, Rfl: 1    ferrous sulfate (IRON 325) 325 (65 Fe) MG tablet, Take 1 tablet by mouth daily (with breakfast), Disp: 90 tablet, Rfl: 2    amLODIPine (NORVASC) 10 MG tablet, Take 1 tablet by mouth daily, Disp: 90 tablet, Rfl: 3    atenolol (TENORMIN) 50 MG tablet, Take 1 tablet by mouth daily, Disp: 60 tablet, Rfl: 3    vitamin B-1 (THIAMINE) 100 MG tablet, Take 1 tablet by mouth daily, Disp: 30 tablet, Rfl: 3    potassium chloride (KLOR-CON M) 20 MEQ extended release tablet, Take 1 tablet by mouth daily, Disp: 30 tablet, Rfl: 0    albuterol sulfate HFA (VENTOLIN HFA) 108 (90 Base) MCG/ACT inhaler, Inhale 2 puffs into the lungs every 6 hours as needed for Wheezing, Disp: , Rfl:     QUEtiapine (SEROQUEL) 50 MG tablet, , Disp: , Rfl:     Calcium Carb-Cholecalciferol (OYSTER SHELL CALCIUM W/D) 500-200 MG-UNIT TABS tablet, , Disp: , Rfl:     aspirin EC 81 MG EC tablet, Take 1 tablet by mouth daily, Disp: 30 tablet, Rfl: 0    atorvastatin (LIPITOR) 40 MG tablet, Take 1 tablet by mouth nightly, Disp: 30 tablet, Rfl: 3    nitroGLYCERIN (NITROSTAT) 0.4 MG SL tablet, up to max of 3 total doses.  If no relief after 1 dose, call 911., Disp: 25 tablet, Rfl: 3    magnesium oxide (MAG-OX) 400 MG tablet, TAKE 1 TABLET BY MOUTH 2 TIMES DAILY, Disp: 60 tablet, Rfl: 3    lipase-protease-amylase (CREON) 74508-40638 units delayed release capsule, Take by mouth 3 times daily (with meals), Disp: 180 capsule, Rfl: 2    allopurinol (ZYLOPRIM) 100 MG tablet, Take 1 tablet by mouth daily, Disp: 90 tablet, Rfl: 0    omeprazole (PRILOSEC) 20 MG delayed release capsule, TAKE 1 CAPSULE BY MOUTH DAILY , Disp: 60 capsule, Rfl: 3    Calcium Carbonate-Vitamin D (OYSTER SHELL CALCIUM/D) 500-200 MG-UNIT TABS, TAKE 1 TAB BY MOUTH ONCE A DAY  (Patient taking differently: 2 times daily TAKE 1 TAB BY MOUTH ONCE A DAY), Disp: 30 tablet, Rfl: 4    Multiple Vitamin (MULTIVITAMIN) TABS tablet, Take 1 tablet by mouth daily, Disp: 60 tablet, Rfl: 2    thiamine mononitrate 100 MG tablet, TAKE 1 TABLET BY MOUTH ONCE DAILY , Disp: 30 tablet, Rfl: 2    FLUoxetine (PROZAC) 40 MG capsule, Take 40 mg by mouth daily, Disp: , Rfl:     acetaminophen (APAP EXTRA STRENGTH) 500 MG tablet, Take 2 tablets by mouth every 6 hours as needed for Pain, Disp: 60 tablet, Rfl: 0    folic acid (FOLVITE) 1 MG true    [place] of Food in the Last Year: Never true   Transportation Needs:     Lack of Transportation (Medical): Not on file    Lack of Transportation (Non-Medical): Not on file   Physical Activity:     Days of Exercise per Week: Not on file    Minutes of Exercise per Session: Not on file   Stress:     Feeling of Stress : Not on file   Social Connections:     Frequency of Communication with Friends and Family: Not on file    Frequency of Social Gatherings with Friends and Family: Not on file    Attends Shinto Services: Not on file    Active Member of [address] [place] [address] [employer] or Organizations: Not on file    Attends Club or Organization Meetings: Not on file    Marital Status: Not on file   Intimate Partner Violence:     Fear of Current or Ex-Partner: Not on file    Emotionally Abused: Not on file    Physically Abused: Not on file    Sexually Abused: Not on file   Housing Stability:     Unable to Pay for Housing in the Last Year: Not on file    Number of [place] in the Last Year: Not on file    Unstable Housing in the Last Year: Not on file       REVIEW OF SYSTEMS: A 12-point review of systems was obtained and pertinent positives and negatives were listed below. REVIEW OF SYSTEMS:     Constitutional: No fever, no chills, no lethargy, no weakness. HEENT:  No headache, otalgia, itchy eyes, nasal discharge or sore throat. Cardiac:  No chest pain, dyspnea, orthopnea or PND. Chest:   No cough, phlegm or wheezing. Abdomen:      Detailed by MA   Neuro:  No focal weakness, abnormal movements or seizure like activity. Skin:   No rashes, no itching. :   No hematuria, no pyuria, no dysuria, no flank pain. Extremities:  No swelling or joint pains. ROS was otherwise negative    Review of Systems   Constitutional: Negative. HENT: Negative. Eyes: Negative. Respiratory: Negative. Cardiovascular: Negative. Gastrointestinal: Negative. Endocrine: Negative. Genitourinary: Negative. Musculoskeletal: Positive for arthralgias. Skin: Negative. Allergic/Immunologic: Positive for food allergies. Neurological: Positive for light-headedness. Hematological: Negative. Psychiatric/Behavioral: Positive for sleep disturbance. The patient is nervous/anxious. All other systems reviewed and are negative. PHYSICAL EXAMINATION: Vital signs reviewed per the nursing documentation. /85   Wt 170 lb (77.1 kg)   BMI 29.18 kg/m²   Body mass index is 29.18 kg/m². Physical Exam    Physical Exam   Constitutional: Patient is oriented to person, place, and time. Patient appears well-developed and well-nourished. HENT:   Head: Normocephalic and atraumatic. Eyes: Pupils are equal, round, and reactive to light. EOM are normal.   Neck: Normal range of motion. Neck supple. No JVD present. No tracheal deviation present. No thyromegaly present. Cardiovascular: Normal rate, regular rhythm, normal heart sounds and intact distal pulses. Pulmonary/Chest: Effort normal and breath sounds normal. No stridor. No respiratory distress. He has no wheezes. He has no rales. He exhibits no tenderness. Abdominal: Soft. Bowel sounds are normal. He exhibits no distension and no mass. There is no tenderness. There is no rebound and no guarding. No hernia. Musculoskeletal: Normal range of motion. Lymphadenopathy:    Patient has no cervical adenopathy. Neurological: Patient is alert and oriented to person, place, and time. Psychiatric: Patient has a normal mood and affect.  Patient behavior is normal.       LABORATORY DATA: Reviewed  Lab Results   Component Value Date    WBC 5.8 02/11/2022    HGB 10.7 (L) 02/11/2022    HCT 30.6 (L) 02/11/2022    MCV 95.0 02/11/2022     02/11/2022     (L) 02/13/2022    K 3.9 02/13/2022     02/13/2022    CO2 21 02/13/2022    BUN 3 (L) 02/13/2022    CREATININE 0.67 02/13/2022    LABPROT 7.5 02/24/2012    LABALBU 3.4 (L) 02/11/2022    BILITOT 0.54 02/11/2022    ALKPHOS 213 (H) 02/11/2022    AST 39 (H) 02/11/2022    ALT 15 02/11/2022    INR 1.0 10/02/2019         Lab Results   Component Value Date    RBC 3.22 (L) 02/11/2022    HGB 10.7 (L) 02/11/2022    MCV 95.0 02/11/2022    MCH 33.2 02/11/2022    MCHC 35.0 (H) 02/11/2022    RDW 18.8 (H) 02/11/2022    MPV 8.5 02/11/2022    BASOPCT 1 10/12/2021    LYMPHSABS 2.79 10/12/2021    MONOSABS 0.87 10/12/2021    NEUTROABS 5.03 10/12/2021    EOSABS 0.08 10/12/2021    BASOSABS 0.13 10/12/2021         DIAGNOSTIC TESTING:     XR CHEST PORTABLE    Result Date: 2/10/2022  EXAMINATION: ONE XRAY VIEW OF THE CHEST 2/10/2022 4:41 pm COMPARISON: Chest 09/29/2021 HISTORY: Chest pain the last couple of weeks, smoking history, hypertension FINDINGS: Calcifications involving the aorta reflect atherosclerosis. Cardiomegaly. The mediastinal and hilar silhouettes appear otherwise unremarkable. The lungs appear clear. No pleural fluid evident. No pneumothorax is seen. No acute osseous abnormality is identified. 1. No acute pulmonary disease. 2. Cardiomegaly. 3. Calcific atherosclerotic disease aorta. CT CHEST ABDOMEN PELVIS WO CONTRAST    Result Date: 2/10/2022  EXAMINATION: CT OF THE CHEST, ABDOMEN, AND PELVIS WITHOUT CONTRAST 2/10/2022 3:33 pm TECHNIQUE: CT of the chest, abdomen and pelvis was performed without the administration of intravenous contrast. Multiplanar reformatted images are provided for review. Dose modulation, iterative reconstruction, and/or weight based adjustment of the mA/kV was utilized to reduce the radiation dose to as low as reasonably achievable.  COMPARISON: Abdomen pelvis CT, 08/16/2021 Chest CT, 07/28/2016 HISTORY: ORDERING SYSTEM PROVIDED HISTORY: chest abd pain TECHNOLOGIST PROVIDED HISTORY: chest abd pain Decision Support Exception - unselect if not a suspected or confirmed emergency medical condition->Emergency Medical Condition (MA) Reason for Exam: chest and abdomen pain FINDINGS: Chest: Mediastinum: Visualized thyroid is unremarkable. No mediastinal lymphadenopathy. Varices within the mediastinum similar in change. Esophagus is patulous. There is suggestion of mild mural thickening, which is new when compared to the previous exam. Limited noncontrast imaging the cardiac chambers, thoracic aorta, and pulmonary arteries is unremarkable. Lungs/pleura: No acute or suspicious lung lesion is identified. No pneumothorax. No pleural effusion. Soft Tissues/Bones: Visualized extra thoracic soft tissues are unremarkable. No acute or suspicious bony abnormalities identified. Abdomen/Pelvis: Lack of intravenous contrast limits evaluation of the solid abdominal viscera, the hollow abdominal viscera, and the vascular structures. Organs: Diffuse hepatic steatosis is noted. No acute or suspicious hepatic abnormality identified. Gallbladder is unremarkable. Noncontrast imaging of the spleen is unremarkable. Adrenal nodularity is unchanged, and considered benign. Pancreas unremarkable. No acute or suspicious renal abnormalities are identified. No hydronephrosis or hydroureter. GI/Bowel: There is possible mild mural thickening involving the distal rectum. Mild surrounding fat stranding noted. Large bowel is otherwise unremarkable. The appendix is not well visualized. No asymmetric pericecal inflammation is seen to suggest acute appendicitis. Moderate-sized hiatal hernia. Otherwise, the distal esophagus, stomach, and duodenal sweep is unremarkable. Pelvis: Uterus is surgically absent. Urinary bladder unremarkable. No free pelvic fluid. Peritoneum/Retroperitoneum: Abdominal aorta normal in caliber. No retroperitoneal lymphadenopathy. Bones/Soft Tissues: Age-indeterminate but acute to subacute appearing compression fracture seen involving the superior endplate of L2 with loss of approximately 30% of the vertebral body height maximally. No significant dorsal retropulsion.   That is new when compared to the previous abdomen pelvis CT from August 2021. Small umbilical hernia contains fat only. The extra-abdominal and extra pelvic soft tissues are otherwise unremarkable. Chest CT: Suggestion of esophageal mural thickening. Correlate with clinical evidence of esophagitis. Otherwise no acute abnormality detected. Abdomen pelvis CT: Possible mild mural thickening involving the distal rectum. Correlate with clinical evidence of proctitis. Acute to subacute appearing compression fracture involving the superior endplate of L2 with loss of approximately 30% of vertebral body height. No dorsal retropulsion. Moderate-sized hiatal hernia. IMPRESSION: Roldan Scott is a 55 y.o. female with a past history remarkable for alcoholism, anxiety, asthma, bipolar disorder, claustrophobia, depression, GERD, hypertension, hypertrophic cardiomyopathy, type 2 diabetes, prior history of peptic ulcer disease, chronic iron deficiency anemia, referred for evaluation of GERD. Patient had an upper endoscopy and colonoscopy in December 2020 where no obvious source of anemia was identified. Strongly advised alcohol cessation. Last alcohol drink approximately 6 weeks ago per patient. Reports diarrhea symptoms which may be related to pancreatic insufficiency. Fecal elastase was low.         Assessment  1. Exocrine pancreatic insufficiency    2. Gastroesophageal reflux disease, unspecified whether esophagitis present        PLAN:    1) EPI-- CREON at 1678416789 units delayed release before every meal to be given. Patient has formed stools currently with this current regimen. Advised to avoid alcohol and fatty foods. Clinically much improved with this regimen. 2) chronic GERDpatient to continue with Prilosec 20 mg daily. Avoid dietary triggers. 3) maintaining abstinence from alcohol. Thank you for allowing me to participate in the care of Ms. Ave Bernal.  For any further questions please do not hesitate to contact me.    I have reviewed and agree with the ROS entered by the MA/LPN from today's encounter documented in a separate note. Gris White MD, MPH   [facility]  Office #: [phone]    this note is created with the assistance of a speech recognition program.  While intending to generate a document that actually reflects the content of the visit, the document can still have some errors including those of syntax and sound a like substitutions which may escape proof reading. It such instances, actual meaning can be extrapolated by contextual diversion.

## 2022-03-07 ENCOUNTER — OFFICE VISIT (OUTPATIENT)
Dept: ONCOLOGY | Age: 57
End: 2022-03-07
Payer: MEDICAID

## 2022-03-07 ENCOUNTER — HOSPITAL ENCOUNTER (OUTPATIENT)
Facility: MEDICAL CENTER | Age: 57
Discharge: HOME | End: 2022-03-07
Payer: MEDICAID

## 2022-03-07 ENCOUNTER — TELEPHONE (OUTPATIENT)
Dept: ONCOLOGY | Age: 57
End: 2022-03-07

## 2022-03-07 VITALS
BODY MASS INDEX: 30.12 KG/M2 | TEMPERATURE: 99 F | SYSTOLIC BLOOD PRESSURE: 137 MMHG | DIASTOLIC BLOOD PRESSURE: 92 MMHG | HEART RATE: 67 BPM | RESPIRATION RATE: 20 BRPM | WEIGHT: 175.5 LBS

## 2022-03-07 DIAGNOSIS — D75.839 THROMBOCYTOSIS: ICD-10-CM

## 2022-03-07 DIAGNOSIS — D75.839 THROMBOCYTOSIS: Primary | ICD-10-CM

## 2022-03-07 DIAGNOSIS — D53.9 MACROCYTIC ANEMIA: ICD-10-CM

## 2022-03-07 LAB
ABSOLUTE EOS #: 0.29 K/UL (ref 0–0.44)
ABSOLUTE IMMATURE GRANULOCYTE: 0.03 K/UL (ref 0–0.3)
ABSOLUTE LYMPH #: 2.26 K/UL (ref 1.1–3.7)
ABSOLUTE MONO #: 1.11 K/UL (ref 0.1–1.2)
ALBUMIN SERPL-MCNC: 4.1 G/DL (ref 3.5–5.2)
ALP BLD-CCNC: 110 U/L (ref 35–104)
ALT SERPL-CCNC: 11 U/L (ref 5–33)
ANION GAP SERPL CALCULATED.3IONS-SCNC: 11 MMOL/L (ref 9–17)
AST SERPL-CCNC: 16 U/L
BASOPHILS # BLD: 1 % (ref 0–2)
BASOPHILS ABSOLUTE: 0.08 K/UL (ref 0–0.2)
BILIRUB SERPL-MCNC: 0.32 MG/DL (ref 0.3–1.2)
BUN BLDV-MCNC: 13 MG/DL (ref 6–20)
BUN/CREAT BLD: 18 (ref 9–20)
CALCIUM SERPL-MCNC: 9.7 MG/DL (ref 8.6–10.4)
CHLORIDE BLD-SCNC: 105 MMOL/L (ref 98–107)
CO2: 25 MMOL/L (ref 20–31)
CREAT SERPL-MCNC: 0.73 MG/DL (ref 0.5–0.9)
EOSINOPHILS RELATIVE PERCENT: 3 % (ref 1–4)
GFR AFRICAN AMERICAN: >60 ML/MIN
GFR NON-AFRICAN AMERICAN: >60 ML/MIN
GFR SERPL CREATININE-BSD FRML MDRD: ABNORMAL ML/MIN/{1.73_M2}
GLUCOSE BLD-MCNC: 108 MG/DL (ref 70–99)
HCT VFR BLD CALC: 30 % (ref 36.3–47.1)
HEMOGLOBIN: 9.7 G/DL (ref 11.9–15.1)
IMMATURE GRANULOCYTES: 0 %
LYMPHOCYTES # BLD: 26 % (ref 24–43)
MCH RBC QN AUTO: 33 PG (ref 25.2–33.5)
MCHC RBC AUTO-ENTMCNC: 32.3 G/DL (ref 28.4–34.8)
MCV RBC AUTO: 102 FL (ref 82.6–102.9)
MONOCYTES # BLD: 13 % (ref 3–12)
NRBC AUTOMATED: 0 PER 100 WBC
PDW BLD-RTO: 16.7 % (ref 11.8–14.4)
PLATELET # BLD: 498 K/UL (ref 138–453)
PMV BLD AUTO: 8.9 FL (ref 8.1–13.5)
POTASSIUM SERPL-SCNC: 4.4 MMOL/L (ref 3.7–5.3)
RBC # BLD: 2.94 M/UL (ref 3.95–5.11)
RBC # BLD: ABNORMAL 10*6/UL
SEG NEUTROPHILS: 57 % (ref 36–65)
SEGMENTED NEUTROPHILS ABSOLUTE COUNT: 4.89 K/UL (ref 1.5–8.1)
SODIUM BLD-SCNC: 141 MMOL/L (ref 135–144)
TOTAL PROTEIN: 7.5 G/DL (ref 6.4–8.3)
WBC # BLD: 8.7 K/UL (ref 3.5–11.3)

## 2022-03-07 PROCEDURE — G8417 CALC BMI ABV UP PARAM F/U: HCPCS | Performed by: INTERNAL MEDICINE

## 2022-03-07 PROCEDURE — 36415 COLL VENOUS BLD VENIPUNCTURE: CPT

## 2022-03-07 PROCEDURE — 85025 COMPLETE CBC W/AUTO DIFF WBC: CPT

## 2022-03-07 PROCEDURE — G8427 DOCREV CUR MEDS BY ELIG CLIN: HCPCS | Performed by: INTERNAL MEDICINE

## 2022-03-07 PROCEDURE — 99214 OFFICE O/P EST MOD 30 MIN: CPT | Performed by: INTERNAL MEDICINE

## 2022-03-07 PROCEDURE — 99211 OFF/OP EST MAY X REQ PHY/QHP: CPT

## 2022-03-07 PROCEDURE — G8482 FLU IMMUNIZE ORDER/ADMIN: HCPCS | Performed by: INTERNAL MEDICINE

## 2022-03-07 PROCEDURE — 3017F COLORECTAL CA SCREEN DOC REV: CPT | Performed by: INTERNAL MEDICINE

## 2022-03-07 PROCEDURE — 1111F DSCHRG MED/CURRENT MED MERGE: CPT | Performed by: INTERNAL MEDICINE

## 2022-03-07 PROCEDURE — 80053 COMPREHEN METABOLIC PANEL: CPT

## 2022-03-07 PROCEDURE — 1036F TOBACCO NON-USER: CPT | Performed by: INTERNAL MEDICINE

## 2022-03-07 NOTE — PROGRESS NOTES
DIAGNOSIS:   1. Thrombocytosis   2. Macrocytic anemia  3. Diffuse arthralgia/. possible RA   4. Diagnosis of chronic pancreatitis secondary to alcohol intake. Possibly explaining her thrombocytosis and microcytic anemia    CURRENT THERAPY:  NGS testing is negative for  mutation  Supportive care  BRIEF CASE HISTORY:   Kelli Eller is a very pleasant 65 y.o. female who is referred to us for thrombocytosis. She was recently in house with uncontrolled hypertension and lab work showed elevated platelets. She was also found to have possible RA or gout and needs to consult with rheumatology for joint pain and swelling. She was given Prednisone in house to manage swelling. She reports she feels improved since admission. She has history of bursa and has had knees drained by ortho surg. We saw the patient and decided to proceed with NGS testing. That was negative for JAK2 mutation, DAGOBERTO R mutation and MPL mutation. Platelets are above 1 million. The patient admitted that she was a long-term drinker. She developed chronic pancreatitis. We thought that her macrocytic anemia thrombocytosis could be related to the chronic pancreatitis and alcohol intake. INTERIM HISTORY: The patient comes in today for follow-up with lab work. She was recently in house with alcohol induced chronic pancreatitis and has been using Creon with good results. Her last alcoholic drink was 17 days ago and she plans to continue to abstain to avoid further health issues including dialysis. She has some pain and stiffness in her lower joints and uses cane with flares of intense pain and is taking Omega supplement.        PAST MEDICAL HISTORY: has a past medical history of Angioedema, Anxiety, Asthma, Bipolar disorder ([address] Claustrophobia, Depression, GERD (gastroesophageal reflux disease), Hypertension, Hypertrophic cardiomyopathy ([address] Lung nodules, MRSA (methicillin resistant Staphylococcus aureus), Murmur, OA (osteoarthritis), JONES (obstructive sleep apnea), Thyroid nodule, and Type 2 diabetes mellitus without complication, without long-term current use of insulin ([facility] [address] PAST SURGICAL HISTORY: has a past surgical history that includes Hysterectomy; Upper gastrointestinal endoscopy; Colonoscopy; Thyroid surgery; Cardiac catheterization; other surgical history (8/24/2015); Upper gastrointestinal endoscopy (N/A, 12/3/2020); Colonoscopy (N/A, 12/3/2020); Foot surgery (Bilateral, 12/07/2020); Hammer toe surgery (Right, 12/7/2020); and Bunionectomy (Bilateral, 12/7/2020). CURRENT MEDICATIONS:  has a current medication list which includes the following prescription(s): omeprazole, loperamide, ferrous sulfate, amlodipine, atenolol, vitamin b-1, potassium chloride, albuterol sulfate hfa, quetiapine, oyster shell calcium w/d, aspirin ec, atorvastatin, magnesium oxide, lipase-protease-amylase, allopurinol, oyster shell calcium/d, multivitamin, thiamine mononitrate, fluoxetine, acetaminophen, albuterol sulfate, loratadine, nitroglycerin, [DISCONTINUED] atorvastatin, folic acid, and [DISCONTINUED] oyster shell calcium/d. ALLERGIES:  is allergic to bee venom, iodine, lisinopril, and shellfish-derived products. FAMILY HISTORY: Negative for any hematological or oncological conditions. SOCIAL HISTORY:  reports that she quit smoking about 29 years ago. Her smoking use included cigarettes. She has a 10.00 pack-year smoking history. She has never used smokeless tobacco. She reports previous alcohol use of about 12.0 standard drinks of alcohol per week. She reports previous drug use. Drug: Cocaine. REVIEW OF SYSTEMS:   General: No fever or night sweats. Weight is stable. ENT: No double or blurred vision, no tinnitus or hearing problem, no dysphagia or sore throat   Respiratory: No chest pain, no shortness of breath, no cough or hemoptysis. Cardiovascular: Denies chest pain, PND or orthopnea. No L E swelling or palpitations.   Gastrointestinal: No nausea or vomiting, abdominal pain, diarrhea has subsided with the help of Imodium +abd pain with pancreatitis   Genitourinary: Denies dysuria, hematuria, frequency, urgency or incontinence. Neurological: Denies headaches, decreased LOC, no sensory or motor focal deficits. Musculoskeletal: No back pain; +joint pain and swelling as discussed previously, unchanged but she is having more pain in her right elbow and right hip after the fall  Skin: There are no rashes or bleeding. Psychiatric: No anxiety, no depression. Endocrine: No diabetes or thyroid disease. Hematologic: No bleeding, no adenopathy. PHYSICAL EXAM: Shows a well appearing 65y.o.-year-old female who is not in pain or distress. Vital Signs: Blood pressure (!) 137/92, pulse 67, temperature 99 °F (37.2 °C), temperature source Temporal, resp. rate 20, weight 175 lb 8 oz (79.6 kg), not currently breastfeeding. HEENT: Normocephalic and atraumatic. Pupils are equal, round, reactive to light and accommodation. Extraocular muscles are intact. Neck: Showed no JVD, no carotid bruit . Lungs: Clear to auscultation bilaterally. Heart: systolic murmur. Abdomen: Soft, nontender. No hepatosplenomegaly. Extremities: Lower extremities show no edema, clubbing, or cyanosis. Evidence of inflammatory arthritis and small fingers of the hand and wrist as well as the elbows. She has olecranon bursa with significant pain and tenderness. Breasts: Examination not done today.  Neuro exam: intact cranial nerves bilaterally no motor or sensory deficit, gait is normal. Lymphatic: no adenopathy appreciated in the supraclavicular, axillary, cervical or inguinal area    REVIEW OF LABORATORY DATA:   Lab Results   Component Value Date    WBC 8.7 03/07/2022    HGB 9.7 (L) 03/07/2022    HCT 30.0 (L) 03/07/2022    .0 03/07/2022     (H) 03/07/2022       Chemistry        Component Value Date/Time     03/07/2022 0816    K 4.4 03/07/2022 0816     03/07/2022 0816    CO2 25 03/07/2022 0816    BUN 13 03/07/2022 0816    CREATININE 0.73 03/07/2022 0816        Component Value Date/Time    CALCIUM 9.7 03/07/2022 0816    ALKPHOS 110 (H) 03/07/2022 0816    AST 16 03/07/2022 0816    ALT 11 03/07/2022 0816    BILITOT 0.32 03/07/2022 0816          Lab Results   Component Value Date    IRON 21 (L) 10/02/2021    TIBC 103 (L) 10/02/2021    FERRITIN 572 (H) 10/02/2021       REVIEW OF RADIOLOGICAL RESULTS:     IMPRESSION:   1. Thrombocytosis, likely reactive secondary to chronic pancreatitis  2. Macrocytic anemia, likely secondary to alcohol intake  3. Evidence of inflammatory arthritis/bursitis  4. NGS testing is negative for  mutation   5. Recurrent hospitalization with ROXANN, diarrhea, pancreatitis    PLAN:   1. We reviewed her lab work which shows continued anemia and thrombocytosis. 2. I discussed plan to monitor for possible recovery as she is now abstaining from alcohol, I strongly encouraged her to continue. 3. We discussed her recent pancreatitis and management. 4. I recommend she follow up with Dr. Steve Brambila for further recommendations and management of her arthritis. 5. Return in 3 months with repeat lab work. Scribe Attestation   This note was created by Merlin Snipe acting as scribe for the physician signing this note  Electronically Signed  Merlin Snipe, 3/7/2022  Scribe, [employer]. Attending Attestation   Note was reviewed and edited.   I am in agreement with the note as entered    Berenice Hdz MD  Hematologist/Medical [address] [place] [address] [facility]

## 2022-03-10 ENCOUNTER — HOSPITAL ENCOUNTER (OUTPATIENT)
Dept: NON INVASIVE DIAGNOSTICS | Age: 57
Discharge: HOME | End: 2022-03-10
Payer: MEDICAID

## 2022-03-10 ENCOUNTER — HOSPITAL ENCOUNTER (OUTPATIENT)
Dept: NUCLEAR MEDICINE | Age: 57
Discharge: HOME | End: 2022-03-12
Payer: MEDICAID

## 2022-03-10 ENCOUNTER — HOSPITAL ENCOUNTER (OUTPATIENT)
Age: 57
Discharge: HOME | End: 2022-03-10
Payer: MEDICAID

## 2022-03-10 DIAGNOSIS — E87.6 HYPOKALEMIA: ICD-10-CM

## 2022-03-10 DIAGNOSIS — D75.839 THROMBOCYTOSIS: ICD-10-CM

## 2022-03-10 DIAGNOSIS — R07.89 ATYPICAL CHEST PAIN: ICD-10-CM

## 2022-03-10 DIAGNOSIS — E61.1 IRON DEFICIENCY: ICD-10-CM

## 2022-03-10 DIAGNOSIS — D53.9 MACROCYTIC ANEMIA: ICD-10-CM

## 2022-03-10 DIAGNOSIS — E83.42 HYPOMAGNESEMIA: ICD-10-CM

## 2022-03-10 LAB
ANION GAP SERPL CALCULATED.3IONS-SCNC: 14 MMOL/L (ref 9–17)
BUN BLDV-MCNC: 9 MG/DL (ref 6–20)
CALCIUM SERPL-MCNC: 9.4 MG/DL (ref 8.6–10.4)
CHLORIDE BLD-SCNC: 105 MMOL/L (ref 98–107)
CO2: 21 MMOL/L (ref 20–31)
CREAT SERPL-MCNC: 0.59 MG/DL (ref 0.5–0.9)
FERRITIN: 362 UG/L (ref 13–150)
GFR AFRICAN AMERICAN: >60 ML/MIN
GFR NON-AFRICAN AMERICAN: >60 ML/MIN
GFR SERPL CREATININE-BSD FRML MDRD: ABNORMAL ML/MIN/{1.73_M2}
GLUCOSE BLD-MCNC: 111 MG/DL (ref 70–99)
IRON SATURATION: 17 % (ref 20–55)
IRON: 38 UG/DL (ref 37–145)
LV EF: 80 %
LVEF MODALITY: NORMAL
POTASSIUM SERPL-SCNC: 4 MMOL/L (ref 3.7–5.3)
SODIUM BLD-SCNC: 140 MMOL/L (ref 135–144)
TOTAL IRON BINDING CAPACITY: 229 UG/DL (ref 250–450)
UNSATURATED IRON BINDING CAPACITY: 191 UG/DL (ref 112–347)

## 2022-03-10 PROCEDURE — 2580000003 HC RX 258: Performed by: INTERNAL MEDICINE

## 2022-03-10 PROCEDURE — A9500 TC99M SESTAMIBI: HCPCS | Performed by: INTERNAL MEDICINE

## 2022-03-10 PROCEDURE — 36415 COLL VENOUS BLD VENIPUNCTURE: CPT

## 2022-03-10 PROCEDURE — 83550 IRON BINDING TEST: CPT

## 2022-03-10 PROCEDURE — 83540 ASSAY OF IRON: CPT

## 2022-03-10 PROCEDURE — 6360000002 HC RX W HCPCS: Performed by: INTERNAL MEDICINE

## 2022-03-10 PROCEDURE — 82728 ASSAY OF FERRITIN: CPT

## 2022-03-10 PROCEDURE — 80048 BASIC METABOLIC PNL TOTAL CA: CPT

## 2022-03-10 PROCEDURE — 78452 HT MUSCLE IMAGE SPECT MULT: CPT

## 2022-03-10 PROCEDURE — 93017 CV STRESS TEST TRACING ONLY: CPT

## 2022-03-10 PROCEDURE — 3430000000 HC RX DIAGNOSTIC RADIOPHARMACEUTICAL: Performed by: INTERNAL MEDICINE

## 2022-03-10 RX ORDER — ATROPINE SULFATE 0.1 MG/ML
0.5 INJECTION INTRAVENOUS EVERY 5 MIN PRN
Status: DISCONTINUED | OUTPATIENT
Start: 2022-03-10 | End: 2022-03-10 | Stop reason: HOSPADM

## 2022-03-10 RX ORDER — NITROGLYCERIN 0.4 MG/1
0.4 TABLET SUBLINGUAL EVERY 5 MIN PRN
Status: DISCONTINUED | OUTPATIENT
Start: 2022-03-10 | End: 2022-03-10 | Stop reason: HOSPADM

## 2022-03-10 RX ORDER — AMINOPHYLLINE DIHYDRATE 25 MG/ML
50 INJECTION, SOLUTION INTRAVENOUS PRN
Status: DISCONTINUED | OUTPATIENT
Start: 2022-03-10 | End: 2022-03-10 | Stop reason: HOSPADM

## 2022-03-10 RX ORDER — METOPROLOL TARTRATE 5 MG/5ML
5 INJECTION INTRAVENOUS EVERY 5 MIN PRN
Status: DISCONTINUED | OUTPATIENT
Start: 2022-03-10 | End: 2022-03-10 | Stop reason: HOSPADM

## 2022-03-10 RX ORDER — ALBUTEROL SULFATE 90 UG/1
2 AEROSOL, METERED RESPIRATORY (INHALATION) PRN
Status: DISCONTINUED | OUTPATIENT
Start: 2022-03-10 | End: 2022-03-10 | Stop reason: HOSPADM

## 2022-03-10 RX ORDER — SODIUM CHLORIDE 0.9 % (FLUSH) 0.9 %
5-40 SYRINGE (ML) INJECTION PRN
Status: DISCONTINUED | OUTPATIENT
Start: 2022-03-10 | End: 2022-03-10 | Stop reason: HOSPADM

## 2022-03-10 RX ORDER — SODIUM CHLORIDE 9 MG/ML
500 INJECTION, SOLUTION INTRAVENOUS CONTINUOUS PRN
Status: DISCONTINUED | OUTPATIENT
Start: 2022-03-10 | End: 2022-03-10 | Stop reason: HOSPADM

## 2022-03-10 RX ADMIN — SODIUM CHLORIDE, PRESERVATIVE FREE 10 ML: 5 INJECTION INTRAVENOUS at 10:58

## 2022-03-10 RX ADMIN — SODIUM CHLORIDE, PRESERVATIVE FREE 20 ML: 5 INJECTION INTRAVENOUS at 10:23

## 2022-03-10 RX ADMIN — TETRAKIS(2-METHOXYISOBUTYLISOCYANIDE)COPPER(I) TETRAFLUOROBORATE 42.2 MILLICURIE: 1 INJECTION, POWDER, LYOPHILIZED, FOR SOLUTION INTRAVENOUS at 10:58

## 2022-03-10 RX ADMIN — TETRAKIS(2-METHOXYISOBUTYLISOCYANIDE)COPPER(I) TETRAFLUOROBORATE 14.8 MILLICURIE: 1 INJECTION, POWDER, LYOPHILIZED, FOR SOLUTION INTRAVENOUS at 09:15

## 2022-03-10 RX ADMIN — REGADENOSON 0.4 MG: 0.08 INJECTION, SOLUTION INTRAVENOUS at 10:51

## 2022-03-10 RX ADMIN — SODIUM CHLORIDE, PRESERVATIVE FREE 10 ML: 5 INJECTION INTRAVENOUS at 09:15

## 2022-03-10 RX ADMIN — SODIUM CHLORIDE, PRESERVATIVE FREE 10 ML: 5 INJECTION INTRAVENOUS at 10:51

## 2022-03-11 RX ORDER — SODIUM CHLORIDE 0.9 % (FLUSH) 0.9 %
10 SYRINGE (ML) INJECTION PRN
Status: DISCONTINUED | OUTPATIENT
Start: 2022-03-11 | End: 2022-03-13 | Stop reason: HOSPADM

## 2022-03-14 DIAGNOSIS — M10.00 IDIOPATHIC GOUT, UNSPECIFIED CHRONICITY, UNSPECIFIED SITE: ICD-10-CM

## 2022-03-15 RX ORDER — ASPIRIN 81 MG/1
TABLET, COATED ORAL
Qty: 90 TABLET | Refills: 3 | Status: SHIPPED | OUTPATIENT
Start: 2022-03-15

## 2022-03-15 RX ORDER — ALLOPURINOL 100 MG/1
100 TABLET ORAL DAILY
Qty: 90 TABLET | Refills: 3 | Status: SHIPPED | OUTPATIENT
Start: 2022-03-15

## 2022-03-15 NOTE — TELEPHONE ENCOUNTER
E-scribe request for Allopurinol . Please review and e-scribe if applicable. Next Visit Date:  Future Appointments   Date Time Provider Anthony Marguerite   5/26/2022  1:40 PM Silvianota David Polk MD [facility] IM [facility]   5/27/2022  8:30 AM Abhay Diaz MD [facility]   6/6/2022  9:15 AM Nisa Hdz MD [address] [facility] Maintenance   Topic Date Due    Hepatitis B vaccine (1 of 3 - Risk 3-dose series) Never done    Shingles Vaccine (2 of 2) 12/15/2021    Breast cancer screen  10/10/2022    Lipid screen  02/11/2023    Depression Monitoring  02/17/2023    Potassium monitoring  03/10/2023    Creatinine monitoring  03/10/2023    Diabetes screen  06/11/2024    DTaP/Tdap/Td vaccine (3 - Td or Tdap) 04/14/2027    Colorectal Cancer Screen  12/03/2030    Pneumococcal 0-64 years Vaccine (2 of 2 - PPSV23) 01/22/2031    Flu vaccine  Completed    COVID-19 Vaccine  Completed    Hepatitis C screen  Completed    HIV screen  Completed    Hepatitis A vaccine  Aged Out    Hib vaccine  Aged Out    Meningococcal (ACWY) vaccine  Aged Out               (applicable per patient's age: Cancer Screenings, Depression Screening, Fall Risk Screening, Immunizations)    Hemoglobin A1C (%)   Date Value   06/11/2021 5.5   03/21/2021 6.3 (H)   01/20/2021 5.9     Microalb/Crt.  Ratio (mcg/mg creat)   Date Value   06/12/2019 147 (H)     LDL Cholesterol (mg/dL)   Date Value   02/11/2022 71     AST (U/L)   Date Value   03/07/2022 16     ALT (U/L)   Date Value   03/07/2022 11     BUN (mg/dL)   Date Value   03/10/2022 9      (goal A1C is < 7)   (goal LDL is <100) need 30-50% reduction from baseline     BP Readings from Last 3 Encounters:   03/07/22 (!) 137/92   02/25/22 129/85   02/24/22 111/67    (goal /80)      All Future Testing planned in [facility]:  Lab Frequency Next Occurrence   CBC With Auto Differential Once 04/22/2021   Miscellaneous Sendout 1 Once 83/10/0150   Basic Metabolic Panel Once 72/60/1476 Basic Metabolic Panel Once 73/98/9543   CBC Auto Differential Once 59/26/0215   Basic Metabolic Panel Once 46/82/1943   Iron and TIBC Once 10/29/2021   Vitamin B12 Once 10/29/2021   Folate Once 10/29/2021   Vitamin D 25 Hydroxy Once 10/29/2021   CBC with Auto Differential     Comprehensive Metabolic Panel              Patient Active Problem List:     Lung nodule     Atypical chest pain     Adrenal adenoma     Goiter     Essential hypertension     Enlarged tonsils     ACE inhibitor-aggravated angioedema     JONES (obstructive sleep apnea)     Dyslipidemia     IGT (impaired glucose tolerance)     Bipolar disorder (HCC)     Mild intermittent asthma without complication     Inflammatory polyarthritis (HCC)     Seronegative rheumatoid arthritis (HCC)     Chronic diarrhea     Hypocalcemia     Hypomagnesemia     Troponin level elevated     Moderate malnutrition (HCC)     Esophageal thickening     Proctitis     Compression fracture of L2 (HCC)     Anemia, normocytic normochromic     Exocrine pancreatic insufficiency     Idiopathic 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

## 2022-03-15 NOTE — TELEPHONE ENCOUNTER
Request for Aspirin. Next Visit Date:  Future Appointments   Date Time Provider Anthony Sosaisti   5/26/2022  1:40 PM Ethan Agustin MD [facility] IM [facility]   5/27/2022  8:30 AM Ben Lopez MD [facility]   6/6/2022  9:15 AM Butch Hdz MD [address] [facility] Maintenance   Topic Date Due    Hepatitis B vaccine (1 of 3 - Risk 3-dose series) Never done    Shingles Vaccine (2 of 2) 12/15/2021    Breast cancer screen  10/10/2022    Lipid screen  02/11/2023    Depression Monitoring  02/17/2023    Potassium monitoring  03/10/2023    Creatinine monitoring  03/10/2023    Diabetes screen  06/11/2024    DTaP/Tdap/Td vaccine (3 - Td or Tdap) 04/14/2027    Colorectal Cancer Screen  12/03/2030    Pneumococcal 0-64 years Vaccine (2 of 2 - PPSV23) 01/22/2031    Flu vaccine  Completed    COVID-19 Vaccine  Completed    Hepatitis C screen  Completed    HIV screen  Completed    Hepatitis A vaccine  Aged Out    Hib vaccine  Aged Out    Meningococcal (ACWY) vaccine  Aged Out       Hemoglobin A1C (%)   Date Value   06/11/2021 5.5   03/21/2021 6.3 (H)   01/20/2021 5.9             ( goal A1C is < 7)   Microalb/Crt.  Ratio (mcg/mg creat)   Date Value   06/12/2019 147 (H)     LDL Cholesterol (mg/dL)   Date Value   02/11/2022 71       (goal LDL is <100)   AST (U/L)   Date Value   03/07/2022 16     ALT (U/L)   Date Value   03/07/2022 11     BUN (mg/dL)   Date Value   03/10/2022 9     BP Readings from Last 3 Encounters:   03/07/22 (!) 137/92   02/25/22 129/85   02/24/22 111/67          (goal 120/80)    All Future Testing planned in [facility]  Lab Frequency Next Occurrence   CBC With Auto Differential Once 04/22/2021   Miscellaneous Sendout 1 Once 62/97/4191   Basic Metabolic Panel Once 46/49/7251   Basic Metabolic Panel Once 47/62/3765   CBC Auto Differential Once 79/96/9393   Basic Metabolic Panel Once 10/13/6759   Iron and TIBC Once 10/29/2021   Vitamin B12 Once 10/29/2021 Folate Once 10/29/2021   Vitamin D 25 Hydroxy Once 10/29/2021   CBC with Auto Differential     Comprehensive Metabolic Panel           Patient Active Problem List:     Lung nodule     Atypical chest pain     Adrenal adenoma     Goiter     Essential hypertension     Enlarged tonsils     ACE inhibitor-aggravated angioedema     JONES (obstructive sleep apnea)     Dyslipidemia     IGT (impaired glucose tolerance)     Bipolar disorder (HCC)     Mild intermittent asthma without complication     Inflammatory polyarthritis (HCC)     Seronegative rheumatoid arthritis (HCC)     Chronic diarrhea     Hypocalcemia     Hypomagnesemia     Troponin level elevated     Moderate malnutrition (HCC)     Esophageal thickening     Proctitis     Compression fracture of L2 (HCC)     Anemia, normocytic normochromic     Exocrine pancreatic insufficiency     Idiopathic 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

## 2022-03-18 DIAGNOSIS — E87.6 HYPOKALEMIA: ICD-10-CM

## 2022-03-18 DIAGNOSIS — M10.00 IDIOPATHIC GOUT, UNSPECIFIED CHRONICITY, UNSPECIFIED SITE: ICD-10-CM

## 2022-03-18 DIAGNOSIS — T78.40XS ALLERGY, SEQUELA: ICD-10-CM

## 2022-03-18 DIAGNOSIS — Z87.898 HISTORY OF ALCOHOL USE: ICD-10-CM

## 2022-03-18 DIAGNOSIS — M06.00 SERONEGATIVE RHEUMATOID ARTHRITIS (HCC): ICD-10-CM

## 2022-03-18 RX ORDER — ASPIRIN 81 MG/1
TABLET ORAL
Qty: 90 TABLET | Refills: 3 | Status: CANCELLED | OUTPATIENT
Start: 2022-03-18

## 2022-03-18 RX ORDER — FOLIC ACID 1 MG/1
1 TABLET ORAL DAILY
Qty: 30 TABLET | Refills: 3 | Status: CANCELLED | OUTPATIENT
Start: 2022-03-18

## 2022-03-18 RX ORDER — ALLOPURINOL 100 MG/1
100 TABLET ORAL DAILY
Qty: 90 TABLET | Refills: 3 | Status: CANCELLED | OUTPATIENT
Start: 2022-03-18

## 2022-03-18 RX ORDER — LORATADINE 10 MG/1
10 TABLET ORAL DAILY PRN
Qty: 90 TABLET | Refills: 1 | Status: CANCELLED | OUTPATIENT
Start: 2022-03-18

## 2022-03-18 RX ORDER — ALBUTEROL SULFATE 90 UG/1
2 AEROSOL, METERED RESPIRATORY (INHALATION) EVERY 6 HOURS PRN
Qty: 18 G | Status: CANCELLED | OUTPATIENT
Start: 2022-03-18

## 2022-03-18 RX ORDER — THIAMINE MONONITRATE (VIT B1) 100 MG
100 TABLET ORAL DAILY
Qty: 30 TABLET | Refills: 3 | Status: CANCELLED | OUTPATIENT
Start: 2022-03-18

## 2022-03-18 RX ORDER — POTASSIUM CHLORIDE 20 MEQ/1
20 TABLET, EXTENDED RELEASE ORAL DAILY
Qty: 30 TABLET | Refills: 0 | Status: CANCELLED | OUTPATIENT
Start: 2022-03-18

## 2022-03-18 RX ORDER — MULTIVITAMIN WITH IRON
1 TABLET ORAL DAILY
Qty: 60 TABLET | Refills: 2 | Status: CANCELLED | OUTPATIENT
Start: 2022-03-18

## 2022-03-18 NOTE — TELEPHONE ENCOUNTER
[facility] [place] pharmacy calling that pt switched pharmacy. Medications are pended if pt is changing pharmacy. Pt called office as soon as writer hung up. She wants a medication for her back pain states she can't move cause her back pain is so great. Writer offered an appt next week as pt states she needs 48 hrs for a cab service. Pt decline. Writer also suggested the urgent care or ER. Pt then stated she will self medicate and hung up the phone. Writer was unable to find out about pharmacy.

## 2022-04-12 DIAGNOSIS — M06.00 SERONEGATIVE RHEUMATOID ARTHRITIS (HCC): ICD-10-CM

## 2022-04-12 NOTE — TELEPHONE ENCOUNTER
----- Message from Yue Ortega sent at 4/12/2022 12:59 PM EDT -----  Subject: Refill Request    QUESTIONS  Name of Medication? nitroGLYCERIN (NITROSTAT) 0.4 MG SL tablet  Patient-reported dosage and instructions? 1 PRN  How many days do you have left? 0  Preferred Pharmacy? [facility] phone number (if available)? [phone]  Additional Information for Provider? Please refill   ---------------------------------------------------------------------------  --------------,  Name of Medication? loperamide (IMODIUM) 2 MG capsule  Patient-reported dosage and instructions? 2 MG PRN  How many days do you have left? 0  Preferred Pharmacy? [facility] phone number (if available)? [phone]  Additional Information for Provider? Please refill   ---------------------------------------------------------------------------  --------------,  Name of Medication? Calcium Carb-Cholecalciferol (OYSTER SHELL CALCIUM   W/D) 500-200 MG-UNIT TABS tablet  Patient-reported dosage and instructions? Every day  How many days do you have left? 0  Preferred Pharmacy? [facility] phone number (if available)? [phone]  Additional Information for Provider? Please refill   ---------------------------------------------------------------------------  --------------,  Name of Medication? acetaminophen (APAP EXTRA STRENGTH) 500 MG tablet  Patient-reported dosage and instructions? PRN Pain  How many days do you have left? 0  Preferred Pharmacy? [facility] phone number (if available)? [phone]  Additional Information for Provider? Please refill   ---------------------------------------------------------------------------  --------------,  Name of Medication? folic acid (FOLVITE) 1 MG tablet  Patient-reported dosage and instructions? Daily  How many days do you have left? 0  Preferred Pharmacy? [facility] phone number (if available)? [phone]  Additional Information for Provider? Please refill   ---------------------------------------------------------------------------  --------------,  Name of Medication? Multiple Vitamin (MULTIVITAMIN) TABS tablet  Patient-reported dosage and instructions? Daily  How many days do you have left? 0  Preferred Pharmacy? [facility] phone number (if available)? [phone]  Additional Information for Provider? Please refill   ---------------------------------------------------------------------------  --------------,  Name of Medication? Other - Ventolin Inhaler  Patient-reported dosage and instructions? 108 MCG 3-4 Inhale PRN  How many days do you have left? 0  Preferred Pharmacy? [facility] phone number (if available)? [phone]  Additional Information for Provider? Please refill   ---------------------------------------------------------------------------  --------------  CALL BACK INFO  What is the best way for the office to contact you? OK to leave message on   voicemail  Preferred Call Back Phone Number? [phone]  ---------------------------------------------------------------------------  --------------  SCRIPT ANSWERS  Relationship to Patient?  Self

## 2022-04-13 ENCOUNTER — TELEPHONE (OUTPATIENT)
Dept: ONCOLOGY | Age: 57
End: 2022-04-13

## 2022-04-13 NOTE — TELEPHONE ENCOUNTER
TERENCE AT [facility] CALLING FOR RECORDS R/T TEMPUS TESTING, MD NOTE AND TEMPUS TEST DRAW NOTE SENT FOR 4/16/21 AND 4/22/21 FAXED WITH CONFIRMATION  [address]

## 2022-04-18 RX ORDER — ALBUTEROL SULFATE 90 UG/1
2 AEROSOL, METERED RESPIRATORY (INHALATION) EVERY 6 HOURS PRN
Qty: 18 G | Refills: 1 | Status: SHIPPED | OUTPATIENT
Start: 2022-04-18 | End: 2022-05-26

## 2022-04-18 RX ORDER — NITROGLYCERIN 0.4 MG/1
TABLET SUBLINGUAL
Qty: 25 TABLET | Refills: 3 | Status: SHIPPED | OUTPATIENT
Start: 2022-04-18

## 2022-04-18 RX ORDER — LOPERAMIDE HYDROCHLORIDE 2 MG/1
2 CAPSULE ORAL 4 TIMES DAILY PRN
Qty: 90 CAPSULE | Refills: 0 | Status: SHIPPED | OUTPATIENT
Start: 2022-04-18 | End: 2022-05-13 | Stop reason: SDUPTHER

## 2022-04-18 RX ORDER — ACETAMINOPHEN 500 MG
1000 TABLET ORAL EVERY 6 HOURS PRN
Qty: 60 TABLET | Refills: 0 | Status: SHIPPED | OUTPATIENT
Start: 2022-04-18 | End: 2022-05-13 | Stop reason: SDUPTHER

## 2022-04-18 RX ORDER — MULTIVITAMIN WITH IRON
1 TABLET ORAL DAILY
Qty: 60 TABLET | Refills: 2 | Status: SHIPPED | OUTPATIENT
Start: 2022-04-18 | End: 2022-09-10

## 2022-04-18 RX ORDER — FOLIC ACID 1 MG/1
1 TABLET ORAL DAILY
Qty: 30 TABLET | Refills: 3 | Status: SHIPPED | OUTPATIENT
Start: 2022-04-18 | End: 2022-09-02 | Stop reason: SDUPTHER

## 2022-04-26 DIAGNOSIS — E87.6 HYPOKALEMIA: ICD-10-CM

## 2022-04-28 NOTE — TELEPHONE ENCOUNTER
Request for Klorcon. Next Visit Date:  Future Appointments   Date Time Provider Anthony Cutleri   5/26/2022  1:40 PM Abrahan Palmer MD [facility] IM [facility]   5/27/2022  8:30 AM Davonte Guzmán MD [facility]   6/6/2022  9:15 AM Radha Hdz [place] [address] [facility] Maintenance   Topic Date Due    Hepatitis B vaccine (1 of 3 - Risk 3-dose series) Never done    Pneumococcal 0-64 years Vaccine (2 - PCV) 05/09/2018    Shingles Vaccine (2 of 2) 12/15/2021    Breast cancer screen  10/10/2022    Lipids  02/11/2023    Depression Monitoring  02/17/2023    Potassium  03/10/2023    Creatinine  03/10/2023    Diabetes screen  06/11/2024    DTaP/Tdap/Td vaccine (3 - Td or Tdap) 04/14/2027    Colorectal Cancer Screen  12/03/2030    Flu vaccine  Completed    COVID-19 Vaccine  Completed    Hepatitis C screen  Completed    HIV screen  Completed    Hepatitis A vaccine  Aged Out    Hib vaccine  Aged Out    Meningococcal (ACWY) vaccine  Aged Out       Hemoglobin A1C (%)   Date Value   06/11/2021 5.5   03/21/2021 6.3 (H)   01/20/2021 5.9             ( goal A1C is < 7)   Microalb/Crt.  Ratio (mcg/mg creat)   Date Value   06/12/2019 147 (H)     LDL Cholesterol (mg/dL)   Date Value   02/11/2022 71       (goal LDL is <100)   AST (U/L)   Date Value   03/07/2022 16     ALT (U/L)   Date Value   03/07/2022 11     BUN (mg/dL)   Date Value   03/10/2022 9     BP Readings from Last 3 Encounters:   03/07/22 (!) 137/92   02/25/22 129/85   02/24/22 111/67          (goal 120/80)    All Future Testing planned in [facility]  Lab Frequency Next Occurrence   Basic Metabolic Panel Once 69/42/4585   Basic Metabolic Panel Once 74/02/0521   CBC Auto Differential Once 94/79/1407   Basic Metabolic Panel Once 50/03/4090   Iron and TIBC Once 10/29/2021   Vitamin B12 Once 10/29/2021   Folate Once 10/29/2021   Vitamin D 25 Hydroxy Once 10/29/2021   CBC with Auto Differential     Comprehensive Metabolic Panel Patient Active Problem List:     Lung nodule     Atypical chest pain     Adrenal adenoma     Goiter     Essential hypertension     Enlarged tonsils     ACE inhibitor-aggravated angioedema     JONES (obstructive sleep apnea)     Dyslipidemia     IGT (impaired glucose tolerance)     Bipolar disorder (HCC)     Mild intermittent asthma without complication     Inflammatory polyarthritis (HCC)     Seronegative rheumatoid arthritis (HCC)     Chronic diarrhea     Hypocalcemia     Hypomagnesemia     Troponin level elevated     Moderate malnutrition (HCC)     Esophageal thickening     Proctitis     Compression fracture of L2 (HCC)     Anemia, normocytic normochromic     Exocrine pancreatic insufficiency     Idiopathic 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

## 2022-04-29 DIAGNOSIS — E83.42 HYPOMAGNESEMIA: ICD-10-CM

## 2022-04-29 DIAGNOSIS — M06.00 SERONEGATIVE RHEUMATOID ARTHRITIS (HCC): ICD-10-CM

## 2022-04-29 RX ORDER — LOPERAMIDE HYDROCHLORIDE 2 MG/1
CAPSULE ORAL
Qty: 90 CAPSULE | Refills: 10 | OUTPATIENT
Start: 2022-04-29

## 2022-04-29 RX ORDER — PSEUDOEPHED/ACETAMINOPH/DIPHEN 30MG-500MG
TABLET ORAL
Qty: 61 TABLET | Refills: 10 | OUTPATIENT
Start: 2022-04-29

## 2022-04-29 RX ORDER — POTASSIUM CHLORIDE 20 MEQ/1
20 TABLET, EXTENDED RELEASE ORAL DAILY
Qty: 90 TABLET | Refills: 3 | Status: SHIPPED | OUTPATIENT
Start: 2022-04-29 | End: 2022-07-27 | Stop reason: DRUGHIGH

## 2022-04-29 NOTE — TELEPHONE ENCOUNTER
Request for Magnesium Oxide. Next Visit Date:  Future Appointments   Date Time Provider Anthony Sosaisti   5/26/2022  1:40 [facility]   5/27/2022  8:30 AM Tawnya Youngblood MD [facility]   6/6/2022  9:15 AM Piyush Hdz [place] [address] [facility] Maintenance   Topic Date Due    Hepatitis B vaccine (1 of 3 - Risk 3-dose series) Never done    Pneumococcal 0-64 years Vaccine (2 - PCV) 05/09/2018    Shingles vaccine (2 of 2) 12/15/2021    Breast cancer screen  10/10/2022    Lipids  02/11/2023    Depression Monitoring  02/17/2023    Potassium  03/10/2023    Creatinine  03/10/2023    Diabetes screen  06/11/2024    DTaP/Tdap/Td vaccine (3 - Td or Tdap) 04/14/2027    Colorectal Cancer Screen  12/03/2030    Flu vaccine  Completed    COVID-19 Vaccine  Completed    Hepatitis C screen  Completed    HIV screen  Completed    Hepatitis A vaccine  Aged Out    Hib vaccine  Aged Out    Meningococcal (ACWY) vaccine  Aged Out       Hemoglobin A1C (%)   Date Value   06/11/2021 5.5   03/21/2021 6.3 (H)   01/20/2021 5.9             ( goal A1C is < 7)   Microalb/Crt.  Ratio (mcg/mg creat)   Date Value   06/12/2019 147 (H)     LDL Cholesterol (mg/dL)   Date Value   02/11/2022 71       (goal LDL is <100)   AST (U/L)   Date Value   03/07/2022 16     ALT (U/L)   Date Value   03/07/2022 11     BUN (mg/dL)   Date Value   03/10/2022 9     BP Readings from Last 3 Encounters:   03/07/22 (!) 137/92   02/25/22 129/85   02/24/22 111/67          (goal 120/80)    All Future Testing planned in [facility]  Lab Frequency Next Occurrence   Basic Metabolic Panel Once 74/05/8969   Basic Metabolic Panel Once 48/89/7428   CBC Auto Differential Once 41/04/8176   Basic Metabolic Panel Once 50/39/4821   Iron and TIBC Once 10/29/2021   Vitamin B12 Once 10/29/2021   Folate Once 10/29/2021   Vitamin D 25 Hydroxy Once 10/29/2021   CBC with Auto Differential     Comprehensive Metabolic Panel           Patient Active Problem List:     Lung nodule     Atypical chest pain     Adrenal adenoma     Goiter     Essential hypertension     Enlarged tonsils     ACE inhibitor-aggravated angioedema     JONES (obstructive sleep apnea)     Dyslipidemia     IGT (impaired glucose tolerance)     Bipolar disorder (HCC)     Mild intermittent asthma without complication     Inflammatory polyarthritis (HCC)     Seronegative rheumatoid arthritis (HCC)     Chronic diarrhea     Hypocalcemia     Hypomagnesemia     Troponin level elevated     Moderate malnutrition (HCC)     Esophageal thickening     Proctitis     Compression fracture of L2 (HCC)     Anemia, normocytic normochromic     Exocrine pancreatic insufficiency     Idiopathic 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

## 2022-05-02 RX ORDER — MAGNESIUM OXIDE 400 MG/1
TABLET ORAL
Qty: 180 TABLET | Refills: 0 | Status: SHIPPED | OUTPATIENT
Start: 2022-05-02 | End: 2022-07-24

## 2022-05-06 DIAGNOSIS — M06.00 SERONEGATIVE RHEUMATOID ARTHRITIS (HCC): ICD-10-CM

## 2022-05-06 NOTE — TELEPHONE ENCOUNTER
Request for medication refill acetaminophen and loperamide. Next Visit Date:5/26/22  Future Appointments   Date Time Provider Anthony Everett   5/26/2022  1:40 PM Abrahan Roy MD [address] IM [facility]   5/27/2022  8:30 AM Aliyah Willams MD [facility]   6/6/2022  9:15 AM Latosha Hdz MD [address] [facility] Maintenance   Topic Date Due    Hepatitis B vaccine (1 of 3 - Risk 3-dose series) Never done    Pneumococcal 0-64 years Vaccine (2 - PCV) 05/09/2018    Shingles vaccine (2 of 2) 12/15/2021    Breast cancer screen  10/10/2022    Lipids  02/11/2023    Depression Monitoring  02/17/2023    Potassium  03/10/2023    Creatinine  03/10/2023    Diabetes screen  06/11/2024    DTaP/Tdap/Td vaccine (3 - Td or Tdap) 04/14/2027    Colorectal Cancer Screen  12/03/2030    Flu vaccine  Completed    COVID-19 Vaccine  Completed    Hepatitis C screen  Completed    HIV screen  Completed    Hepatitis A vaccine  Aged Out    Hib vaccine  Aged Out    Meningococcal (ACWY) vaccine  Aged Out       Hemoglobin A1C (%)   Date Value   06/11/2021 5.5   03/21/2021 6.3 (H)   01/20/2021 5.9             ( goal A1C is < 7)   Microalb/Crt.  Ratio (mcg/mg creat)   Date Value   06/12/2019 147 (H)     LDL Cholesterol (mg/dL)   Date Value   02/11/2022 71       (goal LDL is <100)   AST (U/L)   Date Value   03/07/2022 16     ALT (U/L)   Date Value   03/07/2022 11     BUN (mg/dL)   Date Value   03/10/2022 9     BP Readings from Last 3 Encounters:   03/07/22 (!) 137/92   02/25/22 129/85   02/24/22 111/67          (goal 120/80)    All Future Testing planned in [facility]  Lab Frequency Next Occurrence   Basic Metabolic Panel Once 56/97/5367   Basic Metabolic Panel Once 88/49/9327   CBC Auto Differential Once 30/46/3993   Basic Metabolic Panel Once 96/76/6215   Iron and TIBC Once 10/29/2021   Vitamin B12 Once 10/29/2021   Folate Once 10/29/2021   Vitamin D 25 Hydroxy Once 10/29/2021   CBC with Auto Differential     Comprehensive Metabolic Panel           Patient Active Problem List:     Lung nodule     Atypical chest pain     Adrenal adenoma     Goiter     Essential hypertension     Enlarged tonsils     ACE inhibitor-aggravated angioedema     JONES (obstructive sleep apnea)     Dyslipidemia     IGT (impaired glucose tolerance)     Bipolar disorder (HCC)     Mild intermittent asthma without complication     Inflammatory polyarthritis (HCC)     Seronegative rheumatoid arthritis (HCC)     Chronic diarrhea     Hypocalcemia     Hypomagnesemia     Troponin level elevated     Moderate malnutrition (HCC)     Esophageal thickening     Proctitis     Compression fracture of L2 (HCC)     Anemia, normocytic normochromic     Exocrine pancreatic insufficiency     Idiopathic 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

## 2022-05-13 RX ORDER — LOPERAMIDE HYDROCHLORIDE 2 MG/1
2 CAPSULE ORAL 4 TIMES DAILY PRN
Qty: 90 CAPSULE | Refills: 0 | Status: SHIPPED | OUTPATIENT
Start: 2022-05-13 | End: 2022-06-22

## 2022-05-13 RX ORDER — ACETAMINOPHEN 500 MG
1000 TABLET ORAL EVERY 6 HOURS PRN
Qty: 60 TABLET | Refills: 2 | Status: SHIPPED | OUTPATIENT
Start: 2022-05-13 | End: 2022-09-02 | Stop reason: SDUPTHER

## 2022-05-26 NOTE — TELEPHONE ENCOUNTER
Medications approved, orders signed and sent to pharmacy. Thank you.
Folate Once 10/29/2021   Vitamin D 25 Hydroxy Once 10/29/2021   CBC with Auto Differential     Comprehensive Metabolic Panel              Patient Active Problem List:     Lung nodule     Atypical chest pain     Adrenal adenoma     Goiter     Essential hypertension     Enlarged tonsils     ACE inhibitor-aggravated angioedema     JONES (obstructive sleep apnea)     Dyslipidemia     IGT (impaired glucose tolerance)     Bipolar disorder (HCC)     Mild intermittent asthma without complication     Inflammatory polyarthritis (HCC)     Seronegative rheumatoid arthritis (HCC)     Chronic diarrhea     Hypocalcemia     Hypomagnesemia     Troponin level elevated     Moderate malnutrition (HCC)     Esophageal thickening     Proctitis     Compression fracture of L2 (HCC)     Anemia, normocytic normochromic     Exocrine pancreatic insufficiency     Idiopathic 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

## 2022-05-31 DIAGNOSIS — D75.839 THROMBOCYTOSIS: Primary | ICD-10-CM

## 2022-06-21 NOTE — TELEPHONE ENCOUNTER
Request for Imodium. Next Visit Date:  Future Appointments   Date Time Provider Anthony Cutleri   7/1/2022 12:15 PM Nickie Hdz [place] [address] [facility] Maintenance   Topic Date Due    Hepatitis B vaccine (1 of 3 - Risk 3-dose series) Never done    Pneumococcal 0-64 years Vaccine (2 - PCV) 05/09/2018    Shingles vaccine (2 of 2) 12/15/2021    Breast cancer screen  10/10/2022    Lipids  02/11/2023    Depression Monitoring  02/17/2023    Diabetes screen  06/11/2024    DTaP/Tdap/Td vaccine (3 - Td or Tdap) 04/14/2027    Colorectal Cancer Screen  12/03/2030    Flu vaccine  Completed    COVID-19 Vaccine  Completed    Hepatitis C screen  Completed    HIV screen  Completed    Hepatitis A vaccine  Aged Out    Hib vaccine  Aged Out    Meningococcal (ACWY) vaccine  Aged Out       Hemoglobin A1C (%)   Date Value   06/11/2021 5.5   03/21/2021 6.3 (H)   01/20/2021 5.9             ( goal A1C is < 7)   Microalb/Crt.  Ratio (mcg/mg creat)   Date Value   06/12/2019 147 (H)     LDL Cholesterol (mg/dL)   Date Value   02/11/2022 71       (goal LDL is <100)   AST (U/L)   Date Value   03/07/2022 16     ALT (U/L)   Date Value   03/07/2022 11     BUN (mg/dL)   Date Value   03/10/2022 9     BP Readings from Last 3 Encounters:   06/02/22 (!) 171/95   03/07/22 (!) 137/92   02/25/22 129/85          (goal 120/80)    All Future Testing planned in [facility]  Lab Frequency Next Occurrence   Basic Metabolic Panel Once 26/88/8056   Basic Metabolic Panel Once 55/73/7618   CBC Auto Differential Once 48/98/6475   Basic Metabolic Panel Once 11/27/8446   Iron and TIBC Once 10/29/2021   Vitamin B12 Once 10/29/2021   Folate Once 10/29/2021   Vitamin D 25 Hydroxy Once 10/29/2021   Ferritin Once 06/07/2022   CBC with Auto Differential     Comprehensive Metabolic Panel           Patient Active Problem List:     Lung nodule     Atypical chest pain     Adrenal adenoma     Goiter     Essential hypertension     Enlarged tonsils     ACE inhibitor-aggravated angioedema     JONES (obstructive sleep apnea)     Dyslipidemia     IGT (impaired glucose tolerance)     Bipolar disorder (HCC)     Mild intermittent asthma without complication     Inflammatory polyarthritis (HCC)     Seronegative rheumatoid arthritis (HCC)     Chronic diarrhea     Hypocalcemia     Hypomagnesemia     Troponin level elevated     Moderate malnutrition (HCC)     Esophageal thickening     Proctitis     Compression fracture of L2 (HCC)     Anemia, normocytic normochromic     Exocrine pancreatic insufficiency     Idiopathic 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

## 2022-06-22 RX ORDER — LOPERAMIDE HYDROCHLORIDE 2 MG/1
CAPSULE ORAL
Qty: 90 CAPSULE | Refills: 10 | Status: SHIPPED | OUTPATIENT
Start: 2022-06-22

## 2022-06-22 RX ORDER — LANOLIN ALCOHOL/MO/W.PET/CERES
CREAM (GRAM) TOPICAL
Qty: 30 TABLET | Refills: 10 | Status: SHIPPED | OUTPATIENT
Start: 2022-06-22

## 2022-06-22 NOTE — TELEPHONE ENCOUNTER
E-scribe request for Vit B1. Please review and e-scribe if applicable. Next Visit Date:  Future Appointments   Date Time Provider Anthony Cutleri   7/1/2022 12:15 PM Jennifer Hdz MD [id number] [facility] Maintenance   Topic Date Due    Hepatitis B vaccine (1 of 3 - Risk 3-dose series) Never done    Pneumococcal 0-64 years Vaccine (2 - PCV) 05/09/2018    Shingles vaccine (2 of 2) 12/15/2021    Breast cancer screen  10/10/2022    Lipids  02/11/2023    Depression Monitoring  02/17/2023    Diabetes screen  06/11/2024    DTaP/Tdap/Td vaccine (3 - Td or Tdap) 04/14/2027    Colorectal Cancer Screen  12/03/2030    Flu vaccine  Completed    COVID-19 Vaccine  Completed    Hepatitis C screen  Completed    HIV screen  Completed    Hepatitis A vaccine  Aged Out    Hib vaccine  Aged Out    Meningococcal (ACWY) vaccine  Aged Out               (applicable per patient's age: Cancer Screenings, Depression Screening, Fall Risk Screening, Immunizations)    Hemoglobin A1C (%)   Date Value   06/11/2021 5.5   03/21/2021 6.3 (H)   01/20/2021 5.9     Microalb/Crt.  Ratio (mcg/mg creat)   Date Value   06/12/2019 147 (H)     LDL Cholesterol (mg/dL)   Date Value   02/11/2022 71     AST (U/L)   Date Value   03/07/2022 16     ALT (U/L)   Date Value   03/07/2022 11     BUN (mg/dL)   Date Value   03/10/2022 9      (goal A1C is < 7)   (goal LDL is <100) need 30-50% reduction from baseline     BP Readings from Last 3 Encounters:   06/02/22 (!) 171/95   03/07/22 (!) 137/92   02/25/22 129/85    (goal /80)      All Future Testing planned in [facility]:  Lab Frequency Next Occurrence   Basic Metabolic Panel Once 87/62/2620   Basic Metabolic Panel Once 04/99/4695   CBC Auto Differential Once 67/05/7662   Basic Metabolic Panel Once 37/56/1389   Iron and TIBC Once 10/29/2021   Vitamin B12 Once 10/29/2021   Folate Once 10/29/2021   Vitamin D 25 Hydroxy Once 10/29/2021   Ferritin Once 06/07/2022   CBC with Auto Differential Comprehensive Metabolic Panel              Patient Active Problem List:     Lung nodule     Atypical chest pain     Adrenal adenoma     Goiter     Essential hypertension     Enlarged tonsils     ACE inhibitor-aggravated angioedema     JONES (obstructive sleep apnea)     Dyslipidemia     IGT (impaired glucose tolerance)     Bipolar disorder (HCC)     Mild intermittent asthma without complication     Inflammatory polyarthritis (HCC)     Seronegative rheumatoid arthritis (HCC)     Chronic diarrhea     Hypocalcemia     Hypomagnesemia     Troponin level elevated     Moderate malnutrition (HCC)     Esophageal thickening     Proctitis     Compression fracture of L2 (HCC)     Anemia, normocytic normochromic     Exocrine pancreatic insufficiency     Idiopathic 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

## 2022-06-29 ENCOUNTER — HOSPITAL ENCOUNTER (OUTPATIENT)
Facility: MEDICAL CENTER | Age: 57
End: 2022-06-29

## 2022-07-01 ENCOUNTER — TELEPHONE (OUTPATIENT)
Dept: ONCOLOGY | Age: 57
End: 2022-07-01

## 2022-07-01 NOTE — TELEPHONE ENCOUNTER
PATIENT CALLED AND LEFT A VM AT 11:04AM AND 11:17AM  TO SAY THAT HER [facility] HAS NOT ARRIVED FOR HER APPOINTMENT AT 12:15PM SO SHE MAY BE LATE. WRITER CALLED PATIENT BACK AND  RESCHEDULED APPOINTMENT 7/25/22, AS CAB NEVER SHOWED. PATIENT APOLOGIZED BUT SHE HAS NO OTHER RELIABLE TRANSPORTATION.

## 2022-07-20 DIAGNOSIS — E83.42 HYPOMAGNESEMIA: ICD-10-CM

## 2022-07-21 NOTE — TELEPHONE ENCOUNTER
E-scribing request for magnesium . Pt has future appt. Health Maintenance   Topic Date Due    Hepatitis B vaccine (1 of 3 - Risk 3-dose series) Never done    Pneumococcal 0-64 years Vaccine (2 - PCV) 05/09/2018    Shingles vaccine (2 of 2) 12/15/2021    COVID-19 Vaccine (4 - Booster for Moderna series) 03/12/2022    Flu vaccine (1) 09/01/2022    Breast cancer screen  10/10/2022    Lipids  02/11/2023    Depression Monitoring  02/17/2023    Diabetes screen  06/11/2024    DTaP/Tdap/Td vaccine (3 - Td or Tdap) 04/14/2027    Colorectal Cancer Screen  12/03/2030    Hepatitis C screen  Completed    HIV screen  Completed    Hepatitis A vaccine  Aged Out    Hib vaccine  Aged Out    Meningococcal (ACWY) vaccine  Aged Out             (applicable per patient's age: Cancer Screenings, Depression Screening, Fall Risk Screening, Immunizations)    Hemoglobin A1C (%)   Date Value   06/11/2021 5.5   03/21/2021 6.3 (H)   01/20/2021 5.9     Microalb/Crt.  Ratio (mcg/mg creat)   Date Value   06/12/2019 147 (H)     LDL Cholesterol (mg/dL)   Date Value   02/11/2022 71     AST (U/L)   Date Value   03/07/2022 16     ALT (U/L)   Date Value   03/07/2022 11     BUN (mg/dL)   Date Value   03/10/2022 9      (goal A1C is < 7)   (goal LDL is <100) need 30-50% reduction from baseline     BP Readings from Last 3 Encounters:   06/02/22 (!) 171/95   03/07/22 (!) 137/92   02/25/22 129/85    (goal /80)      All Future Testing planned in [facility]:  Lab Frequency Next Occurrence   Basic Metabolic Panel Once 68/81/7613   Basic Metabolic Panel Once 25/54/3752   CBC Auto Differential Once 57/95/9055   Basic Metabolic Panel Once 20/28/2708   Iron and TIBC Once 10/29/2021   Vitamin B12 Once 10/29/2021   Folate Once 10/29/2021   Vitamin D 25 Hydroxy Once 10/29/2021   Ferritin Once 06/07/2022   CBC with Auto Differential     Comprehensive Metabolic Panel         Next Visit Date:  Future Appointments   Date Time Provider Anthony Everett   7/25/2022

## 2022-07-24 RX ORDER — MAGNESIUM OXIDE 400 MG/1
400 TABLET ORAL DAILY
Qty: 30 TABLET | Refills: 0 | Status: SHIPPED | OUTPATIENT
Start: 2022-07-24 | End: 2022-08-23

## 2022-07-25 ENCOUNTER — OFFICE VISIT (OUTPATIENT)
Dept: ONCOLOGY | Age: 57
End: 2022-07-25
Payer: MEDICAID

## 2022-07-25 ENCOUNTER — HOSPITAL ENCOUNTER (OUTPATIENT)
Age: 57
Setting detail: SPECIMEN
Discharge: HOME | End: 2022-07-25
Payer: MEDICAID

## 2022-07-25 ENCOUNTER — TELEPHONE (OUTPATIENT)
Dept: ONCOLOGY | Age: 57
End: 2022-07-25

## 2022-07-25 VITALS
SYSTOLIC BLOOD PRESSURE: 138 MMHG | DIASTOLIC BLOOD PRESSURE: 96 MMHG | HEART RATE: 96 BPM | WEIGHT: 202.1 LBS | BODY MASS INDEX: 34.69 KG/M2 | RESPIRATION RATE: 20 BRPM | TEMPERATURE: 96 F

## 2022-07-25 DIAGNOSIS — D75.839 THROMBOCYTOSIS: ICD-10-CM

## 2022-07-25 DIAGNOSIS — D53.9 MACROCYTIC ANEMIA: ICD-10-CM

## 2022-07-25 DIAGNOSIS — D75.839 THROMBOCYTOSIS: Primary | ICD-10-CM

## 2022-07-25 DIAGNOSIS — K86.0 ALCOHOL-INDUCED CHRONIC PANCREATITIS (HCC): ICD-10-CM

## 2022-07-25 LAB
ABSOLUTE EOS #: 0.19 K/UL (ref 0–0.44)
ABSOLUTE IMMATURE GRANULOCYTE: 0.02 K/UL (ref 0–0.3)
ABSOLUTE LYMPH #: 2.17 K/UL (ref 1.1–3.7)
ABSOLUTE MONO #: 0.6 K/UL (ref 0.1–1.2)
ALBUMIN SERPL-MCNC: 4.2 G/DL (ref 3.5–5.2)
ALP BLD-CCNC: 141 U/L (ref 35–104)
ALT SERPL-CCNC: 20 U/L (ref 5–33)
ANION GAP SERPL CALCULATED.3IONS-SCNC: 13 MMOL/L (ref 9–17)
AST SERPL-CCNC: 42 U/L
BASOPHILS # BLD: 1 % (ref 0–2)
BASOPHILS ABSOLUTE: 0.05 K/UL (ref 0–0.2)
BILIRUB SERPL-MCNC: 0.36 MG/DL (ref 0.3–1.2)
BUN BLDV-MCNC: 6 MG/DL (ref 6–20)
BUN/CREAT BLD: 7 (ref 9–20)
CALCIUM SERPL-MCNC: 9.1 MG/DL (ref 8.6–10.4)
CHLORIDE BLD-SCNC: 100 MMOL/L (ref 98–107)
CO2: 27 MMOL/L (ref 20–31)
CREAT SERPL-MCNC: 0.9 MG/DL (ref 0.5–0.9)
EOSINOPHILS RELATIVE PERCENT: 3 % (ref 1–4)
FERRITIN: 197 NG/ML (ref 13–150)
GFR AFRICAN AMERICAN: >60 ML/MIN
GFR NON-AFRICAN AMERICAN: >60 ML/MIN
GFR SERPL CREATININE-BSD FRML MDRD: ABNORMAL ML/MIN/{1.73_M2}
GLUCOSE BLD-MCNC: 140 MG/DL (ref 70–99)
HCT VFR BLD CALC: 37.8 % (ref 36.3–47.1)
HEMOGLOBIN: 12.3 G/DL (ref 11.9–15.1)
IMMATURE GRANULOCYTES: 0 %
LYMPHOCYTES # BLD: 38 % (ref 24–43)
MCH RBC QN AUTO: 30.4 PG (ref 25.2–33.5)
MCHC RBC AUTO-ENTMCNC: 32.5 G/DL (ref 28.4–34.8)
MCV RBC AUTO: 93.3 FL (ref 82.6–102.9)
MONOCYTES # BLD: 11 % (ref 3–12)
NRBC AUTOMATED: 0 PER 100 WBC
PDW BLD-RTO: 14.9 % (ref 11.8–14.4)
PLATELET # BLD: 382 K/UL (ref 138–453)
PMV BLD AUTO: 9 FL (ref 8.1–13.5)
POTASSIUM SERPL-SCNC: 3.1 MMOL/L (ref 3.7–5.3)
RBC # BLD: 4.05 M/UL (ref 3.95–5.11)
RBC # BLD: ABNORMAL 10*6/UL
SEG NEUTROPHILS: 47 % (ref 36–65)
SEGMENTED NEUTROPHILS ABSOLUTE COUNT: 2.66 K/UL (ref 1.5–8.1)
SODIUM BLD-SCNC: 140 MMOL/L (ref 135–144)
TOTAL PROTEIN: 7.6 G/DL (ref 6.4–8.3)
WBC # BLD: 5.7 K/UL (ref 3.5–11.3)

## 2022-07-25 PROCEDURE — 3017F COLORECTAL CA SCREEN DOC REV: CPT | Performed by: INTERNAL MEDICINE

## 2022-07-25 PROCEDURE — 80053 COMPREHEN METABOLIC PANEL: CPT

## 2022-07-25 PROCEDURE — G8417 CALC BMI ABV UP PARAM F/U: HCPCS | Performed by: INTERNAL MEDICINE

## 2022-07-25 PROCEDURE — 82728 ASSAY OF FERRITIN: CPT

## 2022-07-25 PROCEDURE — 99214 OFFICE O/P EST MOD 30 MIN: CPT | Performed by: INTERNAL MEDICINE

## 2022-07-25 PROCEDURE — G8427 DOCREV CUR MEDS BY ELIG CLIN: HCPCS | Performed by: INTERNAL MEDICINE

## 2022-07-25 PROCEDURE — 36415 COLL VENOUS BLD VENIPUNCTURE: CPT

## 2022-07-25 PROCEDURE — 1036F TOBACCO NON-USER: CPT | Performed by: INTERNAL MEDICINE

## 2022-07-25 PROCEDURE — 85025 COMPLETE CBC W/AUTO DIFF WBC: CPT

## 2022-07-25 PROCEDURE — 99211 OFF/OP EST MAY X REQ PHY/QHP: CPT

## 2022-07-25 RX ORDER — PREDNISONE 10 MG/1
10 TABLET ORAL DAILY
Qty: 30 TABLET | Refills: 0 | Status: SHIPPED | OUTPATIENT
Start: 2022-07-25 | End: 2022-08-23

## 2022-07-25 NOTE — PROGRESS NOTES
DIAGNOSIS:   Thrombocytosis   Macrocytic anemia  Diffuse arthralgia/. possible RA   Diagnosis of chronic pancreatitis secondary to alcohol intake. Possibly explaining her thrombocytosis and microcytic anemia    CURRENT THERAPY:  NGS testing is negative for  mutation  Supportive care  BRIEF CASE HISTORY:   Yassine Ramires is a very pleasant 65 y.o. female who is referred to us for thrombocytosis. She was recently in house with uncontrolled hypertension and lab work showed elevated platelets. She was also found to have possible RA or gout and needs to consult with rheumatology for joint pain and swelling. She was given Prednisone in house to manage swelling. She reports she feels improved since admission. She has history of bursa and has had knees drained by ortho surg. We saw the patient and decided to proceed with NGS testing. That was negative for JAK2 mutation, DAGOBERTO R mutation and MPL mutation. Platelets are above 1 million. The patient admitted that she was a long-term drinker. She developed chronic pancreatitis. We thought that her macrocytic anemia thrombocytosis could be related to the chronic pancreatitis and alcohol intake. INTERIM HISTORY: The patient comes in today for follow-up with lab work. She had been taking steroids for her arthritis and reports weight gain with treatment, she has recently run out and her pain has returned and not controlled, she has follow up with rheumatology scheduled. She is consuming 1 beer daily and continues with Creon and her eating has improved.        PAST MEDICAL HISTORY: has a past medical history of Angioedema, Anxiety, Asthma, Bipolar disorder ([address] Claustrophobia, Depression, GERD (gastroesophageal reflux disease), Hypertension, Hypertrophic cardiomyopathy ([address] Lung nodules, MRSA (methicillin resistant Staphylococcus aureus), Murmur, OA (osteoarthritis), JONES (obstructive sleep apnea), Thyroid nodule, and Type 2 diabetes mellitus without complication, without long-term current use of insulin ([facility] [address] PAST SURGICAL HISTORY: has a past surgical history that includes Hysterectomy; Upper gastrointestinal endoscopy; Colonoscopy; Thyroid surgery; Cardiac catheterization; other surgical history (8/24/2015); Upper gastrointestinal endoscopy (N/A, 12/3/2020); Colonoscopy (N/A, 12/3/2020); Foot surgery (Bilateral, 12/07/2020); Hammer toe surgery (Right, 12/7/2020); and Bunionectomy (Bilateral, 12/7/2020). CURRENT MEDICATIONS:  has a current medication list which includes the following prescription(s): magnesium oxide, loperamide, ventolin hfa, acetaminophen, potassium chloride, nitroglycerin, oyster shell calcium w/d, folic acid, multivitamin, aspirin low dose, allopurinol, omeprazole, loratadine, ferrous sulfate, amlodipine, atenolol, vitamin b-1, atorvastatin, lipase-protease-amylase, oyster shell calcium/d, fluoxetine, albuterol sulfate, thiamine, quetiapine, thiamine mononitrate, [DISCONTINUED] atorvastatin, and [DISCONTINUED] oyster shell calcium/d. ALLERGIES:  is allergic to bee venom, iodine, lisinopril, and shellfish-derived products. FAMILY HISTORY: Negative for any hematological or oncological conditions. SOCIAL HISTORY:  reports that she quit smoking about 29 years ago. Her smoking use included cigarettes. She has a 10.00 pack-year smoking history. She has never used smokeless tobacco. She reports that she does not currently use alcohol after a past usage of about 12.0 standard drinks per week. She reports that she does not currently use drugs after having used the following drugs: Cocaine. REVIEW OF SYSTEMS:   General: No fever or night sweats. Weight gain   ENT: No double or blurred vision, no tinnitus or hearing problem, no dysphagia or sore throat   Respiratory: No chest pain, no shortness of breath, no cough or hemoptysis. Cardiovascular: Denies chest pain, PND or orthopnea.  No L E swelling or palpitations. Gastrointestinal: No nausea or vomiting, abdominal pain, diarrhea, or abd pain  Genitourinary: Denies dysuria, hematuria, frequency, urgency or incontinence. Neurological: Denies headaches, decreased LOC, no sensory or motor focal deficits. Musculoskeletal: No back pain; +joint pain and swelling as discussed previously, unchanged but she is having more pain in her right elbow and right hip after the fall  Skin: There are no rashes or bleeding. Psychiatric: No anxiety, no depression. Endocrine: No diabetes or thyroid disease  Hematologic: No bleeding, no adenopathy. PHYSICAL EXAM: Shows a well appearing 65y.o.-year-old female who is not in pain or distress. Vital Signs: Blood pressure (!) 138/96, pulse 96, temperature (!) 96 °F (35.6 °C), temperature source Temporal, resp. rate 20, weight 202 lb 1.6 oz (91.7 kg), not currently breastfeeding. HEENT: Normocephalic and atraumatic. Pupils are equal, round, reactive to light and accommodation. Extraocular muscles are intact. Neck: Showed no JVD, no carotid bruit . Lungs: Clear to auscultation bilaterally. Heart: systolic murmur. Abdomen: Soft, nontender. No hepatosplenomegaly. Extremities: Lower extremities show no edema, clubbing, or cyanosis. Evidence of inflammatory arthritis and small fingers of the hand and wrist as well as the elbows. She has olecranon bursa with significant pain and tenderness. Breasts: Examination not done today.  Neuro exam: intact cranial nerves bilaterally no motor or sensory deficit, gait is normal. Lymphatic: no adenopathy appreciated in the supraclavicular, axillary, cervical or inguinal area    REVIEW OF LABORATORY DATA:   Lab Results   Component Value Date    WBC 5.7 07/25/2022    HGB 12.3 07/25/2022    HCT 37.8 07/25/2022    MCV 93.3 07/25/2022     07/25/2022       Chemistry        Component Value Date/Time     07/25/2022 0917    K 3.1 (L) 07/25/2022 0917     07/25/2022 0917    CO2 27 07/25/2022 0917    BUN 6 07/25/2022 0917    CREATININE 0.90 07/25/2022 0917        Component Value Date/Time    CALCIUM 9.1 07/25/2022 0917    ALKPHOS 141 (H) 07/25/2022 0917    AST 42 (H) 07/25/2022 0917    ALT 20 07/25/2022 0917    BILITOT 0.36 07/25/2022 0917          Lab Results   Component Value Date    IRON 38 03/10/2022    TIBC 229 (L) 03/10/2022    FERRITIN 197 (H) 07/25/2022       REVIEW OF RADIOLOGICAL RESULTS:     IMPRESSION:   Thrombocytosis, likely reactive secondary to chronic pancreatitis  Macrocytic anemia, likely secondary to alcohol intake  Evidence of inflammatory arthritis/bursitis  NGS testing is negative for  mutation   Recurrent hospitalization with ROXANN, diarrhea, pancreatitis    PLAN:   Her lab work was reviewed, her counts have normalized. We discussed her history of chronic pancreatitis which appears to be controlled with current alcohol consumption. She has gained weight steroids for RA and has follow up planned to discuss other options for treatment, her pain is recurrent and uncontrolled with script running out. I am refilling her steroids to bridge. We will continue with monitor. Return in 1 year. Scribe Attestation   This note was created by Lizzie Hernandez acting as scribe for the physician signing this note  Electronically Signed  Lucy Thomas, 7/25/2022  Scribe, [employer]. Attending Attestation   Note was reviewed and edited.   I am in agreement with the note as entered    Uriah Hdz MD  Hematologist/Medical [id number] [facility]

## 2022-07-25 NOTE — TELEPHONE ENCOUNTER
Chasity Ortiz MD VISIT  Rv in 12 months with cbc   LABS CDP July 2023  MD VISIT July 2023  AVS PRINTED W/ INSTRUCTIONS AND GIVEN TO PT ON EXIT

## 2022-07-27 ENCOUNTER — OFFICE VISIT (OUTPATIENT)
Dept: INTERNAL MEDICINE | Age: 57
End: 2022-07-27
Payer: MEDICAID

## 2022-07-27 VITALS
TEMPERATURE: 89 F | WEIGHT: 201 LBS | OXYGEN SATURATION: 98 % | DIASTOLIC BLOOD PRESSURE: 86 MMHG | HEIGHT: 64 IN | HEART RATE: 65 BPM | BODY MASS INDEX: 34.31 KG/M2 | SYSTOLIC BLOOD PRESSURE: 134 MMHG

## 2022-07-27 DIAGNOSIS — F10.10 ALCOHOL ABUSE: ICD-10-CM

## 2022-07-27 DIAGNOSIS — R26.81 UNSTEADY GAIT: Primary | ICD-10-CM

## 2022-07-27 DIAGNOSIS — E87.6 HYPOKALEMIA: ICD-10-CM

## 2022-07-27 DIAGNOSIS — K52.9 CHRONIC DIARRHEA: ICD-10-CM

## 2022-07-27 DIAGNOSIS — K86.81 EXOCRINE PANCREATIC INSUFFICIENCY: Chronic | ICD-10-CM

## 2022-07-27 DIAGNOSIS — F31.9 BIPOLAR AFFECTIVE DISORDER, REMISSION STATUS UNSPECIFIED (HCC): Chronic | ICD-10-CM

## 2022-07-27 DIAGNOSIS — I10 UNCONTROLLED HYPERTENSION: ICD-10-CM

## 2022-07-27 DIAGNOSIS — M10.09 ACUTE IDIOPATHIC GOUT OF MULTIPLE SITES: ICD-10-CM

## 2022-07-27 PROCEDURE — 99214 OFFICE O/P EST MOD 30 MIN: CPT

## 2022-07-27 PROCEDURE — 3017F COLORECTAL CA SCREEN DOC REV: CPT

## 2022-07-27 PROCEDURE — 99211 OFF/OP EST MAY X REQ PHY/QHP: CPT | Performed by: STUDENT IN AN ORGANIZED HEALTH CARE EDUCATION/TRAINING PROGRAM

## 2022-07-27 PROCEDURE — 1036F TOBACCO NON-USER: CPT

## 2022-07-27 PROCEDURE — G8427 DOCREV CUR MEDS BY ELIG CLIN: HCPCS

## 2022-07-27 PROCEDURE — G8417 CALC BMI ABV UP PARAM F/U: HCPCS

## 2022-07-27 RX ORDER — TRAZODONE HYDROCHLORIDE 100 MG/1
100 TABLET ORAL NIGHTLY
COMMUNITY

## 2022-07-27 RX ORDER — COLCHICINE 0.6 MG/1
0.6 TABLET ORAL DAILY
Qty: 30 TABLET | Refills: 0 | Status: SHIPPED | OUTPATIENT
Start: 2022-07-27 | End: 2022-08-24

## 2022-07-27 RX ORDER — POTASSIUM CHLORIDE 20 MEQ/1
40 TABLET, EXTENDED RELEASE ORAL DAILY
Qty: 180 TABLET | Refills: 1 | Status: SHIPPED | OUTPATIENT
Start: 2022-07-27

## 2022-07-27 ASSESSMENT — ENCOUNTER SYMPTOMS
ALLERGIC/IMMUNOLOGIC NEGATIVE: 1
EYES NEGATIVE: 1
RESPIRATORY NEGATIVE: 1
DIARRHEA: 1
VOMITING: 1

## 2022-07-27 NOTE — PATIENT INSTRUCTIONS
[facility] for cane and office notes [facility] [address]

## 2022-07-27 NOTE — PROGRESS NOTES
Attending Physician Statement  I have discussed the care of [address], including pertinent history and exam findings with the resident. I have reviewed the key elements of all parts of the encounter with the resident. I have seen and examined the patient with the resident and the key elements of all parts of the encounter have been performed by me. discussed with patient she has diagnosis of idiopathic gout, was seen by rheumatologist in March 2022. Was started on allopurinol in addition to colchicine and prednisone taper. She finished prednisone taper but she gained 25 pounds since March. She was seen by heme oncologist 2 days ago for follow-up for thrombocytosis and macrocytic anemia. And she was started on prednisone again for joint pain. She does not want to take prednisone because of weight gain. She is not sure that she took colchicine and how long ago she stopped it  She has blood work done here yesterday. BMP was within normal limits except for hypokalemia  started her on colchicine. she has follow-up appointment with rheumatologist in 2 weeks. Has history of chronic pancreatitis and still drinks daily  She is trying to cut down  She has chronic diarrhea from chronic pancreatitis. Will increase potassium chloride to 40 mg daily and patient informed  She is requesting cane for unsteady gait and script sent     the problem list as documented. The plan and orders should include   Orders Placed This Encounter   Procedures    Cane    and this was also documented by the resident. Diagnosis Orders   1. Unsteady gait  Cane      2. Uncontrolled hypertension        3. Exocrine pancreatic insufficiency        4. Chronic diarrhea        5. Bipolar affective disorder, remission status unspecified ([facility] [address]        6. Alcohol abuse        7.  Hypokalemia             Chantelle Pereyra MD   Attending Physician, [address] [place] [address], Internal Medicine Residency Program  [facility]) Physician - [facility]

## 2022-07-27 NOTE — PROGRESS NOTES
[facility]  [facility] [address] [id number] [facility] [address]  Dept: [phone]  Dept Fax: [phone]    Office Progress/Follow Up Note  Date ofpatient's visit: 7/27/2022  Patient's Name:  Tamara Gibbs YOB: 1965            Patient Care Team:  Bala Aquino MD as PCP - General (Internal Medicine)  Je Zurita MD as Consulting Physician (Gastroenterology)  ================================================================    REASON FOR VISIT/CHIEF COMPLAINT:  Established New Doctor, Sleep Apnea, and Orders (Dme order for cane )    HISTORY OF PRESENTING ILLNESS:  History was obtained from: patient. Cory Persaud a 65 y.o. with PMH of idiopathic gout, hypertension, depression, chronic pancreatitis is here for a pain over left hip, bilateral knees, bilateral shoulders, neck and needing a new cane for walking. Has been diagnosed with gout 2 years ago. Last rheumatologist visit was in March-taking prednisone and allopurinol. Not taking colchicine. Next appointment is after 2 weeks. She was following hematologist for thrombocytosis and macrocytic anemia-which has resolved as per yesterday's blood report. She is complaining of her weight gain of 25 pounds in the last 4 months because of using prednisone. States she is using Tylenol for pain but it is not helping. She drinks 2 cans of beers per day. States she does not feel eating every day so she uses 2 Gummies of marijuana per day which helps her with eating  Quit smoking 10 years ago. Complains of occasional diarrhea and vomiting.       Patient Active Problem List   Diagnosis    Lung nodule    Atypical chest pain    Adrenal adenoma    Goiter    Essential hypertension    Enlarged tonsils    ACE inhibitor-aggravated angioedema    JONES (obstructive sleep apnea)    Dyslipidemia    IGT (impaired glucose tolerance)    Bipolar disorder (HCC)    Mild intermittent asthma without complication    Inflammatory polyarthritis (HCC)    Seronegative rheumatoid arthritis (HCC)    Chronic diarrhea    Hypocalcemia    Hypomagnesemia    Troponin level elevated    Moderate malnutrition (HCC)    Esophageal thickening    Proctitis    Compression fracture of L2 (HCC)    Anemia, normocytic normochromic    Exocrine pancreatic insufficiency    Idiopathic 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       Health Maintenance Due   Topic Date Due    Hepatitis B vaccine (1 of 3 - Risk 3-dose series) Never done    Pneumococcal 0-64 years Vaccine (2 - PCV) 05/09/2018    Shingles vaccine (2 of 2) 12/15/2021    COVID-19 Vaccine (4 - Booster for Moderna series) 03/12/2022       Allergies   Allergen Reactions    Bee Venom Anaphylaxis    Iodine Anaphylaxis and Rash    Lisinopril Swelling and Anaphylaxis     Angioedema    Shellfish-Derived Products Anaphylaxis and Rash     ANGIOEDEMA         Current Outpatient Medications   Medication Sig Dispense Refill    traZODone (DESYREL) 100 MG tablet Take 100 mg by mouth nightly      colchicine (COLCRYS) 0.6 MG tablet Take 1 tablet by mouth in the morning. 30 tablet 0    potassium chloride (KLOR-CON M) 20 MEQ extended release tablet Take 2 tablets by mouth in the morning. 180 tablet 1    predniSONE (DELTASONE) 10 MG tablet Take 1 tablet by mouth in the morning. 30 tablet 0    magnesium oxide (MAG-OX) 400 MG tablet Take 1 tablet by mouth in the morning. 30 tablet 0    loperamide (IMODIUM) 2 MG capsule TAKE 1 CAPSULE BY MOUTH FOUR TIMES DAILY AS NEEDED FOR DIARRHEA 90 capsule 10    thiamine 100 MG tablet TAKE 1 TABLET BY MOUTH DAILY 30 tablet 10    VENTOLIN  (90 Base) MCG/ACT inhaler INHALE TWO (2) PUFFS BY MOUTH INTO THE LUNGS EVERY 6 HOURS AS NEEDED FOR WHEEZING 18 g 10    acetaminophen (APAP EXTRA STRENGTH) 500 MG tablet Take 2 tablets by mouth every 6 hours as needed for Pain 60 tablet 2    nitroGLYCERIN (NITROSTAT) 0.4 MG SL tablet up to max of 3 total doses.  If no relief after 1 dose, call 360. 14 tablet 3    Calcium Carb-Cholecalciferol (OYSTER SHELL CALCIUM W/D) 500-200 MG-UNIT TABS tablet Take 1 tablet by mouth daily 90 tablet 3    folic acid (FOLVITE) 1 MG tablet Take 1 tablet by mouth daily 30 tablet 3    Multiple Vitamin (MULTIVITAMIN) TABS tablet Take 1 tablet by mouth daily 60 tablet 2    ASPIRIN LOW DOSE 81 MG EC tablet TAKE 1 TABLET BY MOUTH DAILY 90 tablet 3    allopurinol (ZYLOPRIM) 100 MG tablet TAKE 1 TABLET BY MOUTH DAILY 90 tablet 3    omeprazole (PRILOSEC) 20 MG delayed release capsule Take 1 capsule by mouth daily 60 capsule 3    loratadine (CLARITIN) 10 MG tablet Take 1 tablet by mouth daily as needed (allergies) 90 tablet 1    ferrous sulfate (IRON 325) 325 (65 Fe) MG tablet Take 1 tablet by mouth daily (with breakfast) 90 tablet 2    amLODIPine (NORVASC) 10 MG tablet Take 1 tablet by mouth daily 90 tablet 3    vitamin B-1 (THIAMINE) 100 MG tablet Take 1 tablet by mouth daily 30 tablet 3    atorvastatin (LIPITOR) 40 MG tablet Take 1 tablet by mouth nightly 30 tablet 3    lipase-protease-amylase (CREON) 53759-96733 units delayed release capsule Take by mouth 3 times daily (with meals) 180 capsule 2    thiamine mononitrate 100 MG tablet TAKE 1 TABLET BY MOUTH ONCE DAILY  30 tablet 2    FLUoxetine (PROZAC) 40 MG capsule Take 40 mg by mouth daily      albuterol sulfate (PROAIR RESPICLICK) 520 (90 Base) MCG/ACT aerosol powder inhalation Inhale 2 puffs into the lungs every 6 hours as needed for Wheezing or Shortness of Breath 5 Inhaler 3    atenolol (TENORMIN) 50 MG tablet Take 1 tablet by mouth daily 60 tablet 3     No current facility-administered medications for this visit.        Social History     Tobacco Use    Smoking status: Former     Packs/day: 0.50     Years: 20.00     Pack years: 10.00     Types: Cigarettes     Quit date: 1992     Years since quittin.6    Smokeless tobacco: Never    Tobacco comments:     states quiti in the 1980s   Substance Use Topics    Alcohol use: Not Currently     Alcohol/week: 12.0 standard drinks     Types: 12 Cans of beer per week     Comment: Quit about 5 months ago 11/13/20    Drug use: Not Currently     Types: Cocaine     Comment: last use approximately 1/20/14 cocaine       Family History   Problem Relation Age of Onset    Stroke Mother     Hypertension Father     Cancer Brother         bone    Lung Cancer Maternal Aunt     Cancer Maternal Grandmother         eye     Other Sister         aneurysm    Heart Disease Sister         REVIEW OF SYSTEMS:  Review of Systems   Constitutional:  Positive for unexpected weight change. HENT: Negative. Eyes: Negative. Respiratory: Negative. Cardiovascular: Negative. Gastrointestinal:  Positive for diarrhea (ocassional) and vomiting (ocassional). Endocrine: Negative. Genitourinary: Negative. Musculoskeletal:  Positive for arthralgias. Skin: Negative. Allergic/Immunologic: Negative. Neurological: Negative. Hematological: Negative. Psychiatric/Behavioral: Negative. PHYSICAL EXAM:  Vitals:    07/27/22 1452 07/27/22 1503   BP: (!) 143/90 134/86   Site: Left Upper Arm Right Upper Arm   Position: Sitting Sitting   Cuff Size: Large Adult Medium Adult   Pulse: 65 65   Temp: (!) 89 °F (31.7 °C)    SpO2: 98%    Weight: 201 lb (91.2 kg)    Height: 5' 4\" (1.626 m)      BP Readings from Last 3 Encounters:   07/27/22 134/86   07/25/22 (!) 138/96   06/02/22 (!) 171/95        Physical Exam  Constitutional:       Appearance: She is obese. HENT:      Head: Normocephalic and atraumatic. Nose: Nose normal.   Eyes:      Pupils: Pupils are equal, round, and reactive to light. Cardiovascular:      Rate and Rhythm: Normal rate and regular rhythm. Pulses: Normal pulses. Heart sounds: Normal heart sounds. Pulmonary:      Effort: Pulmonary effort is normal.      Breath sounds: Normal breath sounds. Abdominal:      General: Abdomen is flat.  Bowel sounds are normal.      Palpations: Abdomen is soft. Musculoskeletal:         General: Normal range of motion. Cervical back: Normal range of motion. Neurological:      General: No focal deficit present. Mental Status: She is alert and oriented to person, place, and time. DIAGNOSTIC FINDINGS:  CBC:  Lab Results   Component Value Date/Time    WBC 5.7 07/25/2022 09:17 AM    HGB 12.3 07/25/2022 09:17 AM     07/25/2022 09:17 AM       BMP:    Lab Results   Component Value Date/Time     07/25/2022 09:17 AM    K 3.1 07/25/2022 09:17 AM     07/25/2022 09:17 AM    CO2 27 07/25/2022 09:17 AM    BUN 6 07/25/2022 09:17 AM    CREATININE 0.90 07/25/2022 09:17 AM    GLUCOSE 140 07/25/2022 09:17 AM    GLUCOSE 88 02/24/2012 10:28 AM       HEMOGLOBIN A1C:   Lab Results   Component Value Date/Time    LABA1C 5.5 06/11/2021 10:17 AM       FASTING LIPID PANEL:  Lab Results   Component Value Date    CHOL 139 02/11/2022    HDL 45 02/11/2022    TRIG 114 02/11/2022       ASSESSMENT AND PLAN:  King Darlin was seen today for established new doctor, sleep apnea and orders. Diagnoses and all orders for this visit:    Unsteady gait  -  Cane    Uncontrolled hypertension  On amlodipine 10 mg and atenolol  Counseled regarding medication compliance. Counseled regarding low salt diet    Exocrine pancreatic insufficiency  On Creon  Loperamide as per need  Counseled regarding decreasing alcohol intake    Chronic diarrhea  Using loperamide as needed and Creon  Bipolar affective disorder, remission status unspecified (HCC)  Follows Dr. Bryce Scott. Alcohol abuse  Counseled regarding decreasing alcohol intake  Hypokalemia  Increased potassium intake to 40 mEq/day  Acute idiopathic gout of multiple sites  -     Uric Acid; Future  Colchicine for 2 weeks  Follow-up with rheumatologist within 2 weeks  Stopped prednisone  We will check uric acid level    Other orders  -     colchicine (COLCRYS) 0.6 MG tablet;  Take 1 tablet by mouth in the morning.  -

## 2022-08-10 ENCOUNTER — TELEPHONE (OUTPATIENT)
Dept: INTERNAL MEDICINE | Age: 57
End: 2022-08-10

## 2022-08-19 DIAGNOSIS — T78.40XS ALLERGY, SEQUELA: ICD-10-CM

## 2022-08-19 DIAGNOSIS — M06.00 SERONEGATIVE RHEUMATOID ARTHRITIS (HCC): ICD-10-CM

## 2022-08-19 NOTE — TELEPHONE ENCOUNTER
E-scribe request for Loratadine. Please review and e-scribe if applicable. Next Visit Date:  No future appointments. Health Maintenance   Topic Date Due    Hepatitis B vaccine (1 of 3 - Risk 3-dose series) Never done    Pneumococcal 0-64 years Vaccine (2 - PCV) 05/09/2018    Shingles vaccine (2 of 2) 12/15/2021    COVID-19 Vaccine (4 - Booster for Moderna series) 03/12/2022    Flu vaccine (1) 09/01/2022    Breast cancer screen  10/10/2022    Lipids  02/11/2023    Depression Monitoring  02/17/2023    Diabetes screen  06/11/2024    DTaP/Tdap/Td vaccine (3 - Td or Tdap) 04/14/2027    Colorectal Cancer Screen  12/03/2030    Hepatitis C screen  Completed    HIV screen  Completed    Hepatitis A vaccine  Aged Out    Hib vaccine  Aged Out    Meningococcal (ACWY) vaccine  Aged Out               (applicable per patient's age: Cancer Screenings, Depression Screening, Fall Risk Screening, Immunizations)    Hemoglobin A1C (%)   Date Value   06/11/2021 5.5   03/21/2021 6.3 (H)   01/20/2021 5.9     Microalb/Crt.  Ratio (mcg/mg creat)   Date Value   06/12/2019 147 (H)     LDL Cholesterol (mg/dL)   Date Value   02/11/2022 71     AST (U/L)   Date Value   07/25/2022 42 (H)     ALT (U/L)   Date Value   07/25/2022 20     BUN (mg/dL)   Date Value   07/25/2022 6      (goal A1C is < 7)   (goal LDL is <100) need 30-50% reduction from baseline     BP Readings from Last 3 Encounters:   07/27/22 134/86   07/25/22 (!) 138/96   06/02/22 (!) 171/95    (goal /80)      All Future Testing planned in [facility]:  Lab Frequency Next Occurrence   Basic Metabolic Panel Once 72/66/7931   Basic Metabolic Panel Once 44/01/1649   CBC Auto Differential Once 67/32/3909   Basic Metabolic Panel Once 31/42/0222   Iron and TIBC Once 10/29/2021   Vitamin B12 Once 10/29/2021   Folate Once 10/29/2021   Vitamin D 25 Hydroxy Once 10/29/2021   Uric Acid Once 07/27/2022   CBC with Auto Differential     Comprehensive Metabolic Panel              Patient Active Problem List:     Lung nodule     Atypical chest pain     Adrenal adenoma     Goiter     Essential hypertension     Enlarged tonsils     ACE inhibitor-aggravated angioedema     JONES (obstructive sleep apnea)     Dyslipidemia     IGT (impaired glucose tolerance)     Bipolar disorder (HCC)     Mild intermittent asthma without complication     Inflammatory polyarthritis (HCC)     Seronegative rheumatoid arthritis (HCC)     Chronic diarrhea     Hypocalcemia     Hypomagnesemia     Troponin level elevated     Moderate malnutrition (HCC)     Esophageal thickening     Proctitis     Compression fracture of L2 (HCC)     Anemia, normocytic normochromic     Exocrine pancreatic insufficiency     Idiopathic 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

## 2022-08-22 DIAGNOSIS — E83.42 HYPOMAGNESEMIA: ICD-10-CM

## 2022-08-22 RX ORDER — PSEUDOEPHED/ACETAMINOPH/DIPHEN 30MG-500MG
TABLET ORAL
Qty: 60 TABLET | Refills: 10 | OUTPATIENT
Start: 2022-08-22

## 2022-08-22 RX ORDER — FOLIC ACID 1 MG/1
1 TABLET ORAL DAILY
Qty: 30 TABLET | Refills: 10 | OUTPATIENT
Start: 2022-08-22

## 2022-08-22 NOTE — TELEPHONE ENCOUNTER
Copied from Dr Urmila Mckeon :     Plan was discussed with Dr Lay Warner.  He will call patient to follow up

## 2022-08-22 NOTE — TELEPHONE ENCOUNTER
Moderate malnutrition (HCC)     Esophageal thickening     Proctitis     Compression fracture of L2 (HCC)     Anemia, normocytic normochromic     Exocrine pancreatic insufficiency     Idiopathic 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

## 2022-08-23 RX ORDER — PREDNISONE 10 MG/1
10 TABLET ORAL DAILY
Qty: 30 TABLET | Refills: 10 | Status: SHIPPED | OUTPATIENT
Start: 2022-08-23 | End: 2022-09-22

## 2022-08-24 RX ORDER — LORATADINE 10 MG/1
TABLET ORAL
Qty: 30 TABLET | Refills: 2 | Status: SHIPPED | OUTPATIENT
Start: 2022-08-24

## 2022-08-24 RX ORDER — MAGNESIUM OXIDE 400 MG/1
400 TABLET ORAL DAILY
Qty: 30 TABLET | Refills: 10 | OUTPATIENT
Start: 2022-08-24 | End: 2022-09-23

## 2022-08-24 NOTE — PROGRESS NOTES
Insurance denied colchicine prescription stating pt is already on oral steroids. Carlito Alanis Dr has renewed her prednisone prescription. We will discontinue the colchicine.

## 2022-08-30 DIAGNOSIS — M06.00 SERONEGATIVE RHEUMATOID ARTHRITIS (HCC): ICD-10-CM

## 2022-08-30 NOTE — TELEPHONE ENCOUNTER
Request for Folic acid and Acetaminophen. Next Visit Date:  No future appointments. Health Maintenance   Topic Date Due    Hepatitis B vaccine (1 of 3 - Risk 3-dose series) Never done    Pneumococcal 0-64 years Vaccine (2 - PCV) 05/09/2018    Shingles vaccine (2 of 2) 12/15/2021    COVID-19 Vaccine (4 - Booster for Moderna series) 03/12/2022    Flu vaccine (1) 09/01/2022    Breast cancer screen  10/10/2022    Lipids  02/11/2023    Depression Monitoring  02/17/2023    Diabetes screen  06/11/2024    DTaP/Tdap/Td vaccine (3 - Td or Tdap) 04/14/2027    Colorectal Cancer Screen  12/03/2030    Hepatitis C screen  Completed    HIV screen  Completed    Hepatitis A vaccine  Aged Out    Hib vaccine  Aged Out    Meningococcal (ACWY) vaccine  Aged Out       Hemoglobin A1C (%)   Date Value   06/11/2021 5.5   03/21/2021 6.3 (H)   01/20/2021 5.9             ( goal A1C is < 7)   Microalb/Crt.  Ratio (mcg/mg creat)   Date Value   06/12/2019 147 (H)     LDL Cholesterol (mg/dL)   Date Value   02/11/2022 71       (goal LDL is <100)   AST (U/L)   Date Value   07/25/2022 42 (H)     ALT (U/L)   Date Value   07/25/2022 20     BUN (mg/dL)   Date Value   07/25/2022 6     BP Readings from Last 3 Encounters:   07/27/22 134/86   07/25/22 (!) 138/96   06/02/22 (!) 171/95          (goal 120/80)    All Future Testing planned in [facility]  Lab Frequency Next Occurrence   CBC Auto Differential Once 49/02/8331   Basic Metabolic Panel Once 38/34/9356   Iron and TIBC Once 10/29/2021   Vitamin B12 Once 10/29/2021   Folate Once 10/29/2021   Vitamin D 25 Hydroxy Once 10/29/2021   Uric Acid Once 07/27/2022   CBC with Auto Differential     Comprehensive Metabolic Panel           Patient Active Problem List:     Lung nodule     Atypical chest pain     Adrenal adenoma     Goiter     Essential hypertension     Enlarged tonsils     ACE inhibitor-aggravated angioedema     JONES (obstructive sleep apnea)     Dyslipidemia     IGT (impaired glucose tolerance) Bipolar disorder (HCC)     Mild intermittent asthma without complication     Inflammatory polyarthritis (HCC)     Seronegative rheumatoid arthritis (HCC)     Chronic diarrhea     Hypocalcemia     Hypomagnesemia     Troponin level elevated     Moderate malnutrition (HCC)     Esophageal thickening     Proctitis     Compression fracture of L2 (HCC)     Anemia, normocytic normochromic     Exocrine pancreatic insufficiency     Idiopathic 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

## 2022-09-02 DIAGNOSIS — E83.42 HYPOMAGNESEMIA: ICD-10-CM

## 2022-09-02 DIAGNOSIS — M06.00 SERONEGATIVE RHEUMATOID ARTHRITIS (HCC): ICD-10-CM

## 2022-09-02 RX ORDER — ACETAMINOPHEN 500 MG
1000 TABLET ORAL EVERY 6 HOURS PRN
Qty: 60 TABLET | Refills: 2 | Status: SHIPPED | OUTPATIENT
Start: 2022-09-02

## 2022-09-02 RX ORDER — MAGNESIUM OXIDE 400 MG/1
400 TABLET ORAL DAILY
Qty: 30 TABLET | Refills: 10 | OUTPATIENT
Start: 2022-09-02 | End: 2022-10-02

## 2022-09-02 RX ORDER — FOLIC ACID 1 MG/1
1 TABLET ORAL DAILY
Qty: 30 TABLET | Refills: 3 | Status: SHIPPED | OUTPATIENT
Start: 2022-09-02

## 2022-09-02 RX ORDER — FOLIC ACID 1 MG/1
1 TABLET ORAL DAILY
Qty: 30 TABLET | Refills: 10 | OUTPATIENT
Start: 2022-09-02

## 2022-09-02 RX ORDER — PSEUDOEPHED/ACETAMINOPH/DIPHEN 30MG-500MG
TABLET ORAL
Qty: 60 TABLET | Refills: 10 | OUTPATIENT
Start: 2022-09-02

## 2022-09-06 DIAGNOSIS — I10 ESSENTIAL HYPERTENSION: ICD-10-CM

## 2022-09-07 NOTE — TELEPHONE ENCOUNTER
E-scribe request for medication  atenolol. Pt is on wait list till Nov.     Health Maintenance   Topic Date Due    Hepatitis B vaccine (1 of 3 - Risk 3-dose series) Never done    Pneumococcal 0-64 years Vaccine (2 - PCV) 05/09/2018    Shingles vaccine (2 of 2) 12/15/2021    COVID-19 Vaccine (4 - Booster for Moderna series) 03/12/2022    Flu vaccine (1) 09/01/2022    Breast cancer screen  10/10/2022    Lipids  02/11/2023    Depression Monitoring  02/17/2023    Diabetes screen  06/11/2024    DTaP/Tdap/Td vaccine (3 - Td or Tdap) 04/14/2027    Colorectal Cancer Screen  12/03/2030    Hepatitis C screen  Completed    HIV screen  Completed    Hepatitis A vaccine  Aged Out    Hib vaccine  Aged Out    Meningococcal (ACWY) vaccine  Aged Out             (applicable per patient's age: Cancer Screenings, Depression Screening, Fall Risk Screening, Immunizations)    Hemoglobin A1C (%)   Date Value   06/11/2021 5.5   03/21/2021 6.3 (H)   01/20/2021 5.9     Microalb/Crt. Ratio (mcg/mg creat)   Date Value   06/12/2019 147 (H)     LDL Cholesterol (mg/dL)   Date Value   02/11/2022 71     AST (U/L)   Date Value   07/25/2022 42 (H)     ALT (U/L)   Date Value   07/25/2022 20     BUN (mg/dL)   Date Value   07/25/2022 6      (goal A1C is < 7)   (goal LDL is <100) need 30-50% reduction from baseline     BP Readings from Last 3 Encounters:   07/27/22 134/86   07/25/22 (!) 138/96   06/02/22 (!) 171/95    (goal /80)      All Future Testing planned in [facility]:  Lab Frequency Next Occurrence   CBC Auto Differential Once 26/05/7035   Basic Metabolic Panel Once 29/41/6865   Iron and TIBC Once 10/29/2021   Vitamin B12 Once 10/29/2021   Folate Once 10/29/2021   Vitamin D 25 Hydroxy Once 10/29/2021   Uric Acid Once 07/27/2022   CBC with Auto Differential     Comprehensive Metabolic Panel         Next Visit Date:  No future appointments.          Patient Active Problem List:     Lung nodule     Atypical chest pain     Adrenal adenoma Goiter     Essential hypertension     Enlarged tonsils     ACE inhibitor-aggravated angioedema     JONES (obstructive sleep apnea)     Dyslipidemia     IGT (impaired glucose tolerance)     Bipolar disorder (HCC)     Mild intermittent asthma without complication     Inflammatory polyarthritis (HCC)     Seronegative rheumatoid arthritis (HCC)     Chronic diarrhea     Hypocalcemia     Hypomagnesemia     Troponin level elevated     Moderate malnutrition (HCC)     Esophageal thickening     Proctitis     Compression fracture of L2 (HCC)     Anemia, normocytic normochromic     Exocrine pancreatic insufficiency     Idiopathic 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

## 2022-09-09 NOTE — TELEPHONE ENCOUNTER
tonsils     ACE inhibitor-aggravated angioedema     JONES (obstructive sleep apnea)     Dyslipidemia     IGT (impaired glucose tolerance)     Bipolar disorder (HCC)     Mild intermittent asthma without complication     Inflammatory polyarthritis (HCC)     Seronegative rheumatoid arthritis (HCC)     Chronic diarrhea     Hypocalcemia     Hypomagnesemia     Troponin level elevated     Moderate malnutrition (HCC)     Esophageal thickening     Proctitis     Compression fracture of L2 (HCC)     Anemia, normocytic normochromic     Exocrine pancreatic insufficiency     Idiopathic 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

## 2022-09-10 RX ORDER — ATENOLOL 50 MG/1
50 TABLET ORAL DAILY
Qty: 30 TABLET | Refills: 10 | Status: SHIPPED | OUTPATIENT
Start: 2022-09-10

## 2022-09-10 RX ORDER — MULTIVITAMIN
TABLET ORAL
Qty: 30 TABLET | Refills: 10 | Status: SHIPPED | OUTPATIENT
Start: 2022-09-10

## 2022-09-14 DIAGNOSIS — I10 ESSENTIAL HYPERTENSION: ICD-10-CM

## 2022-09-15 RX ORDER — MULTIVITAMIN
TABLET ORAL
Qty: 30 TABLET | Refills: 10 | OUTPATIENT
Start: 2022-09-15

## 2022-09-15 RX ORDER — ATENOLOL 50 MG/1
50 TABLET ORAL DAILY
Qty: 30 TABLET | Refills: 10 | OUTPATIENT
Start: 2022-09-15

## 2022-09-19 DIAGNOSIS — I10 ESSENTIAL HYPERTENSION: ICD-10-CM

## 2022-09-20 RX ORDER — MULTIVITAMIN
TABLET ORAL
Qty: 30 TABLET | Refills: 10 | OUTPATIENT
Start: 2022-09-20

## 2022-09-20 RX ORDER — ATENOLOL 50 MG/1
50 TABLET ORAL DAILY
Qty: 30 TABLET | Refills: 10 | OUTPATIENT
Start: 2022-09-20

## 2022-10-05 DIAGNOSIS — E61.1 IRON DEFICIENCY: ICD-10-CM

## 2022-10-06 RX ORDER — FERROUS SULFATE 325(65) MG
TABLET ORAL
Qty: 30 TABLET | Refills: 3 | Status: SHIPPED | OUTPATIENT
Start: 2022-10-06

## 2022-10-06 NOTE — TELEPHONE ENCOUNTER
Refill request for FEROSUL 325 (65 Fe) MG tablet. If appropriate please send medication(s) to patients pharmacy. Next appt: Patient is currently on wait list for provider. Last appt: 7/27/2022    Health Maintenance   Topic Date Due    Cervical cancer screen  Never done    Shingles vaccine (2 of 2) 12/15/2021    COVID-19 Vaccine (4 - Booster for Moderna series) 03/12/2022    Flu vaccine (1) 08/01/2022    Breast cancer screen  10/10/2022    Lipids  02/11/2023    Depression Monitoring  02/17/2023    Diabetes screen  06/11/2024    DTaP/Tdap/Td vaccine (3 - Td or Tdap) 04/14/2027    Colorectal Cancer Screen  12/03/2030    Pneumococcal 0-64 years Vaccine  Completed    Hepatitis C screen  Completed    HIV screen  Completed    Hepatitis A vaccine  Aged Out    Hepatitis B vaccine  Aged Out    Hib vaccine  Aged Out    Meningococcal (ACWY) vaccine  Aged Out       Hemoglobin A1C (%)   Date Value   06/11/2021 5.5   03/21/2021 6.3 (H)   01/20/2021 5.9             ( goal A1C is < 7)   Microalb/Crt.  Ratio (mcg/mg creat)   Date Value   06/12/2019 147 (H)     LDL Cholesterol (mg/dL)   Date Value   02/11/2022 71       (goal LDL is <100)   AST (U/L)   Date Value   07/25/2022 42 (H)     ALT (U/L)   Date Value   07/25/2022 20     BUN (mg/dL)   Date Value   07/25/2022 6     BP Readings from Last 3 Encounters:   07/27/22 134/86   07/25/22 (!) 138/96   06/02/22 (!) 171/95          (goal 120/80)          Patient Active Problem List:     Lung nodule     Atypical chest pain     Adrenal adenoma     Goiter     Essential hypertension     Enlarged tonsils     ACE inhibitor-aggravated angioedema     JONES (obstructive sleep apnea)     Dyslipidemia     IGT (impaired glucose tolerance)     Bipolar disorder (HCC)     Mild intermittent asthma without complication     Inflammatory polyarthritis (HCC)     Seronegative rheumatoid arthritis (HCC)     Chronic diarrhea     Hypocalcemia     Hypomagnesemia     Troponin level elevated     Moderate malnutrition ([address]     Esophageal thickening     Proctitis     Compression fracture of L2 (HCC)     Anemia, normocytic normochromic     Exocrine pancreatic insufficiency     Idiopathic 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

## 2022-10-17 NOTE — TELEPHONE ENCOUNTER
Creon refill  Patient on waitlist    Health Maintenance   Topic Date Due    Hepatitis B vaccine (1 of 3 - Risk 3-dose series) Never done    Shingles vaccine (2 of 2) 12/15/2021    COVID-19 Vaccine (4 - Booster for Moderna series) 03/12/2022    Flu vaccine (1) 08/01/2022    Breast cancer screen  10/10/2022    Lipids  02/11/2023    Depression Monitoring  02/17/2023    Diabetes screen  06/11/2024    DTaP/Tdap/Td vaccine (3 - Td or Tdap) 04/14/2027    Colorectal Cancer Screen  12/03/2030    Pneumococcal 0-64 years Vaccine  Completed    Hepatitis C screen  Completed    HIV screen  Completed    Hepatitis A vaccine  Aged Out    Hib vaccine  Aged Out    Meningococcal (ACWY) vaccine  Aged Out             (applicable per patient's age: Cancer Screenings, Depression Screening, Fall Risk Screening, Immunizations)    Hemoglobin A1C (%)   Date Value   06/11/2021 5.5   03/21/2021 6.3 (H)   01/20/2021 5.9     Microalb/Crt. Ratio (mcg/mg creat)   Date Value   06/12/2019 147 (H)     LDL Cholesterol (mg/dL)   Date Value   02/11/2022 71     AST (U/L)   Date Value   07/25/2022 42 (H)     ALT (U/L)   Date Value   07/25/2022 20     BUN (mg/dL)   Date Value   07/25/2022 6      (goal A1C is < 7)   (goal LDL is <100) need 30-50% reduction from baseline     BP Readings from Last 3 Encounters:   07/27/22 134/86   07/25/22 (!) 138/96   06/02/22 (!) 171/95    (goal /80)      All Future Testing planned in [facility]:  Lab Frequency Next Occurrence   CBC Auto Differential Once 67/65/3081   Basic Metabolic Panel Once 48/57/0983   Iron and TIBC Once 10/29/2021   Vitamin B12 Once 10/29/2021   Folate Once 10/29/2021   Vitamin D 25 Hydroxy Once 10/29/2021   Uric Acid Once 07/27/2022   CBC with Auto Differential     Comprehensive Metabolic Panel         Next Visit Date:  No future appointments.          Patient Active Problem List:     Lung nodule     Atypical chest pain     Adrenal adenoma     Goiter     Essential hypertension     Enlarged tonsils ACE inhibitor-aggravated angioedema     JONES (obstructive sleep apnea)     Dyslipidemia     IGT (impaired glucose tolerance)     Bipolar disorder (HCC)     Mild intermittent asthma without complication     Inflammatory polyarthritis (HCC)     Seronegative rheumatoid arthritis (HCC)     Chronic diarrhea     Hypocalcemia     Hypomagnesemia     Troponin level elevated     Moderate malnutrition (HCC)     Esophageal thickening     Proctitis     Compression fracture of L2 (HCC)     Anemia, normocytic normochromic     Exocrine pancreatic insufficiency     Idiopathic 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

## 2022-10-18 RX ORDER — PANCRELIPASE 30000; 6000; 19000 [USP'U]/1; [USP'U]/1; [USP'U]/1
CAPSULE, DELAYED RELEASE PELLETS ORAL
Qty: 90 CAPSULE | Refills: 10 | Status: SHIPPED | OUTPATIENT
Start: 2022-10-18

## 2022-11-10 DIAGNOSIS — T78.40XS ALLERGY, SEQUELA: ICD-10-CM

## 2022-11-11 NOTE — TELEPHONE ENCOUNTER
Claritin refill    Pt has a future appt 01/18/23     Health Maintenance   Topic Date Due    Hepatitis B vaccine (1 of 3 - Risk 3-dose series) Never done    Shingles vaccine (2 of 2) 12/15/2021    COVID-19 Vaccine (4 - Booster for Moderna series) 01/07/2022    Flu vaccine (1) 08/01/2022    Breast cancer screen  10/10/2022    Lipids  02/11/2023    Depression Monitoring  02/17/2023    Diabetes screen  06/11/2024    DTaP/Tdap/Td vaccine (3 - Td or Tdap) 04/14/2027    Colorectal Cancer Screen  12/03/2030    Pneumococcal 0-64 years Vaccine  Completed    Hepatitis C screen  Completed    HIV screen  Completed    Hepatitis A vaccine  Aged Out    Hib vaccine  Aged Out    Meningococcal (ACWY) vaccine  Aged Out             (applicable per patient's age: Cancer Screenings, Depression Screening, Fall Risk Screening, Immunizations)    Hemoglobin A1C (%)   Date Value   06/11/2021 5.5   03/21/2021 6.3 (H)   01/20/2021 5.9     Microalb/Crt.  Ratio (mcg/mg creat)   Date Value   06/12/2019 147 (H)     LDL Cholesterol (mg/dL)   Date Value   02/11/2022 71     AST (U/L)   Date Value   07/25/2022 42 (H)     ALT (U/L)   Date Value   07/25/2022 20     BUN (mg/dL)   Date Value   07/25/2022 6      (goal A1C is < 7)   (goal LDL is <100) need 30-50% reduction from baseline     BP Readings from Last 3 Encounters:   07/27/22 134/86   07/25/22 (!) 138/96   06/02/22 (!) 171/95    (goal /80)      All Future Testing planned in [facility]:  Lab Frequency Next Occurrence   Uric Acid Once 07/27/2022   CBC with Auto Differential     Comprehensive Metabolic Panel         Next Visit Date:  Future Appointments   Date Time Provider Anthony Everett   1/18/2023  1:00 PM Joey Marcos MD [id number]            Patient Active Problem List:     Lung nodule     Atypical chest pain     Adrenal adenoma     Goiter     Essential hypertension     Enlarged tonsils     ACE inhibitor-aggravated angioedema     JONES (obstructive sleep apnea)     Dyslipidemia IGT (impaired glucose tolerance)     Bipolar disorder (HCC)     Mild intermittent asthma without complication     Inflammatory polyarthritis (HCC)     Seronegative rheumatoid arthritis (HCC)     Chronic diarrhea     Hypocalcemia     Hypomagnesemia     Troponin level elevated     Moderate malnutrition (HCC)     Esophageal thickening     Proctitis     Compression fracture of L2 (HCC)     Anemia, normocytic normochromic     Exocrine pancreatic insufficiency     Idiopathic 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

## 2022-11-14 RX ORDER — LORATADINE 10 MG/1
TABLET ORAL
Qty: 30 TABLET | Refills: 3 | Status: SHIPPED | OUTPATIENT
Start: 2022-11-14

## 2023-01-03 DIAGNOSIS — I10 ESSENTIAL HYPERTENSION: ICD-10-CM

## 2023-01-04 NOTE — TELEPHONE ENCOUNTER
Request for potassium. Next jo on 1/18/23    Next Visit Date:  Future Appointments   Date Time Provider Anthony Everett   1/18/2023  1:00 PM Joey Lovell MD [facility]   Topic Date Due    Diabetic foot exam  Never done    Hepatitis B vaccine (1 of 3 - Risk 3-dose series) Never done    Diabetic retinal exam  11/19/2021    Shingles vaccine (2 of 2) 12/15/2021    COVID-19 Vaccine (4 - Booster for Moderna series) 01/07/2022    Flu vaccine (1) 08/01/2022    Breast cancer screen  10/10/2022    Lipids  02/11/2023    Depression Monitoring  02/17/2023    Diabetes screen  06/11/2024    DTaP/Tdap/Td vaccine (3 - Td or Tdap) 04/14/2027    Colorectal Cancer Screen  12/03/2030    Pneumococcal 0-64 years Vaccine  Completed    Hepatitis C screen  Completed    HIV screen  Completed    Hepatitis A vaccine  Aged Out    Hib vaccine  Aged Out    Meningococcal (ACWY) vaccine  Aged Out       Hemoglobin A1C (%)   Date Value   06/11/2021 5.5   03/21/2021 6.3 (H)   01/20/2021 5.9             ( goal A1C is < 7)   Microalb/Crt.  Ratio (mcg/mg creat)   Date Value   06/12/2019 147 (H)     LDL Cholesterol (mg/dL)   Date Value   02/11/2022 71       (goal LDL is <100)   AST (U/L)   Date Value   07/25/2022 42 (H)     ALT (U/L)   Date Value   07/25/2022 20     BUN (mg/dL)   Date Value   07/25/2022 6     BP Readings from Last 3 Encounters:   07/27/22 134/86   07/25/22 (!) 138/96   06/02/22 (!) 171/95          (goal 120/80)    All Future Testing planned in [facility]  Lab Frequency Next Occurrence   Uric Acid Once 07/27/2022   CBC with Auto Differential     Comprehensive Metabolic Panel           Patient Active Problem List:     Lung nodule     Atypical chest pain     Adrenal adenoma     Goiter     Essential hypertension     Enlarged tonsils     ACE inhibitor-aggravated angioedema     JONES (obstructive sleep apnea)     Dyslipidemia     IGT (impaired glucose tolerance)     Bipolar disorder (HCC)     Mild intermittent asthma without complication     Inflammatory polyarthritis (HCC)     Seronegative rheumatoid arthritis (HCC)     Chronic diarrhea     Hypocalcemia     Hypomagnesemia     Troponin level elevated     Moderate malnutrition (HCC)     Esophageal thickening     Proctitis     Compression fracture of L2 (HCC)     Anemia, normocytic normochromic     Exocrine pancreatic insufficiency     Idiopathic 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

## 2023-01-04 NOTE — TELEPHONE ENCOUNTER
Request for amlodopine. Next jo on 1/18/23    Next Visit Date:  Future Appointments   Date Time Provider Anthony Everett   1/18/2023  1:00 PM Joey Jordan MD [address] [facility]   Topic Date Due    Diabetic foot exam  Never done    Hepatitis B vaccine (1 of 3 - Risk 3-dose series) Never done    Diabetic retinal exam  11/19/2021    Shingles vaccine (2 of 2) 12/15/2021    COVID-19 Vaccine (4 - Booster for Moderna series) 01/07/2022    Flu vaccine (1) 08/01/2022    Breast cancer screen  10/10/2022    Lipids  02/11/2023    Depression Monitoring  02/17/2023    Diabetes screen  06/11/2024    DTaP/Tdap/Td vaccine (3 - Td or Tdap) 04/14/2027    Colorectal Cancer Screen  12/03/2030    Pneumococcal 0-64 years Vaccine  Completed    Hepatitis C screen  Completed    HIV screen  Completed    Hepatitis A vaccine  Aged Out    Hib vaccine  Aged Out    Meningococcal (ACWY) vaccine  Aged Out       Hemoglobin A1C (%)   Date Value   06/11/2021 5.5   03/21/2021 6.3 (H)   01/20/2021 5.9             ( goal A1C is < 7)   Microalb/Crt.  Ratio (mcg/mg creat)   Date Value   06/12/2019 147 (H)     LDL Cholesterol (mg/dL)   Date Value   02/11/2022 71       (goal LDL is <100)   AST (U/L)   Date Value   07/25/2022 42 (H)     ALT (U/L)   Date Value   07/25/2022 20     BUN (mg/dL)   Date Value   07/25/2022 6     BP Readings from Last 3 Encounters:   07/27/22 134/86   07/25/22 (!) 138/96   06/02/22 (!) 171/95          (goal 120/80)    All Future Testing planned in [facility]  Lab Frequency Next Occurrence   Uric Acid Once 07/27/2022   CBC with Auto Differential     Comprehensive Metabolic Panel           Patient Active Problem List:     Lung nodule     Atypical chest pain     Adrenal adenoma     Goiter     Essential hypertension     Enlarged tonsils     ACE inhibitor-aggravated angioedema     JONES (obstructive sleep apnea)     Dyslipidemia     IGT (impaired glucose tolerance)     Bipolar disorder (HCC)     Mild intermittent asthma without complication     Inflammatory polyarthritis (HCC)     Seronegative rheumatoid arthritis (HCC)     Chronic diarrhea     Hypocalcemia     Hypomagnesemia     Troponin level elevated     Moderate malnutrition (HCC)     Esophageal thickening     Proctitis     Compression fracture of L2 (HCC)     Anemia, normocytic normochromic     Exocrine pancreatic insufficiency     Idiopathic 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

## 2023-01-05 RX ORDER — POTASSIUM CHLORIDE 20 MEQ/1
TABLET, EXTENDED RELEASE ORAL
Qty: 60 TABLET | Refills: 1 | Status: SHIPPED | OUTPATIENT
Start: 2023-01-05

## 2023-01-05 RX ORDER — AMLODIPINE BESYLATE 10 MG/1
10 TABLET ORAL DAILY
Qty: 30 TABLET | Refills: 3 | Status: SHIPPED | OUTPATIENT
Start: 2023-01-05

## 2023-01-06 ENCOUNTER — TELEPHONE (OUTPATIENT)
Dept: ONCOLOGY | Age: 58
End: 2023-01-06

## 2023-01-06 NOTE — TELEPHONE ENCOUNTER
WRITER CALLED COULDN'T LV VM MESSAGE VM DESCRIBER ANSWERING SERVICE WILL CALL BACK FOR A F/U APPT W/ DR Varinder Franklin IN 7/2023 W/ LAB CDP

## 2023-01-10 RX ORDER — POTASSIUM CHLORIDE 20 MEQ/1
TABLET, EXTENDED RELEASE ORAL
Qty: 60 TABLET | Refills: 10 | OUTPATIENT
Start: 2023-01-10

## 2023-01-16 NOTE — TELEPHONE ENCOUNTER
Request for potassium. Next jo on 1/18/23    Next Visit Date:  Future Appointments   Date Time Provider Anthony Everett   1/18/2023  1:00 PM Joey Aponte MD [facility]   Topic Date Due    Hepatitis B vaccine (1 of 3 - Risk 3-dose series) Never done    Diabetic retinal exam  11/19/2021    Shingles vaccine (2 of 2) 12/15/2021    COVID-19 Vaccine (4 - Booster for Moderna series) 01/07/2022    Flu vaccine (1) 08/01/2022    Breast cancer screen  10/10/2022    Lipids  02/11/2023    Depression Monitoring  02/17/2023    Diabetes screen  06/11/2024    DTaP/Tdap/Td vaccine (3 - Td or Tdap) 04/14/2027    Colorectal Cancer Screen  12/03/2030    Pneumococcal 0-64 years Vaccine  Completed    Hepatitis C screen  Completed    HIV screen  Completed    Hepatitis A vaccine  Aged Out    Hib vaccine  Aged Out    Meningococcal (ACWY) vaccine  Aged Out       Hemoglobin A1C (%)   Date Value   06/11/2021 5.5   03/21/2021 6.3 (H)   01/20/2021 5.9             ( goal A1C is < 7)   Microalb/Crt.  Ratio (mcg/mg creat)   Date Value   06/12/2019 147 (H)     LDL Cholesterol (mg/dL)   Date Value   02/11/2022 71       (goal LDL is <100)   AST (U/L)   Date Value   07/25/2022 42 (H)     ALT (U/L)   Date Value   07/25/2022 20     BUN (mg/dL)   Date Value   07/25/2022 6     BP Readings from Last 3 Encounters:   07/27/22 134/86   07/25/22 (!) 138/96   06/02/22 (!) 171/95          (goal 120/80)    All Future Testing planned in [facility]  Lab Frequency Next Occurrence   Uric Acid Once 07/27/2022   CBC with Auto Differential     Comprehensive Metabolic Panel           Patient Active Problem List:     Lung nodule     Atypical chest pain     Adrenal adenoma     Goiter     Essential hypertension     Enlarged tonsils     ACE inhibitor-aggravated angioedema     JNOES (obstructive sleep apnea)     Dyslipidemia     IGT (impaired glucose tolerance)     Bipolar disorder (HCC)     Mild intermittent asthma without complication Inflammatory polyarthritis (HCC)     Seronegative rheumatoid arthritis (HCC)     Chronic diarrhea     Hypocalcemia     Hypomagnesemia     Troponin level elevated     Moderate malnutrition (HCC)     Esophageal thickening     Proctitis     Compression fracture of L2 (HCC)     Anemia, normocytic normochromic     Exocrine pancreatic insufficiency     Idiopathic 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

## 2023-01-17 RX ORDER — POTASSIUM CHLORIDE 20 MEQ/1
TABLET, EXTENDED RELEASE ORAL
Qty: 60 TABLET | Refills: 0 | Status: SHIPPED | OUTPATIENT
Start: 2023-01-17

## 2023-01-23 NOTE — TELEPHONE ENCOUNTER
CERTIFICATE OF {SCHOOL/WORK/SPORTS:095088}       January 23, 2023      Re: Abdifatah Forman  [address]  [place] [address]      This is to certify that Abdifatah Forman has been under my care from 1/23/2023 and {RETURN TO WORK OR SCHOOL:583873}    RESTRICTIONS: ***      REMARKS: ***        SIGNATURE:___________________________________________          Christiano Thompson MD  [facility]  [address]  [place] [address]  Dept Phone: [phone]   No diabetes per last A1C in June. Schedule visit to check on weight loss.

## 2023-02-01 ENCOUNTER — OFFICE VISIT (OUTPATIENT)
Dept: INTERNAL MEDICINE | Age: 58
End: 2023-02-01
Payer: MEDICAID

## 2023-02-01 VITALS
HEART RATE: 74 BPM | SYSTOLIC BLOOD PRESSURE: 146 MMHG | TEMPERATURE: 98 F | WEIGHT: 205 LBS | DIASTOLIC BLOOD PRESSURE: 88 MMHG | HEIGHT: 65 IN | OXYGEN SATURATION: 97 % | BODY MASS INDEX: 34.16 KG/M2

## 2023-02-01 DIAGNOSIS — Z13.220 SCREENING FOR HYPERLIPIDEMIA: ICD-10-CM

## 2023-02-01 DIAGNOSIS — E61.1 IRON DEFICIENCY: ICD-10-CM

## 2023-02-01 DIAGNOSIS — Z13.1 SCREENING FOR DIABETES MELLITUS: ICD-10-CM

## 2023-02-01 DIAGNOSIS — R32 URINARY INCONTINENCE IN FEMALE: ICD-10-CM

## 2023-02-01 DIAGNOSIS — K86.81 EXOCRINE PANCREATIC INSUFFICIENCY: Chronic | ICD-10-CM

## 2023-02-01 DIAGNOSIS — J45.20 MILD INTERMITTENT ASTHMA WITHOUT COMPLICATION: ICD-10-CM

## 2023-02-01 DIAGNOSIS — M06.00 SERONEGATIVE RHEUMATOID ARTHRITIS (HCC): ICD-10-CM

## 2023-02-01 DIAGNOSIS — M10.00 IDIOPATHIC GOUT, UNSPECIFIED CHRONICITY, UNSPECIFIED SITE: ICD-10-CM

## 2023-02-01 DIAGNOSIS — E87.6 HYPOKALEMIA: ICD-10-CM

## 2023-02-01 DIAGNOSIS — Z12.31 ENCOUNTER FOR SCREENING MAMMOGRAM FOR BREAST CANCER: ICD-10-CM

## 2023-02-01 DIAGNOSIS — Z23 INFLUENZA VACCINE NEEDED: ICD-10-CM

## 2023-02-01 DIAGNOSIS — Z23 NEED FOR PROPHYLACTIC VACCINATION AND INOCULATION AGAINST VARICELLA: ICD-10-CM

## 2023-02-01 DIAGNOSIS — M1A.09X0 IDIOPATHIC CHRONIC GOUT OF MULTIPLE SITES WITHOUT TOPHUS: Chronic | ICD-10-CM

## 2023-02-01 DIAGNOSIS — I10 UNCONTROLLED HYPERTENSION: Primary | ICD-10-CM

## 2023-02-01 PROCEDURE — G8427 DOCREV CUR MEDS BY ELIG CLIN: HCPCS

## 2023-02-01 PROCEDURE — G8417 CALC BMI ABV UP PARAM F/U: HCPCS

## 2023-02-01 PROCEDURE — 3017F COLORECTAL CA SCREEN DOC REV: CPT

## 2023-02-01 PROCEDURE — G0008 ADMIN INFLUENZA VIRUS VAC: HCPCS | Performed by: STUDENT IN AN ORGANIZED HEALTH CARE EDUCATION/TRAINING PROGRAM

## 2023-02-01 PROCEDURE — 99213 OFFICE O/P EST LOW 20 MIN: CPT

## 2023-02-01 PROCEDURE — G8482 FLU IMMUNIZE ORDER/ADMIN: HCPCS

## 2023-02-01 PROCEDURE — 3078F DIAST BP <80 MM HG: CPT

## 2023-02-01 PROCEDURE — 1036F TOBACCO NON-USER: CPT

## 2023-02-01 PROCEDURE — 3074F SYST BP LT 130 MM HG: CPT

## 2023-02-01 RX ORDER — ACETAMINOPHEN 500 MG
1000 TABLET ORAL EVERY 6 HOURS PRN
Qty: 60 TABLET | Refills: 2 | Status: SHIPPED | OUTPATIENT
Start: 2023-02-01

## 2023-02-01 RX ORDER — UNDECYLENIC ACID 288 MG/ML
1 LIQUID TOPICAL DAILY
Qty: 60 EACH | Refills: 0 | Status: SHIPPED | OUTPATIENT
Start: 2023-02-01 | End: 2023-04-02

## 2023-02-01 RX ORDER — FOLIC ACID 1 MG/1
1 TABLET ORAL DAILY
Qty: 30 TABLET | Refills: 3 | Status: SHIPPED | OUTPATIENT
Start: 2023-02-01

## 2023-02-01 SDOH — ECONOMIC STABILITY: INCOME INSECURITY: HOW HARD IS IT FOR YOU TO PAY FOR THE VERY BASICS LIKE FOOD, HOUSING, MEDICAL CARE, AND HEATING?: NOT VERY HARD

## 2023-02-01 SDOH — ECONOMIC STABILITY: FOOD INSECURITY: WITHIN THE PAST 12 MONTHS, YOU WORRIED THAT YOUR FOOD WOULD RUN OUT BEFORE YOU GOT MONEY TO BUY MORE.: PATIENT DECLINED

## 2023-02-01 SDOH — ECONOMIC STABILITY: FOOD INSECURITY: WITHIN THE PAST 12 MONTHS, THE FOOD YOU BOUGHT JUST DIDN'T LAST AND YOU DIDN'T HAVE MONEY TO GET MORE.: PATIENT DECLINED

## 2023-02-01 SDOH — ECONOMIC STABILITY: HOUSING INSECURITY
IN THE LAST 12 MONTHS, WAS THERE A TIME WHEN YOU DID NOT HAVE A STEADY PLACE TO SLEEP OR SLEPT IN A SHELTER (INCLUDING NOW)?: NO

## 2023-02-01 ASSESSMENT — PATIENT HEALTH QUESTIONNAIRE - PHQ9
2. FEELING DOWN, DEPRESSED OR HOPELESS: 0
SUM OF ALL RESPONSES TO PHQ QUESTIONS 1-9: 0
6. FEELING BAD ABOUT YOURSELF - OR THAT YOU ARE A FAILURE OR HAVE LET YOURSELF OR YOUR FAMILY DOWN: 0
SUM OF ALL RESPONSES TO PHQ9 QUESTIONS 1 & 2: 0
5. POOR APPETITE OR OVEREATING: 0
10. IF YOU CHECKED OFF ANY PROBLEMS, HOW DIFFICULT HAVE THESE PROBLEMS MADE IT FOR YOU TO DO YOUR WORK, TAKE CARE OF THINGS AT HOME, OR GET ALONG WITH OTHER PEOPLE: 0
SUM OF ALL RESPONSES TO PHQ QUESTIONS 1-9: 0
9. THOUGHTS THAT YOU WOULD BE BETTER OFF DEAD, OR OF HURTING YOURSELF: 0
8. MOVING OR SPEAKING SO SLOWLY THAT OTHER PEOPLE COULD HAVE NOTICED. OR THE OPPOSITE, BEING SO FIGETY OR RESTLESS THAT YOU HAVE BEEN MOVING AROUND A LOT MORE THAN USUAL: 0
3. TROUBLE FALLING OR STAYING ASLEEP: 0
7. TROUBLE CONCENTRATING ON THINGS, SUCH AS READING THE NEWSPAPER OR WATCHING TELEVISION: 0
4. FEELING TIRED OR HAVING LITTLE ENERGY: 0
1. LITTLE INTEREST OR PLEASURE IN DOING THINGS: 0

## 2023-02-01 ASSESSMENT — ENCOUNTER SYMPTOMS
SORE THROAT: 0
STRIDOR: 0
SHORTNESS OF BREATH: 0
CHOKING: 0
COUGH: 0
ABDOMINAL PAIN: 0
CHEST TIGHTNESS: 0

## 2023-02-01 NOTE — PROGRESS NOTES
[facility] [address] [address]  Dept: [phone]  Dept Fax: [phone]    Office Progress/Follow Up Note  Date ofpatient's visit: 2/1/2023  Patient's Name:  Eddie Hays YOB: 1965            Patient Care Team:  Adam Beaver MD as PCP - General (Internal Medicine)  Muna Marin MD as Consulting Physician (Gastroenterology)  ================================================================    REASON FOR VISIT/CHIEF COMPLAINT:  Hypertension    HISTORY OF PRESENTING ILLNESS:  History was obtained from: patient. Daija Montoya a 63 y.o. with past medical history of hypertension, pancreatic insufficiency due to alcohol abuse, gout, chronic back pain is here for a regular checkup. Patient denies any acute concerns or complaints. No chest pain, shortness of breath, palpitations, headache, leg pain or swelling, abdominal pain. Patient has been following rheumatologist for degenerative vertebral disease with foraminal stenosis and also working with physical therapy. Has chronic left hip pain and she got steroid injection yesterday by physical medicine and rehab. Patient has a history of depression and takes fluoxetine 40 mg and states her mood is fine and she is happy and enjoying her life. She eats canned food, salty food. Patient has gained 4 pounds in the last 6 months. Denies cigarette smoking or any other recreational drugs. Drinks alcohol once weekly. Patient also states she has chronic urinary incontinence and loses urine when she coughs or smiles and is asking for sanitary pads as she has Medicaid and cannot afford to buy.       Patient Active Problem List   Diagnosis    Lung nodule    Atypical chest pain    Adrenal adenoma    Goiter    Essential hypertension    Enlarged tonsils    ACE inhibitor-aggravated angioedema    JONES (obstructive sleep apnea)    Dyslipidemia    IGT (impaired glucose tolerance)    Bipolar disorder (HCC)    Mild intermittent asthma without complication    Inflammatory polyarthritis (HCC)    Seronegative rheumatoid arthritis (HCC)    Chronic diarrhea    Hypocalcemia    Hypomagnesemia    Troponin level elevated    Moderate malnutrition (HCC)    Esophageal thickening    Proctitis    Compression fracture of L2 (HCC)    Anemia, normocytic normochromic    Exocrine pancreatic insufficiency    Idiopathic 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    Urinary incontinence in female       Health Maintenance Due   Topic Date Due    Hepatitis B vaccine (1 of 3 - Risk 3-dose series) Never done    Diabetic retinal exam  11/19/2021    Shingles vaccine (2 of 2) 12/15/2021    COVID-19 Vaccine (5 - Booster) 01/07/2022    Breast cancer screen  10/10/2022    Lipids  02/11/2023    Depression Monitoring  02/17/2023       Allergies   Allergen Reactions    Bee Venom Anaphylaxis    Iodine Anaphylaxis and Rash    Lisinopril Swelling and Anaphylaxis     Angioedema    Shellfish-Derived Products Anaphylaxis and Rash     ANGIOEDEMA         Current Outpatient Medications   Medication Sig Dispense Refill    zoster recombinant adjuvanted vaccine (SHINGRIX) 50 MCG/0.5ML SUSR injection Inject 0.5 mLs into the muscle once for 1 dose 50 MCG IM then repeat 2-6 months.  1 each 1    acetaminophen (APAP EXTRA STRENGTH) 500 MG tablet Take 2 tablets by mouth every 6 hours as needed for Pain 60 tablet 2    folic acid (FOLVITE) 1 MG tablet Take 1 tablet by mouth daily 30 tablet 3    diclofenac sodium (VOLTAREN) 1 % GEL Apply 4 g topically 4 times daily 100 g 1    Sanitary Napkins & Tampons (RA PANTY SHIELDS) PADS Apply 1 Units topically daily 60 each 0    potassium chloride (KLOR-CON M) 20 MEQ extended release tablet TAKE TWO (2) TABLETS BY MOUTH IN THE MORNING 60 tablet 0    amLODIPine (NORVASC) 10 MG tablet TAKE 1 TABLET BY MOUTH DAILY 30 tablet 3    loratadine (CLARITIN) 10 MG tablet TAKE 1 TABLET BY MOUTH ONCE DAILY AS NEEDED FOR ALLERGIES 30 tablet 3    CREON 6000-05114 units delayed release capsule TAKE 1 CAPSULE BY MOUTH 3 TIMES DAILY WITH MEALS 90 capsule 10    ferrous sulfate (FEROSUL) 325 (65 Fe) MG tablet TAKE 1 TABLET BY MOUTH DAILY WITH BREAKFAST 30 tablet 3    atenolol (TENORMIN) 50 MG tablet TAKE 1 TABLET BY MOUTH DAILY 30 tablet 10    Multiple Vitamin (MULTIVITAMIN) TABS TAKE 1 TABLET BY MOUTH DAILY 30 tablet 10    loperamide (IMODIUM) 2 MG capsule TAKE 1 CAPSULE BY MOUTH FOUR TIMES DAILY AS NEEDED FOR DIARRHEA 90 capsule 10    VENTOLIN  (90 Base) MCG/ACT inhaler INHALE TWO (2) PUFFS BY MOUTH INTO THE LUNGS EVERY 6 HOURS AS NEEDED FOR WHEEZING 18 g 10    nitroGLYCERIN (NITROSTAT) 0.4 MG SL tablet up to max of 3 total doses. If no relief after 1 dose, call 911. 25 tablet 3    Calcium Carb-Cholecalciferol (OYSTER SHELL CALCIUM W/D) 500-200 MG-UNIT TABS tablet Take 1 tablet by mouth daily 90 tablet 3    ASPIRIN LOW DOSE 81 MG EC tablet TAKE 1 TABLET BY MOUTH DAILY 90 tablet 3    allopurinol (ZYLOPRIM) 100 MG tablet TAKE 1 TABLET BY MOUTH DAILY 90 tablet 3    omeprazole (PRILOSEC) 20 MG delayed release capsule Take 1 capsule by mouth daily 60 capsule 3    vitamin B-1 (THIAMINE) 100 MG tablet Take 1 tablet by mouth daily 30 tablet 3    atorvastatin (LIPITOR) 40 MG tablet Take 1 tablet by mouth nightly 30 tablet 3    lipase-protease-amylase (CREON) 52394-22317 units delayed release capsule Take by mouth 3 times daily (with meals) 180 capsule 2    FLUoxetine (PROZAC) 40 MG capsule Take 40 mg by mouth daily      albuterol sulfate (PROAIR RESPICLICK) 513 (90 Base) MCG/ACT aerosol powder inhalation Inhale 2 puffs into the lungs every 6 hours as needed for Wheezing or Shortness of Breath 5 Inhaler 3     No current facility-administered medications for this visit.        Social History     Tobacco Use    Smoking status: Former     Packs/day: 0.50     Years: 20.00     Pack years: 10.00     Types: Cigarettes     Quit date: 12/21/1992     Years since quittin.1    Smokeless tobacco: Never    Tobacco comments:     states quiti in the 1980s   Substance Use Topics    Alcohol use: Not Currently     Alcohol/week: 12.0 standard drinks     Types: 12 Cans of beer per week     Comment: Quit about 5 months ago 20    Drug use: Not Currently     Types: Cocaine     Comment: last use approximately 14 cocaine       Family History   Problem Relation Age of Onset    Stroke Mother     Hypertension Father     Cancer Brother         bone    Lung Cancer Maternal Aunt     Cancer Maternal Grandmother         eye     Other Sister         aneurysm    Heart Disease Sister         REVIEW OF SYSTEMS:  Review of Systems   Constitutional:  Negative for chills and fatigue. HENT:  Negative for sore throat. Respiratory:  Negative for cough, choking, chest tightness, shortness of breath and stridor. Cardiovascular:  Negative for chest pain and leg swelling. Gastrointestinal:  Negative for abdominal pain. Genitourinary:  Negative for dysuria, frequency and urgency. Musculoskeletal:  Positive for arthralgias (left hip pain). Skin:  Negative for pallor. Neurological:  Negative for dizziness and headaches. PHYSICAL EXAM:  Vitals:    23 1339 23 1449   BP: (!) 150/100 (!) 146/88   Site: Left Upper Arm    Position: Sitting    Cuff Size: Medium Adult    Pulse: 74    Temp: 98 °F (36.7 °C)    SpO2: 97%    Weight: 205 lb (93 kg)    Height: 5' 5\" (1.651 m)      BP Readings from Last 3 Encounters:   23 (!) 146/88   22 134/86   22 (!) 138/96        Physical Exam  Constitutional:       General: She is not in acute distress. Appearance: Normal appearance. She is not ill-appearing. HENT:      Head: Normocephalic. Right Ear: External ear normal.      Left Ear: External ear normal.      Nose: Nose normal.   Eyes:      Pupils: Pupils are equal, round, and reactive to light.    Cardiovascular:      Rate and Rhythm: Normal rate and regular rhythm. Pulses: Normal pulses. Pulmonary:      Effort: Pulmonary effort is normal. No respiratory distress. Breath sounds: No wheezing or rhonchi. Abdominal:      Palpations: Abdomen is soft. Tenderness: There is no abdominal tenderness. There is no guarding. Musculoskeletal:         General: No swelling. Cervical back: No rigidity. Right lower leg: No edema. Left lower leg: No edema. Skin:     Coloration: Skin is not jaundiced or pale. Neurological:      General: No focal deficit present. Mental Status: She is alert and oriented to person, place, and time. Psychiatric:         Mood and Affect: Mood normal.         DIAGNOSTIC FINDINGS:  CBC:  Lab Results   Component Value Date/Time    WBC 5.7 07/25/2022 09:17 AM    HGB 12.3 07/25/2022 09:17 AM     07/25/2022 09:17 AM       BMP:    Lab Results   Component Value Date/Time     07/25/2022 09:17 AM    K 3.1 07/25/2022 09:17 AM     07/25/2022 09:17 AM    CO2 27 07/25/2022 09:17 AM    BUN 6 07/25/2022 09:17 AM    CREATININE 0.90 07/25/2022 09:17 AM    GLUCOSE 140 07/25/2022 09:17 AM    GLUCOSE 88 02/24/2012 10:28 AM       HEMOGLOBIN A1C:   Lab Results   Component Value Date/Time    LABA1C 5.5 06/11/2021 10:17 AM       FASTING LIPID PANEL:  Lab Results   Component Value Date    CHOL 139 02/11/2022    HDL 45 02/11/2022    TRIG 114 02/11/2022       ASSESSMENT AND PLAN:  Jesse Julio was seen today for hypertension. Diagnoses and all orders for this visit:    Uncontrolled hypertension  Blood pressure in the clinic is 146/88. On the last visit it was 130s. Counseled patient regarding weight loss and avoiding canned food and salty food with continuing same medications amlodipine 10 mg and atenolol.   -     Basic Metabolic Panel;  Future  -     TSH With Reflex Ft4; Future    Seronegative rheumatoid arthritis ([address]  -Continue following rheumatologist and physical medicine.  -     acetaminophen (APAP EXTRA STRENGTH) 500 MG tablet; Take 2 tablets by mouth every 6 hours as needed for Pain  -     folic acid (FOLVITE) 1 MG tablet; Take 1 tablet by mouth daily    Encounter for screening mammogram for breast cancer  -     [facility] Digital Screen Bilateral; Future    Screening for hyperlipidemia  -     Lipid Panel; Future    Need for prophylactic vaccination and inoculation against varicella  -     zoster recombinant adjuvanted vaccine [facility]) 50 MCG/0.5ML SUSR injection; Inject 0.5 mLs into the muscle once for 1 dose 50 MCG IM then repeat 2-6 months. Mild intermittent asthma without complication  Well-controlled on on as needed inhaler. Exocrine pancreatic insufficiency  Currently denies any symptoms continue using Creon 3 times daily and loperamide as needed. Idiopathic 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  -Currently well controlled. -Continue taking allopurinol 100 mg as per rheumatologist.    Influenza vaccine needed  -     Influenza, AFLURIA, (age 1 y+), IM, Preservative Free, 0.5 mL  -     MI IM ADM PRQ ID SUBQ/IM NJXS 1 VACCINE    Screening for diabetes mellitus  -     Hemoglobin A1C; Future    Urinary incontinence in female  -     Sanitary Napkins & Tampons (RA PANTY SHIELDS) PADS; Apply 1 Units topically daily    Other orders  -     diclofenac sodium (VOLTAREN) 1 % GEL; Apply 4 g topically 4 times daily    Hypokalemia  Patient is on potassium supplements. We will check BMP. Stress urinary incontinence  Patient does not want to take any medications. Sanitary pads as requested by patient    FOLLOW UP AND INSTRUCTIONS:  Return in about 3 months (around 5/1/2023). Delphine received counseling on the following healthy behaviors: nutrition, exercise, and medication adherence    Discussed use, benefit, and side effects of prescribed medications. Barriers to medication compliance addressed. All patient questions answered. Pt voiced understanding.     Patient given educational materials - see patient instructions    Geeta Read MD  Internal Medicine Resident, [facility]; [place], [place]  2/1/2023, 4:26 PM      This note is created with the assistance of a speech-recognition program. While intending to generate a document that actually reflects the content of thevisit, the document can still have some mistakes which may not have been identified and corrected by editing.

## 2023-02-01 NOTE — PROGRESS NOTES
Attending Physician Statement  I have discussed the care of [address], including pertinent history and exam findings with the resident. I have reviewed the key elements of all parts of the encounter with the resident. I agree with the assessment, and status of the problem list as documented. and this was also documented by the resident. The medication list was reviewed with the resident and is up to date. The return visit should be in 3 months . Diagnosis Orders   1. Uncontrolled hypertension  Basic Metabolic Panel    TSH With Reflex Ft4      2. Seronegative rheumatoid arthritis (HCC)  acetaminophen (APAP EXTRA STRENGTH) 114 MG tablet    folic acid (FOLVITE) 1 MG tablet      3. Encounter for screening mammogram for breast cancer  CARI Digital Screen Bilateral      4. Screening for hyperlipidemia  Lipid Panel      5. Need for prophylactic vaccination and inoculation against varicella  zoster recombinant adjuvanted vaccine (SHINGRIX) 50 MCG/0.5ML SUSR injection      6. Mild intermittent asthma without complication        7. Exocrine pancreatic insufficiency        8. Idiopathic 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        9. Influenza vaccine needed  Influenza, AFLURIA, (age 1 y+), IM, Preservative Free, 0.5 mL    ND IM ADM PRQ ID SUBQ/IM NJXS 1 VACCINE      10. Screening for diabetes mellitus  Hemoglobin A1C      11. Urinary incontinence in female  Sanitary Napkins & Tampons (RA PANTY SHIELDS) PADS      12.  Hypokalemia             Berkley Gautam MD   Attending Physician, [address] [place] [address], Internal Medicine Residency Program  [address] [place] [address]

## 2023-02-02 NOTE — TELEPHONE ENCOUNTER
hypertension     Enlarged tonsils     ACE inhibitor-aggravated angioedema     JONES (obstructive sleep apnea)     Dyslipidemia     IGT (impaired glucose tolerance)     Bipolar disorder (HCC)     Mild intermittent asthma without complication     Inflammatory polyarthritis (HCC)     Seronegative rheumatoid arthritis (HCC)     Chronic diarrhea     Hypocalcemia     Hypomagnesemia     Troponin level elevated     Moderate malnutrition (HCC)     Esophageal thickening     Proctitis     Compression fracture of L2 (HCC)     Anemia, normocytic normochromic     Exocrine pancreatic insufficiency     Idiopathic 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     Urinary incontinence in female

## 2023-02-03 RX ORDER — ASPIRIN 81 MG/1
TABLET, COATED ORAL
Qty: 30 TABLET | Refills: 3 | Status: SHIPPED | OUTPATIENT
Start: 2023-02-03

## 2023-02-03 RX ORDER — FERROUS SULFATE 325(65) MG
TABLET ORAL
Qty: 30 TABLET | Refills: 3 | Status: SHIPPED | OUTPATIENT
Start: 2023-02-03

## 2023-02-03 RX ORDER — ALLOPURINOL 100 MG/1
100 TABLET ORAL DAILY
Qty: 30 TABLET | Refills: 3 | Status: SHIPPED | OUTPATIENT
Start: 2023-02-03

## 2023-03-02 DIAGNOSIS — K21.9 GASTROESOPHAGEAL REFLUX DISEASE WITHOUT ESOPHAGITIS: ICD-10-CM

## 2023-03-02 DIAGNOSIS — T78.40XS ALLERGY, SEQUELA: ICD-10-CM

## 2023-03-03 NOTE — TELEPHONE ENCOUNTER
Request for claritin. Next jo on 5/3/23    Next Visit Date:  Future Appointments   Date Time Provider Anthony Everett   5/3/2023  1:45 PM Rosy Shaikh MD [facility] IM [facility]   7/24/2023 11:00 AM Douglas Hdz MD [address] [facility] Maintenance   Topic Date Due    Hepatitis B vaccine (1 of 3 - Risk 3-dose series) Never done    Diabetic retinal exam  11/19/2021    Shingles vaccine (2 of 2) 12/15/2021    COVID-19 Vaccine (5 - Booster) 01/07/2022    Breast cancer screen  10/10/2022    Lipids  02/11/2023    Depression Monitoring  02/01/2024    Diabetes screen  06/11/2024    DTaP/Tdap/Td vaccine (3 - Td or Tdap) 04/14/2027    Colorectal Cancer Screen  12/03/2030    Flu vaccine  Completed    Pneumococcal 0-64 years Vaccine  Completed    Hepatitis C screen  Completed    HIV screen  Completed    Hepatitis A vaccine  Aged Out    Hib vaccine  Aged Out    Meningococcal (ACWY) vaccine  Aged Out       Hemoglobin A1C (%)   Date Value   06/11/2021 5.5   03/21/2021 6.3 (H)   01/20/2021 5.9             ( goal A1C is < 7)   Microalb/Crt.  Ratio (mcg/mg creat)   Date Value   06/12/2019 147 (H)     LDL Cholesterol (mg/dL)   Date Value   02/11/2022 71       (goal LDL is <100)   AST (U/L)   Date Value   07/25/2022 42 (H)     ALT (U/L)   Date Value   07/25/2022 20     BUN (mg/dL)   Date Value   07/25/2022 6     BP Readings from Last 3 Encounters:   02/01/23 (!) 146/88   07/27/22 134/86   07/25/22 (!) 138/96          (goal 120/80)    All Future Testing planned in [facility]  Lab Frequency Next Occurrence   Uric Acid Once 07/27/2022   CARI Digital Screen Bilateral Once 02/01/2023   Lipid Panel Once 02/01/2023   Hemoglobin A1C Once 50/08/5414   Basic Metabolic Panel Once 24/70/7595   TSH With Reflex Ft4 Once 02/01/2023   CBC with Auto Differential     Comprehensive Metabolic Panel           Patient Active Problem List:     Lung nodule     Atypical chest pain     Adrenal adenoma     Goiter     Essential hypertension Enlarged tonsils     ACE inhibitor-aggravated angioedema     JONES (obstructive sleep apnea)     Dyslipidemia     IGT (impaired glucose tolerance)     Bipolar disorder (HCC)     Mild intermittent asthma without complication     Inflammatory polyarthritis (HCC)     Seronegative rheumatoid arthritis (HCC)     Chronic diarrhea     Hypocalcemia     Hypomagnesemia     Troponin level elevated     Moderate malnutrition (HCC)     Esophageal thickening     Proctitis     Compression fracture of L2 (HCC)     Anemia, normocytic normochromic     Exocrine pancreatic insufficiency     Idiopathic 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     Urinary incontinence in female

## 2023-03-04 RX ORDER — LORATADINE 10 MG/1
TABLET ORAL
Qty: 30 TABLET | Refills: 2 | Status: SHIPPED | OUTPATIENT
Start: 2023-03-04

## 2023-03-06 RX ORDER — OMEPRAZOLE 20 MG/1
CAPSULE, DELAYED RELEASE ORAL
Qty: 30 CAPSULE | Refills: 10 | Status: SHIPPED | OUTPATIENT
Start: 2023-03-06

## 2023-04-03 RX ORDER — POTASSIUM CHLORIDE 20 MEQ/1
TABLET, EXTENDED RELEASE ORAL
Qty: 60 TABLET | Refills: 3 | Status: SHIPPED | OUTPATIENT
Start: 2023-04-03

## 2023-04-03 RX ORDER — ALBUTEROL SULFATE 90 UG/1
AEROSOL, METERED RESPIRATORY (INHALATION)
Qty: 18 G | Refills: 10 | Status: SHIPPED | OUTPATIENT
Start: 2023-04-03

## 2023-04-03 NOTE — TELEPHONE ENCOUNTER
Last visit: 2/1/23  Last Med refill:   Does patient have enough medication for 72 hours: No:     Next Visit Date:  Future Appointments   Date Time Provider Anthony Everett   5/3/2023  1:45 PM Stephanie Jarrett MD [address] [facility] [address] IM [facility]   7/24/2023 11:00 AM Srinivasan Hdz MD [address] [facility] Maintenance   Topic Date Due    Hepatitis B vaccine (1 of 3 - Risk 3-dose series) Never done    Diabetic retinal exam  11/19/2021    Shingles vaccine (2 of 2) 12/15/2021    COVID-19 Vaccine (5 - Booster) 01/07/2022    Breast cancer screen  10/10/2022    Lipids  02/11/2023    Depression Monitoring  02/01/2024    Diabetes screen  06/11/2024    DTaP/Tdap/Td vaccine (3 - Td or Tdap) 04/14/2027    Colorectal Cancer Screen  12/03/2030    Flu vaccine  Completed    Pneumococcal 0-64 years Vaccine  Completed    Hepatitis C screen  Completed    HIV screen  Completed    Hepatitis A vaccine  Aged Out    Hib vaccine  Aged Out    Meningococcal (ACWY) vaccine  Aged Out    A1C test (Diabetic or Prediabetic)  Discontinued    Diabetic Alb to Cr ratio (uACR) test  Discontinued       Hemoglobin A1C (%)   Date Value   06/11/2021 5.5   03/21/2021 6.3 (H)   01/20/2021 5.9             ( goal A1C is < 7)   Microalb/Crt.  Ratio (mcg/mg creat)   Date Value   06/12/2019 147 (H)     LDL Cholesterol (mg/dL)   Date Value   02/11/2022 71   03/21/2021 122       (goal LDL is <100)   AST (U/L)   Date Value   07/25/2022 42 (H)     ALT (U/L)   Date Value   07/25/2022 20     BUN (mg/dL)   Date Value   07/25/2022 6     BP Readings from Last 3 Encounters:   02/01/23 (!) 146/88   07/27/22 134/86   07/25/22 (!) 138/96          (goal 120/80)    All Future Testing planned in [facility]  Lab Frequency Next Occurrence   Uric Acid Once 07/27/2022   CARI Digital Screen Bilateral Once 02/01/2023   Lipid Panel Once 02/01/2023   Hemoglobin A1C Once 19/01/8389   Basic Metabolic Panel Once 49/18/3123   TSH With Reflex Ft4 Once 02/01/2023               Patient Active

## 2023-04-03 NOTE — TELEPHONE ENCOUNTER
11:00 AM Jus Hdz MD [facility]            Patient Active Problem List:     Lung nodule     Atypical chest pain     Adrenal adenoma     Goiter     Essential hypertension     Enlarged tonsils     ACE inhibitor-aggravated angioedema     JONES (obstructive sleep apnea)     Dyslipidemia     IGT (impaired glucose tolerance)     Bipolar disorder (HCC)     Mild intermittent asthma without complication     Inflammatory polyarthritis (HCC)     Seronegative rheumatoid arthritis (HCC)     Chronic diarrhea     Hypocalcemia     Hypomagnesemia     Troponin level elevated     Moderate malnutrition (HCC)     Esophageal thickening     Proctitis     Compression fracture of L2 (HCC)     Anemia, normocytic normochromic     Exocrine pancreatic insufficiency     Idiopathic 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     Urinary incontinence in female

## 2023-05-03 ENCOUNTER — HOSPITAL ENCOUNTER (OUTPATIENT)
Age: 58
Setting detail: SPECIMEN
Discharge: HOME | End: 2023-05-03

## 2023-05-03 ENCOUNTER — OFFICE VISIT (OUTPATIENT)
Dept: INTERNAL MEDICINE | Age: 58
End: 2023-05-03
Payer: MEDICAID

## 2023-05-03 VITALS
HEIGHT: 65 IN | HEART RATE: 75 BPM | SYSTOLIC BLOOD PRESSURE: 136 MMHG | DIASTOLIC BLOOD PRESSURE: 88 MMHG | BODY MASS INDEX: 33.32 KG/M2 | OXYGEN SATURATION: 98 % | WEIGHT: 200 LBS | TEMPERATURE: 97.2 F

## 2023-05-03 DIAGNOSIS — I10 ESSENTIAL HYPERTENSION: ICD-10-CM

## 2023-05-03 DIAGNOSIS — J45.20 MILD INTERMITTENT ASTHMA WITHOUT COMPLICATION: ICD-10-CM

## 2023-05-03 DIAGNOSIS — N89.8 VAGINAL IRRITATION: ICD-10-CM

## 2023-05-03 DIAGNOSIS — K86.81 EXOCRINE PANCREATIC INSUFFICIENCY: Chronic | ICD-10-CM

## 2023-05-03 DIAGNOSIS — Z01.818 PRE-OPERATIVE CLEARANCE: ICD-10-CM

## 2023-05-03 DIAGNOSIS — E78.5 DYSLIPIDEMIA: ICD-10-CM

## 2023-05-03 DIAGNOSIS — M1A.09X0 IDIOPATHIC CHRONIC GOUT OF MULTIPLE SITES WITHOUT TOPHUS: Chronic | ICD-10-CM

## 2023-05-03 DIAGNOSIS — N89.8 VAGINAL IRRITATION: Primary | ICD-10-CM

## 2023-05-03 PROBLEM — G47.33 OSA (OBSTRUCTIVE SLEEP APNEA): Chronic | Status: RESOLVED | Noted: 2017-11-18 | Resolved: 2023-05-03

## 2023-05-03 PROBLEM — E83.42 HYPOMAGNESEMIA: Status: RESOLVED | Noted: 2021-08-20 | Resolved: 2023-05-03

## 2023-05-03 PROBLEM — M06.4 INFLAMMATORY POLYARTHRITIS (HCC): Chronic | Status: RESOLVED | Noted: 2021-03-29 | Resolved: 2023-05-03

## 2023-05-03 PROBLEM — K22.89 ESOPHAGEAL THICKENING: Status: RESOLVED | Noted: 2022-02-10 | Resolved: 2023-05-03

## 2023-05-03 PROBLEM — S32.020A COMPRESSION FRACTURE OF L2 (HCC): Status: RESOLVED | Noted: 2022-02-11 | Resolved: 2023-05-03

## 2023-05-03 PROBLEM — T78.3XXA ACE INHIBITOR-AGGRAVATED ANGIOEDEMA: Chronic | Status: RESOLVED | Noted: 2017-11-17 | Resolved: 2023-05-03

## 2023-05-03 PROBLEM — T46.4X5A ACE INHIBITOR-AGGRAVATED ANGIOEDEMA: Chronic | Status: RESOLVED | Noted: 2017-11-17 | Resolved: 2023-05-03

## 2023-05-03 PROBLEM — K62.89 PROCTITIS: Status: RESOLVED | Noted: 2022-02-10 | Resolved: 2023-05-03

## 2023-05-03 PROBLEM — R32 URINARY INCONTINENCE IN FEMALE: Status: RESOLVED | Noted: 2023-02-01 | Resolved: 2023-05-03

## 2023-05-03 PROBLEM — R79.89 TROPONIN LEVEL ELEVATED: Status: RESOLVED | Noted: 2021-08-20 | Resolved: 2023-05-03

## 2023-05-03 PROBLEM — E44.0 MODERATE MALNUTRITION (HCC): Status: RESOLVED | Noted: 2021-10-01 | Resolved: 2023-05-03

## 2023-05-03 PROBLEM — R77.8 TROPONIN LEVEL ELEVATED: Status: RESOLVED | Noted: 2021-08-20 | Resolved: 2023-05-03

## 2023-05-03 PROBLEM — J35.1 ENLARGED TONSILS: Status: RESOLVED | Noted: 2017-04-21 | Resolved: 2023-05-03

## 2023-05-03 PROBLEM — R73.02 IGT (IMPAIRED GLUCOSE TOLERANCE): Chronic | Status: RESOLVED | Noted: 2019-09-13 | Resolved: 2023-05-03

## 2023-05-03 PROBLEM — D64.9 ANEMIA, NORMOCYTIC NORMOCHROMIC: Status: RESOLVED | Noted: 2022-02-11 | Resolved: 2023-05-03

## 2023-05-03 PROBLEM — M06.00 SERONEGATIVE RHEUMATOID ARTHRITIS (HCC): Chronic | Status: RESOLVED | Noted: 2021-03-30 | Resolved: 2023-05-03

## 2023-05-03 LAB
CANDIDA SPECIES, DNA PROBE: POSITIVE
GARDNERELLA VAGINALIS, DNA PROBE: NEGATIVE
SOURCE: ABNORMAL
TRICHOMONAS VAGINALIS DNA: NEGATIVE

## 2023-05-03 PROCEDURE — 1036F TOBACCO NON-USER: CPT

## 2023-05-03 PROCEDURE — G8427 DOCREV CUR MEDS BY ELIG CLIN: HCPCS

## 2023-05-03 PROCEDURE — 3017F COLORECTAL CA SCREEN DOC REV: CPT

## 2023-05-03 PROCEDURE — G8417 CALC BMI ABV UP PARAM F/U: HCPCS

## 2023-05-03 PROCEDURE — 3074F SYST BP LT 130 MM HG: CPT

## 2023-05-03 PROCEDURE — 99214 OFFICE O/P EST MOD 30 MIN: CPT

## 2023-05-03 PROCEDURE — 3078F DIAST BP <80 MM HG: CPT

## 2023-05-03 ASSESSMENT — ENCOUNTER SYMPTOMS
CHOKING: 0
SORE THROAT: 0
COUGH: 0
CONSTIPATION: 0
WHEEZING: 0
SHORTNESS OF BREATH: 0
ABDOMINAL PAIN: 0

## 2023-05-03 NOTE — PROGRESS NOTES
[facility] [address] [facility]  [address] [facility] [address]  Dept: [phone]  Dept Fax: [phone]    Office Progress/Follow Up Note  Date ofpatient's visit: 5/3/2023  Patient's Name:  Ashley Francis YOB: 1965            Patient Care Team:  Corrie Uribe MD as PCP - General (Internal Medicine)  Marlen Granger MD as Consulting Physician (Gastroenterology)  ================================================================    REASON FOR VISIT/CHIEF COMPLAINT:  Hypertension (3 month follow up )    HISTORY OF PRESENTING ILLNESS:  History was obtained from: patient, electronic medical record. David Mclaughlin a 63 y.o. with past medical history of hypertension, chronic pancreatitis secondary to alcohol abuse, gout, left hip arthritis, depression is here for a follow-up for hypertension. Patient has been compliant with her medications and blood pressure has been well controlled. She has been following orthopedic doctor for left hip pain and has received steroid injections multiple times with unresolved problem. Patient states she is going to have hip replacement surgery likely in the last week of June and needs medical clearance. Patient denies any chest pain, shortness of breath, any history of stroke or cardiovascular disease. Patient had a stress test in March 2022 was unremarkable. Patient is also concerned about vaginal irritation which she has been having for the last 2 weeks.   Patient feels it is likely secondary to her lidocaine patch appliance on left inner upper thigh, has been having occasional discharge but denies any lower abdominal pain or burning while passing urine, asking for OB/GYN referral.      Patient Active Problem List   Diagnosis    Essential hypertension    Dyslipidemia    Bipolar disorder ([address]    Mild intermittent asthma without complication    Exocrine pancreatic insufficiency    Idiopathic chronic gout of multiple sites without

## 2023-05-03 NOTE — PROGRESS NOTES
Attending Physician Statement  I have discussed the care of [address], including pertinent history and exam findings with the resident. I have reviewed the key elements of all parts of the encounter with the resident. I have seen and examined the patient with the resident and the key elements of all parts of the encounter have been performed by me. I agree with the assessment, and status of the problem list as documented. The plan and orders should include   Orders Placed This Encounter   Procedures    Vaginitis DNA Probe    Chlamydia Trachomatis & Neisseria gonorrhoeae (GC) by amplified detection    [facility]    and this was also documented by the resident. The medication list was reviewed with the resident and is up to date. The return visit should be in 3 months . Diagnosis Orders   1. Vaginal irritation  [facility]    Vaginitis DNA Probe    Chlamydia Trachomatis & Neisseria gonorrhoeae (GC) by amplified detection      2. Pre-operative clearance        3. Essential hypertension        4. Mild intermittent asthma without complication        5. Exocrine pancreatic insufficiency        6. Idiopathic 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        7.  Dyslipidemia             Rae Cool MD   Attending Physician, [address] [place] [address], Internal Medicine Residency Program  [address] [place] [address]

## 2023-05-04 ENCOUNTER — TELEPHONE (OUTPATIENT)
Dept: INTERNAL MEDICINE | Age: 58
End: 2023-05-04

## 2023-05-04 DIAGNOSIS — B37.31 CANDIDA VAGINITIS: Primary | ICD-10-CM

## 2023-05-04 LAB
C TRACH DNA SPEC QL PROBE+SIG AMP: NEGATIVE
N GONORRHOEA DNA SPEC QL PROBE+SIG AMP: NEGATIVE
SPECIMEN DESCRIPTION: NORMAL

## 2023-05-04 RX ORDER — FLUCONAZOLE 150 MG/1
150 TABLET ORAL ONCE
Qty: 1 TABLET | Refills: 0 | Status: SHIPPED | OUTPATIENT
Start: 2023-05-04 | End: 2023-05-04

## 2023-05-04 RX ORDER — LANOLIN ALCOHOL/MO/W.PET/CERES
CREAM (GRAM) TOPICAL
Qty: 30 TABLET | Refills: 10 | OUTPATIENT
Start: 2023-05-04

## 2023-05-04 NOTE — TELEPHONE ENCOUNTER
Vaginits DNA test came back positive for Candida. Prescription for Diflucan has been sent. Patient has been updated and she understood.

## 2023-05-04 NOTE — TELEPHONE ENCOUNTER
----- Message from Wilmar Woodson MD sent at 5/4/2023 10:48 AM EDT -----  Please address and start her on treatment

## 2023-05-08 DIAGNOSIS — I10 ESSENTIAL HYPERTENSION: ICD-10-CM

## 2023-05-09 ENCOUNTER — TELEPHONE (OUTPATIENT)
Dept: INTERNAL MEDICINE | Age: 58
End: 2023-05-09

## 2023-05-09 ENCOUNTER — HOSPITAL ENCOUNTER (OUTPATIENT)
Age: 58
Discharge: HOME | End: 2023-05-09
Payer: MEDICAID

## 2023-05-09 ENCOUNTER — HOSPITAL ENCOUNTER (OUTPATIENT)
Dept: MAMMOGRAPHY | Age: 58
Discharge: HOME | End: 2023-05-11
Payer: MEDICAID

## 2023-05-09 DIAGNOSIS — Z12.31 ENCOUNTER FOR SCREENING MAMMOGRAM FOR BREAST CANCER: ICD-10-CM

## 2023-05-09 DIAGNOSIS — E87.6 HYPOKALEMIA: ICD-10-CM

## 2023-05-09 DIAGNOSIS — Z13.1 SCREENING FOR DIABETES MELLITUS: ICD-10-CM

## 2023-05-09 DIAGNOSIS — I10 UNCONTROLLED HYPERTENSION: ICD-10-CM

## 2023-05-09 DIAGNOSIS — Z13.220 SCREENING FOR HYPERLIPIDEMIA: ICD-10-CM

## 2023-05-09 DIAGNOSIS — E78.5 HYPERLIPIDEMIA, UNSPECIFIED HYPERLIPIDEMIA TYPE: Primary | ICD-10-CM

## 2023-05-09 DIAGNOSIS — M10.09 ACUTE IDIOPATHIC GOUT OF MULTIPLE SITES: ICD-10-CM

## 2023-05-09 LAB
ANION GAP SERPL CALCULATED.3IONS-SCNC: 14 MMOL/L (ref 9–17)
BUN SERPL-MCNC: 12 MG/DL (ref 6–20)
CALCIUM SERPL-MCNC: 8.9 MG/DL (ref 8.6–10.4)
CHLORIDE SERPL-SCNC: 101 MMOL/L (ref 98–107)
CHOLEST SERPL-MCNC: 243 MG/DL
CHOLESTEROL/HDL RATIO: 3.7
CO2 SERPL-SCNC: 28 MMOL/L (ref 20–31)
CREAT SERPL-MCNC: 0.75 MG/DL (ref 0.5–0.9)
EST. AVERAGE GLUCOSE BLD GHB EST-MCNC: 123 MG/DL
GFR SERPL CREATININE-BSD FRML MDRD: >60 ML/MIN/1.73M2
GLUCOSE SERPL-MCNC: 101 MG/DL (ref 70–99)
HBA1C MFR BLD: 5.9 % (ref 4–6)
HDLC SERPL-MCNC: 66 MG/DL
LDLC SERPL CALC-MCNC: 124 MG/DL (ref 0–130)
POTASSIUM SERPL-SCNC: 2.9 MMOL/L (ref 3.7–5.3)
SODIUM SERPL-SCNC: 143 MMOL/L (ref 135–144)
TRIGL SERPL-MCNC: 266 MG/DL
TSH SERPL-ACNC: 0.69 UIU/ML (ref 0.3–5)
URATE SERPL-MCNC: 9.1 MG/DL (ref 2.4–5.7)

## 2023-05-09 PROCEDURE — 36415 COLL VENOUS BLD VENIPUNCTURE: CPT

## 2023-05-09 PROCEDURE — 84443 ASSAY THYROID STIM HORMONE: CPT

## 2023-05-09 PROCEDURE — 80061 LIPID PANEL: CPT

## 2023-05-09 PROCEDURE — 77063 BREAST TOMOSYNTHESIS BI: CPT

## 2023-05-09 PROCEDURE — 83036 HEMOGLOBIN GLYCOSYLATED A1C: CPT

## 2023-05-09 PROCEDURE — 84550 ASSAY OF BLOOD/URIC ACID: CPT

## 2023-05-09 PROCEDURE — 80048 BASIC METABOLIC PNL TOTAL CA: CPT

## 2023-05-09 RX ORDER — LANOLIN ALCOHOL/MO/W.PET/CERES
CREAM (GRAM) TOPICAL
Qty: 30 TABLET | Refills: 10 | OUTPATIENT
Start: 2023-05-09

## 2023-05-09 RX ORDER — ATORVASTATIN CALCIUM 40 MG/1
40 TABLET, FILM COATED ORAL DAILY
Qty: 30 TABLET | Refills: 3 | Status: SHIPPED | OUTPATIENT
Start: 2023-05-09

## 2023-05-09 NOTE — TELEPHONE ENCOUNTER
Called Patient to discuss the test results. Patient was supposed to take potassium 40 mEq/day. However, pt was only taking 20 mEq/ day. Advised the pt to take 40 mEq/ day for 1 week and have repeat BMP done. Also advised to eat potassium rich food. Patient understood and agreed.

## 2023-05-09 NOTE — TELEPHONE ENCOUNTER
Received notification from [facility] in lab re: pt's potassium. See alternate encounter, PCP aware.

## 2023-05-09 NOTE — TELEPHONE ENCOUNTER
----- Message from Michelet Ramos MD sent at 5/9/2023 11:23 AM EDT -----  Tom Cook call your patient and let her know and start her on potasium supplements  Why she is hypokalemic

## 2023-05-10 RX ORDER — AMLODIPINE BESYLATE 10 MG/1
10 TABLET ORAL DAILY
Qty: 30 TABLET | Refills: 10 | Status: SHIPPED | OUTPATIENT
Start: 2023-05-10

## 2023-05-10 NOTE — TELEPHONE ENCOUNTER
Last visit: 05/03/23  Last Med refill: 01/23  Does patient have enough medication for 72 hours: No:     Next Visit Date:  Future Appointments   Date Time Provider Anthony Everett   7/24/2023 11:00 AM Randi Hdz MD [address] [facility] Maintenance   Topic Date Due    Hepatitis B vaccine (1 of 3 - Risk 3-dose series) Never done    Shingles vaccine (2 of 2) 12/15/2021    Depression Monitoring  02/01/2024    A1C test (Diabetic or Prediabetic)  05/09/2024    Lipids  05/09/2024    Breast cancer screen  05/09/2024    DTaP/Tdap/Td vaccine (3 - Td or Tdap) 04/14/2027    Colorectal Cancer Screen  12/03/2030    Flu vaccine  Completed    Pneumococcal 0-64 years Vaccine  Completed    COVID-19 Vaccine  Completed    Hepatitis C screen  Completed    HIV screen  Completed    Hepatitis A vaccine  Aged Out    Hib vaccine  Aged Out    Meningococcal (ACWY) vaccine  Aged Out    Diabetic Alb to Cr ratio (uACR) test  Discontinued    Diabetic retinal exam  Discontinued    Diabetes screen  Discontinued       Hemoglobin A1C (%)   Date Value   05/09/2023 5.9   06/11/2021 5.5   03/21/2021 6.3 (H)             ( goal A1C is < 7)   Microalb/Crt.  Ratio (mcg/mg creat)   Date Value   06/12/2019 147 (H)     LDL Cholesterol (mg/dL)   Date Value   05/09/2023 124   02/11/2022 71       (goal LDL is <100)   AST (U/L)   Date Value   07/25/2022 42 (H)     ALT (U/L)   Date Value   07/25/2022 20     BUN (mg/dL)   Date Value   05/09/2023 12     BP Readings from Last 3 Encounters:   05/03/23 136/88   02/01/23 (!) 146/88   07/27/22 134/86          (goal 120/80)    All Future Testing planned in [facility]  Lab Frequency Next Occurrence   Basic Metabolic Panel Once 76/60/1803               Patient Active Problem List:     Essential hypertension     Dyslipidemia     Bipolar disorder (HCC)     Mild intermittent asthma without complication     Exocrine pancreatic insufficiency     Idiopathic 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

## 2023-05-18 NOTE — TELEPHONE ENCOUNTER
Last visit: 5/3/23  Last Med refill:   Does patient have enough medication for 72 hours: No:     Next Visit Date:  Future Appointments   Date Time Provider Anthony Everett   7/24/2023 11:00 AM Maurizio Hdz MD [address] [facility] Maintenance   Topic Date Due    Hepatitis B vaccine (1 of 3 - Risk 3-dose series) Never done    Shingles vaccine (2 of 2) 12/15/2021    Depression Monitoring  02/01/2024    A1C test (Diabetic or Prediabetic)  05/09/2024    Lipids  05/09/2024    Breast cancer screen  05/09/2024    DTaP/Tdap/Td vaccine (3 - Td or Tdap) 04/14/2027    Colorectal Cancer Screen  12/03/2030    Flu vaccine  Completed    Pneumococcal 0-64 years Vaccine  Completed    COVID-19 Vaccine  Completed    Hepatitis C screen  Completed    HIV screen  Completed    Hepatitis A vaccine  Aged Out    Hib vaccine  Aged Out    Meningococcal (ACWY) vaccine  Aged Out    Diabetic Alb to Cr ratio (uACR) test  Discontinued    Diabetic retinal exam  Discontinued    Diabetes screen  Discontinued       Hemoglobin A1C (%)   Date Value   05/09/2023 5.9   06/11/2021 5.5   03/21/2021 6.3 (H)             ( goal A1C is < 7)   Microalb/Crt.  Ratio (mcg/mg creat)   Date Value   06/12/2019 147 (H)     LDL Cholesterol (mg/dL)   Date Value   05/09/2023 124   02/11/2022 71       (goal LDL is <100)   AST (U/L)   Date Value   07/25/2022 42 (H)     ALT (U/L)   Date Value   07/25/2022 20     BUN (mg/dL)   Date Value   05/09/2023 12     BP Readings from Last 3 Encounters:   05/03/23 136/88   02/01/23 (!) 146/88   07/27/22 134/86          (goal 120/80)    All Future Testing planned in [facility]  Lab Frequency Next Occurrence   Basic Metabolic Panel Once 96/76/6221               Patient Active Problem List:     Essential hypertension     Dyslipidemia     Bipolar disorder (HCC)     Mild intermittent asthma without complication     Exocrine pancreatic insufficiency     Idiopathic 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

## 2023-05-18 NOTE — PATIENT INSTRUCTIONS
Keep dressing clean and dry.  No heavy lifting over 10 lbs for 2 weeks.   If dermabond present on incision, allow for it to fall off on its own, usually occurs in about 2-3 weeks.  Follow-up with Dr. Linette Price:  6/2/2023 11:00 AM Dee Nowak PA-C [facility]     Recommend over the counter stool softener like colace while on pain medications.    May sponge bath in 2 days   First dressing change with peritoneal dialysis nurse next week   Peritoneal dialysis nurse to reach out to pt for flushing catheter starting next week - if they do not reach out please call Dr. Sue office   If bandage gets soiled around catheter, gently clean with soap and water and replace gauze around catheter.   Incision Care:   - Dialysis nurses will care for the dressing   - Wash hands before and after you touch your dressing or incision(s)   - Do not scratch or pick at the incision(s)   - Look at your incision(s) every day for signs of infection such as increased redness, swelling and/or yellow, foul smelling drainage at your surgical site      No lifting over 10 pounds x 6 weeks   Wear abdominal binder in am and pm for 48 hours and then may have off at night in bed and replaced during day for another 1 week.    Resume home medications as prescribed.    Due to anesthesia:  Don't drive or use heavy equipment for 24 hours or while taking a narcotic.  Don't make important decisions or sign legal papers for 24 hours.  Don't drink alcohol for 24 hours or while taking a narcotic.  Have someone stay with you overnight.  Eat a light diet today.  Drink a lot of fluids today (if not restricted due to status)    Medications e-scribe to pharmacy of pt's choice. Patient was put on a wait list for 2 months and will be contacted to schedule their next follow up appointment once the schedule is available. If the patient is in need of an appointment before their next visit please call the office at [phone]. After Visit Summary  given and reviewed.

## 2023-05-19 RX ORDER — LANOLIN ALCOHOL/MO/W.PET/CERES
CREAM (GRAM) TOPICAL
Qty: 30 TABLET | Refills: 10 | Status: SHIPPED | OUTPATIENT
Start: 2023-05-19

## 2023-06-01 DIAGNOSIS — E61.1 IRON DEFICIENCY: ICD-10-CM

## 2023-06-01 DIAGNOSIS — T78.40XS ALLERGY, SEQUELA: ICD-10-CM

## 2023-06-02 PROBLEM — Z01.818 PRE-OPERATIVE CLEARANCE: Status: RESOLVED | Noted: 2023-05-03 | Resolved: 2023-06-02

## 2023-06-02 NOTE — TELEPHONE ENCOUNTER
Last visit: 5/3/23  Last Med refill:   Does patient have enough medication for 72 hours: No:     Next Visit Date:  Future Appointments   Date Time Provider [address]   7/24/2023 11:00 AM Jamie Hdz MD [address] Maintenance   Topic Date Due    Hepatitis B vaccine (1 of 3 - Risk 3-dose series) Never done    Shingles vaccine (2 of 2) 12/15/2021    Depression Monitoring  02/01/2024    A1C test (Diabetic or Prediabetic)  05/09/2024    Lipids  05/09/2024    Breast cancer screen  05/09/2024    DTaP/Tdap/Td vaccine (3 - Td or Tdap) 04/14/2027    Colorectal Cancer Screen  12/03/2030    Flu vaccine  Completed    Pneumococcal 0-64 years Vaccine  Completed    COVID-19 Vaccine  Completed    Hepatitis C screen  Completed    HIV screen  Completed    Hepatitis A vaccine  Aged Out    Hib vaccine  Aged Out    Meningococcal (ACWY) vaccine  Aged Out    Diabetic Alb to Cr ratio (uACR) test  Discontinued    Diabetic retinal exam  Discontinued    Diabetes screen  Discontinued       Hemoglobin A1C (%)   Date Value   05/09/2023 5.9   06/11/2021 5.5   03/21/2021 6.3 (H)             ( goal A1C is < 7)   Microalb/Crt.  Ratio (mcg/mg creat)   Date Value   06/12/2019 147 (H)     LDL Cholesterol (mg/dL)   Date Value   05/09/2023 124   02/11/2022 71       (goal LDL is <100)   AST (U/L)   Date Value   07/25/2022 42 (H)     ALT (U/L)   Date Value   07/25/2022 20     BUN (mg/dL)   Date Value   05/09/2023 12     BP Readings from Last 3 Encounters:   05/03/23 136/88   02/01/23 (!) 146/88   07/27/22 134/86          (goal 120/80)    All Future Testing planned in [facility]  Lab Frequency Next Occurrence   Basic Metabolic Panel Once 49/53/7552               Patient Active Problem List:     Essential hypertension     Dyslipidemia     Bipolar disorder (HCC)     Mild intermittent asthma without complication     Exocrine pancreatic insufficiency     Idiopathic 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
Loss

## 2023-06-03 RX ORDER — LOPERAMIDE HYDROCHLORIDE 2 MG/1
CAPSULE ORAL
Qty: 80 CAPSULE | Refills: 2 | Status: SHIPPED | OUTPATIENT
Start: 2023-06-03

## 2023-06-03 RX ORDER — LORATADINE 10 MG/1
TABLET ORAL
Qty: 30 TABLET | Refills: 2 | Status: SHIPPED | OUTPATIENT
Start: 2023-06-03

## 2023-06-03 RX ORDER — FERROUS SULFATE 325(65) MG
TABLET ORAL
Qty: 30 TABLET | Refills: 2 | Status: SHIPPED | OUTPATIENT
Start: 2023-06-03

## 2023-06-03 RX ORDER — ASPIRIN 81 MG/1
TABLET, COATED ORAL
Qty: 30 TABLET | Refills: 3 | Status: SHIPPED | OUTPATIENT
Start: 2023-06-03

## 2023-06-05 DIAGNOSIS — M06.00 SERONEGATIVE RHEUMATOID ARTHRITIS (HCC): ICD-10-CM

## 2023-06-05 NOTE — TELEPHONE ENCOUNTER
Medication refill for Acetaminophen    Last visit: 5/3/23  Last Med refill: 2/1/23  Does patient have enough medication for 72 hours: No:     Next Visit Date:  Future Appointments   Date Time Provider Anthony Everett   7/24/2023 11:00 AM Kalani Hdz MD [address] [facility] Maintenance   Topic Date Due    Hepatitis B vaccine (1 of 3 - Risk 3-dose series) Never done    Shingles vaccine (2 of 2) 12/15/2021    Depression Monitoring  02/01/2024    A1C test (Diabetic or Prediabetic)  05/09/2024    Lipids  05/09/2024    Breast cancer screen  05/09/2024    DTaP/Tdap/Td vaccine (3 - Td or Tdap) 04/14/2027    Colorectal Cancer Screen  12/03/2030    Flu vaccine  Completed    Pneumococcal 0-64 years Vaccine  Completed    COVID-19 Vaccine  Completed    Hepatitis C screen  Completed    HIV screen  Completed    Hepatitis A vaccine  Aged Out    Hib vaccine  Aged Out    Meningococcal (ACWY) vaccine  Aged Out    Diabetic Alb to Cr ratio (uACR) test  Discontinued    Diabetic retinal exam  Discontinued    Diabetes screen  Discontinued       Hemoglobin A1C (%)   Date Value   05/09/2023 5.9   06/11/2021 5.5   03/21/2021 6.3 (H)             ( goal A1C is < 7)   Microalb/Crt.  Ratio (mcg/mg creat)   Date Value   06/12/2019 147 (H)     LDL Cholesterol (mg/dL)   Date Value   05/09/2023 124   02/11/2022 71       (goal LDL is <100)   AST (U/L)   Date Value   07/25/2022 42 (H)     ALT (U/L)   Date Value   07/25/2022 20     BUN (mg/dL)   Date Value   05/09/2023 12     BP Readings from Last 3 Encounters:   05/03/23 136/88   02/01/23 (!) 146/88   07/27/22 134/86          (goal 120/80)    All Future Testing planned in [facility]  Lab Frequency Next Occurrence   Basic Metabolic Panel Once 08/64/6840               Patient Active Problem List:     Essential hypertension     Dyslipidemia     Bipolar disorder (HCC)     Mild intermittent asthma without complication     Exocrine pancreatic insufficiency     Idiopathic chronic gout of

## 2023-06-06 RX ORDER — PSEUDOEPHED/ACETAMINOPH/DIPHEN 30MG-500MG
TABLET ORAL
Qty: 60 TABLET | Refills: 2 | Status: SHIPPED | OUTPATIENT
Start: 2023-06-06

## 2023-06-07 DIAGNOSIS — E61.1 IRON DEFICIENCY: ICD-10-CM

## 2023-06-08 RX ORDER — FERROUS SULFATE 325(65) MG
TABLET ORAL
Qty: 30 TABLET | Refills: 10 | OUTPATIENT
Start: 2023-06-08

## 2023-06-12 ENCOUNTER — TELEPHONE (OUTPATIENT)
Dept: INTERNAL MEDICINE | Age: 58
End: 2023-06-12

## 2023-06-13 DIAGNOSIS — E61.1 IRON DEFICIENCY: ICD-10-CM

## 2023-06-14 RX ORDER — FERROUS SULFATE 325(65) MG
TABLET ORAL
Qty: 30 TABLET | Refills: 10 | OUTPATIENT
Start: 2023-06-14

## 2023-06-15 DIAGNOSIS — E61.1 IRON DEFICIENCY: ICD-10-CM

## 2023-06-16 RX ORDER — FERROUS SULFATE 325(65) MG
TABLET ORAL
Qty: 30 TABLET | Refills: 10 | OUTPATIENT
Start: 2023-06-16

## 2023-06-27 ENCOUNTER — TELEPHONE (OUTPATIENT)
Dept: INTERNAL MEDICINE | Age: 58
End: 2023-06-27

## 2023-06-30 ENCOUNTER — OFFICE VISIT (OUTPATIENT)
Dept: INTERNAL MEDICINE | Age: 58
End: 2023-06-30
Payer: MEDICAID

## 2023-06-30 VITALS
DIASTOLIC BLOOD PRESSURE: 93 MMHG | TEMPERATURE: 98.4 F | HEART RATE: 69 BPM | OXYGEN SATURATION: 99 % | WEIGHT: 195 LBS | SYSTOLIC BLOOD PRESSURE: 141 MMHG | BODY MASS INDEX: 32.45 KG/M2

## 2023-06-30 DIAGNOSIS — Z91.030 ALLERGY TO HONEY BEE VENOM: ICD-10-CM

## 2023-06-30 DIAGNOSIS — B37.2 CANDIDAL INTERTRIGO: ICD-10-CM

## 2023-06-30 DIAGNOSIS — Z01.818 PREOPERATIVE CLEARANCE: Primary | ICD-10-CM

## 2023-06-30 PROBLEM — N89.8 VAGINAL IRRITATION: Status: RESOLVED | Noted: 2023-05-03 | Resolved: 2023-06-30

## 2023-06-30 PROCEDURE — G8427 DOCREV CUR MEDS BY ELIG CLIN: HCPCS | Performed by: STUDENT IN AN ORGANIZED HEALTH CARE EDUCATION/TRAINING PROGRAM

## 2023-06-30 PROCEDURE — 3017F COLORECTAL CA SCREEN DOC REV: CPT | Performed by: STUDENT IN AN ORGANIZED HEALTH CARE EDUCATION/TRAINING PROGRAM

## 2023-06-30 PROCEDURE — 99213 OFFICE O/P EST LOW 20 MIN: CPT | Performed by: STUDENT IN AN ORGANIZED HEALTH CARE EDUCATION/TRAINING PROGRAM

## 2023-06-30 PROCEDURE — G8417 CALC BMI ABV UP PARAM F/U: HCPCS | Performed by: STUDENT IN AN ORGANIZED HEALTH CARE EDUCATION/TRAINING PROGRAM

## 2023-06-30 PROCEDURE — 1036F TOBACCO NON-USER: CPT | Performed by: STUDENT IN AN ORGANIZED HEALTH CARE EDUCATION/TRAINING PROGRAM

## 2023-06-30 PROCEDURE — 3077F SYST BP >= 140 MM HG: CPT | Performed by: STUDENT IN AN ORGANIZED HEALTH CARE EDUCATION/TRAINING PROGRAM

## 2023-06-30 PROCEDURE — 3080F DIAST BP >= 90 MM HG: CPT | Performed by: STUDENT IN AN ORGANIZED HEALTH CARE EDUCATION/TRAINING PROGRAM

## 2023-06-30 RX ORDER — EPINEPHRINE 0.3 MG/.3ML
0.3 INJECTION SUBCUTANEOUS ONCE
Qty: 0.3 ML | Refills: 0 | Status: SHIPPED | OUTPATIENT
Start: 2023-06-30 | End: 2023-06-30

## 2023-06-30 ASSESSMENT — ENCOUNTER SYMPTOMS
GASTROINTESTINAL NEGATIVE: 1
RESPIRATORY NEGATIVE: 1

## 2023-07-20 RX ORDER — PREDNISONE 10 MG/1
10 TABLET ORAL DAILY
Qty: 5 TABLET | Refills: 0 | Status: SHIPPED | OUTPATIENT
Start: 2023-07-20 | End: 2023-08-09

## 2023-07-24 ENCOUNTER — OFFICE VISIT (OUTPATIENT)
Dept: ONCOLOGY | Age: 58
End: 2023-07-24
Payer: MEDICAID

## 2023-07-24 ENCOUNTER — TELEPHONE (OUTPATIENT)
Dept: ONCOLOGY | Age: 58
End: 2023-07-24

## 2023-07-24 ENCOUNTER — HOSPITAL ENCOUNTER (OUTPATIENT)
Age: 58
Setting detail: SPECIMEN
Discharge: HOME | End: 2023-07-24
Payer: MEDICAID

## 2023-07-24 VITALS
RESPIRATION RATE: 16 BRPM | HEART RATE: 58 BPM | DIASTOLIC BLOOD PRESSURE: 93 MMHG | WEIGHT: 190 LBS | SYSTOLIC BLOOD PRESSURE: 145 MMHG | BODY MASS INDEX: 31.62 KG/M2 | TEMPERATURE: 97.6 F

## 2023-07-24 DIAGNOSIS — D59.4 HEMOLYTIC ANEMIA ASSOCIATED WITH CHRONIC INFLAMMATORY DISEASE (HCC): ICD-10-CM

## 2023-07-24 DIAGNOSIS — D75.839 THROMBOCYTOSIS: Primary | ICD-10-CM

## 2023-07-24 DIAGNOSIS — D53.9 MACROCYTIC ANEMIA: ICD-10-CM

## 2023-07-24 DIAGNOSIS — D75.839 THROMBOCYTOSIS: ICD-10-CM

## 2023-07-24 DIAGNOSIS — K86.0 ALCOHOL-INDUCED CHRONIC PANCREATITIS (HCC): ICD-10-CM

## 2023-07-24 LAB
BASOPHILS # BLD: 0.08 K/UL (ref 0–0.2)
BASOPHILS NFR BLD: 2 % (ref 0–2)
EOSINOPHIL # BLD: 0.04 K/UL (ref 0–0.44)
EOSINOPHILS RELATIVE PERCENT: 1 % (ref 1–4)
ERYTHROCYTE [DISTWIDTH] IN BLOOD BY AUTOMATED COUNT: 13.7 % (ref 11.8–14.4)
HCT VFR BLD AUTO: 37 % (ref 36.3–47.1)
HGB BLD-MCNC: 11.9 G/DL (ref 11.9–15.1)
IMM GRANULOCYTES # BLD AUTO: 0.03 K/UL (ref 0–0.3)
IMM GRANULOCYTES NFR BLD: 1 %
LYMPHOCYTES NFR BLD: 0.77 K/UL (ref 1.1–3.7)
LYMPHOCYTES RELATIVE PERCENT: 14 % (ref 24–43)
MCH RBC QN AUTO: 31.6 PG (ref 25.2–33.5)
MCHC RBC AUTO-ENTMCNC: 32.2 G/DL (ref 28.4–34.8)
MCV RBC AUTO: 98.4 FL (ref 82.6–102.9)
MONOCYTES NFR BLD: 0.42 K/UL (ref 0.1–1.2)
MONOCYTES NFR BLD: 8 % (ref 3–12)
NEUTROPHILS NFR BLD: 74 % (ref 36–65)
NEUTS SEG NFR BLD: 4.06 K/UL (ref 1.5–8.1)
NRBC BLD-RTO: 0 PER 100 WBC
PLATELET # BLD AUTO: 438 K/UL (ref 138–453)
PMV BLD AUTO: 8.5 FL (ref 8.1–13.5)
RBC # BLD AUTO: 3.76 M/UL (ref 3.95–5.11)
WBC OTHER # BLD: 5.4 K/UL (ref 3.5–11.3)

## 2023-07-24 PROCEDURE — G8427 DOCREV CUR MEDS BY ELIG CLIN: HCPCS | Performed by: INTERNAL MEDICINE

## 2023-07-24 PROCEDURE — 99214 OFFICE O/P EST MOD 30 MIN: CPT | Performed by: INTERNAL MEDICINE

## 2023-07-24 PROCEDURE — 85027 COMPLETE CBC AUTOMATED: CPT

## 2023-07-24 PROCEDURE — 99211 OFF/OP EST MAY X REQ PHY/QHP: CPT | Performed by: INTERNAL MEDICINE

## 2023-07-24 PROCEDURE — 3080F DIAST BP >= 90 MM HG: CPT | Performed by: INTERNAL MEDICINE

## 2023-07-24 PROCEDURE — G8417 CALC BMI ABV UP PARAM F/U: HCPCS | Performed by: INTERNAL MEDICINE

## 2023-07-24 PROCEDURE — 1036F TOBACCO NON-USER: CPT | Performed by: INTERNAL MEDICINE

## 2023-07-24 PROCEDURE — 36415 COLL VENOUS BLD VENIPUNCTURE: CPT

## 2023-07-24 PROCEDURE — 3077F SYST BP >= 140 MM HG: CPT | Performed by: INTERNAL MEDICINE

## 2023-07-24 PROCEDURE — 3017F COLORECTAL CA SCREEN DOC REV: CPT | Performed by: INTERNAL MEDICINE

## 2023-07-24 RX ORDER — TRAMADOL HYDROCHLORIDE 50 MG/1
50 TABLET ORAL EVERY 6 HOURS PRN
COMMUNITY

## 2023-07-24 RX ORDER — NALOXONE HYDROCHLORIDE 4 MG/.1ML
1 SPRAY NASAL PRN
COMMUNITY

## 2023-07-24 RX ORDER — CELECOXIB 100 MG/1
100 CAPSULE ORAL 2 TIMES DAILY
COMMUNITY

## 2023-07-24 RX ORDER — OXYCODONE HYDROCHLORIDE 5 MG/1
5 CAPSULE ORAL EVERY 4 HOURS PRN
COMMUNITY

## 2023-07-24 NOTE — TELEPHONE ENCOUNTER
Tammy Stephen MD VISIT  RV 1 YR W/ CBC  LABS CDP 7/29/24  MD VISIT 7/29/24 @ 10AM  AVS PRINTED W/ INSTRUCTIONS AND GIVEN TO PT ON EXIT

## 2023-07-24 NOTE — PROGRESS NOTES
DIAGNOSIS:   Thrombocytosis, reactive   macrocytic anemia  Diffuse arthralgia/. possible RA   Diagnosis of chronic pancreatitis secondary to alcohol intake. Possibly explaining her thrombocytosis and microcytic anemia    CURRENT THERAPY:  NGS testing is negative for  mutation  Supportive care  BRIEF CASE HISTORY:   Dusty Perez is a very pleasant 63 y.o. female who is referred to us for thrombocytosis. She was recently in house with uncontrolled hypertension and lab work showed elevated platelets. She was also found to have possible RA or gout and needs to consult with rheumatology for joint pain and swelling. She was given Prednisone in house to manage swelling. She reports she feels improved since admission. She has history of bursa and has had knees drained by ortho surg. We saw the patient and decided to proceed with NGS testing. That was negative for JAK2 mutation, DAGOBERTO R mutation and MPL mutation. Platelets are above 1 million. The patient admitted that she was a long-term drinker. She developed chronic pancreatitis. We thought that her macrocytic anemia thrombocytosis could be related to the chronic pancreatitis and alcohol intake. INTERIM HISTORY: The patient comes in today for follow-up with lab work. Cutting down on alcohol and she had no recurrence of her pancreatitis for the last year. She also had hip surgery. Platelets are hemoglobin are improving and liver enzymes is improving as well. PAST MEDICAL HISTORY: has a past medical history of Adrenal adenoma, Angioedema, Anxiety, Asthma, Bipolar disorder ([address]), Claustrophobia, Depression, GERD (gastroesophageal reflux disease), Hypertension, Hypertrophic cardiomyopathy ([address]), Lung nodules, MRSA (methicillin resistant Staphylococcus aureus), Murmur, OA (osteoarthritis), JONES (obstructive sleep apnea), Thyroid nodule, and Type 2 diabetes mellitus without complication, without long-term current use of insulin ([address]).     PAST

## 2023-07-26 ENCOUNTER — OFFICE VISIT (OUTPATIENT)
Dept: INTERNAL MEDICINE | Age: 58
End: 2023-07-26
Payer: MEDICAID

## 2023-07-26 VITALS
HEIGHT: 65 IN | DIASTOLIC BLOOD PRESSURE: 80 MMHG | SYSTOLIC BLOOD PRESSURE: 136 MMHG | BODY MASS INDEX: 31.49 KG/M2 | HEART RATE: 64 BPM | WEIGHT: 189 LBS | OXYGEN SATURATION: 99 % | TEMPERATURE: 97.9 F

## 2023-07-26 DIAGNOSIS — Z79.52 CURRENT CHRONIC USE OF SYSTEMIC STEROIDS: ICD-10-CM

## 2023-07-26 DIAGNOSIS — Z23 NEED FOR HEPATITIS B VACCINATION: ICD-10-CM

## 2023-07-26 DIAGNOSIS — K86.81 EXOCRINE PANCREATIC INSUFFICIENCY: Chronic | ICD-10-CM

## 2023-07-26 DIAGNOSIS — I10 ESSENTIAL HYPERTENSION: Primary | ICD-10-CM

## 2023-07-26 DIAGNOSIS — Z23 NEED FOR PROPHYLACTIC VACCINATION AND INOCULATION AGAINST VARICELLA: ICD-10-CM

## 2023-07-26 DIAGNOSIS — J45.20 MILD INTERMITTENT ASTHMA WITHOUT COMPLICATION: ICD-10-CM

## 2023-07-26 DIAGNOSIS — Z13.820 SCREENING FOR OSTEOPOROSIS: ICD-10-CM

## 2023-07-26 DIAGNOSIS — Z96.642 S/P HIP REPLACEMENT, LEFT: ICD-10-CM

## 2023-07-26 DIAGNOSIS — F10.11 H/O ALCOHOL ABUSE: ICD-10-CM

## 2023-07-26 DIAGNOSIS — Z78.0 POST-MENOPAUSAL: ICD-10-CM

## 2023-07-26 DIAGNOSIS — M1A.09X0 IDIOPATHIC CHRONIC GOUT OF MULTIPLE SITES WITHOUT TOPHUS: Chronic | ICD-10-CM

## 2023-07-26 PROCEDURE — 90744 HEPB VACC 3 DOSE PED/ADOL IM: CPT | Performed by: STUDENT IN AN ORGANIZED HEALTH CARE EDUCATION/TRAINING PROGRAM

## 2023-07-26 ASSESSMENT — ENCOUNTER SYMPTOMS
CHEST TIGHTNESS: 0
SHORTNESS OF BREATH: 0
ABDOMINAL PAIN: 0
RHINORRHEA: 0
WHEEZING: 0
DIARRHEA: 0
COUGH: 0

## 2023-07-26 NOTE — PROGRESS NOTES
[facility]  [facility] [address]   [address] [address]  Dept: [phone]  Dept Fax: [phone]    Office Progress/Follow Up Note  Date ofpatient's visit: 7/26/2023  Patient's Name:  Gladis Bender YOB: 1965            Patient Care Team:  Destiney Ortega MD as PCP - General (Internal Medicine)  Luis Lindquist MD as Consulting Physician (Gastroenterology)  ================================================================    REASON FOR VISIT/CHIEF COMPLAINT:  Follow Up After Procedure (Pt states she had left hip replacement July 5th) and Rash (Pt states she had rash before surgery on groin area, Pt states rash is gone.)    HISTORY OF PRESENTING ILLNESS:  History was obtained from: patient, electronic medical record. Wilmer Overton a 63 y.o. with past medical history of hypertension, chronic gout, osteoarthritis, s/p hip replacement-left-3 weeks back, depression, hyperlipidemia, chronic pancreatitis secondary to alcohol abuse is here for a follow-up for candidal rash and cellulitis. patient states rash has resolved and denies any other acute concerns or complaints. Patient had a left hip replaced 3 weeks back and currently has been doing well, working with physical therapy at home. States pain has been well controlled with tramadol. Patient's blood pressure has been well controlled and she is compliant with her medications. Patient drinks 2 beers a week  Has a smoking history of 10 pack years. Quit in 1992  Last A1c-5.9 in May 2023  CBC and BMP last month were unremarkable.       Patient Active Problem List   Diagnosis    Essential hypertension    Dyslipidemia    Bipolar disorder (HCC)    Mild intermittent asthma without complication    Exocrine pancreatic insufficiency    Idiopathic 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    S/P hip replacement, left    Current chronic use of systemic steroids    H/O alcohol abuse       Health Maintenance Due

## 2023-08-01 DIAGNOSIS — I10 ESSENTIAL HYPERTENSION: ICD-10-CM

## 2023-08-03 RX ORDER — PREDNISONE 10 MG/1
TABLET ORAL
Qty: 5 TABLET | Refills: 10 | OUTPATIENT
Start: 2023-08-03

## 2023-08-03 NOTE — TELEPHONE ENCOUNTER
Pharmacy requesting refills for Atenolol 50mg tablets, Potassium Chloride 20meq tablets and Multivitamin tablets. Please review and e-scribe to pharmacy listed in chart if appropriate. Thank you.       Next Visit Date: 11/01/2023  Last Visit Date: 07/26/2023    Future Appointments   Date Time Provider [address]   8/30/2023  1:00 PM [facility] DEXA RM [facility]   11/1/2023  1:00 PM Joey Encarnacion MD [address] [place] [facility]   7/29/2024 10:00 AM Ernestine Hdz MD [facility] [address] Maintenance   Topic Date Due    Hepatitis B vaccine (1 of 3 - Risk 3-dose series) 01/22/1985    Shingles vaccine (2 of 2) 12/15/2021    Flu vaccine (1) 08/01/2023    Depression Monitoring  02/01/2024    A1C test (Diabetic or Prediabetic)  05/09/2024    Lipids  05/09/2024    Breast cancer screen  05/09/2024    DTaP/Tdap/Td vaccine (3 - Td or Tdap) 04/14/2027    Colorectal Cancer Screen  12/03/2030    Pneumococcal 0-64 years Vaccine  Completed    COVID-19 Vaccine  Completed    Hepatitis C screen  Completed    HIV screen  Completed    Hepatitis A vaccine  Aged Out    Hib vaccine  Aged Out    Meningococcal (ACWY) vaccine  Aged Out    Diabetic Alb to Cr ratio (uACR) test  Discontinued    Diabetic retinal exam  Discontinued    Diabetes screen  Discontinued       Hemoglobin A1C (%)   Date Value   05/09/2023 5.9   06/11/2021 5.5   03/21/2021 6.3 (H)             ( goal A1C is < 7)   No components found for: LABMICR  LDL Cholesterol (mg/dL)   Date Value   05/09/2023 124       (goal LDL is <100)   AST (U/L)   Date Value   07/25/2022 42 (H)     ALT (U/L)   Date Value   07/25/2022 20     BUN (mg/dL)   Date Value   05/09/2023 12     BP Readings from Last 3 Encounters:   07/26/23 136/80   07/24/23 (!) 145/93   06/30/23 (!) 141/93          (goal 120/80)    All Future Testing planned in [facility]  Lab Frequency Next Occurrence   Basic Metabolic Panel Once 58/53/0657   DEXA BONE DENSITY 2 SITES Once 07/26/2023   CBC with Auto

## 2023-08-04 RX ORDER — ATENOLOL 50 MG/1
50 TABLET ORAL DAILY
Qty: 30 TABLET | Refills: 10 | Status: SHIPPED | OUTPATIENT
Start: 2023-08-04

## 2023-08-04 RX ORDER — MULTIVITAMIN
TABLET ORAL
Qty: 30 TABLET | Refills: 10 | Status: SHIPPED | OUTPATIENT
Start: 2023-08-04

## 2023-08-04 RX ORDER — POTASSIUM CHLORIDE 20 MEQ/1
TABLET, EXTENDED RELEASE ORAL
Qty: 60 TABLET | Refills: 10 | Status: SHIPPED | OUTPATIENT
Start: 2023-08-04

## 2023-08-08 NOTE — TELEPHONE ENCOUNTER
Electronic request for medication refill. Last prescribed 7/20/23 for 5 tablets 0 refills per Dr. Cameron Quinteros. Last MD visit with Dr. Marilyn Chase 7/24/23. Routed to Dr. Marilyn Chase today for review; current request from pharmacy is to have 10 refills. Will allow Dr. Marilyn Chase to review and prescribed as desired.

## 2023-08-09 RX ORDER — PREDNISONE 10 MG/1
10 TABLET ORAL DAILY
Qty: 5 TABLET | Refills: 10 | Status: SHIPPED | OUTPATIENT
Start: 2023-08-09 | End: 2023-09-08

## 2023-08-10 ENCOUNTER — HOSPITAL ENCOUNTER (OUTPATIENT)
Age: 58
Setting detail: SPECIMEN
Discharge: HOME | End: 2023-08-10

## 2023-08-10 ENCOUNTER — OFFICE VISIT (OUTPATIENT)
Dept: OBGYN CLINIC | Age: 58
End: 2023-08-10

## 2023-08-10 VITALS
SYSTOLIC BLOOD PRESSURE: 126 MMHG | HEART RATE: 81 BPM | DIASTOLIC BLOOD PRESSURE: 80 MMHG | HEIGHT: 65 IN | WEIGHT: 183 LBS | BODY MASS INDEX: 30.49 KG/M2

## 2023-08-10 DIAGNOSIS — N90.89 LABIAL LESION: ICD-10-CM

## 2023-08-10 DIAGNOSIS — Z11.3 SCREEN FOR STD (SEXUALLY TRANSMITTED DISEASE): ICD-10-CM

## 2023-08-10 DIAGNOSIS — Z11.3 SCREEN FOR STD (SEXUALLY TRANSMITTED DISEASE): Primary | ICD-10-CM

## 2023-08-10 LAB
CANDIDA SPECIES: POSITIVE
GARDNERELLA VAGINALIS: NEGATIVE
MICROORGANISM SPEC CULT: NORMAL
SOURCE: ABNORMAL
SPECIMEN DESCRIPTION: NORMAL
TRICHOMONAS: NEGATIVE

## 2023-08-10 RX ORDER — GAUZE BANDAGE 2" X 2"
BANDAGE TOPICAL
COMMUNITY
Start: 2023-08-01

## 2023-08-10 NOTE — PROGRESS NOTES
Kimberly Flanagan  8/10/2023    YOB: 1965          The patient was seen today. She is here regarding STD screening and labial lesion. States she went to a pop up health department screening yesterday and they informed her she was positive for syphilis and wanted her to do further screening. Desires full STD screening. States she has a lesion on her labia for some time now. Was given a powder and cream and not having relief. States she was in a domestic partnership that ended about 5 years ago and has NOT had intercourse since. Hx hyst for fibroids. Has not seen GYN for 30+ years Her bowels are regular and she is voiding without difficulty.      HPI:  Christian Kendrick is a 63 y.o. female  STD screening, labial lesions, exposure to syphilis        OB History    Para Term  AB Living   3 3 3 0 0 0   SAB IAB Ectopic Molar Multiple Live Births   0 0 0 0 0 0      # Outcome Date GA Lbr Catalino/2nd Weight Sex Delivery Anes PTL Lv   3 Term            2 Term            1 Term                Past Medical History:   Diagnosis Date    Adrenal adenoma 3/11/2014    Angioedema 2017    from lisinopril    Anxiety     Asthma     mild persistent asthma, on home resp medications for control    Bipolar disorder ([address])     Claustrophobia     Depression     GERD (gastroesophageal reflux disease)     Hypertension     Hypertrophic cardiomyopathy (HCC)     Lung nodules     MRSA (methicillin resistant Staphylococcus aureus)     rt hip    Murmur     see cardiac echo result    OA (osteoarthritis)     JONES (obstructive sleep apnea)     Thyroid nodule     Type 2 diabetes mellitus without complication, without long-term current use of insulin ([address]) 2018       Past Surgical History:   Procedure Laterality Date    BUNIONECTOMY Bilateral 2020    [facility]  BUNIONECTOMY, ARTHREX performed by Sandra Jasso DPM at [facility]      bx    COLONOSCOPY N/A 12/3/2020

## 2023-08-11 ENCOUNTER — TELEPHONE (OUTPATIENT)
Dept: OBGYN CLINIC | Age: 58
End: 2023-08-11

## 2023-08-11 LAB
C TRACH DNA SPEC QL PROBE+SIG AMP: NEGATIVE
HSV1 DNA SPEC QL NAA+PROBE: NEGATIVE
HSV2 DNA SPEC QL NAA+PROBE: NEGATIVE
N GONORRHOEA DNA SPEC QL PROBE+SIG AMP: NEGATIVE
SPECIMEN DESCRIPTION: NORMAL
SPECIMEN DESCRIPTION: NORMAL

## 2023-08-11 RX ORDER — FLUCONAZOLE 150 MG/1
150 TABLET ORAL
Qty: 2 TABLET | Refills: 0 | Status: SHIPPED | OUTPATIENT
Start: 2023-08-11

## 2023-08-11 NOTE — TELEPHONE ENCOUNTER
----- Message from CECILIA Omalley - CNP sent at 8/11/2023  8:04 AM EDT -----  + candida- diflucan 150mg PO x 1 now and repeat in 7 days.   Wound culture pending

## 2023-08-13 LAB
MICROORGANISM SPEC CULT: ABNORMAL
MICROORGANISM/AGENT SPEC: ABNORMAL
MICROORGANISM/AGENT SPEC: ABNORMAL
SPECIMEN DESCRIPTION: ABNORMAL

## 2023-08-14 PROBLEM — Z22.322 MRSA (METHICILLIN RESISTANT STAPH AUREUS) CULTURE POSITIVE: Status: ACTIVE | Noted: 2023-08-14

## 2023-08-15 ENCOUNTER — TELEPHONE (OUTPATIENT)
Dept: OBGYN CLINIC | Age: 58
End: 2023-08-15

## 2023-08-15 NOTE — TELEPHONE ENCOUNTER
----- Message from CECILIA Pinzon - CNP sent at 8/14/2023  8:08 AM EDT -----  +MRSA - labia culture  Clindamycin 300mg po TID x 7 days  Encourage patient to complete bloodwork as ordered previously

## 2023-08-23 ENCOUNTER — TELEPHONE (OUTPATIENT)
Dept: OBGYN CLINIC | Age: 58
End: 2023-08-23

## 2023-08-23 DIAGNOSIS — Z11.3 SCREEN FOR STD (SEXUALLY TRANSMITTED DISEASE): ICD-10-CM

## 2023-08-23 PROBLEM — M54.42 CHRONIC LEFT-SIDED LOW BACK PAIN WITH LEFT-SIDED SCIATICA: Status: ACTIVE | Noted: 2022-10-10

## 2023-08-23 PROBLEM — M70.62 GREATER TROCHANTERIC BURSITIS OF LEFT HIP: Status: ACTIVE | Noted: 2023-04-03

## 2023-08-23 PROBLEM — M16.12 PRIMARY OSTEOARTHRITIS OF LEFT HIP: Status: ACTIVE | Noted: 2023-05-22

## 2023-08-23 PROBLEM — B00.9 HSV INFECTION: Status: ACTIVE | Noted: 2023-08-23

## 2023-08-23 PROBLEM — M1A.9XX1 GOUT, TOPHACEOUS: Status: ACTIVE | Noted: 2023-04-03

## 2023-08-23 PROBLEM — Z91.81 HISTORY OF FALLING: Status: ACTIVE | Noted: 2023-07-07

## 2023-08-23 PROBLEM — M10.00 IDIOPATHIC GOUT: Status: ACTIVE | Noted: 2023-04-03

## 2023-08-23 PROBLEM — M47.816 LUMBAR SPONDYLOSIS: Status: ACTIVE | Noted: 2022-10-18

## 2023-08-23 PROBLEM — M10.9 GOUT, ARTHRITIS: Status: ACTIVE | Noted: 2022-10-10

## 2023-08-23 PROBLEM — Z79.82 LONG TERM CURRENT USE OF ASPIRIN: Status: ACTIVE | Noted: 2023-07-07

## 2023-08-23 PROBLEM — M87.052 AVASCULAR NECROSIS OF BONE OF LEFT HIP (HCC): Status: ACTIVE | Noted: 2023-07-05

## 2023-08-23 PROBLEM — G89.29 CHRONIC LEFT-SIDED LOW BACK PAIN WITH LEFT-SIDED SCIATICA: Status: ACTIVE | Noted: 2022-10-10

## 2023-08-23 RX ORDER — CLINDAMYCIN HYDROCHLORIDE 300 MG/1
300 CAPSULE ORAL 3 TIMES DAILY
Qty: 21 CAPSULE | Refills: 0 | Status: SHIPPED | OUTPATIENT
Start: 2023-08-23 | End: 2023-08-30

## 2023-08-23 NOTE — TELEPHONE ENCOUNTER
----- Message from CECILIA Ureña - CNP sent at 8/14/2023  8:08 AM EDT -----  +MRSA - labia culture  Clindamycin 300mg po TID x 7 days  Encourage patient to complete bloodwork as ordered previously

## 2023-08-23 NOTE — TELEPHONE ENCOUNTER
Per [employer] NP, pt notified of +MRSA on labial culture; RX for clindamycin to be sent to pt pharmacy. Pt notified to repeat syphilis labs in 2-4 weeks at [facility]; and call health department in regards to treatment. 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 Pt also to call Apryl Varela in regards to labs drawn yesterday; to have results faxed to us. Pt verbalized understanding.

## 2023-08-23 NOTE — TELEPHONE ENCOUNTER
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

## 2023-08-24 ENCOUNTER — HOSPITAL ENCOUNTER (OUTPATIENT)
Age: 58
Setting detail: SPECIMEN
Discharge: HOME | End: 2023-08-24

## 2023-08-24 ENCOUNTER — OFFICE VISIT (OUTPATIENT)
Dept: OBGYN CLINIC | Age: 58
End: 2023-08-24
Payer: MEDICAID

## 2023-08-24 VITALS
SYSTOLIC BLOOD PRESSURE: 100 MMHG | BODY MASS INDEX: 30.16 KG/M2 | HEIGHT: 65 IN | WEIGHT: 181 LBS | DIASTOLIC BLOOD PRESSURE: 62 MMHG

## 2023-08-24 DIAGNOSIS — Z12.31 ENCOUNTER FOR SCREENING MAMMOGRAM FOR MALIGNANT NEOPLASM OF BREAST: ICD-10-CM

## 2023-08-24 DIAGNOSIS — Z11.51 SPECIAL SCREENING EXAMINATION FOR HUMAN PAPILLOMAVIRUS (HPV): ICD-10-CM

## 2023-08-24 DIAGNOSIS — Z20.2 EXPOSURE TO SYPHILIS: ICD-10-CM

## 2023-08-24 DIAGNOSIS — N95.1 MENOPAUSAL STATE: ICD-10-CM

## 2023-08-24 DIAGNOSIS — Z01.419 WELL WOMAN EXAM WITH ROUTINE GYNECOLOGICAL EXAM: Primary | ICD-10-CM

## 2023-08-24 DIAGNOSIS — Z13.820 SCREENING FOR OSTEOPOROSIS: ICD-10-CM

## 2023-08-24 PROCEDURE — 99396 PREV VISIT EST AGE 40-64: CPT | Performed by: NURSE PRACTITIONER

## 2023-08-24 PROCEDURE — 3078F DIAST BP <80 MM HG: CPT | Performed by: NURSE PRACTITIONER

## 2023-08-24 PROCEDURE — 3074F SYST BP LT 130 MM HG: CPT | Performed by: NURSE PRACTITIONER

## 2023-08-24 NOTE — PROGRESS NOTES
negative. Calcium and Vitamin D dosing reviewed. Colonoscopy screening reviewed as well as onset for bone density testing. Birth control and barrier recommendations discussed. STD counseling and prevention reviewed. Gardisil counseling completed for all patients 7-38 yo. Routine health maintenance per patients PCP. Orders Placed This Encounter   Procedures    CARI DIGITAL SCREEN W OR WO CAD BILATERAL     Standing Status:   Future     Standing Expiration Date:   8/24/2024     Order Specific Question:   Reason for exam:     Answer:   screening for malignant neoplasm of breast    DEXA BONE DENSITY 2 SITES     Standing Status:   Future     Standing Expiration Date:   8/24/2024     Order Specific Question:   Reason for exam:     Answer:   menopausal state/  screening for osteoporosis    PAP SMEAR     Patient History:    No LMP recorded. Patient has had a hysterectomy. OBGYN Status: Hysterectomy  Past Surgical History:  12/7/2020: BUNIONECTOMY; Bilateral      Comment:  [facility]  BUNIONECTOMY, ARTHREX performed by Zackery Rivera DPM at [address]  No date: CARDIAC CATHETERIZATION  No date: COLONOSCOPY      Comment:  bx  12/3/2020: COLONOSCOPY; N/A      Comment:  COLONOSCOPY WITH BIOPSY performed by Vlad Moseley MD at               [facility]  12/07/2020: FOOT SURGERY; Bilateral      Comment:  Mcrogelio bunionectomy, right 2nd hammer toe repair   12/7/2020: HAMMER TOE SURGERY;  Right      Comment:  2ND TOE HAMMER REPAIR performed by Zackery Rivera DPM                at [address]  No date: HYSTERECTOMY ([address])      Comment:  partial hyst  8/24/2015: OTHER SURGICAL HISTORY      Comment:  MRI under anesthesia  No date: THYROID SURGERY      Comment:  bilat bx  No date: UPPER GASTROINTESTINAL ENDOSCOPY  12/3/2020: UPPER GASTROINTESTINAL ENDOSCOPY; N/A      Comment:  EGD BIOPSY performed by Vlad Moseley MD at [facility]                Endoscopy  Medications/Contraceptives Affecting Cytology

## 2023-08-25 DIAGNOSIS — Z11.3 SCREEN FOR STD (SEXUALLY TRANSMITTED DISEASE): Primary | ICD-10-CM

## 2023-08-25 DIAGNOSIS — Z20.2 EXPOSURE TO SYPHILIS: ICD-10-CM

## 2023-08-27 LAB
HPV I/H RISK 4 DNA CVX QL NAA+PROBE: NOT DETECTED
HPV SAMPLE: NORMAL
HPV, INTERPRETATION: NORMAL
HPV16 DNA CVX QL NAA+PROBE: NOT DETECTED
HPV18 DNA CVX QL NAA+PROBE: NOT DETECTED
SPECIMEN DESCRIPTION: NORMAL

## 2023-08-28 ENCOUNTER — TELEPHONE (OUTPATIENT)
Dept: OBGYN CLINIC | Age: 58
End: 2023-08-28

## 2023-08-28 LAB — CYTOLOGY REPORT: NORMAL

## 2023-08-28 RX ORDER — FLUCONAZOLE 150 MG/1
150 TABLET ORAL
Qty: 2 TABLET | Refills: 0 | Status: SHIPPED | OUTPATIENT
Start: 2023-08-28

## 2023-08-28 NOTE — TELEPHONE ENCOUNTER
----- Message from CECILIA Maxwell NP sent at 8/28/2023 10:28 AM EDT -----  + yeast  Diflucan 150 mg PO x 1 repeat in 7 days   Pap smear neg  HPV neg age 63

## 2023-08-28 NOTE — TELEPHONE ENCOUNTER
Per paulino pt notified of + yeast on pap and pt to get   Diflucan 150 mg PO x 1 repeat in 7 days sent to pt pharm. Pt Pap smear neg/HPV neg.

## 2023-08-30 ENCOUNTER — HOSPITAL ENCOUNTER (OUTPATIENT)
Dept: MAMMOGRAPHY | Age: 58
Discharge: HOME | End: 2023-09-01
Payer: MEDICAID

## 2023-08-30 ENCOUNTER — HOSPITAL ENCOUNTER (OUTPATIENT)
Age: 58
Discharge: HOME | End: 2023-08-30
Payer: MEDICAID

## 2023-08-30 DIAGNOSIS — K86.81 EXOCRINE PANCREATIC INSUFFICIENCY: Primary | Chronic | ICD-10-CM

## 2023-08-30 DIAGNOSIS — T78.40XS ALLERGY, SEQUELA: ICD-10-CM

## 2023-08-30 DIAGNOSIS — E61.1 IRON DEFICIENCY: ICD-10-CM

## 2023-08-30 DIAGNOSIS — Z78.0 POST-MENOPAUSAL: ICD-10-CM

## 2023-08-30 DIAGNOSIS — F10.11 H/O ALCOHOL ABUSE: ICD-10-CM

## 2023-08-30 DIAGNOSIS — Z79.52 CURRENT CHRONIC USE OF SYSTEMIC STEROIDS: ICD-10-CM

## 2023-08-30 DIAGNOSIS — Z20.2 EXPOSURE TO SYPHILIS: ICD-10-CM

## 2023-08-30 DIAGNOSIS — Z13.820 SCREENING FOR OSTEOPOROSIS: ICD-10-CM

## 2023-08-30 LAB — T PALLIDUM AB SER QL IA: NONREACTIVE

## 2023-08-30 PROCEDURE — 86780 TREPONEMA PALLIDUM: CPT

## 2023-08-30 PROCEDURE — 77080 DXA BONE DENSITY AXIAL: CPT

## 2023-08-30 PROCEDURE — 36415 COLL VENOUS BLD VENIPUNCTURE: CPT

## 2023-08-31 NOTE — TELEPHONE ENCOUNTER
Request for multiple meds. Please review and e-scribe to pharmacy listed in chart if appropriate. Thank you.       Next Visit Date: 11/1/2023  Last Visit Date: 7/26/2023    Future Appointments   Date Time Provider [address]   11/1/2023  1:00 PM Joey Gaston MD [facility]   7/29/2024 10:00 AM Laura Hdz MD [facility] [address] Maintenance   Topic Date Due    Hepatitis B vaccine (1 of 3 - Risk 3-dose series) 01/22/1985    Shingles vaccine (2 of 2) 12/15/2021    Flu vaccine (1) 08/01/2023    Depression Monitoring  02/01/2024    A1C test (Diabetic or Prediabetic)  05/09/2024    Lipids  05/09/2024    Breast cancer screen  05/09/2024    DTaP/Tdap/Td vaccine (3 - Td or Tdap) 04/14/2027    Colorectal Cancer Screen  12/03/2030    Pneumococcal 0-64 years Vaccine  Completed    COVID-19 Vaccine  Completed    Hepatitis C screen  Completed    HIV screen  Completed    Hepatitis A vaccine  Aged Out    Hib vaccine  Aged Out    Meningococcal (ACWY) vaccine  Aged Out    Diabetic Alb to Cr ratio (uACR) test  Discontinued    Diabetic retinal exam  Discontinued    Diabetes screen  Discontinued    Cervical cancer screen  Discontinued       Hemoglobin A1C (%)   Date Value   05/09/2023 5.9   06/11/2021 5.5   03/21/2021 6.3 (H)             ( goal A1C is < 7)   No components found for: LABMICR  LDL Cholesterol (mg/dL)   Date Value   05/09/2023 124       (goal LDL is <100)   AST (U/L)   Date Value   07/25/2022 42 (H)     ALT (U/L)   Date Value   07/25/2022 20     BUN (mg/dL)   Date Value   05/09/2023 12     BP Readings from Last 3 Encounters:   08/24/23 100/62   08/10/23 126/80   07/26/23 136/80          (goal 120/80)    All Future Testing planned in [facility]  Lab Frequency Next Occurrence   Basic Metabolic Panel Once 32/09/4986   Hepatitis C Antibody Once 08/24/2023   RPR Once 08/10/2023   CARI DIGITAL SCREEN W OR WO CAD BILATERAL Once 08/24/2023   DEXA BONE DENSITY 2 SITES Once 08/24/2023   PAP SMEAR

## 2023-09-05 DIAGNOSIS — E61.1 IRON DEFICIENCY: ICD-10-CM

## 2023-09-05 DIAGNOSIS — T78.40XS ALLERGY, SEQUELA: ICD-10-CM

## 2023-09-05 DIAGNOSIS — K86.81 EXOCRINE PANCREATIC INSUFFICIENCY: Chronic | ICD-10-CM

## 2023-09-05 RX ORDER — FERROUS SULFATE 325(65) MG
TABLET ORAL
Qty: 30 TABLET | Refills: 10 | Status: SHIPPED | OUTPATIENT
Start: 2023-09-05 | End: 2023-09-25

## 2023-09-05 RX ORDER — LORATADINE 10 MG/1
TABLET ORAL
Qty: 30 TABLET | Refills: 10 | Status: SHIPPED | OUTPATIENT
Start: 2023-09-05 | End: 2023-09-25

## 2023-09-05 RX ORDER — PANCRELIPASE 30000; 6000; 19000 [USP'U]/1; [USP'U]/1; [USP'U]/1
CAPSULE, DELAYED RELEASE PELLETS ORAL
Qty: 90 CAPSULE | Refills: 10 | Status: SHIPPED | OUTPATIENT
Start: 2023-09-05

## 2023-09-05 RX ORDER — LOPERAMIDE HYDROCHLORIDE 2 MG/1
CAPSULE ORAL
Qty: 30 CAPSULE | Refills: 0 | Status: SHIPPED | OUTPATIENT
Start: 2023-09-05 | End: 2023-09-25

## 2023-09-06 RX ORDER — PREDNISONE 10 MG/1
10 TABLET ORAL DAILY
Qty: 5 TABLET | Refills: 10 | Status: SHIPPED | OUTPATIENT
Start: 2023-09-06 | End: 2023-10-06

## 2023-09-06 RX ORDER — LORATADINE 10 MG/1
TABLET ORAL
Qty: 30 TABLET | Refills: 10 | OUTPATIENT
Start: 2023-09-06

## 2023-09-06 RX ORDER — LOPERAMIDE HYDROCHLORIDE 2 MG/1
CAPSULE ORAL
Qty: 80 CAPSULE | Refills: 10 | OUTPATIENT
Start: 2023-09-06

## 2023-09-06 RX ORDER — FERROUS SULFATE 325(65) MG
TABLET ORAL
Qty: 30 TABLET | Refills: 10 | OUTPATIENT
Start: 2023-09-06

## 2023-09-06 RX ORDER — PANCRELIPASE 30000; 6000; 19000 [USP'U]/1; [USP'U]/1; [USP'U]/1
CAPSULE, DELAYED RELEASE PELLETS ORAL
Qty: 90 CAPSULE | Refills: 10 | OUTPATIENT
Start: 2023-09-06

## 2023-09-13 ENCOUNTER — TELEPHONE (OUTPATIENT)
Dept: INFUSION THERAPY | Facility: MEDICAL CENTER | Age: 58
End: 2023-09-13

## 2023-09-13 NOTE — TELEPHONE ENCOUNTER
Returned call to patient. She states she only received 5 Prednisone tablets for a 1 month supply and is asking to have this rectified. Patient informed I will check and call back. Spoke with Dr. Emily Pittman. Prescription will be corrected to 30 tablets monthly. [facility] called. Prescription corrected. Attempted to call patient. Message left that prescription corrected. She can call her pharmacy to receive the corrected amount of Prednisone.

## 2023-09-15 ENCOUNTER — TELEPHONE (OUTPATIENT)
Dept: OBGYN CLINIC | Age: 58
End: 2023-09-15

## 2023-09-15 NOTE — TELEPHONE ENCOUNTER
Call was disconnected; attempted to return pt call and VM full. Pt to have repeat labs done next week.

## 2023-09-21 DIAGNOSIS — K86.81 EXOCRINE PANCREATIC INSUFFICIENCY: Chronic | ICD-10-CM

## 2023-09-21 DIAGNOSIS — E61.1 IRON DEFICIENCY: ICD-10-CM

## 2023-09-21 DIAGNOSIS — T78.40XS ALLERGY, SEQUELA: ICD-10-CM

## 2023-09-22 DIAGNOSIS — T78.40XS ALLERGY, SEQUELA: ICD-10-CM

## 2023-09-22 DIAGNOSIS — E61.1 IRON DEFICIENCY: ICD-10-CM

## 2023-09-22 DIAGNOSIS — K86.81 EXOCRINE PANCREATIC INSUFFICIENCY: Chronic | ICD-10-CM

## 2023-09-22 RX ORDER — FERROUS SULFATE 325(65) MG
TABLET ORAL
Qty: 30 TABLET | Refills: 10 | OUTPATIENT
Start: 2023-09-22

## 2023-09-22 RX ORDER — LORATADINE 10 MG/1
TABLET ORAL
Qty: 30 TABLET | Refills: 10 | OUTPATIENT
Start: 2023-09-22

## 2023-09-22 RX ORDER — LOPERAMIDE HYDROCHLORIDE 2 MG/1
CAPSULE ORAL
Qty: 80 CAPSULE | Refills: 10 | OUTPATIENT
Start: 2023-09-22

## 2023-09-25 RX ORDER — LOPERAMIDE HYDROCHLORIDE 2 MG/1
CAPSULE ORAL
Qty: 80 CAPSULE | Refills: 10 | Status: SHIPPED | OUTPATIENT
Start: 2023-09-25

## 2023-09-25 RX ORDER — FERROUS SULFATE 325(65) MG
TABLET ORAL
Qty: 30 TABLET | Refills: 10 | Status: SHIPPED | OUTPATIENT
Start: 2023-09-25

## 2023-09-25 RX ORDER — LORATADINE 10 MG/1
TABLET ORAL
Qty: 30 TABLET | Refills: 10 | Status: SHIPPED | OUTPATIENT
Start: 2023-09-25

## 2023-09-25 NOTE — TELEPHONE ENCOUNTER
Last visit: 09/23   Last Med refill:   Does patient have enough medication for 72 hours: No:     Next Visit Date:  Future Appointments   Date Time Provider [address]   11/1/2023  1:00 PM Isabela Hernandez MD [address] [place] IM [facility]   7/29/2024 10:00 AM Roro Hdz MD [address] Maintenance   Topic Date Due    Hepatitis B vaccine (1 of 3 - 3-dose series) 1965    Pneumococcal 0-64 years Vaccine (2 - PCV) 05/09/2018    Shingles vaccine (2 of 2) 12/15/2021    Flu vaccine (1) 08/01/2023    Depression Monitoring  02/01/2024    A1C test (Diabetic or Prediabetic)  05/09/2024    Lipids  05/09/2024    Breast cancer screen  05/09/2024    DTaP/Tdap/Td vaccine (3 - Td or Tdap) 04/14/2027    Colorectal Cancer Screen  12/03/2030    COVID-19 Vaccine  Completed    Hepatitis C screen  Completed    HIV screen  Completed    Hepatitis A vaccine  Aged Out    Hib vaccine  Aged Out    Meningococcal (ACWY) vaccine  Aged Out    Diabetic Alb to Cr ratio (uACR) test  Discontinued    Diabetic retinal exam  Discontinued    Diabetes screen  Discontinued    Cervical cancer screen  Discontinued       Hemoglobin A1C (%)   Date Value   05/09/2023 5.9   06/11/2021 5.5   03/21/2021 6.3 (H)             ( goal A1C is < 7)   No components found for: \"LABMICR\"  LDL Cholesterol (mg/dL)   Date Value   05/09/2023 124   02/11/2022 71       (goal LDL is <100)   AST (U/L)   Date Value   07/25/2022 42 (H)     ALT (U/L)   Date Value   07/25/2022 20     BUN (mg/dL)   Date Value   05/09/2023 12     BP Readings from Last 3 Encounters:   08/24/23 100/62   08/10/23 126/80   07/26/23 136/80          (goal 120/80)    All Future Testing planned in [facility]  Lab Frequency Next Occurrence   Basic Metabolic Panel Once 14/46/9768   Hepatitis C Antibody Once 08/24/2023   RPR Once 08/10/2023   CARI DIGITAL SCREEN W OR WO CAD BILATERAL Once 08/24/2023   DEXA BONE DENSITY 2 SITES Once 08/24/2023   PAP SMEAR Once 08/24/2023   CBC with Auto

## 2023-09-27 NOTE — PROGRESS NOTES
[address] WALK IN CARE  [address] [address]  Dept: [phone]  Dept Fax: [phone]    Lele Guadalupe is a 55 y.o. female who presents to the urgent care today for her medicalconditions/complaints as noted below. Lele Guadalupe is c/o of Shoulder Pain (right side), Neck Pain (fell out of chair hit hard floor), and Ankle Pain      HPI:     Shoulder Pain   The pain is present in the right shoulder. This is a new problem. The current episode started in the past 7 days. The quality of the pain is described as sharp. The pain is moderate. The symptoms are aggravated by activity. Ankle Pain   The incident occurred 3 to 5 days ago ([place] chair [place] The injury mechanism was a fall. The pain is present in the right ankle. The quality of the pain is described as aching. The symptoms are aggravated by weight bearing. She has tried acetaminophen for the symptoms. The treatment provided mild relief.        Past Medical History:   Diagnosis Date    Angioedema 11/17/2017    from lisinopril    Anxiety     Asthma     mild persistent asthma, on home resp medications for control    Bipolar disorder (HCC)     Claustrophobia     Depression     GERD (gastroesophageal reflux disease)     Hypertension     Hypertrophic cardiomyopathy ([address]     Lung nodules     MRSA (methicillin resistant Staphylococcus aureus)     rt hip    Murmur     see cardiac echo result    OA (osteoarthritis)     JONES (obstructive sleep apnea)     Thyroid nodule     Type 2 diabetes mellitus without complication, without long-term current use of insulin ([address] 12/7/2018        Current Outpatient Medications   Medication Sig Dispense Refill    lipase-protease-amylase (CREON) 6000-24634 units delayed release capsule Take by mouth 3 times daily (with meals) 450 capsule 3    allopurinol (ZYLOPRIM) 100 MG tablet Take 1 tablet by mouth daily 90 tablet 0    colchicine (COLCRYS) 0.6 MG tablet Take 1 tablet by mouth daily 30 tablet 1    magnesium oxide (MAG-OX) 400 (240 Mg) MG tablet Take 1 tablet by mouth daily 30 tablet 0    omeprazole (PRILOSEC) 20 MG delayed release capsule TAKE 1 CAPSULE BY MOUTH DAILY  60 capsule 3    Calcium Carbonate-Vitamin D (OYSTER SHELL CALCIUM/D) 500-200 MG-UNIT TABS TAKE 1 TAB BY MOUTH ONCE A DAY  30 tablet 4    potassium chloride (KLOR-CON M) 20 MEQ extended release tablet Take 2 tablets by mouth 2 times daily After BMP two times, first after 1 week and if potassium is low then continue taking same dose with repeat BMP in a week. and if potassium is normal then take once daily. Do not crush, chew, or suck on tablet. 60 tablet 5    Multiple Vitamin (MULTIVITAMIN) TABS tablet Take 1 tablet by mouth daily 60 tablet 2    loratadine (CLARITIN) 10 MG tablet TAKE 1 tab BY MOUTH DAILY  30 tablet 3    thiamine mononitrate 100 MG tablet TAKE 1 TABLET BY MOUTH ONCE DAILY  30 tablet 2    FLUoxetine (PROZAC) 40 MG capsule Take 40 mg by mouth daily      acetaminophen (APAP EXTRA STRENGTH) 500 MG tablet Take 2 tablets by mouth every 6 hours as needed for Pain 60 tablet 0    folic acid (FOLVITE) 1 MG tablet Take 1 tablet by mouth daily 30 tablet 3    ferrous sulfate (IRON 325) 325 (65 Fe) MG tablet Take 325 mg by mouth daily (with breakfast)      atenolol (TENORMIN) 50 MG tablet Take 1 tablet by mouth daily 60 tablet 3    amLODIPine (NORVASC) 10 MG tablet TAKE 1 TABLET BY MOUTH DAILY  60 tablet 3    diclofenac sodium (VOLTAREN) 1 % GEL Apply 4 g topically 2 times daily 150 g 2    albuterol sulfate (PROAIR RESPICLICK) 141 (90 Base) MCG/ACT aerosol powder inhalation Inhale 2 puffs into the lungs every 6 hours as needed for Wheezing or Shortness of Breath 5 Inhaler 3     No current facility-administered medications for this visit.      Allergies   Allergen Reactions    Bee Venom Anaphylaxis    Iodine Anaphylaxis and Rash    Lisinopril Swelling and Anaphylaxis     Angioedema    Shellfish-Derived Products Anaphylaxis and Rash     ANGIOEDEMA       Health Maintenance   Topic Date Due    Shingles Vaccine (1 of 2) Never done    Lipid screen  03/21/2022    Breast cancer screen  10/10/2022    Potassium monitoring  10/12/2022    Creatinine monitoring  10/12/2022    DTaP/Tdap/Td vaccine (3 - Td or Tdap) 04/14/2027    Colon cancer screen colonoscopy  12/03/2030    Pneumococcal 0-64 years Vaccine (2 of 2 - PPSV23) 01/22/2031    Flu vaccine  Completed    COVID-19 Vaccine  Completed    Hepatitis C screen  Completed    HIV screen  Completed    Hepatitis A vaccine  Aged Out    Hepatitis B vaccine  Aged Out    Hib vaccine  Aged Out    Meningococcal (ACWY) vaccine  Aged Out       Subjective:      Review of Systems   All other systems reviewed and are negative. Objective:     Physical Exam  Vitals reviewed. Constitutional:       Appearance: Normal appearance. HENT:      Head: Normocephalic and atraumatic. Musculoskeletal:      Right shoulder: Tenderness present. Right elbow: Effusion (has olecranon bursitis ) present. Tenderness present in olecranon process. Right ankle: Tenderness present over the lateral malleolus. Skin:     General: Skin is warm and dry. Neurological:      General: No focal deficit present. Mental Status: She is alert and oriented to person, place, and time. Psychiatric:         Mood and Affect: Mood normal.         Behavior: Behavior normal.       /83   Pulse 83   SpO2 99%       Assessment:       Diagnosis Orders   1. Acute pain of right shoulder  XR SHOULDER RIGHT (MIN 2 VIEWS)   2. Right elbow pain  XR ELBOW RIGHT (2 VIEWS)   3. Sprain of calcaneofibular ligament of right ankle, initial encounter  XR ANKLE RIGHT (2 VIEWS)       Plan:      No follow-ups on file. No orders of the defined types were placed in this encounter.     Orders Placed This Encounter   Procedures    XR SHOULDER RIGHT (MIN 2 VIEWS)     Standing Status: Future     Standing Expiration Date:   10/18/2022    XR ELBOW RIGHT (2 VIEWS)     Standing Status:   Future     Standing Expiration Date:   10/18/2022    XR ANKLE RIGHT (2 VIEWS)     Standing Status:   Future     Standing Expiration Date:   10/18/2022            Patient given educational materials - see patient instructions. Discussed use, benefit, and side effects of prescribed medications. All patientquestions answered. Pt voiced understanding.     Electronically signed by Saskia Hoffman MD on 10/18/2021at 11:36 AM Detail Level: Detailed Quality 110: Preventive Care And Screening: Influenza Immunization: Influenza Immunization previously received during influenza season Additional Notes: *** Meds documented to the best of my ability with the resources available Quality 111:Pneumonia Vaccination Status For Older Adults: Pneumococcal vaccine (PPSV23) administered on or after patient’s 60th birthday and before the end of the measurement period Quality 130: Documentation Of Current Medications In The Medical Record: Current Medications Documented Quality 431: Preventive Care And Screening: Unhealthy Alcohol Use - Screening: Patient not identified as an unhealthy alcohol user when screened for unhealthy alcohol use using a systematic screening method Quality 226: Preventive Care And Screening: Tobacco Use: Screening And Cessation Intervention: Patient screened for tobacco use and is an ex/non-smoker

## 2023-10-02 RX ORDER — ASPIRIN 81 MG/1
TABLET, COATED ORAL
Qty: 30 TABLET | Refills: 3 | Status: SHIPPED | OUTPATIENT
Start: 2023-10-02

## 2023-10-02 NOTE — TELEPHONE ENCOUNTER
Request for aspirin. Please review and e-scribe to pharmacy listed in chart if appropriate. Thank you.         Last Visit Date: 7/26/2023    Future Appointments   Date Time Provider [address]   11/1/2023  1:00 PM Joey Almanza MD [facility] IM [facility]   7/29/2024 10:00 AM Jeffery Hdz MD [address] Maintenance   Topic Date Due    Hepatitis B vaccine (1 of 3 - 3-dose series) 1965    Pneumococcal 0-64 years Vaccine (2 - PCV) 05/09/2018    Shingles vaccine (2 of 2) 12/15/2021    Flu vaccine (1) 08/01/2023    Depression Monitoring  02/01/2024    A1C test (Diabetic or Prediabetic)  05/09/2024    Lipids  05/09/2024    Breast cancer screen  05/09/2024    DTaP/Tdap/Td vaccine (3 - Td or Tdap) 04/14/2027    Colorectal Cancer Screen  12/03/2030    COVID-19 Vaccine  Completed    Hepatitis C screen  Completed    HIV screen  Completed    Hepatitis A vaccine  Aged Out    Hib vaccine  Aged Out    Meningococcal (ACWY) vaccine  Aged Out    Diabetic Alb to Cr ratio (uACR) test  Discontinued    Diabetic retinal exam  Discontinued    Diabetes screen  Discontinued    Cervical cancer screen  Discontinued       Hemoglobin A1C (%)   Date Value   05/09/2023 5.9   06/11/2021 5.5   03/21/2021 6.3 (H)             ( goal A1C is < 7)   No components found for: \"LABMICR\"  LDL Cholesterol (mg/dL)   Date Value   05/09/2023 124       (goal LDL is <100)   AST (U/L)   Date Value   07/25/2022 42 (H)     ALT (U/L)   Date Value   07/25/2022 20     BUN (mg/dL)   Date Value   05/09/2023 12     BP Readings from Last 3 Encounters:   08/24/23 100/62   08/10/23 126/80   07/26/23 136/80          (goal 120/80)    All Future Testing planned in [facility]  Lab Frequency Next Occurrence   Basic Metabolic Panel Once 94/82/8465   Hepatitis C Antibody Once 08/24/2023   RPR Once 08/10/2023   CARI DIGITAL SCREEN W OR WO CAD BILATERAL Once 08/24/2023   DEXA BONE DENSITY 2 SITES Once 08/24/2023   PAP SMEAR Once 08/24/2023   CBC

## 2023-10-13 DIAGNOSIS — M06.00 SERONEGATIVE RHEUMATOID ARTHRITIS (HCC): ICD-10-CM

## 2023-10-13 RX ORDER — PSEUDOEPHED/ACETAMINOPH/DIPHEN 30MG-500MG
TABLET ORAL
Qty: 60 TABLET | Refills: 0 | Status: SHIPPED | OUTPATIENT
Start: 2023-10-13 | End: 2023-11-13

## 2023-10-13 NOTE — TELEPHONE ENCOUNTER
Last visit: 7/26/23  Last Med refill:   Does patient have enough medication for 72 hours: No:     Next Visit Date:  Future Appointments   Date Time Provider [address]   11/1/2023  1:00 PM Mey Sutherland MD [facility] IM [facility]   7/29/2024 10:00 AM Amaris Hdz MD [address] Maintenance   Topic Date Due    Hepatitis B vaccine (1 of 3 - 3-dose series) 1965    Pneumococcal 0-64 years Vaccine (2 - PCV) 05/09/2018    Shingles vaccine (2 of 2) 12/15/2021    Flu vaccine (1) 08/01/2023    Depression Monitoring  02/01/2024    A1C test (Diabetic or Prediabetic)  05/09/2024    Lipids  05/09/2024    Breast cancer screen  05/09/2024    DTaP/Tdap/Td vaccine (3 - Td or Tdap) 04/14/2027    Colorectal Cancer Screen  12/03/2030    COVID-19 Vaccine  Completed    Hepatitis C screen  Completed    HIV screen  Completed    Hepatitis A vaccine  Aged Out    Hib vaccine  Aged Out    Meningococcal (ACWY) vaccine  Aged Out    Diabetic Alb to Cr ratio (uACR) test  Discontinued    Diabetic retinal exam  Discontinued    Diabetes screen  Discontinued    Cervical cancer screen  Discontinued       Hemoglobin A1C (%)   Date Value   05/09/2023 5.9   06/11/2021 5.5   03/21/2021 6.3 (H)             ( goal A1C is < 7)   No components found for: \"LABMICR\"  LDL Cholesterol (mg/dL)   Date Value   05/09/2023 124   02/11/2022 71       (goal LDL is <100)   AST (U/L)   Date Value   07/25/2022 42 (H)     ALT (U/L)   Date Value   07/25/2022 20     BUN (mg/dL)   Date Value   05/09/2023 12     BP Readings from Last 3 Encounters:   08/24/23 100/62   08/10/23 126/80   07/26/23 136/80          (goal 120/80)    All Future Testing planned in [facility]  Lab Frequency Next Occurrence   Basic Metabolic Panel Once 95/29/9295   Hepatitis C Antibody Once 08/24/2023   RPR Once 08/10/2023   CARI DIGITAL SCREEN W OR WO CAD BILATERAL Once 08/24/2023   DEXA BONE DENSITY 2 SITES Once 08/24/2023   PAP SMEAR Once 08/24/2023   CBC with Auto

## 2023-11-13 DIAGNOSIS — M06.00 SERONEGATIVE RHEUMATOID ARTHRITIS (HCC): ICD-10-CM

## 2023-11-13 RX ORDER — PSEUDOEPHED/ACETAMINOPH/DIPHEN 30MG-500MG
TABLET ORAL
Qty: 60 TABLET | Refills: 0 | Status: SHIPPED | OUTPATIENT
Start: 2023-11-13

## 2023-11-13 NOTE — TELEPHONE ENCOUNTER
Ollie Davis is calling to request a refill on the following medication(s):    Medication Request:  Requested Prescriptions     Pending Prescriptions Disp Refills    Acetaminophen Extra Strength 500 MG TABS [Pharmacy Med Name: ACETAMINOPHEN EXTRA STRENGTH 500MG TABLET] 60 tablet 0     Sig: TAKE 2 TABLETS BY MOUTH EVERY 6 HOURS AS NEEDED FOR PAIN       Last Visit Date (If Applicable):  8/93/8239    Next Visit Date:    Visit date not found

## 2023-12-12 DIAGNOSIS — M06.00 SERONEGATIVE RHEUMATOID ARTHRITIS (HCC): ICD-10-CM

## 2023-12-12 DIAGNOSIS — E78.5 HYPERLIPIDEMIA, UNSPECIFIED HYPERLIPIDEMIA TYPE: ICD-10-CM

## 2023-12-12 RX ORDER — PSEUDOEPHED/ACETAMINOPH/DIPHEN 30MG-500MG
TABLET ORAL
Qty: 60 TABLET | Refills: 0 | Status: SHIPPED | OUTPATIENT
Start: 2023-12-12

## 2023-12-12 RX ORDER — FOLIC ACID 1 MG/1
1 TABLET ORAL DAILY
Qty: 30 TABLET | Refills: 3 | Status: SHIPPED | OUTPATIENT
Start: 2023-12-12

## 2023-12-12 RX ORDER — ATORVASTATIN CALCIUM 40 MG/1
40 TABLET, FILM COATED ORAL DAILY
Qty: 30 TABLET | Refills: 0 | Status: SHIPPED | OUTPATIENT
Start: 2023-12-12

## 2023-12-12 NOTE — TELEPHONE ENCOUNTER
.. Request for   Requested Prescriptions     Pending Prescriptions Disp Refills    folic acid (FOLVITE) 1 MG tablet [Pharmacy Med Name: FOLIC ACID 1MG TABLET] 30 tablet 3     Sig: TAKE 1 TABLET BY MOUTH DAILY    . Please review and e-scribe to pharmacy listed in chart if appropriate. Thank you.       Last Visit Date: 7/26/2023  Next Visit Date: Visit date not found    Future Appointments   Date Time Provider [address]   7/29/2024 10:00 AM Al-Nsour, Johnie Gowers, MD [address] [place] Maintenance   Topic Date Due    Hepatitis B vaccine (1 of 3 - 3-dose series) 1965    Pneumococcal 0-64 years Vaccine (2 - PCV) 05/09/2018    Shingles vaccine (2 of 2) 12/15/2021    Flu vaccine (1) 08/01/2023    COVID-19 Vaccine (6 - 2023-24 season) 09/01/2023    Depression Monitoring  02/01/2024    A1C test (Diabetic or Prediabetic)  05/09/2024    Lipids  05/09/2024    Breast cancer screen  05/09/2024    DTaP/Tdap/Td vaccine (3 - Td or Tdap) 04/14/2027    Colorectal Cancer Screen  12/03/2030    Hepatitis C screen  Completed    HIV screen  Completed    Hepatitis A vaccine  Aged Out    Hib vaccine  Aged Out    Meningococcal (ACWY) vaccine  Aged Out    Diabetic Alb to Cr ratio (uACR) test  Discontinued    Diabetic retinal exam  Discontinued    Diabetes screen  Discontinued    Cervical cancer screen  Discontinued       Hemoglobin A1C (%)   Date Value   05/09/2023 5.9   06/11/2021 5.5   03/21/2021 6.3 (H)             ( goal A1C is < 7)   No components found for: \"LABMICR\"  LDL Cholesterol (mg/dL)   Date Value   05/09/2023 124       (goal LDL is <100)   AST (U/L)   Date Value   07/25/2022 42 (H)     ALT (U/L)   Date Value   07/25/2022 20     BUN (mg/dL)   Date Value   05/09/2023 12     BP Readings from Last 3 Encounters:   08/24/23 100/62   08/10/23 126/80   07/26/23 136/80          (goal 120/80)    All Future Testing planned in [facility]  Lab Frequency Next Occurrence   Basic Metabolic Panel Once 61/25/9199

## 2023-12-12 NOTE — TELEPHONE ENCOUNTER
.. Request for   Requested Prescriptions     Pending Prescriptions Disp Refills    Acetaminophen Extra Strength 500 MG TABS [Pharmacy Med Name: ACETAMINOPHEN EXTRA STRENGTH 500MG TABLET] 60 tablet 0     Sig: TAKE 2 TABLETS BY MOUTH EVERY 6 HOURS AS NEEDED FOR PAIN    atorvastatin (LIPITOR) 40 MG tablet [Pharmacy Med Name: ATORVASTATIN CALCIUM 40MG TABLET] 30 tablet 0     Sig: TAKE 1 TABLET BY MOUTH DAILY    . Please review and e-scribe to pharmacy listed in chart if appropriate. Thank you.       Last Visit Date: 7/26/2023  Next Visit Date: 12/12/2023    Future Appointments   Date Time Provider [address]   7/29/2024 10:00 AM Chauncey Hdz MD [address] [place] Maintenance   Topic Date Due    Hepatitis B vaccine (1 of 3 - 3-dose series) 1965    Pneumococcal 0-64 years Vaccine (2 - PCV) 05/09/2018    Shingles vaccine (2 of 2) 12/15/2021    Flu vaccine (1) 08/01/2023    COVID-19 Vaccine (6 - 2023-24 season) 09/01/2023    Depression Monitoring  02/01/2024    A1C test (Diabetic or Prediabetic)  05/09/2024    Lipids  05/09/2024    Breast cancer screen  05/09/2024    DTaP/Tdap/Td vaccine (3 - Td or Tdap) 04/14/2027    Colorectal Cancer Screen  12/03/2030    Hepatitis C screen  Completed    HIV screen  Completed    Hepatitis A vaccine  Aged Out    Hib vaccine  Aged Out    Meningococcal (ACWY) vaccine  Aged Out    Diabetic Alb to Cr ratio (uACR) test  Discontinued    Diabetic retinal exam  Discontinued    Diabetes screen  Discontinued    Cervical cancer screen  Discontinued       Hemoglobin A1C (%)   Date Value   05/09/2023 5.9   06/11/2021 5.5   03/21/2021 6.3 (H)             ( goal A1C is < 7)   No components found for: \"LABMICR\"  LDL Cholesterol (mg/dL)   Date Value   05/09/2023 124       (goal LDL is <100)   AST (U/L)   Date Value   07/25/2022 42 (H)     ALT (U/L)   Date Value   07/25/2022 20     BUN (mg/dL)   Date Value   05/09/2023 12     BP Readings from Last 3 Encounters:

## 2024-01-17 DIAGNOSIS — M06.00 SERONEGATIVE RHEUMATOID ARTHRITIS (HCC): ICD-10-CM

## 2024-01-17 RX ORDER — PSEUDOEPHED/ACETAMINOPH/DIPHEN 30MG-500MG
TABLET ORAL
Qty: 60 TABLET | Refills: 0 | Status: SHIPPED | OUTPATIENT
Start: 2024-01-17

## 2024-01-17 NOTE — TELEPHONE ENCOUNTER
(goal 120/80)    All Future Testing planned in [facility]  Lab Frequency Next Occurrence   Basic Metabolic Panel Once 05/16/2023   Hepatitis C Antibody Once 08/24/2023   RPR Once 08/10/2023   CARI DIGITAL SCREEN W OR WO CAD BILATERAL Once 08/24/2023   DEXA BONE DENSITY 2 SITES Once 08/24/2023   PAP SMEAR Once 08/24/2023   CBC with Auto Differential           Patient Active Problem List:     Essential hypertension     Dyslipidemia     Bipolar disorder (HCC)     Mild intermittent asthma without complication     Exocrine pancreatic insufficiency     Idiopathic 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     S/P hip replacement, left     Current chronic use of systemic steroids     H/O alcohol abuse     MRSA (methicillin resistant staph aureus) culture positive 8/2023     HSV 1 and 2      Avascular necrosis of bone of left hip (HCC)     Chronic left-sided low back pain with left-sided sciatica     Gout, arthritis     Gout, tophaceous     Idiopathic gout     Greater trochanteric bursitis of left hip     Lumbar spondylosis     Primary osteoarthritis of left hip     History of falling     Long term current use of aspirin

## 2024-01-31 RX ORDER — ASPIRIN 81 MG/1
TABLET, COATED ORAL
Qty: 30 TABLET | Refills: 4 | Status: SHIPPED | OUTPATIENT
Start: 2024-01-31

## 2024-01-31 NOTE — TELEPHONE ENCOUNTER
A Refill Has Been Requested for Delphine MO Panchito    Medication Requested  Requested Prescriptions     Pending Prescriptions Disp Refills    ASPIRIN LOW DOSE 81 MG EC tablet [Pharmacy Med Name: ASPIRIN EC 81MG TABLET 81 Tablet] 30 tablet 10     Sig: TAKE 1 TABLET BY MOUTH DAILY       Last Visit Date (If Applicable)  7/26/2023    Next Visit Date (If Applicable)  1/31/2024

## 2024-02-28 RX ORDER — ALBUTEROL SULFATE 90 UG/1
2 AEROSOL, METERED RESPIRATORY (INHALATION) EVERY 6 HOURS PRN
Qty: 18 G | Refills: 10 | Status: SHIPPED | OUTPATIENT
Start: 2024-02-28

## 2024-02-29 NOTE — TELEPHONE ENCOUNTER
A Refill Has Been Requested for Delphine Flanagan    Medication Requested  Requested Prescriptions     Pending Prescriptions Disp Refills    VENTOLIN  (90 Base) MCG/ACT inhaler [Pharmacy Med Name: VENTOLIN HFA 90MCG INH X1 108 (90 BAS Aerosol] 18 g 10     Sig: INHALE 2 PUFFS BY MOUTH EVERY 6 HOURS AS NEEDED FOR WHEEZING    Calcium Carb-Cholecalciferol (OYSTER SHELL CALCIUM W/D) 500-5 MG-MCG TABS tablet [Pharmacy Med Name: OYST DAGOBERTO+D 500-200MG -200 Tablet] 30 tablet 10     Sig: TAKE 1 TABLET BY MOUTH DAILY       Last Visit Date (If Applicable)  7/26/2023    Next Visit Date (If Applicable)  Visit date not found

## 2024-03-25 ENCOUNTER — TELEPHONE (OUTPATIENT)
Dept: INTERNAL MEDICINE | Age: 59
End: 2024-03-25

## 2024-03-25 NOTE — TELEPHONE ENCOUNTER
----- Message from Ashley Rinner sent at 3/25/2024  9:48 AM EDT -----  Subject: Appointment Request    Reason for Call: Established Patient Appointment needed: Routine Physical   Exam    QUESTIONS    Reason for appointment request? Available appointments did not meet   patient need     Additional Information for Provider? Pt needs an annual physical with PCP   or any provider in the practice before 4/8/24 if availability can be made.   Pt needs paperwork signed to continue medical cab services by that date.   Pt would like to bring it in but can fax it if it can be done before the   annual physical. Pt prefers morning appts. Please advise.  ---------------------------------------------------------------------------  --------------  CALL BACK INFO  [phone]; OK to leave message on voicemail  ---------------------------------------------------------------------------  --------------  SCRIPT ANSWERS

## 2024-03-25 NOTE — TELEPHONE ENCOUNTER
Spoke to patient and scheduled an appointment.    Electronically signed by Andrew Morgan MA on 3/25/2024 at 10:00 AM

## 2024-03-28 DIAGNOSIS — I10 ESSENTIAL HYPERTENSION: ICD-10-CM

## 2024-03-29 RX ORDER — AMLODIPINE BESYLATE 10 MG/1
10 TABLET ORAL DAILY
Qty: 30 TABLET | Refills: 10 | Status: SHIPPED | OUTPATIENT
Start: 2024-03-29

## 2024-03-29 RX ORDER — GAUZE BANDAGE 2" X 2"
100 BANDAGE TOPICAL DAILY
Qty: 30 TABLET | Refills: 10 | OUTPATIENT
Start: 2024-03-29

## 2024-03-29 NOTE — TELEPHONE ENCOUNTER
Readings from Last 3 Encounters:   08/24/23 100/62   08/10/23 126/80   07/26/23 136/80          (goal 120/80)    All Future Testing planned in [facility]  Lab Frequency Next Occurrence   Basic Metabolic Panel Once 05/16/2023   Hepatitis C Antibody Once 08/24/2023   RPR Once 08/10/2023   CARI DIGITAL SCREEN W OR WO CAD BILATERAL Once 08/24/2023   DEXA BONE DENSITY 2 SITES Once 08/24/2023   CBC with Auto Differential           Patient Active Problem List:     Essential hypertension     Dyslipidemia     Bipolar disorder (HCC)     Mild intermittent asthma without complication     Exocrine pancreatic insufficiency     Idiopathic 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     S/P hip replacement, left     Current chronic use of systemic steroids     H/O alcohol abuse     MRSA (methicillin resistant staph aureus) culture positive 8/2023     HSV 1 and 2      Avascular necrosis of bone of left hip (HCC)     Chronic left-sided low back pain with left-sided sciatica     Gout, arthritis     Gout, tophaceous     Idiopathic gout     Greater trochanteric bursitis of left hip     Lumbar spondylosis     Primary osteoarthritis of left hip     History of falling     Long term current use of aspirin

## 2024-04-01 SDOH — ECONOMIC STABILITY: FOOD INSECURITY: WITHIN THE PAST 12 MONTHS, YOU WORRIED THAT YOUR FOOD WOULD RUN OUT BEFORE YOU GOT MONEY TO BUY MORE.: SOMETIMES TRUE

## 2024-04-01 SDOH — ECONOMIC STABILITY: INCOME INSECURITY: HOW HARD IS IT FOR YOU TO PAY FOR THE VERY BASICS LIKE FOOD, HOUSING, MEDICAL CARE, AND HEATING?: SOMEWHAT HARD

## 2024-04-01 SDOH — ECONOMIC STABILITY: FOOD INSECURITY: WITHIN THE PAST 12 MONTHS, THE FOOD YOU BOUGHT JUST DIDN'T LAST AND YOU DIDN'T HAVE MONEY TO GET MORE.: OFTEN TRUE

## 2024-04-01 ASSESSMENT — PATIENT HEALTH QUESTIONNAIRE - PHQ9
3. TROUBLE FALLING OR STAYING ASLEEP: MORE THAN HALF THE DAYS
7. TROUBLE CONCENTRATING ON THINGS, SUCH AS READING THE NEWSPAPER OR WATCHING TELEVISION: NOT AT ALL
SUM OF ALL RESPONSES TO PHQ9 QUESTIONS 1 & 2: 4
2. FEELING DOWN, DEPRESSED OR HOPELESS: MORE THAN HALF THE DAYS
4. FEELING TIRED OR HAVING LITTLE ENERGY: SEVERAL DAYS
5. POOR APPETITE OR OVEREATING: SEVERAL DAYS
7. TROUBLE CONCENTRATING ON THINGS, SUCH AS READING THE NEWSPAPER OR WATCHING TELEVISION: NOT AT ALL
10. IF YOU CHECKED OFF ANY PROBLEMS, HOW DIFFICULT HAVE THESE PROBLEMS MADE IT FOR YOU TO DO YOUR WORK, TAKE CARE OF THINGS AT HOME, OR GET ALONG WITH OTHER PEOPLE: SOMEWHAT DIFFICULT
SUM OF ALL RESPONSES TO PHQ QUESTIONS 1-9: 12
10. IF YOU CHECKED OFF ANY PROBLEMS, HOW DIFFICULT HAVE THESE PROBLEMS MADE IT FOR YOU TO DO YOUR WORK, TAKE CARE OF THINGS AT HOME, OR GET ALONG WITH OTHER PEOPLE: SOMEWHAT DIFFICULT
3. TROUBLE FALLING OR STAYING ASLEEP: MORE THAN HALF THE DAYS
8. MOVING OR SPEAKING SO SLOWLY THAT OTHER PEOPLE COULD HAVE NOTICED. OR THE OPPOSITE - BEING SO FIDGETY OR RESTLESS THAT YOU HAVE BEEN MOVING AROUND A LOT MORE THAN USUAL: MORE THAN HALF THE DAYS
5. POOR APPETITE OR OVEREATING: SEVERAL DAYS
1. LITTLE INTEREST OR PLEASURE IN DOING THINGS: MORE THAN HALF THE DAYS
9. THOUGHTS THAT YOU WOULD BE BETTER OFF DEAD, OR OF HURTING YOURSELF: NOT AT ALL
8. MOVING OR SPEAKING SO SLOWLY THAT OTHER PEOPLE COULD HAVE NOTICED. OR THE OPPOSITE, BEING SO FIGETY OR RESTLESS THAT YOU HAVE BEEN MOVING AROUND A LOT MORE THAN USUAL: MORE THAN HALF THE DAYS
6. FEELING BAD ABOUT YOURSELF - OR THAT YOU ARE A FAILURE OR HAVE LET YOURSELF OR YOUR FAMILY DOWN: MORE THAN HALF THE DAYS
6. FEELING BAD ABOUT YOURSELF - OR THAT YOU ARE A FAILURE OR HAVE LET YOURSELF OR YOUR FAMILY DOWN: MORE THAN HALF THE DAYS
9. THOUGHTS THAT YOU WOULD BE BETTER OFF DEAD, OR OF HURTING YOURSELF: NOT AT ALL
1. LITTLE INTEREST OR PLEASURE IN DOING THINGS: MORE THAN HALF THE DAYS
SUM OF ALL RESPONSES TO PHQ QUESTIONS 1-9: 12
2. FEELING DOWN, DEPRESSED OR HOPELESS: MORE THAN HALF THE DAYS
4. FEELING TIRED OR HAVING LITTLE ENERGY: SEVERAL DAYS
SUM OF ALL RESPONSES TO PHQ QUESTIONS 1-9: 12

## 2024-04-02 ENCOUNTER — OFFICE VISIT (OUTPATIENT)
Dept: INTERNAL MEDICINE | Age: 59
End: 2024-04-02
Payer: MEDICAID

## 2024-04-02 VITALS
WEIGHT: 187.4 LBS | HEART RATE: 70 BPM | OXYGEN SATURATION: 97 % | HEIGHT: 65 IN | DIASTOLIC BLOOD PRESSURE: 74 MMHG | SYSTOLIC BLOOD PRESSURE: 122 MMHG | BODY MASS INDEX: 31.22 KG/M2 | TEMPERATURE: 98.2 F

## 2024-04-02 DIAGNOSIS — Z96.642 S/P HIP REPLACEMENT, LEFT: ICD-10-CM

## 2024-04-02 DIAGNOSIS — Z13.1 SCREENING FOR DIABETES MELLITUS: ICD-10-CM

## 2024-04-02 DIAGNOSIS — Z13.31 POSITIVE DEPRESSION SCREENING: ICD-10-CM

## 2024-04-02 DIAGNOSIS — Z13.220 LIPID SCREENING: ICD-10-CM

## 2024-04-02 DIAGNOSIS — J45.909 ASTHMA, UNSPECIFIED ASTHMA SEVERITY, UNSPECIFIED WHETHER COMPLICATED, UNSPECIFIED WHETHER PERSISTENT: ICD-10-CM

## 2024-04-02 DIAGNOSIS — M06.00 SERONEGATIVE RHEUMATOID ARTHRITIS (HCC): ICD-10-CM

## 2024-04-02 DIAGNOSIS — I10 ESSENTIAL HYPERTENSION: Primary | ICD-10-CM

## 2024-04-02 DIAGNOSIS — M10.00 IDIOPATHIC GOUT, UNSPECIFIED CHRONICITY, UNSPECIFIED SITE: ICD-10-CM

## 2024-04-02 PROCEDURE — 99214 OFFICE O/P EST MOD 30 MIN: CPT

## 2024-04-02 PROCEDURE — 94010 BREATHING CAPACITY TEST: CPT

## 2024-04-02 RX ORDER — PREDNISONE 10 MG/1
10 TABLET ORAL 2 TIMES DAILY
COMMUNITY
Start: 2024-03-28

## 2024-04-02 RX ORDER — IBUPROFEN 800 MG/1
800 TABLET ORAL EVERY 8 HOURS PRN
Qty: 60 TABLET | Refills: 0 | Status: SHIPPED | OUTPATIENT
Start: 2024-04-02

## 2024-04-02 RX ORDER — TRAZODONE HYDROCHLORIDE 100 MG/1
100 TABLET ORAL NIGHTLY
COMMUNITY

## 2024-04-02 ASSESSMENT — ENCOUNTER SYMPTOMS
ABDOMINAL PAIN: 0
CHEST TIGHTNESS: 0
BACK PAIN: 1
SHORTNESS OF BREATH: 1
VOMITING: 0
WHEEZING: 0
CONSTIPATION: 0
COUGH: 0
NAUSEA: 0
DIARRHEA: 0

## 2024-04-02 NOTE — PROGRESS NOTES
PHQ-9 score today: (PHQ-9 Total Score: 12), additional evaluation and assessment performed, follow-up plan includes but not limited to: Medication management and Referral to BH/Specialist  for evaluation and management.   PHQ-9 score today: (PHQ-9 Total Score: 12), additional evaluation and assessment performed, follow-up plan includes but not limited to: Medication management and Referral to BH/Specialist  for evaluation and management.   PHQ-9 score today: (PHQ-9 Total Score: 12), additional evaluation and assessment performed, follow-up plan includes but not limited to: Medication management and Referral to BH/Specialist  for evaluation and management.

## 2024-04-02 NOTE — PROGRESS NOTES
Attending Physician Statement  I have discussed the care of Delphine Flanagan, including pertinent history and exam findings with the resident. I have reviewed the key elements of all parts of the encounter with the resident. I agree with the assessment, and status of the problem list as documented.    Diagnosis Orders   1. Essential hypertension  Basic Metabolic Panel    CBC      2. S/P hip replacement, left  [facility] - [facility]    ibuprofen (ADVIL;MOTRIN) 800 MG tablet      3. Idiopathic gout, unspecified chronicity, unspecified site        4. Asthma, unspecified asthma severity, unspecified whether complicated, unspecified whether persistent  86248 - ID BREATHING CAPACITY TEST      5. Positive depression screening  [facility] Referral for Social Determinants of Health (Primary Care Practices)      6. Lipid screening  Lipid Panel      7. Seronegative rheumatoid arthritis (HCC)        8. Screening for diabetes mellitus  Hemoglobin A1C        The plan and orders should include   Orders Placed This Encounter   Procedures    Basic Metabolic Panel    CBC    Lipid Panel    Hemoglobin A1C    [facility] Referral for Social Determinants of Health (Primary Care Practices)    [facility] Physical Therapy - [facility]    93173 - ID BREATHING CAPACITY TEST    and this was also documented by the resident.  I agree with the referral to PT, SDOH, PFTs.The medication list was reviewed with the resident and is up to date. The return visit should be in 4 weeks .    Dr Mao Frias MD, FACP  Associate , Internal Medicine Residency Program  [facility] , [facility]  Chair, Department of Internal Medicine  [facility] Internal Medicine Clerkship         4/2/2024, 10:23 AM

## 2024-04-02 NOTE — PROGRESS NOTES
[facility]  [facility]  [address] [place] [address]  Dept: [phone]  Dept Fax: [phone]    Office Progress/Follow Up Note  Date ofpatient's visit: 4/2/2024  Patient's Name:  Delphine Flanagan YOB: 1965            Patient Care Team:  Joey Hancock MD as PCP - General (Internal Medicine)  Tina Padilla MD as Consulting Physician (Gastroenterology)  ================================================================    REASON FOR VISIT/CHIEF COMPLAINT:  Forms (Patient states she needs medical transportation forms filled out.), Lower Back Pain, Medication Refill, and Health Maintenance (Shingles vaccine due. Pneumonia vaccine due. Covid vaccine due. Hep B vaccine due. Mammogram due.)    HISTORY OF PRESENTING ILLNESS:  History was obtained from: patient. Delphine cerna 59 y.o. is here for a follow-up visit.  Patient usually follows up with Dr. Torres.  Patient has past medical history of essential hypertension, gout, osteoarthritis s/p left hip replacement on 7/23, major depressive disorder, hyperlipidemia, chronic pancreatitis secondary to alcohol use on Creon.    During this visit, the patient came in with chief complaints of lower back pain.  According to her, she has been having this back pain since her left hip replacement in July last year.  Since then, the patient has followed up with PT/OT when she had no problem but her back pain did not resolve completely.  Patient denied any weakness or numbness.  The pain was aggravated on palpation and relieved when taking ibuprofen.  Patient also wanted increased dose of ibuprofen from 500 mg to 800 mg so that she has to take one 800 mg tablet instead of two 500 mg tablets a day.  Patient denied any headache, vision changes, hearing changes, chest pain, shortness of breath, new onset upper or lower extremity weakness, abdominal pain, problems with urination, problems with passing stool.  Patient did

## 2024-04-17 ENCOUNTER — TELEPHONE (OUTPATIENT)
Dept: INTERNAL MEDICINE | Age: 59
End: 2024-04-17

## 2024-04-17 NOTE — TELEPHONE ENCOUNTER
Delphine Flanagan was contacted by a Community Health Navigator to discuss a referral for SDOH related needs.     Writer spoke with: Patient and explained the services and assistance that can be provided through the Community Health Navigation program.     Patient agreeable to receiving resources and support from writer.     Intake Notes:   Patient is seeking food and transportation.  She told me she has been a client at [facility] but she wants to discontinue.  I told patient about [facility]'s new partnership with [employer] and offering free home delivery from a food pantry.  She interested in this service and will call 211.  Patient told me she uses transportation benefit through medicaid to get to medical appointments.  She has SNAP (food stamps) but can't get to the store to use them.  I let her know that she can use the medicaid transportation benefit for rides to grocery store for curbside .  Unfortunately, those rides do count against the limited number of rides available, and patient is not interested in curbside .         Action steps to be completed by writer:  I will follow up with patient in about a week.    Action steps to be completed by patient:  Patient will call 211 and request [employer] home delivery from a food pantry.    Patient has given verbal permission to leave detailed messages regarding SDOH referral on their phone: N/A    Patient has given verbal permission to submit applications on their behalf: N/A    Patient voiced understanding of action plan and responsibilities, was provided with writer's contact information, and agrees to call should they require additional assistance: yes      FRIEDA PARSON MA

## 2024-04-25 NOTE — TELEPHONE ENCOUNTER
Delphine Borregoe was contacted by FRIEDA PARSON MA, a Community Health Navigator, regarding a Social Determinants of Health referral.     Writer was unable to leave a message. (Voicemail full/VM not set up/busy signal)    Follow-up attempt.

## 2024-05-03 NOTE — TELEPHONE ENCOUNTER
Delphine CHEPE Borregoe was contacted by FRIEDA PARSON MA, a Community Health Navigator, regarding a Social Determinants of Health referral.     A message was left with the writer's contact information.    Second follow-up attempt.

## 2024-05-07 NOTE — TELEPHONE ENCOUNTER
Delphine Flanagan was contacted by a Community Health Navigator for follow-up regarding SDOH-related needs.     Writer spoke with: Patient    Progress Notes: Patient is really hungry today.   She told me she receives her food stamps (SNAP benefits) on the 15th of the month.  I talked with her about [facility]'s [employer] partnership with a local food pantry and also [facility]'s food pantry home delivery.  Patient is agreeable to me submitting a request to [facility] on her behalf, and we completed this while on the phone.  I provided patient with [facility]'s phone number.     Actions to be completed by writer:  I will follow up with patient.    Actions to be completed by patient:  Patient is aware that [facility] will text her to schedule food delivery.  Patient told me she will call [facility].    FRIEDA PARSON MA

## 2024-05-23 NOTE — TELEPHONE ENCOUNTER
Delphine Flanagan was contacted by FRIEDA PARSON MA, a Community Health Navigator, regarding a Social Determinants of Health referral.     A message was left with the writer's contact information.    Follow-up attempt.

## 2024-06-19 NOTE — TELEPHONE ENCOUNTER
Delphine CHEPE Borregoe was contacted by FRIEDA PARSON MA, a Community Health Navigator, regarding a Social Determinants of Health referral.     A message was left with the writer's contact information.    Third follow up attempt.  Will leave referral open through June 26, 2024.

## 2024-06-26 DIAGNOSIS — I10 ESSENTIAL HYPERTENSION: ICD-10-CM

## 2024-06-27 RX ORDER — PREDNISONE 10 MG/1
10 TABLET ORAL DAILY
Qty: 30 TABLET | Refills: 10 | Status: SHIPPED | OUTPATIENT
Start: 2024-06-27

## 2024-06-27 RX ORDER — ASPIRIN 81 MG/1
TABLET, COATED ORAL
Qty: 30 TABLET | Refills: 10 | Status: SHIPPED | OUTPATIENT
Start: 2024-06-27

## 2024-06-27 RX ORDER — POTASSIUM CHLORIDE 20 MEQ/1
TABLET, EXTENDED RELEASE ORAL
Qty: 60 TABLET | Refills: 10 | Status: SHIPPED | OUTPATIENT
Start: 2024-06-27

## 2024-06-27 RX ORDER — ATENOLOL 50 MG/1
50 TABLET ORAL DAILY
Qty: 30 TABLET | Refills: 10 | Status: SHIPPED | OUTPATIENT
Start: 2024-06-27

## 2024-06-27 RX ORDER — MULTIVITAMIN
TABLET ORAL
Qty: 30 TABLET | Refills: 10 | Status: SHIPPED | OUTPATIENT
Start: 2024-06-27

## 2024-06-27 NOTE — TELEPHONE ENCOUNTER
Delphine Flanagan is calling to request a refill on the following medication(s):    Medication Request:  Requested Prescriptions     Pending Prescriptions Disp Refills    Multiple Vitamin (MULTIVITAMIN) TABS [Pharmacy Med Name: MULTIVITAMIN TABS Tablet] 30 tablet 10     Sig: TAKE 1 TABLET BY MOUTH EVERY DAY    potassium chloride (KLOR-CON M) 20 MEQ extended release tablet [Pharmacy Med Name: POTASSIUM CHL 20MEQ MICR TB 20 Tablet] 60 tablet 10     Sig: TAKE TWO TABLETS BY MOUTH EACH MORNING    ASPIRIN LOW DOSE 81 MG EC tablet [Pharmacy Med Name: ASPIRIN EC 81MG TABLET 81 Tablet] 30 tablet 10     Sig: TAKE 1 TABLET BY MOUTH DAILY    atenolol (TENORMIN) 50 MG tablet [Pharmacy Med Name: ATENOLOL 50 MG TABS 50 Tablet] 30 tablet 10     Sig: TAKE 1 TABLET BY MOUTH DAILY       Last Visit Date (If Applicable):  4/2/2024    Next Visit Date:    Visit date not found

## 2024-07-23 DIAGNOSIS — Z96.642 S/P HIP REPLACEMENT, LEFT: ICD-10-CM

## 2024-07-23 RX ORDER — IBUPROFEN 800 MG/1
800 TABLET ORAL EVERY 8 HOURS PRN
Qty: 60 TABLET | Refills: 0 | Status: SHIPPED | OUTPATIENT
Start: 2024-07-23

## 2024-07-23 NOTE — TELEPHONE ENCOUNTER
Next Occurrence   Hepatitis C Antibody Once 08/24/2023   RPR Once 08/10/2023   CARI DIGITAL SCREEN W OR WO CAD BILATERAL Once 08/24/2023   DEXA BONE DENSITY 2 SITES Once 08/24/2023   Basic Metabolic Panel Once 04/02/2024   CBC Once 04/02/2024   Lipid Panel Once 04/02/2024   Hemoglobin A1C Once 04/02/2024   CBC with Auto Differential           Patient Active Problem List:     Essential hypertension     Dyslipidemia     Bipolar disorder (HCC)     Mild intermittent asthma without complication     Exocrine pancreatic insufficiency     Idiopathic 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     S/P hip replacement, left     Current chronic use of systemic steroids     H/O alcohol abuse     MRSA (methicillin resistant staph aureus) culture positive 8/2023     HSV 1 and 2      Avascular necrosis of bone of left hip (HCC)     Chronic left-sided low back pain with left-sided sciatica     Gout, arthritis     Gout, tophaceous     Idiopathic gout     Greater trochanteric bursitis of left hip     Lumbar spondylosis     Primary osteoarthritis of left hip     History of falling     Long term current use of aspirin

## 2024-07-24 DIAGNOSIS — D75.839 THROMBOCYTOSIS: Primary | ICD-10-CM

## 2024-07-26 DIAGNOSIS — T78.40XS ALLERGY, SEQUELA: ICD-10-CM

## 2024-07-26 DIAGNOSIS — E61.1 IRON DEFICIENCY: ICD-10-CM

## 2024-07-29 RX ORDER — LORATADINE 10 MG/1
TABLET ORAL
Qty: 30 TABLET | Refills: 10 | Status: SHIPPED | OUTPATIENT
Start: 2024-07-29

## 2024-07-29 RX ORDER — FERROUS SULFATE 325(65) MG
TABLET ORAL
Qty: 30 TABLET | Refills: 10 | Status: SHIPPED | OUTPATIENT
Start: 2024-07-29

## 2024-07-29 NOTE — TELEPHONE ENCOUNTER
..Request for   Requested Prescriptions     Pending Prescriptions Disp Refills    loratadine (CLARITIN) 10 MG tablet [Pharmacy Med Name: LORATADINE 10 MG TABLET 10 Tablet] 30 tablet 10     Sig: TAKE 1 TABLET BY MOUTH ONCE DAILY AS NEEDED FOR ALLERGIES    FEROSUL 325 (65 Fe) MG tablet [Pharmacy Med Name: FERROUS SULF 325MG TAB  (65 FE) Tablet] 30 tablet 10     Sig: TAKE 1 TABLET BY MOUTH DAILY WITH BREAKFAST    .      Please review and e-scribe to pharmacy listed in chart if appropriate. Thank you.      Last Visit Date: 4/2/2024  Next Visit Date: Visit date not found    Future Appointments   Date Time Provider Department Center   8/12/2024  3:15 PM Aldo Hdz MD [facility]       Health Maintenance   Topic Date Due    Hepatitis B vaccine (1 of 3 - 3-dose series) 1965    Pneumococcal 0-64 years Vaccine (2 of 2 - PCV) 05/09/2018    Shingles vaccine (2 of 2) 12/15/2021    COVID-19 Vaccine (6 - 2023-24 season) 09/01/2023    A1C test (Diabetic or Prediabetic)  05/09/2024    Lipids  05/09/2024    Breast cancer screen  05/09/2024    Flu vaccine (1) 08/01/2024    Depression Monitoring  04/01/2025    DTaP/Tdap/Td vaccine (3 - Td or Tdap) 04/14/2027    Colorectal Cancer Screen  12/03/2030    Hepatitis C screen  Completed    HIV screen  Completed    Hepatitis A vaccine  Aged Out    Hib vaccine  Aged Out    Polio vaccine  Aged Out    Meningococcal (ACWY) vaccine  Aged Out    Diabetic Alb to Cr ratio (uACR) test  Discontinued    Diabetic retinal exam  Discontinued    Diabetes screen  Discontinued    Cervical cancer screen  Discontinued       Hemoglobin A1C (%)   Date Value   05/09/2023 5.9   06/11/2021 5.5   03/21/2021 6.3 (H)             ( goal A1C is < 7)   No components found for: \"LABMICR\"  No components found for: \"LDLCHOLESTEROL\", \"LDLCALC\"    (goal LDL is <100)   AST (U/L)   Date Value   07/25/2022 42 (H)     ALT (U/L)   Date Value   07/25/2022 20     BUN (mg/dL)   Date Value   05/09/2023

## 2024-08-15 ENCOUNTER — HOSPITAL ENCOUNTER (OUTPATIENT)
Age: 59
Discharge: HOME | End: 2024-08-15
Payer: MEDICAID

## 2024-08-15 DIAGNOSIS — Z13.1 SCREENING FOR DIABETES MELLITUS: ICD-10-CM

## 2024-08-15 DIAGNOSIS — Z13.220 LIPID SCREENING: ICD-10-CM

## 2024-08-15 DIAGNOSIS — D75.839 THROMBOCYTOSIS: ICD-10-CM

## 2024-08-15 DIAGNOSIS — I10 ESSENTIAL HYPERTENSION: ICD-10-CM

## 2024-08-15 LAB
ANION GAP SERPL CALCULATED.3IONS-SCNC: 13 MMOL/L (ref 9–16)
BASOPHILS # BLD: 0.05 K/UL (ref 0–0.2)
BASOPHILS NFR BLD: 2 % (ref 0–2)
BUN SERPL-MCNC: 6 MG/DL (ref 6–20)
CALCIUM SERPL-MCNC: 9.6 MG/DL (ref 8.6–10.4)
CHLORIDE SERPL-SCNC: 103 MMOL/L (ref 98–107)
CHOLEST SERPL-MCNC: 300 MG/DL (ref 0–199)
CHOLESTEROL/HDL RATIO: 2
CO2 SERPL-SCNC: 24 MMOL/L (ref 20–31)
CREAT SERPL-MCNC: 0.8 MG/DL (ref 0.5–0.9)
EOSINOPHIL # BLD: 0.04 K/UL (ref 0–0.44)
EOSINOPHILS RELATIVE PERCENT: 1 % (ref 1–4)
ERYTHROCYTE [DISTWIDTH] IN BLOOD BY AUTOMATED COUNT: 14.1 % (ref 11.8–14.4)
ERYTHROCYTE [DISTWIDTH] IN BLOOD BY AUTOMATED COUNT: 14.2 % (ref 11.8–14.4)
EST. AVERAGE GLUCOSE BLD GHB EST-MCNC: 114 MG/DL
GFR, ESTIMATED: 80 ML/MIN/1.73M2
GLUCOSE SERPL-MCNC: 84 MG/DL (ref 74–99)
HBA1C MFR BLD: 5.6 % (ref 4–6)
HCT VFR BLD AUTO: 38.4 % (ref 36.3–47.1)
HCT VFR BLD AUTO: 38.8 % (ref 36.3–47.1)
HDLC SERPL-MCNC: 141 MG/DL
HGB BLD-MCNC: 13.1 G/DL (ref 11.9–15.1)
HGB BLD-MCNC: 13.1 G/DL (ref 11.9–15.1)
IMM GRANULOCYTES # BLD AUTO: <0.03 K/UL (ref 0–0.3)
IMM GRANULOCYTES NFR BLD: 0 %
LDLC SERPL CALC-MCNC: 148 MG/DL (ref 0–100)
LYMPHOCYTES NFR BLD: 1.13 K/UL (ref 1.1–3.7)
LYMPHOCYTES RELATIVE PERCENT: 39 % (ref 24–43)
MCH RBC QN AUTO: 31.4 PG (ref 25.2–33.5)
MCH RBC QN AUTO: 31.8 PG (ref 25.2–33.5)
MCHC RBC AUTO-ENTMCNC: 33.8 G/DL (ref 28.4–34.8)
MCHC RBC AUTO-ENTMCNC: 34.1 G/DL (ref 28.4–34.8)
MCV RBC AUTO: 93 FL (ref 82.6–102.9)
MCV RBC AUTO: 93.2 FL (ref 82.6–102.9)
MONOCYTES NFR BLD: 0.46 K/UL (ref 0.1–1.2)
MONOCYTES NFR BLD: 16 % (ref 3–12)
NEUTROPHILS NFR BLD: 42 % (ref 36–65)
NEUTS SEG NFR BLD: 1.21 K/UL (ref 1.5–8.1)
NRBC BLD-RTO: 0 PER 100 WBC
NRBC BLD-RTO: 0 PER 100 WBC
PLATELET # BLD AUTO: 283 K/UL (ref 138–453)
PLATELET # BLD AUTO: 289 K/UL (ref 138–453)
PMV BLD AUTO: 9.5 FL (ref 8.1–13.5)
PMV BLD AUTO: 9.6 FL (ref 8.1–13.5)
POTASSIUM SERPL-SCNC: 3.8 MMOL/L (ref 3.7–5.3)
RBC # BLD AUTO: 4.12 M/UL (ref 3.95–5.11)
RBC # BLD AUTO: 4.17 M/UL (ref 3.95–5.11)
SODIUM SERPL-SCNC: 140 MMOL/L (ref 136–145)
TRIGL SERPL-MCNC: 57 MG/DL
VLDLC SERPL CALC-MCNC: 11 MG/DL
WBC OTHER # BLD: 2.8 K/UL (ref 3.5–11.3)
WBC OTHER # BLD: 2.9 K/UL (ref 3.5–11.3)

## 2024-08-15 PROCEDURE — 85025 COMPLETE CBC W/AUTO DIFF WBC: CPT

## 2024-08-15 PROCEDURE — 36415 COLL VENOUS BLD VENIPUNCTURE: CPT

## 2024-08-15 PROCEDURE — 80061 LIPID PANEL: CPT

## 2024-08-15 PROCEDURE — 83036 HEMOGLOBIN GLYCOSYLATED A1C: CPT

## 2024-08-15 PROCEDURE — 80048 BASIC METABOLIC PNL TOTAL CA: CPT

## 2024-08-15 PROCEDURE — 85027 COMPLETE CBC AUTOMATED: CPT

## 2024-09-06 DIAGNOSIS — D75.839 THROMBOCYTOSIS: Primary | ICD-10-CM

## 2024-09-09 ENCOUNTER — TELEPHONE (OUTPATIENT)
Dept: ONCOLOGY | Age: 59
End: 2024-09-09

## 2024-09-09 ENCOUNTER — OFFICE VISIT (OUTPATIENT)
Dept: ONCOLOGY | Age: 59
End: 2024-09-09
Payer: MEDICAID

## 2024-09-09 VITALS
BODY MASS INDEX: 27.32 KG/M2 | HEART RATE: 74 BPM | RESPIRATION RATE: 16 BRPM | WEIGHT: 164.2 LBS | TEMPERATURE: 96.4 F | SYSTOLIC BLOOD PRESSURE: 122 MMHG | DIASTOLIC BLOOD PRESSURE: 84 MMHG

## 2024-09-09 DIAGNOSIS — K86.0 ALCOHOL-INDUCED CHRONIC PANCREATITIS (HCC): ICD-10-CM

## 2024-09-09 DIAGNOSIS — D75.839 THROMBOCYTOSIS: Primary | ICD-10-CM

## 2024-09-09 PROCEDURE — G8427 DOCREV CUR MEDS BY ELIG CLIN: HCPCS | Performed by: INTERNAL MEDICINE

## 2024-09-09 PROCEDURE — 3074F SYST BP LT 130 MM HG: CPT | Performed by: INTERNAL MEDICINE

## 2024-09-09 PROCEDURE — 99211 OFF/OP EST MAY X REQ PHY/QHP: CPT

## 2024-09-09 PROCEDURE — G8417 CALC BMI ABV UP PARAM F/U: HCPCS | Performed by: INTERNAL MEDICINE

## 2024-09-09 PROCEDURE — 3079F DIAST BP 80-89 MM HG: CPT | Performed by: INTERNAL MEDICINE

## 2024-09-09 PROCEDURE — 1036F TOBACCO NON-USER: CPT | Performed by: INTERNAL MEDICINE

## 2024-09-09 PROCEDURE — 99214 OFFICE O/P EST MOD 30 MIN: CPT | Performed by: INTERNAL MEDICINE

## 2024-09-09 PROCEDURE — 3017F COLORECTAL CA SCREEN DOC REV: CPT | Performed by: INTERNAL MEDICINE

## 2024-09-25 ENCOUNTER — OFFICE VISIT (OUTPATIENT)
Dept: INTERNAL MEDICINE | Age: 59
End: 2024-09-25
Payer: MEDICAID

## 2024-09-25 VITALS
DIASTOLIC BLOOD PRESSURE: 75 MMHG | WEIGHT: 162.4 LBS | BODY MASS INDEX: 27.06 KG/M2 | HEART RATE: 63 BPM | SYSTOLIC BLOOD PRESSURE: 125 MMHG | HEIGHT: 65 IN | OXYGEN SATURATION: 97 % | TEMPERATURE: 97.3 F

## 2024-09-25 DIAGNOSIS — M1A.09X1 IDIOPATHIC CHRONIC GOUT OF MULTIPLE SITES WITH TOPHUS: ICD-10-CM

## 2024-09-25 DIAGNOSIS — Z23 NEED FOR VACCINATION: ICD-10-CM

## 2024-09-25 DIAGNOSIS — M10.9 ACUTE GOUT OF RIGHT HAND, UNSPECIFIED CAUSE: Primary | ICD-10-CM

## 2024-09-25 DIAGNOSIS — Z12.31 BREAST CANCER SCREENING BY MAMMOGRAM: ICD-10-CM

## 2024-09-25 DIAGNOSIS — I10 ESSENTIAL HYPERTENSION: ICD-10-CM

## 2024-09-25 PROCEDURE — 99214 OFFICE O/P EST MOD 30 MIN: CPT

## 2024-09-25 PROCEDURE — 3017F COLORECTAL CA SCREEN DOC REV: CPT

## 2024-09-25 PROCEDURE — 3074F SYST BP LT 130 MM HG: CPT

## 2024-09-25 PROCEDURE — 99213 OFFICE O/P EST LOW 20 MIN: CPT

## 2024-09-25 PROCEDURE — G8417 CALC BMI ABV UP PARAM F/U: HCPCS

## 2024-09-25 PROCEDURE — 90656 IIV3 VACC NO PRSV 0.5 ML IM: CPT

## 2024-09-25 PROCEDURE — G8427 DOCREV CUR MEDS BY ELIG CLIN: HCPCS

## 2024-09-25 PROCEDURE — 3078F DIAST BP <80 MM HG: CPT

## 2024-09-25 PROCEDURE — 1036F TOBACCO NON-USER: CPT

## 2024-09-25 RX ORDER — FOLIC ACID 1 MG/1
1 TABLET ORAL DAILY
Qty: 30 TABLET | Refills: 3 | Status: SHIPPED | OUTPATIENT
Start: 2024-09-25

## 2024-09-25 RX ORDER — COLCHICINE 0.6 MG/1
0.6 TABLET ORAL 2 TIMES DAILY
Qty: 30 TABLET | Refills: 1 | Status: SHIPPED | OUTPATIENT
Start: 2024-09-25 | End: 2024-09-26 | Stop reason: CLARIF

## 2024-09-25 RX ORDER — IBUPROFEN 800 MG/1
800 TABLET, FILM COATED ORAL EVERY 8 HOURS PRN
Qty: 60 TABLET | Refills: 0 | Status: CANCELLED | OUTPATIENT
Start: 2024-09-25

## 2024-09-25 NOTE — PATIENT INSTRUCTIONS
Please follow up with your rheumatologist and hematologist- Dr Kemar Norris for prednisone, You might not need it anymore.

## 2024-09-25 NOTE — PROGRESS NOTES
Patient here with swelling in her right hand which is thought to be a gout flare.  She sees rheumatologist.  Her last uric acid level was a little high at 7.9.  She sees a rheumatologist.  She is on allopurinol 100 mg once a day.  She has chronic gout with tophi in her elbows.  She has been taking ibuprofen.  She states she has been compliant with allopurinol for the last 2 months but prior to that was not very compliant.  She has a history of alcoholism but no longer drinks.   She has been on chronic steroids at 10 mg prednisone daily since at least 2019 that I can see.  She was initially thought to have seronegative rheumatoid arthritis and was started on this medication and has been continuing to get it from hematology for the last 2 years.  Rheumatology is not prescribing it and it appears rheumatoid arthritis was ruled out.  Patient advised she needs to talk to rheumatology regarding her medications.  Prednisone will need to be tapered off.  Allopurinol will need to be increased.  For now we will treat her flareup with colchicine for 2 weeks.  Advised to stay compliant with allopurinol and avoid alcohol.  Attending Physician Statement  I have discussed the care of Delphine Flanagan, including pertinent history and exam findings,  with the resident. I have reviewed the key elements of all parts of the encounter with the resident.  I agree with the assessment, plan and orders as documented by the resident.  (GE Modifier)   
cause  Idiopathic chronic gout of multiple sites with jennifer  Advised patient to follow-up with rheumatologist for chronic gout management, currently we will give her colchicine for 2 weeks and advised her to continue taking all her medications as prescribed.  Strongly advised her to follow-up with oncologist and rheumatologist for chronic prednisone and see if it can be tapered off  -     colchicine (COLCRYS) 0.6 MG tablet; Take 1 tablet by mouth 2 times daily    Essential hypertension  Currently well-controlled.  Continue taking atenolol 50 mg and amlodipine 10 mg      Need for vaccination  -     IMMUNIZ ADMIN,1 SINGLE/COMB VAC/TOXOID    Breast cancer screening by mammogram  -     [facility] MALCOM DIGITAL SCREEN BILATERAL; Future    Other orders  -     folic acid (FOLVITE) 1 MG tablet; Take 1 tablet by mouth daily  -     Influenza, AFLURIA Trivalent, (age 3 y+), IM, Preservative Free, 0.5mL          FOLLOW UP AND INSTRUCTIONS:  Return in about 3 months (around 12/25/2024).    Delphine received counseling on the following healthy behaviors: nutrition, exercise, and medication adherence    Discussed use, benefit, and side effects of prescribed medications.  Barriers to medication compliance addressed.  All patient questions answered.  Pt voiced understanding.    Patient given educational materials - see patient instructions    Joey Hancock MD  Internal Medicine Resident, PGY- 3  [facility]; [place], [place]  9/25/2024, 5:02 PM      This note is created with the assistance of a speech-recognition program. While intending to generate a document that actually reflects the content of thevisit, the document can still have some mistakes which may not have been identified and corrected by editing.

## 2024-09-26 DIAGNOSIS — M10.9 GOUT, ARTHRITIS: Primary | ICD-10-CM

## 2024-09-26 RX ORDER — ALLOPURINOL 300 MG/1
300 TABLET ORAL DAILY
Qty: 90 TABLET | Refills: 1 | Status: CANCELLED | OUTPATIENT
Start: 2024-09-26

## 2024-09-26 NOTE — TELEPHONE ENCOUNTER
Received request for PA in/on [employer] for colchicine.    PA completed via Epic and sent to insurance, medication denied:   Coverage is provided when the member has had a 30-day trial (at maximally tolerated doses) of a preferred xanthine oxidase inhibitor which includes: allopurinol 300 mg.      Script for allopurinol 300 mg tabs once daily pended, please review and e-scribe. Pt must try this medication for minimum of 30-days with no improvement to qualify for colchicine.

## 2024-09-27 RX ORDER — IBUPROFEN 600 MG/1
600 TABLET, FILM COATED ORAL
Qty: 21 TABLET | Refills: 0 | Status: SHIPPED | OUTPATIENT
Start: 2024-09-27 | End: 2024-10-04

## 2024-09-27 NOTE — TELEPHONE ENCOUNTER
Patient has gout flare up and it will need to be controlled before adjustment in allopurinol dosage. Please advice patient to motrin 400 mg 3 times a day with meals for now and call our office if that does not help with the symptoms. Also please advice patient to keep herself hydrated while she is taking motrin.

## 2024-09-30 NOTE — TELEPHONE ENCOUNTER
PC to pt to advise of ibuprofen being sent in, pt has obtained medication and will call office if flare does not resolve.

## 2024-10-23 ENCOUNTER — HOSPITAL ENCOUNTER (OUTPATIENT)
Dept: MAMMOGRAPHY | Age: 59
Discharge: HOME | End: 2024-10-25
Payer: MEDICAID

## 2024-10-23 DIAGNOSIS — R92.8 ABNORMALITY OF RIGHT BREAST ON SCREENING MAMMOGRAM: Primary | ICD-10-CM

## 2024-10-23 DIAGNOSIS — Z12.31 BREAST CANCER SCREENING BY MAMMOGRAM: ICD-10-CM

## 2024-10-23 PROCEDURE — 77063 BREAST TOMOSYNTHESIS BI: CPT

## 2024-11-04 NOTE — TELEPHONE ENCOUNTER
Writer called patient. No answer. LVM informing patient that due to COVID precautions, he will not be performing the procedure tomorrow morning. However, his partner, , will be doing it. Patient was given the option to reschedule if she is not comfortable with this and advised to call the office with any questions. steady

## 2024-11-25 ENCOUNTER — HOSPITAL ENCOUNTER (OUTPATIENT)
Dept: MAMMOGRAPHY | Age: 59
Discharge: HOME | End: 2024-11-27
Attending: INTERNAL MEDICINE
Payer: MEDICAID

## 2024-11-25 ENCOUNTER — HOSPITAL ENCOUNTER (OUTPATIENT)
Dept: ULTRASOUND IMAGING | Age: 59
Discharge: HOME | End: 2024-11-27
Attending: INTERNAL MEDICINE
Payer: MEDICAID

## 2024-11-25 DIAGNOSIS — R92.8 ABNORMALITY OF RIGHT BREAST ON SCREENING MAMMOGRAM: ICD-10-CM

## 2024-11-25 PROCEDURE — G0279 TOMOSYNTHESIS, MAMMO: HCPCS

## 2024-11-25 PROCEDURE — 76642 ULTRASOUND BREAST LIMITED: CPT

## 2024-12-03 DIAGNOSIS — M10.9 GOUT, ARTHRITIS: ICD-10-CM

## 2024-12-03 NOTE — TELEPHONE ENCOUNTER
Delphine Flanagan is calling to request a refill on the following medication(s):    Medication Request:  Requested Prescriptions     Pending Prescriptions Disp Refills    ibuprofen (ADVIL;MOTRIN) 600 MG tablet [Pharmacy Med Name: IBUPROFEN 600MG TABLET] 21 tablet 0     Sig: TAKE 1 TABLET BY MOUTH 3 TIMES DAILY (WITH MEALS) FOR 7 DAYS       Last Visit Date (If Applicable):  9/25/2024    Next Visit Date:    12/18/2024

## 2024-12-04 RX ORDER — IBUPROFEN 600 MG/1
600 TABLET, FILM COATED ORAL
Qty: 21 TABLET | Refills: 0 | Status: SHIPPED | OUTPATIENT
Start: 2024-12-04 | End: 2024-12-11

## 2024-12-18 ENCOUNTER — OFFICE VISIT (OUTPATIENT)
Dept: INTERNAL MEDICINE | Age: 59
End: 2024-12-18

## 2024-12-18 VITALS
WEIGHT: 174.6 LBS | OXYGEN SATURATION: 98 % | HEIGHT: 65 IN | BODY MASS INDEX: 29.09 KG/M2 | TEMPERATURE: 98.1 F | SYSTOLIC BLOOD PRESSURE: 116 MMHG | HEART RATE: 66 BPM | DIASTOLIC BLOOD PRESSURE: 82 MMHG

## 2024-12-18 DIAGNOSIS — E78.5 DYSLIPIDEMIA: ICD-10-CM

## 2024-12-18 DIAGNOSIS — I10 ESSENTIAL HYPERTENSION: Primary | ICD-10-CM

## 2024-12-18 DIAGNOSIS — J45.20 MILD INTERMITTENT ASTHMA WITHOUT COMPLICATION: ICD-10-CM

## 2024-12-18 DIAGNOSIS — K86.81 EXOCRINE PANCREATIC INSUFFICIENCY: Chronic | ICD-10-CM

## 2024-12-18 DIAGNOSIS — M10.9 GOUT, ARTHRITIS: ICD-10-CM

## 2024-12-18 DIAGNOSIS — E78.5 HYPERLIPIDEMIA, UNSPECIFIED HYPERLIPIDEMIA TYPE: ICD-10-CM

## 2024-12-18 RX ORDER — ATORVASTATIN CALCIUM 40 MG/1
40 TABLET, FILM COATED ORAL DAILY
Qty: 30 TABLET | Refills: 0 | Status: SHIPPED | OUTPATIENT
Start: 2024-12-18

## 2024-12-18 RX ORDER — IBUPROFEN 400 MG/1
400 TABLET, FILM COATED ORAL
Qty: 21 TABLET | Refills: 0 | Status: SHIPPED | OUTPATIENT
Start: 2024-12-18 | End: 2024-12-25

## 2024-12-18 ASSESSMENT — ENCOUNTER SYMPTOMS
ANAL BLEEDING: 0
CHEST TIGHTNESS: 0
SHORTNESS OF BREATH: 0
COUGH: 0
CHOKING: 0

## 2024-12-18 NOTE — PROGRESS NOTES
Attending Physician Statement  I have discussed the care of Delphine Flanagan, including pertinent history and exam findings with the resident. I have reviewed the key elements of all parts of the encounter with the resident. I agree with the assessment, and status of the problem list as documented. The plan and orders should include No orders of the defined types were placed in this encounter.   and this was also documented by the resident.  The medication list was reviewed with the resident and is up to date. The return visit should be in 3 months .    Faizan Juarez MD  Attending Physician,  Department of Internal Medicine  [facility] Internal Medicine  [facility]      12/18/2024, 3:39 PM     Bill For Surgical Tray: no Size Of Lesion In Cm: 1.1 Anesthesia Type: 1% lidocaine with epinephrine and a 1:10 solution of 8.4% sodium bicarbonate Render Post-Care Instructions In Note?: yes Anesthesia Volume In Cc: 2 Size Of Lesion After Curettage: 1.4 Consent was obtained from the patient. The risks, benefits and alternatives to therapy were discussed in detail. Specifically, the risks of infection, scarring, bleeding, prolonged wound healing, nerve injury, incomplete removal, allergy to anesthesia and recurrence were addressed. Alternatives to ED&C, such as: surgical removal were also discussed.  Prior to the procedure, the treatment site was clearly identified and confirmed by the patient. All components of Universal Protocol/PAUSE Rule completed. Bill As A Line Item Or As Units: Line Item Cautery Type: cryotherapy Post-Care Instructions: I reviewed with the patient in detail post-care instructions. Patient is to keep the area dry for 48 hours, and not to engage in any swimming until the area is healed. Should the patient develop any fevers, chills, bleeding, severe pain patient will contact the office immediately. Additional Information: (Optional): The wound was cleaned, and a pressure dressing was applied.  The patient received detailed post-op instructions. What Was Performed First?: Curettage Detail Level: Detailed Number Of Curettages: 3

## 2024-12-18 NOTE — PROGRESS NOTES
[facility]  [facility]  [address] [place] [address]  Dept: [phone]  Dept Fax: [phone]    Office Progress/Follow Up Note  Date ofpatient's visit: 12/18/2024  Patient's Name:  eDlphine Flanagan YOB: 1965            Patient Care Team:  Joey Hancock MD as PCP - General (Internal Medicine)  Tina Padilla MD as Consulting Physician (Gastroenterology)  ================================================================    REASON FOR VISIT/CHIEF COMPLAINT:  Hypertension and Employment Physical (Pt working at [employer])    HISTORY OF PRESENTING ILLNESS:  History was obtained from: patient, electronic medical record. Delphine Allen a 59 y.o. with past medical history of hypertension, depression, chronic gout, chronic pancreatitis secondary to alcohol abuse, osteoarthritis-s/p left hip replacement on 7/23 is here for a regular follow-up and has no complaints and has been feeling well overall.  On the last visit patient was having gout which she feels is under control now and she has been following up with rheumatologist and takes allopurinol 100 mg twice daily.  Prednisone has been stopped  Patient's LDL was 146 in 8/24.  Patient states that she has not been getting her Lipitor and it was dispensed last in December last year.   Had a DEXA scan done in 8/23 which was normal  Also patient states she was a heavy drinker in the past and has recently stopped drinking and she has been getting naltrexone through [facility].  Has a history of hypertension and takes Norvasc 10 mg and atenolol 50 mg and blood pressure has been well-controlled  Has been taking her Creon for chronic pancreatitis and currently denies any symptoms.  Occasionally uses loperamide  Feels asthma is well-controlled.  Uses albuterol as needed  Currently interested in taking vaccines.  States she will get it from [employer].    She had a mammogram done in 5/23 which was unremarkable   A1c

## 2025-01-27 RX ORDER — ALBUTEROL SULFATE 90 UG/1
2 AEROSOL, METERED RESPIRATORY (INHALATION) EVERY 6 HOURS PRN
Qty: 18 G | Refills: 10 | Status: SHIPPED | OUTPATIENT
Start: 2025-01-27

## 2025-01-27 NOTE — TELEPHONE ENCOUNTER
Last visit: 12/18/24  Last Med refill: 2/28/24  Does patient have enough medication for 72 hours: No:     Next Visit Date: 3/19/25  Future Appointments   Date Time Provider Department Center   3/19/2025  1:00 PM Joey Hancock MD [facility]       Health Maintenance   Topic Date Due    Hepatitis B vaccine (1 of 3 - 19+ 3-dose series) 01/22/1985    Pneumococcal 0-64 years Vaccine (2 of 2 - PCV) 05/09/2018    Shingles vaccine (2 of 2) 12/15/2021    COVID-19 Vaccine (6 - 2023-24 season) 09/01/2024    Depression Monitoring  04/01/2025    Lipids  08/15/2025    Breast cancer screen  11/25/2025    DTaP/Tdap/Td vaccine (3 - Td or Tdap) 04/14/2027    Colorectal Cancer Screen  12/03/2030    Flu vaccine  Completed    Hepatitis C screen  Completed    HIV screen  Completed    Hepatitis A vaccine  Aged Out    Hib vaccine  Aged Out    Polio vaccine  Aged Out    Meningococcal (ACWY) vaccine  Aged Out    A1C test (Diabetic or Prediabetic)  Discontinued    Diabetic Alb to Cr ratio (uACR) test  Discontinued    Diabetic retinal exam  Discontinued    Diabetes screen  Discontinued    Cervical cancer screen  Discontinued       Hemoglobin A1C (%)   Date Value   08/15/2024 5.6   05/09/2023 5.9   06/11/2021 5.5             ( goal A1C is < 7)   No components found for: \"LABMICR\"  No components found for: \"LDLCHOLESTEROL\", \"LDLCALC\"    (goal LDL is <100)   AST (U/L)   Date Value   07/25/2022 42 (H)     ALT (U/L)   Date Value   07/25/2022 20     BUN (mg/dL)   Date Value   08/15/2024 6     BP Readings from Last 3 Encounters:   12/18/24 116/82   09/25/24 125/75   09/09/24 122/84          (goal 120/80)    All Future Testing planned in [facility]  Lab Frequency Next Occurrence   CBC with Auto Differential Once 09/06/2024               Patient Active Problem List:     Essential hypertension     Dyslipidemia     Bipolar disorder (HCC)     Mild intermittent asthma without complication     Exocrine pancreatic insufficiency     Idiopathic

## 2025-02-05 ENCOUNTER — OFFICE VISIT (OUTPATIENT)
Dept: OBGYN CLINIC | Age: 60
End: 2025-02-05
Payer: MEDICAID

## 2025-02-05 ENCOUNTER — HOSPITAL ENCOUNTER (OUTPATIENT)
Age: 60
Setting detail: SPECIMEN
Discharge: HOME | End: 2025-02-05

## 2025-02-05 VITALS
WEIGHT: 172 LBS | DIASTOLIC BLOOD PRESSURE: 82 MMHG | HEIGHT: 65 IN | BODY MASS INDEX: 28.66 KG/M2 | SYSTOLIC BLOOD PRESSURE: 146 MMHG

## 2025-02-05 DIAGNOSIS — B37.9 CANDIDA INFECTION: ICD-10-CM

## 2025-02-05 DIAGNOSIS — N76.0 ACUTE VAGINITIS: ICD-10-CM

## 2025-02-05 DIAGNOSIS — Z11.51 SPECIAL SCREENING EXAMINATION FOR HUMAN PAPILLOMAVIRUS (HPV): ICD-10-CM

## 2025-02-05 DIAGNOSIS — Z01.419 WELL WOMAN EXAM WITH ROUTINE GYNECOLOGICAL EXAM: Primary | ICD-10-CM

## 2025-02-05 DIAGNOSIS — Z12.31 ENCOUNTER FOR SCREENING MAMMOGRAM FOR MALIGNANT NEOPLASM OF BREAST: ICD-10-CM

## 2025-02-05 PROCEDURE — G8427 DOCREV CUR MEDS BY ELIG CLIN: HCPCS | Performed by: NURSE PRACTITIONER

## 2025-02-05 PROCEDURE — 99396 PREV VISIT EST AGE 40-64: CPT | Performed by: NURSE PRACTITIONER

## 2025-02-05 PROCEDURE — 1036F TOBACCO NON-USER: CPT | Performed by: NURSE PRACTITIONER

## 2025-02-05 PROCEDURE — G8417 CALC BMI ABV UP PARAM F/U: HCPCS | Performed by: NURSE PRACTITIONER

## 2025-02-05 PROCEDURE — 3077F SYST BP >= 140 MM HG: CPT | Performed by: NURSE PRACTITIONER

## 2025-02-05 PROCEDURE — 3017F COLORECTAL CA SCREEN DOC REV: CPT | Performed by: NURSE PRACTITIONER

## 2025-02-05 PROCEDURE — 99213 OFFICE O/P EST LOW 20 MIN: CPT | Performed by: NURSE PRACTITIONER

## 2025-02-05 PROCEDURE — 3079F DIAST BP 80-89 MM HG: CPT | Performed by: NURSE PRACTITIONER

## 2025-02-05 RX ORDER — FLUCONAZOLE 150 MG/1
150 TABLET ORAL
Qty: 2 TABLET | Refills: 2 | Status: SHIPPED | OUTPATIENT
Start: 2025-02-05

## 2025-02-05 RX ORDER — NALTREXONE HYDROCHLORIDE 50 MG/1
TABLET, FILM COATED ORAL
COMMUNITY
Start: 2024-12-03

## 2025-02-05 RX ORDER — CLOTRIMAZOLE AND BETAMETHASONE DIPROPIONATE 10; .64 MG/G; MG/G
CREAM TOPICAL
Qty: 45 G | Refills: 1 | Status: SHIPPED | OUTPATIENT
Start: 2025-02-05

## 2025-02-05 RX ORDER — ALLOPURINOL 300 MG/1
300 TABLET ORAL DAILY
COMMUNITY
Start: 2025-01-08

## 2025-02-05 RX ORDER — COLCHICINE 0.6 MG/1
TABLET ORAL
COMMUNITY
Start: 2025-01-08

## 2025-02-05 RX ORDER — NYSTATIN 100000 [USP'U]/G
POWDER TOPICAL
Qty: 1 EACH | Refills: 1 | Status: SHIPPED | OUTPATIENT
Start: 2025-02-05

## 2025-02-05 NOTE — PROGRESS NOTES
Throat inspected-No exudates or Masses, Nares Patent No Masses        Respiratory:  The lungs were auscultated and found to be clear. There were no rales, rhonchi or wheezes. There was a good respiratory effort.    Cardiovascular:  The heart was in a regular rate and rhythm. . No S3 or S4. There was no murmur appreciated. Location, grade, and radiation are not applicable.     Extremities:  The patients extremities were without calf tenderness, edema, or varicosities.  There was full range of motion in all four extremities. Pulses in all four extremities were appreciated and are 2/4.    Abdomen:  The abdomen was soft and non-tender. There were good bowel sounds in all quadrants and there was no guarding, rebound or rigidity.  On evaluation there was no evidence of hepatosplenomegaly and there was no costal vertebral nomi tenderness bilaterally.  No hernias were appreciated.     Abdominal Scars: C/w previous surgery    Psych:  The patient had a normal Orientation to: Time, Place, Person, and Situation  There is no Mood / Affect changes    Breast:  (Chest)  normal appearance, no masses or tenderness, No nipple retraction or dimpling, No nipple discharge or bleeding, No axillary or supraclavicular adenopathy, Normal to palpation without dominant masses  Self breast exams were reviewed in detail. Literature was given.    Pelvic Exam:  External genitalia: normal general appearance. Slight erythema noted to left groin area. No open sores, lesions noted.  Urinary system: urethral meatus normal Bladder is smooth NT   Vaginal: normal mucosa without prolapse or lesions, moderate amount of thick white vaginal discharge noted.  Cervix: normal appearance  Adnexa: non palpable  Uterus: absent    Rectal Exam:  exam declined by patient           Musculosk:  Normal Gait and station was noted.  Digits were evaluated without abnormal findings.  Range of motion, stability and strength were evaluated and found to be appropriate for

## 2025-02-07 DIAGNOSIS — E78.5 HYPERLIPIDEMIA, UNSPECIFIED HYPERLIPIDEMIA TYPE: ICD-10-CM

## 2025-02-07 DIAGNOSIS — M10.9 GOUT, ARTHRITIS: ICD-10-CM

## 2025-02-07 NOTE — TELEPHONE ENCOUNTER
Delphine Flanagan is calling to request a refill on the following medication(s):    Medication Request:  Requested Prescriptions     Pending Prescriptions Disp Refills    ibuprofen (ADVIL;MOTRIN) 400 MG tablet [Pharmacy Med Name: IBUPROFEN 400MG TABLET] 21 tablet 11     Sig: TAKE 1 TABLET BY MOUTH 3 TIMES DAILY (WITH MEALS) FOR 7 DAYS    atorvastatin (LIPITOR) 40 MG tablet [Pharmacy Med Name: ATORVASTATIN CALCIUM 40MG TABLET] 30 tablet 11     Sig: TAKE 1 TABLET BY MOUTH DAILY       Last Visit Date (If Applicable):  12/18/2024    Next Visit Date:    3/19/2025

## 2025-02-08 LAB
MICROORGANISM SPEC CULT: NORMAL
SERVICE CMNT-IMP: NORMAL
SPECIMEN DESCRIPTION: NORMAL

## 2025-02-08 RX ORDER — ATORVASTATIN CALCIUM 40 MG/1
40 TABLET, FILM COATED ORAL DAILY
Qty: 30 TABLET | Refills: 11 | Status: SHIPPED | OUTPATIENT
Start: 2025-02-08

## 2025-02-08 RX ORDER — IBUPROFEN 400 MG/1
400 TABLET, FILM COATED ORAL
Qty: 21 TABLET | Refills: 0 | Status: SHIPPED | OUTPATIENT
Start: 2025-02-08 | End: 2025-02-15

## 2025-02-12 LAB — CYTOLOGY REPORT: NORMAL

## 2025-03-11 ENCOUNTER — TELEPHONE (OUTPATIENT)
Dept: OBGYN CLINIC | Age: 60
End: 2025-03-11

## 2025-03-11 DIAGNOSIS — N76.0 ACUTE VAGINITIS: ICD-10-CM

## 2025-03-11 RX ORDER — FLUCONAZOLE 150 MG/1
150 TABLET ORAL
Qty: 2 TABLET | Refills: 2 | Status: SHIPPED | OUTPATIENT
Start: 2025-03-11

## 2025-03-14 DIAGNOSIS — I10 ESSENTIAL HYPERTENSION: ICD-10-CM

## 2025-03-14 NOTE — TELEPHONE ENCOUNTER
Last visit: 12/18/24   Last Med refill:   Does patient have enough medication for 72 hours: No:     Next Visit Date:  Future Appointments   Date Time Provider Department Center   3/19/2025  1:00 PM Joey Hancock MD [facility] DEP   2/9/2026  8:00 AM Patsy Fuentes APRN - CNP [facility]       Health Maintenance   Topic Date Due    Hepatitis B vaccine (1 of 3 - 19+ 3-dose series) 01/22/1985    Pneumococcal 50+ years Vaccine (2 of 2 - PCV) 05/09/2018    Shingles vaccine (2 of 2) 12/15/2021    COVID-19 Vaccine (6 - 2024-25 season) 09/01/2024    Depression Monitoring  04/01/2025    Lipids  08/15/2025    Breast cancer screen  11/25/2025    DTaP/Tdap/Td vaccine (3 - Td or Tdap) 04/14/2027    Colorectal Cancer Screen  12/03/2030    Flu vaccine  Completed    Hepatitis C screen  Completed    HIV screen  Completed    Hepatitis A vaccine  Aged Out    Hib vaccine  Aged Out    Polio vaccine  Aged Out    Meningococcal (ACWY) vaccine  Aged Out    Meningococcal B vaccine  Aged Out    A1C test (Diabetic or Prediabetic)  Discontinued    Diabetic Alb to Cr ratio (uACR) test  Discontinued    Diabetic retinal exam  Discontinued    Pneumococcal 0-49 years Vaccine  Discontinued    Diabetes screen  Discontinued    Cervical cancer screen  Discontinued       Hemoglobin A1C (%)   Date Value   08/15/2024 5.6   05/09/2023 5.9   06/11/2021 5.5             ( goal A1C is < 7)   No components found for: \"LABMICR\"  No components found for: \"LDLCHOLESTEROL\", \"LDLCALC\"    (goal LDL is <100)   AST (U/L)   Date Value   07/25/2022 42 (H)     ALT (U/L)   Date Value   07/25/2022 20     BUN (mg/dL)   Date Value   08/15/2024 6     BP Readings from Last 3 Encounters:   02/05/25 (!) 146/82   12/18/24 116/82   09/25/24 125/75          (goal 120/80)    All Future Testing planned in [facility]  Lab Frequency Next Occurrence   CBC with Auto Differential Once 09/06/2024   CARI MALCOM DIGITAL SCREEN BILATERAL Once 02/05/2025   PAP SMEAR Once

## 2025-03-15 RX ORDER — AMLODIPINE BESYLATE 10 MG/1
10 TABLET ORAL DAILY
Qty: 30 TABLET | Refills: 10 | Status: SHIPPED | OUTPATIENT
Start: 2025-03-15

## 2025-03-19 ENCOUNTER — OFFICE VISIT (OUTPATIENT)
Dept: INTERNAL MEDICINE | Age: 60
End: 2025-03-19
Payer: MEDICAID

## 2025-03-19 VITALS
DIASTOLIC BLOOD PRESSURE: 84 MMHG | BODY MASS INDEX: 29.56 KG/M2 | WEIGHT: 177.4 LBS | TEMPERATURE: 97.9 F | HEART RATE: 61 BPM | SYSTOLIC BLOOD PRESSURE: 133 MMHG | OXYGEN SATURATION: 99 % | HEIGHT: 65 IN

## 2025-03-19 DIAGNOSIS — E78.5 DYSLIPIDEMIA: ICD-10-CM

## 2025-03-19 DIAGNOSIS — R20.0 BILATERAL FINGER NUMBNESS: Primary | ICD-10-CM

## 2025-03-19 DIAGNOSIS — M1A.9XX1 GOUT, TOPHACEOUS: ICD-10-CM

## 2025-03-19 DIAGNOSIS — J45.20 MILD INTERMITTENT ASTHMA WITHOUT COMPLICATION: ICD-10-CM

## 2025-03-19 DIAGNOSIS — I10 ESSENTIAL HYPERTENSION: ICD-10-CM

## 2025-03-19 DIAGNOSIS — Z02.1 PRE-EMPLOYMENT HEALTH SCREENING EXAMINATION: ICD-10-CM

## 2025-03-19 DIAGNOSIS — L84 CALLUS OF FOOT: ICD-10-CM

## 2025-03-19 DIAGNOSIS — M10.9 GOUT, ARTHRITIS: ICD-10-CM

## 2025-03-19 PROCEDURE — G8427 DOCREV CUR MEDS BY ELIG CLIN: HCPCS

## 2025-03-19 PROCEDURE — G8417 CALC BMI ABV UP PARAM F/U: HCPCS

## 2025-03-19 PROCEDURE — 1036F TOBACCO NON-USER: CPT

## 2025-03-19 PROCEDURE — 3079F DIAST BP 80-89 MM HG: CPT

## 2025-03-19 PROCEDURE — 3017F COLORECTAL CA SCREEN DOC REV: CPT

## 2025-03-19 PROCEDURE — 3075F SYST BP GE 130 - 139MM HG: CPT

## 2025-03-19 PROCEDURE — 99213 OFFICE O/P EST LOW 20 MIN: CPT

## 2025-03-19 RX ORDER — IBUPROFEN 400 MG/1
400 TABLET, FILM COATED ORAL 2 TIMES DAILY PRN
Qty: 20 TABLET | Refills: 0 | Status: SHIPPED | OUTPATIENT
Start: 2025-03-19 | End: 2025-03-29

## 2025-03-19 SDOH — ECONOMIC STABILITY: FOOD INSECURITY: WITHIN THE PAST 12 MONTHS, YOU WORRIED THAT YOUR FOOD WOULD RUN OUT BEFORE YOU GOT MONEY TO BUY MORE.: OFTEN TRUE

## 2025-03-19 SDOH — ECONOMIC STABILITY: FOOD INSECURITY: WITHIN THE PAST 12 MONTHS, THE FOOD YOU BOUGHT JUST DIDN'T LAST AND YOU DIDN'T HAVE MONEY TO GET MORE.: OFTEN TRUE

## 2025-03-19 ASSESSMENT — PATIENT HEALTH QUESTIONNAIRE - PHQ9
3. TROUBLE FALLING OR STAYING ASLEEP: NOT AT ALL
7. TROUBLE CONCENTRATING ON THINGS, SUCH AS READING THE NEWSPAPER OR WATCHING TELEVISION: NOT AT ALL
6. FEELING BAD ABOUT YOURSELF - OR THAT YOU ARE A FAILURE OR HAVE LET YOURSELF OR YOUR FAMILY DOWN: NOT AT ALL
10. IF YOU CHECKED OFF ANY PROBLEMS, HOW DIFFICULT HAVE THESE PROBLEMS MADE IT FOR YOU TO DO YOUR WORK, TAKE CARE OF THINGS AT HOME, OR GET ALONG WITH OTHER PEOPLE: NOT DIFFICULT AT ALL
1. LITTLE INTEREST OR PLEASURE IN DOING THINGS: NOT AT ALL
9. THOUGHTS THAT YOU WOULD BE BETTER OFF DEAD, OR OF HURTING YOURSELF: NOT AT ALL
SUM OF ALL RESPONSES TO PHQ QUESTIONS 1-9: 0
4. FEELING TIRED OR HAVING LITTLE ENERGY: NOT AT ALL
SUM OF ALL RESPONSES TO PHQ QUESTIONS 1-9: 0
2. FEELING DOWN, DEPRESSED OR HOPELESS: NOT AT ALL
5. POOR APPETITE OR OVEREATING: NOT AT ALL
8. MOVING OR SPEAKING SO SLOWLY THAT OTHER PEOPLE COULD HAVE NOTICED. OR THE OPPOSITE, BEING SO FIGETY OR RESTLESS THAT YOU HAVE BEEN MOVING AROUND A LOT MORE THAN USUAL: NOT AT ALL

## 2025-03-19 ASSESSMENT — ENCOUNTER SYMPTOMS
BACK PAIN: 0
ABDOMINAL PAIN: 0
STRIDOR: 0
COUGH: 0
SHORTNESS OF BREATH: 0

## 2025-03-19 NOTE — PROGRESS NOTES
[facility]  [facility]  [address] [place] [address]  Dept: [phone]  Dept Fax: [phone]    Office Progress/Follow Up Note  Date ofpatient's visit: 3/19/2025  Patient's Name:  Delphine Flanagan YOB: 1965            Patient Care Team:  Joey Hancock MD as PCP - General (Internal Medicine)  Tina Padilla MD as Consulting Physician (Gastroenterology)  ================================================================    REASON FOR VISIT/CHIEF COMPLAINT:  Hypertension (Pt took medication today), Foot Pain (Both side, left side have mass on side of big toe, 2 month, pt states her toe are numb), and Hand Pain (Pt states figter on both hand are numb, x 2 month)    HISTORY OF PRESENTING ILLNESS:  History was obtained from: patient, electronic medical record. Delphine Allen a 60 y.o. past medical history of hypertension, depression, chronic gout, chronic pancreatitis secondary to alcohol abuse, osteoarthritis-s/p left hip replacement on 7/23 is here for a regular follow-up.  This visit patient states she has a small bump on her medial side of left big toe, callus-like, has been hurting for last few weeks.  Denies any change in footwear or trauma.  No redness, warmth no drainage, size of half centimeter by half centimeter.  Denies any fever, nausea, vomiting, change in appetite, dysuria, diarrhea  Also patient states she has been feeling that her bilateral fingertips are numb for last 2 months.  States they are unchanged, persistent.  Denies any weakness anywhere in the body.  Has a gouty arthritis with tophus and has been following up with rheumatologist.  Currently on allopurinol 300 OD and colchicine 0.6 mg daily.  Feels her gout is controlled.  Denies any pain in fingers or great toes  Patient's LDL was 146 in 8/24.  Has been getting Lipitor.   Had a DEXA scan done in 8/23 which was normal  Also patient states she was a heavy drinker in the past and has

## 2025-03-19 NOTE — PROGRESS NOTES
Attending Physician Statement  I have discussed the care of Delphine Flanagan, including pertinent history and exam findings with the resident. I have reviewed the key elements of all parts of the encounter with the resident.  I agree with the assessment, and status of the problem list as documented. The plan and orders should include   Orders Placed This Encounter   Procedures    MMR, M-M-R II, PRIORIX, (age 12 mo+) SC    Varicella, VARIVAX, (age 12 mo+), SC    COVID-19, MODERNA, (age 12y+), IM, PF, 50mcg/0.5mL    and this was also documented by the resident. The medication list was reviewed with the resident and is up to date. The return visit should be in 3 months .    Faizan Juarez MD  Attending Physician,  Department of Internal Medicine  [facility] Internal Medicine  [facility]      3/19/2025, 2:09 PM
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
## 2025-05-22 DIAGNOSIS — I10 ESSENTIAL HYPERTENSION: ICD-10-CM

## 2025-05-23 RX ORDER — MULTIVITAMIN
1 TABLET ORAL DAILY
Qty: 30 TABLET | Refills: 11 | Status: SHIPPED | OUTPATIENT
Start: 2025-05-23

## 2025-05-23 RX ORDER — ASPIRIN 81 MG/1
81 TABLET, COATED ORAL DAILY
Qty: 30 TABLET | Refills: 11 | Status: SHIPPED | OUTPATIENT
Start: 2025-05-23

## 2025-05-23 RX ORDER — POTASSIUM CHLORIDE 1500 MG/1
TABLET, EXTENDED RELEASE ORAL
Qty: 60 TABLET | Refills: 11 | Status: SHIPPED | OUTPATIENT
Start: 2025-05-23

## 2025-05-23 RX ORDER — ATENOLOL 50 MG/1
50 TABLET ORAL DAILY
Qty: 30 TABLET | Refills: 11 | Status: SHIPPED | OUTPATIENT
Start: 2025-05-23

## 2025-05-23 NOTE — TELEPHONE ENCOUNTER
Delphine Flanagan is calling to request a refill on the following medication(s):    Medication Request:  Requested Prescriptions     Pending Prescriptions Disp Refills    atenolol (TENORMIN) 50 MG tablet [Pharmacy Med Name: ATENOLOL 50 MG TABS 50 Tablet] 30 tablet 11     Sig: TAKE 1 TABLET BY MOUTH DAILY    ASPIRIN LOW DOSE 81 MG EC tablet [Pharmacy Med Name: ASPIRIN EC 81MG TABLET 81 Tablet] 30 tablet 11     Sig: TAKE 1 TABLET BY MOUTH DAILY    Multiple Vitamin (MULTIVITAMIN) TABS [Pharmacy Med Name: MULTIVITAMIN TABS Tablet] 30 tablet 11     Sig: TAKE 1 TABLET BY MOUTH EVERY DAY    potassium chloride (KLOR-CON M) 20 MEQ extended release tablet [Pharmacy Med Name: POTASSIUM CHL 20MEQ ELIGIO TAB 20 Tablet] 60 tablet 11     Sig: TAKE TWO TABLETS BY MOUTH EACH MORNING       Last Visit Date (If Applicable):  Visit date not found    Next Visit Date:    Visit date not found

## 2025-05-27 RX ORDER — PREDNISONE 10 MG/1
10 TABLET ORAL DAILY
Qty: 30 TABLET | Refills: 11 | Status: SHIPPED | OUTPATIENT
Start: 2025-05-27

## 2025-06-23 DIAGNOSIS — E61.1 IRON DEFICIENCY: ICD-10-CM

## 2025-06-23 DIAGNOSIS — T78.40XS ALLERGY, SEQUELA: ICD-10-CM

## 2025-06-24 RX ORDER — FERROUS SULFATE 325(65) MG
1 TABLET ORAL DAILY
Qty: 30 TABLET | Refills: 11 | Status: SHIPPED | OUTPATIENT
Start: 2025-06-24

## 2025-06-24 RX ORDER — LORATADINE 10 MG/1
TABLET ORAL
Qty: 30 TABLET | Refills: 11 | Status: SHIPPED | OUTPATIENT
Start: 2025-06-24

## 2025-06-24 NOTE — TELEPHONE ENCOUNTER
..Request for   Requested Prescriptions     Pending Prescriptions Disp Refills    FEROSUL 325 (65 Fe) MG tablet [Pharmacy Med Name: FERROUS SULF 325MG TAB  (65 FE) Tablet] 30 tablet 11     Sig: TAKE 1 TABLET BY MOUTH DAILY WITH BREAKFAST    loratadine (CLARITIN) 10 MG tablet [Pharmacy Med Name: LORATADINE 10 MG TABLET 10 Tablet] 30 tablet 11     Sig: TAKE 1 TABLET BY MOUTH ONCE DAILY AS NEEDED FOR ALLERGIES    .      Please review and e-scribe to pharmacy listed in chart if appropriate. Thank you.      Last Visit Date: Visit date not found  Next Visit Date: Visit date not found    Future Appointments   Date Time Provider Department Center   2/9/2026  8:00 AM Patsy Fuentes, APRN - CNP [facility]       Health Maintenance   Topic Date Due    Hepatitis B vaccine (1 of 3 - 19+ 3-dose series) 01/22/1985    Pneumococcal 50+ years Vaccine (2 of 2 - PCV) 05/09/2018    Shingles vaccine (2 of 2) 12/15/2021    COVID-19 Vaccine (6 - 2024-25 season) 09/01/2024    Lipids  08/15/2025    Breast cancer screen  11/25/2025    Depression Monitoring  03/19/2026    DTaP/Tdap/Td vaccine (3 - Td or Tdap) 04/14/2027    Colorectal Cancer Screen  12/03/2030    Flu vaccine  Completed    Hepatitis C screen  Completed    HIV screen  Completed    Hepatitis A vaccine  Aged Out    Hib vaccine  Aged Out    Polio vaccine  Aged Out    Meningococcal (ACWY) vaccine  Aged Out    Meningococcal B vaccine  Aged Out    A1C test (Diabetic or Prediabetic)  Discontinued    Diabetic Alb to Cr ratio (uACR) test  Discontinued    Diabetic retinal exam  Discontinued    Pneumococcal 0-49 years Vaccine  Discontinued    Diabetes screen  Discontinued    Cervical cancer screen  Discontinued       Hemoglobin A1C (%)   Date Value   08/15/2024 5.6   05/09/2023 5.9   06/11/2021 5.5             ( goal A1C is < 7)   No components found for: \"LABMICR\"  No components found for: \"LDLCHOLESTEROL\", \"LDLCALC\"    (goal LDL is <100)   AST (U/L)   Date Value

## 2025-06-26 NOTE — TELEPHONE ENCOUNTER
Request for potassium. Next jo on 1/18/23    Next Visit Date:  Future Appointments   Date Time Provider Anthony [place]   1/18/2023  1:00 PM Jeoy Amaya MD [facility]   Topic Date Due    Hepatitis B vaccine (1 of 3 - Risk 3-dose series) Never done    Diabetic retinal exam  11/19/2021    Shingles vaccine (2 of 2) 12/15/2021    COVID-19 Vaccine (4 - Booster for Moderna series) 01/07/2022    Flu vaccine (1) 08/01/2022    Breast cancer screen  10/10/2022    Lipids  02/11/2023    Depression Monitoring  02/17/2023    Diabetes screen  06/11/2024    DTaP/Tdap/Td vaccine (3 - Td or Tdap) 04/14/2027    Colorectal Cancer Screen  12/03/2030    Pneumococcal 0-64 years Vaccine  Completed    Hepatitis C screen  Completed    HIV screen  Completed    Hepatitis A vaccine  Aged Out    Hib vaccine  Aged Out    Meningococcal (ACWY) vaccine  Aged Out       Hemoglobin A1C (%)   Date Value   06/11/2021 5.5   03/21/2021 6.3 (H)   01/20/2021 5.9             ( goal A1C is < 7)   Microalb/Crt.  Ratio (mcg/mg creat)   Date Value   06/12/2019 147 (H)     LDL Cholesterol (mg/dL)   Date Value   02/11/2022 71       (goal LDL is <100)   AST (U/L)   Date Value   07/25/2022 42 (H)     ALT (U/L)   Date Value   07/25/2022 20     BUN (mg/dL)   Date Value   07/25/2022 6     BP Readings from Last 3 Encounters:   07/27/22 134/86   07/25/22 (!) 138/96   06/02/22 (!) 171/95          (goal 120/80)    All Future Testing planned in [facility]  Lab Frequency Next Occurrence   Uric Acid Once 07/27/2022   CBC with Auto Differential     Comprehensive Metabolic Panel           Patient Active Problem List:     Lung nodule     Atypical chest pain     Adrenal adenoma     Goiter     Essential hypertension     Enlarged tonsils     ACE inhibitor-aggravated angioedema     JONES (obstructive sleep apnea)     Dyslipidemia     IGT (impaired glucose tolerance)     Bipolar disorder (HCC)     Mild intermittent asthma without complication Inflammatory polyarthritis (HCC)     Seronegative rheumatoid arthritis (HCC)     Chronic diarrhea     Hypocalcemia     Hypomagnesemia     Troponin level elevated     Moderate malnutrition (HCC)     Esophageal thickening     Proctitis     Compression fracture of L2 (HCC)     Anemia, normocytic normochromic     Exocrine pancreatic insufficiency     Idiopathic 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

## 2025-07-15 ENCOUNTER — TELEPHONE (OUTPATIENT)
Age: 60
End: 2025-07-15

## (undated) DEVICE — FORCEPS BX L240CM WRK CHN 2.8MM STD CAP W/ NDL MIC MESH

## (undated) DEVICE — SUTURE NONABSORBABLE BRAIDED 2-0 CT-2 1X30 IN ETHBND EXCEL X411H

## (undated) DEVICE — FORCEPS BX L240CM JAW DIA22MM ORNG STD CAP W NDL RAD JAW 4

## (undated) DEVICE — SUTURE MCRYL + SZ 5 0 L18IN ABSRB UD PC 3 L16MM 3 8 CIR MCP844G

## (undated) DEVICE — BNDG,ELSTC,MATRIX,STRL,4"X5YD,LF,HOOK&LP: Brand: MEDLINE

## (undated) DEVICE — SNARE ENDOSCP M L240CM LOOP W27MM SHTH DIA2.4MM OVL FLX

## (undated) DEVICE — DRAPE,EXTREMITY,89X128,STERILE: Brand: MEDLINE

## (undated) DEVICE — DRAPE C ARM UNIV W41XL74IN CLR PLAS XR VELC CLSR POLY STRP

## (undated) DEVICE — Z DISCONTINUED BY MEDLINE USE 2711682 TRAY SKIN PREP DRY W/ PREM GLV

## (undated) DEVICE — GOWN,AURORA,NONREINFORCED,LARGE: Brand: MEDLINE

## (undated) DEVICE — GOWN,AURORA,NONRNF,XL,30/CS: Brand: MEDLINE

## (undated) DEVICE — BANDAGE,ELASTIC,ESMARK,STERILE,4"X9',LF: Brand: MEDLINE

## (undated) DEVICE — BANDAGE,GAUZE,BULKEE II,4.5"X4.1YD,STRL: Brand: MEDLINE

## (undated) DEVICE — INTENDED FOR TISSUE SEPARATION, AND OTHER PROCEDURES THAT REQUIRE A SHARP SURGICAL BLADE TO PUNCTURE OR CUT.: Brand: BARD-PARKER ® CARBON RIB-BACK BLADES

## (undated) DEVICE — SUTURE VIC + BR UD PS2 4-0 18IN VCP496G

## (undated) DEVICE — THIN OFFSET (9.0 X 0.38 X 31.0MM)

## (undated) DEVICE — SUTURE SUTTAPE L40IN DIA1.3MM NONABSORBABLE WHT BLU L26.5MM AR7500

## (undated) DEVICE — PADDING,UNDERCAST,COTTON, 4"X4YD STERILE: Brand: MEDLINE

## (undated) DEVICE — SOLUTION SCRB 4OZ 4% CHG H2O AIDED FOR PREOPERATIVE SKIN

## (undated) DEVICE — STRIP,CLOSURE,WOUND,MEDI-STRIP,1/2X4: Brand: MEDLINE

## (undated) DEVICE — SYRINGE MED 10ML LUERLOCK TIP W/O SFTY DISP

## (undated) DEVICE — NEEDLE FLTR 18GA L1.5IN MEM THK5UM BLNT DISP

## (undated) DEVICE — GLOVE SURG SZ 65 THK91MIL LTX FREE SYN POLYISOPRENE

## (undated) DEVICE — GAUZE,SPONGE,FLUFF,6"X6.75",STRL,5/TRAY: Brand: MEDLINE

## (undated) DEVICE — GLOVE ORTHO 8   MSG9480

## (undated) DEVICE — SUTURE VCRL SZ 4-0 L27IN ABSRB UD L26MM SH 1/2 CIR J415H

## (undated) DEVICE — SVMMC POD PK

## (undated) DEVICE — 3M™ STERI-STRIP™ COMPOUND BENZOIN TINCTURE 40 BAGS/CARTON 4 CARTONS/CASE C1544: Brand: 3M™ STERI-STRIP™

## (undated) DEVICE — NEEDLE SCLERO 25GA L240CM OD0.51MM ID0.24MM EXTN L4MM SHTH

## (undated) DEVICE — SUTURE LABRALTAPE L36IN DIA1.5MM NONABSORBABLE WHT POLY AR7276

## (undated) DEVICE — KIT SUT ANCHR SH W/ DRL GUID PNCH DRL BIT FOR 2.9X12.5MM

## (undated) DEVICE — TOURNIQUET CUF BLD PRESSURE 4X18 IN 2 PRT SINGLE BLDR RED

## (undated) DEVICE — DRAPE,T,LIMB,BILATERAL,STERILE: Brand: MEDLINE